# Patient Record
Sex: FEMALE | Race: WHITE | Employment: OTHER | URBAN - METROPOLITAN AREA
[De-identification: names, ages, dates, MRNs, and addresses within clinical notes are randomized per-mention and may not be internally consistent; named-entity substitution may affect disease eponyms.]

---

## 2017-06-04 ENCOUNTER — HOSPITAL ENCOUNTER (INPATIENT)
Age: 82
LOS: 2 days | Discharge: HOME HEALTH | DRG: 871 | End: 2017-06-06
Attending: EMERGENCY MEDICINE | Admitting: INTERNAL MEDICINE
Payer: MEDICARE

## 2017-06-04 ENCOUNTER — APPOINTMENT (OUTPATIENT)
Dept: GENERAL RADIOLOGY | Age: 82
DRG: 871 | End: 2017-06-04
Attending: EMERGENCY MEDICINE
Payer: MEDICARE

## 2017-06-04 DIAGNOSIS — J44.1 ACUTE EXACERBATION OF CHRONIC OBSTRUCTIVE PULMONARY DISEASE (COPD) (HCC): Primary | ICD-10-CM

## 2017-06-04 DIAGNOSIS — N28.9 ACUTE RENAL INSUFFICIENCY: ICD-10-CM

## 2017-06-04 DIAGNOSIS — N30.00 ACUTE CYSTITIS WITHOUT HEMATURIA: ICD-10-CM

## 2017-06-04 DIAGNOSIS — A41.9 SEPSIS, DUE TO UNSPECIFIED ORGANISM: ICD-10-CM

## 2017-06-04 PROBLEM — N17.9 ACUTE RENAL FAILURE (ARF) (HCC): Status: ACTIVE | Noted: 2017-06-04

## 2017-06-04 PROBLEM — J20.9 BRONCHITIS, ACUTE: Status: ACTIVE | Noted: 2017-06-04

## 2017-06-04 PROBLEM — J96.21 ACUTE AND CHRONIC RESPIRATORY FAILURE WITH HYPOXIA (HCC): Status: ACTIVE | Noted: 2017-06-04

## 2017-06-04 LAB
ALBUMIN SERPL BCP-MCNC: 2.9 G/DL (ref 3.5–5)
ALBUMIN/GLOB SERPL: 0.8 {RATIO} (ref 1.1–2.2)
ALP SERPL-CCNC: 62 U/L (ref 45–117)
ALT SERPL-CCNC: 20 U/L (ref 12–78)
ANION GAP BLD CALC-SCNC: 10 MMOL/L (ref 5–15)
APPEARANCE UR: ABNORMAL
AST SERPL W P-5'-P-CCNC: 31 U/L (ref 15–37)
ATRIAL RATE: 81 BPM
BACTERIA URNS QL MICRO: ABNORMAL /HPF
BASOPHILS # BLD AUTO: 0.2 K/UL (ref 0–0.1)
BASOPHILS # BLD: 1 % (ref 0–1)
BILIRUB SERPL-MCNC: 0.8 MG/DL (ref 0.2–1)
BILIRUB UR QL CFM: NEGATIVE
BUN SERPL-MCNC: 33 MG/DL (ref 6–20)
BUN/CREAT SERPL: 15 (ref 12–20)
CALCIUM SERPL-MCNC: 8.3 MG/DL (ref 8.5–10.1)
CALCULATED R AXIS, ECG10: -43 DEGREES
CALCULATED T AXIS, ECG11: 52 DEGREES
CHLORIDE SERPL-SCNC: 106 MMOL/L (ref 97–108)
CO2 SERPL-SCNC: 23 MMOL/L (ref 21–32)
COLOR UR: ABNORMAL
CREAT SERPL-MCNC: 2.22 MG/DL (ref 0.55–1.02)
DIAGNOSIS, 93000: NORMAL
DIFFERENTIAL METHOD BLD: ABNORMAL
EOSINOPHIL # BLD: 0 K/UL (ref 0–0.4)
EOSINOPHIL NFR BLD: 0 % (ref 0–7)
EPITH CASTS URNS QL MICRO: ABNORMAL /LPF
ERYTHROCYTE [DISTWIDTH] IN BLOOD BY AUTOMATED COUNT: 15.7 % (ref 11.5–14.5)
GLOBULIN SER CALC-MCNC: 3.6 G/DL (ref 2–4)
GLUCOSE SERPL-MCNC: 156 MG/DL (ref 65–100)
GLUCOSE UR STRIP.AUTO-MCNC: NEGATIVE MG/DL
HCT VFR BLD AUTO: 40.3 % (ref 35–47)
HGB BLD-MCNC: 13.1 G/DL (ref 11.5–16)
HGB UR QL STRIP: ABNORMAL
KETONES UR QL STRIP.AUTO: 15 MG/DL
LACTATE SERPL-SCNC: 1.6 MMOL/L (ref 0.4–2)
LACTATE SERPL-SCNC: 3.4 MMOL/L (ref 0.4–2)
LEUKOCYTE ESTERASE UR QL STRIP.AUTO: ABNORMAL
LYMPHOCYTES # BLD AUTO: 2 % (ref 12–49)
LYMPHOCYTES # BLD: 0.4 K/UL (ref 0.8–3.5)
MCH RBC QN AUTO: 29.6 PG (ref 26–34)
MCHC RBC AUTO-ENTMCNC: 32.5 G/DL (ref 30–36.5)
MCV RBC AUTO: 91.2 FL (ref 80–99)
MONOCYTES # BLD: 1.1 K/UL (ref 0–1)
MONOCYTES NFR BLD AUTO: 6 % (ref 5–13)
NEUTS BAND NFR BLD MANUAL: 5 % (ref 0–6)
NEUTS SEG # BLD: 16.1 K/UL (ref 1.8–8)
NEUTS SEG NFR BLD AUTO: 86 % (ref 32–75)
NITRITE UR QL STRIP.AUTO: NEGATIVE
PH UR STRIP: 5 [PH] (ref 5–8)
PLATELET # BLD AUTO: 250 K/UL (ref 150–400)
POTASSIUM SERPL-SCNC: 4.8 MMOL/L (ref 3.5–5.1)
PROT SERPL-MCNC: 6.5 G/DL (ref 6.4–8.2)
PROT UR STRIP-MCNC: 100 MG/DL
Q-T INTERVAL, ECG07: 444 MS
QRS DURATION, ECG06: 122 MS
QTC CALCULATION (BEZET), ECG08: 489 MS
RBC # BLD AUTO: 4.42 M/UL (ref 3.8–5.2)
RBC #/AREA URNS HPF: ABNORMAL /HPF (ref 0–5)
RBC MORPH BLD: ABNORMAL
SODIUM SERPL-SCNC: 139 MMOL/L (ref 136–145)
SP GR UR REFRACTOMETRY: 1.03 (ref 1–1.03)
TROPONIN I SERPL-MCNC: <0.04 NG/ML
UA: UC IF INDICATED,UAUC: ABNORMAL
UROBILINOGEN UR QL STRIP.AUTO: 1 EU/DL (ref 0.2–1)
VENTRICULAR RATE, ECG03: 73 BPM
WBC # BLD AUTO: 17.8 K/UL (ref 3.6–11)
WBC URNS QL MICRO: ABNORMAL /HPF (ref 0–4)

## 2017-06-04 PROCEDURE — 74011250637 HC RX REV CODE- 250/637: Performed by: INTERNAL MEDICINE

## 2017-06-04 PROCEDURE — 80053 COMPREHEN METABOLIC PANEL: CPT | Performed by: EMERGENCY MEDICINE

## 2017-06-04 PROCEDURE — 74011000258 HC RX REV CODE- 258: Performed by: EMERGENCY MEDICINE

## 2017-06-04 PROCEDURE — 83605 ASSAY OF LACTIC ACID: CPT | Performed by: INTERNAL MEDICINE

## 2017-06-04 PROCEDURE — 94640 AIRWAY INHALATION TREATMENT: CPT

## 2017-06-04 PROCEDURE — 71020 XR CHEST PA LAT: CPT

## 2017-06-04 PROCEDURE — 85025 COMPLETE CBC W/AUTO DIFF WBC: CPT | Performed by: EMERGENCY MEDICINE

## 2017-06-04 PROCEDURE — 87086 URINE CULTURE/COLONY COUNT: CPT | Performed by: EMERGENCY MEDICINE

## 2017-06-04 PROCEDURE — 74011000250 HC RX REV CODE- 250: Performed by: INTERNAL MEDICINE

## 2017-06-04 PROCEDURE — 87186 SC STD MICRODIL/AGAR DIL: CPT | Performed by: EMERGENCY MEDICINE

## 2017-06-04 PROCEDURE — 74011250636 HC RX REV CODE- 250/636: Performed by: EMERGENCY MEDICINE

## 2017-06-04 PROCEDURE — 74011000250 HC RX REV CODE- 250: Performed by: EMERGENCY MEDICINE

## 2017-06-04 PROCEDURE — 36415 COLL VENOUS BLD VENIPUNCTURE: CPT | Performed by: INTERNAL MEDICINE

## 2017-06-04 PROCEDURE — 87040 BLOOD CULTURE FOR BACTERIA: CPT | Performed by: EMERGENCY MEDICINE

## 2017-06-04 PROCEDURE — 77010033678 HC OXYGEN DAILY

## 2017-06-04 PROCEDURE — 77030029684 HC NEB SM VOL KT MONA -A

## 2017-06-04 PROCEDURE — 99284 EMERGENCY DEPT VISIT MOD MDM: CPT

## 2017-06-04 PROCEDURE — 93005 ELECTROCARDIOGRAM TRACING: CPT

## 2017-06-04 PROCEDURE — 87077 CULTURE AEROBIC IDENTIFY: CPT | Performed by: EMERGENCY MEDICINE

## 2017-06-04 PROCEDURE — 83605 ASSAY OF LACTIC ACID: CPT | Performed by: EMERGENCY MEDICINE

## 2017-06-04 PROCEDURE — 74011250636 HC RX REV CODE- 250/636: Performed by: INTERNAL MEDICINE

## 2017-06-04 PROCEDURE — 65270000015 HC RM PRIVATE ONCOLOGY

## 2017-06-04 PROCEDURE — 81001 URINALYSIS AUTO W/SCOPE: CPT | Performed by: EMERGENCY MEDICINE

## 2017-06-04 PROCEDURE — 84484 ASSAY OF TROPONIN QUANT: CPT | Performed by: EMERGENCY MEDICINE

## 2017-06-04 RX ORDER — ONDANSETRON 2 MG/ML
4 INJECTION INTRAMUSCULAR; INTRAVENOUS
Status: DISCONTINUED | OUTPATIENT
Start: 2017-06-04 | End: 2017-06-06 | Stop reason: HOSPADM

## 2017-06-04 RX ORDER — ACETAMINOPHEN 325 MG/1
650 TABLET ORAL
Status: DISCONTINUED | OUTPATIENT
Start: 2017-06-04 | End: 2017-06-06 | Stop reason: HOSPADM

## 2017-06-04 RX ORDER — UMECLIDINIUM BROMIDE AND VILANTEROL TRIFENATATE 62.5; 25 UG/1; UG/1
1 POWDER RESPIRATORY (INHALATION) DAILY
COMMUNITY
Start: 2017-05-08 | End: 2021-01-01

## 2017-06-04 RX ORDER — HEPARIN SODIUM 5000 [USP'U]/ML
5000 INJECTION, SOLUTION INTRAVENOUS; SUBCUTANEOUS EVERY 8 HOURS
Status: DISCONTINUED | OUTPATIENT
Start: 2017-06-04 | End: 2017-06-06 | Stop reason: HOSPADM

## 2017-06-04 RX ORDER — PANTOPRAZOLE SODIUM 40 MG/1
40 TABLET, DELAYED RELEASE ORAL
Status: DISCONTINUED | OUTPATIENT
Start: 2017-06-04 | End: 2017-06-04

## 2017-06-04 RX ORDER — IPRATROPIUM BROMIDE AND ALBUTEROL SULFATE 2.5; .5 MG/3ML; MG/3ML
3 SOLUTION RESPIRATORY (INHALATION)
Status: DISCONTINUED | OUTPATIENT
Start: 2017-06-04 | End: 2017-06-04

## 2017-06-04 RX ORDER — AZELASTINE 1 MG/ML
1 SPRAY, METERED NASAL DAILY
Status: DISCONTINUED | OUTPATIENT
Start: 2017-06-04 | End: 2017-06-06 | Stop reason: HOSPADM

## 2017-06-04 RX ORDER — FAMOTIDINE 20 MG/1
20 TABLET, FILM COATED ORAL EVERY 12 HOURS
Status: DISCONTINUED | OUTPATIENT
Start: 2017-06-04 | End: 2017-06-05

## 2017-06-04 RX ORDER — SODIUM CHLORIDE 0.9 % (FLUSH) 0.9 %
5-10 SYRINGE (ML) INJECTION EVERY 8 HOURS
Status: DISCONTINUED | OUTPATIENT
Start: 2017-06-04 | End: 2017-06-06 | Stop reason: HOSPADM

## 2017-06-04 RX ORDER — SODIUM CHLORIDE 0.9 % (FLUSH) 0.9 %
5-10 SYRINGE (ML) INJECTION AS NEEDED
Status: DISCONTINUED | OUTPATIENT
Start: 2017-06-04 | End: 2017-06-06 | Stop reason: HOSPADM

## 2017-06-04 RX ORDER — GUAIFENESIN/DEXTROMETHORPHAN 100-10MG/5
10 SYRUP ORAL
Status: DISCONTINUED | OUTPATIENT
Start: 2017-06-04 | End: 2017-06-06 | Stop reason: HOSPADM

## 2017-06-04 RX ORDER — FAMOTIDINE 20 MG/1
20 TABLET, FILM COATED ORAL DAILY
Status: DISCONTINUED | OUTPATIENT
Start: 2017-06-05 | End: 2017-06-04

## 2017-06-04 RX ORDER — ASPIRIN 81 MG/1
81 TABLET ORAL DAILY
Status: DISCONTINUED | OUTPATIENT
Start: 2017-06-04 | End: 2017-06-06 | Stop reason: HOSPADM

## 2017-06-04 RX ORDER — AZELASTINE HCL 205.5 UG/1
2 SPRAY NASAL 2 TIMES DAILY
COMMUNITY
Start: 2017-05-11 | End: 2019-12-15

## 2017-06-04 RX ORDER — SODIUM CHLORIDE 9 MG/ML
100 INJECTION, SOLUTION INTRAVENOUS CONTINUOUS
Status: DISCONTINUED | OUTPATIENT
Start: 2017-06-04 | End: 2017-06-04

## 2017-06-04 RX ORDER — IPRATROPIUM BROMIDE AND ALBUTEROL SULFATE 2.5; .5 MG/3ML; MG/3ML
3 SOLUTION RESPIRATORY (INHALATION)
Status: DISCONTINUED | OUTPATIENT
Start: 2017-06-04 | End: 2017-06-06 | Stop reason: HOSPADM

## 2017-06-04 RX ORDER — IPRATROPIUM BROMIDE AND ALBUTEROL SULFATE 2.5; .5 MG/3ML; MG/3ML
3 SOLUTION RESPIRATORY (INHALATION) ONCE
Status: COMPLETED | OUTPATIENT
Start: 2017-06-04 | End: 2017-06-04

## 2017-06-04 RX ORDER — GUAIFENESIN 600 MG/1
600 TABLET, EXTENDED RELEASE ORAL EVERY 12 HOURS
Status: DISCONTINUED | OUTPATIENT
Start: 2017-06-04 | End: 2017-06-06 | Stop reason: HOSPADM

## 2017-06-04 RX ADMIN — IPRATROPIUM BROMIDE AND ALBUTEROL SULFATE 3 ML: .5; 3 SOLUTION RESPIRATORY (INHALATION) at 07:26

## 2017-06-04 RX ADMIN — HEPARIN SODIUM 5000 UNITS: 5000 INJECTION, SOLUTION INTRAVENOUS; SUBCUTANEOUS at 10:07

## 2017-06-04 RX ADMIN — IPRATROPIUM BROMIDE AND ALBUTEROL SULFATE 3 ML: .5; 3 SOLUTION RESPIRATORY (INHALATION) at 03:13

## 2017-06-04 RX ADMIN — HEPARIN SODIUM 5000 UNITS: 5000 INJECTION, SOLUTION INTRAVENOUS; SUBCUTANEOUS at 18:48

## 2017-06-04 RX ADMIN — SODIUM CHLORIDE 1100 ML: 900 INJECTION, SOLUTION INTRAVENOUS at 03:21

## 2017-06-04 RX ADMIN — SODIUM CHLORIDE 1000 ML: 900 INJECTION, SOLUTION INTRAVENOUS at 01:57

## 2017-06-04 RX ADMIN — ASPIRIN 81 MG: 81 TABLET, COATED ORAL at 09:01

## 2017-06-04 RX ADMIN — SODIUM CHLORIDE 100 ML/HR: 900 INJECTION, SOLUTION INTRAVENOUS at 05:15

## 2017-06-04 RX ADMIN — CEFTRIAXONE SODIUM 2 G: 2 INJECTION, POWDER, FOR SOLUTION INTRAMUSCULAR; INTRAVENOUS at 03:06

## 2017-06-04 RX ADMIN — AZELASTINE HYDROCHLORIDE 1 SPRAY: 137 SPRAY, METERED NASAL at 09:01

## 2017-06-04 RX ADMIN — ACETAMINOPHEN 650 MG: 325 TABLET, FILM COATED ORAL at 10:06

## 2017-06-04 RX ADMIN — METHYLPREDNISOLONE SODIUM SUCCINATE 40 MG: 40 INJECTION, POWDER, FOR SOLUTION INTRAMUSCULAR; INTRAVENOUS at 05:30

## 2017-06-04 RX ADMIN — SALMETEROL XINAFOATE 1 PUFF: 50 POWDER, METERED ORAL; RESPIRATORY (INHALATION) at 10:07

## 2017-06-04 RX ADMIN — METHYLPREDNISOLONE SODIUM SUCCINATE 40 MG: 40 INJECTION, POWDER, FOR SOLUTION INTRAMUSCULAR; INTRAVENOUS at 20:36

## 2017-06-04 RX ADMIN — IPRATROPIUM BROMIDE AND ALBUTEROL SULFATE 3 ML: .5; 3 SOLUTION RESPIRATORY (INHALATION) at 02:53

## 2017-06-04 RX ADMIN — AZITHROMYCIN MONOHYDRATE 500 MG: 500 INJECTION, POWDER, LYOPHILIZED, FOR SOLUTION INTRAVENOUS at 05:30

## 2017-06-04 RX ADMIN — GUAIFENESIN 600 MG: 600 TABLET, EXTENDED RELEASE ORAL at 09:01

## 2017-06-04 RX ADMIN — SALMETEROL XINAFOATE 1 PUFF: 50 POWDER, METERED ORAL; RESPIRATORY (INHALATION) at 20:35

## 2017-06-04 RX ADMIN — GUAIFENESIN 600 MG: 600 TABLET, EXTENDED RELEASE ORAL at 20:36

## 2017-06-04 RX ADMIN — FAMOTIDINE 20 MG: 20 TABLET ORAL at 21:45

## 2017-06-04 RX ADMIN — IPRATROPIUM BROMIDE AND ALBUTEROL SULFATE 3 ML: .5; 3 SOLUTION RESPIRATORY (INHALATION) at 11:24

## 2017-06-04 RX ADMIN — Medication 10 ML: at 21:46

## 2017-06-04 RX ADMIN — IPRATROPIUM BROMIDE AND ALBUTEROL SULFATE 3 ML: .5; 3 SOLUTION RESPIRATORY (INHALATION) at 15:14

## 2017-06-04 RX ADMIN — UMECLIDINIUM 1 PUFF: 62.5 AEROSOL, POWDER ORAL at 10:08

## 2017-06-04 NOTE — PROGRESS NOTES
Pt admitted earlier this morning by my partner. She is seen on rounds today as courtesy. Pt without complaints currently, says she is feeling better this morning. IMP/PLAN:  Sepsis and acute on chronic hypoxic respiratory failure due to acute bronchitis and acute COPD exacerbation:  Baseline 2 LPM O2. Normal stress testing and EF 3/16. Follows with Dr. Babs Andersen. - CXR on admission with no acute abnormality  - blood cultures sent, will follow  - decrease steroids. Will con't rocephin, azithromycin today - possible transition tomorrow  - weaning O2 as able  - con't mucinex, incruse, salmeterol   Acute kidney injury due to dehydration in setting of solitary kidney:  - UA reviewed with 2+ bacteria but many epithelial cells. Sent for culture will follow. Con't emperic rocephin for now. - given IV fluids overnight, will try off fluids today if po intake remains good  Nausea/Vomiting/Diarrhea:  Pt says sx resolving  - prn zofran   - protonix changed to pepcid  PAD s/p L leg stent:  con't ASA      Code Status: DNR  Surrogate Decision Maker: frances France Bachelor [phone]  DVT Prophylaxis: heparin     Baseline: Pt is independent at home, lives alone    No charge.

## 2017-06-04 NOTE — PROGRESS NOTES
Pharmacy Automatic Renal Dosing    Medication: famotidine   Current regimen:  20 mg twice daily  Recent Labs      06/04/17   0057   CREA  2.22*   BUN  33*     Creatinine Clearance (ml/min): 18      Impression/Plan: dose reduced to 20 mg daily for Crcl < [address]/Soy Guzmán Final SAMARA BradleyD

## 2017-06-04 NOTE — H&P
Hospitalist Admission Note    NAME: Oralia Giraldo   :  11/10/1931   MRN:  [id number]     Date/Time:  2017 2:30 AM    Patient PCP: Maykel Gunn MD Pulm= Dr Raffy Rust , Vascular Sx= Dr Chinyere aCrcamo  ________________________________________________________________________    My assessment of this patient's clinical condition and my plan of care is as follows. Assessment / Plan:  Acute on chronic Resp Failure with Hypoxia POA- on 2L/m at home at baseline, needing 5L/m at the moment  Due to Acute COPD exacerbation POA  Due to Acute Bronchitis POA  Sepsis POA due to above  CXR neg  Cr= 2.2  WBC= 17.8    Admit to stepdown bed as pt is at high risk for further respiratory deterioration  Oxygen to keep sats >90%, taper as able in next 24-48 hrs to baseline 2L/m  Empiric Rocephin + Azithromycin  S/p 1L NS bolus x1 in ER, check lactate, if >2.0 give total 30ml/kg NS bolus  Then cont IVF at 100ml/hr   Follow Blood & Sputum Cx  Repeat CXR in 24-48 hrs after hydration  Cont Anora ellipta or equivalent here  Duoneb Q 4 RT  IV solumedrol Q 8 for now, taper quickly  Mucinex, robitussin DM prn    Acute Renal Failure POA  Dehydration  Solitary Kidney (known since 5yrs old)  Nausea/Vomiting/Diarrhea POA - likely Gastritis POA    IVF  BMP in AM  Check renal USG if Cr not improved in 24 hrs  IV PPI for Gastritis  IV zofran prn    PAD s/p L Leg stent  Cont ASA        Code Status: DNR as per pt's wishes in ER  Surrogate Decision Maker: son Jose Carlos Catalan @ [phone]    DVT Prophylaxis: SQ heparin  GI Prophylaxis: IV protonix as above    Baseline: Pt is independent at home, lives alone        Subjective:   CHIEF COMPLAINT: Worsening SOB with cough x 2 days    HISTORY OF PRESENT ILLNESS:     Yohana Aranda is a 80 y.o.  female who presents with above complains from home ambulatory. Pt complains of Worsening SOB x 2 days.   H/o associated cough with yellowish sputum  No h/o fever, chills or rigors  H/o taking PO azithromycin MWF as per the pulm Dr Oscar Gonzalez at baseline  H/o gastric upset causing nausea/vomiting/diarrhea & decreased PO intake as per the pt x few days    Pt was found to have WBC 17K with left shift in ER with neg CXR & renal failure on workup. We were asked to admit for work up and evaluation of the above problems. Past Medical History:   Diagnosis Date    Arthritis     generalized    COPD     former smoker      >60pkyr quit 1/3/11    h/o DVT 1955    left leg    ischemic left lower extremitiy pain     above knee FemPop 1/3/11    PVD (peripheral vascular disease) ([facility] [address]     Seasonal allergic rhinitis     Single kidney         Past Surgical History:   Procedure Laterality Date    BREAST SURGERY PROCEDURE UNLISTED  1955    excision left breast cyst    HX GI  10 yrs ago    colonoscopy    HX GYN      3 miscarriges    HX GYN      1 vaginal birth   [de-identified] ORTHOPAEDIC      veinography left leg, stent left leg       Social History   Substance Use Topics    Smoking status: Former Smoker     Packs/day: 1.00     Years: 60.00     Quit date: 1/3/2011    Smokeless tobacco: Never Used    Alcohol use Yes      Comment: occasional liquor after dinner        Family History   Problem Relation Age of Onset    Cancer Mother      colon    Cancer Sister      lung     Allergies   Allergen Reactions    Food Extracts Diarrhea     Onions and garlic causes abdominal pain,cramping     Sulfa (Sulfonamide Antibiotics) Other (comments)     Altered Mental Status        Prior to Admission medications    Medication Sig Start Date End Date Taking?  Authorizing Provider   Azelastine (ASTEPRO) 0.15 % (205.5 mcg) nasal spray  5/11/17   Historical Provider   ANORO ELLIPTA 62.5-25 mcg/actuation inhaler  5/8/17   Historical Provider   COMBIVENT RESPIMAT  mcg/actuation inhaler INHALE 1 PUFF THREE TIMES A DAY 5/9/17   Kojo Villeda MD   triamcinolone acetonide (KENALOG) 0.1 % topical cream Apply  to affected area two (2) times daily as needed for Skin Irritation. use thin layer 3/12/13   Latha Hidalgo MD   aspirin delayed-release 81 mg tablet Take  by mouth daily. Historical Provider       REVIEW OF SYSTEMS:           Total of 12 systems reviewed as follows:       POSITIVE= underlined text  Negative = text not underlined  General:  fever, chills, sweats, generalized weakness, weight loss/gain,      loss of appetite   Eyes:    blurred vision, eye pain, loss of vision, double vision  ENT:    rhinorrhea, pharyngitis   Respiratory:   cough, sputum production, SOB, GODINEZ, wheezing, pleuritic pain   Cardiology:   chest pain, palpitations, orthopnea, PND, edema, syncope   Gastrointestinal:  abdominal pain , N/V, diarrhea, dysphagia, constipation, bleeding   Genitourinary:  frequency, urgency, dysuria, hematuria, incontinence   Muskuloskeletal :  arthralgia, myalgia, back pain  Hematology:  easy bruising, nose or gum bleeding, lymphadenopathy   Dermatological: rash, ulceration, pruritis, color change / jaundice  Endocrine:   hot flashes or polydipsia   Neurological:  headache, dizziness, confusion, focal weakness, paresthesia,     Speech difficulties, memory loss, gait difficulty  Psychological: Feelings of anxiety, depression, agitation    Objective:   VITALS:    Visit Vitals    /44 (BP 1 Location: Right arm)    Pulse 80    Temp 98.7 °F (37.1 °C)    Resp 24    Ht 5' 8\" (1.727 m)    Wt 68 kg (150 lb)    SpO2 92%    BMI 22.81 kg/m2       PHYSICAL EXAM:    General:    Alert, cooperative, Moderate Resp distress noted+, appears stated age. HEENT: Atraumatic, anicteric sclerae, pink conjunctivae     No oral ulcers, mucosa moist, throat clear, dentition fair  Neck:  Supple, symmetrical,  thyroid: non tender  Lungs:   Bilateral Wheezing noted +. No rales. Chest wall:  No tenderness  No Accessory muscle use. Heart:   Regular  rhythm,  No  murmur   No edema  Abdomen:   Soft, non-tender. Not distended.   Bowel sounds normal  Extremities: No cyanosis. No clubbing,      Skin turgor normal, Capillary refill normal, Radial dial pulse 2+  Skin:     Not pale. Not Jaundiced  No rashes   Psych:  Good insight. Not depressed. Not anxious or agitated. Neurologic: EOMs intact. No facial asymmetry. No aphasia or slurred speech. Symmetrical strength, Sensation grossly intact. Alert and oriented X 4.     _______________________________________________________________________  Care Plan discussed with:    Comments   Patient x    Family  x Son & DIL at bedside in ER   RN x    Care Manager                    Consultant:  ana Heath   _______________________________________________________________________  Expected  Disposition:   Home with Family x   HH/PT/OT/RN ?   SNF/LTC    JORGE ALBERTO    ________________________________________________________________________  TOTAL TIME:  54 Minutes    Critical Care Provided     Minutes non procedure based      Comments    x Reviewed previous records   >50% of visit spent in counseling and coordination of care  Discussion with patient and/or family and questions answered       ________________________________________________________________________  Signed: Willian Gamboa MD    Procedures: see electronic medical records for all procedures/Xrays and details which were not copied into this note but were reviewed prior to creation of Plan.     LAB DATA REVIEWED:    Recent Results (from the past 24 hour(s))   EKG, 12 LEAD, INITIAL    Collection Time: 06/04/17 12:47 AM   Result Value Ref Range    Ventricular Rate 73 BPM    Atrial Rate 81 BPM    QRS Duration 122 ms    Q-T Interval 444 ms    QTC Calculation (Bezet) 489 ms    Calculated R Axis -43 degrees    Calculated T Axis 52 degrees    Diagnosis       Wide QRS rhythm  Left axis deviation  Left bundle branch block  When compared with ECG of 03-FEB-2011 18:13,  Wide QRS rhythm has replaced Sinus rhythm     CBC WITH AUTOMATED DIFF    Collection Time: 06/04/17 12:57 AM   Result Value Ref Range    WBC 17.8 (H) 3.6 - 11.0 K/uL    RBC 4.42 3.80 - 5.20 M/uL    HGB 13.1 11.5 - 16.0 g/dL    HCT 40.3 35.0 - 47.0 %    MCV 91.2 80.0 - 99.0 FL    MCH 29.6 26.0 - 34.0 PG    MCHC 32.5 30.0 - 36.5 g/dL    RDW 15.7 (H) 11.5 - 14.5 %    PLATELET 164 804 - 027 K/uL    NEUTROPHILS 86 (H) 32 - 75 %    BAND NEUTROPHILS 5 0 - 6 %    LYMPHOCYTES 2 (L) 12 - 49 %    MONOCYTES 6 5 - 13 %    EOSINOPHILS 0 0 - 7 %    BASOPHILS 1 0 - 1 %    ABS. NEUTROPHILS 16.1 (H) 1.8 - 8.0 K/UL    ABS. LYMPHOCYTES 0.4 (L) 0.8 - 3.5 K/UL    ABS. MONOCYTES 1.1 (H) 0.0 - 1.0 K/UL    ABS. EOSINOPHILS 0.0 0.0 - 0.4 K/UL    ABS. BASOPHILS 0.2 (H) 0.0 - 0.1 K/UL    RBC COMMENTS NORMOCYTIC, NORMOCHROMIC      DF MANUAL     METABOLIC PANEL, COMPREHENSIVE    Collection Time: 06/04/17 12:57 AM   Result Value Ref Range    Sodium 139 136 - 145 mmol/L    Potassium 4.8 3.5 - 5.1 mmol/L    Chloride 106 97 - 108 mmol/L    CO2 23 21 - 32 mmol/L    Anion gap 10 5 - 15 mmol/L    Glucose 156 (H) 65 - 100 mg/dL    BUN 33 (H) 6 - 20 MG/DL    Creatinine 2.22 (H) 0.55 - 1.02 MG/DL    BUN/Creatinine ratio 15 12 - 20      GFR est AA 25 (L) >60 ml/min/1.73m2    GFR est non-AA 21 (L) >60 ml/min/1.73m2    Calcium 8.3 (L) 8.5 - 10.1 MG/DL    Bilirubin, total 0.8 0.2 - 1.0 MG/DL    ALT (SGPT) 20 12 - 78 U/L    AST (SGOT) 31 15 - 37 U/L    Alk.  phosphatase 62 45 - 117 U/L    Protein, total 6.5 6.4 - 8.2 g/dL    Albumin 2.9 (L) 3.5 - 5.0 g/dL    Globulin 3.6 2.0 - 4.0 g/dL    A-G Ratio 0.8 (L) 1.1 - 2.2     TROPONIN I    Collection Time: 06/04/17 12:57 AM   Result Value Ref Range    Troponin-I, Qt. <0.04 <0.05 ng/mL

## 2017-06-04 NOTE — PROGRESS NOTES
Bedside shift change report given to [facility] (oncoming nurse) by Rosa Stone (offgoing nurse). Report included the following information SBAR, Kardex, Intake/Output, MAR, Recent Results and Cardiac Rhythm NSR. TRANSFER - OUT REPORT:    Verbal report given to Pam Christianson (name) on Edmond Najera  being transferred to Oncology (unit) for routine progression of care       Report consisted of patients Situation, Background, Assessment and   Recommendations(SBAR). Information from the following report(s) SBAR, Kardex, Intake/Output, MAR, Recent Results and Cardiac Rhythm NSR was reviewed with the receiving nurse. Lines:   Peripheral IV 06/04/17 Left Antecubital (Active)   Site Assessment Clean, dry, & intact 6/4/2017  4:21 PM   Phlebitis Assessment 0 6/4/2017  4:21 PM   Infiltration Assessment 0 6/4/2017  4:21 PM   Dressing Status Clean, dry, & intact 6/4/2017  4:21 PM   Dressing Type Transparent;Tape 6/4/2017  4:21 PM   Hub Color/Line Status Pink;Capped 6/4/2017  4:21 PM   Alcohol Cap Used No 6/4/2017  4:21 PM        Opportunity for questions and clarification was provided.       Patient transported with:   Oxygen at 3 liters  RN

## 2017-06-04 NOTE — IP AVS SNAPSHOT
[address] [facility]. 
[phone] Patient: Montserrat Rodriguez MRN: [id number] You are allergic to the following Allergen Reactions Food Extracts Diarrhea Onions and garlic causes abdominal pain,cramping   
    
 Sulfa (Sulfonamide Antibiotics) Other (comments) Altered Mental Status Recent Documentation Height Weight Breastfeeding? BMI OB Status Smoking Status 1.727 m 68 kg No 22.81 kg/m2 Postmenopausal Former Smoker Unresulted Labs Order Current Status CULTURE, BLOOD Preliminary result CULTURE, BLOOD, PAIRED Preliminary result CULTURE, RESPIRATORY/SPUTUM/BRONCH W GRAM STAIN Preliminary result CULTURE, URINE Preliminary result Emergency Contacts  (Rel.) Home Phone Work Phone Mobile Phone Damon Capone (Spouse) [phone] -- -- About your hospitalization You were admitted on:  June 4, 2017 You last received care in the:  [facility] MEDICAL ONCOLOGY You were discharged on:  June 6, 2017 Why you were hospitalized Your primary diagnosis was:  Not on File Your diagnoses also included:  Bronchitis, Acute, Copd Exacerbation (Hcc), Acute Renal Failure (Arf) (Hcc), Acute And Chronic Respiratory Failure With Hypoxia (Hcc), Sepsis (Hcc) Providers Seen During Your Hospitalizations Provider Role Specialty Primary office phone Rufus Hendrickson MD Attending Provider Emergency Medicine [phone] Selene Gonsalez MD Attending Provider Internal Medicine [phone] Your Primary Care Physician (PCP) Primary Care Physician Office Phone Office Fax Abdifinn Potter [phone] Follow-up Information Follow up With Details Comments Contact Info Keith Terrazas MD  As needed [address] 
[phone] Michelle Galarza MD In 2 weeks  [address] [facility] [address] [facility] 
[phone] Current Discharge Medication List  
  
START taking these medications Dose & Instructions Dispensing Information Comments Morning Noon Evening Bedtime  
 guaiFENesin  mg ER tablet Commonly known as:  Adrian & Adrian Your last dose was: Your next dose is:    
   
   
 Dose:  600 mg Take 1 Tab by mouth two (2) times a day for 7 days. Quantity:  14 Tab Refills:  0  
     
   
   
   
  
 guaiFENesin-dextromethorphan 100-10 mg/5 mL syrup Commonly known as:  ROBITUSSIN DM Your last dose was: Your next dose is:    
   
   
 Dose:  5 mL Take 5 mL by mouth every six (6) hours as needed for up to 10 days. Quantity:  118 mL Refills:  0  
     
   
   
   
  
 levoFLOXacin 750 mg tablet Commonly known as:  Rick Curtis Your last dose was: Your next dose is:    
   
   
 Dose:  750 mg Take 1 Tab by mouth every fourty-eight (48) hours for 7 days. Quantity:  3 Tab Refills:  0  
     
   
   
   
  
 predniSONE 20 mg tablet Commonly known as:  Luis A Shelley Your last dose was: Your next dose is:    
   
   
 Dose:  40 mg Take 2 Tabs by mouth daily for 7 days. Quantity:  14 Tab Refills:  0 CONTINUE these medications which have CHANGED Dose & Instructions Dispensing Information Comments Morning Noon Evening Bedtime * COMBIVENT RESPIMAT  mcg/actuation inhaler Generic drug:  ipratropium-albuterol What changed:  Another medication with the same name was added. Make sure you understand how and when to take each. Your last dose was: Your next dose is:    
   
   
 INHALE 1 PUFF THREE TIMES A DAY Quantity:  1 Inhaler Refills:  11  
     
   
   
   
  
 * albuterol-ipratropium 2.5 mg-0.5 mg/3 ml Nebu Commonly known as:  Izola Cons  
 What changed: You were already taking a medication with the same name, and this prescription was added. Make sure you understand how and when to take each. Your last dose was: Your next dose is:    
   
   
 Dose:  3 mL  
3 mL by Nebulization route three (3) times daily. Quantity:  90 Nebule Refills:  0  
     
   
   
   
  
 * Notice: This list has 2 medication(s) that are the same as other medications prescribed for you. Read the directions carefully, and ask your doctor or other care provider to review them with you. CONTINUE these medications which have NOT CHANGED Dose & Instructions Dispensing Information Comments Morning Noon Evening Bedtime ANORO ELLIPTA 62.5-25 mcg/actuation inhaler Generic drug:  umeclidinium-vilanterol Your last dose was: Your next dose is:    
   
   
  Refills:  0  
     
   
   
   
  
 aspirin delayed-release 81 mg tablet Your last dose was: Your next dose is: Take  by mouth daily. Refills:  0 Azelastine 0.15 % (205.5 mcg) nasal spray Commonly known as:  ASTEPRO Your last dose was: Your next dose is:    
   
   
  Refills:  0  
     
   
   
   
  
 triamcinolone acetonide 0.1 % topical cream  
Commonly known as:  KENALOG Your last dose was: Your next dose is:    
   
   
 Apply  to affected area two (2) times daily as needed for Skin Irritation. use thin layer Quantity:  80 g Refills:  5 Where to Get Your Medications Information on where to get these meds will be given to you by the nurse or doctor. ! Ask your nurse or doctor about these medications  
  albuterol-ipratropium 2.5 mg-0.5 mg/3 ml Nebu  
 guaiFENesin  mg ER tablet  
 guaiFENesin-dextromethorphan 100-10 mg/5 mL syrup  
 levoFLOXacin 750 mg tablet  
 predniSONE 20 mg tablet Discharge Instructions HOSPITALIST DISCHARGE INSTRUCTIONS 
 
NAME: Heidi Bauer :  11/10/1931 MRN:  [id number] Date/Time:  2017 11:38 AM 
 
ADMIT DATE: 2017 DISCHARGE DATE: 2017 Attending Physician: Karley Tomlinson MD 
 
DISCHARGE DIAGNOSIS: 
Sepsis and acute on chronic hypoxic respiratory failure due to acute bronchitis and acute COPD exacerbation Acute kidney injury due to dehydration, resolved Nausea/Vomiting/Diarrhea MEDICATIONS: 
See above · It is important that you take the medication exactly as they are prescribed. · Keep your medication in the bottles provided by the pharmacist and keep a list of the medication names, dosages, and times to be taken in your wallet. · Do not take other medications without consulting your doctor. Pain Management: per above medications What to do at [facility] Recommended diet:  Resume previous diet Recommended activity: Activity as tolerated If you have questions regarding the hospital related prescriptions or hospital related issues please call [facility] at . You can always direct your questions to your primary care doctor if you are unable to reach your hospital physician; your PCP works as an extension of your hospital doctor just like your hospital doctor is an extension of your PCP for your time at [facility]. If you experience any of the following symptoms then please call your primary care physician or return to the emergency room if you cannot get hold of your doctor: 
Fever, chills, nausea, vomiting, diarrhea, change in mentation, falling, bleeding, shortness of breath Additional Instructions: 
 
I recommend that you take an over-the-counter probiotic (such as Align, Culturelle, or store generic) while you are on antibiotics. For your constipation, I recommend that you start docusate (Colace) 100mg by mouth twice daily - this is available over the counter. Bring these papers with you to your follow up appointments. The papers will help your doctors be sure to continue the care plan from the hospital. 
 
 
 
 
 
 
Information obtained by : 
I understand that if any problems occur once I am at home I am to contact my physician. I understand and acknowledge receipt of the instructions indicated above. Physician's or R.N.'s Signature                                                                  Date/Time Patient or Representative Signature                                                          Date/Time Discharge Orders None [employer] Announcement We are excited to announce that we are making your provider's discharge notes available to you in [employer]. You will see these notes when they are completed and signed by the physician that discharged you from your recent hospital stay. If you have any questions or concerns about any information you see in [employer], please call the Health Information Department where you were seen or reach out to your Primary Care Provider for more information about your plan of care. Introducing [facility] & [facility] SERVICES! Hilaria Escobar introduces [employer] patient portal. Now you can access parts of your medical record, email your doctor's office, and request medication refills online. 1. In your internet browser, go to [email or web address] 2. Click on the First Time User? Click Here link in the Sign In box. You will see the New Member Sign Up page. 3. Enter your [employer] Access Code exactly as it appears below. You will not need to use this code after youve completed the sign-up process.  If you do not sign up before the expiration date, you must request a new code. · [employer] Access Code: [email or web address] Expires: 9/2/2017 12:51 AM 
 
4. Enter the last four digits of your Social Security Number (xxxx) and Date of Birth (mm/dd/yyyy) as indicated and click Submit. You will be taken to the next sign-up page. 5. Create a [employer] ID. This will be your [employer] login ID and cannot be changed, so think of one that is secure and easy to remember. 6. Create a [employer] password. You can change your password at any time. 7. Enter your Password Reset Question and Answer. This can be used at a later time if you forget your password. 8. Enter your e-mail address. You will receive e-mail notification when new information is available in [address] 9. Click Sign Up. You can now view and download portions of your medical record. 10. Click the Download Summary menu link to download a portable copy of your medical information. If you have questions, please visit the Frequently Asked Questions section of the [employer] website. Remember, [employer] is NOT to be used for urgent needs. For medical emergencies, dial 911. Now available from your iPhone and Android! General Information Please provide this summary of care documentation to your next provider. Patient Signature:  ____________________________________________________________ Date:  ____________________________________________________________  
  
Gene Chicas Provider Signature:  ____________________________________________________________ Date:  ____________________________________________________________

## 2017-06-04 NOTE — PROGRESS NOTES
Primary Nurse Torey Santos RN and Sirisha Anand RN performed a dual skin assessment on this patient No impairment noted, except callous on second toe on right foot. Blanchable redness on bilateral bunions. Also, blanchable redness on elbows and behind right ear from nasal cannula.    Juan score is 20

## 2017-06-04 NOTE — ED PROVIDER NOTES
HPI Comments: Franklin Huynh is a 80 y.o. female with history significant for COPD on continuous 2L O2 NC, PVD, Ischemic left lower extremity pain presenting ambulatory to [facility] ED with c/o SOB and upset stomach for 2 days. Pt reports having a productive cough producing yellow sputum. Pt also reports generalized weakness. Pt states having breathing treatments, but has recently lost her inhaler and will not do nebulizer treatments at home. Pt denies any heart disease. Pt specifically denies pain with urination, fever, and chills. PCP: Jevon Menendez MD    Social Hx: +EtOH; -Smoker (Former Smoker; quit date: 4/3/9447); -Illicit Drugs    There are no other changes, complaints or physical findings at this time. .    The history is provided by the patient. Past Medical History:   Diagnosis Date    Arthritis     generalized    COPD     former smoker      >60pkyr quit 1/3/11    h/o DVT 1955    left leg    ischemic left lower extremitiy pain     above knee FemPop 1/3/11    PVD (peripheral vascular disease) ([facility] [address]     Seasonal allergic rhinitis     Single kidney        Past Surgical History:   Procedure Laterality Date    BREAST SURGERY PROCEDURE UNLISTED  1955    excision left breast cyst    HX GI  10 yrs ago    colonoscopy    HX GYN      3 miscarriges    HX GYN      1 vaginal birth   [de-identified] ORTHOPAEDIC      veinography left leg, stent left leg         Family History:   Problem Relation Age of Onset    Cancer Mother      colon    Cancer Sister      lung       Social History     Social History    Marital status:      Spouse name: N/A    Number of children: N/A    Years of education: N/A     Occupational History    Not on file.      Social History Main Topics    Smoking status: Former Smoker     Packs/day: 1.00     Years: 60.00     Quit date: 1/3/2011    Smokeless tobacco: Never Used    Alcohol use Yes      Comment: occasional liquor after dinner    Drug use: No    Sexual activity: Not on file     Other Topics Concern    Not on file     Social History Narrative         ALLERGIES: Food extracts and Sulfa (sulfonamide antibiotics)    Review of Systems   Constitutional: Negative for activity change, chills and fever. HENT: Negative for congestion and sore throat. Eyes: Negative for pain and redness. Respiratory: Positive for cough (Productive with yellow sputum) and shortness of breath. Negative for chest tightness. Cardiovascular: Negative for chest pain and palpitations. Gastrointestinal: Negative for abdominal pain, diarrhea, nausea and vomiting. Genitourinary: Negative for dysuria, frequency and urgency. Musculoskeletal: Negative for back pain and neck pain. Skin: Negative for rash. Neurological: Positive for weakness (Generalized). Negative for syncope, light-headedness and headaches. Psychiatric/Behavioral: Negative for confusion. All other systems reviewed and are negative. Patient Vitals for the past 12 hrs:   Temp Pulse Resp BP SpO2   06/04/17 0048 - - - - 92 %   06/04/17 0042 - - - 120/47 -   06/04/17 0036 98.7 °F (37.1 °C) 80 (!) 36 (!) 62/50 (!) 89 %     Physical Exam   Constitutional: She is oriented to person, place, and time. She appears well-developed and well-nourished. No distress. HENT:   Head: Normocephalic. Nose: Nose normal.   Mouth/Throat: Oropharynx is clear and moist. No oropharyngeal exudate. Eyes: Conjunctivae are normal. Pupils are equal, round, and reactive to light. No scleral icterus. Neck: Normal range of motion. Neck supple. No JVD present. No tracheal deviation present. No thyromegaly present. Cardiovascular: Normal rate, regular rhythm and intact distal pulses. Exam reveals no gallop and no friction rub. No murmur heard. Pulmonary/Chest: No stridor. She is in respiratory distress. She has wheezes. She has no rales.    BL expiratory wheezes  mild increased work of breathing  + pursed lip breathing  Talks in short 3-5 word sentences  Mild respiratory distress   Abdominal: Soft. Bowel sounds are normal. She exhibits no distension. There is no tenderness. There is no rebound and no guarding. Musculoskeletal: Normal range of motion. She exhibits no edema. Lymphadenopathy:     She has no cervical adenopathy. Neurological: She is alert and oriented to person, place, and time. No cranial nerve deficit. She exhibits normal muscle tone. Coordination normal.   Skin: Skin is warm and dry. No rash noted. She is not diaphoretic. No erythema. Psychiatric: She has a normal mood and affect. Her behavior is normal.   Nursing note and vitals reviewed. MDM  Number of Diagnoses or Management Options  Acute cystitis without hematuria:   Acute exacerbation of chronic obstructive pulmonary disease (COPD) ([facility] [address]):   Acute renal insufficiency:   Sepsis, due to unspecified organism [facility]):   Diagnosis management comments: DDx: COPD, PNA, ACS, and sepsis    Risk of Complications, Morbidity, and/or Mortality  General comments: Will admit to hospitalist for acute copd exacerbation, uti, sepsis; bp's stable; no tachycardia; afebrile here; believe initial bp of 62/50 was inaccurate, as bp was normal 6 minutes later on repeat; clear cxr; +uti; +elevated lactate at 3.4; wbc 17; + acute renal insufficiency with cr of 2.2, previously normal in 2015; started iv ceftriaxone and zithromax;  Gabriela Ivan MD      Critical Care  Total time providing critical care: 30-74 minutes    ED Course       Procedures    CONSULT NOTE:   1:52 AM  Gabriela Ivan MD   spoke with Dr. Tl Joyner,   Specialty: Hospitalist  Discussed pt's hx, disposition, and available diagnostic and imaging results. Reviewed care plans. Consultant will evaluate pt for admission. Would like to treat pt with Rocephin and Zithromax. Written by MELODY Uriostegui, as dictated by Gabriela Ivan MD.    ED EKG interpretation: 00:47  Rhythm: sinus rhythm; and regular .  Rate (approx.): 73; Axis: left axis deviation; MA Interval: normal; QRS interval: LBBB; ST/T wave: non-specific changes; This EKG was interpreted by Yg Higgins MD,ED Provider. CRITICAL CARE NOTE :    3:58 AM      IMPENDING DETERIORATION -Airway, Respiratory, Cardiovascular and Metabolic    ASSOCIATED RISK FACTORS - Hypotension, Dysrhythmia, Metabolic changes and CNS Decompensation    MANAGEMENT- Bedside Assessment and Supervision of Care    INTERPRETATION -  Xrays, ECG and Blood Pressure    INTERVENTIONS - hemodynamic mngmt and Metobolic interventions    CASE REVIEW - Hospitalist, Medical Sub-Specialist and Nursing    TREATMENT RESPONSE -Improved    PERFORMED BY - Self        NOTES   :      I have spent 50 minutes of critical care time involved in lab review, consultations with specialist, family decision- making, bedside attention and documentation. During this entire length of time I was immediately available to the patient . Critical Care: The reason for providing this level of medical care for this critically ill patient was due to a critical illness that impaired one or more vital organ systems, such that there was a high probability of imminent or life threatening deterioration in the patient's condition. This care involved high complexity decision making to assess, manipulate, and support vital system functions, to treat this degree of vital organ system failure, and to prevent further life threatening deterioration of the patients condition.   Yg Higgins MD        LABORATORY TESTS:  Recent Results (from the past 12 hour(s))   EKG, 12 LEAD, INITIAL    Collection Time: 06/04/17 12:47 AM   Result Value Ref Range    Ventricular Rate 73 BPM    Atrial Rate 81 BPM    QRS Duration 122 ms    Q-T Interval 444 ms    QTC Calculation (Bezet) 489 ms    Calculated R Axis -43 degrees    Calculated T Axis 52 degrees    Diagnosis       Wide QRS rhythm  Left axis deviation  Left bundle branch block  When compared with ECG of 03-FEB-2011 18:13,  Wide QRS rhythm has replaced Sinus rhythm     CBC WITH AUTOMATED DIFF    Collection Time: 06/04/17 12:57 AM   Result Value Ref Range    WBC 17.8 (H) 3.6 - 11.0 K/uL    RBC 4.42 3.80 - 5.20 M/uL    HGB 13.1 11.5 - 16.0 g/dL    HCT 40.3 35.0 - 47.0 %    MCV 91.2 80.0 - 99.0 FL    MCH 29.6 26.0 - 34.0 PG    MCHC 32.5 30.0 - 36.5 g/dL    RDW 15.7 (H) 11.5 - 14.5 %    PLATELET 802 549 - 927 K/uL    NEUTROPHILS 86 (H) 32 - 75 %    BAND NEUTROPHILS 5 0 - 6 %    LYMPHOCYTES 2 (L) 12 - 49 %    MONOCYTES 6 5 - 13 %    EOSINOPHILS 0 0 - 7 %    BASOPHILS 1 0 - 1 %    ABS. NEUTROPHILS 16.1 (H) 1.8 - 8.0 K/UL    ABS. LYMPHOCYTES 0.4 (L) 0.8 - 3.5 K/UL    ABS. MONOCYTES 1.1 (H) 0.0 - 1.0 K/UL    ABS. EOSINOPHILS 0.0 0.0 - 0.4 K/UL    ABS. BASOPHILS 0.2 (H) 0.0 - 0.1 K/UL    RBC COMMENTS NORMOCYTIC, NORMOCHROMIC      DF MANUAL     METABOLIC PANEL, COMPREHENSIVE    Collection Time: 06/04/17 12:57 AM   Result Value Ref Range    Sodium 139 136 - 145 mmol/L    Potassium 4.8 3.5 - 5.1 mmol/L    Chloride 106 97 - 108 mmol/L    CO2 23 21 - 32 mmol/L    Anion gap 10 5 - 15 mmol/L    Glucose 156 (H) 65 - 100 mg/dL    BUN 33 (H) 6 - 20 MG/DL    Creatinine 2.22 (H) 0.55 - 1.02 MG/DL    BUN/Creatinine ratio 15 12 - 20      GFR est AA 25 (L) >60 ml/min/1.73m2    GFR est non-AA 21 (L) >60 ml/min/1.73m2    Calcium 8.3 (L) 8.5 - 10.1 MG/DL    Bilirubin, total 0.8 0.2 - 1.0 MG/DL    ALT (SGPT) 20 12 - 78 U/L    AST (SGOT) 31 15 - 37 U/L    Alk. phosphatase 62 45 - 117 U/L    Protein, total 6.5 6.4 - 8.2 g/dL    Albumin 2.9 (L) 3.5 - 5.0 g/dL    Globulin 3.6 2.0 - 4.0 g/dL    A-G Ratio 0.8 (L) 1.1 - 2.2     TROPONIN I    Collection Time: 06/04/17 12:57 AM   Result Value Ref Range    Troponin-I, Qt. <0.04 <0.05 ng/mL       IMAGING RESULTS:  XR CHEST PA LAT   Final Result   EXAM: XR CHEST PA LAT. INDICATION: Chest pain. COMPARISON: 11/2/2015.     FINDINGS:   PA and lateral radiographs of the chest were obtained.  The patient is on a  cardiac monitor and nasal oxygen. Lungs: The lungs are clear of mass, nodule, airspace disease or edema. Pleura: There is no pleural effusion or pneumothorax. Mediastinum: The cardiac and mediastinal contours and pulmonary vascularity are  normal.  Bones and soft tissues: There is some sclerosis in the left humeral head which  is unchanged.     IMPRESSION  IMPRESSION: No acute abnormality. MEDICATIONS GIVEN:  Medications   sodium chloride 0.9 % bolus infusion 1,000 mL (not administered)   albuterol-ipratropium (DUO-NEB) 2.5 MG-0.5 MG/3 ML (not administered)   cefTRIAXone (ROCEPHIN) 2 g in 0.9% sodium chloride (MBP/ADV) 50 mL (not administered)   azithromycin (ZITHROMAX) 500 mg in 0.9% sodium chloride (MBP/ADV) 250 mL (not administered)       IMPRESSION:  1. Acute exacerbation of chronic obstructive pulmonary disease (COPD) ([facility] [address]    2. Acute renal insufficiency        PLAN:  1. Admit to Hospitalist    Admit Note:  1:57 AM  Pt is being admitted by Sol Tyson MD. The results of their tests and reason(s) for their admission have been discussed with pt and/or available family. They convey agreement and understanding for the need to be admitted and for admission diagnosis. This note is prepared by Giovanni Oropeza, acting as a Scribe for Sol Tyson MD.    Sol Tyson MD: The scribe's documentation has been prepared under my direction and personally reviewed by me in its entirety. I confirm that the notes above accurately reflects all work, treatment, procedures, and medical decision making performed by me.

## 2017-06-04 NOTE — PROGRESS NOTES
PCU SHIFT NURSING NOTE      Bedside shift change report given to Luis Solitario (oncoming nurse) by Brian Najera (offgoing nurse). Report included the following information SBAR, Kardex, Intake/Output, MAR and Recent Results. Shift Summary: 0730  Pt with no complaints  In bed resting NSR on monitor        Admission Date 6/4/2017   Admission Diagnosis Acute and chronic respiratory failure with hypoxia (HCC)  COPD exacerbation (HCC)  Bronchitis, acute  Acute renal failure (ARF) (HCC)   Consults None        Consults   []PT   []OT   []Speech   []Case Management      [] Palliative      Cardiac Monitoring Order   []Yes   []No     IV drips   []Yes    Drip:                            Dose:  Drip:                            Dose:  Drip:                            Dose:   []No     GI Prophylaxis   []Yes   []No         DVT Prophylaxis   SCDs:             Torito stockings:         [] Medication   []Contraindicated   []None      Activity Level Activity Level: Up with Assistance     Activity Assistance: Partial (one person)   Purposeful Rounding every 1-2 hour? []Yes   Candelaria Score  Total Score: 3   Bed Alarm (If score 3 or >)   []Yes   [] Refused (See signed refusal form in chart)   Juan Score  Juan Score: 20   Juan Score (if score 14 or less)   []PMT consult   []Wound Care consult      []Specialty bed   [] Nutrition consult          Needs prior to discharge:   Home O2 required:    []Yes   []No    If yes, how much O2 required?     Other:    Last Bowel Movement: Last Bowel Movement Date: 06/03/17      Influenza Vaccine Received Flu Vaccine for Current Season (usually Sept-March): Not Flu Season        Pneumonia Vaccine           Diet Active Orders   Diet    DIET CARDIAC Regular; 2 GM NA (House Low NA)      LDAs               Peripheral IV 06/04/17 Left Antecubital (Active)   Site Assessment Clean, dry, & intact 6/4/2017  8:06 AM   Phlebitis Assessment 0 6/4/2017  8:06 AM   Infiltration Assessment 0 6/4/2017  8:06 AM   Dressing Status Clean, dry, & intact 6/4/2017  8:06 AM   Dressing Type Transparent 6/4/2017  8:06 AM   Hub Color/Line Status Pink; Infusing 6/4/2017  8:06 AM                      Urinary Catheter      Intake & Output   Date 06/03/17 0700 - 06/04/17 0659(Not Admitted) 06/04/17 0700 - 06/05/17 0659   Shift 0700-1859 1900-0659 24 Hour Total 0700-1859 1900-0659 24 Hour Total   I  N  T  A  K  E   I.V.  300  (0.4) 300  (0.2)         Volume (cefTRIAXone (ROCEPHIN) 2 g in 0.9% sodium chloride (MBP/ADV) 50 mL)  50 50         Volume (azithromycin (ZITHROMAX) 500 mg in 0.9% sodium chloride (MBP/ADV) 250 mL)  250 250       Shift Total  (mL/kg)  300  (4.4) 300  (4.4)      O  U  T  P  U  T   Shift Total  (mL/kg)         NET  300 300      Weight (kg)  68 68 68 68 68         Readmission Risk Assessment Tool Score Medium Risk            14       Total Score        4 More than 1 Admission in calendar year    5 Patient Insurance is Medicare, Medicaid or Self Pay    5 Charlson Comorbidity Score        Criteria that do not apply:    Relationship with PCP    Patient Living Status    Patient Length of Stay > 5       Expected Length of Stay - - -   Actual Length of Stay 0

## 2017-06-04 NOTE — PROGRESS NOTES
0230: TRANSFER - IN REPORT:    Verbal report received from CHELSIE Castro RN(name) on Shelby Arellano  being received from ED(unit) for routine progression of care      Report consisted of patients Situation, Background, Assessment and   Recommendations(SBAR). Information from the following report(s) SBAR, Kardex, ED Summary, Procedure Summary, Intake/Output, MAR, Recent Results, Cardiac Rhythm NSR and Alarm Parameters  was reviewed with the receiving nurse. Opportunity for questions and clarification was provided. Assessment will be completed upon patients arrival to unit and care assumed. 0430: Pt arrived from ED w/ IVF infusing for 2nd 1000cc bolus of fluids. Is alert and oriented. Transferred to bed. Son and daughter in law w/ pt and took all of pt's belongings home. O2 per NC @ 4L. Scattered wheezing noted. Primary Nurse Briseyda Laird, TOMI and Amol Serrano RN performed a dual skin assessment on this patient No impairment noted  Juan score is 20. Pt has some SOB and dry, non-productive cough. 3304: 2nd lactic acid drawn.

## 2017-06-05 LAB
ANION GAP BLD CALC-SCNC: 8 MMOL/L (ref 5–15)
BUN SERPL-MCNC: 41 MG/DL (ref 6–20)
BUN/CREAT SERPL: 31 (ref 12–20)
CALCIUM SERPL-MCNC: 8.2 MG/DL (ref 8.5–10.1)
CHLORIDE SERPL-SCNC: 112 MMOL/L (ref 97–108)
CO2 SERPL-SCNC: 20 MMOL/L (ref 21–32)
CREAT SERPL-MCNC: 1.34 MG/DL (ref 0.55–1.02)
ERYTHROCYTE [DISTWIDTH] IN BLOOD BY AUTOMATED COUNT: 15.9 % (ref 11.5–14.5)
GLUCOSE SERPL-MCNC: 151 MG/DL (ref 65–100)
HCT VFR BLD AUTO: 35.1 % (ref 35–47)
HGB BLD-MCNC: 11.5 G/DL (ref 11.5–16)
MCH RBC QN AUTO: 30.3 PG (ref 26–34)
MCHC RBC AUTO-ENTMCNC: 32.8 G/DL (ref 30–36.5)
MCV RBC AUTO: 92.4 FL (ref 80–99)
PLATELET # BLD AUTO: 216 K/UL (ref 150–400)
POTASSIUM SERPL-SCNC: 4.6 MMOL/L (ref 3.5–5.1)
RBC # BLD AUTO: 3.8 M/UL (ref 3.8–5.2)
SODIUM SERPL-SCNC: 140 MMOL/L (ref 136–145)
WBC # BLD AUTO: 11.9 K/UL (ref 3.6–11)

## 2017-06-05 PROCEDURE — 94640 AIRWAY INHALATION TREATMENT: CPT

## 2017-06-05 PROCEDURE — 77010033678 HC OXYGEN DAILY

## 2017-06-05 PROCEDURE — 80048 BASIC METABOLIC PNL TOTAL CA: CPT | Performed by: INTERNAL MEDICINE

## 2017-06-05 PROCEDURE — 87040 BLOOD CULTURE FOR BACTERIA: CPT | Performed by: INTERNAL MEDICINE

## 2017-06-05 PROCEDURE — 74011000250 HC RX REV CODE- 250: Performed by: INTERNAL MEDICINE

## 2017-06-05 PROCEDURE — 97161 PT EVAL LOW COMPLEX 20 MIN: CPT | Performed by: PHYSICAL THERAPIST

## 2017-06-05 PROCEDURE — 85027 COMPLETE CBC AUTOMATED: CPT | Performed by: INTERNAL MEDICINE

## 2017-06-05 PROCEDURE — 87070 CULTURE OTHR SPECIMN AEROBIC: CPT | Performed by: INTERNAL MEDICINE

## 2017-06-05 PROCEDURE — 97535 SELF CARE MNGMENT TRAINING: CPT

## 2017-06-05 PROCEDURE — 36415 COLL VENOUS BLD VENIPUNCTURE: CPT | Performed by: INTERNAL MEDICINE

## 2017-06-05 PROCEDURE — 74011250636 HC RX REV CODE- 250/636: Performed by: INTERNAL MEDICINE

## 2017-06-05 PROCEDURE — G8979 MOBILITY GOAL STATUS: HCPCS | Performed by: PHYSICAL THERAPIST

## 2017-06-05 PROCEDURE — 97165 OT EVAL LOW COMPLEX 30 MIN: CPT

## 2017-06-05 PROCEDURE — 74011250636 HC RX REV CODE- 250/636: Performed by: EMERGENCY MEDICINE

## 2017-06-05 PROCEDURE — 74011000258 HC RX REV CODE- 258: Performed by: EMERGENCY MEDICINE

## 2017-06-05 PROCEDURE — 74011250637 HC RX REV CODE- 250/637: Performed by: INTERNAL MEDICINE

## 2017-06-05 PROCEDURE — G8978 MOBILITY CURRENT STATUS: HCPCS | Performed by: PHYSICAL THERAPIST

## 2017-06-05 PROCEDURE — 65270000015 HC RM PRIVATE ONCOLOGY

## 2017-06-05 PROCEDURE — 97116 GAIT TRAINING THERAPY: CPT | Performed by: PHYSICAL THERAPIST

## 2017-06-05 PROCEDURE — G8987 SELF CARE CURRENT STATUS: HCPCS

## 2017-06-05 PROCEDURE — G8988 SELF CARE GOAL STATUS: HCPCS

## 2017-06-05 RX ORDER — LEVOFLOXACIN 750 MG/1
750 TABLET ORAL
Status: DISCONTINUED | OUTPATIENT
Start: 2017-06-06 | End: 2017-06-06 | Stop reason: HOSPADM

## 2017-06-05 RX ORDER — CALCIUM CARBONATE 200(500)MG
200 TABLET,CHEWABLE ORAL
Status: DISCONTINUED | OUTPATIENT
Start: 2017-06-05 | End: 2017-06-06 | Stop reason: HOSPADM

## 2017-06-05 RX ORDER — FAMOTIDINE 20 MG/1
20 TABLET, FILM COATED ORAL DAILY
Status: DISCONTINUED | OUTPATIENT
Start: 2017-06-06 | End: 2017-06-06 | Stop reason: HOSPADM

## 2017-06-05 RX ORDER — PREDNISONE 20 MG/1
40 TABLET ORAL
Status: DISCONTINUED | OUTPATIENT
Start: 2017-06-06 | End: 2017-06-06 | Stop reason: HOSPADM

## 2017-06-05 RX ADMIN — SALMETEROL XINAFOATE 1 PUFF: 50 POWDER, METERED ORAL; RESPIRATORY (INHALATION) at 20:27

## 2017-06-05 RX ADMIN — CEFTRIAXONE SODIUM 2 G: 2 INJECTION, POWDER, FOR SOLUTION INTRAMUSCULAR; INTRAVENOUS at 01:57

## 2017-06-05 RX ADMIN — Medication 10 ML: at 13:55

## 2017-06-05 RX ADMIN — IPRATROPIUM BROMIDE AND ALBUTEROL SULFATE 3 ML: .5; 3 SOLUTION RESPIRATORY (INHALATION) at 00:07

## 2017-06-05 RX ADMIN — IPRATROPIUM BROMIDE AND ALBUTEROL SULFATE 3 ML: .5; 3 SOLUTION RESPIRATORY (INHALATION) at 16:03

## 2017-06-05 RX ADMIN — METHYLPREDNISOLONE SODIUM SUCCINATE 40 MG: 40 INJECTION, POWDER, FOR SOLUTION INTRAMUSCULAR; INTRAVENOUS at 20:27

## 2017-06-05 RX ADMIN — METHYLPREDNISOLONE SODIUM SUCCINATE 40 MG: 40 INJECTION, POWDER, FOR SOLUTION INTRAMUSCULAR; INTRAVENOUS at 08:53

## 2017-06-05 RX ADMIN — ACETAMINOPHEN 650 MG: 325 TABLET, FILM COATED ORAL at 16:52

## 2017-06-05 RX ADMIN — Medication 10 ML: at 06:20

## 2017-06-05 RX ADMIN — IPRATROPIUM BROMIDE AND ALBUTEROL SULFATE 3 ML: .5; 3 SOLUTION RESPIRATORY (INHALATION) at 07:58

## 2017-06-05 RX ADMIN — CALCIUM CARBONATE (ANTACID) CHEW TAB 500 MG 200 MG: 500 CHEW TAB at 20:39

## 2017-06-05 RX ADMIN — GUAIFENESIN 600 MG: 600 TABLET, EXTENDED RELEASE ORAL at 08:52

## 2017-06-05 RX ADMIN — HEPARIN SODIUM 5000 UNITS: 5000 INJECTION, SOLUTION INTRAVENOUS; SUBCUTANEOUS at 10:39

## 2017-06-05 RX ADMIN — UMECLIDINIUM 1 PUFF: 62.5 AEROSOL, POWDER ORAL at 08:54

## 2017-06-05 RX ADMIN — HEPARIN SODIUM 5000 UNITS: 5000 INJECTION, SOLUTION INTRAVENOUS; SUBCUTANEOUS at 17:44

## 2017-06-05 RX ADMIN — ASPIRIN 81 MG: 81 TABLET, COATED ORAL at 08:52

## 2017-06-05 RX ADMIN — HEPARIN SODIUM 5000 UNITS: 5000 INJECTION, SOLUTION INTRAVENOUS; SUBCUTANEOUS at 02:05

## 2017-06-05 RX ADMIN — AZITHROMYCIN MONOHYDRATE 500 MG: 500 INJECTION, POWDER, LYOPHILIZED, FOR SOLUTION INTRAVENOUS at 02:46

## 2017-06-05 RX ADMIN — GUAIFENESIN 600 MG: 600 TABLET, EXTENDED RELEASE ORAL at 20:28

## 2017-06-05 RX ADMIN — Medication 10 ML: at 20:29

## 2017-06-05 RX ADMIN — FAMOTIDINE 20 MG: 20 TABLET ORAL at 08:52

## 2017-06-05 RX ADMIN — IPRATROPIUM BROMIDE AND ALBUTEROL SULFATE 3 ML: .5; 3 SOLUTION RESPIRATORY (INHALATION) at 23:52

## 2017-06-05 RX ADMIN — AZELASTINE HYDROCHLORIDE 1 SPRAY: 137 SPRAY, METERED NASAL at 08:55

## 2017-06-05 RX ADMIN — SALMETEROL XINAFOATE 1 PUFF: 50 POWDER, METERED ORAL; RESPIRATORY (INHALATION) at 08:54

## 2017-06-05 NOTE — PROGRESS NOTES
Physical Therapy Goals  Initiated 6/5/2017  1. Patient will move from supine to sit and sit to supine , scoot up and down and roll side to side in bed with independence within 7 day(s). 2.  Patient will transfer from bed to chair and chair to bed with independence using the least restrictive device within 7 day(s). 3.  Patient will perform sit to stand with independence within 7 day(s). 4.  Patient will ambulate with modified independence for 250 feet with the least restrictive device within 7 day(s). 5.  Patient will ascend/descend 3 stairs with 1 handrail(s) with modified independence within 7 day(s). physical Therapy EVALUATION  Patient: Milagros Hebert (80 y.o. female)  Date: 6/5/2017  Primary Diagnosis: Acute and chronic respiratory failure with hypoxia (HCC)  COPD exacerbation (HCC)  Bronchitis, acute  Acute renal failure (ARF) (HCC)        Precautions: DNR, Fall (2L O2 PTA with COPD)    ASSESSMENT :  Based on the objective data described below, the patient presents with generally decreased functional mobility and decreased endurance. Patient ambulated 150ft with use of a rolling walker requiring supervision with steady gait and no LOB noted. Patient requiring one standing rest break. Patient independent with sit to stand demonstrating intact sitting balance and good standing balance with support. Recommend discharge home with HHPT safety evaluation. Patient may benefit from transition to OP Pulmonary Rehab. Patient will benefit from skilled intervention to address the above impairments.   Patients rehabilitation potential is considered to be Good  Factors which may influence rehabilitation potential include:   []         None noted  []         Mental ability/status  [x]         Medical condition  []         Home/family situation and support systems  []         Safety awareness  []         Pain tolerance/management  []         Other:      PLAN :  Recommendations and Planned Interventions:  [x] Bed Mobility Training             []    Neuromuscular Re-Education  [x]           Transfer Training                   []    Orthotic/Prosthetic Training  [x]           Gait Training                         []    Modalities  []           Therapeutic Exercises           []    Edema Management/Control  [x]           Therapeutic Activities            [x]    Patient and Family Training/Education  [x]           Other (comment): stairs    Frequency/Duration: Patient will be followed by physical therapy  3 times a week to address goals. Discharge Recommendations: Home Health for safety eval  Further Equipment Recommendations for Discharge: Patient has appropriate DME     SUBJECTIVE:   Patient stated I just feel a little short of breath after walking.     OBJECTIVE DATA SUMMARY:   HISTORY:    Past Medical History:   Diagnosis Date    Arthritis     generalized    COPD     former smoker      >60pkyr quit 1/3/11    h/o DVT 1955    left leg    ischemic left lower extremitiy pain     above knee FemPop 1/3/11    PVD (peripheral vascular disease) ([facility] [address]     Seasonal allergic rhinitis     Single kidney      Past Surgical History:   Procedure Laterality Date    BREAST SURGERY PROCEDURE UNLISTED  1955    excision left breast cyst    HX GI  10 yrs ago    colonoscopy    HX GYN      3 miscarriges    HX GYN      1 vaginal birth   [facility]      veinography left leg, stent left leg     Prior Level of Function/Home Situation: Patient reports living alone in one story home ambulating independently with use of a rolling walker as needed. She reports using 2LPM of O2 at night and as needed during the day. She also reports she is not driving and her son brings her groceries.        Home Situation  Home Environment: Private residence  # Steps to Enter: (P) 3  Rails to Enter: (P) Yes  Hand Rails : (P) Left  One/Two Story Residence: One story  Living Alone: Yes  Support Systems: Family member(s), Friends \ neighbors  Patient Expects to be Discharged to[de-identified] Private residence  Current DME Used/Available at Home: Walker, rolling, Oxygen, portable  Tub or Shower Type: Tub/Shower combination    EXAMINATION/PRESENTATION/DECISION MAKING:   Critical Behavior:  Neurologic State: Alert  Orientation Level: Oriented X4  Cognition: Follows commands, Appropriate decision making, Appropriate for age attention/concentration, Appropriate safety awareness  Safety/Judgement: Fall prevention, Home safety, Insight into deficits  Hearing: Auditory  Auditory Impairment: None    Range Of Motion:  AROM: Generally decreased, functional           PROM: Within functional limits           Strength:    Strength: Generally decreased, functional                    Tone & Sensation:   Tone: Normal              Sensation:  (impaired L LE post revascularization)               Coordination:  Coordination: Generally decreased, functional  Vision:   Corrective Lenses: Reading glasses  Functional Mobility:  Bed Mobility:  Rolling: Independent  Supine to Sit: Independent  Sit to Supine: Independent  Scooting: Independent  Transfers:  Sit to Stand: Independent  Stand to Sit: Independent        Bed to Chair: Supervision              Balance:   Sitting: Intact  Standing: Impaired  Standing - Static: Good; Unsupported  Standing - Dynamic : Good (with RW )  Ambulation/Gait Training:  Distance (ft): 150 Feet (ft)  Assistive Device: Walker, rolling;Gait belt  Ambulation - Level of Assistance: Supervision        Gait Abnormalities: Decreased step clearance        Base of Support: Narrowed     Speed/Paige: Pace decreased (<100 feet/min)  Step Length: Left shortened;Right shortened                  Gait was steady with no LOB noted for 150 ft with use of a rolling walker and supervision. Patient requiring one standing rest break to complete.                    Functional Measure:    Elder Mobility Scale    17/20         EMS and G-code impairment scale:  Percentage of impairment CH  0% CI  1-19% CJ  20-39% CK  40-59% CL  60-79% CM  80-99% CN  100%   EMS Score 0-20 20 17-19 13-16 9-12 5-8 1-4 0      Scores under 10  generally these patients are dependent in mobility maneuvers; require help with  basic ADL, such as transfers, toileting and dressing. Scores between 10  13  generally these patients are borderline in terms of safe mobility and  independence in ADL i.e. they require some help with some mobility maneuvers. Scores over 14  Generally these patients are able to perform mobility maneuvers alone and safely  and are independent in basic ADL. G codes: In compliance with CMSs Claims Based Outcome Reporting, the following G-code set was chosen for this patient based on their primary functional limitation being treated: The outcome measure chosen to determine the severity of the functional limitation was the Elderly Mobility scale with a score of 17/20 which was correlated with the impairment scale. ? Mobility - Walking and Moving Around:     - CURRENT STATUS: CI - 1%-19% impaired, limited or restricted    - GOAL STATUS: CH - 0% impaired, limited or restricted    - D/C STATUS:  ---------------To be determined---------------          Pain:  Pain Scale 1: Numeric (0 - 10)  Pain Intensity 1: 0              Activity Tolerance:   O2 sats at rest were 92% and post-activity were 87%. After rest and pursed lip breathing O2 sats karine to 91%. Please refer to the flowsheet for vital signs taken during this treatment. After treatment:   [x]         Patient left in no apparent distress sitting up in chair  []         Patient left in no apparent distress in bed  [x]         Call bell left within reach  [x]         Nursing notified  []         Caregiver present  []         Bed alarm activated    COMMUNICATION/EDUCATION:   The patients plan of care was discussed with: Registered Nurse.   [x]         Fall prevention education was provided and the patient/caregiver indicated understanding. [x]         Patient/family have participated as able in goal setting and plan of care. [x]         Patient/family agree to work toward stated goals and plan of care. []         Patient understands intent and goals of therapy, but is neutral about his/her participation. []         Patient is unable to participate in goal setting and plan of care.     Thank you for this referral.  Annika Baumann, PT   Time Calculation: 19 mins

## 2017-06-05 NOTE — PROGRESS NOTES
Oncology Nursing Communication Tool      6:31 PM  6/5/2017     Bedside shift change report given to Lea, RN (incoming nurse) by Di Mendoza RN (outgoing nurse) on Deloris Bocanegra a 80 y.o. female who was admitted on 6/4/2017 12:40 AM. Report included the following information SBAR, Kardex, Procedure Summary, Intake/Output, MAR, Accordion, Recent Results and Med Rec Status. Significant changes during shift: Repeat blood cultures drawn. Sputum sample collected for culture.  IV antibiotics changed to oral.       Issues for physician to address: None            Code Status: DNR     Infections: No current active infections     Allergies: Food extracts and Sulfa (sulfonamide antibiotics)     Current diet: DIET REGULAR  DIET NUTRITIONAL SUPPLEMENTS Breakfast, Dinner; Ensure Clear       Pain Meds [x] yes [] no   Bowel Movement [] yes [x] no   Last Bowel Movement (date) 06/03            Vital Signs:   Patient Vitals for the past 12 hrs:   Temp Pulse Resp BP SpO2   06/05/17 0759 - - - - 96 %   06/05/17 0612 98 °F (36.7 °C) 76 20 156/89 95 %   06/05/17 0007 - - - - 92 %      Intake & Output:     Intake/Output Summary (Last 24 hours) at 06/05/17 1006  Last data filed at 06/05/17 0246   Gross per 24 hour   Intake              200 ml   Output                0 ml   Net              200 ml      Laboratory Results:     Recent Results (from the past 12 hour(s))   METABOLIC PANEL, BASIC    Collection Time: 06/05/17  1:55 AM   Result Value Ref Range    Sodium 140 136 - 145 mmol/L    Potassium 4.6 3.5 - 5.1 mmol/L    Chloride 112 (H) 97 - 108 mmol/L    CO2 20 (L) 21 - 32 mmol/L    Anion gap 8 5 - 15 mmol/L    Glucose 151 (H) 65 - 100 mg/dL    BUN 41 (H) 6 - 20 MG/DL    Creatinine 1.34 (H) 0.55 - 1.02 MG/DL    BUN/Creatinine ratio 31 (H) 12 - 20      GFR est AA 46 (L) >60 ml/min/1.73m2    GFR est non-AA 38 (L) >60 ml/min/1.73m2    Calcium 8.2 (L) 8.5 - 10.1 MG/DL   CBC W/O DIFF    Collection Time: 06/05/17 1:55 AM   Result Value Ref Range    WBC 11.9 (H) 3.6 - 11.0 K/uL    RBC 3.80 3.80 - 5.20 M/uL    HGB 11.5 11.5 - 16.0 g/dL    HCT 35.1 35.0 - 47.0 %    MCV 92.4 80.0 - 99.0 FL    MCH 30.3 26.0 - 34.0 PG    MCHC 32.8 30.0 - 36.5 g/dL    RDW 15.9 (H) 11.5 - 14.5 %    PLATELET 994 079 - 058 K/uL              Opportunity for questions and clarifications were given to the incoming nurse. Patient's bed is in low position, side rails x2, door open PRN, call bell within reach and patient not in distress.       Amy Card RN

## 2017-06-05 NOTE — PROGRESS NOTES
Problem: Self Care Deficits Care Plan (Adult)  Goal: *Acute Goals and Plan of Care (Insert Text)  Occupational Therapy Goals  Initiated 6/5/2017  1. Patient will perform grooming in standing VSS with independence within 7 day(s). 2. Patient will perform bathing VSS with modified independence within 7 day(s). 3. Patient will perform upper body dressing and lower body dressing VSS with modified independence within 7 day(s). 4. Patient will perform toilet transfers with modified independence within 7 day(s). 5. Patient will perform all aspects of toileting with modified independence within 7 day(s). 6. Patient will participate in upper extremity therapeutic exercise/activities with independence for 10 minutes within 7 day(s). 7. Patient will utilize energy conservation, PLB and fall prevention techniques during functional activities with verbal, visual and tactile cues within 7 day(s). OCCUPATIONAL THERAPY EVALUATION  Patient: Arun Dawson (80 y.o. female)  Date: 6/5/2017  Primary Diagnosis: Acute and chronic respiratory failure with hypoxia (HCC)  COPD exacerbation (HCC)  Bronchitis, acute  Acute renal failure (ARF) (HCC)        Precautions:   DNR, Fall (2L O2 PTA with COPD)      ASSESSMENT :  Based on the objective data described below, the patient presents with general debility from diarrhea with sepsis and acute on chronic resp. Failure with COPD exaccerbation, admitted to ER 6-4, against her wishes and with much encouragement from her son who brings her weekly groceries. Patient states, \"I thought I could fix it myself! \" She performs basic self care with S with increased time and SOB, VSS on 3L O2, baseline 2L. Expect she will do well once medically stable, with training in acute care to maximize her awareness of home safety, energy conservation, AE/DME and benefits of consistent HEP use on a daily basis. Patient reports feeling motivated for this training.       Patient will benefit from skilled intervention to address the above impairments. Patients rehabilitation potential is considered to be Good  Factors which may influence rehabilitation potential include:   [X]             None noted  [ ]             Mental ability/status  [ ]             Medical condition  [ ]             Home/family situation and support systems  [ ]             Safety awareness  [ ]             Pain tolerance/management  [ ]             Other:        PLAN :  Recommendations and Planned Interventions:  [X]               Self Care Training                  [X]        Therapeutic Activities  [X]               Functional Mobility Training    [X]        Cognitive Retraining PRN  [X]               Therapeutic Exercises           [X]        Endurance Activities  [X]               Balance Training                   [ ]        Neuromuscular Re-Education  [ ]               Visual/Perceptual Training     [X]   Home Safety Training  [X]               Patient Education                 [X]        Family Training/Education  [ ]               Other (comment):     Frequency/Duration: Patient will be followed by occupational therapy 4 times a week to address goals.   Discharge Recommendations: None  Further Equipment Recommendations for Discharge: have trained patient benefits of BSC over commode vs pulling up on sink       SUBJECTIVE:   Patient stated My sciatica is the thing that hurts the most.      OBJECTIVE DATA SUMMARY:   HISTORY:   Past Medical History:   Diagnosis Date    Arthritis       generalized    COPD      former smoker        >60pkyr quit 1/3/11    h/o DVT 1955     left leg    ischemic left lower extremitiy pain       above knee FemPop 1/3/11    PVD (peripheral vascular disease) ([facility] [address]      Seasonal allergic rhinitis      Single kidney       Past Surgical History:   Procedure Laterality Date    BREAST SURGERY PROCEDURE UNLISTED   1955     excision left breast cyst    HX GI   10 yrs ago     colonoscopy    HX GYN         3 miscarriges    HX GYN         1 vaginal birth   [employer] HX ORTHOPAEDIC         veinography left leg, stent left leg        Prior Level of Function/Home Situation: lives alone, performs all ADLs and IADLs within the home, son buys groceries as patient has never driven 2L O2 PTA for COPD  Expanded or extensive additional review of patient history:      Home Situation  Home Environment: Private residence  # Steps to Enter: 3  One/Two Story Residence: One story  Living Alone: Yes  Support Systems: Family member(s), Friends \ neighbors  Patient Expects to be Discharged to[de-identified] Private residence  Current DME Used/Available at Home: Walker, rolling, Oxygen, portable  Tub or Shower Type: Tub/Shower combination  [X]  Right hand dominant             [ ]  Left hand dominant     EXAMINATION OF PERFORMANCE DEFICITS:  Cognitive/Behavioral Status:  Neurologic State: Alert  Orientation Level: Oriented X4  Cognition: Follows commands; Appropriate decision making; Appropriate for age attention/concentration; Appropriate safety awareness  Perception: Appears intact  Perseveration: No perseveration noted  Safety/Judgement: Fall prevention;Home safety; Insight into deficits  Appears within normal limits- Do recommend MoCA when 79 yo lives alone so subtle cognitive changes don't fall through the cracks; eg, she did not seek medical tx on her own. Skin: intact; note redness at bunions     Edema: none     Hearing: Auditory  Auditory Impairment: None     Vision/Perceptual:                                Corrective Lenses: Reading glasses     Range of Motion:  B UE  AROM: Generally decreased, functional  PROM: Within functional limits                       Strength:  B UE  Strength: Generally decreased, functional; using 2L O2, endurance \"a bit lower than normal\" with functional use for self care                 Coordination:  Coordination: Generally decreased, functional  Fine Motor Skills-Upper: Left Intact; Right Intact    Gross Motor Skills-Upper: Left Intact; Right Intact     Tone & Sensation:     Tone: Normal  Sensation:  (impaired L LE post revascularization)                       Balance:  Sitting: Intact  Standing: Intact (declines with fatigue)     Functional Mobility and Transfers for ADLs:  Bed Mobility:  Rolling: Independent  Supine to Sit: Independent  Sit to Supine: Independent  Scooting: Independent     Transfers:  Sit to Stand: Modified independent  Stand to Sit: Modified independent  Bed to Chair: Supervision  Toilet Transfer : Supervision (safety with toilet transfers with diarrhea trained)     ADL Assessment:  Feeding: Independent     Oral Facial Hygiene/Grooming: Modified Independent; Additional time     Bathing: Supervision     Upper Body Dressing: Setup     Lower Body Dressing: Setup     Toileting: Supervision                 ADL Intervention and task modifications:         initiated training for fall prevention; has not had energy conservation training recently. Recommend with nursing patient to complete as able in order to maintain strength, endurance and independence: ADLs with supervision/setup, OOB to chair 3x/day and mobilizing to the bathroom for toileting with S/CGA assist. Thank you for your assistance. Cognitive Retraining  Safety/Judgement: Fall prevention;Home safety; Insight into deficits     Therapeutic Exercise:  Initiated training of [employer] benefits with consistent participation even with her co-morbidities; has no HEP in place: \"With sciatica and COPD can't really do anything. \"   Functional Measure:  Barthel Index:      Bathin  Bladder: 5  Bowels: 5  Groomin  Dressing: 10  Feeding: 10  Mobility: 0  Stairs: 5  Toilet Use: 5  Transfer (Bed to Chair and Back): 10  Total: 55         Barthel and G-code impairment scale:  Percentage of impairment CH  0% CI  1-19% CJ  20-39% CK  40-59% CL  60-79% CM  80-99% CN  100%   Barthel Score 0-100 100 99-80 79-60 59-40 20-39 1-19    0   Barthel Score 0-20 20 17-19 13-16 9-12 5-8 1-4 0      The Barthel ADL Index: Guidelines  1. The index should be used as a record of what a patient does, not as a record of what a patient could do. 2. The main aim is to establish degree of independence from any help, physical or verbal, however minor and for whatever reason. 3. The need for supervision renders the patient not independent. 4. A patient's performance should be established using the best available evidence. Asking the patient, friends/relatives and nurses are the usual sources, but direct observation and common sense are also important. However direct testing is not needed. 5. Usually the patient's performance over the preceding 24-48 hours is important, but occasionally longer periods will be relevant. 6. Middle categories imply that the patient supplies over 50 per cent of the effort. 7. Use of aids to be independent is allowed. Sheryl Appl., Barthel, D.W. (5797). Functional evaluation: the Barthel Index. [address] [facility] (14)2. Garrett Nance casie HERNANDO Ortega, Fransico Galindo., Asya Orellana., [place], [address] [facility] (1999). Measuring the change indisability after inpatient rehabilitation; comparison of the responsiveness of the Barthel Index and Functional [place] Measure. Journal of Neurology, Neurosurgery, and Psychiatry, 66(4), 511-825. Gianna Ruvalcaba, NARCHANA.OZZY, JAGDISH Andersen, & Mary Miller, MAngelinaA. (2004.) Assessment of post-stroke quality of life in cost-effectiveness studies: The usefulness of the Barthel Index and the EuroQoL-5D. Quality of Life Research, 13, 245-15         G codes: In compliance with CMSs Claims Based Outcome Reporting, the following G-code set was chosen for this patient based on their primary functional limitation being treated: The outcome measure chosen to determine the severity of the functional limitation was the Barthel Index with a score of 55/100 which was correlated with the impairment scale. · Self Care:               - CURRENT STATUS:    CK - 40%-59% impaired, limited or restricted               - GOAL STATUS:           CI - 1%-19% impaired, limited or restricted               - D/C STATUS:                       ---------------To be determined---------------      Occupational Therapy Evaluation Charge Determination   History Examination Decision-Making   LOW Complexity : Brief history review  LOW Complexity : 1-3 performance deficits relating to physical, cognitive , or psychosocial skils that result in activity limitations and / or participation restrictions  MEDIUM Complexity : Patient may present with comorbidities that affect occupational performnce. Miniml to moderate modification of tasks or assistance (eg, physical or verbal ) with assesment(s) is necessary to enable patient to complete evaluation       Based on the above components, the patient evaluation is determined to be of the following complexity level: LOW   Pain:  Pain Scale 1: Numeric (0 - 10)  Pain Intensity 1: 0              Activity Tolerance:   fair  Please refer to the flowsheet for vital signs taken during this treatment. After treatment:   [X] Patient left in no apparent distress sitting up in chair  [ ] Patient left in no apparent distress in bed  [X] Call bell left within reach  [X] Nursing notified  [ ] Caregiver present  [ ] Bed alarm activated      COMMUNICATION/EDUCATION:   The patients plan of care was discussed with: Registered Nurse.  [X] Home safety education was provided and the patient/caregiver indicated understanding. [X] Patient/family have participated as able in goal setting and plan of care. [X] Patient/family agree to work toward stated goals and plan of care. [ ] Patient understands intent and goals of therapy, but is neutral about his/her participation. [ ] Patient is unable to participate in goal setting and plan of care.   This patients plan of care is appropriate for delegation to [facility].     Thank you for this referral.  Shereen Landrum OTR/L  Time Calculation: 25 mins

## 2017-06-05 NOTE — PROGRESS NOTES
Pharmacy Dosing - Famotidine    CrCl = Less than 50 mL/min    Since CrCl is less than 50 mL/min, famotidine dose adjusted to 20 mg PO every 24 hours per P&T approved protocol.     Thanks,  Reg Ramos, PHARMD

## 2017-06-05 NOTE — ROUTINE PROCESS
[facility]'s lab called to report 1/2 bottles BC growing gram + cocci in pairs & chains. Dr. Sari Giles informed of results & current antibiotics,  no orders given.

## 2017-06-05 NOTE — PROGRESS NOTES
Initial Nutrition Assessment:    INTERVENTIONS/RECOMMENDATIONS:   · Meals/Snacks: General/healthful diet: Continue Regular Diet  · Supplements: Commercial supplement: Continue Ensure Clear BID     ASSESSMENT:   Pt medically noted with sepsis, acute on chronic hypoxic respiratory failure, acute bronchitis, acute COPD exacerbation, JASPAL due to dehydration, and N/V/D. PMH noted below. Chart reviewed; Pt was alert and oriented at time of visit, no family members present. Pt states a good appetite today and PTA; observed 75% consumed lunch tray. Pt denies N/V/D today. Pt reports a 10# wt loss since December due to the passing of her ; pt noted with 8% wt loss in 8 months. Pt reports she prepares her meals and her son does the shopping. Usual home diet discussed. Pt reports tolerance to diet but has not tried ONS, will continue. Labs reviewed: elevated BUN, Cr and Gluc. BM noted on 6/3. Will monitor pt appetite/PO intake and acceptance to ONS.      Past Medical History:   Diagnosis Date    Arthritis     generalized    COPD     former smoker      >60pkyr quit 1/3/11    h/o DVT 1955    left leg    ischemic left lower extremitiy pain     above knee FemPop 1/3/11    PVD [facility]peripheral vascular disease) ([facility] [address]     Seasonal allergic rhinitis     Single kidney      Diet Order: Regular, Other (comment) (Ensure Clear BID)  % Eaten:  Patient Vitals for the past 72 hrs:   % Diet Eaten   06/05/17 1355 50 %     Pertinent Medications: [x]Reviewed []Other: heparin; nitrobid; serevent; methylprednisolone; zofran; umedidinium  Pertinent Labs: [x]Reviewed []Other: BUN 41; Cr 1.34; Gluc 151  Food Allergies: []None [x]Other: food extracts   Last BM: 6/3   [x]Active     []Hyperactive  []Hypoactive       [] Absent BS  Skin:    [] Intact   [] Incision  [] Breakdown  [x] Other: scars    Anthropometrics:   Height: 5' 8\" (172.7 cm) Weight: 68 kg (150 lb)   IBW (%IBW):   ( ) UBW (%UBW):   (  %)   Last Weight Metrics:  Weight Loss Metrics 6/4/2017 10/21/2016 11/2/2015 7/2/2015 2/28/2014 4/9/2013 4/4/2013   Today's Wt 150 lb 160 lb 163 lb 160 lb 165 lb 169 lb 172 lb   BMI 22.81 kg/m2 26.63 kg/m2 27.12 kg/m2 26.63 kg/m2 27.46 kg/m2 28.12 kg/m2 28.62 kg/m2       BMI: Body mass index is 22.81 kg/(m^2). This BMI is indicative of:   []Underweight    [x]Normal    []Overweight    [] Obesity   [] Extreme Obesity (BMI>40)     Estimated Nutrition Needs (Based on):   1528 Kcals/day (BMR 1175 x 1. 3AF) , 68 g (1.0g/kg bw) Protein  Carbohydrate: At Least 130 g/day  Fluids: 1550 mL/day (1ml/kcal) or per MD    Pt expected to meet estimated nutrient needs: [x]Yes []No    NUTRITION DIAGNOSES:   Problem:  Inadequate oral food/beverage intake      Etiology: related to GI complications     Signs/Symptoms: as evidenced by reported N/V/D PTA      NUTRITION INTERVENTIONS:  Meals/Snacks: General/healthful diet   Supplements: Commercial supplement              GOAL:   Pt will consume >75% meals/supplements next 3-5 days    LEARNING NEEDS (Diet, Food/Nutrient-Drug Interaction):    [x] None Identified   [] Identified and Education Provided/Documented   [] Identified and Pt declined/was not appropriate     Cultureal, Mandaeism, OR Ethnic Dietary Needs:    [x] None Identified   [] Identified and Addressed     [x] Interdisciplinary Care Plan Reviewed/Documented    [x] Discharge Planning:  General Healthful diet     MONITORING /EVALUATION:      Food/Nutrient Intake Outcomes:  Total energy intake  Physical Signs/Symptoms Outcomes: Weight/weight change, GI, GI profile, Glucose profile, Electrolyte and renal profile    NUTRITION RISK:    [] High              [] Moderate           [x]  Low  []  Minimal/Uncompromised    PT SEEN FOR:    []  MD Consult: []Calorie Count      []Diabetic Diet Education        []Diet Education     []Electrolyte Management     []General Nutrition Management and Supplements     []Management of Tube Feeding     []TPN Recommendations    [x]  RN Referral:  [x]MST score >=2     []Enteral/Parenteral Nutrition PTA     []Pregnant: Gestational DM or Multigestation     []Pressure Ulcer/Wound Care needs        []  Low BMI  []  DTR Referral       Franny Mesa  Pager [phone]  Weekend Pager [phone]

## 2017-06-05 NOTE — PROGRESS NOTES
Hospitalist Progress Note    NAME: Franklin Huynh   :  11/10/1931   MRN:  [id number]         Assessment / Plan:  Sepsis and acute on chronic hypoxic respiratory failure due to acute bronchitis and acute COPD exacerbation: Baseline 2 LPM O2 - back on home O2. Normal stress testing and EF 3/16. Follows with Dr. Maria Guadalupe Ward. - CXR on admission with no acute abnormality  - blood cultures alpha strep 1/2 (probably contaminent). New blood cultures sent today. Respiratory culture sent, results pending.  - will complete IV solumedrol today, transition to po prednisone tomorrow  - change rocephin, azithro to oral levaquin tomorrow  - con't mucinex, incruse, salmeterol   Acute kidney injury due to dehydration in setting of solitary kidney:  - UTI ruled out by culture (1000 colonies)  - con't off IV fluids, encourage po intake  Nausea/Vomiting/Diarrhea: resolved  - prn zofran   - con't pepcid  PAD s/p L leg stent: con't ASA      Code Status: DNR  Surrogate Decision Maker: frances Cook Manner [phone]  DVT Prophylaxis: heparin     Subjective:     Chief Complaint / Reason for Physician Visit  \"I'm still having trouble with my breathing\". Discussed with RN events overnight. Review of Systems:  Symptom Y/N Comments  Symptom Y/N Comments   Fever/Chills n   Chest Pain n    Poor Appetite n   Edema y    Cough y   Abdominal Pain n    Sputum n   Joint Pain     SOB/GODINEZ y   Pruritis/Rash     Nausea/vomit    Tolerating PT/OT     Diarrhea    Tolerating Diet     Constipation    Other       Could NOT obtain due to:      Objective:     VITALS:   Last 24hrs VS reviewed since prior progress note.  Most recent are:  Patient Vitals for the past 24 hrs:   Temp Pulse Resp BP SpO2   17 0759 - - - - 96 %   17 0612 98 °F (36.7 °C) 76 20 156/89 95 %   17 0007 - - - - 92 %   17 1837 97.6 °F (36.4 °C) 76 18 107/56 91 %   17 1615 98.1 °F (36.7 °C) 72 18 98/74 92 %   17 1515 - - - - 96 %   17 1124 - - - - 97 % 06/04/17 1120 - - - 94/42 -   06/04/17 1117 98.1 °F (36.7 °C) (!) 59 20 (!) 87/36 93 %       Intake/Output Summary (Last 24 hours) at 06/05/17 0845  Last data filed at 06/05/17 0246   Gross per 24 hour   Intake              200 ml   Output                0 ml   Net              200 ml        PHYSICAL EXAM:  General: WD, WN. Alert, cooperative, no acute distress    EENT:  EOMI. Anicteric sclerae. MMM  Resp:  CTA bilaterally, no wheezing or rales. Fair air movement. No accessory muscle use  CV:  Regular  rhythm,  L pedal edema (chronic)  GI:  Soft, Non distended, Non tender.  +Bowel sounds  Neurologic:  Alert and oriented X 3, normal speech,   Psych:   Fair insight. Not anxious nor agitated  Skin:  No rashes. No jaundice    Reviewed most current lab test results and cultures  YES  Reviewed most current radiology test results   YES  Review and summation of old records today    NO  Reviewed patient's current orders and MAR    YES  PMH/SH reviewed - no change compared to H&P  ________________________________________________________________________  Care Plan discussed with:    Comments   Patient x    Family      RN x    Care Manager     Consultant                        Multidiciplinary team rounds were held today with , nursing, pharmacist and clinical coordinator. Patient's plan of care was discussed; medications were reviewed and discharge planning was addressed. ________________________________________________________________________  Total NON critical care TIME:  25 Minutes    Total CRITICAL CARE TIME Spent:   Minutes non procedure based      Comments   >50% of visit spent in counseling and coordination of care x    ________________________________________________________________________  Malena Petersen MD     Procedures: see electronic medical records for all procedures/Xrays and details which were not copied into this note but were reviewed prior to creation of Plan.       LABS:  I reviewed today's most current labs and imaging studies.   Pertinent labs include:  Recent Labs      06/05/17 0155 06/04/17 0057   WBC  11.9*  17.8*   HGB  11.5  13.1   HCT  35.1  40.3   PLT  216  250     Recent Labs      06/05/17 0155 06/04/17 0057   NA  140  139   K  4.6  4.8   CL  112*  106   CO2  20*  23   GLU  151*  156*   BUN  41*  33*   CREA  1.34*  2.22*   CA  8.2*  8.3*   ALB   --   2.9*   TBILI   --   0.8   SGOT   --   31   ALT   --   20       Signed: Janay Ascencio MD

## 2017-06-05 NOTE — ROUTINE PROCESS
Oncology Nursing Communication Tool      7:07 AM  6/5/2017     Bedside shift change report given to 793 Christiano Montana RN (incoming nurse) by Dk Person RN (outgoing nurse) on Aida Cotto a 80 y.o. female who was admitted on 6/4/2017 12:40 AM. Report included the following information SBAR, Kardex, Intake/Output, MAR and Recent Results. Significant changes during shift: [facility] lab called back blood cx results & on call MD notified. No new orders. Up to BR with assist. SOB on exertion. BP improved.       Issues for physician to address:   [facility] results          Code Status: DNR     Infections: No current active infections     Allergies: Food extracts and Sulfa (sulfonamide antibiotics)     Current diet: DIET REGULAR  DIET NUTRITIONAL SUPPLEMENTS Breakfast, Dinner; Ensure Clear       Pain Controlled [x] yes [] no   Bowel Movement [] yes [x] no   Last Bowel Movement (date)     6/3/17            Vital Signs:   Patient Vitals for the past 12 hrs:   Temp Pulse Resp BP SpO2   06/05/17 0612 98 °F (36.7 °C) 76 20 156/89 95 %   06/05/17 0007 - - - - 92 %      Intake & Output:     Intake/Output Summary (Last 24 hours) at 06/05/17 0707  Last data filed at 06/05/17 0246   Gross per 24 hour   Intake              200 ml   Output                0 ml   Net              200 ml      Laboratory Results:     Recent Results (from the past 12 hour(s))   METABOLIC PANEL, BASIC    Collection Time: 06/05/17  1:55 AM   Result Value Ref Range    Sodium 140 136 - 145 mmol/L    Potassium 4.6 3.5 - 5.1 mmol/L    Chloride 112 (H) 97 - 108 mmol/L    CO2 20 (L) 21 - 32 mmol/L    Anion gap 8 5 - 15 mmol/L    Glucose 151 (H) 65 - 100 mg/dL    BUN 41 (H) 6 - 20 MG/DL    Creatinine 1.34 (H) 0.55 - 1.02 MG/DL    BUN/Creatinine ratio 31 (H) 12 - 20      GFR est AA 46 (L) >60 ml/min/1.73m2    GFR est non-AA 38 (L) >60 ml/min/1.73m2    Calcium 8.2 (L) 8.5 - 10.1 MG/DL   CBC W/O DIFF    Collection Time: 06/05/17  1:55 AM   Result Value Ref Range    WBC 11.9 (H) 3.6 - 11.0 K/uL    RBC 3.80 3.80 - 5.20 M/uL    HGB 11.5 11.5 - 16.0 g/dL    HCT 35.1 35.0 - 47.0 %    MCV 92.4 80.0 - 99.0 FL    MCH 30.3 26.0 - 34.0 PG    MCHC 32.8 30.0 - 36.5 g/dL    RDW 15.9 (H) 11.5 - 14.5 %    PLATELET 608 180 - 750 K/uL              Opportunity for questions and clarifications were given to the incoming nurse. Patient's bed is in low position, side rails x2, door open PRN, call bell within reach and patient not in distress.       Yael Hu RN

## 2017-06-06 ENCOUNTER — HOME HEALTH ADMISSION (OUTPATIENT)
Dept: HOME HEALTH SERVICES | Facility: HOME HEALTH | Age: 82
End: 2017-06-06
Payer: MEDICARE

## 2017-06-06 VITALS
WEIGHT: 150 LBS | HEART RATE: 71 BPM | SYSTOLIC BLOOD PRESSURE: 154 MMHG | OXYGEN SATURATION: 91 % | BODY MASS INDEX: 22.73 KG/M2 | RESPIRATION RATE: 18 BRPM | TEMPERATURE: 98.3 F | DIASTOLIC BLOOD PRESSURE: 63 MMHG | HEIGHT: 68 IN

## 2017-06-06 LAB
ANION GAP BLD CALC-SCNC: 7 MMOL/L (ref 5–15)
BACTERIA SPEC CULT: ABNORMAL
BACTERIA SPEC CULT: ABNORMAL
BASOPHILS # BLD AUTO: 0 K/UL
BASOPHILS # BLD: 0 %
BUN SERPL-MCNC: 30 MG/DL (ref 6–20)
BUN/CREAT SERPL: 31 (ref 12–20)
CALCIUM SERPL-MCNC: 8.1 MG/DL (ref 8.5–10.1)
CC UR VC: ABNORMAL
CHLORIDE SERPL-SCNC: 112 MMOL/L (ref 97–108)
CO2 SERPL-SCNC: 22 MMOL/L (ref 21–32)
CREAT SERPL-MCNC: 0.96 MG/DL (ref 0.55–1.02)
DIFFERENTIAL METHOD BLD: ABNORMAL
EOSINOPHIL # BLD: 0 K/UL
EOSINOPHIL NFR BLD: 0 %
ERYTHROCYTE [DISTWIDTH] IN BLOOD BY AUTOMATED COUNT: 15.7 % (ref 11.5–14.5)
GLUCOSE SERPL-MCNC: 135 MG/DL (ref 65–100)
HCT VFR BLD AUTO: 34.5 % (ref 35–47)
HGB BLD-MCNC: 11.5 G/DL (ref 11.5–16)
LYMPHOCYTES # BLD AUTO: 3 %
LYMPHOCYTES # BLD: 0.3 K/UL
MCH RBC QN AUTO: 30.8 PG (ref 26–34)
MCHC RBC AUTO-ENTMCNC: 33.3 G/DL (ref 30–36.5)
MCV RBC AUTO: 92.5 FL (ref 80–99)
MONOCYTES # BLD: 0.4 K/UL
MONOCYTES NFR BLD AUTO: 4 %
NEUTS SEG # BLD: 9.4 K/UL
NEUTS SEG NFR BLD AUTO: 93 %
PLATELET # BLD AUTO: 238 K/UL (ref 150–400)
POTASSIUM SERPL-SCNC: 4.6 MMOL/L (ref 3.5–5.1)
RBC # BLD AUTO: 3.73 M/UL (ref 3.8–5.2)
RBC MORPH BLD: ABNORMAL
SERVICE CMNT-IMP: ABNORMAL
SODIUM SERPL-SCNC: 141 MMOL/L (ref 136–145)
WBC # BLD AUTO: 10.1 K/UL (ref 3.6–11)

## 2017-06-06 PROCEDURE — 36415 COLL VENOUS BLD VENIPUNCTURE: CPT | Performed by: INTERNAL MEDICINE

## 2017-06-06 PROCEDURE — 80048 BASIC METABOLIC PNL TOTAL CA: CPT | Performed by: INTERNAL MEDICINE

## 2017-06-06 PROCEDURE — 94640 AIRWAY INHALATION TREATMENT: CPT

## 2017-06-06 PROCEDURE — 74011250636 HC RX REV CODE- 250/636: Performed by: INTERNAL MEDICINE

## 2017-06-06 PROCEDURE — 74011000250 HC RX REV CODE- 250: Performed by: INTERNAL MEDICINE

## 2017-06-06 PROCEDURE — 74011636637 HC RX REV CODE- 636/637: Performed by: INTERNAL MEDICINE

## 2017-06-06 PROCEDURE — 85025 COMPLETE CBC W/AUTO DIFF WBC: CPT | Performed by: INTERNAL MEDICINE

## 2017-06-06 PROCEDURE — 74011250637 HC RX REV CODE- 250/637: Performed by: INTERNAL MEDICINE

## 2017-06-06 RX ORDER — GUAIFENESIN 600 MG/1
600 TABLET, EXTENDED RELEASE ORAL 2 TIMES DAILY
Qty: 14 TAB | Refills: 0 | Status: SHIPPED | OUTPATIENT
Start: 2017-06-06 | End: 2017-06-13

## 2017-06-06 RX ORDER — GUAIFENESIN/DEXTROMETHORPHAN 100-10MG/5
5 SYRUP ORAL
Qty: 118 ML | Refills: 0 | Status: SHIPPED | OUTPATIENT
Start: 2017-06-06 | End: 2017-06-16

## 2017-06-06 RX ORDER — LEVOFLOXACIN 750 MG/1
750 TABLET ORAL
Qty: 3 TAB | Refills: 0 | Status: SHIPPED | OUTPATIENT
Start: 2017-06-06 | End: 2017-06-13

## 2017-06-06 RX ORDER — PREDNISONE 20 MG/1
40 TABLET ORAL DAILY
Qty: 14 TAB | Refills: 0 | Status: SHIPPED | OUTPATIENT
Start: 2017-06-06 | End: 2017-06-13

## 2017-06-06 RX ORDER — IPRATROPIUM BROMIDE AND ALBUTEROL SULFATE 2.5; .5 MG/3ML; MG/3ML
3 SOLUTION RESPIRATORY (INHALATION) 3 TIMES DAILY
Qty: 90 NEBULE | Refills: 0 | Status: SHIPPED | OUTPATIENT
Start: 2017-06-06 | End: 2019-12-15 | Stop reason: DRUGHIGH

## 2017-06-06 RX ORDER — POLYETHYLENE GLYCOL 3350 17 G/17G
17 POWDER, FOR SOLUTION ORAL ONCE
Status: COMPLETED | OUTPATIENT
Start: 2017-06-06 | End: 2017-06-06

## 2017-06-06 RX ADMIN — AZELASTINE HYDROCHLORIDE 1 SPRAY: 137 SPRAY, METERED NASAL at 09:07

## 2017-06-06 RX ADMIN — UMECLIDINIUM 1 PUFF: 62.5 AEROSOL, POWDER ORAL at 09:08

## 2017-06-06 RX ADMIN — IPRATROPIUM BROMIDE AND ALBUTEROL SULFATE 3 ML: .5; 3 SOLUTION RESPIRATORY (INHALATION) at 08:28

## 2017-06-06 RX ADMIN — GUAIFENESIN 600 MG: 600 TABLET, EXTENDED RELEASE ORAL at 08:44

## 2017-06-06 RX ADMIN — SALMETEROL XINAFOATE 1 PUFF: 50 POWDER, METERED ORAL; RESPIRATORY (INHALATION) at 09:08

## 2017-06-06 RX ADMIN — PREDNISONE 40 MG: 20 TABLET ORAL at 08:44

## 2017-06-06 RX ADMIN — ASPIRIN 81 MG: 81 TABLET, COATED ORAL at 08:43

## 2017-06-06 RX ADMIN — POLYETHYLENE GLYCOL 3350 17 G: 17 POWDER, FOR SOLUTION ORAL at 12:24

## 2017-06-06 RX ADMIN — Medication 10 ML: at 06:00

## 2017-06-06 RX ADMIN — HEPARIN SODIUM 5000 UNITS: 5000 INJECTION, SOLUTION INTRAVENOUS; SUBCUTANEOUS at 11:14

## 2017-06-06 RX ADMIN — FAMOTIDINE 20 MG: 20 TABLET ORAL at 08:44

## 2017-06-06 RX ADMIN — HEPARIN SODIUM 5000 UNITS: 5000 INJECTION, SOLUTION INTRAVENOUS; SUBCUTANEOUS at 02:37

## 2017-06-06 RX ADMIN — LEVOFLOXACIN 750 MG: 750 TABLET, FILM COATED ORAL at 08:44

## 2017-06-06 NOTE — PROGRESS NOTES
Spiritual Care Partner Volunteer visited patient on 6/5/17. Documented by:    Nabeel Rm M.S.   Spiritual Care Department  If needs arise please call [phone]

## 2017-06-06 NOTE — DISCHARGE SUMMARY
Hospitalist Discharge Summary     Patient ID:  Adrianna Sensor  [id number]  80 y.o.  11/10/1931    PCP on record: Tameka Person MD    Admit date: 6/4/2017  Discharge date and time: 6/6/2017      DISCHARGE DIAGNOSIS:  Sepsis and acute on chronic hypoxic respiratory failure due to acute bronchitis and acute COPD exacerbation   Acute kidney injury due to dehydration in setting of solitary kidney  Nausea/Vomiting/Diarrhea  PAD s/p L leg stent with sciatica      CONSULTATIONS:  None    Excerpted HPI from H&P of Constanza De La Cruz MD:  Amarilys Najera is a 80 y.o.  female who presents with above complains from home ambulatory. Pt complains of Worsening SOB x 2 days. H/o associated cough with yellowish sputum  No h/o fever, chills or rigors  H/o taking PO azithromycin MWF as per the pulm Dr Anirudh Mckeon at baseline  H/o gastric upset causing nausea/vomiting/diarrhea & decreased PO intake as per the pt x few days     Pt was found to have WBC 17K with left shift in ER with neg CXR & renal failure on workup.    ______________________________________________________________________  DISCHARGE SUMMARY/HOSPITAL COURSE:  for full details see H&P, daily progress notes, labs, consult notes. Hospital course:  Sepsis and acute on chronic hypoxic respiratory failure due to acute bronchitis and acute COPD exacerbation: Baseline 2 LPM O2 - back on home O2 at the time of discharge. Follows with Dr. Anirudh Mckeon. Pt had normal stress testing and EF 3/16. CXR on admission with no acute abnormality. Pt had blood cultures alpha strep 1/2 (probably contaminent). Repeat blood cultures sent 6/5 were negative at the time of discharge. Respiratory culture 6/5 grew light normal eron. Pt was initially treated with solumedrol, then transitioned to po prednisone prior to discharge. She was initially on IV rocephin, azithro and then changed oral levaquin. Pt should con't mucinex, incruse, salmeterol.   Acute kidney injury due to dehydration in setting of solitary kidney:  UTI ruled out by culture (1000 colonies). Cr at baseline on discharge (0.9). Nausea/Vomiting/Diarrhea: resolved after admission  PAD s/p L leg stent with sciatica: con't ASA. Pt has been set up with home PT.    _______________________________________________________________________  Patient seen and examined by me on discharge day. Pertinent Findings:  Gen: awake, appropriate, NAD  HEENT: cl reza, no lesions  Chest: CTA bilaterally, no crackles or wheezes, fair air movement  Cv: RRR, no murmur, no edema  Abd: soft, NT, ND, BS+, no mass  Neuro: CN intact  _______________________________________________________________________  DISCHARGE MEDICATIONS:   Current Discharge Medication List      START taking these medications    Details   albuterol-ipratropium (DUO-NEB) 2.5 mg-0.5 mg/3 ml nebu 3 mL by Nebulization route three (3) times daily. Qty: 90 Nebule, Refills: 0      guaiFENesin ER (MUCINEX) 600 mg ER tablet Take 1 Tab by mouth two (2) times a day for 7 days. Qty: 14 Tab, Refills: 0      guaiFENesin-dextromethorphan (ROBITUSSIN DM) 100-10 mg/5 mL syrup Take 5 mL by mouth every six (6) hours as needed for up to 10 days. Qty: 118 mL, Refills: 0      levoFLOXacin (LEVAQUIN) 750 mg tablet Take 1 Tab by mouth every fourty-eight (48) hours for 7 days. Qty: 3 Tab, Refills: 0      predniSONE (DELTASONE) 20 mg tablet Take 2 Tabs by mouth daily for 7 days. Qty: 14 Tab, Refills: 0         CONTINUE these medications which have NOT CHANGED    Details   Azelastine (ASTEPRO) 0.15 % (205.5 mcg) nasal spray       ANORO ELLIPTA 62.5-25 mcg/actuation inhaler       COMBIVENT RESPIMAT  mcg/actuation inhaler INHALE 1 PUFF THREE TIMES A DAY  Qty: 1 Inhaler, Refills: 11      aspirin delayed-release 81 mg tablet Take  by mouth daily.         triamcinolone acetonide (KENALOG) 0.1 % topical cream Apply  to affected area two (2) times daily as needed for Skin Irritation. use thin layer  Qty: 80 g, Refills: 5    Associated Diagnoses: Eczema             My Recommended Diet, Activity, Wound Care, and follow-up labs are listed in the patient's Discharge Insturctions which I have personally completed and reviewed.     _______________________________________________________________________  DISPOSITION:    Home with Family: x   Home with HH/PT/OT/RN: x   SNF/LTC:    JORGE ALBERTO:    OTHER:        Condition at Discharge:  Stable  _______________________________________________________________________  Follow up with:   PCP : Katie Lam MD  Follow-up Information     Follow up With Details Comments Contact Info    Katie Lam MD  As needed [address] [id number]  [phone]      Jay Poole MD In 2 weeks  [address]  [facility]  [place] [address] [place] [address]  [phone]                Total time in minutes spent coordinating this discharge (includes going over instructions, follow-up, prescriptions, and preparing report for sign off to her PCP) :  35 minutes    Signed:  Hernan Lance MD

## 2017-06-06 NOTE — HOME CARE
Home Health Care Discharge Planning: [facility]  Face to Face Encounter      NAME: Robyn Hernandez   :  11/10/1931   MRN:  [id number]     Primary Diagnosis:   Sepsis and acute on chronic hypoxic respiratory failure due to acute bronchitis and acute COPD exacerbation  Acute kidney injury due to dehydration, resolved  Nausea/Vomiting/Diarrhea    Date of Face to Face:  2017 11:40 AM                                  Face to Face Encounter findings are related to primary reason for home care:   YES    1. I certify that the patient needs intermittent skilled nursing care, physical therapy and/or speech therapy. I will not be following this patient in the Community and Dr. Marie Ellis MD will be responsible for signing the [address] 2. Initial Orders for Care: Skilled Nursing and Physical Therapy    3. I certify that this patient is homebound because of illness or injury, need the aid of supportive devices such as crutches, canes, wheelchairs, and walkers; the use of special transportation; or the assistance of another person in order to leave their place of residence. There exists a normal inability to leave home and leaving home requires a considerable and taxing effort. 4. I certify that this patient is under my care and that I had a Face-to-Face Encounter that meets the physician Face-to-Face Encounter requirements. Document the physical findings from the Face-to-Face Encounter that support the need for skilled services: Has new diagnosis that requires skilled nursing teaching and intervention , Has new medications that requires skilled nursing teaching and monitoring for understanding and compliance  and Has new finding of weakness and altered mobility that requires skilled physical/occupational and/or speech therapy services for evaluation and interventions.      Audelia Dominguez MD  Discharging Physician  Office: [phone]  Fax:   [phone]

## 2017-06-06 NOTE — PROGRESS NOTES
Patient was discharged home with family at bedside. . IV was removed per hospital policy. Discharge instructions and medications were reviewed and understood.

## 2017-06-06 NOTE — DISCHARGE INSTRUCTIONS
HOSPITALIST DISCHARGE INSTRUCTIONS    NAME: Chano Guzmán   :  11/10/1931   MRN:  [id number]     Date/Time:  2017 11:38 AM    ADMIT DATE: 2017   DISCHARGE DATE: 2017     Attending Physician: Melvi Kirby MD    DISCHARGE DIAGNOSIS:  Sepsis and acute on chronic hypoxic respiratory failure due to acute bronchitis and acute COPD exacerbation  Acute kidney injury due to dehydration, resolved  Nausea/Vomiting/Diarrhea    MEDICATIONS:  See above    · It is important that you take the medication exactly as they are prescribed. · Keep your medication in the bottles provided by the pharmacist and keep a list of the medication names, dosages, and times to be taken in your wallet. · Do not take other medications without consulting your doctor. Pain Management: per above medications    What to do at [address]:  Resume previous diet    Recommended activity: Activity as tolerated    If you have questions regarding the hospital related prescriptions or hospital related issues please call [facility] at . You can always direct your questions to your primary care doctor if you are unable to reach your hospital physician; your PCP works as an extension of your hospital doctor just like your hospital doctor is an extension of your PCP for your time at [facility]. If you experience any of the following symptoms then please call your primary care physician or return to the emergency room if you cannot get hold of your doctor:  Fever, chills, nausea, vomiting, diarrhea, change in mentation, falling, bleeding, shortness of breath    Additional Instructions:    I recommend that you take an over-the-counter probiotic (such as Align, Culturelle, or store generic) while you are on antibiotics. For your constipation, I recommend that you start docusate (Colace) 100mg by mouth twice daily - this is available over the counter.     Bring these papers with you to your follow up appointments. The papers will help your doctors be sure to continue the care plan from the hospital.              Information obtained by :  I understand that if any problems occur once I am at home I am to contact my physician. I understand and acknowledge receipt of the instructions indicated above.                                                                                                                                            Physician's or R.N.'s Signature                                                                  Date/Time                                                                                                                                              Patient or Representative Signature                                                          Date/Time

## 2017-06-07 LAB
BACTERIA SPEC CULT: ABNORMAL
BACTERIA SPEC CULT: ABNORMAL
GRAM STN SPEC: ABNORMAL
SERVICE CMNT-IMP: ABNORMAL

## 2017-06-08 ENCOUNTER — HOME CARE VISIT (OUTPATIENT)
Dept: SCHEDULING | Facility: HOME HEALTH | Age: 82
End: 2017-06-08
Payer: MEDICARE

## 2017-06-08 VITALS
OXYGEN SATURATION: 96 % | TEMPERATURE: 99.2 F | RESPIRATION RATE: 18 BRPM | HEART RATE: 75 BPM | SYSTOLIC BLOOD PRESSURE: 122 MMHG | DIASTOLIC BLOOD PRESSURE: 74 MMHG

## 2017-06-08 VITALS
HEART RATE: 76 BPM | SYSTOLIC BLOOD PRESSURE: 128 MMHG | TEMPERATURE: 98.5 F | DIASTOLIC BLOOD PRESSURE: 90 MMHG | RESPIRATION RATE: 22 BRPM | OXYGEN SATURATION: 93 %

## 2017-06-08 PROCEDURE — G0299 HHS/HOSPICE OF RN EA 15 MIN: HCPCS

## 2017-06-08 PROCEDURE — 3331090001 HH PPS REVENUE CREDIT

## 2017-06-08 PROCEDURE — 3331090002 HH PPS REVENUE DEBIT

## 2017-06-08 PROCEDURE — G0151 HHCP-SERV OF PT,EA 15 MIN: HCPCS

## 2017-06-08 PROCEDURE — 400013 HH SOC

## 2017-06-09 ENCOUNTER — HOME CARE VISIT (OUTPATIENT)
Dept: HOME HEALTH SERVICES | Facility: HOME HEALTH | Age: 82
End: 2017-06-09
Payer: MEDICARE

## 2017-06-09 PROCEDURE — 3331090002 HH PPS REVENUE DEBIT

## 2017-06-09 PROCEDURE — 3331090001 HH PPS REVENUE CREDIT

## 2017-06-10 PROCEDURE — 3331090001 HH PPS REVENUE CREDIT

## 2017-06-10 PROCEDURE — 3331090002 HH PPS REVENUE DEBIT

## 2017-06-11 LAB
BACTERIA SPEC CULT: NORMAL
SERVICE CMNT-IMP: NORMAL

## 2017-06-11 PROCEDURE — 3331090001 HH PPS REVENUE CREDIT

## 2017-06-11 PROCEDURE — 3331090002 HH PPS REVENUE DEBIT

## 2017-06-12 PROCEDURE — 3331090002 HH PPS REVENUE DEBIT

## 2017-06-12 PROCEDURE — 3331090001 HH PPS REVENUE CREDIT

## 2017-06-13 ENCOUNTER — HOME CARE VISIT (OUTPATIENT)
Dept: HOME HEALTH SERVICES | Facility: HOME HEALTH | Age: 82
End: 2017-06-13
Payer: MEDICARE

## 2017-06-13 PROCEDURE — 3331090002 HH PPS REVENUE DEBIT

## 2017-06-13 PROCEDURE — G0157 HHC PT ASSISTANT EA 15: HCPCS

## 2017-06-13 PROCEDURE — 3331090001 HH PPS REVENUE CREDIT

## 2017-06-14 VITALS
TEMPERATURE: 99 F | OXYGEN SATURATION: 97 % | HEART RATE: 87 BPM | SYSTOLIC BLOOD PRESSURE: 132 MMHG | RESPIRATION RATE: 18 BRPM | DIASTOLIC BLOOD PRESSURE: 64 MMHG

## 2017-06-14 PROCEDURE — 3331090002 HH PPS REVENUE DEBIT

## 2017-06-14 PROCEDURE — 3331090001 HH PPS REVENUE CREDIT

## 2017-06-15 ENCOUNTER — HOME CARE VISIT (OUTPATIENT)
Dept: HOME HEALTH SERVICES | Facility: HOME HEALTH | Age: 82
End: 2017-06-15
Payer: MEDICARE

## 2017-06-15 LAB
BACTERIA SPEC CULT: ABNORMAL
SERVICE CMNT-IMP: ABNORMAL

## 2017-06-15 PROCEDURE — G0157 HHC PT ASSISTANT EA 15: HCPCS

## 2017-06-15 PROCEDURE — 3331090002 HH PPS REVENUE DEBIT

## 2017-06-15 PROCEDURE — 3331090001 HH PPS REVENUE CREDIT

## 2017-06-16 PROCEDURE — 3331090001 HH PPS REVENUE CREDIT

## 2017-06-16 PROCEDURE — 3331090002 HH PPS REVENUE DEBIT

## 2017-06-17 VITALS
OXYGEN SATURATION: 95 % | DIASTOLIC BLOOD PRESSURE: 62 MMHG | SYSTOLIC BLOOD PRESSURE: 122 MMHG | TEMPERATURE: 99 F | HEART RATE: 66 BPM | RESPIRATION RATE: 16 BRPM

## 2017-06-17 PROCEDURE — 3331090002 HH PPS REVENUE DEBIT

## 2017-06-17 PROCEDURE — 3331090001 HH PPS REVENUE CREDIT

## 2017-06-18 PROCEDURE — 3331090002 HH PPS REVENUE DEBIT

## 2017-06-18 PROCEDURE — 3331090001 HH PPS REVENUE CREDIT

## 2017-06-19 PROCEDURE — 3331090001 HH PPS REVENUE CREDIT

## 2017-06-19 PROCEDURE — 3331090002 HH PPS REVENUE DEBIT

## 2017-06-20 ENCOUNTER — HOME CARE VISIT (OUTPATIENT)
Dept: SCHEDULING | Facility: HOME HEALTH | Age: 82
End: 2017-06-20
Payer: MEDICARE

## 2017-06-20 VITALS
OXYGEN SATURATION: 94 % | SYSTOLIC BLOOD PRESSURE: 112 MMHG | HEART RATE: 64 BPM | TEMPERATURE: 98.2 F | DIASTOLIC BLOOD PRESSURE: 64 MMHG

## 2017-06-20 PROCEDURE — 3331090001 HH PPS REVENUE CREDIT

## 2017-06-20 PROCEDURE — G0157 HHC PT ASSISTANT EA 15: HCPCS

## 2017-06-20 PROCEDURE — 3331090002 HH PPS REVENUE DEBIT

## 2017-06-21 PROCEDURE — 3331090001 HH PPS REVENUE CREDIT

## 2017-06-21 PROCEDURE — 3331090002 HH PPS REVENUE DEBIT

## 2017-06-22 PROCEDURE — 3331090002 HH PPS REVENUE DEBIT

## 2017-06-22 PROCEDURE — 3331090001 HH PPS REVENUE CREDIT

## 2017-06-23 ENCOUNTER — HOME CARE VISIT (OUTPATIENT)
Dept: HOME HEALTH SERVICES | Facility: HOME HEALTH | Age: 82
End: 2017-06-23
Payer: MEDICARE

## 2017-06-23 PROCEDURE — 3331090001 HH PPS REVENUE CREDIT

## 2017-06-23 PROCEDURE — 3331090002 HH PPS REVENUE DEBIT

## 2017-06-23 PROCEDURE — G0157 HHC PT ASSISTANT EA 15: HCPCS

## 2017-06-24 VITALS
RESPIRATION RATE: 16 BRPM | HEART RATE: 54 BPM | DIASTOLIC BLOOD PRESSURE: 62 MMHG | OXYGEN SATURATION: 94 % | TEMPERATURE: 99.7 F | SYSTOLIC BLOOD PRESSURE: 122 MMHG

## 2017-06-24 PROCEDURE — 3331090002 HH PPS REVENUE DEBIT

## 2017-06-24 PROCEDURE — 3331090001 HH PPS REVENUE CREDIT

## 2017-06-25 PROCEDURE — 3331090002 HH PPS REVENUE DEBIT

## 2017-06-25 PROCEDURE — 3331090001 HH PPS REVENUE CREDIT

## 2017-06-26 PROCEDURE — 3331090002 HH PPS REVENUE DEBIT

## 2017-06-26 PROCEDURE — 3331090001 HH PPS REVENUE CREDIT

## 2017-06-27 ENCOUNTER — HOME CARE VISIT (OUTPATIENT)
Dept: HOME HEALTH SERVICES | Facility: HOME HEALTH | Age: 82
End: 2017-06-27
Payer: MEDICARE

## 2017-06-27 PROCEDURE — 3331090001 HH PPS REVENUE CREDIT

## 2017-06-27 PROCEDURE — 3331090002 HH PPS REVENUE DEBIT

## 2017-06-28 PROCEDURE — 3331090001 HH PPS REVENUE CREDIT

## 2017-06-28 PROCEDURE — 3331090002 HH PPS REVENUE DEBIT

## 2017-06-29 ENCOUNTER — HOME CARE VISIT (OUTPATIENT)
Dept: HOME HEALTH SERVICES | Facility: HOME HEALTH | Age: 82
End: 2017-06-29
Payer: MEDICARE

## 2017-06-29 ENCOUNTER — HOME CARE VISIT (OUTPATIENT)
Dept: SCHEDULING | Facility: HOME HEALTH | Age: 82
End: 2017-06-29
Payer: MEDICARE

## 2017-06-29 PROCEDURE — G0151 HHCP-SERV OF PT,EA 15 MIN: HCPCS

## 2017-06-29 PROCEDURE — 3331090002 HH PPS REVENUE DEBIT

## 2017-06-29 PROCEDURE — 3331090001 HH PPS REVENUE CREDIT

## 2017-06-30 PROCEDURE — 3331090001 HH PPS REVENUE CREDIT

## 2017-06-30 PROCEDURE — 3331090002 HH PPS REVENUE DEBIT

## 2017-07-01 PROCEDURE — 3331090002 HH PPS REVENUE DEBIT

## 2017-07-01 PROCEDURE — 3331090001 HH PPS REVENUE CREDIT

## 2017-07-02 PROCEDURE — 3331090002 HH PPS REVENUE DEBIT

## 2017-07-02 PROCEDURE — 3331090001 HH PPS REVENUE CREDIT

## 2017-07-03 PROCEDURE — 3331090001 HH PPS REVENUE CREDIT

## 2017-07-03 PROCEDURE — 3331090002 HH PPS REVENUE DEBIT

## 2017-07-04 PROCEDURE — 3331090002 HH PPS REVENUE DEBIT

## 2017-07-04 PROCEDURE — 3331090001 HH PPS REVENUE CREDIT

## 2017-07-05 ENCOUNTER — HOME CARE VISIT (OUTPATIENT)
Dept: SCHEDULING | Facility: HOME HEALTH | Age: 82
End: 2017-07-05
Payer: MEDICARE

## 2017-07-05 PROCEDURE — 3331090002 HH PPS REVENUE DEBIT

## 2017-07-05 PROCEDURE — G0157 HHC PT ASSISTANT EA 15: HCPCS

## 2017-07-05 PROCEDURE — 3331090001 HH PPS REVENUE CREDIT

## 2017-07-06 VITALS
SYSTOLIC BLOOD PRESSURE: 118 MMHG | DIASTOLIC BLOOD PRESSURE: 70 MMHG | RESPIRATION RATE: 16 BRPM | OXYGEN SATURATION: 96 % | TEMPERATURE: 98.1 F | HEART RATE: 51 BPM

## 2017-07-06 PROCEDURE — 3331090002 HH PPS REVENUE DEBIT

## 2017-07-06 PROCEDURE — 3331090001 HH PPS REVENUE CREDIT

## 2017-07-07 ENCOUNTER — HOME CARE VISIT (OUTPATIENT)
Dept: SCHEDULING | Facility: HOME HEALTH | Age: 82
End: 2017-07-07
Payer: MEDICARE

## 2017-07-07 PROCEDURE — G0157 HHC PT ASSISTANT EA 15: HCPCS

## 2017-07-07 PROCEDURE — 3331090001 HH PPS REVENUE CREDIT

## 2017-07-07 PROCEDURE — 3331090002 HH PPS REVENUE DEBIT

## 2017-07-08 PROCEDURE — 3331090002 HH PPS REVENUE DEBIT

## 2017-07-08 PROCEDURE — 3331090001 HH PPS REVENUE CREDIT

## 2017-07-09 VITALS
OXYGEN SATURATION: 93 % | SYSTOLIC BLOOD PRESSURE: 124 MMHG | HEART RATE: 47 BPM | TEMPERATURE: 98.5 F | DIASTOLIC BLOOD PRESSURE: 78 MMHG | RESPIRATION RATE: 17 BRPM

## 2017-07-09 PROCEDURE — 3331090002 HH PPS REVENUE DEBIT

## 2017-07-09 PROCEDURE — 3331090001 HH PPS REVENUE CREDIT

## 2017-07-10 PROCEDURE — 3331090001 HH PPS REVENUE CREDIT

## 2017-07-10 PROCEDURE — 3331090002 HH PPS REVENUE DEBIT

## 2017-07-11 ENCOUNTER — HOME CARE VISIT (OUTPATIENT)
Dept: SCHEDULING | Facility: HOME HEALTH | Age: 82
End: 2017-07-11
Payer: MEDICARE

## 2017-07-11 VITALS
TEMPERATURE: 98.1 F | HEART RATE: 45 BPM | SYSTOLIC BLOOD PRESSURE: 118 MMHG | RESPIRATION RATE: 16 BRPM | DIASTOLIC BLOOD PRESSURE: 60 MMHG | OXYGEN SATURATION: 94 %

## 2017-07-11 PROCEDURE — G0157 HHC PT ASSISTANT EA 15: HCPCS

## 2017-07-11 PROCEDURE — 3331090002 HH PPS REVENUE DEBIT

## 2017-07-11 PROCEDURE — 3331090001 HH PPS REVENUE CREDIT

## 2017-07-12 PROCEDURE — 3331090002 HH PPS REVENUE DEBIT

## 2017-07-12 PROCEDURE — 3331090001 HH PPS REVENUE CREDIT

## 2017-07-13 PROCEDURE — 3331090002 HH PPS REVENUE DEBIT

## 2017-07-13 PROCEDURE — 3331090001 HH PPS REVENUE CREDIT

## 2017-07-14 ENCOUNTER — HOME CARE VISIT (OUTPATIENT)
Dept: SCHEDULING | Facility: HOME HEALTH | Age: 82
End: 2017-07-14
Payer: MEDICARE

## 2017-07-14 VITALS
SYSTOLIC BLOOD PRESSURE: 133 MMHG | DIASTOLIC BLOOD PRESSURE: 58 MMHG | OXYGEN SATURATION: 97 % | HEART RATE: 54 BPM | RESPIRATION RATE: 16 BRPM | TEMPERATURE: 98.4 F

## 2017-07-14 PROCEDURE — 3331090002 HH PPS REVENUE DEBIT

## 2017-07-14 PROCEDURE — G0151 HHCP-SERV OF PT,EA 15 MIN: HCPCS

## 2017-07-14 PROCEDURE — 3331090003 HH PPS REVENUE ADJ

## 2017-07-14 PROCEDURE — 3331090001 HH PPS REVENUE CREDIT

## 2017-07-15 PROCEDURE — 3331090002 HH PPS REVENUE DEBIT

## 2017-07-15 PROCEDURE — 3331090001 HH PPS REVENUE CREDIT

## 2017-07-16 PROCEDURE — 3331090001 HH PPS REVENUE CREDIT

## 2017-07-16 PROCEDURE — 3331090002 HH PPS REVENUE DEBIT

## 2017-07-17 ENCOUNTER — HOSPITAL ENCOUNTER (EMERGENCY)
Age: 82
Discharge: HOME | End: 2017-07-17
Attending: EMERGENCY MEDICINE | Admitting: EMERGENCY MEDICINE
Payer: MEDICARE

## 2017-07-17 ENCOUNTER — APPOINTMENT (OUTPATIENT)
Dept: GENERAL RADIOLOGY | Age: 82
End: 2017-07-17
Attending: EMERGENCY MEDICINE
Payer: MEDICARE

## 2017-07-17 VITALS
DIASTOLIC BLOOD PRESSURE: 49 MMHG | OXYGEN SATURATION: 93 % | TEMPERATURE: 99 F | RESPIRATION RATE: 26 BRPM | HEART RATE: 74 BPM | SYSTOLIC BLOOD PRESSURE: 107 MMHG | BODY MASS INDEX: 24.11 KG/M2 | WEIGHT: 150 LBS | HEIGHT: 66 IN

## 2017-07-17 DIAGNOSIS — J44.1 COPD EXACERBATION (HCC): Primary | ICD-10-CM

## 2017-07-17 DIAGNOSIS — E87.1 HYPONATREMIA: ICD-10-CM

## 2017-07-17 LAB
ALBUMIN SERPL BCP-MCNC: 3.5 G/DL (ref 3.5–5)
ALBUMIN/GLOB SERPL: 1.2 {RATIO} (ref 1.1–2.2)
ALP SERPL-CCNC: 53 U/L (ref 45–117)
ALT SERPL-CCNC: 12 U/L (ref 12–78)
ANION GAP BLD CALC-SCNC: 8 MMOL/L (ref 5–15)
AST SERPL W P-5'-P-CCNC: 13 U/L (ref 15–37)
BASOPHILS # BLD AUTO: 0 K/UL (ref 0–0.1)
BASOPHILS # BLD: 0 % (ref 0–1)
BILIRUB SERPL-MCNC: 1 MG/DL (ref 0.2–1)
BUN SERPL-MCNC: 20 MG/DL (ref 6–20)
BUN/CREAT SERPL: 14 (ref 12–20)
CALCIUM SERPL-MCNC: 8.7 MG/DL (ref 8.5–10.1)
CHLORIDE SERPL-SCNC: 95 MMOL/L (ref 97–108)
CK SERPL-CCNC: 38 U/L (ref 26–192)
CO2 SERPL-SCNC: 24 MMOL/L (ref 21–32)
CREAT SERPL-MCNC: 1.4 MG/DL (ref 0.55–1.02)
DIFFERENTIAL METHOD BLD: ABNORMAL
EOSINOPHIL # BLD: 0.1 K/UL (ref 0–0.4)
EOSINOPHIL NFR BLD: 1 % (ref 0–7)
ERYTHROCYTE [DISTWIDTH] IN BLOOD BY AUTOMATED COUNT: 14.4 % (ref 11.5–14.5)
GLOBULIN SER CALC-MCNC: 2.9 G/DL (ref 2–4)
GLUCOSE SERPL-MCNC: 158 MG/DL (ref 65–100)
HCT VFR BLD AUTO: 38.1 % (ref 35–47)
HGB BLD-MCNC: 12.9 G/DL (ref 11.5–16)
LYMPHOCYTES # BLD AUTO: 2 % (ref 12–49)
LYMPHOCYTES # BLD: 0.3 K/UL (ref 0.8–3.5)
MCH RBC QN AUTO: 30.2 PG (ref 26–34)
MCHC RBC AUTO-ENTMCNC: 33.9 G/DL (ref 30–36.5)
MCV RBC AUTO: 89.2 FL (ref 80–99)
MONOCYTES # BLD: 0.6 K/UL (ref 0–1)
MONOCYTES NFR BLD AUTO: 4 % (ref 5–13)
NEUTS BAND NFR BLD MANUAL: 5 % (ref 0–6)
NEUTS SEG # BLD: 13.7 K/UL (ref 1.8–8)
NEUTS SEG NFR BLD AUTO: 88 % (ref 32–75)
PLATELET # BLD AUTO: 230 K/UL (ref 150–400)
POTASSIUM SERPL-SCNC: 5 MMOL/L (ref 3.5–5.1)
PROT SERPL-MCNC: 6.4 G/DL (ref 6.4–8.2)
RBC # BLD AUTO: 4.27 M/UL (ref 3.8–5.2)
RBC MORPH BLD: ABNORMAL
SODIUM SERPL-SCNC: 127 MMOL/L (ref 136–145)
TROPONIN I SERPL-MCNC: <0.04 NG/ML
WBC # BLD AUTO: 14.7 K/UL (ref 3.6–11)

## 2017-07-17 PROCEDURE — 80053 COMPREHEN METABOLIC PANEL: CPT | Performed by: EMERGENCY MEDICINE

## 2017-07-17 PROCEDURE — 82550 ASSAY OF CK (CPK): CPT | Performed by: EMERGENCY MEDICINE

## 2017-07-17 PROCEDURE — 94640 AIRWAY INHALATION TREATMENT: CPT

## 2017-07-17 PROCEDURE — 74011636637 HC RX REV CODE- 636/637: Performed by: EMERGENCY MEDICINE

## 2017-07-17 PROCEDURE — 85025 COMPLETE CBC W/AUTO DIFF WBC: CPT | Performed by: EMERGENCY MEDICINE

## 2017-07-17 PROCEDURE — 84484 ASSAY OF TROPONIN QUANT: CPT | Performed by: EMERGENCY MEDICINE

## 2017-07-17 PROCEDURE — 74011000250 HC RX REV CODE- 250: Performed by: EMERGENCY MEDICINE

## 2017-07-17 PROCEDURE — 93005 ELECTROCARDIOGRAM TRACING: CPT

## 2017-07-17 PROCEDURE — 77030029684 HC NEB SM VOL KT MONA -A

## 2017-07-17 PROCEDURE — 71020 XR CHEST PA LAT: CPT

## 2017-07-17 PROCEDURE — 36415 COLL VENOUS BLD VENIPUNCTURE: CPT | Performed by: EMERGENCY MEDICINE

## 2017-07-17 PROCEDURE — A9270 NON-COVERED ITEM OR SERVICE: HCPCS | Performed by: EMERGENCY MEDICINE

## 2017-07-17 PROCEDURE — 99285 EMERGENCY DEPT VISIT HI MDM: CPT

## 2017-07-17 RX ORDER — IPRATROPIUM BROMIDE AND ALBUTEROL SULFATE 2.5; .5 MG/3ML; MG/3ML
3 SOLUTION RESPIRATORY (INHALATION) ONCE
Status: COMPLETED | OUTPATIENT
Start: 2017-07-17 | End: 2017-07-17

## 2017-07-17 RX ORDER — PREDNISONE 20 MG/1
60 TABLET ORAL DAILY
Qty: 15 TAB | Refills: 0 | Status: SHIPPED | OUTPATIENT
Start: 2017-07-17 | End: 2017-07-22

## 2017-07-17 RX ORDER — PREDNISONE 20 MG/1
60 TABLET ORAL
Status: COMPLETED | OUTPATIENT
Start: 2017-07-17 | End: 2017-07-17

## 2017-07-17 RX ORDER — DOXYCYCLINE HYCLATE 100 MG
100 TABLET ORAL 2 TIMES DAILY
Qty: 14 TAB | Refills: 0 | Status: SHIPPED | OUTPATIENT
Start: 2017-07-17 | End: 2017-07-24

## 2017-07-17 RX ORDER — IPRATROPIUM BROMIDE AND ALBUTEROL SULFATE 2.5; .5 MG/3ML; MG/3ML
3 SOLUTION RESPIRATORY (INHALATION)
Status: COMPLETED | OUTPATIENT
Start: 2017-07-17 | End: 2017-07-17

## 2017-07-17 RX ADMIN — IPRATROPIUM BROMIDE AND ALBUTEROL SULFATE 3 ML: .5; 3 SOLUTION RESPIRATORY (INHALATION) at 21:32

## 2017-07-17 RX ADMIN — IPRATROPIUM BROMIDE AND ALBUTEROL SULFATE 3 ML: .5; 3 SOLUTION RESPIRATORY (INHALATION) at 19:33

## 2017-07-17 RX ADMIN — PREDNISONE 60 MG: 20 TABLET ORAL at 21:04

## 2017-07-18 LAB
ATRIAL RATE: 78 BPM
CALCULATED R AXIS, ECG10: -29 DEGREES
CALCULATED T AXIS, ECG11: 88 DEGREES
DIAGNOSIS, 93000: NORMAL
Q-T INTERVAL, ECG07: 446 MS
QRS DURATION, ECG06: 118 MS
QTC CALCULATION (BEZET), ECG08: 474 MS
VENTRICULAR RATE, ECG03: 68 BPM

## 2017-07-18 NOTE — ED PROVIDER NOTES
HPI Comments: Anirudh Trinh is a 80 y.o. female with PMHx of COPD, and PSHx of nephrectomy, presenting per EMS to ED c/o worsening SOB since yesterday. Pt notes associated dry cough and subjective fever. She reports history of COPD and adherence with associated medications. Pt endorses she uses home O2 2L NC, denies increase with current symptoms. She denies current tobacco use. Pt denies history of A-fib. She notes history of nephrectomy. Pt specifically denies CP and leg swelling. PCP: Macho Burnett MD  Social Hx: former smoker (quit date: 1/3/2011); + EtOH (occasional); - drug use. There are no other complaints, changes, or physical findings at this time. Written by Dulce Maria Calhoun ED Scribe, as dictated by Marques Hull DO. The history is provided by the patient and the EMS personnel. Past Medical History:   Diagnosis Date    Arthritis     generalized    COPD     former smoker      >60pkyr quit 1/3/11    h/o DVT 1955    left leg    ischemic left lower extremitiy pain     above knee FemPop 1/3/11    PVD (peripheral vascular disease) ([facility] [address]     Seasonal allergic rhinitis     Single kidney        Past Surgical History:   Procedure Laterality Date    BREAST SURGERY PROCEDURE UNLISTED  1955    excision left breast cyst    HX GI  10 yrs ago    colonoscopy    HX GYN      3 miscarriges    HX GYN      1 vaginal birth   [de-identified] ORTHOPAEDIC      veinography left leg, stent left leg         Family History:   Problem Relation Age of Onset    Cancer Mother      colon    Cancer Sister      lung       Social History     Social History    Marital status:      Spouse name: N/A    Number of children: N/A    Years of education: N/A     Occupational History    Not on file.      Social History Main Topics    Smoking status: Former Smoker     Packs/day: 1.00     Years: 60.00     Quit date: 1/3/2011    Smokeless tobacco: Never Used    Alcohol use Yes      Comment: occasional liquor after dinner    Drug use: No    Sexual activity: Not on file     Other Topics Concern    Not on file     Social History Narrative         ALLERGIES: Food extracts and Sulfa (sulfonamide antibiotics)    Review of Systems   Constitutional: Positive for fever (subjective). Negative for fatigue. HENT: Negative. Eyes: Negative. Respiratory: Positive for cough and shortness of breath. Negative for wheezing. Cardiovascular: Negative for chest pain and leg swelling. Gastrointestinal: Negative for blood in stool, constipation, diarrhea, nausea and vomiting. Endocrine: Negative. Genitourinary: Negative for difficulty urinating and dysuria. Musculoskeletal: Negative. Skin: Negative for rash. Allergic/Immunologic: Negative. Neurological: Negative for weakness and numbness. Hematological: Negative. Psychiatric/Behavioral: Negative. All other systems reviewed and are negative. Vitals:    07/17/17 1929 07/17/17 1937 07/17/17 2017 07/17/17 2111   BP:       Pulse: 63 73 64 74   Resp: 19 20 26 26   Temp:       SpO2: 93% 94% 94% 93%   Weight:       Height:                Physical Exam   Constitutional: She is oriented to person, place, and time. She appears well-developed and well-nourished. HENT:   Head: Normocephalic and atraumatic. Mouth/Throat: Mucous membranes are normal.   Eyes: EOM are normal. Pupils are equal, round, and reactive to light. Neck: Normal range of motion. No JVD present. No tracheal deviation present. Cardiovascular: Normal rate, normal heart sounds and intact distal pulses. An irregularly irregular rhythm present. Exam reveals no gallop and no friction rub. No murmur heard. Pulmonary/Chest: Breath sounds normal. No stridor. She has no wheezes. She has no rales. Diminished air movement bilaterally; Abdominal: Soft. Bowel sounds are normal. She exhibits no distension and no mass. There is no tenderness. There is no guarding. Musculoskeletal: Normal range of motion. She exhibits no tenderness. No peripheral edema; Neurological: She is alert and oriented to person, place, and time. Skin: Skin is warm and dry. No rash noted. Psychiatric: She has a normal mood and affect. Her behavior is normal. Judgment and thought content normal.   Nursing note and vitals reviewed. MDM  Number of Diagnoses or Management Options  Diagnosis management comments: DDx: COPD exacerbation, acs, pneumonia, pulmonary edema. Pt is speaking in full sentences, is in NAD. Will get CXR, labs, cardiac enzymes, ekg. WIll treat with duonebs and steroids then reassess. Amount and/or Complexity of Data Reviewed  Clinical lab tests: ordered and reviewed  Tests in the radiology section of CPT®: ordered and reviewed  Tests in the medicine section of CPT®: reviewed and ordered  Obtain history from someone other than the patient: yes (EMS)  Review and summarize past medical records: yes  Independent visualization of images, tracings, or specimens: yes    Patient Progress  Patient progress: stable    Procedures    EKG interpretation: (Preliminary) 1934  Rhythm: atrial fib; and irregular. Rate (approx.): 68; Axis: left axis deviation; QRS interval: normal ; ST/T wave: non-specific changes. Written by MELODY Mallory, as dictated by Rebeca Tyler DO. Progress Note:  Pt states she is feeling better, and lung sounds are improved. Pt O2 saturations are 93-94% on her home O2. Discussed patient's hyponatremia and creatinine with patient. Pt will increase her oral hydration at home, increase her salt intake, and follow up with her PCP later this week to recheck her labwork.      LABORATORY TESTS:  Recent Results (from the past 12 hour(s))   CBC WITH AUTOMATED DIFF    Collection Time: 07/17/17  7:34 PM   Result Value Ref Range    WBC 14.7 (H) 3.6 - 11.0 K/uL    RBC 4.27 3.80 - 5.20 M/uL    HGB 12.9 11.5 - 16.0 g/dL    HCT 38.1 35.0 - 47.0 % MCV 89.2 80.0 - 99.0 FL    MCH 30.2 26.0 - 34.0 PG    MCHC 33.9 30.0 - 36.5 g/dL    RDW 14.4 11.5 - 14.5 %    PLATELET 099 150 - 812 K/uL    NEUTROPHILS 88 (H) 32 - 75 %    BAND NEUTROPHILS 5 0 - 6 %    LYMPHOCYTES 2 (L) 12 - 49 %    MONOCYTES 4 (L) 5 - 13 %    EOSINOPHILS 1 0 - 7 %    BASOPHILS 0 0 - 1 %    ABS. NEUTROPHILS 13.7 (H) 1.8 - 8.0 K/UL    ABS. LYMPHOCYTES 0.3 (L) 0.8 - 3.5 K/UL    ABS. MONOCYTES 0.6 0.0 - 1.0 K/UL    ABS. EOSINOPHILS 0.1 0.0 - 0.4 K/UL    ABS. BASOPHILS 0.0 0.0 - 0.1 K/UL    RBC COMMENTS NORMOCYTIC, NORMOCHROMIC      DF MANUAL     METABOLIC PANEL, COMPREHENSIVE    Collection Time: 07/17/17  7:34 PM   Result Value Ref Range    Sodium 127 (L) 136 - 145 mmol/L    Potassium 5.0 3.5 - 5.1 mmol/L    Chloride 95 (L) 97 - 108 mmol/L    CO2 24 21 - 32 mmol/L    Anion gap 8 5 - 15 mmol/L    Glucose 158 (H) 65 - 100 mg/dL    BUN 20 6 - 20 MG/DL    Creatinine 1.40 (H) 0.55 - 1.02 MG/DL    BUN/Creatinine ratio 14 12 - 20      GFR est AA 43 (L) >60 ml/min/1.73m2    GFR est non-AA 36 (L) >60 ml/min/1.73m2    Calcium 8.7 8.5 - 10.1 MG/DL    Bilirubin, total 1.0 0.2 - 1.0 MG/DL    ALT (SGPT) 12 12 - 78 U/L    AST (SGOT) 13 (L) 15 - 37 U/L    Alk.  phosphatase 53 45 - 117 U/L    Protein, total 6.4 6.4 - 8.2 g/dL    Albumin 3.5 3.5 - 5.0 g/dL    Globulin 2.9 2.0 - 4.0 g/dL    A-G Ratio 1.2 1.1 - 2.2     CK W/ REFLX CKMB    Collection Time: 07/17/17  7:34 PM   Result Value Ref Range    CK 38 26 - 192 U/L   TROPONIN I    Collection Time: 07/17/17  7:34 PM   Result Value Ref Range    Troponin-I, Qt. <0.04 <0.05 ng/mL   EKG, 12 LEAD, INITIAL    Collection Time: 07/17/17  7:34 PM   Result Value Ref Range    Ventricular Rate 68 BPM    Atrial Rate 78 BPM    QRS Duration 118 ms    Q-T Interval 446 ms    QTC Calculation (Bezet) 474 ms    Calculated R Axis -29 degrees    Calculated T Axis 88 degrees    Diagnosis       Atrial fibrillation with a competing junctional pacemaker  Low voltage QRS  Cannot rule out Anterior infarct , age undetermined  ST & T wave abnormality, consider lateral ischemia  When compared with ECG of 04-JUN-2017 00:47,  Atrial fibrillation has replaced Wide QRS rhythm         IMAGING RESULTS:  INDICATION:  Tachypnea, dyspnea.     COMPARISON:  6/4/2017.     FINDINGS:  AP and lateral views were obtained of the chest.    The heart is top normal to minimally enlarged. The lungs are hyperinflated. No consolidation, pulmonary edema, or pleural effusion is evident. Degenerative change of the spine is noted.          IMPRESSION  IMPRESSION:    No pneumonia or CHF.               MEDICATIONS GIVEN:  Medications   albuterol-ipratropium (DUO-NEB) 2.5 MG-0.5 MG/3 ML (3 mL Nebulization Given 7/17/17 1933)   predniSONE (DELTASONE) tablet 60 mg (60 mg Oral Given 7/17/17 2104)   albuterol-ipratropium (DUO-NEB) 2.5 MG-0.5 MG/3 ML (3 mL Nebulization Given 7/17/17 2132)       IMPRESSION:  1. COPD exacerbation ([facility] [address]    2. Hyponatremia        PLAN:  1. Current Discharge Medication List      START taking these medications    Details   doxycycline (VIBRA-TABS) 100 mg tablet Take 1 Tab by mouth two (2) times a day for 7 days. Qty: 14 Tab, Refills: 0      predniSONE (DELTASONE) 20 mg tablet Take 3 Tabs by mouth daily for 5 days. Qty: 15 Tab, Refills: 0           2. Follow-up Information     Follow up With Details Comments Contact Info    ODILON Bennett MD Schedule an appointment as soon as possible for a visit  [address] [address]  [phone]      [facility] EMERGENCY DEPT  As needed, If symptoms worsen [address]  [address] [facility]  [phone]        Return to ED if worse     DISCHARGE NOTE  10:08 PM  The patient has been re-evaluated and is ready for discharge. Reviewed available results with patient. Counseled pt on diagnosis and care plan. Pt has expressed understanding, and all questions have been answered.  Pt agrees with plan and agrees to F/U as recommended, or return to the ED if their sxs worsen. Discharge instructions have been provided and explained to the pt, along with reasons to return to the ED. Written by Alex Valdivia ED Scribe, as dictated by Beatris Geronimo DO. This note is prepared by Alex Valdivia, acting as Scribe for Beatris Geronimo DO. Beatris Geronimo DO: The scribe's documentation has been prepared under my direction and personally reviewed by me in its entirety. I confirm that the note above accurately reflects all work, treatment, procedures, and medical decision making performed by me.

## 2017-07-18 NOTE — DISCHARGE INSTRUCTIONS
Chronic Obstructive Pulmonary Disease (COPD): Care Instructions  Your Care Instructions    Chronic obstructive pulmonary disease (COPD) is a general term for a group of lung diseases, including emphysema and chronic bronchitis. People with COPD have decreased airflow in and out of the lungs, which makes it hard to breathe. The airways also can get clogged with thick mucus. Cigarette smoking is a major cause of COPD. Although there is no cure for COPD, you can slow its progress. Following your treatment plan and taking care of yourself can help you feel better and live longer. Follow-up care is a key part of your treatment and safety. Be sure to make and go to all appointments, and call your doctor if you are having problems. It's also a good idea to know your test results and keep a list of the medicines you take. How can you care for yourself at home? Staying healthy  · Do not smoke. This is the most important step you can take to prevent more damage to your lungs. If you need help quitting, talk to your doctor about stop-smoking programs and medicines. These can increase your chances of quitting for good. · Avoid colds and flu. Get a pneumococcal vaccine shot. If you have had one before, ask your doctor whether you need a second dose. Get the flu vaccine every fall. If you must be around people with colds or the flu, wash your hands often. · Avoid secondhand smoke, air pollution, and high altitudes. Also avoid cold, dry air and hot, humid air. Stay at home with your windows closed when air pollution is bad. Medicines and oxygen therapy  · Take your medicines exactly as prescribed. Call your doctor if you think you are having a problem with your medicine. · You may be taking medicines such as:  ¨ Bronchodilators. These help open your airways and make breathing easier. Bronchodilators are either short-acting (work for 6 to 9 hours) or long-acting (work for 24 hours).  You inhale most bronchodilators, so they start to act quickly. Always carry your quick-relief inhaler with you in case you need it while you are away from home. ¨ Corticosteroids (prednisone, budesonide). These reduce airway inflammation. They come in pill or inhaled form. You must take these medicines every day for them to work well. · A spacer may help you get more inhaled medicine to your lungs. Ask your doctor or pharmacist if a spacer is right for you. If it is, ask how to use it properly. · Do not take any vitamins, over-the-counter medicine, or herbal products without talking to your doctor first.  · If your doctor prescribed antibiotics, take them as directed. Do not stop taking them just because you feel better. You need to take the full course of antibiotics. · Oxygen therapy boosts the amount of oxygen in your blood and helps you breathe easier. Use the flow rate your doctor has recommended, and do not change it without talking to your doctor first.  Activity  · Get regular exercise. Walking is an easy way to get exercise. Start out slowly, and walk a little more each day. · Pay attention to your breathing. You are exercising too hard if you cannot talk while you are exercising. · Take short rest breaks when doing household chores and other activities. · Learn breathing methods--such as breathing through pursed lips--to help you become less short of breath. · If your doctor has not set you up with a pulmonary rehabilitation program, talk to him or her about whether rehab is right for you. Rehab includes exercise programs, education about your disease and how to manage it, help with diet and other changes, and emotional support. Diet  · Eat regular, healthy meals. Use bronchodilators about 1 hour before you eat to make it easier to eat. Eat several small meals instead of three large ones. Drink beverages at the end of the meal. Avoid foods that are hard to chew.   · Eat foods that contain protein so that you do not lose muscle mass.  · Talk with your doctor if you gain too much weight or if you lose weight without trying. Mental health  · Talk to your family, friends, or a therapist about your feelings. It is normal to feel frightened, angry, hopeless, helpless, and even guilty. Talking openly about bad feelings can help you cope. If these feelings last, talk to your doctor. When should you call for help? Call 911 anytime you think you may need emergency care. For example, call if:  · You have severe trouble breathing. Call your doctor now or seek immediate medical care if:  · You have new or worse trouble breathing. · You cough up blood. · You have a fever. Watch closely for changes in your health, and be sure to contact your doctor if:  · You cough more deeply or more often, especially if you notice more mucus or a change in the color of your mucus. · You have new or worse swelling in your legs or belly. · You are not getting better as expected. Where can you learn more? Go to http://von-johnna.info/. López Gone in the search box to learn more about \"Chronic Obstructive Pulmonary Disease (COPD): Care Instructions. \"  Current as of: March 25, 2017  Content Version: 11.3  © 1961-0094 [employer]. Care instructions adapted under license by [employer] (which disclaims liability or warranty for this information). If you have questions about a medical condition or this instruction, always ask your healthcare professional. [place] [address] any warranty or liability for your use of this information.

## 2018-07-27 ENCOUNTER — HOSPITAL ENCOUNTER (INPATIENT)
Age: 83
LOS: 5 days | Discharge: NURSING, REHAB OR LONG-TERM CARE | DRG: 191 | End: 2018-08-01
Attending: EMERGENCY MEDICINE | Admitting: INTERNAL MEDICINE
Payer: MEDICARE

## 2018-07-27 ENCOUNTER — APPOINTMENT (OUTPATIENT)
Dept: GENERAL RADIOLOGY | Age: 83
DRG: 191 | End: 2018-07-27
Attending: EMERGENCY MEDICINE
Payer: MEDICARE

## 2018-07-27 DIAGNOSIS — J44.1 ACUTE EXACERBATION OF CHRONIC OBSTRUCTIVE PULMONARY DISEASE (COPD) (HCC): Primary | ICD-10-CM

## 2018-07-27 DIAGNOSIS — E87.1 HYPONATREMIA: ICD-10-CM

## 2018-07-27 DIAGNOSIS — R09.02 HYPOXIA: ICD-10-CM

## 2018-07-27 LAB
ALBUMIN SERPL-MCNC: 4 G/DL (ref 3.5–5)
ALBUMIN/GLOB SERPL: 1.3 {RATIO} (ref 1.1–2.2)
ALP SERPL-CCNC: 63 U/L (ref 45–117)
ALT SERPL-CCNC: 14 U/L (ref 12–78)
ANION GAP SERPL CALC-SCNC: 8 MMOL/L (ref 5–15)
AST SERPL-CCNC: 15 U/L (ref 15–37)
ATRIAL RATE: 67 BPM
BASOPHILS # BLD: 0.1 K/UL (ref 0–0.1)
BASOPHILS NFR BLD: 1 % (ref 0–1)
BILIRUB SERPL-MCNC: 0.6 MG/DL (ref 0.2–1)
BUN SERPL-MCNC: 16 MG/DL (ref 6–20)
BUN/CREAT SERPL: 17 (ref 12–20)
CALCIUM SERPL-MCNC: 9.1 MG/DL (ref 8.5–10.1)
CALCULATED P AXIS, ECG09: 53 DEGREES
CALCULATED R AXIS, ECG10: -29 DEGREES
CALCULATED T AXIS, ECG11: 92 DEGREES
CHLORIDE SERPL-SCNC: 95 MMOL/L (ref 97–108)
CK SERPL-CCNC: 66 U/L (ref 26–192)
CO2 SERPL-SCNC: 25 MMOL/L (ref 21–32)
CREAT SERPL-MCNC: 0.92 MG/DL (ref 0.55–1.02)
DIAGNOSIS, 93000: NORMAL
DIFFERENTIAL METHOD BLD: ABNORMAL
EOSINOPHIL # BLD: 0.4 K/UL (ref 0–0.4)
EOSINOPHIL NFR BLD: 5 % (ref 0–7)
ERYTHROCYTE [DISTWIDTH] IN BLOOD BY AUTOMATED COUNT: 13.6 % (ref 11.5–14.5)
GLOBULIN SER CALC-MCNC: 3.1 G/DL (ref 2–4)
GLUCOSE SERPL-MCNC: 99 MG/DL (ref 65–100)
HCT VFR BLD AUTO: 41 % (ref 35–47)
HGB BLD-MCNC: 13.8 G/DL (ref 11.5–16)
IMM GRANULOCYTES # BLD: 0 K/UL (ref 0–0.04)
IMM GRANULOCYTES NFR BLD AUTO: 0 % (ref 0–0.5)
LYMPHOCYTES # BLD: 1 K/UL (ref 0.8–3.5)
LYMPHOCYTES NFR BLD: 13 % (ref 12–49)
MCH RBC QN AUTO: 29.7 PG (ref 26–34)
MCHC RBC AUTO-ENTMCNC: 33.7 G/DL (ref 30–36.5)
MCV RBC AUTO: 88.2 FL (ref 80–99)
MONOCYTES # BLD: 0.6 K/UL (ref 0–1)
MONOCYTES NFR BLD: 8 % (ref 5–13)
NEUTS SEG # BLD: 5.8 K/UL (ref 1.8–8)
NEUTS SEG NFR BLD: 74 % (ref 32–75)
NRBC # BLD: 0 K/UL (ref 0–0.01)
NRBC BLD-RTO: 0 PER 100 WBC
P-R INTERVAL, ECG05: 242 MS
PLATELET # BLD AUTO: 227 K/UL (ref 150–400)
PMV BLD AUTO: 8.8 FL (ref 8.9–12.9)
POTASSIUM SERPL-SCNC: 4.7 MMOL/L (ref 3.5–5.1)
PROT SERPL-MCNC: 7.1 G/DL (ref 6.4–8.2)
Q-T INTERVAL, ECG07: 442 MS
QRS DURATION, ECG06: 138 MS
QTC CALCULATION (BEZET), ECG08: 467 MS
RBC # BLD AUTO: 4.65 M/UL (ref 3.8–5.2)
SODIUM SERPL-SCNC: 128 MMOL/L (ref 136–145)
TROPONIN I SERPL-MCNC: <0.05 NG/ML
VENTRICULAR RATE, ECG03: 67 BPM
WBC # BLD AUTO: 7.9 K/UL (ref 3.6–11)

## 2018-07-27 PROCEDURE — 65660000000 HC RM CCU STEPDOWN

## 2018-07-27 PROCEDURE — 94640 AIRWAY INHALATION TREATMENT: CPT

## 2018-07-27 PROCEDURE — A9270 NON-COVERED ITEM OR SERVICE: HCPCS | Performed by: EMERGENCY MEDICINE

## 2018-07-27 PROCEDURE — 74011000250 HC RX REV CODE- 250: Performed by: EMERGENCY MEDICINE

## 2018-07-27 PROCEDURE — 82550 ASSAY OF CK (CPK): CPT | Performed by: EMERGENCY MEDICINE

## 2018-07-27 PROCEDURE — 36415 COLL VENOUS BLD VENIPUNCTURE: CPT | Performed by: EMERGENCY MEDICINE

## 2018-07-27 PROCEDURE — 83935 ASSAY OF URINE OSMOLALITY: CPT | Performed by: INTERNAL MEDICINE

## 2018-07-27 PROCEDURE — 74011000250 HC RX REV CODE- 250: Performed by: INTERNAL MEDICINE

## 2018-07-27 PROCEDURE — 94760 N-INVAS EAR/PLS OXIMETRY 1: CPT

## 2018-07-27 PROCEDURE — 99285 EMERGENCY DEPT VISIT HI MDM: CPT

## 2018-07-27 PROCEDURE — 74011250637 HC RX REV CODE- 250/637: Performed by: INTERNAL MEDICINE

## 2018-07-27 PROCEDURE — 74011250637 HC RX REV CODE- 250/637: Performed by: EMERGENCY MEDICINE

## 2018-07-27 PROCEDURE — 85025 COMPLETE CBC W/AUTO DIFF WBC: CPT | Performed by: EMERGENCY MEDICINE

## 2018-07-27 PROCEDURE — 84484 ASSAY OF TROPONIN QUANT: CPT | Performed by: EMERGENCY MEDICINE

## 2018-07-27 PROCEDURE — 80053 COMPREHEN METABOLIC PANEL: CPT | Performed by: EMERGENCY MEDICINE

## 2018-07-27 PROCEDURE — 84300 ASSAY OF URINE SODIUM: CPT | Performed by: INTERNAL MEDICINE

## 2018-07-27 PROCEDURE — 74011636637 HC RX REV CODE- 636/637: Performed by: EMERGENCY MEDICINE

## 2018-07-27 PROCEDURE — 71046 X-RAY EXAM CHEST 2 VIEWS: CPT

## 2018-07-27 PROCEDURE — 77010033678 HC OXYGEN DAILY

## 2018-07-27 PROCEDURE — 93005 ELECTROCARDIOGRAM TRACING: CPT

## 2018-07-27 RX ORDER — IPRATROPIUM BROMIDE AND ALBUTEROL SULFATE 2.5; .5 MG/3ML; MG/3ML
3 SOLUTION RESPIRATORY (INHALATION)
Status: DISCONTINUED | OUTPATIENT
Start: 2018-07-27 | End: 2018-08-01 | Stop reason: HOSPADM

## 2018-07-27 RX ORDER — AZITHROMYCIN 250 MG/1
250 TABLET, FILM COATED ORAL DAILY
Status: COMPLETED | OUTPATIENT
Start: 2018-07-27 | End: 2018-07-31

## 2018-07-27 RX ORDER — SODIUM CHLORIDE 0.9 % (FLUSH) 0.9 %
5-10 SYRINGE (ML) INJECTION AS NEEDED
Status: DISCONTINUED | OUTPATIENT
Start: 2018-07-27 | End: 2018-08-01 | Stop reason: HOSPADM

## 2018-07-27 RX ORDER — AZITHROMYCIN 250 MG/1
250 TABLET, FILM COATED ORAL DAILY
Status: DISCONTINUED | OUTPATIENT
Start: 2018-07-28 | End: 2018-07-27

## 2018-07-27 RX ORDER — ONDANSETRON 2 MG/ML
4 INJECTION INTRAMUSCULAR; INTRAVENOUS
Status: DISCONTINUED | OUTPATIENT
Start: 2018-07-27 | End: 2018-08-01 | Stop reason: HOSPADM

## 2018-07-27 RX ORDER — ACETAMINOPHEN 325 MG/1
650 TABLET ORAL
Status: DISCONTINUED | OUTPATIENT
Start: 2018-07-27 | End: 2018-08-01 | Stop reason: HOSPADM

## 2018-07-27 RX ORDER — ALBUTEROL SULFATE 0.83 MG/ML
10 SOLUTION RESPIRATORY (INHALATION)
Status: COMPLETED | OUTPATIENT
Start: 2018-07-27 | End: 2018-07-27

## 2018-07-27 RX ORDER — HYDROCHLOROTHIAZIDE 25 MG/1
25 TABLET ORAL
Status: COMPLETED | OUTPATIENT
Start: 2018-07-27 | End: 2018-07-27

## 2018-07-27 RX ORDER — GUAIFENESIN 600 MG/1
600 TABLET, EXTENDED RELEASE ORAL 2 TIMES DAILY
Status: DISCONTINUED | OUTPATIENT
Start: 2018-07-27 | End: 2018-08-01 | Stop reason: HOSPADM

## 2018-07-27 RX ORDER — ENOXAPARIN SODIUM 100 MG/ML
30 INJECTION SUBCUTANEOUS EVERY 24 HOURS
Status: DISCONTINUED | OUTPATIENT
Start: 2018-07-27 | End: 2018-07-27 | Stop reason: DRUGHIGH

## 2018-07-27 RX ORDER — IPRATROPIUM BROMIDE AND ALBUTEROL SULFATE 2.5; .5 MG/3ML; MG/3ML
3 SOLUTION RESPIRATORY (INHALATION)
Status: DISCONTINUED | OUTPATIENT
Start: 2018-07-27 | End: 2018-07-29

## 2018-07-27 RX ORDER — ENOXAPARIN SODIUM 100 MG/ML
40 INJECTION SUBCUTANEOUS EVERY 24 HOURS
Status: DISCONTINUED | OUTPATIENT
Start: 2018-07-27 | End: 2018-08-01 | Stop reason: HOSPADM

## 2018-07-27 RX ORDER — PREDNISONE 10 MG/1
5 TABLET ORAL
Status: COMPLETED | OUTPATIENT
Start: 2018-07-27 | End: 2018-07-27

## 2018-07-27 RX ORDER — SODIUM CHLORIDE 0.9 % (FLUSH) 0.9 %
5-10 SYRINGE (ML) INJECTION EVERY 8 HOURS
Status: DISCONTINUED | OUTPATIENT
Start: 2018-07-27 | End: 2018-08-01 | Stop reason: HOSPADM

## 2018-07-27 RX ORDER — HYDRALAZINE HYDROCHLORIDE 20 MG/ML
10 INJECTION INTRAMUSCULAR; INTRAVENOUS
Status: DISCONTINUED | OUTPATIENT
Start: 2018-07-27 | End: 2018-07-28

## 2018-07-27 RX ADMIN — AZITHROMYCIN 250 MG: 250 TABLET, FILM COATED ORAL at 20:42

## 2018-07-27 RX ADMIN — ALBUTEROL SULFATE 10 MG: 2.5 SOLUTION RESPIRATORY (INHALATION) at 15:51

## 2018-07-27 RX ADMIN — PREDNISONE 5 MG: 10 TABLET ORAL at 15:51

## 2018-07-27 RX ADMIN — GUAIFENESIN 600 MG: 600 TABLET, EXTENDED RELEASE ORAL at 20:42

## 2018-07-27 RX ADMIN — HYDROCHLOROTHIAZIDE 25 MG: 25 TABLET ORAL at 17:58

## 2018-07-27 RX ADMIN — IPRATROPIUM BROMIDE AND ALBUTEROL SULFATE 3 ML: .5; 3 SOLUTION RESPIRATORY (INHALATION) at 20:42

## 2018-07-27 NOTE — IP AVS SNAPSHOT
[address] 
[phone] Patient: Lydia Serrano MRN: [id number] TFI:46/24/4555 About your hospitalization You were admitted on:  July 27, 2018 You last received care in the:  \[facility] [place] [facility]\"" 2 CARDIOPULMONARY CARE You were discharged on:  August 1, 2018 Why you were hospitalized Your primary diagnosis was:  Not on File Your diagnoses also included:  Copd Exacerbation (Hcc), Acute And Chronic Respiratory Failure With Hypoxia (Hcc), Sinus Bradycardia, Lbbb (Left Bundle Branch Block) Follow-up Information Follow up With Details Comments Contact Info Shanel Garay MD Schedule an appointment as soon as possible for a visit after discharge from [address] [place] [address] 
[phone] Thiago Harvey MD  [facility] Janel Meyer [address] 
[phone] Discharge Orders None A check maya indicates which time of day the medication should be taken. My Medications START taking these medications Instructions Each Dose to Equal  
 Morning Noon Evening Bedtime  
 alum-mag hydroxide-simeth 200-200-20 mg/5 mL Susp Commonly known as:  MYLANTA Your last dose was: Your next dose is: Take 30 mL by mouth every four (4) hours as needed. 30 mL  
    
   
   
   
  
 amLODIPine 2.5 mg tablet Commonly known as:  Kinza Jefferson Start taking on:  8/2/2018 Your last dose was: Your next dose is: Take 1 Tab by mouth daily. 2.5 mg  
    
   
   
   
  
 butalbital-acetaminophen-caffeine -40 mg per tablet Commonly known as:  Jacky Montesinos Your last dose was: Your next dose is: Take 1 Tab by mouth every six (6) hours as needed for Headache. 1 Tab guaiFENesin  mg ER tablet Commonly known as:  Adrian [employer] Adrian Your last dose was: Your next dose is: Take 1 Tab by mouth two (2) times a day for 5 days. 600 mg  
    
   
   
   
  
 predniSONE 5 mg tablet Commonly known as:  Maryellen Grover Start taking on:  8/2/2018 Your last dose was: Your next dose is: Take 1 Tab by mouth daily (with breakfast) for 20 days. 5 mg CHANGE how you take these medications Instructions Each Dose to Equal  
 Morning Noon Evening Bedtime * COMBIVENT RESPIMAT  mcg/actuation inhaler Generic drug:  ipratropium-albuterol What changed: - Another medication with the same name was added. Make sure you understand how and when to take each. - Another medication with the same name was changed. Make sure you understand how and when to take each. Your last dose was: Your next dose is:    
   
   
 INHALE 1 PUFF THREE TIMES A DAY  
     
   
   
   
  
 * albuterol-ipratropium 2.5 mg-0.5 mg/3 ml Nebu Commonly known as:  Nahma Churches What changed:  when to take this Your last dose was: Your next dose is:    
   
   
 3 mL by Nebulization route three (3) times daily. 3 mL * albuterol-ipratropium 2.5 mg-0.5 mg/3 ml Nebu Commonly known as:  Nahma Churches What changed: You were already taking a medication with the same name, and this prescription was added. Make sure you understand how and when to take each. Your last dose was: Your next dose is:    
   
   
 3 mL by Nebulization route every four (4) hours as needed. 3 mL * Notice: This list has 3 medication(s) that are the same as other medications prescribed for you. Read the directions carefully, and ask your doctor or other care provider to review them with you. CONTINUE taking these medications  Instructions Each Dose to Equal  
 Morning Noon Evening Bedtime  
 acetaminophen 500 mg tablet Commonly known as:  TYLENOL Your last dose was: Your next dose is: Take 500 mg by mouth every six (6) hours as needed for Pain. 500 mg  
    
   
   
   
  
 ANORO ELLIPTA 62.5-25 mcg/actuation inhaler Generic drug:  umeclidinium-vilanterol Your last dose was: Your next dose is:    
   
   
      
   
   
   
  
 aspirin delayed-release 81 mg tablet Your last dose was: Your next dose is: Take  by mouth daily. Azelastine 0.15 % (205.5 mcg) nasal spray Commonly known as:  ASTEPRO Your last dose was: Your next dose is: OXYGEN-AIR DELIVERY SYSTEMS Your last dose was: Your next dose is:    
   
   
 2 L/min by Nasal route as needed (Shortness of Breath). 2 L/min STOP taking these medications   
 azithromycin 250 mg tablet Commonly known as:  Morris Leigh Where to Get Your Medications These medications were sent to [address], [place] [address]  [address] [facility], [address] [place] [address] Phone:  [phone]  
  albuterol-ipratropium 2.5 mg-0.5 mg/3 ml Nebu Information on where to get these meds will be given to you by the nurse or doctor. ! Ask your nurse or doctor about these medications  
  alum-mag hydroxide-simeth 200-200-20 mg/5 mL Susp  
 amLODIPine 2.5 mg tablet  
 butalbital-acetaminophen-caffeine -40 mg per tablet  
 guaiFENesin  mg ER tablet  
 predniSONE 5 mg tablet Discharge Instructions HOSPITALIST DISCHARGE INSTRUCTIONS 
 
NAME: Arnaud Whitley :  11/10/1931 MRN:  [id number] Date/Time:  2018 2:29 PM 
 
ADMIT DATE: 2018 DISCHARGE DATE: 2018 DISCHARGE DIAGNOSIS: 
Hyponatremia Dizziness COPD exacerbation Chronic respiratory failure on 2LNC oxygen Elevated BP Sinus erin with 1st degree block Solitary kidney PAD, s/p left leg stent DNR MEDICATIONS: 
· It is important that you take the medication exactly as they are prescribed. · Keep your medication in the bottles provided by the pharmacist and keep a list of the medication names, dosages, and times to be taken in your wallet. · Do not take other medications without consulting your doctor. Pain Management: per above medications What to do at [facility] Recommended diet:  Cardiac Diet Recommended activity: PT/OT Eval and Treat If you have questions regarding the hospital related prescriptions or hospital related issues please call Christina Mota at . If you experience any of the following symptoms then please call your primary care physician or return to the emergency room if you cannot get hold of your doctor: 
Fever, chills, nausea, vomiting, diarrhea, change in mentation, falling, bleeding, shortness of breath Information obtained by : 
I understand that if any problems occur once I am at home I am to contact my physician. I understand and acknowledge receipt of the instructions indicated above. Physician's or R.N.'s Signature                                                                  Date/Time Patient or Representative Signature                                                          Date/Time Appnique Announcement We are excited to announce that we are making your provider's discharge notes available to you in Appnique.   You will see these notes when they are completed and signed by the physician that discharged you from your recent hospital stay. If you have any questions or concerns about any information you see in EverCloud, please call the Health Information Department where you were seen or reach out to your Primary Care Provider for more information about your plan of care. Introducing [facility]! [facility] introduces EverCloud patient portal. Now you can access parts of your medical record, email your doctor's office, and request medication refills online. 1. In your internet browser, go to [email or web address] 2. Click on the First Time User? Click Here link in the Sign In box. You will see the New Member Sign Up page. 3. Enter your EverCloud Access Code exactly as it appears below. You will not need to use this code after youve completed the sign-up process. If you do not sign up before the expiration date, you must request a new code. · EverCloud Access Code: [email or web address] Expires: 10/25/2018 12:49 PM 
 
4. Enter the last four digits of your Social Security Number (xxxx) and Date of Birth (mm/dd/yyyy) as indicated and click Submit. You will be taken to the next sign-up page. 5. Create a EverCloud ID. This will be your EverCloud login ID and cannot be changed, so think of one that is secure and easy to remember. 6. Create a EverCloud password. You can change your password at any time. 7. Enter your Password Reset Question and Answer. This can be used at a later time if you forget your password. 8. Enter your e-mail address. You will receive e-mail notification when new information is available in [address] 9. Click Sign Up. You can now view and download portions of your medical record. 10. Click the Download Summary menu link to download a portable copy of your medical information.  
 
If you have questions, please visit the Frequently Asked Questions section of the [employer]. Remember, MyChart is NOT to be used for urgent needs. For medical emergencies, dial 911. Now available from your iPhone and Android! Introducing Placido Alexis As a [employer] [facility] patient, I wanted to make you aware of our electronic visit tool called Placido Alexis. Shopular/Bloxy allows you to connect within minutes with a medical provider 24 hours a day, seven days a week via a mobile device or tablet or logging into a secure website from your computer. You can access Placido Alexis from anywhere in the [place]. A virtual visit might be right for you when you have a simple condition and feel like you just dont want to get out of bed, or cant get away from work for an appointment, when your regular [facility] provider is not available (evenings, weekends or holidays), or when youre out of town and need minor care. Electronic visits cost only $49 and if the SpicerAppthority/Bloxy provider determines a prescription is needed to treat your condition, one can be electronically transmitted to a nearby pharmacy*. Please take a moment to enroll today if you have not already done so. The enrollment process is free and takes just a few minutes. To enroll, please download the Mobilepolice hayden to your tablet or phone, or visit [email or web address] to enroll on your computer. And, as an [address] [place] [address] patient with a Storehouse account, the results of your visits will be scanned into your electronic medical record and your primary care provider will be able to view the scanned results. We urge you to continue to see your regular [facility] provider for your ongoing medical care. And while your primary care provider may not be the one available when you seek a Placido Alexis virtual visit, the peace of mind you get from getting a real diagnosis real time can be priceless. For more information on Placido Alexis, view our Frequently Asked Questions (FAQs) at [email or web address]. Sincerely, 
 
Juanito Phillips MD 
Chief Medical Officer Marcia Vsaquez *:  certain medications cannot be prescribed via Placido Alexis Providers Seen During Your Hospitalization Provider Specialty Primary office phone Praveena Oakes MD Emergency Medicine [phone] Connie Sandra MD Internal Medicine [phone] Your Primary Care Physician (PCP) Primary Care Physician Office Phone Office Fax Day Flores [phone] You are allergic to the following Allergen Reactions Food Extracts Diarrhea Onions and garlic causes abdominal pain,cramping   
    
 Sulfa (Sulfonamide Antibiotics) Other (comments) Altered Mental Status Recent Documentation Height Weight Breastfeeding? BMI OB Status Smoking Status 1.702 m 62.3 kg No 21.51 kg/m2 Postmenopausal Former Smoker Emergency Contacts Name Discharge Info Relation Home Work Mobile Damon Capone DISCHARGE CAREGIVER [3] Son [22] [phone] Patient Belongings The following personal items are in your possession at time of discharge: 
  Dental Appliances: Lowers, With patient  Visual Aid: Glasses      Home Medications: None   Jewelry: Bracelet, Ring, Earrings, With patient (3 rings with white metal bands, 1 ring with yellow band.)  Clothing: Footwear, Pants, Shirt, Undergarments    Other Valuables: Kinsey Jean [employer], KARENA, With patient Please provide this summary of care documentation to your next provider. Signatures-by signing, you are acknowledging that this After Visit Summary has been reviewed with you and you have received a copy. Patient Signature:  ____________________________________________________________ Date:  ____________________________________________________________  
  
Tati Kit Provider Signature:  ____________________________________________________________ Date:  ____________________________________________________________

## 2018-07-27 NOTE — ED NOTES
Bedside and Verbal shift change report given to Pearl James  (oncoming nurse) by Cash Velarde  (offgoing nurse). Report included the following information SBAR, ED Summary and Recent Results.

## 2018-07-27 NOTE — IP AVS SNAPSHOT
[address] [facility] 
[phone] Patient: Yareli Ragsdale MRN: [id number] KARLA:[birth year] A check maya indicates which time of day the medication should be taken. My Medications START taking these medications Instructions Each Dose to Equal  
 Morning Noon Evening Bedtime  
 alum-mag hydroxide-simeth 200-200-20 mg/5 mL Susp Commonly known as:  MYLANTA Your last dose was: Your next dose is: Take 30 mL by mouth every four (4) hours as needed. 30 mL  
    
   
   
   
  
 amLODIPine 2.5 mg tablet Commonly known as:  Brandi Gold Start taking on:  8/2/2018 Your last dose was: Your next dose is: Take 1 Tab by mouth daily. 2.5 mg  
    
   
   
   
  
 butalbital-acetaminophen-caffeine -40 mg per tablet Commonly known as:  Dom Holloway Your last dose was: Your next dose is: Take 1 Tab by mouth every six (6) hours as needed for Headache. 1 Tab  
    
   
   
   
  
 guaiFENesin  mg ER tablet Commonly known as:  [employer] Adrian Your last dose was: Your next dose is: Take 1 Tab by mouth two (2) times a day for 5 days. 600 mg  
    
   
   
   
  
 predniSONE 5 mg tablet Commonly known as:  Jennifer Reid Start taking on:  8/2/2018 Your last dose was: Your next dose is: Take 1 Tab by mouth daily (with breakfast) for 20 days. 5 mg CHANGE how you take these medications Instructions Each Dose to Equal  
 Morning Noon Evening Bedtime * COMBIVENT RESPIMAT  mcg/actuation inhaler Generic drug:  ipratropium-albuterol What changed: - Another medication with the same name was added. Make sure you understand how and when to take each. - Another medication with the same name was changed.  Make sure you understand how and when to take each. Your last dose was: Your next dose is:    
   
   
 INHALE 1 PUFF THREE TIMES A DAY  
     
   
   
   
  
 * albuterol-ipratropium 2.5 mg-0.5 mg/3 ml Nebu Commonly known as:  Zoutonsray What changed:  when to take this Your last dose was: Your next dose is:    
   
   
 3 mL by Nebulization route three (3) times daily. 3 mL * albuterol-ipratropium 2.5 mg-0.5 mg/3 ml Nebu Commonly known as:  Zoutonsray What changed: You were already taking a medication with the same name, and this prescription was added. Make sure you understand how and when to take each. Your last dose was: Your next dose is:    
   
   
 3 mL by Nebulization route every four (4) hours as needed. 3 mL * Notice: This list has 3 medication(s) that are the same as other medications prescribed for you. Read the directions carefully, and ask your doctor or other care provider to review them with you. CONTINUE taking these medications Instructions Each Dose to Equal  
 Morning Noon Evening Bedtime  
 acetaminophen 500 mg tablet Commonly known as:  TYLENOL Your last dose was: Your next dose is: Take 500 mg by mouth every six (6) hours as needed for Pain. 500 mg  
    
   
   
   
  
 ANORO ELLIPTA 62.5-25 mcg/actuation inhaler Generic drug:  umeclidinium-vilanterol Your last dose was: Your next dose is:    
   
   
      
   
   
   
  
 aspirin delayed-release 81 mg tablet Your last dose was: Your next dose is: Take  by mouth daily. Azelastine 0.15 % (205.5 mcg) nasal spray Commonly known as:  ASTEPRO Your last dose was: Your next dose is: OXYGEN-AIR DELIVERY SYSTEMS Your last dose was: Your next dose is: 2 L/min by Nasal route as needed (Shortness of Breath). 2 L/min STOP taking these medications   
 azithromycin 250 mg tablet Commonly known as:  Regine Burnham Where to Get Your Medications These medications were sent to [address], [place] [address]  [address] [facility], [address] [facility] [address] [address] Phone:  [phone]  
  albuterol-ipratropium 2.5 mg-0.5 mg/3 ml Nebu Information on where to get these meds will be given to you by the nurse or doctor. ! Ask your nurse or doctor about these medications  
  alum-mag hydroxide-simeth 200-200-20 mg/5 mL Susp  
 amLODIPine 2.5 mg tablet  
 butalbital-acetaminophen-caffeine -40 mg per tablet  
 guaiFENesin  mg ER tablet  
 predniSONE 5 mg tablet

## 2018-07-28 ENCOUNTER — APPOINTMENT (OUTPATIENT)
Dept: CT IMAGING | Age: 83
DRG: 191 | End: 2018-07-28
Attending: INTERNAL MEDICINE
Payer: MEDICARE

## 2018-07-28 ENCOUNTER — APPOINTMENT (OUTPATIENT)
Dept: MRI IMAGING | Age: 83
DRG: 191 | End: 2018-07-28
Attending: INTERNAL MEDICINE
Payer: MEDICARE

## 2018-07-28 PROBLEM — R00.1 SINUS BRADYCARDIA: Status: ACTIVE | Noted: 2018-07-28

## 2018-07-28 PROBLEM — I44.7 LBBB (LEFT BUNDLE BRANCH BLOCK): Status: ACTIVE | Noted: 2018-07-28

## 2018-07-28 LAB
ANION GAP SERPL CALC-SCNC: 7 MMOL/L (ref 5–15)
BUN SERPL-MCNC: 12 MG/DL (ref 6–20)
BUN/CREAT SERPL: 15 (ref 12–20)
CALCIUM SERPL-MCNC: 9 MG/DL (ref 8.5–10.1)
CHLORIDE SERPL-SCNC: 98 MMOL/L (ref 97–108)
CO2 SERPL-SCNC: 23 MMOL/L (ref 21–32)
CREAT SERPL-MCNC: 0.78 MG/DL (ref 0.55–1.02)
GLUCOSE SERPL-MCNC: 129 MG/DL (ref 65–100)
MAGNESIUM SERPL-MCNC: 2 MG/DL (ref 1.6–2.4)
OSMOLALITY UR: 286 MOSM/KG H2O
POTASSIUM SERPL-SCNC: 4.6 MMOL/L (ref 3.5–5.1)
SODIUM SERPL-SCNC: 128 MMOL/L (ref 136–145)
SODIUM UR-SCNC: 104 MMOL/L
TSH SERPL DL<=0.05 MIU/L-ACNC: 1.72 UIU/ML (ref 0.36–3.74)

## 2018-07-28 PROCEDURE — 83735 ASSAY OF MAGNESIUM: CPT | Performed by: INTERNAL MEDICINE

## 2018-07-28 PROCEDURE — 77030029684 HC NEB SM VOL KT MONA -A

## 2018-07-28 PROCEDURE — 70450 CT HEAD/BRAIN W/O DYE: CPT

## 2018-07-28 PROCEDURE — 74011250636 HC RX REV CODE- 250/636: Performed by: INTERNAL MEDICINE

## 2018-07-28 PROCEDURE — 94640 AIRWAY INHALATION TREATMENT: CPT

## 2018-07-28 PROCEDURE — 80048 BASIC METABOLIC PNL TOTAL CA: CPT | Performed by: INTERNAL MEDICINE

## 2018-07-28 PROCEDURE — 93306 TTE W/DOPPLER COMPLETE: CPT

## 2018-07-28 PROCEDURE — 70551 MRI BRAIN STEM W/O DYE: CPT

## 2018-07-28 PROCEDURE — 84443 ASSAY THYROID STIM HORMONE: CPT | Performed by: INTERNAL MEDICINE

## 2018-07-28 PROCEDURE — 74011000250 HC RX REV CODE- 250: Performed by: INTERNAL MEDICINE

## 2018-07-28 PROCEDURE — 65660000000 HC RM CCU STEPDOWN

## 2018-07-28 PROCEDURE — 77010033678 HC OXYGEN DAILY

## 2018-07-28 PROCEDURE — 74011250637 HC RX REV CODE- 250/637: Performed by: INTERNAL MEDICINE

## 2018-07-28 PROCEDURE — 36415 COLL VENOUS BLD VENIPUNCTURE: CPT | Performed by: INTERNAL MEDICINE

## 2018-07-28 PROCEDURE — 94760 N-INVAS EAR/PLS OXIMETRY 1: CPT

## 2018-07-28 RX ORDER — MAG HYDROX/ALUMINUM HYD/SIMETH 200-200-20
30 SUSPENSION, ORAL (FINAL DOSE FORM) ORAL ONCE
Status: COMPLETED | OUTPATIENT
Start: 2018-07-28 | End: 2018-07-28

## 2018-07-28 RX ORDER — BUTALBITAL, ACETAMINOPHEN AND CAFFEINE 50; 325; 40 MG/1; MG/1; MG/1
1 TABLET ORAL
Status: DISCONTINUED | OUTPATIENT
Start: 2018-07-28 | End: 2018-08-01 | Stop reason: HOSPADM

## 2018-07-28 RX ORDER — HYDRALAZINE HYDROCHLORIDE 20 MG/ML
10 INJECTION INTRAMUSCULAR; INTRAVENOUS
Status: DISCONTINUED | OUTPATIENT
Start: 2018-07-28 | End: 2018-08-01 | Stop reason: HOSPADM

## 2018-07-28 RX ADMIN — Medication 10 ML: at 00:07

## 2018-07-28 RX ADMIN — METHYLPREDNISOLONE SODIUM SUCCINATE 40 MG: 40 INJECTION, POWDER, FOR SOLUTION INTRAMUSCULAR; INTRAVENOUS at 14:33

## 2018-07-28 RX ADMIN — Medication 10 ML: at 21:03

## 2018-07-28 RX ADMIN — GUAIFENESIN 600 MG: 600 TABLET, EXTENDED RELEASE ORAL at 09:33

## 2018-07-28 RX ADMIN — IPRATROPIUM BROMIDE AND ALBUTEROL SULFATE 3 ML: .5; 3 SOLUTION RESPIRATORY (INHALATION) at 19:26

## 2018-07-28 RX ADMIN — Medication 10 ML: at 14:00

## 2018-07-28 RX ADMIN — ENOXAPARIN SODIUM 40 MG: 100 INJECTION SUBCUTANEOUS at 21:02

## 2018-07-28 RX ADMIN — ACETAMINOPHEN 650 MG: 325 TABLET ORAL at 07:31

## 2018-07-28 RX ADMIN — GUAIFENESIN 600 MG: 600 TABLET, EXTENDED RELEASE ORAL at 17:32

## 2018-07-28 RX ADMIN — METHYLPREDNISOLONE SODIUM SUCCINATE 20 MG: 40 INJECTION, POWDER, FOR SOLUTION INTRAMUSCULAR; INTRAVENOUS at 09:33

## 2018-07-28 RX ADMIN — METHYLPREDNISOLONE SODIUM SUCCINATE 20 MG: 40 INJECTION, POWDER, FOR SOLUTION INTRAMUSCULAR; INTRAVENOUS at 00:07

## 2018-07-28 RX ADMIN — IPRATROPIUM BROMIDE AND ALBUTEROL SULFATE 3 ML: .5; 3 SOLUTION RESPIRATORY (INHALATION) at 07:42

## 2018-07-28 RX ADMIN — Medication 10 ML: at 06:00

## 2018-07-28 RX ADMIN — ALUMINUM HYDROXIDE, MAGNESIUM HYDROXIDE, AND SIMETHICONE 30 ML: 200; 200; 20 SUSPENSION ORAL at 22:03

## 2018-07-28 RX ADMIN — AZITHROMYCIN 250 MG: 250 TABLET, FILM COATED ORAL at 12:49

## 2018-07-28 RX ADMIN — METHYLPREDNISOLONE SODIUM SUCCINATE 40 MG: 40 INJECTION, POWDER, FOR SOLUTION INTRAMUSCULAR; INTRAVENOUS at 21:02

## 2018-07-28 RX ADMIN — IPRATROPIUM BROMIDE AND ALBUTEROL SULFATE 3 ML: .5; 3 SOLUTION RESPIRATORY (INHALATION) at 14:05

## 2018-07-28 RX ADMIN — BUTALBITAL, ACETAMINOPHEN AND CAFFEINE 1 TABLET: 50; 325; 40 TABLET ORAL at 17:32

## 2018-07-28 RX ADMIN — ACETAMINOPHEN 650 MG: 325 TABLET ORAL at 14:33

## 2018-07-28 RX ADMIN — IPRATROPIUM BROMIDE AND ALBUTEROL SULFATE 3 ML: .5; 3 SOLUTION RESPIRATORY (INHALATION) at 01:57

## 2018-07-28 NOTE — H&P
Hospitalist Admission Note NAME: Teagan Kang :  11/10/1931 MRN:  [id number] Date/Time:  2018 8:02 PM 
 
Patient PCP: Guanaco Elizabeth MD 
_____________________________________________________________________ Given the patient's current clinical presentation, I have a high level of concern for decompensation if discharged from the emergency department. Complex decision making was performed, which includes reviewing the patient's available past medical records, laboratory results, and x-ray films. My assessment of this patient's clinical condition and my plan of care is as follows. Assessment / Plan: COPD exacerbation Chronic respiratory failure on 2LNC oxygen 
-CXR negative 
-she reports sensitivity to prednisone. Will start with low dose IV solumedrol and transition to medrol at discharge 
-start empiric oral zithromax 
-mucinex and scheduled duonebs 
-continue oxygen Elevated BP 
-no history of HTN. BP has come down so could just be stress related. -will stop the HCTZ started by ER as she is already hyponatremic 
-hydralazine prn for now Hyponatremia 
-she drinks a lot of plain water and could have underlying SIADH related to lung disease. Will hold on hydration and start with Urine Na and Osmolarity 
-repeat Na in AM. Solitary kidney 
-follow Cr 
 
PAD, s/p left leg stent 
-asa. Follows with Dr Ce Perez Code Status:   DNR  She said she would not want to be kept alive on machines or receive CPR in the event of an arrest.  We will honor her wishes and will make her a DNR. Surrogate Decision Maker:  son DVT Prophylaxis:   lovenox GI Prophylaxis: not indicated Baseline: . Lives alone. Subjective: CHIEF COMPLAINT:   SOB HISTORY OF PRESENT ILLNESS:    
Eveline Beverly is a 80 y.o.  female with a history of PAD, solitary kidney and COPD who presents with SOB.   Patient reports feeling short winded with activities x 1 week. She has a chronic cough and white sputum but her sputum has been thicker recently. She denies CP, increase leg swelling, leg pain, orthopnea or PND or fevers chills. ER evaluation today, CXR clear. She was treated with 5mg prednisone and a neb treatment but desaturated down to the 80s with ambulation. We were asked to admit for work up and evaluation of the above problems. Past Medical History:  
Diagnosis Date  Arthritis   
 generalized  COPD   
 former smoker   
  >60pkyr quit 1/3/11  
 h/o DVT 1955 left leg  ischemic left lower extremitiy pain   
 above knee FemPop 1/3/11  PVD (peripheral vascular disease) ([facility]  Seasonal allergic rhinitis  Single kidney Past Surgical History:  
Procedure Laterality Date [address] excision left breast cyst  
 HX GI  10 yrs ago  
 colonoscopy  HX GYN    
 3 miscarriges  HX GYN    
 1 vaginal birth  HX ORTHOPAEDIC    
 veinography left leg, stent left leg Social History Substance Use Topics  Smoking status: Former Smoker Packs/day: 1.00 Years: 60.00 Quit date: 1/3/2011  Smokeless tobacco: Never Used  Alcohol use Yes Comment: occasional liquor after dinner Family History Problem Relation Age of Onset  Cancer Mother   
  colon  Cancer Sister   
  lung Allergies Allergen Reactions  Food Extracts Diarrhea Onions and garlic causes abdominal pain,cramping  Sulfa (Sulfonamide Antibiotics) Other (comments) Altered Mental Status Prior to Admission medications Medication Sig Start Date End Date Taking? Authorizing Provider  
predniSONE (DELTASONE) 5 mg tablet Take 5 mg by mouth. Historical Provider  
azithromycin (ZITHROMAX) 250 mg tablet Take 250 mg by mouth every Monday, Wednesday, Friday. Historical Provider OXYGEN-AIR DELIVERY SYSTEMS 2 L/min by Nasal route as needed (Shortness of Breath). Historical Provider  
docusate sodium (COLACE) 100 mg capsule Take 100 mg by mouth daily as needed for Constipation. Historical Provider  
acetaminophen (TYLENOL) 500 mg tablet Take 500 mg by mouth every six (6) hours as needed for Pain. Historical Provider  
albuterol-ipratropium (DUO-NEB) 2.5 mg-0.5 mg/3 ml nebu 3 mL by Nebulization route three (3) times daily. Patient taking differently: 3 mL by Nebulization route four (4) times daily. 6/6/17   En Ramirez MD  
Azelastine (ASTEPRO) 0.15 % (205.5 mcg) nasal spray  5/11/17   Historical Provider ANORO ELLIPTA 62.5-25 mcg/actuation inhaler  5/8/17   Historical Provider COMBIVENT RESPIMAT  mcg/actuation inhaler INHALE 1 PUFF THREE TIMES A DAY 5/9/17   Abbe Magallon MD  
triamcinolone acetonide (KENALOG) 0.1 % topical cream Apply  to affected area two (2) times daily as needed for Skin Irritation. use thin layer 3/12/13   Abbe Magallon MD  
aspirin delayed-release 81 mg tablet Take  by mouth daily. Historical Provider REVIEW OF SYSTEMS:    
 
Total of 12 systems reviewed as follows:   
   POSITIVE= underlined text  Negative = text not underlined General:  fever, chills, sweats, generalized weakness, weight loss/gain,  
   loss of appetite Eyes:    blurred vision, eye pain, loss of vision, double vision ENT:    rhinorrhea, pharyngitis Respiratory:   cough, sputum production, SOB, GODINEZ, wheezing, pleuritic pain  
Cardiology:   chest pain, palpitations, orthopnea, PND, edema, syncope Gastrointestinal:  abdominal pain , N/V, diarrhea, dysphagia, constipation, bleeding Genitourinary:  frequency, urgency, dysuria, hematuria, incontinence Muskuloskeletal :  arthralgia, myalgia, back pain Hematology:  easy bruising, nose or gum bleeding, lymphadenopathy Dermatological: rash, ulceration, pruritis, color change / jaundice Endocrine:   hot flashes or polydipsia Neurological:  headache, dizziness, confusion, focal weakness, paresthesia, Speech difficulties, memory loss, gait difficulty Psychological: Feelings of anxiety, depression, agitation Objective: VITALS:   
Visit Vitals  BP (!) 205/76  Pulse (!) 59  Temp 97.9 °F (36.6 °C)  Resp 18  Ht 5' 8\" (1.727 m)  Wt 63.5 kg (140 lb)  SpO2 97%  BMI 21.29 kg/m2 PHYSICAL EXAM: 
 
General:    Alert, cooperative, no distress, appears stated age. HEENT: Atraumatic, anicteric sclerae, pink conjunctivae No oral ulcers, mucosa moist, throat clear, dentition fair Neck:  Supple, symmetrical,  thyroid: non tender Lungs:   Mild end expiratory wheezing at bases posteriorly Chest wall:  No tenderness  No Accessory muscle use. Heart:   Regular  rhythm,  No  murmur   No edema Abdomen:   Soft, non-tender. Not distended. Bowel sounds normal 
Extremities: No cyanosis. No clubbing,   
  Skin turgor normal, Capillary refill normal, Radial dial pulse 2+ Skin:     Not pale. Not Jaundiced  No rashes Psych:  Good insight. Not depressed. Not anxious or agitated. Neurologic: EOMs intact. No facial asymmetry. No aphasia or slurred speech. Symmetrical strength, Sensation grossly intact. Alert and oriented X 4.  
 
_______________________________________________________________________ Care Plan discussed with: 
  Comments Patient x Family RN Care Manager Consultant:     
_______________________________________________________________________ Expected  Disposition:  
Home with Family x HH/PT/OT/RN   
SNF/LTC   
JORGE ALBERTO   
________________________________________________________________________ TOTAL TIME:  50  Minutes Critical Care Provided     Minutes non procedure based Comments  
 x Reviewed previous records  
>50% of visit spent in counseling and coordination of care  Discussion with patient and/or family and questions answered Given the patient's current clinical presentation, I have a high level of concern for decompensation if discharged from the ED. Complex decision making was performed which includes reviewing the patient's available past medical records, laboratory results, and Xray films. I have also directly communicated my plan and discussed this case with the involved ED physician.  
 
____________________________________________________________________ Peyton Crowe MD 
 
Procedures: see electronic medical records for all procedures/Xrays and details which were not copied into this note but were reviewed prior to creation of Plan. LAB DATA REVIEWED:   
Recent Results (from the past 24 hour(s)) EKG, 12 LEAD, INITIAL Collection Time: 07/27/18 12:56 PM  
Result Value Ref Range Ventricular Rate 67 BPM  
 Atrial Rate 67 BPM  
 P-R Interval 242 ms QRS Duration 138 ms Q-T Interval 442 ms QTC Calculation (Bezet) 467 ms Calculated P Axis 53 degrees Calculated R Axis -29 degrees Calculated T Axis 92 degrees Diagnosis Sinus rhythm with 1st degree AV block Left bundle branch block When compared with ECG of 17-JUL-2017 19:34, Sinus rhythm has replaced Atrial fibrillation Left bundle branch block is now present Minimal criteria for Anterior infarct are no longer present CBC WITH AUTOMATED DIFF Collection Time: 07/27/18  2:11 PM  
Result Value Ref Range WBC 7.9 3.6 - 11.0 K/uL  
 RBC 4.65 3.80 - 5.20 M/uL  
 HGB 13.8 11.5 - 16.0 g/dL HCT 41.0 35.0 - 47.0 % MCV 88.2 80.0 - 99.0 FL  
 MCH 29.7 26.0 - 34.0 PG  
 MCHC 33.7 30.0 - 36.5 g/dL  
 RDW 13.6 11.5 - 14.5 % PLATELET 438 356 - 653 K/uL MPV 8.8 (L) 8.9 - 12.9 FL  
 NRBC 0.0 0  WBC ABSOLUTE NRBC 0.00 0.00 - 0.01 K/uL NEUTROPHILS 74 32 - 75 % LYMPHOCYTES 13 12 - 49 % MONOCYTES 8 5 - 13 % EOSINOPHILS 5 0 - 7 % BASOPHILS 1 0 - 1 % IMMATURE GRANULOCYTES 0 0.0 - 0.5 % ABS. NEUTROPHILS 5.8 1.8 - 8.0 K/UL  
 ABS. LYMPHOCYTES 1.0 0.8 - 3.5 K/UL  
 ABS. MONOCYTES 0.6 0.0 - 1.0 K/UL  
 ABS. EOSINOPHILS 0.4 0.0 - 0.4 K/UL  
 ABS. BASOPHILS 0.1 0.0 - 0.1 K/UL  
 ABS. IMM. GRANS. 0.0 0.00 - 0.04 K/UL  
 DF AUTOMATED METABOLIC PANEL, COMPREHENSIVE Collection Time: 07/27/18  2:11 PM  
Result Value Ref Range Sodium 128 (L) 136 - 145 mmol/L Potassium 4.7 3.5 - 5.1 mmol/L Chloride 95 (L) 97 - 108 mmol/L  
 CO2 25 21 - 32 mmol/L Anion gap 8 5 - 15 mmol/L Glucose 99 65 - 100 mg/dL BUN 16 6 - 20 MG/DL Creatinine 0.92 0.55 - 1.02 MG/DL  
 BUN/Creatinine ratio 17 12 - 20 GFR est AA >60 >60 ml/min/1.73m2 GFR est non-AA 58 (L) >60 ml/min/1.73m2 Calcium 9.1 8.5 - 10.1 MG/DL Bilirubin, total 0.6 0.2 - 1.0 MG/DL  
 ALT (SGPT) 14 12 - 78 U/L  
 AST (SGOT) 15 15 - 37 U/L Alk. phosphatase 63 45 - 117 U/L Protein, total 7.1 6.4 - 8.2 g/dL Albumin 4.0 3.5 - 5.0 g/dL Globulin 3.1 2.0 - 4.0 g/dL A-G Ratio 1.3 1.1 - 2.2 CK W/ REFLX CKMB Collection Time: 07/27/18  2:11 PM  
Result Value Ref Range CK 66 26 - 192 U/L  
TROPONIN I Collection Time: 07/27/18  2:11 PM  
Result Value Ref Range Troponin-I, Qt. <0.05 <0.05 ng/mL

## 2018-07-28 NOTE — CONSULTS
CARDIOLOGY CONSULT       Date of  Admission: 7/27/2018  2:28 PM     Admission type:Emergency    Consult for: Lightheadedness, transient sinus bradycardia to 46 bpm, question  symptomatic bradycardia  Consulted by:  Dr. Az Pro: Macie Kiser is a 80 y.o. female admitted for COPD exacerbation ([facility] Patient complains of lately, she has been lightheaded shortness of breath which is slowly improving. Matta Junk She was noted to have transient sinus bradycardia as low as 46 bpm while awake on the monitor. At that time, she was hypertensive. The patient denies chest pain/ orthopnea, PND, LE edema, palpitations, syncope, or presyncope.       Patient Active Problem List    Diagnosis Date Noted    Sinus bradycardia 07/28/2018    LBBB (left bundle branch block) 07/28/2018    Bronchitis, acute 06/04/2017    [facility] 06/04/2017    Acute renal failure (ARF) ([facility] 06/04/2017    Acute and chronic respiratory failure with hypoxia ([facility] 06/04/2017    Sepsis ([facility] 06/04/2017    PVD (peripheral vascular disease) ([facility]     Seasonal allergic rhinitis     COPD (chronic obstructive pulmonary disease) ([facility] 02/13/2012      ODILON Gottlieb MD  Past Medical History:   Diagnosis Date    Arthritis     generalized    COPD     former smoker      >60pkyr quit 1/3/11    h/o DVT 1955    left leg    ischemic left lower extremitiy pain     above knee FemPop 1/3/11    PVD (peripheral vascular disease) ([facility]     Seasonal allergic rhinitis     Single kidney        Social History     Social History    Marital status:      Spouse name: N/A    Number of children: N/A    Years of education: N/A     Social History Main Topics    Smoking status: Former Smoker     Packs/day: 1.00     Years: 60.00     Quit date: 1/3/2011    Smokeless tobacco: Never Used    Alcohol use Yes      Comment: occasional liquor after dinner    Drug use: No    Sexual activity: Not Asked     Other Topics Concern  None     Social History Narrative     Allergies   Allergen Reactions    Food Extracts Diarrhea     Onions and garlic causes abdominal pain,cramping     Sulfa (Sulfonamide Antibiotics) Other (comments)     Altered Mental Status      Family History   Problem Relation Age of Onset    Cancer Mother      colon    Cancer Sister      lung      Current Facility-Administered Medications   Medication Dose Route Frequency    hydrALAZINE (APRESOLINE) 20 mg/mL injection 10 mg  10 mg IntraVENous Q6H PRN    methylPREDNISolone (PF) (SOLU-MEDROL) injection 40 mg  40 mg IntraVENous Q6H    sodium chloride (NS) flush 5-10 mL  5-10 mL IntraVENous Q8H    sodium chloride (NS) flush 5-10 mL  5-10 mL IntraVENous PRN    acetaminophen (TYLENOL) tablet 650 mg  650 mg Oral Q4H PRN    ondansetron (ZOFRAN) injection 4 mg  4 mg IntraVENous Q4H PRN    albuterol-ipratropium (DUO-NEB) 2.5 MG-0.5 MG/3 ML  3 mL Nebulization Q6H RT    albuterol-ipratropium (DUO-NEB) 2.5 MG-0.5 MG/3 ML  3 mL Nebulization Q4H PRN    guaiFENesin ER (MUCINEX) tablet 600 mg  600 mg Oral BID    enoxaparin (LOVENOX) injection 40 mg  40 mg SubCUTAneous Q24H    azithromycin (ZITHROMAX) tablet 250 mg  250 mg Oral DAILY         Review of Symptoms:  11 systems reviewed, negative other than as stated in HPI     Subjective:        Visit Vitals    /58 (BP 1 Location: Left arm, BP Patient Position: At rest)    Pulse 75    Temp 98 °F (36.7 °C)    Resp 20    Ht 5' 7\" (1.702 m)    Wt 140 lb (63.5 kg)    SpO2 97%    Breastfeeding No    BMI 21.93 kg/m2       Physical:  Gen:  NAD  Heart: regular rhythm, no murmur, gallop or rub  Lungs: clear to auscultation bilaterally, Somewhat decreased   neck: supple, symmetrical, trachea midline, no adenopathy, thyroid: not enlarged, symmetric, no tenderness/mass/nodules, no carotid bruit and no JVD  Abdomen: soft, non-tender.  Bowel sounds normal. No masses,  no organomegaly  Extremities: extremities normal, atraumatic, no cyanosis or edema, positive femorals without bruits, normal dp pulses  Neurologic: CN, motor and speech grossly normal    Data Review:   Recent Labs      07/27/18   1411   WBC  7.9   HGB  13.8   HCT  41.0   PLT  227     Recent Labs      07/28/18   0620  07/27/18   1411   NA  128*  128*   K  4.6  4.7   CL  98  95*   CO2  23  25   GLU  129*  99   BUN  12  16   CREA  0.78  0.92   CA  9.0  9.1   MG  2.0   --    ALB   --   4.0   TBILI   --   0.6   SGOT   --   15   ALT   --   14       Recent Labs      07/27/18   1411   TROIQ  <0.05       Lab Results   Component Value Date/Time    Cholesterol (POC) 227 (A) 11/02/2015 03:48 PM    HDL Cholesterol (POC) 52 11/02/2015 03:48 PM    LDL Cholesterol (POC) 104 11/02/2015 03:48 PM    Triglycerides (POC) 356 (A) 11/02/2015 03:48 PM           Intake/Output Summary (Last 24 hours) at 07/28/18 1626  Last data filed at 07/27/18 2330   Gross per 24 hour   Intake                0 ml   Output              500 ml   Net             -500 ml        Cardiographics    Telemetry: normal sinus rhythm  Occasional sinus bradycardia 50/min    ECG: normal sinus rhythm, LBBB    Echocardiogram: Ordered     Assessment:       Active Problems:    COPD exacerbation ([address] (6/4/2017)      Acute and chronic respiratory failure with hypoxia (HCC) (6/4/2017)      Sinus bradycardia (7/28/2018)      LBBB (left bundle branch block) (7/28/2018)         Plan:     Transient sinus bradycardia:  Transient HR as low as 46 BPM with hypertension, not hypotension. This is very unlikely to be symptomatic bradycardia. Continue to monitor for more significant bradycardia or erin with hypotension. Old LBBB. Check echo. Discussed with Dr. Christal Lane.

## 2018-07-28 NOTE — PROGRESS NOTES
Hospitalist Progress Note NAME: Rebecca Grant :  11/10/1931 MRN:  [id number] Assessment / Plan: 
Dizziness x 2-3 days Associated HTN and sinus bradycardia concerning for [place] / CVA Check CT head and MRI brain If positive then consult neurology Dizziness can be part of symptomatic bradycardia ? ? BP is high rather low ? COPD related ? Due to hyponatremia Check TSH and cortisol am 
 
COPD exacerbation Chronic respiratory failure on 2LNC oxygen CXR negative IV steroids Continue empiric oral zithromax Mucolytics, Nebs Continue O2 Hyponatremia Suspect SIADH vs polydipsia as reported drinking lots of fluids due to told in the past to do so due to single kidney Will place on fluid restriction and continue to monitor 
  
Elevated BP No history of HTN, pt reported history of hypotension R/o CVA as above Fluid restriction for possible SIADH as above PRN IV hydralazine for now 
  Sinus erin with 1st degree block Considering concern for symptomatic bradycardia, asking cardiology consult Check TSH 
  
Solitary kidney Monitor Cr 
  
PAD, s/p left leg stent Continue ASA  Follows with Dr Shereen Green 
Code Status:   DNR  She said she would not want to be kept alive on machines or receive CPR in the event of an arrest.  We will honor her wishes and will make her a DNR. 
  
Surrogate Decision Maker:  Son 
  
DVT Prophylaxis: Lovenox GI Prophylaxis: not indicated 
  
Baseline: . Lives alone.    
                       
  
Subjective: Pt seen and examined at bedside. Continue to feel dizzy. Overnight events d/w RN  
CHIEF COMPLAINT: f/u \"SOB\" 
  
Review of Systems: 
Symptom Y/N Comments  Symptom Y/N Comments Fever/Chills n   Chest Pain n   
Poor Appetite    Edema Cough y   Abdominal Pain n   
Sputum    Joint Pain SOB/GODINEZ n   Pruritis/Rash Nausea/vomit    Tolerating PT/OT Diarrhea    Tolerating Diet y Constipation    Other y Dizziness / lightheadedness Could NOT obtain due to:   
 
Objective: VITALS:  
Last 24hrs VS reviewed since prior progress note. Most recent are: 
Patient Vitals for the past 24 hrs: 
 Temp Pulse Resp BP SpO2  
07/28/18 1056 97.8 °F (36.6 °C) 60 18 171/59 97 %  
07/28/18 0742 - - - - 91 %  
07/28/18 0722 - - - - 93 % 07/28/18 0716 97.8 °F (36.6 °C) 84 20 154/76 96 %  
07/28/18 0314 97.8 °F (36.6 °C) 68 18 148/68 93 % 07/28/18 0157 - - - - 96 %  
07/27/18 2303 97.4 °F (36.3 °C) 67 20 158/59 95 %  
07/27/18 2109 97.4 °F (36.3 °C) 74 20 (!) 176/103 95 %  
07/27/18 2030 - - - 183/70 97 %  
07/27/18 2000 - - - 175/73 97 %  
07/27/18 1930 - - - 167/72 96 %  
07/27/18 1746 - (!) 59 - (!) 205/76 97 %  
07/27/18 1630 - (!) 58 - 192/90 98 %  
07/27/18 1530 - (!) 56 - 170/82 98 % Intake/Output Summary (Last 24 hours) at 07/28/18 1338 Last data filed at 07/27/18 2330 Gross per 24 hour Intake                0 ml Output              500 ml Net             -500 ml PHYSICAL EXAM: 
General: WD, WN. Alert, cooperative, no acute distress   
EENT:  EOMI. Anicteric sclerae. MMM Resp:  CTA bilaterally, no wheezing or rales. No accessory muscle use CV:  Regular  rhythm,  No edema GI:  Soft, Non distended, Non tender.  +Bowel sounds Neurologic:  Alert and oriented X 3, normal speech, Psych:   Good insight. Not anxious nor agitated Skin:  No rashes. No jaundice Reviewed most current lab test results and cultures  YES Reviewed most current radiology test results   YES Review and summation of old records today    NO Reviewed patient's current orders and MAR    YES 
PMH/SH reviewed - no change compared to H&P 
________________________________________________________________________ Care Plan discussed with: 
  Comments Patient y Family RN y   
Care Manager Consultant Multidiciplinary team rounds were held today with , nursing, pharmacist and clinical coordinator.   Patient's plan of care was discussed; medications were reviewed and discharge planning was addressed. ________________________________________________________________________ Total NON critical care TIME:  35 Minutes Total CRITICAL CARE TIME Spent:   Minutes non procedure based Comments >50% of visit spent in counseling and coordination of care    
________________________________________________________________________ Melania Gutierrez MD  
 
Procedures: see electronic medical records for all procedures/Xrays and details which were not copied into this note but were reviewed prior to creation of Plan. LABS: 
I reviewed today's most current labs and imaging studies. Pertinent labs include: 
Recent Labs  
   07/27/18 
 1411 WBC  7.9 HGB  13.8 HCT  41.0  
PLT  227 Recent Labs  
   07/28/18 
 3305  07/27/18 
 1411 NA  128*  128* K  4.6  4.7 CL  98  95* CO2  23  25 GLU  129*  99 BUN  12  16 CREA  0.78  0.92  
CA  9.0  9.1 MG  2.0   --   
ALB   --   4.0 TBILI   --   0.6 SGOT   --   15 ALT   --   14 Signed: Melania Gutierrez MD

## 2018-07-28 NOTE — ROUTINE PROCESS
TRANSFER - OUT REPORT: 
 
Verbal report given to RN(name) on Vane Henry  being transferred to [address] [place] [address] [address](unit) for routine progression of care Report consisted of patients Situation, Background, Assessment and  
Recommendations(SBAR). Information from the following report(s) SBAR, ED Summary, MAR, Recent Results and Cardiac Rhythm sr was reviewed with the receiving nurse. Lines:  
Peripheral IV 07/27/18 Right Antecubital (Active) Site Assessment Clean, dry, & intact 7/27/2018  2:15 PM  
Phlebitis Assessment 0 7/27/2018  2:15 PM  
Infiltration Assessment 0 7/27/2018  2:15 PM  
Dressing Status Clean, dry, & intact 7/27/2018  2:15 PM  
Dressing Type Transparent 7/27/2018  2:15 PM  
Hub Color/Line Status Pink 7/27/2018  2:15 PM  
  
 
Opportunity for questions and clarification was provided. Patient transported with: 
 O2 @ 2 liters

## 2018-07-28 NOTE — PROGRESS NOTES
ADULT PROTOCOL: JET AEROSOL  REASSESSMENT Patient  Analisa Barrios     80 y.o.   female     7/28/2018  10:33 AM 
 
Breath Sounds Pre Procedure: Right Breath Sounds: Diminished, Expiratory wheezing Left Breath Sounds: Diminished, Expiratory wheezing Breath Sounds Post Procedure: Right Breath Sounds: Diminished, Expiratory wheezing Left Breath Sounds: Diminished, Expiratory wheezing Breathing pattern: Pre procedure Breathing Pattern: Regular Post procedure Breathing Pattern: Regular Heart Rate: Pre procedure Pulse: 80 
         Post procedure Pulse: 84 Resp Rate: Pre procedure Respirations: 20 
         Post procedure Respirations: 20 
 
     
 
Cough: Pre procedure Cough: Productive Post procedure Cough: Non-productive Sputum: Pre procedure Sputum amount: Moderate Sputum color/odor: Clear Sputum consistency: Thick Oxygen: O2 Device: Room air   room air Changed: NO/ Baseline 2 lpm at night and PRN SpO2: Pre procedure SpO2: 91 %   without oxygen Post procedure SpO2: 93 %  without oxygen Nebulizer Therapy: Current medications Aerosolized Medications: DuoNeb Changed: NO Smoking History: former smoker Problem List:  
Patient Active Problem List  
Diagnosis Code  COPD (chronic obstructive pulmonary disease) ([facility]) J44.9  PVD (peripheral vascular disease) ([facility]) I73.9  Seasonal allergic rhinitis J30.2  Bronchitis, acute J20.9  COPD exacerbation ([facility] J44.1  Acute renal failure (ARF) ([facility]) N17.9  Acute and chronic respiratory failure with hypoxia ([facility]) J96.21  
 Sepsis ([facility]) A41.9 Respiratory Therapist: Criselda Driscoll, RT

## 2018-07-28 NOTE — PROGRESS NOTES
SHIFT SUMMARY: 
 
0700: Bedside report received from Antonia Middleton RN. Assumed patient care 
 
1200: Telemetry informed nurse of patient HR of 46. At the time patient sleeping, BP stable. Patient also complaining of intermittent dizziness and severe headache. Dr. Meggan Ye informed. Order received for STAT head CT and MRI. Cardiology consult also placed. Will continue to monitor  
 
1900:  Bedside report given to oncoming nurse. 1360 J[address] Pacheco NURSING NOTE Admission Date 7/27/2018 Admission Diagnosis COPD exacerbation ([address] Consults IP CONSULT TO HOSPITALIST 
IP CONSULT TO CARDIOLOGY Cardiac Monitoring [] Yes [] No  
  
Purposeful Hourly Rounding [] Yes   
Tracy Score Total Score: 2 Tracy score 3 or > [] Bed Alarm [] Avasys [] 1:1 sitter [] Patient refused (Signed refusal form in chart) Juan Score Juan Score: 20 Juan score 14 or < [] PMT consult [] Wound Care consult  
 []  Specialty bed  [] Nutrition consult Influenza Vaccine Received Flu Vaccine for Current Season (usually Sept-March): Not Flu Season Oxygen needs? [] Room air Oxygen @  []1L    []2L    []3L   []4L    []5L   []6L via NC Chronic home O2 use? [] Yes [] No 
Perform O2 challenge test and document in progress note using [facility] [employer]) Last bowel movement Last Bowel Movement Date: 07/27/18 Urinary Catheter LDAs Peripheral IV 07/27/18 Right Antecubital (Active) Site Assessment Clean, dry, & intact 7/28/2018  4:20 PM  
Phlebitis Assessment 0 7/28/2018  4:20 PM  
Infiltration Assessment 0 7/28/2018  4:20 PM  
Dressing Status Clean, dry, & intact 7/28/2018  4:20 PM  
Dressing Type Tape;Transparent 7/28/2018  4:20 PM  
Hub Color/Line Status Pink;Flushed;Patent 7/28/2018  4:20 PM  
                  
  
Readmission Risk Assessment Tool Score Low Risk   
      
 10 Total Score   
  
 5 Pt. Coverage (Medicare=5 , Medicaid, or Self-Pay=4)  5 Charlson Comorbidity Score (Age + Comorbid Conditions) Criteria that do not apply:  
 Has Seen PCP in Last 6 Months (Yes=3, No=0) . Living with Significant Other. Assisted Living. LTAC. SNF. or  
Rehab Patient Length of Stay (>5 days = 3) IP Visits Last 12 Months (1-3=4, 4=9, >4=11) Expected Length of Stay - - - Actual Length of Stay 1

## 2018-07-28 NOTE — PROGRESS NOTES
Pharmacy  Enoxaparin (Lovenox®) Monitoring/Dosing Indication: DVT ppx Current Dose: Enoxaparin 30 mg subcutaneously every 24 hours Creatinine Clearance (mL/min): Estimated Creatinine Clearance: 44 mL/min (based on Cr of 0.92). Estimated Creatinine Clearance (using IBW):44.3 mL/min Labs: 
Recent Labs  
   07/27/18 
 1411 CREA  0.92  
HGB  13.8 PLT  227 Wt Readings from Last 1 Encounters:  
07/27/18 63.5 kg (140 lb) Ht Readings from Last 1 Encounters:  
07/27/18 172.7 cm (68\") Impression/Plan: · Adjusted Lovenox to 40mg SQ q24h for indication and renal function Thanks, 
Natan Rosenberg, [facility]

## 2018-07-29 LAB
ANION GAP SERPL CALC-SCNC: 10 MMOL/L (ref 5–15)
ANION GAP SERPL CALC-SCNC: 9 MMOL/L (ref 5–15)
BUN SERPL-MCNC: 17 MG/DL (ref 6–20)
BUN SERPL-MCNC: 35 MG/DL (ref 6–20)
BUN/CREAT SERPL: 19 (ref 12–20)
BUN/CREAT SERPL: 32 (ref 12–20)
CALCIUM SERPL-MCNC: 9.4 MG/DL (ref 8.5–10.1)
CALCIUM SERPL-MCNC: 9.4 MG/DL (ref 8.5–10.1)
CHLORIDE SERPL-SCNC: 91 MMOL/L (ref 97–108)
CHLORIDE SERPL-SCNC: 94 MMOL/L (ref 97–108)
CO2 SERPL-SCNC: 23 MMOL/L (ref 21–32)
CO2 SERPL-SCNC: 24 MMOL/L (ref 21–32)
CORTIS AM PEAK SERPL-MCNC: 5.6 UG/DL (ref 4.3–22.4)
CREAT SERPL-MCNC: 0.89 MG/DL (ref 0.55–1.02)
CREAT SERPL-MCNC: 1.1 MG/DL (ref 0.55–1.02)
GLUCOSE SERPL-MCNC: 116 MG/DL (ref 65–100)
GLUCOSE SERPL-MCNC: 128 MG/DL (ref 65–100)
OSMOLALITY UR: 535 MOSM/KG H2O
POTASSIUM SERPL-SCNC: 4.4 MMOL/L (ref 3.5–5.1)
POTASSIUM SERPL-SCNC: 4.9 MMOL/L (ref 3.5–5.1)
POTASSIUM UR-SCNC: 46 MMOL/L
SODIUM SERPL-SCNC: 124 MMOL/L (ref 136–145)
SODIUM SERPL-SCNC: 127 MMOL/L (ref 136–145)
SODIUM UR-SCNC: 38 MMOL/L

## 2018-07-29 PROCEDURE — 74011250637 HC RX REV CODE- 250/637: Performed by: INTERNAL MEDICINE

## 2018-07-29 PROCEDURE — 94760 N-INVAS EAR/PLS OXIMETRY 1: CPT

## 2018-07-29 PROCEDURE — 80048 BASIC METABOLIC PNL TOTAL CA: CPT | Performed by: INTERNAL MEDICINE

## 2018-07-29 PROCEDURE — 83930 ASSAY OF BLOOD OSMOLALITY: CPT | Performed by: INTERNAL MEDICINE

## 2018-07-29 PROCEDURE — 94640 AIRWAY INHALATION TREATMENT: CPT

## 2018-07-29 PROCEDURE — 83935 ASSAY OF URINE OSMOLALITY: CPT | Performed by: INTERNAL MEDICINE

## 2018-07-29 PROCEDURE — 74011000250 HC RX REV CODE- 250: Performed by: INTERNAL MEDICINE

## 2018-07-29 PROCEDURE — 36415 COLL VENOUS BLD VENIPUNCTURE: CPT | Performed by: INTERNAL MEDICINE

## 2018-07-29 PROCEDURE — 82533 TOTAL CORTISOL: CPT | Performed by: INTERNAL MEDICINE

## 2018-07-29 PROCEDURE — 65660000000 HC RM CCU STEPDOWN

## 2018-07-29 PROCEDURE — 84133 ASSAY OF URINE POTASSIUM: CPT | Performed by: INTERNAL MEDICINE

## 2018-07-29 PROCEDURE — 74011250636 HC RX REV CODE- 250/636: Performed by: INTERNAL MEDICINE

## 2018-07-29 PROCEDURE — 84300 ASSAY OF URINE SODIUM: CPT | Performed by: INTERNAL MEDICINE

## 2018-07-29 PROCEDURE — 77010033678 HC OXYGEN DAILY

## 2018-07-29 PROCEDURE — 74011250637 HC RX REV CODE- 250/637: Performed by: EMERGENCY MEDICINE

## 2018-07-29 RX ORDER — MAG HYDROX/ALUMINUM HYD/SIMETH 200-200-20
30 SUSPENSION, ORAL (FINAL DOSE FORM) ORAL ONCE
Status: COMPLETED | OUTPATIENT
Start: 2018-07-29 | End: 2018-07-29

## 2018-07-29 RX ORDER — IPRATROPIUM BROMIDE AND ALBUTEROL SULFATE 2.5; .5 MG/3ML; MG/3ML
3 SOLUTION RESPIRATORY (INHALATION)
Status: DISCONTINUED | OUTPATIENT
Start: 2018-07-29 | End: 2018-08-01 | Stop reason: HOSPADM

## 2018-07-29 RX ORDER — AMLODIPINE BESYLATE 5 MG/1
2.5 TABLET ORAL DAILY
Status: DISCONTINUED | OUTPATIENT
Start: 2018-07-29 | End: 2018-08-01 | Stop reason: HOSPADM

## 2018-07-29 RX ORDER — ASPIRIN 81 MG/1
81 TABLET ORAL DAILY
Status: DISCONTINUED | OUTPATIENT
Start: 2018-07-29 | End: 2018-08-01 | Stop reason: HOSPADM

## 2018-07-29 RX ORDER — TOLVAPTAN 15 MG/1
15 TABLET ORAL ONCE
Status: COMPLETED | OUTPATIENT
Start: 2018-07-29 | End: 2018-07-29

## 2018-07-29 RX ADMIN — ENOXAPARIN SODIUM 40 MG: 100 INJECTION SUBCUTANEOUS at 20:49

## 2018-07-29 RX ADMIN — ALUMINUM HYDROXIDE, MAGNESIUM HYDROXIDE, AND SIMETHICONE 30 ML: 200; 200; 20 SUSPENSION ORAL at 22:23

## 2018-07-29 RX ADMIN — Medication 10 ML: at 20:49

## 2018-07-29 RX ADMIN — METHYLPREDNISOLONE SODIUM SUCCINATE 40 MG: 40 INJECTION, POWDER, FOR SOLUTION INTRAMUSCULAR; INTRAVENOUS at 05:03

## 2018-07-29 RX ADMIN — UMECLIDINIUM BROMIDE AND VILANTEROL TRIFENATATE 1 PUFF: 62.5; 25 POWDER RESPIRATORY (INHALATION) at 10:00

## 2018-07-29 RX ADMIN — Medication 10 ML: at 16:29

## 2018-07-29 RX ADMIN — GUAIFENESIN 600 MG: 600 TABLET, EXTENDED RELEASE ORAL at 18:39

## 2018-07-29 RX ADMIN — ASPIRIN 81 MG: 81 TABLET, DELAYED RELEASE ORAL at 09:59

## 2018-07-29 RX ADMIN — AZITHROMYCIN 250 MG: 250 TABLET, FILM COATED ORAL at 13:07

## 2018-07-29 RX ADMIN — IPRATROPIUM BROMIDE AND ALBUTEROL SULFATE 3 ML: .5; 3 SOLUTION RESPIRATORY (INHALATION) at 13:33

## 2018-07-29 RX ADMIN — GUAIFENESIN 600 MG: 600 TABLET, EXTENDED RELEASE ORAL at 09:59

## 2018-07-29 RX ADMIN — IPRATROPIUM BROMIDE AND ALBUTEROL SULFATE 3 ML: .5; 3 SOLUTION RESPIRATORY (INHALATION) at 20:21

## 2018-07-29 RX ADMIN — METHYLPREDNISOLONE SODIUM SUCCINATE 40 MG: 40 INJECTION, POWDER, FOR SOLUTION INTRAMUSCULAR; INTRAVENOUS at 16:28

## 2018-07-29 RX ADMIN — TOLVAPTAN 15 MG: 15 TABLET ORAL at 16:22

## 2018-07-29 RX ADMIN — METHYLPREDNISOLONE SODIUM SUCCINATE 40 MG: 40 INJECTION, POWDER, FOR SOLUTION INTRAMUSCULAR; INTRAVENOUS at 20:49

## 2018-07-29 RX ADMIN — IPRATROPIUM BROMIDE AND ALBUTEROL SULFATE 3 ML: .5; 3 SOLUTION RESPIRATORY (INHALATION) at 07:35

## 2018-07-29 RX ADMIN — METHYLPREDNISOLONE SODIUM SUCCINATE 40 MG: 40 INJECTION, POWDER, FOR SOLUTION INTRAMUSCULAR; INTRAVENOUS at 09:59

## 2018-07-29 RX ADMIN — AMLODIPINE BESYLATE 2.5 MG: 5 TABLET ORAL at 16:25

## 2018-07-29 RX ADMIN — Medication 10 ML: at 05:03

## 2018-07-29 RX ADMIN — IPRATROPIUM BROMIDE AND ALBUTEROL SULFATE 3 ML: .5; 3 SOLUTION RESPIRATORY (INHALATION) at 01:02

## 2018-07-29 RX ADMIN — BUTALBITAL, ACETAMINOPHEN AND CAFFEINE 1 TABLET: 50; 325; 40 TABLET ORAL at 10:17

## 2018-07-29 NOTE — PROGRESS NOTES
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 
  
Elevated BP No history of HTN, pt reported history of borderline hypotension I see she saw last year but could find vital 
Will start low dose norvasc Continue PRN IV hydralazine 
  
Sinus erin with 1st degree block Hr Stable, now staying > 60s (there was few episodes in 40s earlier the admission) TSh ok 
  
Solitary kidney Monitor Cr 
  
PAD, s/p left leg stent Continue ASA  Follows with Dr Marya Palomo 
Code Status:   DNR  She said she would not want to be kept alive on machines or receive CPR in the event of an arrest.  We will honor her wishes and will make her a DNR. 
  
Surrogate Decision Maker:  Son 
  
DVT Prophylaxis: Lovenox GI Prophylaxis: not indicated 
  
Baseline: . Lives alone.    
                       
  
Subjective: Pt seen and examined at bedside. Started to feel better. Overnight events d/w RN  
CHIEF COMPLAINT: f/u \"SOB\" 
  
Review of Systems: 
Symptom Y/N Comments  Symptom Y/N Comments Fever/Chills n   Chest Pain n   
Poor Appetite    Edema Cough y   Abdominal Pain n   
Sputum    Joint Pain SOB/GODINEZ n   Pruritis/Rash Nausea/vomit    Tolerating PT/OT Diarrhea    Tolerating Diet y Constipation    Other y Dizziness / lightheadedness Could NOT obtain due to:   
 
Objective: VITALS:  
Last 24hrs VS reviewed since prior progress note. Most recent are: 
Patient Vitals for the past 24 hrs: 
 Temp Pulse Resp BP SpO2  
07/29/18 1038 98.2 °F (36.8 °C) 69 20 160/66 97 %  
07/29/18 0751 97.9 °F (36.6 °C) 68 18 145/69 96 %  
07/29/18 0735 - - - - 96 %  
07/29/18 0307 98.1 °F (36.7 °C) 72 20 149/74 96 %  
07/29/18 0102 - - - - 97 %  
07/28/18 2339 97.9 °F (36.6 °C) 72 20 150/74 95 %  
07/28/18 1932 97.6 °F (36.4 °C) 72 20 160/70 95 %  
07/28/18 1926 - - - - 94 %  
07/28/18 1508 98 °F (36.7 °C) 75 20 161/58 97 %  
07/28/18 1405 - - - - 96 % Intake/Output Summary (Last 24 hours) at 07/29/18 1333 Last data filed at 07/29/18 8131 Gross per 24 hour Intake              250 ml Output              600 ml Net             -350 ml PHYSICAL EXAM: 
General: WD, WN. Alert, cooperative, no acute distress   
EENT:  EOMI. Anicteric sclerae. MMM Resp:  CTA bilaterally, no wheezing or rales. No accessory muscle use CV:  Regular  rhythm,  No edema GI:  Soft, Non distended, Non tender.  +Bowel sounds Neurologic:  Alert and oriented X 3, normal speech, Psych:   Good insight. Not anxious nor agitated Skin:  No rashes. No jaundice Reviewed most current lab test results and cultures  YES Reviewed most current radiology test results   YES Review and summation of old records today    NO Reviewed patient's current orders and MAR    YES 
PMH/SH reviewed - no change compared to H&P 
________________________________________________________________________ Care Plan discussed with: 
  Comments Patient y Family RN y   
Care Manager Consultant Multidiciplinary team rounds were held today with , nursing, pharmacist and clinical coordinator. Patient's plan of care was discussed; medications were reviewed and discharge planning was addressed. ________________________________________________________________________ Total NON critical care TIME:  30 Minutes Total CRITICAL CARE TIME Spent:   Minutes non procedure based Comments >50% of visit spent in counseling and coordination of care    
________________________________________________________________________ Judie Lewis MD  
 
Procedures: see electronic medical records for all procedures/Xrays and details which were not copied into this note but were reviewed prior to creation of Plan. LABS: 
I reviewed today's most current labs and imaging studies. Pertinent labs include: 
Recent Labs  
   07/27/18 
 1411 WBC  7.9 HGB  13.8 HCT  41.0  
PLT  227 Recent Labs  
   07/29/18 
 0342  07/28/18 
 0620  07/27/18 
 1411 NA  127*  128*  128* K  4.4  4.6  4.7 CL  94*  98  95* CO2  24  23  25 GLU  128*  129*  99 BUN  17  12  16 CREA  0.89  0.78  0.92  
CA  9.4  9.0  9.1 MG   --   2.0   --   
ALB   --    --   4.0 TBILI   --    --   0.6 SGOT   --    --   15 ALT   --    --   14 Signed: Judie Lewis MD

## 2018-07-29 NOTE — CONSULTS
Nephrology Consult Note     Reji Chawla     [facility]              Phone - [phone]   Patient: Kevin Sheehan   YOB: 1931    Date- 7/29/2018  MRN: [id number]   CONSULTING PHYSICIAN: DR. WRIGHT           REASON FOR CONSULTATION: HYPONATREMIA  ADMIT DATE:7/27/2018 PATIENT PCP:ODILON Renner MD     ASSESSMENT:   · HYPONATREMIA LIKELY DUE TO EXCESS ADH due to COPD  · Excessive free water intake may contribute to her hyponatremia. - I don't think this is primary polydipsia. · Copd  · htn  · Active Problems:  ·   COPD exacerbation ([address] (6/4/2017)  ·   ·   Acute and chronic respiratory failure with hypoxia (HCC) (6/4/2017)  ·   ·   Sinus bradycardia (7/28/2018)  ·   ·   LBBB (left bundle branch block) (7/28/2018)  ·   ·   PLAN:   · samsca 15 mg po now  · Repeat lab this pm  · Recheck urine, na,k,osmo  · TSH AND CORTISOL normal  · CXR negative for lung mass  · Agree with norvasc for htn  · Fluid restriction 1500 ml per day    [x] High complexity decision making was performed  [x] Patient is at high-risk of decompensation with multiple organ involvement    Subjective:   HPI: Kevin Sheehan is a 80 y.o.  female. She is admitted with weakness. She is found to have hyponatremia  Na level 127 today  Her na trends      Ref. Range 6/27/2017 11:47 7/17/2017 19:34 7/24/2017 14:58 9/15/2017 12:15 7/27/2018 14:11 7/28/2018 06:20 7/29/2018 03:42   Sodium  133 (L) 127 (L) 134 134 128 (L) 128 (L) 127 (L)     She is not on any diuretics  She denies pain or nausea or vomiting  She has copd  She denies taking NSAIDS  She drinks plenty of liquid. She can't give me quantity for her liquid intake. She says - I drink a lot of water  Her na didn't improve with fluid restriction  Her tsh and cortisol is normal  Urine osmo 286 and na 104  No serum osmo done. Review of Systems:  A 11 point review of system was performed today.  Pertinent positives and negatives are mentioned in the HPI. The reminder of the ROS is negative and noncontributory. Past Medical History:   Diagnosis Date    Arthritis     generalized    COPD     former smoker      >60pkyr quit 1/3/11    h/o DVT 1955    left leg    ischemic left lower extremitiy pain     above knee FemPop 1/3/11    PVD [facility]peripheral vascular disease) [facility]     Seasonal allergic rhinitis     Single kidney       Past Surgical History:   Procedure Laterality Date    BREAST SURGERY PROCEDURE UNLISTED  1955    excision left breast cyst    HX GI  10 yrs ago    colonoscopy    HX GYN      3 miscarriges    HX GYN      1 vaginal birth   [facility]      veinography left leg, stent left leg      Prior to Admission medications    Medication Sig Start Date End Date Taking? Authorizing Provider   azithromycin (ZITHROMAX) 250 mg tablet Take 250 mg by mouth every Monday, Wednesday, Friday. Yes Historical Provider   OXYGEN-AIR DELIVERY SYSTEMS 2 L/min by Nasal route as needed (Shortness of Breath). Yes Historical Provider   acetaminophen (TYLENOL) 500 mg tablet Take 500 mg by mouth every six (6) hours as needed for Pain. Yes Historical Provider   albuterol-ipratropium (DUO-NEB) 2.5 mg-0.5 mg/3 ml nebu 3 mL by Nebulization route three (3) times daily. Patient taking differently: 3 mL by Nebulization route four (4) times daily. 6/6/17  Yes Rose Fay MD   Azelastine (ASTEPRO) 0.15 % (205.5 mcg) nasal spray  5/11/17  Yes Historical Provider   ANORO ELLIPTA 62.5-25 mcg/actuation inhaler  5/8/17  Yes Historical Provider   COMBIVENT RESPIMAT  mcg/actuation inhaler INHALE 1 PUFF THREE TIMES A DAY 5/9/17  Yes ODILON Krueger MD   aspirin delayed-release 81 mg tablet Take  by mouth daily. Yes Historical Provider   predniSONE (DELTASONE) 5 mg tablet Take 5 mg by mouth. Historical Provider   docusate sodium (COLACE) 100 mg capsule Take 100 mg by mouth daily as needed for Constipation.     Historical Provider triamcinolone acetonide (KENALOG) 0.1 % topical cream Apply  to affected area two (2) times daily as needed for Skin Irritation.  use thin layer 3/12/13   Amelia Johansen MD     Allergies   Allergen Reactions    Food Extracts Diarrhea     Onions and garlic causes abdominal pain,cramping     Sulfa (Sulfonamide Antibiotics) Other (comments)     Altered Mental Status      Social History   Substance Use Topics    Smoking status: Former Smoker     Packs/day: 1.00     Years: 60.00     Quit date: 1/3/2011    Smokeless tobacco: Never Used    Alcohol use Yes      Comment: occasional liquor after dinner      Family History   Problem Relation Age of Onset    Cancer Mother      colon    Cancer Sister      lung        Objective:    Patient Vitals for the past 24 hrs:   Temp Pulse Resp BP SpO2   07/29/18 1333 - - - - 95 %   07/29/18 1038 98.2 °F (36.8 °C) 69 20 160/66 97 %   07/29/18 0751 97.9 °F (36.6 °C) 68 18 145/69 96 %   07/29/18 0735 - - - - 96 %   07/29/18 0307 98.1 °F (36.7 °C) 72 20 149/74 96 %   07/29/18 0102 - - - - 97 %   07/28/18 2339 97.9 °F (36.6 °C) 72 20 150/74 95 %   07/28/18 1932 97.6 °F (36.4 °C) 72 20 160/70 95 %   07/28/18 1926 - - - - 94 %   07/28/18 1508 98 °F (36.7 °C) 75 20 161/58 97 %   07/28/18 1405 - - - - 96 %        Physical Exam:  General:Alert, No distress,   Eyes:No scleral icterus, No conjunctival pallor  Neck:Supple,no mass palpable,no thyromegaly  Lungs:Clears to auscultation Bilaterally, normal respiratory effort  CVS:RRR, S1 S2 normal,  No rub,  Abdomen:Soft, Non tender, No hepatosplenomegaly  Extremities: no LE edema  Skin:No rash or lesions, Warm and DRY   Psych: appropriate affect    :  No verde  Musculoskeletal : no redness, no joint tenderness  NEURO: Normal Speech, Non focal    CODE STATUS:  dnr  Care Plan discussed with:  Pt and nurse     Chart reviewed.      TOTAL TIME: 76 Minutes   y Reviewed previous records   y Discussion with patient and/or family and questions answered   y >50% of visit spent in counseling and coordination of care        ECG[de-identified] Rev:no  Xray/CT/US/MRI REV:yes  Lab Data Personally Reviewed: (see below)    BMP: 7/27/2018: BUN 16 MG/DL (Ref range: 6 - 20 MG/DL); Calcium 9.1 MG/DL (Ref range: 8.5 - 10.1 MG/DL); Chloride 95 mmol/L* (Ref range: 97 - 108 mmol/L); CO2 25 mmol/L (Ref range: 21 - 32 mmol/L); Creatinine 0.92 MG/DL (Ref range: 0.55 - 1.02 MG/DL); Glucose 99 mg/dL (Ref range: 65 - 100 mg/dL); Potassium 4.7 mmol/L (Ref range: 3.5 - 5.1 mmol/L); Sodium 128 mmol/L* (Ref range: 136 - 145 mmol/L)  7/28/2018: BUN 12 MG/DL (Ref range: 6 - 20 MG/DL); Calcium 9.0 MG/DL (Ref range: 8.5 - 10.1 MG/DL); Chloride 98 mmol/L (Ref range: 97 - 108 mmol/L); CO2 23 mmol/L (Ref range: 21 - 32 mmol/L); Creatinine 0.78 MG/DL (Ref range: 0.55 - 1.02 MG/DL); Glucose 129 mg/dL* (Ref range: 65 - 100 mg/dL); Potassium 4.6 mmol/L (Ref range: 3.5 - 5.1 mmol/L);  Sodium 128 mmol/L* (Ref range: 136 - 145 mmol/L)  Recent Labs      07/29/18   0342  07/28/18   0620  07/27/18   1411   WBC   --    --   7.9   HGB   --    --   13.8   PLT   --    --   227   ANEU   --    --   5.8   NA  127*  128*  128*   K  4.4  4.6  4.7   GLU  128*  129*  99   BUN  17  12  16   CREA  0.89  0.78  0.92   ALT   --    --   14   SGOT   --    --   15   TBILI   --    --   0.6   AP   --    --   63   CA  9.4  9.0  9.1   MG   --   2.0   --      Lab Results   Component Value Date/Time    Color DARK YELLOW 06/04/2017 02:52 AM    Appearance OPAQUE (A) 06/04/2017 02:52 AM    Specific gravity 1.026 06/04/2017 02:52 AM    Specific gravity >1.030 (H) 03/11/2011 03:47 PM    pH (UA) 5.0 06/04/2017 02:52 AM    Protein 100 (A) 06/04/2017 02:52 AM    Glucose NEGATIVE  06/04/2017 02:52 AM    Ketone 15 (A) 06/04/2017 02:52 AM    Bilirubin NEGATIVE  03/11/2011 03:47 PM    Urobilinogen 1.0 06/04/2017 02:52 AM    Nitrites NEGATIVE  06/04/2017 02:52 AM    Leukocyte Esterase MODERATE (A) 06/04/2017 02:52 AM    Epithelial cells MANY (A) 2017 02:52 AM    Bacteria 2+ (A) 2017 02:52 AM    WBC 20-50 2017 02:52 AM    RBC 5-10 2017 02:52 AM       No results found for: IRON, FE, TIBC, IBCT, PSAT, FERR  Lab Results   Component Value Date/Time    Culture result: NO GROWTH 6 DAYS 2017 09:53 AM    Culture result: MODERATE  NORMAL RESPIRATORY KELLY   2017 07:55 AM    Culture result: LIGHT  YEAST   (A) 2017 07:55 AM     Prior to Admission Medications   Prescriptions Last Dose Informant Patient Reported? Taking? ANORO ELLIPTA 62.5-25 mcg/actuation inhaler 2018 at Unknown time  Yes Yes   Azelastine (ASTEPRO) 0.15 % (205.5 mcg) nasal spray 2018 at Unknown time  Yes Yes   COMBIVENT RESPIMAT  mcg/actuation inhaler 2018 at Unknown time  No Yes   Sig: INHALE 1 PUFF THREE TIMES A DAY   OXYGEN-AIR DELIVERY SYSTEMS 2018 at Unknown time  Yes Yes   Si L/min by Nasal route as needed (Shortness of Breath). acetaminophen (TYLENOL) 500 mg tablet 2018 at Unknown time  Yes Yes   Sig: Take 500 mg by mouth every six (6) hours as needed for Pain. albuterol-ipratropium (DUO-NEB) 2.5 mg-0.5 mg/3 ml nebu 2018 at Unknown time  No Yes   Sig: 3 mL by Nebulization route three (3) times daily. Patient taking differently: 3 mL by Nebulization route four (4) times daily. aspirin delayed-release 81 mg tablet 2018 at Unknown time  Yes Yes   Sig: Take  by mouth daily. azithromycin (ZITHROMAX) 250 mg tablet 2018 at Unknown time  Yes Yes   Sig: Take 250 mg by mouth every Monday, Wednesday, Friday. docusate sodium (COLACE) 100 mg capsule Unknown at Unknown time  Yes No   Sig: Take 100 mg by mouth daily as needed for Constipation. predniSONE (DELTASONE) 5 mg tablet Unknown at Unknown time  Yes No   Sig: Take 5 mg by mouth.   triamcinolone acetonide (KENALOG) 0.1 % topical cream Unknown at Unknown time  No No   Sig: Apply  to affected area two (2) times daily as needed for Skin Irritation. use thin layer      Facility-Administered Medications: None     Imaging:    Medications list Personally Reviewed   [x]      Yes     []               No    Thank you for allowing us to participate in the care this patient. We will follow patient with you. Signed By: Zainab Gordon MD  [facility]  [facility]  [facility], [address] [facility], [address]  Phone - [phone]         Fax - [phone] [facility] Office  [address] [place] [address]  Phone - [phone]        Fax - [phone]     [email or web address]

## 2018-07-29 NOTE — PROGRESS NOTES
Bedside and Verbal shift change report given to Grace Huddleston RN (oncoming nurse). Report included the following information SBAR, Kardex, ED Summary, Intake/Output, MAR, Accordion and Med Rec Status. SHIFT SUMMARY: 
1110: consulted dr Kenisha Clemente 1113: rn spoke to dr Daxa Lopez who states he will see pt today.

## 2018-07-29 NOTE — PROGRESS NOTES
ADULT PROTOCOL: JET AEROSOL  REASSESSMENT Patient  Regino Serrano     80 y.o.   female     7/29/2018  9:18 AM 
 
Breath Sounds Pre Procedure: Right Breath Sounds: Diminished, Expiratory wheezing Left Breath Sounds: Diminished, Expiratory wheezing Breath Sounds Post Procedure: Right Breath Sounds: Diminished, Expiratory wheezing Left Breath Sounds: Diminished, Expiratory wheezing Breathing pattern: Pre procedure Breathing Pattern: Regular Post procedure Breathing Pattern: Regular Heart Rate: Pre procedure Pulse: 70 Post procedure Pulse: 60 Resp Rate: Pre procedure Respirations: 16 Post procedure Respirations: 16 
 
     
 
Cough: Pre procedure Cough: Productive Post procedure Cough: Non-productive Sputum: Pre procedure Sputum amount: Moderate Sputum color/odor: Clear Sputum consistency: Thick Oxygen: O2 Device: Nasal cannula   Flow rate (L/min) 2 lpm 
   Changed: NO/ Baseline 2 lpm 
 
SpO2: Pre procedure SpO2: 96 %   with oxygen Post procedure SpO2: 95 %  with oxygen Nebulizer Therapy: Current medications Aerosolized Medications: DuoNeb Changed: YES/ Changed to TID and PRN Smoking History: former smoker Problem List:  
Patient Active Problem List  
Diagnosis Code  COPD (chronic obstructive pulmonary disease) ([facility]) J44.9  PVD (peripheral vascular disease) ([facility]) I73.9  Seasonal allergic rhinitis J30.2  Bronchitis, acute J20.9  [facility] J44.1  Acute renal failure (ARF) ([facility]) N17.9  Acute and chronic respiratory failure with hypoxia ([facility]) J96.21  
 Sepsis ([facility]) A41.9  Sinus bradycardia R00.1  LBBB (left bundle branch block) I44.7 Respiratory Therapist: RT Toni

## 2018-07-29 NOTE — PROGRESS NOTES
7/29/2018 4:51 PM 
 
Admit Date: 7/27/2018 Admit Diagnosis: [facility] exacerb[facility] Subjective: Yareli Ragsdale denies chest pain or shortness of breath. Current Facility-Administered Medications Medication Dose Route Frequency  umeclidinium-vilanterol (ANORO ELLIPTA) 62.5 mcg- 25 mcg/inhalation  1 Puff Inhalation DAILY  aspirin delayed-release tablet 81 mg  81 mg Oral DAILY  albuterol-ipratropium (DUO-NEB) 2.5 MG-0.5 MG/3 ML  3 mL Nebulization TID RT  
 amLODIPine (NORVASC) tablet 2.5 mg  2.5 mg Oral DAILY  hydrALAZINE (APRESOLINE) 20 mg/mL injection 10 mg  10 mg IntraVENous Q6H PRN  
 methylPREDNISolone (PF) (SOLU-MEDROL) injection 40 mg  40 mg IntraVENous Q6H  
 butalbital-acetaminophen-caffeine (FIORICET, ESGIC) -40 mg per tablet 1 Tab  1 Tab Oral Q6H PRN  
 sodium chloride (NS) flush 5-10 mL  5-10 mL IntraVENous Q8H  
 sodium chloride (NS) flush 5-10 mL  5-10 mL IntraVENous PRN  
 acetaminophen (TYLENOL) tablet 650 mg  650 mg Oral Q4H PRN  
 ondansetron (ZOFRAN) injection 4 mg  4 mg IntraVENous Q4H PRN  
 albuterol-ipratropium (DUO-NEB) 2.5 MG-0.5 MG/3 ML  3 mL Nebulization Q4H PRN  
 guaiFENesin ER (MUCINEX) tablet 600 mg  600 mg Oral BID  enoxaparin (LOVENOX) injection 40 mg  40 mg SubCUTAneous Q24H  
 azithromycin (ZITHROMAX) tablet 250 mg  250 mg Oral DAILY Objective:  
  
Physical Exam:   
Visit Vitals  /77 (BP 1 Location: Right arm, BP Patient Position: Sitting)  Pulse 75  Temp 98.6 °F (37 °C)  Resp 20  
 Ht 5' 7\" (1.702 m)  Wt 138 lb 0.1 oz (62.6 kg)  SpO2 94%  Breastfeeding No  
 BMI 21.62 kg/m2 Gen:  NAD Mental Status - Alert. General Appearance - Not in acute distress. Chest and Lung Exam  
Inspection: Accessory muscles - No use of accessory muscles in breathing. Auscultation:  
Breath sounds: - Normal.  
Cardiovascular Inspection: Jugular vein - Bilateral - Inspection Normal.  
Palpation/Percussion: Apical Impulse: - Normal.  
Auscultation: Rhythm - Regular. Heart Sounds - S1 WNL and S2 WNL. No S3 or S4. Murmurs & Other Heart Sounds: Auscultation of the heart reveals - No Murmurs. Peripheral Vascular Upper Extremity: Inspection - Bilateral - No Cyanotic nailbeds or Digital clubbing. Lower Extremity:  
Palpation: Edema - Bilateral - No edema. Abdomen:   Soft, non-tender, bowel sounds are active. Neuro: A&O times 3, CN and motor grossly WNL Data Review:  
Recent Labs  
   07/27/18 
 1411 WBC  7.9 HGB  13.8 HCT  41.0  
PLT  227 Recent Labs  
   07/29/18 
 0342  07/28/18 
 0620  07/27/18 
 1411 NA  127*  128*  128* K  4.4  4.6  4.7 CL  94*  98  95* CO2  24  23  25 GLU  128*  129*  99 BUN  17  12  16 CREA  0.89  0.78  0.92  
CA  9.4  9.0  9.1 MG   --   2.0   --   
ALB   --    --   4.0 TBILI   --    --   0.6 SGOT   --    --   15 ALT   --    --   14 Recent Labs  
   07/27/18 
 1411 TROIQ  <0.05 Intake/Output Summary (Last 24 hours) at 07/29/18 1651 Last data filed at 07/29/18 7933 Gross per 24 hour Intake              250 ml Output              600 ml Net             -350 ml Telemetry: normal sinus rhythm Echo 7/29/2018: 
SUMMARY: 
Left ventricle: Systolic function was normal. Ejection fraction was 
estimated to be 60 %. No obvious wall motion abnormalities identified in 
the views obtained. There was mild concentric hypertrophy. Mitral valve: There was mild regurgitation. Aortic valve: Leaflets exhibited mildly increased thickness and mild 
calcification. Not well visualized. Tricuspid valve: There was mild to moderate regurgitation. There was 
moderate pulmonary hypertension. Assessment:  
 
Active Problems: COPD exacerbation ([address] (6/4/2017) Acute and chronic respiratory failure with hypoxia ([address] (6/4/2017) Sinus bradycardia (7/28/2018) LBBB (left bundle branch block) (7/28/2018) Plan:  
 
Transient sinus bradycardia: 
Transient HR as low as 46 BPM with hypertension, not hypotension on 7/28/2018 . This is very unlikely to be symptomatic bradycardia. Continue to monitor for more significant bradycardia or erin with hypotension. Old LBBB. Echo with normal LVEF, no significant valvular pathology, moderate pulmonary hypertension presumed due to COPD. Please call [facility] if sustained heart rate less than 40 bpm, pauses greater than 3 seconds, or bradycardia associated with blood pressure less than 90. Otherwise, I will sign off for now. Thanks for the consult. Discussed with Dr. Sonya Rosales.

## 2018-07-29 NOTE — PROGRESS NOTES
Problem: Pressure Injury - Risk of 
Goal: *Prevention of pressure injury Document Juan Scale and appropriate interventions in the flowsheet. Outcome: Progressing Towards Goal 
Pressure Injury Interventions: 
Sensory Interventions: Assess changes in LOC, Chair cushion, Check visual cues for pain, Discuss PT/OT consult with provider, Float heels, Keep linens dry and wrinkle-free, Maintain/enhance activity level, Minimize linen layers, Monitor skin under medical devices, Pad between skin to skin, Turn and reposition approx. every two hours (pillows and wedges if needed) Moisture Interventions: Absorbent underpads, Apply protective barrier, creams and emollients, Limit adult briefs, Minimize layers, Moisture barrier Activity Interventions: Chair cushion, Increase time out of bed, PT/OT evaluation Mobility Interventions: Chair cushion, Float heels, PT/OT evaluation, Turn and reposition approx. every two hours(pillow and wedges) Nutrition Interventions: Document food/fluid/supplement intake, Discuss nutritional consult with provider, Offer support with meals,snacks and hydration Friction and Shear Interventions: Apply protective barrier, creams and emollients, Feet elevated on foot rest, Lift sheet, Lift team/patient mobility team, Minimize layers, Transferring/repositioning devices

## 2018-07-29 NOTE — PROGRESS NOTES
Problem: Falls - Risk of 
Goal: *Absence of Falls Document Bishop Graham Fall Risk and appropriate interventions in the flowsheet. Outcome: Progressing Towards Goal 
Fall Risk Interventions: 
Mobility Interventions: Bed/chair exit alarm Medication Interventions: Assess postural VS orthostatic hypotension, Patient to call before getting OOB, Teach patient to arise slowly

## 2018-07-30 LAB
ALBUMIN SERPL-MCNC: 3.7 G/DL (ref 3.5–5)
ANION GAP SERPL CALC-SCNC: 9 MMOL/L (ref 5–15)
BUN SERPL-MCNC: 33 MG/DL (ref 6–20)
BUN/CREAT SERPL: 33 (ref 12–20)
CALCIUM SERPL-MCNC: 9.2 MG/DL (ref 8.5–10.1)
CHLORIDE SERPL-SCNC: 95 MMOL/L (ref 97–108)
CO2 SERPL-SCNC: 24 MMOL/L (ref 21–32)
CREAT SERPL-MCNC: 1.01 MG/DL (ref 0.55–1.02)
GLUCOSE SERPL-MCNC: 125 MG/DL (ref 65–100)
OSMOLALITY SERPL: 280 MOSM/KG H2O
PHOSPHATE SERPL-MCNC: 3.5 MG/DL (ref 2.6–4.7)
POTASSIUM SERPL-SCNC: 4.5 MMOL/L (ref 3.5–5.1)
SODIUM SERPL-SCNC: 128 MMOL/L (ref 136–145)

## 2018-07-30 PROCEDURE — 36415 COLL VENOUS BLD VENIPUNCTURE: CPT | Performed by: INTERNAL MEDICINE

## 2018-07-30 PROCEDURE — 65660000000 HC RM CCU STEPDOWN

## 2018-07-30 PROCEDURE — 74011250636 HC RX REV CODE- 250/636: Performed by: INTERNAL MEDICINE

## 2018-07-30 PROCEDURE — 74011250637 HC RX REV CODE- 250/637: Performed by: INTERNAL MEDICINE

## 2018-07-30 PROCEDURE — 74011000250 HC RX REV CODE- 250: Performed by: INTERNAL MEDICINE

## 2018-07-30 PROCEDURE — 94760 N-INVAS EAR/PLS OXIMETRY 1: CPT

## 2018-07-30 PROCEDURE — 77010033678 HC OXYGEN DAILY

## 2018-07-30 PROCEDURE — 80069 RENAL FUNCTION PANEL: CPT | Performed by: INTERNAL MEDICINE

## 2018-07-30 PROCEDURE — 94640 AIRWAY INHALATION TREATMENT: CPT

## 2018-07-30 RX ORDER — MAG HYDROX/ALUMINUM HYD/SIMETH 200-200-20
30 SUSPENSION, ORAL (FINAL DOSE FORM) ORAL
Status: DISCONTINUED | OUTPATIENT
Start: 2018-07-30 | End: 2018-08-01 | Stop reason: HOSPADM

## 2018-07-30 RX ORDER — PREDNISONE 5 MG/1
5 TABLET ORAL
Status: DISCONTINUED | OUTPATIENT
Start: 2018-07-31 | End: 2018-08-01 | Stop reason: HOSPADM

## 2018-07-30 RX ADMIN — METHYLPREDNISOLONE SODIUM SUCCINATE 40 MG: 40 INJECTION, POWDER, FOR SOLUTION INTRAMUSCULAR; INTRAVENOUS at 05:13

## 2018-07-30 RX ADMIN — ENOXAPARIN SODIUM 40 MG: 100 INJECTION SUBCUTANEOUS at 20:59

## 2018-07-30 RX ADMIN — AZITHROMYCIN 250 MG: 250 TABLET, FILM COATED ORAL at 12:57

## 2018-07-30 RX ADMIN — IPRATROPIUM BROMIDE AND ALBUTEROL SULFATE 3 ML: .5; 3 SOLUTION RESPIRATORY (INHALATION) at 13:41

## 2018-07-30 RX ADMIN — Medication 10 ML: at 13:03

## 2018-07-30 RX ADMIN — IPRATROPIUM BROMIDE AND ALBUTEROL SULFATE 3 ML: .5; 3 SOLUTION RESPIRATORY (INHALATION) at 07:57

## 2018-07-30 RX ADMIN — METHYLPREDNISOLONE SODIUM SUCCINATE 40 MG: 40 INJECTION, POWDER, FOR SOLUTION INTRAMUSCULAR; INTRAVENOUS at 11:01

## 2018-07-30 RX ADMIN — Medication 10 ML: at 05:13

## 2018-07-30 RX ADMIN — IPRATROPIUM BROMIDE AND ALBUTEROL SULFATE 3 ML: .5; 3 SOLUTION RESPIRATORY (INHALATION) at 19:26

## 2018-07-30 RX ADMIN — GUAIFENESIN 600 MG: 600 TABLET, EXTENDED RELEASE ORAL at 18:27

## 2018-07-30 RX ADMIN — GUAIFENESIN 600 MG: 600 TABLET, EXTENDED RELEASE ORAL at 11:01

## 2018-07-30 RX ADMIN — AMLODIPINE BESYLATE 2.5 MG: 5 TABLET ORAL at 11:01

## 2018-07-30 RX ADMIN — UMECLIDINIUM BROMIDE AND VILANTEROL TRIFENATATE 1 PUFF: 62.5; 25 POWDER RESPIRATORY (INHALATION) at 12:58

## 2018-07-30 RX ADMIN — ASPIRIN 81 MG: 81 TABLET, DELAYED RELEASE ORAL at 11:01

## 2018-07-30 NOTE — PROGRESS NOTES
Nephrology Progress Note Reji Chawla  
 
[facility]. [facility]                  Phone - [phone] Patient: Analisa Barrios Admit Date: 7/27/2018 YOB: 1931 Date- 7/30/2018 CC: Follow up for HYPONATREMIA Subjective: Interval History:  
-   S/P SAMSCA HIGH BUN DUE TO STEROIDS Cr. Stable She feels better 
bp improved Her serum osmo is normal ???? Likely due to high bun due to steroids Urine osmo is high suggest excess ADH state ROS-No c/o sob, fever. No c/o nausea or vomiting No c/o chest pain Assessment & Plan:  
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 
Fluid restriction 6080-6253 ml per day ( 24 hour after samsca administration ) 
 
- copd On steroids 
 
- htn Continue norvasc Care Plan discussed with: pt 
Review of Systems: Pertinent items are noted in HPI. Physical Exam:  
GEN: NAD NECK- Supple, no thyromegaly RESP: Clear b/l, no wheezing, No accessory muscle use CVS: RRR,S1,S2 SKIN: No Rash, No Jaundice ABDO: soft , non tender, No hepatosplenomegaly EXT: No Edema NEURO: non focal, normal speech Objective:  
Patient Vitals for the past 24 hrs: 
 Temp Pulse Resp BP SpO2  
07/30/18 0757 - - - - 93 % 07/30/18 0734 98.3 °F (36.8 °C) 70 19 151/74 96 % 07/30/18 0428 97.1 °F (36.2 °C) 69 19 161/74 96 % 07/30/18 0109 97.9 °F (36.6 °C) 66 17 141/67 95 %  
07/29/18 2026 98.4 °F (36.9 °C) 70 20 142/54 96 %  
07/29/18 2021 - - - - 97 %  
07/29/18 1603 98.6 °F (37 °C) 75 20 139/77 94 %  
07/29/18 1333 - - - - 95 %  
07/29/18 1038 98.2 °F (36.8 °C) 69 20 160/66 97 % Last 3 Recorded Weights in this Encounter  
 07/27/18 1249 07/29/18 8034 Weight: 63.5 kg (140 lb) 62.6 kg (138 lb 0.1 oz)  
 
  
07/28 1901 - 07/30 0700 In: -  
Out: 2100 [Urine:2100] Chart reviewed. Pertinent Notes reviewed. Medications list  reviewed Current Facility-Administered Medications Medication  umeclidinium-vilanterol (ANORO ELLIPTA) 62.5 mcg- 25 mcg/inhalation  aspirin delayed-release tablet 81 mg  
 albuterol-ipratropium (DUO-NEB) 2.5 MG-0.5 MG/3 ML  
 amLODIPine (NORVASC) tablet 2.5 mg  
 hydrALAZINE (APRESOLINE) 20 mg/mL injection 10 mg  
 methylPREDNISolone (PF) (SOLU-MEDROL) injection 40 mg  
 butalbital-acetaminophen-caffeine (FIORICET, ESGIC) -40 mg per tablet 1 Tab  sodium chloride (NS) flush 5-10 mL  sodium chloride (NS) flush 5-10 mL  acetaminophen (TYLENOL) tablet 650 mg  
 ondansetron (ZOFRAN) injection 4 mg  albuterol-ipratropium (DUO-NEB) 2.5 MG-0.5 MG/3 ML  
 guaiFENesin ER (MUCINEX) tablet 600 mg  
 enoxaparin (LOVENOX) injection 40 mg  
 azithromycin (ZITHROMAX) tablet 250 mg Data Review : 
Recent Labs  
   07/30/18 
 0439  07/29/18 
 2240  07/29/18 
 0342  07/28/18 
 0620  07/27/18 
 1411 WBC   --    --    --    --   7.9 HGB   --    --    --    --   13.8 PLT   --    --    --    --   227 ANEU   --    --    --    --   5.8 NA  128*  124*  127*  128*  128* K  4.5  4.9  4.4  4.6  4.7 GLU  125*  116*  128*  129*  99 BUN  33*  35*  17  12  16 CREA  1.01  1.10*  0.89  0.78  0.92  
ALT   --    --    --    --   14 SGOT   --    --    --    --   15  
TBILI   --    --    --    --   0.6 AP   --    --    --    --   63  
CA  9.2  9.4  9.4  9.0  9.1 MG   --    --    --   2.0   --   
PHOS  3.5   --    --    --    -- No results for input(s): FE, TIBC, PSAT, FERR in the last 72 hours. Lab Results Component Value Date/Time Sodium urine, random 38 07/29/2018 07:15 PM  
 
 
Dianna Longest, MD 
 
                         [facility] 
                               [email or web address] [facility] [address] [place], [address] Phone - [phone] Fax - [phone]  [address] [facility] Phone - [phone] Fax - [phone]

## 2018-07-30 NOTE — PROGRESS NOTES
0730 
Report received from [place], [address] [place] [address]. SBAR, Kardex, ED Summary, Procedure Summary, Intake/Output, MAR, Accordion, Recent Results, Med Rec Status and Cardiac Rhythm NSR to SB were discussed. [address] [place] [address] Dr. Kassy Vieira at bedside. Verbal orders received to stop solumedrol and to start 5 mg daily prednisone starting tomorrow.

## 2018-07-30 NOTE — PROGRESS NOTES
Hospitalist Progress Note NAME: Kimberlee Kidney :  11/10/1931 MRN:  [id number] Assessment / Plan: Hyponatremia Seems combination of SIADH and excessive water intake Appreciate nephrology assistance who susupect SIADH due to COPD S/p SAMSCA x1 yesterday, held today Dizziness x 2-3 days Improving CT head and MRI negative Possible COPD related COPD exacerbation Chronic respiratory failure on 2LNC oxygen CXR negative Switch steroids to 5mg Daily (she doesn't want higher dose as making her aggitated Continue empiric oral zithromax Mucolytics, Nebs Continue O2 Elevated BP No history of HTN, pt reported history of borderline hypotension I see she saw last year but could find vital 
Will start low dose norvasc Continue PRN IV hydralazine 
  
Sinus erin with 1st degree block Hr Stable, now staying > 60s (there was few episodes in 40s earlier the admission) TSh ok 
  
Solitary kidney Monitor Cr 
  
PAD, s/p left leg stent Continue ASA  Follows with Dr Amin Fort 
Code Status:   DNR  She said she would not want to be kept alive on machines or receive CPR in the event of an arrest.  We will honor her wishes and will make her a DNR. 
  
Surrogate Decision Maker:  Son 
  
DVT Prophylaxis: Lovenox GI Prophylaxis: not indicated 
  
Baseline: . Lives alone.    
                       
  
Subjective: Pt seen and examined at bedside. Started to feel better, breathing better. Overnight events d/w RN  
CHIEF COMPLAINT: f/u \"SOB\" 
  
Review of Systems: 
Symptom Y/N Comments  Symptom Y/N Comments Fever/Chills n   Chest Pain n   
Poor Appetite    Edema Cough y   Abdominal Pain n   
Sputum    Joint Pain SOB/GODINEZ n   Pruritis/Rash Nausea/vomit    Tolerating PT/OT Diarrhea    Tolerating Diet y Constipation    Other y Dizziness / lightheadedness Could NOT obtain due to:   
 
Objective: VITALS:  
Last 24hrs VS reviewed since prior progress note.  Most recent are: 
Patient Vitals for the past 24 hrs: 
 Temp Pulse Resp BP SpO2  
07/30/18 1342 - - - - 95 % 07/30/18 1121 97.2 °F (36.2 °C) 73 19 161/60 97 % 07/30/18 0815 - - - - 96 % 07/30/18 0757 - - - - 93 % 07/30/18 0734 98.3 °F (36.8 °C) 70 19 151/74 96 % 07/30/18 0428 97.1 °F (36.2 °C) 69 19 161/74 96 % 07/30/18 0109 97.9 °F (36.6 °C) 66 17 141/67 95 %  
07/29/18 2026 98.4 °F (36.9 °C) 70 20 142/54 96 %  
07/29/18 2021 - - - - 97 %  
07/29/18 1603 98.6 °F (37 °C) 75 20 139/77 94 % Intake/Output Summary (Last 24 hours) at 07/30/18 1507 Last data filed at 07/30/18 1309 Gross per 24 hour Intake              360 ml Output             1800 ml Net            -1440 ml PHYSICAL EXAM: 
General: WD, WN. Alert, cooperative, no acute distress   
EENT:  EOMI. Anicteric sclerae. MMM Resp:  CTA bilaterally, no wheezing or rales. No accessory muscle use CV:  Regular  rhythm,  No edema GI:  Soft, Non distended, Non tender.  +Bowel sounds Neurologic:  Alert and oriented X 3, normal speech, Psych:   Good insight. Not anxious nor agitated Skin:  No rashes. No jaundice Reviewed most current lab test results and cultures  YES Reviewed most current radiology test results   YES Review and summation of old records today    NO Reviewed patient's current orders and MAR    YES 
PMH/SH reviewed - no change compared to H&P 
________________________________________________________________________ Care Plan discussed with: 
  Comments Patient y Family RN y   
Care Manager Consultant Multidiciplinary team rounds were held today with , nursing, pharmacist and clinical coordinator. Patient's plan of care was discussed; medications were reviewed and discharge planning was addressed. ________________________________________________________________________ Total NON critical care TIME:  25 Minutes Total CRITICAL CARE TIME Spent:   Minutes non procedure based Comments >50% of visit spent in counseling and coordination of care    
________________________________________________________________________ Anders Whitley MD  
 
Procedures: see electronic medical records for all procedures/Xrays and details which were not copied into this note but were reviewed prior to creation of Plan. LABS: 
I reviewed today's most current labs and imaging studies. Pertinent labs include: No results for input(s): WBC, HGB, HCT, PLT, HGBEXT, HCTEXT, PLTEXT, HGBEXT, HCTEXT, PLTEXT in the last 72 hours. Recent Labs  
   07/30/18 
 6802  07/29/18 
 2240  07/29/18 
 0342  07/28/18 
 0774 NA  128*  124*  127*  128* K  4.5  4.9  4.4  4.6 CL  95*  91*  94*  98  
CO2  24  23  24  23 GLU  125*  116*  128*  129* BUN  33*  35*  17  12 CREA  1.01  1.10*  0.89  0.78 CA  9.2  9.4  9.4  9.0 MG   --    --    --   2.0 PHOS  3.5   --    --    --   
ALB  3.7   --    --    --   
 
 
Signed: Anders Whitley MD

## 2018-07-30 NOTE — PROGRESS NOTES
ADULT PROTOCOL: JET AEROSOL  REASSESSMENT Patient  Teagan Kang     80 y.o.   female     7/30/2018  10:10 AM 
 
Breath Sounds Pre Procedure: Right Breath Sounds: Diminished, Expiratory wheezing Left Breath Sounds: Diminished, Expiratory wheezing Breath Sounds Post Procedure: Right Breath Sounds: Diminished, Expiratory wheezing Left Breath Sounds: Diminished, Expiratory wheezing Breathing pattern: Pre procedure Breathing Pattern: Regular Post procedure Breathing Pattern: Regular Heart Rate: Pre procedure Pulse: 75 Post procedure Pulse: 68 Resp Rate: Pre procedure Respirations: 16 Post procedure Respirations: 16 
 
     
 
Cough: Pre procedure Cough: Productive Post procedure Cough: Non-productive Sputum: Pre procedure Sputum amount: Moderate Sputum color/odor: Clear Sputum consistency: Thick Oxygen: O2 Device: Nasal cannula   Flow rate (L/min) 2 lpm 
   Changed: NO/ baseline 2 lpm 
 
SpO2: Pre procedure SpO2: 93 %   without oxygen Post procedure SpO2: 96 %  with oxygen Nebulizer Therapy: Current medications Aerosolized Medications: DuoNeb Changed: NO Smoking History: former smoker Problem List:  
Patient Active Problem List  
Diagnosis Code  COPD (chronic obstructive pulmonary disease) ([facility]) J44.9  PVD (peripheral vascular disease) ([facility]) I73.9  Seasonal allergic rhinitis J30.2  Bronchitis, acute J20.9  COPD exacerbation ([facility] J44.1  Acute renal failure (ARF) ([facility]) N17.9  Acute and chronic respiratory failure with hypoxia ([facility]) J96.21  
 Sepsis ([facility]) A41.9  Sinus bradycardia R00.1  LBBB (left bundle branch block) I44.7 Respiratory Therapist: Rosana Garnica RT

## 2018-07-31 LAB
ALBUMIN SERPL-MCNC: 3.6 G/DL (ref 3.5–5)
ANION GAP SERPL CALC-SCNC: 7 MMOL/L (ref 5–15)
BUN SERPL-MCNC: 37 MG/DL (ref 6–20)
BUN/CREAT SERPL: 34 (ref 12–20)
CALCIUM SERPL-MCNC: 8.9 MG/DL (ref 8.5–10.1)
CHLORIDE SERPL-SCNC: 99 MMOL/L (ref 97–108)
CO2 SERPL-SCNC: 26 MMOL/L (ref 21–32)
CREAT SERPL-MCNC: 1.08 MG/DL (ref 0.55–1.02)
GLUCOSE SERPL-MCNC: 89 MG/DL (ref 65–100)
OSMOLALITY UR: 248 MOSM/KG H2O
PHOSPHATE SERPL-MCNC: 3.2 MG/DL (ref 2.6–4.7)
POTASSIUM SERPL-SCNC: 4.5 MMOL/L (ref 3.5–5.1)
POTASSIUM UR-SCNC: 17 MMOL/L
SODIUM SERPL-SCNC: 132 MMOL/L (ref 136–145)
SODIUM UR-SCNC: 31 MMOL/L

## 2018-07-31 PROCEDURE — G8987 SELF CARE CURRENT STATUS: HCPCS

## 2018-07-31 PROCEDURE — 74011250637 HC RX REV CODE- 250/637: Performed by: INTERNAL MEDICINE

## 2018-07-31 PROCEDURE — G8979 MOBILITY GOAL STATUS: HCPCS

## 2018-07-31 PROCEDURE — 74011636637 HC RX REV CODE- 636/637: Performed by: INTERNAL MEDICINE

## 2018-07-31 PROCEDURE — G8978 MOBILITY CURRENT STATUS: HCPCS

## 2018-07-31 PROCEDURE — 80069 RENAL FUNCTION PANEL: CPT | Performed by: INTERNAL MEDICINE

## 2018-07-31 PROCEDURE — 77030027138 HC INCENT SPIROMETER -A

## 2018-07-31 PROCEDURE — 77010033678 HC OXYGEN DAILY

## 2018-07-31 PROCEDURE — 94640 AIRWAY INHALATION TREATMENT: CPT

## 2018-07-31 PROCEDURE — 65660000000 HC RM CCU STEPDOWN

## 2018-07-31 PROCEDURE — 74011000250 HC RX REV CODE- 250: Performed by: INTERNAL MEDICINE

## 2018-07-31 PROCEDURE — 84133 ASSAY OF URINE POTASSIUM: CPT | Performed by: INTERNAL MEDICINE

## 2018-07-31 PROCEDURE — 97165 OT EVAL LOW COMPLEX 30 MIN: CPT

## 2018-07-31 PROCEDURE — G8988 SELF CARE GOAL STATUS: HCPCS

## 2018-07-31 PROCEDURE — 94760 N-INVAS EAR/PLS OXIMETRY 1: CPT

## 2018-07-31 PROCEDURE — 36415 COLL VENOUS BLD VENIPUNCTURE: CPT | Performed by: INTERNAL MEDICINE

## 2018-07-31 PROCEDURE — 74011250636 HC RX REV CODE- 250/636: Performed by: INTERNAL MEDICINE

## 2018-07-31 PROCEDURE — 83935 ASSAY OF URINE OSMOLALITY: CPT | Performed by: INTERNAL MEDICINE

## 2018-07-31 PROCEDURE — 97162 PT EVAL MOD COMPLEX 30 MIN: CPT

## 2018-07-31 PROCEDURE — 97116 GAIT TRAINING THERAPY: CPT

## 2018-07-31 PROCEDURE — 97535 SELF CARE MNGMENT TRAINING: CPT

## 2018-07-31 PROCEDURE — 84300 ASSAY OF URINE SODIUM: CPT | Performed by: INTERNAL MEDICINE

## 2018-07-31 RX ADMIN — IPRATROPIUM BROMIDE AND ALBUTEROL SULFATE 3 ML: .5; 3 SOLUTION RESPIRATORY (INHALATION) at 19:51

## 2018-07-31 RX ADMIN — ALUMINUM HYDROXIDE, MAGNESIUM HYDROXIDE, AND SIMETHICONE 30 ML: 200; 200; 20 SUSPENSION ORAL at 20:47

## 2018-07-31 RX ADMIN — Medication 10 ML: at 20:47

## 2018-07-31 RX ADMIN — AMLODIPINE BESYLATE 2.5 MG: 5 TABLET ORAL at 08:48

## 2018-07-31 RX ADMIN — IPRATROPIUM BROMIDE AND ALBUTEROL SULFATE 3 ML: .5; 3 SOLUTION RESPIRATORY (INHALATION) at 07:31

## 2018-07-31 RX ADMIN — ASPIRIN 81 MG: 81 TABLET, DELAYED RELEASE ORAL at 08:49

## 2018-07-31 RX ADMIN — UMECLIDINIUM BROMIDE AND VILANTEROL TRIFENATATE 1 PUFF: 62.5; 25 POWDER RESPIRATORY (INHALATION) at 08:48

## 2018-07-31 RX ADMIN — IPRATROPIUM BROMIDE AND ALBUTEROL SULFATE 3 ML: .5; 3 SOLUTION RESPIRATORY (INHALATION) at 13:55

## 2018-07-31 RX ADMIN — Medication 10 ML: at 14:47

## 2018-07-31 RX ADMIN — AZITHROMYCIN 250 MG: 250 TABLET, FILM COATED ORAL at 11:52

## 2018-07-31 RX ADMIN — GUAIFENESIN 600 MG: 600 TABLET, EXTENDED RELEASE ORAL at 18:01

## 2018-07-31 RX ADMIN — ENOXAPARIN SODIUM 40 MG: 100 INJECTION SUBCUTANEOUS at 20:47

## 2018-07-31 RX ADMIN — GUAIFENESIN 600 MG: 600 TABLET, EXTENDED RELEASE ORAL at 08:48

## 2018-07-31 RX ADMIN — PREDNISONE 5 MG: 5 TABLET ORAL at 08:49

## 2018-07-31 NOTE — PROGRESS NOTES
Spiritual Care Assessment/Progress Note [address] 
 
 
NAME: Rosanna Paulino      MRN: [id number] AGE: 80 y.o. SEX: female [facility] Affiliation: [facility] Language: [place] 7/31/2018     Total Time (in minutes): 12 Spiritual Assessment begun in [facility] CARDIOPULMONARY CARE through conversation with: 
  
    [x]Patient        [] Family    [] Friend(s) Reason for Consult: Initial/Spiritual assessment, patient floor Spiritual beliefs: (Please include comment if needed) [x] Identifies with a yaa tradition:     
   [x] Supported by a yaa community:        
   [] Claims no spiritual orientation:       
   [] Seeking spiritual identity:            
   [] Adheres to an individual form of spirituality:       
   [] Not able to assess:                   
 
    
Identified resources for coping:  
   [x] Prayer                           
   [] Music                  [] Guided Imagery [x] Family/friends                 [] Pet visits [] Devotional reading                         [] Unknown 
   [] Other:                                          
 
 
Interventions offered during this visit: (See comments for more details) Patient Interventions: Affirmation of emotions/emotional suffering, Catharsis/review of pertinent events in supportive environment, Coping skills reviewed/reinforced, Iconic (affirming the presence of God/Higher Power), Initial/Spiritual assessment, patient floor, Life review/legacy, Prayer (assurance of), Normalization of emotional/spiritual concerns Plan of Care: 
 
 [] Support spiritual and/or cultural needs  
 [] Support AMD and/or advance care planning process    
 [] Support grieving process 
 [] Coordinate Rites and/or Rituals  
 [] Coordination with community clergy  [] No spiritual needs identified at this time 
 [] Detailed Plan of Care below (See Comments)  [] Make referral to Music Therapy 
[] Make referral to Pet Therapy [] Make referral to Addiction services 
[] Make referral to [facility] 
[] Make referral to Spiritual Care Partner 
[] No future visits requested       
[x] Follow up visits as needed Comments: Initial visit with patient on [facility]. Introduced self and role of chaplains in the hospital. Provided active listening as patient shared events leading to her hospitalization and concerns with new complications that have arisen and may require rehab following discharge. Patient engaged in life review where patient shared that she feels extremely blessed to have enjoyed good health almost her entire life as well in addition to being supported by friends and family. Patient indicated she attends a [employer] and is checked on by members of the [employer] and her . Patient has a wonderful sense of humor that she attributes to her mother whom she shared had a very difficult life and yet always found laughter and herb in things. Affirmed patient's similar characteristics and concluded visit with spoken blessing for continued healing. Chaplains remain available as needed and desired. ANGELLA [employer].  Paging Service [phone])

## 2018-07-31 NOTE — PROGRESS NOTES
Nephrology Progress Note Reji Chawla  
 
www. [facility]                  Phone - [phone] Patient: Fransico Diop Admit Date: 7/27/2018 YOB: 1931 Date- 7/31/2018 CC: Follow up for hyponatremia Subjective: Interval History:  
-  Na improved to 132 Cr. Stable She feels better 
bp improved Her serum osmo is normal ???? Likely due to high bun due to steroids Urine osmo is high suggest excess ADH state ROS- 
No C/O of chest pain,SOB, abdominal pain,fever,chills Assessment & Plan:  
- HYPONATREMIA likely due to SIADH- copd NO SAMSCA today Follow bmp in am 
Fluid restriction 1808-5482 ml per day ( 24 hour after samsca administration ) 
 
- copd On steroids 
 
- htn Continue norvas Care Plan discussed with: pt 
Review of Systems: Pertinent items are noted in HPI. Physical Exam:  
General: NAD Resp:  Clear lungs, no wheezing or rales. CV:  Regular  rhythm,  S1,S2 GI:  Soft, Non distended, Non tender. Skin:  no rashes or ulcers. No jaundice Neurologic:  Alert and oriented X 3. Non Focal 
 
 
 
Objective:  
Patient Vitals for the past 24 hrs: 
 Temp Pulse Resp BP SpO2  
07/31/18 0733 - - - - 94 % 07/31/18 0709 98.2 °F (36.8 °C) 60 18 123/53 98 % 07/31/18 0355 97.5 °F (36.4 °C) 69 20 157/65 96 % 07/30/18 2307 97.7 °F (36.5 °C) 70 16 154/65 98 % 07/30/18 1924 - - - - 96 % 07/30/18 1850 97.7 °F (36.5 °C) 77 19 154/86 97 % 07/30/18 1533 97.4 °F (36.3 °C) 77 19 145/70 96 % 07/30/18 1342 - - - - 95 % 07/30/18 1121 97.2 °F (36.2 °C) 73 19 161/60 97 % Last 3 Recorded Weights in this Encounter  
 07/29/18 6279 07/30/18 1828 07/31/18 9406 Weight: 62.6 kg (138 lb 0.1 oz) 63 kg (138 lb 14.2 oz) 62.3 kg (137 lb 5.6 oz) 07/31 0701 - 07/31 1900 In: -  
Out: 700 [Urine:700] 07/29 1901 - 07/31 0700 In: 360 [P.O.:360] Out: 2100 [Urine:2100] Chart reviewed. Pertinent Notes reviewed. Medications list  reviewed Current Facility-Administered Medications Medication  alum-mag hydroxide-simeth (MYLANTA) oral suspension 30 mL  predniSONE (DELTASONE) tablet 5 mg  umeclidinium-vilanterol (ANORO ELLIPTA) 62.5 mcg- 25 mcg/inhalation  aspirin delayed-release tablet 81 mg  
 albuterol-ipratropium (DUO-NEB) 2.5 MG-0.5 MG/3 ML  
 amLODIPine (NORVASC) tablet 2.5 mg  
 hydrALAZINE (APRESOLINE) 20 mg/mL injection 10 mg  
 butalbital-acetaminophen-caffeine (FIORICET, ESGIC) -40 mg per tablet 1 Tab  sodium chloride (NS) flush 5-10 mL  sodium chloride (NS) flush 5-10 mL  acetaminophen (TYLENOL) tablet 650 mg  
 ondansetron (ZOFRAN) injection 4 mg  albuterol-ipratropium (DUO-NEB) 2.5 MG-0.5 MG/3 ML  
 guaiFENesin ER (MUCINEX) tablet 600 mg  
 enoxaparin (LOVENOX) injection 40 mg  
 azithromycin (ZITHROMAX) tablet 250 mg Data Review : 
Recent Labs  
   07/31/18 
 0355  07/30/18 
 0439  07/29/18 
 2240  07/29/18 
 0342 NA  132*  128*  124*  127* K  4.5  4.5  4.9  4.4 GLU  89  125*  116*  128* BUN  37*  33*  35*  17  
CREA  1.08*  1.01  1.10*  0.89 CA  8.9  9.2  9.4  9.4 PHOS  3.2  3.5   --    -- No results for input(s): FE, TIBC, PSAT, FERR in the last 72 hours. Lab Results Component Value Date/Time Sodium urine, random 31 07/31/2018 03:55 AM  
 
 
Kathy Adams MD 
 
                         [facility] 
                               [email or web address] [facility] [address] [place], [address] [place] Phone - [phone] Fax - [phone]  [address], [address] [facility] Phone - [phone] Fax - [phone]

## 2018-07-31 NOTE — PROGRESS NOTES
Problem: Mobility Impaired (Adult and Pediatric) Goal: *Acute Goals and Plan of Care (Insert Text) Physical Therapy Goals Initiated 7/31/2018 1. Patient will move from supine to sit and sit to supine , scoot up and down and roll side to side in bed with independence within 7 day(s). 2.  Patient will transfer from bed to chair and chair to bed with independence using the least restrictive device within 7 day(s). 3.  Patient will perform sit to stand with independence within 7 day(s). 4.  Patient will ambulate with independence for 300 feet with the least restrictive device within 7 day(s). 5.  Patient will ascend/descend 4 stairs with 1 handrail(s) with modified independence within 7 day(s). physical Therapy EVALUATION Patient: Nakul Tomas (80 y.o. female) Date: 7/31/2018 Primary Diagnosis: COPD exacerbation ([facility] [address] Precautions:  Fall ASSESSMENT : 
Based on the objective data described below, the patient presents with impaired functional mobility secondary to impaired standing balance, impaired gait mechanics, generalized weakness, decreased respiratory status, limited endurance, and decreased activity tolerance following admission for COPD exacerbation. Pt received sitting in bedside chair with caregiver present and agreeable to PT evaluation. Pt cleared by nursing for mobility. VSS on 2 L/min O2. Supplemental O2 weaned to 1 L/min O2 for activity as pt does not wear O2 at baseline and SaO2 96% during initial mobility. Sit<>stand transfers performed with supervision. Exhibited fair standing balance overall, needing intermittent external support for balance. She scored 18/28 on Tinetti balance test indicating high fall risk. She ambulated 70 ft with min A x 1 and without AD, demonstrating slow gait speed, short and shuffled steps, decreased arm swing RAISSA, and decreased step clearance throughout. SaO2 93% on 1 L/min O2 during initial ambulation, however SaO2 89% once returned to chair.  O2 placed back on 2 L/min O2 and educated/encouraged PLB to improve O2 sats >92%. Had pt attempt dynamic standing balance tasks (SLS and picking up object from the ground), however pt unable to complete and needed min A for LOB. Pt was left sitting in bedside chair with all needs met, RN aware, caregiver present, and chair alarm on following therapy session. Pt is an excellent inpatient rehab candidate and can tolerate 3 hours of therapy/day. PT will continue to follow to address mobility impairments as noted above. Recommend with nursing patient to complete as able in order to maintain strength, endurance and independence: OOB to chair 3x/day and walking daily with min/CGA assist and RW support. Thank you for your assistance. Patient will benefit from skilled intervention to address the above impairments. Patients rehabilitation potential is considered to be Excellent Factors which may influence rehabilitation potential include:  
[x]         None noted 
[]         Mental ability/status []         Medical condition 
[]         Home/family situation and support systems 
[]         Safety awareness 
[]         Pain tolerance/management 
[]         Other: PLAN : 
Recommendations and Planned Interventions: 
[x]           Bed Mobility Training             []    Neuromuscular Re-Education 
[x]           Transfer Training                   []    Orthotic/Prosthetic Training 
[x]           Gait Training                         []    Modalities [x]           Therapeutic Exercises           []    Edema Management/Control 
[x]           Therapeutic Activities            [x]    Patient and Family Training/Education 
[]           Other (comment): Frequency/Duration: Patient will be followed by physical therapy  5 times a week to address goals. Discharge Recommendations: Inpatient Rehab Further Equipment Recommendations for Discharge: TBD by rehab SUBJECTIVE:  
Patient stated I can't go home like this. OBJECTIVE DATA SUMMARY:  
HISTORY:   
Past Medical History:  
Diagnosis Date  Arthritis   
 generalized  COPD   
 former smoker   
  >60pkyr quit 1/3/11  
 h/o DVT 1955 left leg  ischemic left lower extremitiy pain   
 above knee FemPop 1/3/11  PVD (peripheral vascular disease) ([facility] [address]  Seasonal allergic rhinitis  Single kidney Past Surgical History:  
Procedure Laterality Date [address] excision left breast cyst  
 HX GI  10 yrs ago  
 colonoscopy  HX GYN    
 3 miscarriges  HX GYN    
 1 vaginal birth  HX ORTHOPAEDIC    
 veinography left leg, stent left leg Prior Level of Function/Home Situation: Pt is independent for mobility and ADLs at baseline. Lives alone. Does not work or drive. Denies fall history. Does own cooking, cleaning, laundry, but does not do yard work due to breathing issues. Wears 2 L/min O2 only at night. Personal factors and/or comorbidities impacting plan of care: arthritis; COPD Home Situation Home Environment: Private residence # Steps to Enter: 3 Rails to Enter: Yes Hand Rails : Bilateral 
One/Two Story Residence: One story Living Alone: No 
Support Systems: Family member(s), Friends \ neighbors, Child(genna) Patient Expects to be Discharged to[de-identified] Private residence Current DME Used/Available at Home: Cane, straight, Walker, rolling EXAMINATION/PRESENTATION/DECISION MAKING:  
Critical Behavior: 
Neurologic State: Alert Cognition: Appropriate for age attention/concentration, Appropriate decision making, Appropriate safety awareness Hearing: Auditory Auditory Impairment: Hard of hearing, bilateral 
Skin:  Intact Edema: None Range Of Motion: 
AROM: Within functional limits PROM: Within functional limits Strength:   
Strength: Generally decreased, functional 
  
  
  
  
  
  
Tone & Sensation:  
Tone: Normal 
  
  
  
  
Sensation: Impaired (LLE) Coordination: 
Coordination: Generally decreased, functional 
Vision:  
  
Functional Mobility: 
Bed Mobility: 
  
  
  
Scooting: Supervision Transfers: 
Sit to Stand: Supervision Stand to Sit: Supervision Balance:  
Sitting: Intact Standing: Impaired; Without support Standing - Static: Fair Standing - Dynamic : Fair Ambulation/Gait Training: 
Distance (ft): 70 Feet (ft) Assistive Device: Gait belt Ambulation - Level of Assistance: Minimal assistance; Additional time Gait Description (WDL): Exceptions to [facility] Gait Abnormalities: Decreased step clearance;Shuffling gait;Trunk sway increased; Altered arm swing Base of Support: Narrowed Speed/Paige: Pace decreased (<100 feet/min); Shuffled Step Length: Left shortened;Right shortened Functional Measure: 
Tinetti test: 
 
Sitting Balance: 1 Arises: 1 Attempts to Rise: 2 Immediate Standing Balance: 1 Standing Balance: 1 Nudged: 1 Eyes Closed: 1 Turn 360 Degrees - Continuous/Discontinuous: 0 Turn 360 Degrees - Steady/Unsteady: 0 Sitting Down: 1 Balance Score: 9 Indication of Gait: 1 
R Step Length/Height: 0 
L Step Length/Height: 0 
R Foot Clearance: 1 L Foot Clearance: 1 Step Symmetry: 1 Step Continuity: 1 Path: 1 Trunk: 2 Walking Time: 1 Gait Score: 9 Total Score: 18 Tinetti Test and G-code impairment scale: 
Percentage of Impairment CH 
 
0% 
 CI 
 
1-19% CJ 
 
20-39% CK 
 
40-59% CL 
 
60-79% CM 
 
80-99% CN  
 
100% Tinetti Score 0-28 28 23-27 17-22 12-16 6-11 1-5 0 Tinetti Tool Score Risk of Falls 
<19 = High Fall Risk 19-24 = Moderate Fall Risk 25-28 = Low Fall Risk Tinetti ME. Performance-Oriented Assessment of Mobility Problems in Elderly Patients. [address]; [id number]. (Scoring Description: PT Bulletin Feb. 10, 1993) Older adults: Jude Rebolledo et al, 2009; n = [address] elderly evaluated with ABC, MAGDI, ADL, and IADL) · Mean MAGDI score for males aged 69-68 years = 26.21(3.40) · Mean MAGDI score for females age 69-68 years = 25.16(4.30) · Mean MAGDI score for males over 80 years = 23.29(6.02) · Mean MAGDI score for females over 80 years = 17.20(8.32) G codes: In compliance with CMSs Claims Based Outcome Reporting, the following G-code set was chosen for this patient based on their primary functional limitation being treated: The outcome measure chosen to determine the severity of the functional limitation was the Tinetti with a score of 18/28 which was correlated with the impairment scale. ? Mobility - Walking and Moving Around:  
  - CURRENT STATUS: CJ - 20%-39% impaired, limited or restricted  - GOAL STATUS: CH - 0% impaired, limited or restricted  - D/C STATUS:  ---------------To be determined--------------- Physical Therapy Evaluation Charge Determination History Examination Presentation Decision-Making MEDIUM  Complexity : 1-2 comorbidities / personal factors will impact the outcome/ POC  HIGH Complexity : 4+ Standardized tests and measures addressing body structure, function, activity limitation and / or participation in recreation  MEDIUM Complexity : Evolving with changing characteristics  Other outcome measures Tinetti  MEDIUM Based on the above components, the patient evaluation is determined to be of the following complexity level: MEDIUM Pain: 
Pain Scale 1: Numeric (0 - 10) Pain Intensity 1: 0 Activity Tolerance:  
Fair - increased SOB with activity on 1 L/min O2; SaO2 89-93% during activity on 1 L/min O2; increased fatigue with gait Please refer to the flowsheet for vital signs taken during this treatment. After treatment:  
[x]         Patient left in no apparent distress sitting up in chair 
[]         Patient left in no apparent distress in bed 
[x]         Call bell left within reach [x]         Nursing notified 
[x]         Caregiver present [x]         Bed alarm activated COMMUNICATION/EDUCATION:  
The patients plan of care was discussed with: Physical Therapist, Occupational Therapist, Registered Nurse and . [x]         Fall prevention education was provided and the patient/caregiver indicated understanding. [x]         Patient/family have participated as able in goal setting and plan of care. [x]         Patient/family agree to work toward stated goals and plan of care. []         Patient understands intent and goals of therapy, but is neutral about his/her participation. []         Patient is unable to participate in goal setting and plan of care. Thank you for this referral. 
Emiliana Leiva, PT, DPT Time Calculation: 21 mins

## 2018-07-31 NOTE — PROGRESS NOTES
Bedside shift change report given to Moshe Magaña RN (oncoming nurse) by Mirela Casey RN (offgoing nurse). Report included the following information SBAR.

## 2018-07-31 NOTE — PROGRESS NOTES
Problem: Self Care Deficits Care Plan (Adult) Goal: *Acute Goals and Plan of Care (Insert Text) Occupational Therapy Goals Initiated 7/31/2018 1. Patient will perform grooming at the sink with supervision/set-up within 7 day(s). 2.  Patient will perform bathing at sink with minimal assistance/contact guard assist within 7 day(s). 3.  Patient will perform lower body dressing with minimal assistance/contact guard assist within 7 day(s). 4.  Patient will perform toilet transfers with supervision/set-up within 7 day(s). 5.  Patient will perform all aspects of toileting with independence within 7 day(s). 6.  Patient will participate in upper extremity therapeutic exercise/activities with independence for 5 minutes within 7 day(s). 7.  Patient will utilize energy conservation techniques during functional activities with verbal cues within 7 day(s). Occupational Therapy EVALUATION Patient: Rebecca Grant (80 y.o. female) Date: 7/31/2018 Primary Diagnosis: COPD exacerbation ([facility] [address] Precautions:   Fall ASSESSMENT : 
Based on the objective data described below, the patient presents with generalized weakness, decreased endurance, strength, functional mobility, and ADLs. Pt was on 2 liter of NC at night PTA and was living at home alone and independent in all areas and was not using AD. Pt was cleared to be seen for eval and all VSS and pt was on RA and her O2% was 90% at rest and once she transferred to the toilet her O2% dropped to 87%. Pt was put back on her O2 at 2 liters. Pt did well on the 2 liters and her O2% remained in the 90's. Pt was unsteady on her feet with no AD and was holding to the walls and therapy and did much better with RW and she liked using the RW. Pt has a dry cough and states that her chest feels tight at times and OT issued pt incentive inspirometer and educated pt on use.   Pt is very weak and at discharge recommend that she have In pt rehab in order to reach her max functional level. Patient will benefit from skilled intervention to address the above impairments. Patients rehabilitation potential is considered to be Good Factors which may influence rehabilitation potential include:  
[x]             None noted []             Mental ability/status []             Medical condition []             Home/family situation and support systems []             Safety awareness []             Pain tolerance/management 
[]             Other: PLAN : 
Recommendations and Planned Interventions: 
[x]               Self Care Training                  []        Therapeutic Activities [x]               Functional Mobility Training    []        Cognitive Retraining 
[x]               Therapeutic Exercises           [x]        Endurance Activities []               Balance Training                   []        Neuromuscular Re-Education []               Visual/Perceptual Training     [x]   Home Safety Training 
[x]               Patient Education                 [x]        Family Training/Education []               Other (comment): Frequency/Duration: Patient will be followed by occupational therapy 4 times a week to address goals. Discharge Recommendations: Inpatient Rehab Further Equipment Recommendations for Discharge: tbd SUBJECTIVE:  
Patient stated I dont think I can go home alone right now. I am so weak.  OBJECTIVE DATA SUMMARY:  
HISTORY:  
Past Medical History:  
Diagnosis Date  Arthritis   
 generalized  COPD   
 former smoker   
  >60pkyr quit 1/3/11  
 h/o DVT 1955 left leg  ischemic left lower extremitiy pain   
 above knee FemPop 1/3/11  PVD (peripheral vascular disease) ([facility] [address]  Seasonal allergic rhinitis  Single kidney Past Surgical History:  
Procedure Laterality Date [address] excision left breast cyst  
 HX GI  10 yrs ago  
 colonoscopy  HX GYN    
 3 miscarriges  HX GYN 1 vaginal birth  HX ORTHOPAEDIC    
 veinography left leg, stent left leg Prior Level of Function/Environment/Context: pt lives alone and was independent in all areas prior and did not use AD and only used O2 at night. Expanded or extensive additional review of patient history:  
 
Home Situation Home Environment: Private residence # Steps to Enter: 3 Rails to Enter: Yes Hand Rails : Bilateral 
One/Two Story Residence: One story Living Alone: No 
Support Systems: Family member(s), Friends \ neighbors, Child(genna) Patient Expects to be Discharged to[de-identified] Private residence Current DME Used/Available at Home: Cane, straight, Walker, rolling Hand dominance: Right EXAMINATION OF PERFORMANCE DEFICITS: 
Cognitive/Behavioral Status: 
Neurologic State: Alert Cognition: Appropriate for age attention/concentration; Appropriate decision making; Appropriate safety awareness Skin: in good health for her age Edema: none noted Hearing: Auditory Auditory Impairment: Hard of hearing, bilateral 
 
Vision/Perceptual:   
    
    
   intact Range of Motion: 
 
AROM: Within functional limits PROM: Within functional limits Strength: 
 
Strength: Generally decreased, functional 
  
  
  
  
 
Coordination: 
Coordination: Generally decreased, functional 
    
  
 
Tone & Sensation: 
 
Tone: Normal 
Sensation: Impaired (LLE) Balance: 
Sitting: Intact Standing: Impaired; Without support Standing - Static: Fair Standing - Dynamic : Fair Functional Mobility and Transfers for ADLs: 
Bed Mobility: 
Scooting: Supervision Transfers: 
Sit to Stand: Supervision Stand to Sit: Supervision ADL Assessment: 
Feeding: Independent Oral Facial Hygiene/Grooming: Setup Bathing: Maximum assistance;Minimum assistance Upper Body Dressing: Setup Lower Body Dressing: Maximum assistance;Minimum assistance Toileting: Minimum assistance;Contact guard assistance Functional Measure: 
Barthel Index: 
 
Bathin Bladder: 10 Bowels: 10 
Groomin Dressin Feeding: 10 Mobility: 5 Stairs: 0 Toilet Use: 5 Transfer (Bed to Chair and Back): 10 Total: 60 Barthel and G-code impairment scale: 
Percentage of impairment CH 
0% CI 
1-19% CJ 
20-39% CK 
40-59% CL 
60-79% CM 
80-99% CN 
100% Barthel Score 0-100 100 99-80 79-60 59-40 20-39 1-19 
 0 Barthel Score 0-20 20 17-19 13-16 9-12 5-8 1-4 0 The Barthel ADL Index: Guidelines 1. The index should be used as a record of what a patient does, not as a record of what a patient could do. 2. The main aim is to establish degree of independence from any help, physical or verbal, however minor and for whatever reason. 3. The need for supervision renders the patient not independent. 4. A patient's performance should be established using the best available evidence. Asking the patient, friends/relatives and nurses are the usual sources, but direct observation and common sense are also important. However direct testing is not needed. 5. Usually the patient's performance over the preceding 24-48 hours is important, but occasionally longer periods will be relevant. 6. Middle categories imply that the patient supplies over 50 per cent of the effort. 7. Use of aids to be independent is allowed. Delfino Duffy., Barthel, D.W. ([id number]). Functional evaluation: the Barthel Index. [address] [facility] (14)2. Adelfo Wilcox casie Shannon, LAMJ.MChelsey MODI.Lisa., [place], [address] [place] [address] (). Measuring the change indisability after inpatient rehabilitation; comparison of the responsiveness of the Barthel Index and Functional [place] Measure. Journal of Neurology, Neurosurgery, and Psychiatry, 66(4), 850-973. Suleiman Rivera, N.ODILON.A, JAGDISH Andersen, & Vivian Mariano MAngelinaA. (2004.) Assessment of post-stroke quality of life in cost-effectiveness studies:  The usefulness of the Barthel Index and the EuroQoL-5D. [facility], 13, 800-13 G codes: In compliance with CMSs Claims Based Outcome Reporting, the following G-code set was chosen for this patient based on their primary functional limitation being treated: The outcome measure chosen to determine the severity of the functional limitation was the Barthel with a score of 60/100 which was correlated with the impairment scale. ? Self Care:  
  - CURRENT STATUS: CJ - 20%-39% impaired, limited or restricted  - GOAL STATUS: CI - 1%-19% impaired, limited or restricted  - D/C STATUS:  ---------------To be determined--------------- Occupational Therapy Evaluation Charge Determination History Examination Decision-Making MEDIUM Complexity : Expanded review of history including physical, cognitive and psychosocial  history  LOW Complexity : 1-3 performance deficits relating to physical, cognitive , or psychosocial skils that result in activity limitations and / or participation restrictions  LOW Complexity : No comorbidities that affect functional and no verbal or physical assistance needed to complete eval tasks Based on the above components, the patient evaluation is determined to be of the following complexity level: LOW Pain: 
Pain Scale 1: Numeric (0 - 10) Pain Intensity 1: 0 Activity Tolerance:  
vss/fair Please refer to the flowsheet for vital signs taken during this treatment. After treatment:  
[x] Patient left in no apparent distress sitting up in chair 
[] Patient left in no apparent distress in bed 
[x] Call bell left within reach [x] Nursing notified 
[] Caregiver present [x] Bed alarm activated COMMUNICATION/EDUCATION:  
The patients plan of care was discussed with: Physical Therapist and Registered Nurse. [x] Home safety education was provided and the patient/caregiver indicated understanding.  
[x] Patient/family have participated as able in goal setting and plan of care. 
[x] Patient/family agree to work toward stated goals and plan of care. [] Patient understands intent and goals of therapy, but is neutral about his/her participation. [] Patient is unable to participate in goal setting and plan of care. This patients plan of care is appropriate for delegation to ALLI. Thank you for this referral. 
Tejal Livingston, OT Time Calculation: 20 mins

## 2018-07-31 NOTE — PROGRESS NOTES
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 
resolved CT head and MRI negative Possible COPD related COPD exacerbation Chronic respiratory failure on 2LNC oxygen CXR negative 
continue steroids to 5mg Daily (she doesn't want higher dose as making her aggitated, claims she does better 1 month of 5mg of steroids) Continue empiric oral zithromax Mucolytics, Nebs Continue O2 Elevated BP No history of HTN, pt reported history of borderline hypotension I see she saw last year but could find vital 
May developed HTN within past year 
stated low dose norvasc Continue PRN IV hydralazine BP stbale 
  
Sinus erin with 1st degree block Hr Stable, now staying > 60s (there was few episodes in 40s earlier the admission) TSh ok Appreciate cardiology input 
  
Solitary kidney Monitor Cr 
  
PAD, s/p left leg stent Continue ASA  Follows with Dr Jackie Mitchell 
Code Status:   DNR  She said she would not want to be kept alive on machines or receive CPR in the event of an arrest.  We will honor her wishes and will make her a DNR. 
  
Surrogate Decision Maker:  Son 
  
DVT Prophylaxis: Lovenox GI Prophylaxis: not indicated 
  
Baseline: . Lives alone.    
                       
  
Subjective: Pt seen and examined at bedside. Started to feel better, breathing better. Overnight events d/w RN  
CHIEF COMPLAINT: f/u \"SOB\" 
  
Review of Systems: 
Symptom Y/N Comments  Symptom Y/N Comments Fever/Chills n   Chest Pain n   
Poor Appetite    Edema Cough y   Abdominal Pain n   
Sputum    Joint Pain SOB/GODINEZ n   Pruritis/Rash Nausea/vomit    Tolerating PT/OT Diarrhea    Tolerating Diet y Constipation    Other Could NOT obtain due to:   
 
Objective: VITALS:  
Last 24hrs VS reviewed since prior progress note. Most recent are: 
Patient Vitals for the past 24 hrs: 
 Temp Pulse Resp BP SpO2  
07/31/18 1443 98.6 °F (37 °C) 80 18 111/60 93 % 07/31/18 1355 - - - - 91 %  
07/31/18 1037 98 °F (36.7 °C) 65 16 108/69 97 % 07/31/18 0733 - - - - 94 % 07/31/18 0709 98.2 °F (36.8 °C) 60 18 123/53 98 % 07/31/18 0355 97.5 °F (36.4 °C) 69 20 157/65 96 % 07/30/18 2307 97.7 °F (36.5 °C) 70 16 154/65 98 % 07/30/18 1924 - - - - 96 % 07/30/18 1850 97.7 °F (36.5 °C) 77 19 154/86 97 % Intake/Output Summary (Last 24 hours) at 07/31/18 1841 Last data filed at 07/31/18 9290 Gross per 24 hour Intake              360 ml Output             1200 ml Net             -840 ml PHYSICAL EXAM: 
General: WD, WN. Alert, cooperative, no acute distress   
EENT:  EOMI. Anicteric sclerae. MMM Resp:  CTA bilaterally, no wheezing or rales. No accessory muscle use CV:  Regular  rhythm,  No edema GI:  Soft, Non distended, Non tender.  +Bowel sounds Neurologic:  Alert and oriented X 3, normal speech, Psych:   Good insight. Not anxious nor agitated Skin:  No rashes. No jaundice Reviewed most current lab test results and cultures  YES Reviewed most current radiology test results   YES Review and summation of old records today    NO Reviewed patient's current orders and MAR    YES 
PMH/SH reviewed - no change compared to H&P 
________________________________________________________________________ Care Plan discussed with: 
  Comments Patient y Family RN y   
Care Manager Consultant Multidiciplinary team rounds were held today with , nursing, pharmacist and clinical coordinator. Patient's plan of care was discussed; medications were reviewed and discharge planning was addressed. ________________________________________________________________________ Total NON critical care TIME:  25 Minutes Total CRITICAL CARE TIME Spent:   Minutes non procedure based Comments >50% of visit spent in counseling and coordination of care    
________________________________________________________________________ Terri Nam MD  
 
Procedures: see electronic medical records for all procedures/Xrays and details which were not copied into this note but were reviewed prior to creation of Plan. LABS: 
I reviewed today's most current labs and imaging studies. Pertinent labs include: No results for input(s): WBC, HGB, HCT, PLT, HGBEXT, HCTEXT, PLTEXT, HGBEXT, HCTEXT, PLTEXT in the last 72 hours. Recent Labs  
   07/31/18 
 0355  07/30/18 
 0439  07/29/18 
 2240 NA  132*  128*  124* K  4.5  4.5  4.9 CL  99  95*  91* CO2  26  24  23 GLU  89  125*  116* BUN  37*  33*  35* CREA  1.08*  1.01  1.10* CA  8.9  9.2  9.4 PHOS  3.2  3.5   --   
ALB  3.6  3.7   --   
 
 
Signed: Terri Nam MD

## 2018-07-31 NOTE — PROGRESS NOTES
ADULT PROTOCOL: JET AEROSOL  REASSESSMENT Patient  Bakari Yan     80 y.o.   female     7/31/2018  9:01 AM 
 
Breath Sounds Pre Procedure: Right Breath Sounds: Clear Left Breath Sounds: Clear Breath Sounds Post Procedure: Right Breath Sounds: Clear Left Breath Sounds: Clear Breathing pattern: Pre procedure Breathing Pattern: Regular Post procedure Breathing Pattern: Regular Heart Rate: Pre procedure Pulse: 66 Post procedure Pulse: 85 Resp Rate: Pre procedure Respirations: 16 Post procedure Respirations: 16 
 
 
Oxygen: O2 Device: Nasal cannula   Flow rate (L/min) 2 Changed: No 
 
 
 
SpO2: Pre procedure SpO2: 94 %    2LPM NC Post procedure SpO2: 93 %  2LPM NC Nebulizer Therapy: Current medications Aerosolized Medications: DuoNeb  TID Changed: No, Pt takes TID at home Smoking History:   
Smoking status: Former Smoker   
    Packs/day: 1.00  
    Years: 60.00  
    Quit date: 1/3/2011 Problem List:  
Patient Active Problem List  
Diagnosis Code  COPD (chronic obstructive pulmonary disease) ([facility]) J44.9  PVD (peripheral vascular disease) ([facility]) I73.9  Seasonal allergic rhinitis J30.2  Bronchitis, acute J20.9  COPD exacerbation ([facility] J44.1  Acute renal failure (ARF) ([facility]) N17.9  Acute and chronic respiratory failure with hypoxia ([facility]) J96.21  
 Sepsis ([facility]) A41.9  Sinus bradycardia R00.1  LBBB (left bundle branch block) I44.7 Respiratory Therapist: RT Chad

## 2018-07-31 NOTE — PROGRESS NOTES
CM met with Pt to discuss discharge recommendation for acute. Pt stated that she would like to go to [address] [place] [address]. CM sent referral to [address] [place] [address] via Ofelia Feliz. Awaiting response. Reason for Admission:   COPD exacerbation RRAT Score:     10 Plan for utilizing home health:   No recommendation at this time. Likelihood of Readmission:  Low Transition of Care Plan:   Treatment team is recommending that Pt discharges to acute rehab. Referral jhas been sent to [address] [place] [address]. Pt lives in a 1 story home alone. Pt recieves support from her son and neighbors. Pt reports that she does not drive, stating that her son or her neighbors takes her to appointments and to run errands. Pt reports to be independent in managing her ADLs. Pt reports that she uses O2 as needed and at night. (Dennie Edin) Pt denies previous history of SNF. Pt reports she had [facility] services several years ago but could not recall the agency. Care Management Interventions PCP Verified by CM: Yes Mode of Transport at Discharge:  (wheel chair) Transition of Care Consult (CM Consult): Discharge Planning Discharge Durable Medical Equipment:  (Pt has access to 2L O2 at home ([facility]) Pt has access to a walker and cane) Physical Therapy Consult: Yes Occupational Therapy Consult: Yes Current Support Network: Own Home, Lives Alone (son ) Plan discussed with Pt/Family/Caregiver: Yes Discharge Location Discharge Placement: Rehab hospital/unit acute Peyton Obrien, BS, [facility] [phone]

## 2018-08-01 ENCOUNTER — HOSPITAL ENCOUNTER (OUTPATIENT)
Dept: REHABILITATION | Age: 83
End: 2018-08-06
Attending: PHYSICAL MEDICINE & REHABILITATION | Admitting: PHYSICAL MEDICINE & REHABILITATION

## 2018-08-01 VITALS
TEMPERATURE: 98.5 F | DIASTOLIC BLOOD PRESSURE: 71 MMHG | BODY MASS INDEX: 21.56 KG/M2 | SYSTOLIC BLOOD PRESSURE: 119 MMHG | WEIGHT: 137.35 LBS | RESPIRATION RATE: 20 BRPM | HEIGHT: 67 IN | OXYGEN SATURATION: 92 % | HEART RATE: 79 BPM

## 2018-08-01 LAB
ALBUMIN SERPL-MCNC: 3.6 G/DL (ref 3.5–5)
ANION GAP SERPL CALC-SCNC: 6 MMOL/L (ref 5–15)
BUN SERPL-MCNC: 36 MG/DL (ref 6–20)
BUN/CREAT SERPL: 28 (ref 12–20)
CALCIUM SERPL-MCNC: 8.8 MG/DL (ref 8.5–10.1)
CHLORIDE SERPL-SCNC: 100 MMOL/L (ref 97–108)
CO2 SERPL-SCNC: 27 MMOL/L (ref 21–32)
CREAT SERPL-MCNC: 1.29 MG/DL (ref 0.55–1.02)
GLUCOSE SERPL-MCNC: 86 MG/DL (ref 65–100)
PHOSPHATE SERPL-MCNC: 3.3 MG/DL (ref 2.6–4.7)
POTASSIUM SERPL-SCNC: 4.7 MMOL/L (ref 3.5–5.1)
SODIUM SERPL-SCNC: 133 MMOL/L (ref 136–145)

## 2018-08-01 PROCEDURE — 74011636637 HC RX REV CODE- 636/637: Performed by: INTERNAL MEDICINE

## 2018-08-01 PROCEDURE — 74011250637 HC RX REV CODE- 250/637: Performed by: INTERNAL MEDICINE

## 2018-08-01 PROCEDURE — 77010033678 HC OXYGEN DAILY

## 2018-08-01 PROCEDURE — 94760 N-INVAS EAR/PLS OXIMETRY 1: CPT

## 2018-08-01 PROCEDURE — 94640 AIRWAY INHALATION TREATMENT: CPT

## 2018-08-01 PROCEDURE — 80069 RENAL FUNCTION PANEL: CPT | Performed by: INTERNAL MEDICINE

## 2018-08-01 PROCEDURE — 36415 COLL VENOUS BLD VENIPUNCTURE: CPT | Performed by: INTERNAL MEDICINE

## 2018-08-01 PROCEDURE — 97116 GAIT TRAINING THERAPY: CPT

## 2018-08-01 PROCEDURE — 74011000250 HC RX REV CODE- 250: Performed by: INTERNAL MEDICINE

## 2018-08-01 RX ORDER — GUAIFENESIN 600 MG/1
600 TABLET, EXTENDED RELEASE ORAL 2 TIMES DAILY
Qty: 10 TAB | Refills: 0 | Status: SHIPPED
Start: 2018-08-01 | End: 2018-08-06

## 2018-08-01 RX ORDER — MAG HYDROX/ALUMINUM HYD/SIMETH 200-200-20
30 SUSPENSION, ORAL (FINAL DOSE FORM) ORAL
Qty: 1 BOTTLE | Refills: 0 | Status: SHIPPED
Start: 2018-08-01 | End: 2019-12-15 | Stop reason: DRUGHIGH

## 2018-08-01 RX ORDER — BUTALBITAL, ACETAMINOPHEN AND CAFFEINE 50; 325; 40 MG/1; MG/1; MG/1
1 TABLET ORAL
Qty: 30 TAB | Refills: 0 | Status: SHIPPED
Start: 2018-08-01 | End: 2018-12-26

## 2018-08-01 RX ORDER — AMLODIPINE BESYLATE 2.5 MG/1
2.5 TABLET ORAL DAILY
Qty: 30 TAB | Refills: 0 | Status: SHIPPED
Start: 2018-08-02 | End: 2018-10-03 | Stop reason: SDUPTHER

## 2018-08-01 RX ORDER — IPRATROPIUM BROMIDE AND ALBUTEROL SULFATE 2.5; .5 MG/3ML; MG/3ML
3 SOLUTION RESPIRATORY (INHALATION)
Qty: 30 NEBULE | Refills: 0 | Status: SHIPPED | OUTPATIENT
Start: 2018-08-01 | End: 2019-12-15

## 2018-08-01 RX ORDER — PREDNISONE 5 MG/1
5 TABLET ORAL
Qty: 30 TAB | Refills: 0 | Status: SHIPPED
Start: 2018-08-02 | End: 2018-08-22

## 2018-08-01 RX ADMIN — PREDNISONE 5 MG: 5 TABLET ORAL at 09:19

## 2018-08-01 RX ADMIN — IPRATROPIUM BROMIDE AND ALBUTEROL SULFATE 3 ML: .5; 3 SOLUTION RESPIRATORY (INHALATION) at 07:18

## 2018-08-01 RX ADMIN — ASPIRIN 81 MG: 81 TABLET, DELAYED RELEASE ORAL at 09:19

## 2018-08-01 RX ADMIN — AMLODIPINE BESYLATE 2.5 MG: 5 TABLET ORAL at 09:19

## 2018-08-01 RX ADMIN — GUAIFENESIN 600 MG: 600 TABLET, EXTENDED RELEASE ORAL at 09:19

## 2018-08-01 RX ADMIN — Medication 10 ML: at 03:24

## 2018-08-01 RX ADMIN — IPRATROPIUM BROMIDE AND ALBUTEROL SULFATE 3 ML: .5; 3 SOLUTION RESPIRATORY (INHALATION) at 14:17

## 2018-08-01 RX ADMIN — UMECLIDINIUM BROMIDE AND VILANTEROL TRIFENATATE 1 PUFF: 62.5; 25 POWDER RESPIRATORY (INHALATION) at 09:20

## 2018-08-01 NOTE — PROGRESS NOTES
Problem: Mobility Impaired (Adult and Pediatric) Goal: *Acute Goals and Plan of Care (Insert Text) Physical Therapy Goals Initiated 7/31/2018 1. Patient will move from supine to sit and sit to supine , scoot up and down and roll side to side in bed with independence within 7 day(s). 2.  Patient will transfer from bed to chair and chair to bed with independence using the least restrictive device within 7 day(s). 3.  Patient will perform sit to stand with independence within 7 day(s). 4.  Patient will ambulate with independence for 300 feet with the least restrictive device within 7 day(s). 5.  Patient will ascend/descend 4 stairs with 1 handrail(s) with modified independence within 7 day(s). physical Therapy TREATMENT Patient: Nakul Tomas (80 y.o. female) Date: 8/1/2018 Diagnosis: COPD exacerbation ([facility] <principal problem not specified> Precautions: Fall Chart, physical therapy assessment, plan of care and goals were reviewed. ASSESSMENT: 
Pt cleared by nurse to mobilize. Pt received up in recliner on RA. Pt agreeable to therapy. Pt performed sit to stand transfer at [facility]. Pt ambulated 115ft with RW at [facility]. Pt requiring constant cueing to walk closer to walker for safety. Pt with improved stability with RW with no LOB. Pt with SOB over time with ambulation. Pt reported LE felt stronger. Pts o2 stats after ambulation at 86% on RA. Pt able to recover in 1 min with PLB to 91%. Pt educated on importance of focusing on breathing with activity right now to prevent o2 stats decreasing too low. Pt with understanding. Pt still very fatigued after ambulation but reported she felt better than yesterday. Pt would benefit from IP rehab to continue to improve strength and endurance to return home to independent prior level of function.   
Progression toward goals: 
[x]    Improving appropriately and progressing toward goals 
[]    Improving slowly and progressing toward goals 
[]    Not making progress toward goals and plan of care will be adjusted PLAN: 
Patient continues to benefit from skilled intervention to address the above impairments. Continue treatment per established plan of care. Discharge Recommendations:  Inpatient Rehab Further Equipment Recommendations for Discharge:  TBD by rehab SUBJECTIVE:  
Patient stated I feel better so that's a good thing.  OBJECTIVE DATA SUMMARY:  
Critical Behavior: 
Neurologic State: Alert, Appropriate for age Cognition: Appropriate decision making Functional Mobility Training: 
  
Transfers: 
Sit to Stand: Contact guard assistance;Stand-by assistance Stand to Sit: Stand-by assistance Balance: 
Sitting: Intact Standing: Intact; With support Standing - Static: Good;Constant support Standing - Dynamic : Fair Ambulation/Gait Training: 
Distance (ft): 115 Feet (ft) Assistive Device: Gait belt;Walker, rolling Ambulation - Level of Assistance: Contact guard assistance Gait Abnormalities: Decreased step clearance (trunk flexion ) Base of Support: Narrowed Speed/Paige: Pace decreased (<100 feet/min) Step Length: Right shortened;Left shortened Pain: 
Pain Scale 1: Numeric (0 - 10) Pain Intensity 1: 0 Activity Tolerance: Pt with improved activity tolerance and safety with RW. After treatment:  
[x]    Patient left in no apparent distress sitting up in chair 
[]    Patient left in no apparent distress in bed 
[x]    Call bell left within reach [x]    Nursing notified 
[]    Caregiver present [x]    Bed alarm activated COMMUNICATION/COLLABORATION:  
The patients plan of care was discussed with: Registered Nurse Jaydon Patel Time Calculation: 14 mins

## 2018-08-01 NOTE — PROGRESS NOTES
ADULT PROTOCOL: JET AEROSOL  REASSESSMENT Patient  Lydia Pretty     80 y.o.   female     8/1/2018  8:37 AM 
 
Breath Sounds Pre Procedure: Right Breath Sounds: Clear Left Breath Sounds: Clear Breath Sounds Post Procedure: Right Breath Sounds: Clear Left Breath Sounds: Clear Breathing pattern: Pre procedure Breathing Pattern: Regular Post procedure Breathing Pattern: Regular Heart Rate: Pre procedure Pulse: 76 Post procedure Pulse: 89 Resp Rate: Pre procedure Respirations: 18 Post procedure Respirations: 18 
 
 
Cough: Pre procedure Cough: Non-productive Post procedure Cough: Congested, Non-productive Sputum: Pre procedure Sputum amount: Moderate Sputum color/odor: Clear Sputum consistency: Thick Post procedure Oxygen: 2L/NC Changed: NO SpO2: Pre procedure SpO2: 94 % Post procedure SpO2: 100 % Nebulizer Therapy: Current medications Aerosolized Medications: DuoNeb Changed: NO 
 
 
Problem List:  
Patient Active Problem List  
Diagnosis Code  COPD (chronic obstructive pulmonary disease) ([facility]) J44.9  PVD (peripheral vascular disease) ([facility]) I73.9  Seasonal allergic rhinitis J30.2  Bronchitis, acute J20.9  [facility] exacerbation [facility] J44.1  Acute renal failure (ARF) ([facility]) N17.9  Acute and chronic respiratory failure with hypoxia ([facility]) J96.21  
 Sepsis ([facility]) A41.9  Sinus bradycardia R00.1  LBBB (left bundle branch block) I44.7 Respiratory Therapist: Clement Pierre RT

## 2018-08-01 NOTE — PROGRESS NOTES
Nephrology Progress Note Reji Chawla  
 
[facility]. [facility]                  Phone - [phone] Patient: Lata Ricci Admit Date: 7/27/2018 YOB: 1931 Date- 8/1/2018 CC: Follow up for hyponatremia Subjective: Interval History:  
-  Na improving - 133 today Cr. Slightly increased 
bp stable No c/o sob, fever. No c/o nausea or vomiting No c/o chest pain C/o leg weakness. C/o difficulty walking Assessment & Plan:  
- HYPONATREMIA likely due to SIADH- copd Tsh, cortisol normal. URINE Osmo 535 and na 38 on 7-29-18 NO SAMSCA today Follow bmp in am 
Fluid restriction 2810-4058 ml per day ( 24 hour after samsca administration ) 
 
- copd  
 
 
- [facility] Care Plan discussed with: pt 
Review of Systems: Pertinent items are noted in HPI. Physical Exam:  
GEN:  NAD NECK:  Supple, no thyromegaly RESP: CTA  b/l, no  wheezing, decreased at base CVS: RRR,S1,S2 ABDO:  soft , non tender, No hepatosplenomegaly EXT:no  Edema NEURO: non focal, normal speech Objective:  
Patient Vitals for the past 24 hrs: 
 Temp Pulse Resp BP SpO2  
08/01/18 1058 98.4 °F (36.9 °C) 82 19 136/65 94 % 08/01/18 0736 98.5 °F (36.9 °C) 89 18 151/46 94 % 08/01/18 0718 - - - - 94 % 08/01/18 0312 98.7 °F (37.1 °C) 80 18 128/48 94 % 08/01/18 0010 98.4 °F (36.9 °C) 78 20 135/60 92 % 07/31/18 1954 98.6 °F (37 °C) 80 18 153/71 96 % 07/31/18 1951 - - - - 94 % 07/31/18 1443 98.6 °F (37 °C) 80 18 111/60 93 % 07/31/18 1355 - - - - 91 % Last 3 Recorded Weights in this Encounter  
 07/29/18 3232 07/30/18 1828 07/31/18 2707 Weight: 62.6 kg (138 lb 0.1 oz) 63 kg (138 lb 14.2 oz) 62.3 kg (137 lb 5.6 oz) 07/30 1901 - 08/01 0700 In: 36 [P.O.:760] Out: 2000 [BQIXK:5345] Chart reviewed. Pertinent Notes reviewed. Medications list  reviewed Current Facility-Administered Medications Medication  alum-mag hydroxide-simeth (MYLANTA) oral suspension 30 mL  predniSONE (DELTASONE) tablet 5 mg  umeclidinium-vilanterol (ANORO ELLIPTA) 62.5 mcg- 25 mcg/inhalation  aspirin delayed-release tablet 81 mg  
 albuterol-ipratropium (DUO-NEB) 2.5 MG-0.5 MG/3 ML  
 amLODIPine (NORVASC) tablet 2.5 mg  
 hydrALAZINE (APRESOLINE) 20 mg/mL injection 10 mg  
 butalbital-acetaminophen-caffeine (FIORICET, ESGIC) -40 mg per tablet 1 Tab  sodium chloride (NS) flush 5-10 mL  sodium chloride (NS) flush 5-10 mL  acetaminophen (TYLENOL) tablet 650 mg  
 ondansetron (ZOFRAN) injection 4 mg  albuterol-ipratropium (DUO-NEB) 2.5 MG-0.5 MG/3 ML  
 guaiFENesin ER (MUCINEX) tablet 600 mg  
 enoxaparin (LOVENOX) injection 40 mg Data Review : 
Recent Labs 08/01/18 
 1895  07/31/18 
 8777  07/30/18 
 4498  07/29/18 
 2240 NA  133*  132*  128*  124* K  4.7  4.5  4.5  4.9 GLU  86  89  125*  116* BUN  36*  37*  33*  35* CREA  1.29*  1.08*  1.01  1.10* CA  8.8  8.9  9.2  9.4 PHOS  3.3  3.2  3.5   -- No results for input(s): FE, TIBC, PSAT, FERR in the last 72 hours. Lab Results Component Value Date/Time Sodium urine, random 31 07/31/2018 03:55 AM  
 
 
Lonnie Ha MD 
 
                         [address] [place] [address] [facility] 
                               www.[facility].[employer] [address] [place], [address] Phone - [phone] Fax - [phone]  [address], [address] [place] [address] Phone - [phone] Fax - [phone]

## 2018-08-01 NOTE — DISCHARGE SUMMARY
Hospitalist Discharge Summary     Patient ID:  Nakul Tomas  [id number]  80 y.o.  11/10/1931    PCP on record: Mukund Bergman MD    Admit date: 7/27/2018  Discharge date and time: 8/1/2018      DISCHARGE DIAGNOSIS:  Hyponatremia  Dizziness  COPD exacerbation  Chronic respiratory failure on 2LNC oxygen  Elevated BP  Sinus erin with 1st degree block  Solitary kidney  PAD, s/p left leg stent  DNR    CONSULTATIONS:  IP CONSULT TO HOSPITALIST  IP CONSULT TO CARDIOLOGY  IP CONSULT TO NEPHROLOGY    Excerpted HPI from H&P of Ranjan Johnson MD:  Adalgisa Humphrey is a 80 y.o.  female with a history of PAD, solitary kidney and COPD who presents with SOB. Patient reports feeling short winded with activities x 1 week. She has a chronic cough and white sputum but her sputum has been thicker recently. She denies CP, increase leg swelling, leg pain, orthopnea or PND or fevers chills. ER evaluation today, CXR clear. She was treated with 5mg prednisone and a neb treatment but desaturated down to the 80s with ambulation. We were asked to admit for work up and evaluation of the above problems. ______________________________________________________________________  DISCHARGE SUMMARY/HOSPITAL COURSE:  for full details see H&P, daily progress notes, labs, consult notes.      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  S/p SAMSCA x1 yesterday, again held today  Na improving  PT/OT recommended inpatient rehab     Dizziness x 2-3 days  resolved  CT head and MRI negative  Possible COPD related     COPD exacerbation  Chronic respiratory failure on 2LNC oxygen  CXR negative  continue steroids to 5mg Daily (she doesn't want higher dose as making her aggitated, claims she does better 1 month of 5mg of steroids)  Continue empiric oral zithromax  Mucolytics, Nebs  Continue O2     Elevated BP  No history of HTN, pt reported history of borderline hypotension  I see she saw last year but could find vital  May developed HTN within past year  stated low dose norvasc  Continue PRN IV hydralazine  BP stbale      Sinus erin with 1st degree block  Hr Stable, now staying > 60s (there was few episodes in 40s earlier the admission)  TSh ok  Appreciate cardiology input      Solitary kidney  Monitor Cr      PAD, s/p left leg stent  Continue ASA  Follows with Dr Nestor Eagle  Code Status: [facility]  _______________________________________________________________________  Patient seen and examined by me on discharge day. Pertinent Findings:  Gen:    Not in distress  Chest: Clear lungs  CVS:   Regular rhythm. No edema  Abd:  Soft, not distended, not tender  Neuro:  Alert, non focal  ______  Current Discharge Medication List      START taking these medications    Details   !! albuterol-ipratropium (DUO-NEB) 2.5 mg-0.5 mg/3 ml nebu 3 mL by Nebulization route every four (4) hours as needed. Qty: 30 Nebule, Refills: 0      alum-mag hydroxide-simeth (MYLANTA) 200-200-20 mg/5 mL susp Take 30 mL by mouth every four (4) hours as needed. Qty: 1 Bottle, Refills: 0      amLODIPine (NORVASC) 2.5 mg tablet Take 1 Tab by mouth daily. Qty: 30 Tab, Refills: 0      butalbital-acetaminophen-caffeine (FIORICET, ESGIC) -40 mg per tablet Take 1 Tab by mouth every six (6) hours as needed for Headache. Qty: 30 Tab, Refills: 0      guaiFENesin ER (MUCINEX) 600 mg ER tablet Take 1 Tab by mouth two (2) times a day for 5 days. Qty: 10 Tab, Refills: 0      predniSONE (DELTASONE) 5 mg tablet Take 1 Tab by mouth daily (with breakfast) for 20 days. Qty: 30 Tab, Refills: 0       !! - Potential duplicate medications found. Please discuss with provider. CONTINUE these medications which have NOT CHANGED    Details   OXYGEN-AIR DELIVERY SYSTEMS 2 L/min by Nasal route as needed (Shortness of Breath).       acetaminophen (TYLENOL) 500 mg tablet Take 500 mg by mouth every six (6) hours as needed for Pain. !! albuterol-ipratropium (DUO-NEB) 2.5 mg-0.5 mg/3 ml nebu 3 mL by Nebulization route three (3) times daily. Qty: 90 Nebule, Refills: 0      Azelastine (ASTEPRO) 0.15 % (205.5 mcg) nasal spray       ANORO ELLIPTA 62.5-25 mcg/actuation inhaler       COMBIVENT RESPIMAT  mcg/actuation inhaler INHALE 1 PUFF THREE TIMES A DAY  Qty: 1 Inhaler, Refills: 11      aspirin delayed-release 81 mg tablet Take  by mouth daily. !! - Potential duplicate medications found. Please discuss with provider. STOP taking these medications       azithromycin (ZITHROMAX) 250 mg tablet Comments:   Reason for Stopping:             My Recommended Diet, Activity, Wound Care, and follow-up labs are listed in the patient's Discharge Insturctions which I have personally completed and reviewed.     ______________________________________________________________________    Risk of deterioration: Low    Condition at Discharge:  Stable  ______________________________________________________________________    Disposition  IP Rehab  ______________________________________________________________________    Care Plan discussed with:   Patient, RN, Care Manager    ______________________________________________________________________    Code Status: DNR/DNI  ______________________________________________________________________      Follow up with:   PCP : Pily Ernst MD  Follow-up Information     Follow up With Details Comments Contact Info    Pily Ernst MD Schedule an appointment as soon as possible for a visit after discharge from [place] [address] [address] [facility], [place]  [facility] [address] [facility]  [phone]                Total time in minutes spent coordinating this discharge (includes going over instructions, follow-up, prescriptions, and preparing report for sign off to her PCP) :  35 minutes    Signed:  Juliocesar Fagan MD

## 2018-08-01 NOTE — PROGRESS NOTES
Pt to discharge to [address] [place] [address] today. Pt will be in room 121 Call report : [phone] There are no further discharge needs at this time. Care Management Interventions PCP Verified by CM: Yes Mode of Transport at Discharge:  (wheel chair) Transition of Care Consult (CM Consult): Discharge Planning Discharge Durable Medical Equipment:  (Pt has access to 2L O2 at home ([facility]) Pt has access to a walker and cane) Physical Therapy Consult: Yes Occupational Therapy Consult: Yes Current Support Network: Own Home, Lives Alone (son ) Plan discussed with Pt/Family/Caregiver: Yes Discharge Location Discharge Placement: Rehab hospital/unit acute ([facility]) Peyton [address], BS, [facility] [address] [facility] [address] [facility] [address] [phone]

## 2018-08-01 NOTE — PROGRESS NOTES
Bedside shift change report given to TOMI Brantley (oncoming nurse). Report included the following information SBAR, Kardex, Intake/Output, MAR and Recent Results. SHIFT SUMMARY: 
 
 
 
 
 
[address] NURSING NOTE Admission Date 7/27/2018 Admission Diagnosis COPD exacerbation ([address] Consults IP CONSULT TO HOSPITALIST 
IP CONSULT TO CARDIOLOGY 
IP CONSULT TO NEPHROLOGY Cardiac Monitoring [x] Yes [] No  
  
Purposeful Hourly Rounding [x] Yes   
Tracy Score Total Score: 3 Tracy score 3 or > [x] Bed Alarm [] Avasys [] 1:1 sitter [] Patient refused (Signed refusal form in chart) Juan Score Juan Score: 21 Juan score 14 or < [] PMT consult [] Wound Care consult  
 []  Specialty bed  [] Nutrition consult Influenza Vaccine Received Flu Vaccine for Current Season (usually Sept-March): Not Flu Season Oxygen needs? [] Room air Oxygen @  [x]1L    []2L    []3L   []4L    []5L   []6L via NC Chronic home O2 use? [x] Yes [] No 
Perform O2 challenge test and document in progress note using smartphrase (.Homeoxygen) Last bowel movement Last Bowel Movement Date: 07/27/18 Urinary Catheter LDAs Peripheral IV 07/27/18 Right Antecubital (Active) Site Assessment Clean, dry, & intact 7/31/2018  8:04 PM  
Phlebitis Assessment 0 7/31/2018  8:04 PM  
Infiltration Assessment 0 7/31/2018  8:04 PM  
Dressing Status Clean, dry, & intact 7/31/2018  8:04 PM  
Dressing Type Transparent 7/31/2018  8:04 PM  
Hub Color/Line Status Pink;Capped 7/31/2018  8:04 PM  
                  
  
Readmission Risk Assessment Tool Score Low Risk   
      
 10 Total Score   
  
 5 Pt. Coverage (Medicare=5 , Medicaid, or Self-Pay=4) 5 Charlson Comorbidity Score (Age + Comorbid Conditions) Criteria that do not apply:  
 Has Seen PCP in Last 6 Months (Yes=3, No=0) . Living with Significant Other. Assisted Living. LTAC. SNF. or  
Rehab  Patient Length of Stay (>5 days = 3) IP Visits Last 12 Months (1-3=4, 4=9, >4=11) Expected Length of Stay 3d 2h Actual Length of Stay 4

## 2018-08-01 NOTE — DISCHARGE INSTRUCTIONS
HOSPITALIST DISCHARGE INSTRUCTIONS    NAME: Nakul Tomas   :  11/10/1931   MRN:  [id number]     Date/Time:  2018 2:29 PM    ADMIT DATE: 2018     DISCHARGE DATE: 2018     DISCHARGE DIAGNOSIS:  Hyponatremia  Dizziness  COPD exacerbation  Chronic respiratory failure on 2LNC oxygen  Elevated BP  Sinus erin with 1st degree block  Solitary kidney  PAD, s/p left leg stent  DNR    MEDICATIONS:  · It is important that you take the medication exactly as they are prescribed. · Keep your medication in the bottles provided by the pharmacist and keep a list of the medication names, dosages, and times to be taken in your wallet. · Do not take other medications without consulting your doctor. Pain Management: per above medications    What to do at Home    Recommended diet:  Cardiac Diet    Recommended activity: PT/OT Eval and Treat    If you have questions regarding the hospital related prescriptions or hospital related issues please call [facility] [place] at [phone]. If you experience any of the following symptoms then please call your primary care physician or return to the emergency room if you cannot get hold of your doctor:  Fever, chills, nausea, vomiting, diarrhea, change in mentation, falling, bleeding, shortness of breath      Information obtained by :  I understand that if any problems occur once I am at home I am to contact my physician. I understand and acknowledge receipt of the instructions indicated above.                                                                                                                                            Physician's or R.N.'s Signature                                                                  Date/Time                                                                                                                                              Patient or Representative Signature Date/Time

## 2018-08-01 NOTE — PROGRESS NOTES
Problem: Falls - Risk of 
Goal: *Absence of Falls Document Bienvenido Ridley Fall Risk and appropriate interventions in the flowsheet. Outcome: Progressing Towards Goal 
Fall Risk Interventions: 
Mobility Interventions: Bed/chair exit alarm, Communicate number of staff needed for ambulation/transfer, Mechanical lift, OT consult for ADLs, Patient to call before getting OOB, PT Consult for mobility concerns Mentation Interventions: Bed/chair exit alarm, Door open when patient unattended, Evaluate medications/consider consulting pharmacy, Eyeglasses and hearing aids, Familiar objects from home, Family/sitter at bedside Medication Interventions: Bed/chair exit alarm, Evaluate medications/consider consulting pharmacy, Patient to call before getting OOB, Teach patient to arise slowly Elimination Interventions: Bed/chair exit alarm, Call light in reach, Elevated toilet seat, Patient to call for help with toileting needs, Toilet paper/wipes in reach, Toileting schedule/hourly rounds

## 2018-08-01 NOTE — PROGRESS NOTES
[address] 
                                                                      80 y.o.   female [address]   Room: 2213/01    [facility] 2 CARDIOPULMONARY CARE  Unit Phone# :       
[address] 
[facility] [address] [address] Dept: [phone] Loc: [id number] SITUATION Admitted:  7/27/2018         Attending Provider:  Rocael Estrada MD    
 
Consultations:  IP CONSULT TO HOSPITALIST 
IP CONSULT TO CARDIOLOGY 
IP CONSULT TO NEPHROLOGY 
 
PCP:  Martine Wilson MD   [phone] Treatment Team: Attending Provider: Rocael Estrada MD; Consulting Provider: Robin Stone MD; Utilization Review: Nickolas Basurto RN; Consulting Provider: Leopoldo Alexis MD; Care Manager: Karolina Hernandez Admitting Dx:  COPD exacerbation ([facility] [address] Principal Problem: <principal problem not specified> * No surgery found * of  
  
BY: * Surgery not found *             ON: * No surgery found * Code Status: DNR Advance Directives:  
Advance Care Planning 7/28/2018 Patient's Healthcare Decision Maker is: Verbal statement (Legal Next of Kin remains as decision maker) Primary Decision Maker Name [address] [place] [address] Primary Decision Maker Phone Number -  
Primary Decision Maker Relationship to Patient -  
Confirm Advance Directive Yes, not on file Does the patient have other document types - (Send w/patient) Yes Not W Pt  
 
 
Isolation:  There are currently no Active Isolations       MDRO: No current active infections Pain Medications given:  None    Last dose:  
 
Special Equipment needed: no  Type of equipment: 
 
 
(Not currently on dialysis) (Not currently on dialysis) (Not currently on dialysis) BACKGROUND Allergies: Allergies Allergen Reactions  Food Extracts Diarrhea Onions and garlic causes abdominal pain,cramping  Sulfa (Sulfonamide Antibiotics) Other (comments) Altered Mental Status Past Medical History:  
Diagnosis Date  Arthritis   
 generalized  COPD   
 former smoker   
  >60pkyr quit 1/3/11  
 h/o DVT 1955 left leg  ischemic left lower extremitiy pain   
 above knee FemPop 1/3/11  PVD (peripheral vascular disease) ([facility] [address]  Seasonal allergic rhinitis  Single kidney Past Surgical History:  
Procedure Laterality Date [address] excision left breast cyst  
 HX GI  10 yrs ago  
 colonoscopy  HX GYN    
 3 miscarriges  HX GYN    
 1 vaginal birth  HX ORTHOPAEDIC    
 veinography left leg, stent left leg Prescriptions Prior to Admission Medication Sig  
 azithromycin (ZITHROMAX) 250 mg tablet Take 250 mg by mouth every Monday, Wednesday, Friday.  OXYGEN-AIR DELIVERY SYSTEMS 2 L/min by Nasal route as needed (Shortness of Breath).  acetaminophen (TYLENOL) 500 mg tablet Take 500 mg by mouth every six (6) hours as needed for Pain.  albuterol-ipratropium (DUO-NEB) 2.5 mg-0.5 mg/3 ml nebu 3 mL by Nebulization route three (3) times daily. (Patient taking differently: 3 mL by Nebulization route four (4) times daily.)  Azelastine (ASTEPRO) 0.15 % (205.5 mcg) nasal spray  ANORO ELLIPTA 62.5-25 mcg/actuation inhaler  COMBIVENT RESPIMAT  mcg/actuation inhaler INHALE 1 PUFF THREE TIMES A DAY  aspirin delayed-release 81 mg tablet Take  by mouth daily. Hard scripts included in transfer packet no Vaccinations:   
Immunization History Administered Date(s) Administered  Influenza Vaccine 11/13/2012  Influenza Vaccine Whole 10/01/2010  ZZZ-RETIRED (DO NOT USE) Pneumococcal Vaccine (Unspecified Type) 01/03/2011 Readmission Risks:    Known Risks: The Charlson CoMorbitiy Index tool is an evidenced based tool that has more automatic generated information.  The tool looks at many different items such as the age of the patient, how many times they were admitted in the last calendar year, current length of stay in the hospital and their diagnosis. All of these items are pulled automatically from information documented in the chart from various places and will generate a score that predicts whether a patient is at low (less than 13), medium (13-20) or high (21 or greater) risk of being readmitted. ASSESSMENT Temp: 98.5 °F (36.9 °C) (08/01/18 1514) Pulse (Heart Rate): 79 (08/01/18 1514) Resp Rate: 20 (08/01/18 1514)           BP: 119/71 (08/01/18 1514) O2 Sat (%): 92 % (08/01/18 1514) Weight: 62.3 kg (137 lb 5.6 oz)    Height: 5' 7\" (170.2 cm) (07/27/18 2129) If above not within 1 hour of discharge: 
 
BP:_____  P:____  R:____ T:_____ O2 Sat: ___%  O2: ______ Active Orders Diet DIET CARDIAC Regular Orientation: oriented to time, place, person and situation Active Behaviors: None Active Lines/Drains:  (Peg Tube / Peterson / CL or S/L?): no 
 
Urinary Status: Voiding     Last BM: Last Bowel Movement Date: 08/01/18 Skin Integrity: Intact Mobility: No limitations Weight Bearing Status: WBAT (Weight Bearing as Tolerated) Gait Training Assistive Device: Gait belt, Walker, rolling Ambulation - Level of Assistance: Contact guard assistance Distance (ft): 115 Feet (ft) Lab Results Component Value Date/Time Glucose 86 08/01/2018 02:28 AM  
 INR 1.1 03/15/2011 10:40 AM  
 INR 1.1 03/09/2011 06:20 PM  
 HGB 13.8 07/27/2018 02:11 PM  
 HGB 12.9 07/17/2017 07:34 PM  
  
  RECOMMENDATION See After Visit Summary (AVS) for: · Discharge instructions · [facility] · Special equipment needed (entered pre-discharge by Care Management) · Medication Reconciliation · Follow up Appointment(s) Report given/sent by:  Francine Sutton Verbal report given to: Eyal Arora RN  FAXED to:     
    
Estimated discharge time:  8/1/2018 at 1700 Pt discharged to [facility]. Left with all personal belongings.

## 2018-08-02 LAB
ALBUMIN SERPL-MCNC: 3.5 G/DL (ref 3.5–5)
ALBUMIN/GLOB SERPL: 1.2 {RATIO} (ref 1.1–2.2)
ALP SERPL-CCNC: 57 U/L (ref 45–117)
ALT SERPL-CCNC: 18 U/L (ref 12–78)
ANION GAP SERPL CALC-SCNC: 7 MMOL/L (ref 5–15)
AST SERPL-CCNC: 16 U/L (ref 15–37)
BILIRUB SERPL-MCNC: 0.7 MG/DL (ref 0.2–1)
BUN SERPL-MCNC: 30 MG/DL (ref 6–20)
BUN/CREAT SERPL: 35 (ref 12–20)
CALCIUM SERPL-MCNC: 8.9 MG/DL (ref 8.5–10.1)
CHLORIDE SERPL-SCNC: 101 MMOL/L (ref 97–108)
CO2 SERPL-SCNC: 26 MMOL/L (ref 21–32)
CREAT SERPL-MCNC: 0.86 MG/DL (ref 0.55–1.02)
ERYTHROCYTE [DISTWIDTH] IN BLOOD BY AUTOMATED COUNT: 13.9 % (ref 11.5–14.5)
GLOBULIN SER CALC-MCNC: 3 G/DL (ref 2–4)
GLUCOSE SERPL-MCNC: 79 MG/DL (ref 65–100)
HCT VFR BLD AUTO: 42.4 % (ref 35–47)
HGB BLD-MCNC: 14.2 G/DL (ref 11.5–16)
MAGNESIUM SERPL-MCNC: 2.4 MG/DL (ref 1.6–2.4)
MCH RBC QN AUTO: 30.1 PG (ref 26–34)
MCHC RBC AUTO-ENTMCNC: 33.5 G/DL (ref 30–36.5)
MCV RBC AUTO: 89.8 FL (ref 80–99)
NRBC # BLD: 0 K/UL (ref 0–0.01)
NRBC BLD-RTO: 0 PER 100 WBC
PLATELET # BLD AUTO: 226 K/UL (ref 150–400)
PMV BLD AUTO: 9 FL (ref 8.9–12.9)
POTASSIUM SERPL-SCNC: 4.5 MMOL/L (ref 3.5–5.1)
PROT SERPL-MCNC: 6.5 G/DL (ref 6.4–8.2)
RBC # BLD AUTO: 4.72 M/UL (ref 3.8–5.2)
SODIUM SERPL-SCNC: 134 MMOL/L (ref 136–145)
WBC # BLD AUTO: 8.4 K/UL (ref 3.6–11)

## 2018-08-02 PROCEDURE — 80053 COMPREHEN METABOLIC PANEL: CPT | Performed by: PHYSICAL MEDICINE & REHABILITATION

## 2018-08-02 PROCEDURE — 36415 COLL VENOUS BLD VENIPUNCTURE: CPT | Performed by: PHYSICAL MEDICINE & REHABILITATION

## 2018-08-02 PROCEDURE — 83735 ASSAY OF MAGNESIUM: CPT | Performed by: PHYSICAL MEDICINE & REHABILITATION

## 2018-08-02 PROCEDURE — 85027 COMPLETE CBC AUTOMATED: CPT | Performed by: PHYSICAL MEDICINE & REHABILITATION

## 2018-08-06 ENCOUNTER — PATIENT OUTREACH (OUTPATIENT)
Dept: CARDIOLOGY CLINIC | Age: 83
End: 2018-08-06

## 2018-09-04 PROBLEM — I10 ESSENTIAL HYPERTENSION: Status: ACTIVE | Noted: 2018-09-04

## 2018-12-26 ENCOUNTER — APPOINTMENT (OUTPATIENT)
Dept: ULTRASOUND IMAGING | Age: 83
End: 2018-12-26
Attending: EMERGENCY MEDICINE
Payer: MEDICARE

## 2018-12-26 ENCOUNTER — HOSPITAL ENCOUNTER (EMERGENCY)
Age: 83
Discharge: HOME | End: 2018-12-26
Attending: EMERGENCY MEDICINE | Admitting: EMERGENCY MEDICINE
Payer: MEDICARE

## 2018-12-26 VITALS
BODY MASS INDEX: 21.66 KG/M2 | OXYGEN SATURATION: 94 % | RESPIRATION RATE: 18 BRPM | HEART RATE: 66 BPM | WEIGHT: 138 LBS | HEIGHT: 67 IN | TEMPERATURE: 97.9 F | SYSTOLIC BLOOD PRESSURE: 181 MMHG | DIASTOLIC BLOOD PRESSURE: 86 MMHG

## 2018-12-26 DIAGNOSIS — R60.0 LEG EDEMA, LEFT: Primary | ICD-10-CM

## 2018-12-26 PROCEDURE — 93971 EXTREMITY STUDY: CPT

## 2018-12-26 PROCEDURE — 99282 EMERGENCY DEPT VISIT SF MDM: CPT

## 2018-12-27 NOTE — ED NOTES
Patient discharged by Kenia Downs. Patient provided with discharge instructions Rx and instructions on follow up care. Patient out of ED via wheelchair accompanied by family.

## 2018-12-27 NOTE — DISCHARGE INSTRUCTIONS
Leg and Ankle Edema: Care Instructions  Your Care Instructions  Swelling in the legs, ankles, and feet is called edema. It is common after you sit or stand for a while. Long plane flights or car rides often cause swelling in the legs and feet. You may also have swelling if you have to stand for long periods of time at your job. Problems with the veins in the legs (varicose veins) and changes in hormones can also cause swelling. Sometimes the swelling in the ankles and feet is caused by a more serious problem, such as heart failure, infection, blood clots, or liver or kidney disease. Follow-up care is a key part of your treatment and safety. Be sure to make and go to all appointments, and call your doctor if you are having problems. It's also a good idea to know your test results and keep a list of the medicines you take. How can you care for yourself at home? · If your doctor gave you medicine, take it as prescribed. Call your doctor if you think you are having a problem with your medicine. · Whenever you are resting, raise your legs up. Try to keep the swollen area higher than the level of your heart. · Take breaks from standing or sitting in one position. ? Walk around to increase the blood flow in your lower legs. ? Move your feet and ankles often while you stand, or tighten and relax your leg muscles. · Wear support stockings. Put them on in the morning, before swelling gets worse. · Eat a balanced diet. Lose weight if you need to. · Limit the amount of salt (sodium) in your diet. Salt holds fluid in the body and may increase swelling. When should you call for help? Call 911 anytime you think you may need emergency care. For example, call if:    · You have symptoms of a blood clot in your lung (called a pulmonary embolism). These may include:  ? Sudden chest pain. ? Trouble breathing. ?  Coughing up blood.    Call your doctor now or seek immediate medical care if:    · You have signs of a blood clot, such as:  ? Pain in your calf, back of the knee, thigh, or groin. ? Redness and swelling in your leg or groin.     · You have symptoms of infection, such as:  ? Increased pain, swelling, warmth, or redness. ? Red streaks or pus. ? A fever.    Watch closely for changes in your health, and be sure to contact your doctor if:    · Your swelling is getting worse.     · You have new or worsening pain in your legs.     · You do not get better as expected. Where can you learn more? Go to http://von-johnna.info/. Enter A265 in the search box to learn more about \"Leg and Ankle Edema: Care Instructions. \"  Current as of: November 20, 2017  Content Version: 11.8  © 1776-8429 [employer]. Care instructions adapted under license by [employer] (which disclaims liability or warranty for this information). If you have questions about a medical condition or this instruction, always ask your healthcare professional. [place] [address] any warranty or liability for your use of this information.

## 2018-12-27 NOTE — ED PROVIDER NOTES
EMERGENCY DEPARTMENT HISTORY AND PHYSICAL EXAM          Date: 12/26/2018  Patient Name: Contreras Mejia    History of Presenting Illness     Chief Complaint   Patient presents with    Leg Pain     Into triage via wheelchair with c/o non-traumatic pain and swelling to Lt lower leg x 1 week. History Provided By: Patient and Patient's Son    HPI: Contreras Mejia is a 80 y.o. female, pmhx PVD, who presents ambulatory to the ED c/o left lower leg swelling and pain x 1 week. Pt denies injury/recent surgery/hormone use. Pt has been sedentary for the last couple of weeks. There is no redness. She denie SOB. She specifically denies any recent fevers, chills, nausea, vomiting, diarrhea, abd pain, CP, urinary sxs, changes in BM, or headache. She denies history of diabetes, kidney disease, liver disease, thyroid disease    PCP: Jayna Gonzalez MD   Vascular Surgeon: Kenn Gomez MD    There are no other complaints, changes, or physical findings at this time. Current Outpatient Medications   Medication Sig Dispense Refill    amLODIPine (NORVASC) 2.5 mg tablet Take 1 Tab by mouth daily. 30 Tab 6    albuterol-ipratropium (DUO-NEB) 2.5 mg-0.5 mg/3 ml nebu 3 mL by Nebulization route every four (4) hours as needed. 30 Nebule 0    alum-mag hydroxide-simeth (MYLANTA) 200-200-20 mg/5 mL susp Take 30 mL by mouth every four (4) hours as needed. 1 Bottle 0    OXYGEN-AIR DELIVERY SYSTEMS 2 L/min by Nasal route as needed (Shortness of Breath).  acetaminophen (TYLENOL) 500 mg tablet Take 500 mg by mouth every six (6) hours as needed for Pain.  albuterol-ipratropium (DUO-NEB) 2.5 mg-0.5 mg/3 ml nebu 3 mL by Nebulization route three (3) times daily.  (Patient taking differently: 3 mL by Nebulization route four (4) times daily.) 90 Nebule 0    ANORO ELLIPTA 62.5-25 mcg/actuation inhaler       COMBIVENT RESPIMAT  mcg/actuation inhaler INHALE 1 PUFF THREE TIMES A DAY 1 Inhaler 11    aspirin delayed-release 81 mg tablet Take  by mouth daily.  Azelastine (ASTEPRO) 0.15 % (205.5 mcg) nasal spray          Past History     Past Medical History:  Past Medical History:   Diagnosis Date    Arthritis     generalized    COPD     Essential hypertension 2018    former smoker      >60pkyr quit 1/3/11    h/o DVT     left leg    ischemic left lower extremitiy pain     above knee FemPop 1/3/11    PVD [facility]peripheral vascular disease) ([facility] [address]     Seasonal allergic rhinitis     Single kidney        Past Surgical History:  Past Surgical History:   Procedure Laterality Date    BREAST SURGERY PROCEDURE UNLISTED      excision left breast cyst    HX GI  10 yrs ago    colonoscopy    HX GYN      3 miscarriges    HX GYN      1 vaginal birth   [facility]      veinography left leg, stent left leg       Family History:  Family History   Problem Relation Age of Onset    Cancer Mother         colon    Cancer Sister         lung       Social History:  Social History     Tobacco Use    Smoking status: Former Smoker     Packs/day: 1.00     Years: 60.00     Pack years: 60.00     Last attempt to quit: 1/3/2011     Years since quittin.9    Smokeless tobacco: Never Used   Substance Use Topics    Alcohol use: Yes     Comment: occasional liquor after dinner    Drug use: No       Allergies: Allergies   Allergen Reactions    Food Extracts Diarrhea     Onions and garlic causes abdominal pain,cramping     Sulfa (Sulfonamide Antibiotics) Other (comments)     Altered Mental Status         Review of Systems   Review of Systems   Constitutional: Negative for activity change, appetite change, fatigue and fever. HENT: Negative for congestion, dental problem, ear pain, rhinorrhea and sinus pressure. Eyes: Negative for photophobia, pain, discharge, redness, itching and visual disturbance. Respiratory: Negative for cough, chest tightness, shortness of breath, wheezing and stridor.     Cardiovascular: Negative for chest pain and leg swelling. Gastrointestinal: Negative for abdominal distention, abdominal pain and blood in stool. Genitourinary: Negative for difficulty urinating, dysuria, flank pain, frequency, vaginal bleeding, vaginal discharge and vaginal pain. Musculoskeletal: Positive for gait problem and myalgias. Negative for arthralgias, back pain, joint swelling and neck pain. Skin: Negative for rash and wound. Neurological: Negative for dizziness, syncope, weakness, numbness and headaches. Psychiatric/Behavioral: Negative for behavioral problems. The patient is not nervous/anxious. Physical Exam   Physical Exam   Constitutional: She is oriented to person, place, and time. Vital signs are normal. She appears well-developed and well-nourished. No distress. Nasal cannula in place. HENT:   Head: Normocephalic and atraumatic. Right Ear: External ear normal.   Left Ear: External ear normal.   Nose: Nose normal.   Eyes: Conjunctivae and EOM are normal. Pupils are equal, round, and reactive to light. Right eye exhibits no discharge. Left eye exhibits no discharge. No scleral icterus. Neck: Normal range of motion. Neck supple. No tracheal deviation present. Cardiovascular: Normal rate, regular rhythm, normal heart sounds and intact distal pulses. Exam reveals no gallop and no friction rub. No murmur heard. Pulmonary/Chest: Effort normal and breath sounds normal. No respiratory distress. She has no wheezes. She has no rales. She exhibits no tenderness. Musculoskeletal:        Left lower leg: She exhibits tenderness and edema. She exhibits no bony tenderness and no deformity. Left foot: Normal. There is no swelling and normal capillary refill. Edema left lower leg. No erythema. No calor. Strong pedal pulse left foot. Lymphadenopathy:     She has no cervical adenopathy. Neurological: She is alert and oriented to person, place, and time. No cranial nerve deficit.    Skin: Skin is warm and dry. No lesion and no rash noted. No erythema. Psychiatric: She has a normal mood and affect. Her behavior is normal.   Nursing note and vitals reviewed. Diagnostic Study Results     Labs -   No results found for this or any previous visit (from the past 12 hour(s)). Radiologic Studies -       DUPLEX LOWER EXT VENOUS LEFT (Final result)   Result time 12/26/18 19:00:48   Final result by Chino Moran MD (12/26/18 19:00:48)                Impression:    IMPRESSION: Negative examination. Narrative:    Clinical indication: r/o dvt    Unilateral venous Doppler performed on theleft lower extremity shows no evidence  for deep venous thrombosis masses or fluid collection. Color-flow, compression and real-time examination were performed. Spectral  analysis was performed                    DUPLEX LOWER EXT VENOUS LEFT   Final Result   IMPRESSION: Negative examination. CT Results  (Last 48 hours)    None        CXR Results  (Last 48 hours)    None            Medical Decision Making   I am the first provider for this patient. I reviewed the vital signs, available nursing notes, past medical history, past surgical history, family history and social history. Vital Signs-Reviewed the patient's vital signs. Patient Vitals for the past 12 hrs:   Temp Pulse Resp BP SpO2   12/26/18 2007  66 18 181/86    12/26/18 1741 97.9 °F (36.6 °C) 77 18 (!) 176/101 94 %       Records Reviewed: Nursing Notes and Old Medical Records    Provider Notes (Medical Decision Making):      DDx: DVT, edema, phelbitis    ED Course:   Initial assessment performed. The patients presenting problems have been discussed, and they are in agreement with the care plan formulated and outlined with them. I have encouraged them to ask questions as they arise throughout their visit. PROGRESS NOTE:     Pt defers pain medicine. Pt defers ACE wraps.     8:48 PM  Pt noted to be feeling better , ready for discharge. Updated pt and/or family on all lab and imaging findings available. Will follow up as instructed. All questions have been answered, pt voiced understanding and agreement with plan. Narcotics were prescribed, pt was advised not to drive or operate heavy machinery. Specific return precautions provided as well as instructions to return to the ED should sx worsen at any time. Vital signs stable for discharge. VAUGHN Shannon    Disposition:    DISCHARGE NOTE:  8:48PM  The patient's results have been reviewed with family and/or caregiver. They verbally convey their understanding and agreement of the patient's signs, symptoms, diagnosis, treatment, and prognosis. They additionally agree to follow up as recommended in the discharge instructions or to return to the Emergency Room should the patient's condition change prior to their follow-up appointment. The family and/or caregiver verbally agrees with the care-plan and all of their questions have been answered. The discharge instructions have also been provided to the them along with educational information regarding the patient's diagnosis and a list of reasons why the patient would want to return to the ER prior to their follow-up appointment should their condition change. VAUGHN Shannon    PLAN:  1. Current Discharge Medication List      CONTINUE these medications which have NOT CHANGED    Details   amLODIPine (NORVASC) 2.5 mg tablet Take 1 Tab by mouth daily. Qty: 30 Tab, Refills: 6      !! albuterol-ipratropium (DUO-NEB) 2.5 mg-0.5 mg/3 ml nebu 3 mL by Nebulization route every four (4) hours as needed. Qty: 30 Nebule, Refills: 0      alum-mag hydroxide-simeth (MYLANTA) 200-200-20 mg/5 mL susp Take 30 mL by mouth every four (4) hours as needed. Qty: 1 Bottle, Refills: 0      OXYGEN-AIR DELIVERY SYSTEMS 2 L/min by Nasal route as needed (Shortness of Breath).       acetaminophen (TYLENOL) 500 mg tablet Take 500 mg by mouth every six (6) hours as needed for Pain. !! albuterol-ipratropium (DUO-NEB) 2.5 mg-0.5 mg/3 ml nebu 3 mL by Nebulization route three (3) times daily. Qty: 90 Nebule, Refills: 0      ANORO ELLIPTA 62.5-25 mcg/actuation inhaler       COMBIVENT RESPIMAT  mcg/actuation inhaler INHALE 1 PUFF THREE TIMES A DAY  Qty: 1 Inhaler, Refills: 11      aspirin delayed-release 81 mg tablet Take  by mouth daily. Azelastine (ASTEPRO) 0.15 % (205.5 mcg) nasal spray        !! - Potential duplicate medications found. Please discuss with provider. 2.   Follow-up Information     Follow up With Specialties Details Why Contact Info    Lamine Rosado MD Internal Medicine   [address]  [phone]      Torri Saldaña MD General and Vascular Surgery Call inform them of ER visit and schedule follow up  [address] [place] [address] Rd  [address] [phone]      [facility] EMERGENCY DEPT Emergency Medicine  If symptoms worsen [address] [place] [address]  [phone]        Return to ED if worse     Diagnosis     Clinical Impression:   1. Leg edema, left              This note will not be viewable in [employer].

## 2018-12-27 NOTE — ED TRIAGE NOTES
Assumed care of this patient in JET. She arrived via wheelchair on oxygen at 2L NC. Patient reports that she had left lower extremity swelling and tenderness in her calf area x1 week.

## 2019-02-18 ENCOUNTER — APPOINTMENT (OUTPATIENT)
Dept: GENERAL RADIOLOGY | Age: 84
DRG: 190 | End: 2019-02-18
Attending: EMERGENCY MEDICINE
Payer: MEDICARE

## 2019-02-18 ENCOUNTER — APPOINTMENT (OUTPATIENT)
Dept: CT IMAGING | Age: 84
DRG: 190 | End: 2019-02-18
Attending: EMERGENCY MEDICINE
Payer: MEDICARE

## 2019-02-18 ENCOUNTER — HOSPITAL ENCOUNTER (INPATIENT)
Age: 84
LOS: 2 days | Discharge: HOME | DRG: 190 | End: 2019-02-20
Attending: EMERGENCY MEDICINE | Admitting: INTERNAL MEDICINE
Payer: MEDICARE

## 2019-02-18 DIAGNOSIS — J44.1 COPD EXACERBATION (HCC): ICD-10-CM

## 2019-02-18 DIAGNOSIS — J96.01 ACUTE RESPIRATORY FAILURE WITH HYPOXIA (HCC): Primary | ICD-10-CM

## 2019-02-18 PROBLEM — J96.00 ACUTE RESPIRATORY FAILURE (HCC): Status: ACTIVE | Noted: 2019-02-18

## 2019-02-18 LAB
ALBUMIN SERPL-MCNC: 3.7 G/DL (ref 3.5–5)
ALBUMIN/GLOB SERPL: 1.2 {RATIO} (ref 1.1–2.2)
ALP SERPL-CCNC: 67 U/L (ref 45–117)
ALT SERPL-CCNC: 12 U/L (ref 12–78)
ANION GAP SERPL CALC-SCNC: 8 MMOL/L (ref 5–15)
AST SERPL-CCNC: 15 U/L (ref 15–37)
ATRIAL RATE: 74 BPM
BASOPHILS # BLD: 0.1 K/UL (ref 0–0.1)
BASOPHILS NFR BLD: 1 % (ref 0–1)
BILIRUB SERPL-MCNC: 0.5 MG/DL (ref 0.2–1)
BNP SERPL-MCNC: 2113 PG/ML
BUN SERPL-MCNC: 18 MG/DL (ref 6–20)
BUN/CREAT SERPL: 24 (ref 12–20)
CALCIUM SERPL-MCNC: 8.7 MG/DL (ref 8.5–10.1)
CALCULATED P AXIS, ECG09: 56 DEGREES
CALCULATED R AXIS, ECG10: -18 DEGREES
CALCULATED T AXIS, ECG11: 91 DEGREES
CHLORIDE SERPL-SCNC: 97 MMOL/L (ref 97–108)
CK SERPL-CCNC: 66 U/L (ref 26–192)
CO2 SERPL-SCNC: 25 MMOL/L (ref 21–32)
COMMENT, HOLDF: NORMAL
CREAT SERPL-MCNC: 0.76 MG/DL (ref 0.55–1.02)
D DIMER PPP FEU-MCNC: 1.47 MG/L FEU (ref 0–0.65)
DIAGNOSIS, 93000: NORMAL
DIFFERENTIAL METHOD BLD: NORMAL
EOSINOPHIL # BLD: 0.4 K/UL (ref 0–0.4)
EOSINOPHIL NFR BLD: 6 % (ref 0–7)
ERYTHROCYTE [DISTWIDTH] IN BLOOD BY AUTOMATED COUNT: 14.3 % (ref 11.5–14.5)
GLOBULIN SER CALC-MCNC: 3.1 G/DL (ref 2–4)
GLUCOSE SERPL-MCNC: 88 MG/DL (ref 65–100)
HCT VFR BLD AUTO: 39.9 % (ref 35–47)
HGB BLD-MCNC: 13.3 G/DL (ref 11.5–16)
IMM GRANULOCYTES # BLD AUTO: 0 K/UL (ref 0–0.04)
IMM GRANULOCYTES NFR BLD AUTO: 0 % (ref 0–0.5)
LACTATE BLD-SCNC: 1.06 MMOL/L (ref 0.4–2)
LYMPHOCYTES # BLD: 1.1 K/UL (ref 0.8–3.5)
LYMPHOCYTES NFR BLD: 15 % (ref 12–49)
MCH RBC QN AUTO: 29.8 PG (ref 26–34)
MCHC RBC AUTO-ENTMCNC: 33.3 G/DL (ref 30–36.5)
MCV RBC AUTO: 89.5 FL (ref 80–99)
MONOCYTES # BLD: 0.6 K/UL (ref 0–1)
MONOCYTES NFR BLD: 9 % (ref 5–13)
NEUTS SEG # BLD: 5.1 K/UL (ref 1.8–8)
NEUTS SEG NFR BLD: 70 % (ref 32–75)
NRBC # BLD: 0 K/UL (ref 0–0.01)
NRBC BLD-RTO: 0 PER 100 WBC
P-R INTERVAL, ECG05: 216 MS
PLATELET # BLD AUTO: 244 K/UL (ref 150–400)
PMV BLD AUTO: 9.1 FL (ref 8.9–12.9)
POTASSIUM SERPL-SCNC: 4.5 MMOL/L (ref 3.5–5.1)
PROT SERPL-MCNC: 6.8 G/DL (ref 6.4–8.2)
Q-T INTERVAL, ECG07: 490 MS
QRS DURATION, ECG06: 150 MS
QTC CALCULATION (BEZET), ECG08: 543 MS
RBC # BLD AUTO: 4.46 M/UL (ref 3.8–5.2)
SODIUM SERPL-SCNC: 130 MMOL/L (ref 136–145)
TROPONIN I SERPL-MCNC: <0.05 NG/ML
VENTRICULAR RATE, ECG03: 74 BPM
WBC # BLD AUTO: 7.2 K/UL (ref 3.6–11)

## 2019-02-18 PROCEDURE — 74011000250 HC RX REV CODE- 250: Performed by: EMERGENCY MEDICINE

## 2019-02-18 PROCEDURE — 71045 X-RAY EXAM CHEST 1 VIEW: CPT

## 2019-02-18 PROCEDURE — 71275 CT ANGIOGRAPHY CHEST: CPT

## 2019-02-18 PROCEDURE — 94640 AIRWAY INHALATION TREATMENT: CPT

## 2019-02-18 PROCEDURE — 74011000250 HC RX REV CODE- 250: Performed by: INTERNAL MEDICINE

## 2019-02-18 PROCEDURE — 82550 ASSAY OF CK (CPK): CPT

## 2019-02-18 PROCEDURE — 65270000015 HC RM PRIVATE ONCOLOGY

## 2019-02-18 PROCEDURE — 85379 FIBRIN DEGRADATION QUANT: CPT

## 2019-02-18 PROCEDURE — 74011250636 HC RX REV CODE- 250/636: Performed by: INTERNAL MEDICINE

## 2019-02-18 PROCEDURE — 80053 COMPREHEN METABOLIC PANEL: CPT

## 2019-02-18 PROCEDURE — 83880 ASSAY OF NATRIURETIC PEPTIDE: CPT

## 2019-02-18 PROCEDURE — 94760 N-INVAS EAR/PLS OXIMETRY 1: CPT

## 2019-02-18 PROCEDURE — 74011250637 HC RX REV CODE- 250/637: Performed by: INTERNAL MEDICINE

## 2019-02-18 PROCEDURE — 83605 ASSAY OF LACTIC ACID: CPT

## 2019-02-18 PROCEDURE — 96374 THER/PROPH/DIAG INJ IV PUSH: CPT

## 2019-02-18 PROCEDURE — 96375 TX/PRO/DX INJ NEW DRUG ADDON: CPT

## 2019-02-18 PROCEDURE — 99285 EMERGENCY DEPT VISIT HI MDM: CPT

## 2019-02-18 PROCEDURE — 84484 ASSAY OF TROPONIN QUANT: CPT

## 2019-02-18 PROCEDURE — 74011636320 HC RX REV CODE- 636/320: Performed by: EMERGENCY MEDICINE

## 2019-02-18 PROCEDURE — 74011250636 HC RX REV CODE- 250/636: Performed by: EMERGENCY MEDICINE

## 2019-02-18 PROCEDURE — 36415 COLL VENOUS BLD VENIPUNCTURE: CPT

## 2019-02-18 PROCEDURE — 85025 COMPLETE CBC W/AUTO DIFF WBC: CPT

## 2019-02-18 PROCEDURE — 93005 ELECTROCARDIOGRAM TRACING: CPT

## 2019-02-18 PROCEDURE — 94761 N-INVAS EAR/PLS OXIMETRY MLT: CPT

## 2019-02-18 RX ORDER — SODIUM CHLORIDE 0.9 % (FLUSH) 0.9 %
10 SYRINGE (ML) INJECTION
Status: COMPLETED | OUTPATIENT
Start: 2019-02-18 | End: 2019-02-18

## 2019-02-18 RX ORDER — HYDRALAZINE HYDROCHLORIDE 25 MG/1
25 TABLET, FILM COATED ORAL
Status: DISCONTINUED | OUTPATIENT
Start: 2019-02-18 | End: 2019-02-20 | Stop reason: HOSPADM

## 2019-02-18 RX ORDER — AZELASTINE 1 MG/ML
2 SPRAY, METERED NASAL 2 TIMES DAILY
Status: DISCONTINUED | OUTPATIENT
Start: 2019-02-18 | End: 2019-02-20 | Stop reason: HOSPADM

## 2019-02-18 RX ORDER — SODIUM CHLORIDE 0.9 % (FLUSH) 0.9 %
5-40 SYRINGE (ML) INJECTION AS NEEDED
Status: DISCONTINUED | OUTPATIENT
Start: 2019-02-18 | End: 2019-02-20 | Stop reason: HOSPADM

## 2019-02-18 RX ORDER — MAG HYDROX/ALUMINUM HYD/SIMETH 200-200-20
30 SUSPENSION, ORAL (FINAL DOSE FORM) ORAL DAILY PRN
Status: DISCONTINUED | OUTPATIENT
Start: 2019-02-18 | End: 2019-02-20 | Stop reason: HOSPADM

## 2019-02-18 RX ORDER — ALBUTEROL SULFATE 0.83 MG/ML
5 SOLUTION RESPIRATORY (INHALATION)
Status: COMPLETED | OUTPATIENT
Start: 2019-02-18 | End: 2019-02-18

## 2019-02-18 RX ORDER — FUROSEMIDE 10 MG/ML
10 INJECTION INTRAMUSCULAR; INTRAVENOUS
Status: COMPLETED | OUTPATIENT
Start: 2019-02-18 | End: 2019-02-18

## 2019-02-18 RX ORDER — LISINOPRIL 5 MG/1
5 TABLET ORAL DAILY
Status: DISCONTINUED | OUTPATIENT
Start: 2019-02-19 | End: 2019-02-20 | Stop reason: HOSPADM

## 2019-02-18 RX ORDER — ONDANSETRON 2 MG/ML
4 INJECTION INTRAMUSCULAR; INTRAVENOUS
Status: DISCONTINUED | OUTPATIENT
Start: 2019-02-18 | End: 2019-02-20 | Stop reason: HOSPADM

## 2019-02-18 RX ORDER — ASPIRIN 81 MG/1
81 TABLET ORAL DAILY
Status: DISCONTINUED | OUTPATIENT
Start: 2019-02-19 | End: 2019-02-20 | Stop reason: HOSPADM

## 2019-02-18 RX ORDER — ENOXAPARIN SODIUM 100 MG/ML
30 INJECTION SUBCUTANEOUS EVERY 24 HOURS
Status: DISCONTINUED | OUTPATIENT
Start: 2019-02-19 | End: 2019-02-20 | Stop reason: HOSPADM

## 2019-02-18 RX ORDER — CLONIDINE HYDROCHLORIDE 0.1 MG/1
0.1 TABLET ORAL 3 TIMES DAILY
Status: DISCONTINUED | OUTPATIENT
Start: 2019-02-18 | End: 2019-02-18

## 2019-02-18 RX ORDER — NALOXONE HYDROCHLORIDE 0.4 MG/ML
0.4 INJECTION, SOLUTION INTRAMUSCULAR; INTRAVENOUS; SUBCUTANEOUS AS NEEDED
Status: DISCONTINUED | OUTPATIENT
Start: 2019-02-18 | End: 2019-02-20 | Stop reason: HOSPADM

## 2019-02-18 RX ORDER — ACETAMINOPHEN 325 MG/1
650 TABLET ORAL
Status: DISCONTINUED | OUTPATIENT
Start: 2019-02-18 | End: 2019-02-20 | Stop reason: HOSPADM

## 2019-02-18 RX ORDER — IPRATROPIUM BROMIDE AND ALBUTEROL SULFATE 2.5; .5 MG/3ML; MG/3ML
3 SOLUTION RESPIRATORY (INHALATION)
Status: DISCONTINUED | OUTPATIENT
Start: 2019-02-18 | End: 2019-02-20 | Stop reason: HOSPADM

## 2019-02-18 RX ORDER — OXYCODONE AND ACETAMINOPHEN 5; 325 MG/1; MG/1
1 TABLET ORAL
Status: DISCONTINUED | OUTPATIENT
Start: 2019-02-18 | End: 2019-02-20 | Stop reason: HOSPADM

## 2019-02-18 RX ORDER — SODIUM CHLORIDE 0.9 % (FLUSH) 0.9 %
5-40 SYRINGE (ML) INJECTION EVERY 8 HOURS
Status: DISCONTINUED | OUTPATIENT
Start: 2019-02-18 | End: 2019-02-20 | Stop reason: HOSPADM

## 2019-02-18 RX ADMIN — Medication 10 ML: at 15:00

## 2019-02-18 RX ADMIN — FUROSEMIDE 10 MG: 10 INJECTION, SOLUTION INTRAMUSCULAR; INTRAVENOUS at 14:09

## 2019-02-18 RX ADMIN — METHYLPREDNISOLONE SODIUM SUCCINATE 40 MG: 125 INJECTION, POWDER, FOR SOLUTION INTRAMUSCULAR; INTRAVENOUS at 21:50

## 2019-02-18 RX ADMIN — Medication 10 ML: at 21:50

## 2019-02-18 RX ADMIN — Medication 10 ML: at 17:53

## 2019-02-18 RX ADMIN — METHYLPREDNISOLONE SODIUM SUCCINATE 125 MG: 125 INJECTION, POWDER, FOR SOLUTION INTRAMUSCULAR; INTRAVENOUS at 12:44

## 2019-02-18 RX ADMIN — METHYLPREDNISOLONE SODIUM SUCCINATE 40 MG: 125 INJECTION, POWDER, FOR SOLUTION INTRAMUSCULAR; INTRAVENOUS at 17:54

## 2019-02-18 RX ADMIN — NITROGLYCERIN 0.5 INCH: 20 OINTMENT TOPICAL at 17:53

## 2019-02-18 RX ADMIN — IOPAMIDOL 100 ML: 755 INJECTION, SOLUTION INTRAVENOUS at 15:00

## 2019-02-18 RX ADMIN — ALBUTEROL SULFATE 5 MG: 2.5 SOLUTION RESPIRATORY (INHALATION) at 12:44

## 2019-02-18 RX ADMIN — IPRATROPIUM BROMIDE AND ALBUTEROL SULFATE 3 ML: .5; 3 SOLUTION RESPIRATORY (INHALATION) at 19:20

## 2019-02-18 RX ADMIN — ALUMINUM HYDROXIDE, MAGNESIUM HYDROXIDE, AND SIMETHICONE 30 ML: 200; 200; 20 SUSPENSION ORAL at 21:50

## 2019-02-18 RX ADMIN — ACETAMINOPHEN 650 MG: 325 TABLET ORAL at 18:26

## 2019-02-18 NOTE — PROGRESS NOTES
TRANSFER - IN REPORT: 
 
Verbal report received from trung Duran(name) on David Lemons  being received from ED(unit) for routine progression of care Report consisted of patients Situation, Background, Assessment and  
Recommendations(SBAR). Information from the following report(s) SBAR, Procedure Summary, Intake/Output, MAR and Recent Results was reviewed with the receiving nurse. Opportunity for questions and clarification was provided. Assessment completed upon patients arrival to unit and care assumed.

## 2019-02-18 NOTE — ED NOTES
1200- Assumed care of patient. Patient is alert and oriented. Patient arrives via EMS with c/o acute on chronic shortness of breath. Hx of COPD and baseline 2L NC. Monitor x 3 applied. Patient positioned for comfort with call bell within reach. Side rails up for safety. Provider to evaluate patient. 1300- Patient resting comfortably.

## 2019-02-18 NOTE — H&P
Hospitalist Admission NoteNAME: Mikaela Poole :  11/10/1931 MRN:  [id number] Date/Time:  2019 5:09 PM 
 
Patient PCP: Lorna Hardy MD 
______________________________________________________________________ Assessment & Plan: COPD with acute exacerbation 
--Suspect that she has had a viral process resulting in COPD exacerbation and she is not septic, therefore will withhold antibiotics at present 
--Start empiric steroids and nebulized bronchodilators 
--Supplemental oxygen support as needed --Respiratory pathogen panel ordered Accelerated hypertension 
--Suspect modest troponin leak and elevated BNP may be a consequence of her present blood pressures --Add nitropaste; hydralazine PRN 
--Patient endorses good medication compliance 
--Home medications ordered Hyponatremia 
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 
--Modest and asymptomatic, monitor Congenital solitary kidney 
--Creatinine within patient's baseline range History of DVT Body mass index is 23.49 kg/m². Code: DNR 
DVT prophylaxis: Lovenox Surrogate decision maker: Prasanna Ortiz,  [phone] Subjective: CHIEF COMPLAINT:   
 
HISTORY OF PRESENT ILLNESS:    
Mikaela Poole is a 80 y.o.  female whose history includes COPD, hypertension, hyponatremia, who presents with complaint noted above. She reports that she has been having problems with dyspnea and wheezing that have been getting progressively worse over the last two weeks. She has since developed a cough that is productive of thick whitish phlegm as well as some nasal congestion and postnasal drip. She denies fevers and chills, denies sick contacts, did not try anything at home apart from her usual COPD maintenance regimen. Today, she felt much worse with increased dyspnea and therefore presented to the ER today.  On presentation in the ER, she was found to have some reduced air exchange and wheezing, as well as oxygen desaturation with ambulation. She has a history of DVT in the left leg as well as PAD in the left leg requiring bypass surgery and says that the left leg is always a bit more swollen compared to the right; she indicates that today her left leg girth is within her usual baseline. We were asked to admit for work up and evaluation of the above problems. Past Medical History:  
Diagnosis Date  Arthritis   
 generalized  COPD  Essential hypertension 2018  former smoker   
  >60pkyr quit 1/3/11  
 h/o DVT  left leg  ischemic left lower extremitiy pain   
 above knee FemPop 1/3/11  PVD (peripheral vascular disease) ([facility] [address]  Seasonal allergic rhinitis  Single kidney Past Surgical History:  
Procedure Laterality Date [address] excision left breast cyst  
 HX GI  10 yrs ago  
 colonoscopy  HX GYN    
 3 miscarriges  HX GYN    
 1 vaginal birth  HX ORTHOPAEDIC    
 veinography left leg, stent left leg Social History Tobacco Use  Smoking status: Former Smoker Packs/day: 1.00 Years: 60.00 Pack years: 60.00 Last attempt to quit: 1/3/2011 Years since quittin.1  Smokeless tobacco: Never Used Substance Use Topics  Alcohol use: Yes Comment: occasional liquor after dinner Drug use:  denies Family History Problem Relation Age of Onset  Cancer Mother   
     colon  Cancer Sister   
     lung Allergies Allergen Reactions  Food Extracts Diarrhea Onions and garlic causes abdominal pain,cramping  Sulfa (Sulfonamide Antibiotics) Other (comments) Altered Mental Status Prior to Admission medications Medication Sig Start Date End Date Taking? Authorizing Provider  
lisinopril (PRINIVIL, ZESTRIL) 5 mg tablet Take 1 Tab by mouth daily. 1/15/19  Yes Sai Arnold MD  
COMBIVENT RESPIMAT  mcg/actuation inhaler INHALE 1 PUFF THREE TIMES A DAY 5/9/17  Yes Sai Arnold MD  
aspirin delayed-release 81 mg tablet Take  by mouth daily. Yes Provider, Historical  
albuterol-ipratropium (DUO-NEB) 2.5 mg-0.5 mg/3 ml nebu 3 mL by Nebulization route every four (4) hours as needed. 8/1/18   Luma Amaya MD  
alum-mag hydroxide-simeth (MYLANTA) 200-200-20 mg/5 mL susp Take 30 mL by mouth every four (4) hours as needed. 8/1/18   Luma Amaya MD  
OXYGEN-AIR DELIVERY SYSTEMS 2 L/min by Nasal route as needed (Shortness of Breath). Provider, Historical  
acetaminophen (TYLENOL) 500 mg tablet Take 500 mg by mouth every six (6) hours as needed for Pain. Provider, Historical  
albuterol-ipratropium (DUO-NEB) 2.5 mg-0.5 mg/3 ml nebu 3 mL by Nebulization route three (3) times daily. Patient taking differently: 3 mL by Nebulization route four (4) times daily. 6/6/17   Stephanie Miller MD  
Azelastine (ASTEPRO) 0.15 % (205.5 mcg) nasal spray  5/11/17   Provider, Historical  
[place]ro [place] 62.5-25 mcg/actuation inhaler  5/8/17   Provider, Historical  
 
REVIEW OF SYSTEMS:  POSITIVE= Bold. Negative = normal text General:  fever, chills, sweats, generalized weakness, weight loss/gain, loss of appetite Eyes:  blurred vision, eye pain, loss of vision, diplopia Ear Nose and Throat:  rhinorrhea, pharyngitis Respiratory:   cough, sputum production, SOB, wheezing, GODINEZ, pleuritic pain 
Cardiology:  chest pain, palpitations, orthopnea, PND, edema, syncope Gastrointestinal:  abdominal pain, N/V, dysphagia, diarrhea, constipation, bleeding Genitourinary:  frequency, urgency, dysuria, hematuria, incontinence Muskuloskeletal :  arthralgia, myalgia Hematology:  easy bruising, bleeding, lymphadenopathy Dermatological:  rash, ulceration, pruritis Endocrine:  hot flashes or polydipsia Neurological:  headache, dizziness, confusion, focal weakness, paresthesia, memory loss, gait disturbance Psychological: anxiety, depression, agitation Objective: VITALS:   
Visit Vitals BP (!) 181/131 Pulse 83 Temp 97.7 °F (36.5 °C) Resp 22 Wt 68 kg (150 lb) SpO2 92% BMI 23.49 kg/m² Temp (24hrs), Av.7 °F (36.5 °C), Min:97.7 °F (36.5 °C), Max:97.7 °F (36.5 °C) Body mass index is 23.49 kg/m². PHYSICAL EXAM: 
 
General:    Alert, cooperative, no distress, appears stated age. HEENT: Atraumatic, anicteric sclerae, pink conjunctivae No oral ulcers, mucosa moist, throat clear. Hearing intact. Neck:  Supple, symmetrical,  thyroid: non tender Lungs:   Diffuse end-expiratory wheezes; tachypneic with intermittent use of SCMs to assist with breathing. Chest wall:  No tenderness Heart:   Regular  rhythm,  No  murmur   No gallop. Abdomen:   Soft, non-tender. Not distended. Bowel sounds normal. No masses Extremities: No cyanosis. No clubbing. Slight erythema or left lower extremity with slightly enlarged girth compared to right lower extremity, baseline per patient. Skin:     Not pale Not Jaundiced  No rashes Psych:  Good insight. Not depressed. Not anxious or agitated. Neurologic: EOMs intact. No facial asymmetry. No aphasia or slurred speech. Symmetrical strength, Alert and oriented X 3. Peripheral pulse: Bilateral, DP, Radial, 2+ Capillary refill:  normal 
 
IMAGING RESULTS: 
 [x]       I have personally reviewed the actual   [x]     CXR  [x]     CT scan CXR: 
CT : 
EKG: 
 ________________________________________________________________________ Care Plan discussed with: 
  Comments Patient x Family RN Care Manager Consultant:     
________________________________________________________________________ Prophylaxis: 
GI N/A  
DVT Lovenox  
________________________________________________________________________ Recommended Disposition:  
Home with Family x HH/PT/OT/RN   
SNF/LTC   
JORGE ALBERTO   
________________________________________________________________________ Code Status: 
Full Code DNR/DNI x  
________________________________________________________________________ TOTAL TIME:  45 minutes Comments  
 x Reviewed previous records  
>50% of visit spent in counseling and coordination of care x Discussion with patient and/or family and questions answered 
  
 
 
______________________________________________________________________ Mandi Vasquez MD 
 
 
Procedures: see electronic medical records for all procedures/Xrays and details which were not copied into this note but were reviewed prior to creation of Plan. LAB DATA REVIEWED:   
Recent Results (from the past 24 hour(s)) EKG, 12 LEAD, INITIAL Collection Time: 02/18/19 11:51 AM  
Result Value Ref Range Ventricular Rate 74 BPM  
 Atrial Rate 74 BPM  
 P-R Interval 216 ms  
 QRS Duration 150 ms  
 Q-T Interval 490 ms QTC Calculation (Bezet) 543 ms Calculated P Axis 56 degrees Calculated R Axis -18 degrees Calculated T Axis 91 degrees Diagnosis Sinus rhythm with 1st degree AV block with fusion complexes Left bundle branch block When compared with ECG of 27-JUL-2018 12:56, 
fusion complexes are now present T wave amplitude has increased in Inferior leads QT has lengthened Confirmed by Андрей Miner ([id number]) on 2/18/2019 3:42:09 PM 
  
POC LACTIC ACID Collection Time: 02/18/19 12:22 PM  
Result Value Ref Range Lactic Acid (POC) 1.06 0.40 - 2.00 mmol/L  
D DIMER Collection Time: 02/18/19 12:43 PM  
Result Value Ref Range D-dimer 1.47 (H) 0.00 - 0.65 mg/L FEU NT-PRO BNP Collection Time: 02/18/19 12:59 PM  
Result Value Ref Range NT pro-BNP 2,113 (H) <450 PG/ML  
TROPONIN I Collection Time: 02/18/19 12:59 PM  
Result Value Ref Range Troponin-I, Qt. <0.05 <0.05 ng/mL CK W/ REFLX CKMB Collection Time: 02/18/19 12:59 PM  
Result Value Ref Range CK 66 26 - 276 U/L  
METABOLIC PANEL, COMPREHENSIVE Collection Time: 02/18/19 12:59 PM  
Result Value Ref Range Sodium 130 (L) 136 - 145 mmol/L Potassium 4.5 3.5 - 5.1 mmol/L Chloride 97 97 - 108 mmol/L  
 CO2 25 21 - 32 mmol/L Anion gap 8 5 - 15 mmol/L Glucose 88 65 - 100 mg/dL BUN 18 6 - 20 MG/DL Creatinine 0.76 0.55 - 1.02 MG/DL  
 BUN/Creatinine ratio 24 (H) 12 - 20 GFR est AA >60 >60 ml/min/1.73m2 GFR est non-AA >60 >60 ml/min/1.73m2 Calcium 8.7 8.5 - 10.1 MG/DL Bilirubin, total 0.5 0.2 - 1.0 MG/DL  
 ALT (SGPT) 12 12 - 78 U/L  
 AST (SGOT) 15 15 - 37 U/L Alk. phosphatase 67 45 - 117 U/L Protein, total 6.8 6.4 - 8.2 g/dL Albumin 3.7 3.5 - 5.0 g/dL Globulin 3.1 2.0 - 4.0 g/dL A-G Ratio 1.2 1.1 - 2.2    
CBC WITH AUTOMATED DIFF Collection Time: 02/18/19 12:59 PM  
Result Value Ref Range WBC 7.2 3.6 - 11.0 K/uL  
 RBC 4.46 3.80 - 5.20 M/uL  
 HGB 13.3 11.5 - 16.0 g/dL HCT 39.9 35.0 - 47.0 % MCV 89.5 80.0 - 99.0 FL  
 MCH 29.8 26.0 - 34.0 PG  
 MCHC 33.3 30.0 - 36.5 g/dL  
 RDW 14.3 11.5 - 14.5 % PLATELET 928 477 - 487 K/uL MPV 9.1 8.9 - 12.9 FL  
 NRBC 0.0 0  WBC ABSOLUTE NRBC 0.00 0.00 - 0.01 K/uL NEUTROPHILS 70 32 - 75 % LYMPHOCYTES 15 12 - 49 % MONOCYTES 9 5 - 13 % EOSINOPHILS 6 0 - 7 % BASOPHILS 1 0 - 1 % IMMATURE GRANULOCYTES 0 0.0 - 0.5 % ABS. NEUTROPHILS 5.1 1.8 - 8.0 K/UL  
 ABS. LYMPHOCYTES 1.1 0.8 - 3.5 K/UL  
 ABS. MONOCYTES 0.6 0.0 - 1.0 K/UL  
 ABS. EOSINOPHILS 0.4 0.0 - 0.4 K/UL  
 ABS. BASOPHILS 0.1 0.0 - 0.1 K/UL  
 ABS. IMM. GRANS. 0.0 0.00 - 0.04 K/UL  
 DF AUTOMATED    
SAMPLES BEING HELD Collection Time: 02/18/19 12:59 PM  
Result Value Ref Range COMMENT Add-on orders for these samples will be processed based on acceptable specimen integrity and analyte stability, which may vary by analyte.

## 2019-02-18 NOTE — ED PROVIDER NOTES
EMERGENCY DEPARTMENT HISTORY AND PHYSICAL EXAM 
 
 
Date: 2/18/2019 Patient Name: Filiberto Segundo History of Presenting Illness Chief Complaint Patient presents with  Shortness of Breath  
  acute on chronic shortness of breath with cough x today; denies pain History Provided By: Patient HPI: Filiberto Segundo, 80 y.o. female with PMHx significant for COPD, PVD, essential HTN and DVT, presents via EMS to the ED with cc of gradual onset, constant, worsening SOB x 1 week with associated generalized weakness, LLE edema, LLE pain, nausea and a productive cough. SOB is worsened with exertion. Cough production is clear and thick. Pt states her SOB has been going on for 1 week but at 8am this morning she was awoken to a sudden worsening of her symptoms, prompting the call for EMS. EMS states she is on 2L O2 at baseline and her vital signs were stable during transport. She is not on any blood thinners. She ambulates with a walker. Pt is a former smoker but denies drinking or illicit drug use. Pt specifically denies any fever, chills, CP or vomiting. There are no other complaints, changes, or physical findings at this time. PCP: Sai Arnold MD 
 
No current facility-administered medications on file prior to encounter. Current Outpatient Medications on File Prior to Encounter Medication Sig Dispense Refill  lisinopril (PRINIVIL, ZESTRIL) 5 mg tablet Take 1 Tab by mouth daily. 30 Tab 3  
 COMBIVENT RESPIMAT  mcg/actuation inhaler INHALE 1 PUFF THREE TIMES A DAY 1 Inhaler 11  
 aspirin delayed-release 81 mg tablet Take  by mouth daily.  albuterol-ipratropium (DUO-NEB) 2.5 mg-0.5 mg/3 ml nebu 3 mL by Nebulization route every four (4) hours as needed. 30 Nebule 0  
 alum-mag hydroxide-simeth (MYLANTA) 200-200-20 mg/5 mL susp Take 30 mL by mouth every four (4) hours as needed.  1 Bottle 0  
 OXYGEN-AIR DELIVERY SYSTEMS 2 L/min by Nasal route as needed (Shortness of Breath).  acetaminophen (TYLENOL) 500 mg tablet Take 500 mg by mouth every six (6) hours as needed for Pain.  albuterol-ipratropium (DUO-NEB) 2.5 mg-0.5 mg/3 ml nebu 3 mL by Nebulization route three (3) times daily. (Patient taking differently: 3 mL by Nebulization route four (4) times daily.) 90 Nebule 0  
 Azelastine (ASTEPRO) 0.15 % (205.5 mcg) nasal spray  ANORO ELLIPTA 62.5-25 mcg/actuation inhaler Past History Past Medical History: 
Past Medical History:  
Diagnosis Date  Arthritis   
 generalized  COPD  Essential hypertension 2018  former smoker   
  >60pkyr quit 1/3/11  
 h/o DVT  left leg  ischemic left lower extremitiy pain   
 above knee FemPop 1/3/11  PVD (peripheral vascular disease) ([facility] [address]  Seasonal allergic rhinitis  Single kidney Past Surgical History: 
Past Surgical History:  
Procedure Laterality Date [address] excision left breast cyst  
 HX GI  10 yrs ago  
 colonoscopy  HX GYN    
 3 miscarriges  HX GYN    
 1 vaginal birth  HX ORTHOPAEDIC    
 veinography left leg, stent left leg Family History: 
Family History Problem Relation Age of Onset  Cancer Mother   
     colon  Cancer Sister   
     lung Social History: 
Social History Tobacco Use  Smoking status: Former Smoker Packs/day: 1.00 Years: 60.00 Pack years: 60.00 Last attempt to quit: 1/3/2011 Years since quittin.1  Smokeless tobacco: Never Used Substance Use Topics  Alcohol use: Yes Comment: occasional liquor after dinner  Drug use: No  
 
 
Allergies: Allergies Allergen Reactions  Food Extracts Diarrhea Onions and garlic causes abdominal pain,cramping  Sulfa (Sulfonamide Antibiotics) Other (comments) Altered Mental Status Review of Systems Review of Systems Constitutional: Negative for fever. HENT: Negative for congestion. Eyes: Negative. Respiratory: Positive for cough and shortness of breath. Cardiovascular: Positive for leg swelling (LLE). Gastrointestinal: Positive for nausea. Endocrine: Negative for heat intolerance. Genitourinary: Negative for dysuria. Musculoskeletal: Positive for myalgias. Skin: Negative for rash. Allergic/Immunologic: Negative for immunocompromised state. Neurological: Positive for weakness. Hematological: Does not bruise/bleed easily. Psychiatric/Behavioral: Negative. All other systems reviewed and are negative. Physical Exam  
Physical Exam  
Constitutional: She is oriented to person, place, and time. She appears well-developed and well-nourished. No distress. HENT:  
Head: Normocephalic and atraumatic. Eyes: EOM are normal. Pupils are equal, round, and reactive to light. Neck: Normal range of motion. Cardiovascular: Normal rate, regular rhythm and normal heart sounds. Pulmonary/Chest:  
Slightly decreased air exchange Abdominal: Soft. Bowel sounds are normal. She exhibits no mass. There is no tenderness. Musculoskeletal: Normal range of motion. She exhibits edema (1+ edema in the LLE). No LLE tenderness to palpation Neurological: She is alert and oriented to person, place, and time. Coordination normal.  
Skin: Skin is warm and dry. Psychiatric: She has a normal mood and affect. Her behavior is normal.  
Nursing note and vitals reviewed. Diagnostic Study Results Labs - Recent Results (from the past 12 hour(s)) EKG, 12 LEAD, INITIAL Collection Time: 02/18/19 11:51 AM  
Result Value Ref Range Ventricular Rate 74 BPM  
 Atrial Rate 74 BPM  
 P-R Interval 216 ms  
 QRS Duration 150 ms  
 Q-T Interval 490 ms QTC Calculation (Bezet) 543 ms Calculated P Axis 56 degrees Calculated R Axis -18 degrees Calculated T Axis 91 degrees Diagnosis Sinus rhythm with 1st degree AV block with fusion complexes Left bundle branch block When compared with ECG of 27-JUL-2018 12:56, 
fusion complexes are now present T wave amplitude has increased in Inferior leads QT has lengthened Confirmed by Thiago Barnard ([id number]) on 2/18/2019 3:42:09 PM 
  
POC LACTIC ACID Collection Time: 02/18/19 12:22 PM  
Result Value Ref Range Lactic Acid (POC) 1.06 0.40 - 2.00 mmol/L  
D DIMER Collection Time: 02/18/19 12:43 PM  
Result Value Ref Range D-dimer 1.47 (H) 0.00 - 0.65 mg/L FEU NT-PRO BNP Collection Time: 02/18/19 12:59 PM  
Result Value Ref Range NT pro-BNP 2,113 (H) <450 PG/ML  
TROPONIN I Collection Time: 02/18/19 12:59 PM  
Result Value Ref Range Troponin-I, Qt. <0.05 <0.05 ng/mL CK W/ REFLX CKMB Collection Time: 02/18/19 12:59 PM  
Result Value Ref Range CK 66 26 - 275 U/L  
METABOLIC PANEL, COMPREHENSIVE Collection Time: 02/18/19 12:59 PM  
Result Value Ref Range Sodium 130 (L) 136 - 145 mmol/L Potassium 4.5 3.5 - 5.1 mmol/L Chloride 97 97 - 108 mmol/L  
 CO2 25 21 - 32 mmol/L Anion gap 8 5 - 15 mmol/L Glucose 88 65 - 100 mg/dL BUN 18 6 - 20 MG/DL Creatinine 0.76 0.55 - 1.02 MG/DL  
 BUN/Creatinine ratio 24 (H) 12 - 20 GFR est AA >60 >60 ml/min/1.73m2 GFR est non-AA >60 >60 ml/min/1.73m2 Calcium 8.7 8.5 - 10.1 MG/DL Bilirubin, total 0.5 0.2 - 1.0 MG/DL  
 ALT (SGPT) 12 12 - 78 U/L  
 AST (SGOT) 15 15 - 37 U/L Alk. phosphatase 67 45 - 117 U/L Protein, total 6.8 6.4 - 8.2 g/dL Albumin 3.7 3.5 - 5.0 g/dL Globulin 3.1 2.0 - 4.0 g/dL A-G Ratio 1.2 1.1 - 2.2    
CBC WITH AUTOMATED DIFF Collection Time: 02/18/19 12:59 PM  
Result Value Ref Range WBC 7.2 3.6 - 11.0 K/uL  
 RBC 4.46 3.80 - 5.20 M/uL  
 HGB 13.3 11.5 - 16.0 g/dL HCT 39.9 35.0 - 47.0 % MCV 89.5 80.0 - 99.0 FL  
 MCH 29.8 26.0 - 34.0 PG  
 MCHC 33.3 30.0 - 36.5 g/dL  
 RDW 14.3 11.5 - 14.5 % PLATELET 859 480 - 131 K/uL MPV 9.1 8.9 - 12.9 FL  
 NRBC 0.0 0  WBC ABSOLUTE NRBC 0.00 0.00 - 0.01 K/uL NEUTROPHILS 70 32 - 75 % LYMPHOCYTES 15 12 - 49 % MONOCYTES 9 5 - 13 % EOSINOPHILS 6 0 - 7 % BASOPHILS 1 0 - 1 % IMMATURE GRANULOCYTES 0 0.0 - 0.5 % ABS. NEUTROPHILS 5.1 1.8 - 8.0 K/UL  
 ABS. LYMPHOCYTES 1.1 0.8 - 3.5 K/UL  
 ABS. MONOCYTES 0.6 0.0 - 1.0 K/UL  
 ABS. EOSINOPHILS 0.4 0.0 - 0.4 K/UL  
 ABS. BASOPHILS 0.1 0.0 - 0.1 K/UL  
 ABS. IMM. GRANS. 0.0 0.00 - 0.04 K/UL  
 DF AUTOMATED    
SAMPLES BEING HELD Collection Time: 02/18/19 12:59 PM  
Result Value Ref Range COMMENT Add-on orders for these samples will be processed based on acceptable specimen integrity and analyte stability, which may vary by analyte. Radiologic Studies -  
CTA CHEST W OR W WO CONT Final Result IMPRESSION:  
  
No evidence for pulmonary embolism. Emphysema. XR CHEST PORT Final Result IMPRESSION:  
Cardiomegaly without acute finding CT Results  (Last 48 hours) 02/18/19 1500  CTA CHEST W OR W WO CONT Final result Impression:  IMPRESSION:  
   
No evidence for pulmonary embolism. Emphysema. Narrative:  EXAM: CTA CHEST W OR W WO CONT INDICATION: Chest pain, acute, pulmonary embolism (PE) suspected COMPARISON: None. CONTRAST: 80 mL of Isovue-370. TECHNIQUE:   
Precontrast  images were obtained to localize the volume for acquisition. Multislice helical CT arteriography was performed from the diaphragm to the  
thoracic inlet during uneventful rapid bolus intravenous contrast  
administration. Lung and soft tissue windows were generated. Coronal and  
sagittal images were generated and 3D post processing consisting of coronal  
maximum intensity images was performed. CT dose reduction was achieved through  
use of a standardized protocol tailored for this examination and automatic exposure control for dose modulation. FINDINGS:  
The lungs are clear of mass, nodule, airspace disease or edema. Diffuse  
emphysematous changes are present. The pulmonary arteries are well enhanced and no pulmonary emboli are identified. There is no mediastinal or hilar adenopathy or mass. The aorta enhances normally  
without evidence of aneurysm or dissection. The visualized portions of the upper abdominal organs are normal.  
   
There is a dextroconvex thoracic curvature. CXR Results  (Last 48 hours) 02/18/19 1239  XR CHEST PORT Final result Impression:  IMPRESSION:  
Cardiomegaly without acute finding Narrative:  INDICATION: Shortness of breath COMPARISON: 7/27/2018 A single frontal view was obtained. The time of this study is 1226 hours. The  
lungs are clear. Cardiomegaly is unchanged. Atherosclerotic change of the aorta  
is noted. . Degenerative changes of the right shoulder noted. Annamary Ripa Medical Decision Making I am the first provider for this patient. I reviewed the vital signs, available nursing notes, past medical history, past surgical history, family history and social history. Vital Signs-Reviewed the patient's vital signs. Patient Vitals for the past 12 hrs: 
 Temp Pulse Resp BP SpO2  
02/18/19 1625  83   92 % 02/18/19 1622  88  (!) 181/131 (!) 75 % 02/18/19 1537  80   92 % 02/18/19 1213 97.7 °F (36.5 °C) 65 22    
02/18/19 1205     95 % 02/18/19 1204    (!) 173/101  Pulse Oximetry Analysis - 95% on 2L NC Cardiac Monitor:  
Rate: 65 bpm 
Rhythm: Normal Sinus Rhythm EKG interpretation: 1151 Rhythm: Sinus Rhythm with 1st degree AV block with fusion complexes and a LBBB; and regular . Rate (approx.): 74; Axis: normal; [place] interval: normal; QRS interval: normal ; ST/T wave: normal; Other findings.  
This note was written by MELODY Adam, as dictated by Jennifer Lainez MD. Records Reviewed: Nursing Notes and Old Medical Records Provider Notes (Medical Decision Making): DDx: COPD exacerbation, respiratory failure, CHF, PNA, bronchitis, dehydration, electrolyte abnormality, UTI, anemia, arrhythmia ED Course:  
Initial assessment performed. The patients presenting problems have been discussed, and they are in agreement with the care plan formulated and outlined with them. I have encouraged them to ask questions as they arise throughout their visit. ED Course as of Feb 18 1626 Mon Feb 18, 2019  
1623 O2 stats dropped to 77% on ambulation. Will admit to hospitalist  [AS] ED Course User Index [AS] Dejon Narayan  
 
 
CONSULT NOTE:  
4:26 PM 
Jennifer Lainez MD spoke with Dr. Mahendra Camara, Specialty: Hospitalist 
Discussed pt's hx, disposition, and available diagnostic and imaging results. Reviewed care plans. Consultant will evaluate pt for admission. Written by anticoagulation , ED Scribe, as dictated by Jennifer Lainez MD. 
 
CRITICAL CARE NOTE : 
4:26 PM 
 
IMPENDING DETERIORATION -Respiratory, Cardiovascular and Metabolic ASSOCIATED RISK FACTORS - Hypoxia, Dysrhythmia, Metabolic changes and Dehydration MANAGEMENT- Bedside Assessment and Supervision of Care INTERPRETATION -  Xrays, CT Scan, ECG and Blood Pressure INTERVENTIONS - hemodynamic mngmt and Metobolic interventions CASE REVIEW - Hospitalist and Nursing TREATMENT RESPONSE -Unchanged PERFORMED BY - Self NOTES   : 
I have spent 55 minutes of critical care time involved in lab review, consultations with specialist, family decision- making, bedside attention and documentation. During this entire length of time I was immediately available to the patient . Jennifer Lainez MD 
 
Disposition: 
Admit Note: 
4:27 PM 
Pt is being admitted by Dr. Mahendra Camara.  The results of their tests and reason(s) for their admission have been discussed with pt and/or available family. They convey agreement and understanding for the need to be admitted and for admission diagnosis. PLAN: 
1. Admit to hospitalist 
 
Diagnosis Clinical Impression: 1. Acute respiratory failure with hypoxia ([facility] [address] 2. COPD exacerbation ([facility] [address] Attestations: This note is prepared by Ysabel Truong, acting as Scribe for Nolberto Fonseca MD. 
 
Nolberto Fonseca MD: The scribe's documentation has been prepared under my direction and personally reviewed by me in its entirety. I confirm that the note above accurately reflects all work, treatment, procedures, and medical decision making performed by me.

## 2019-02-19 LAB
ANION GAP SERPL CALC-SCNC: 7 MMOL/L (ref 5–15)
BASOPHILS # BLD: 0 K/UL (ref 0–0.1)
BASOPHILS NFR BLD: 0 % (ref 0–1)
BUN SERPL-MCNC: 19 MG/DL (ref 6–20)
BUN/CREAT SERPL: 20 (ref 12–20)
CALCIUM SERPL-MCNC: 8.8 MG/DL (ref 8.5–10.1)
CHLORIDE SERPL-SCNC: 95 MMOL/L (ref 97–108)
CO2 SERPL-SCNC: 25 MMOL/L (ref 21–32)
CREAT SERPL-MCNC: 0.94 MG/DL (ref 0.55–1.02)
DIFFERENTIAL METHOD BLD: ABNORMAL
EOSINOPHIL # BLD: 0 K/UL (ref 0–0.4)
EOSINOPHIL NFR BLD: 0 % (ref 0–7)
ERYTHROCYTE [DISTWIDTH] IN BLOOD BY AUTOMATED COUNT: 14 % (ref 11.5–14.5)
GLUCOSE SERPL-MCNC: 136 MG/DL (ref 65–100)
HCT VFR BLD AUTO: 38.3 % (ref 35–47)
HGB BLD-MCNC: 12.9 G/DL (ref 11.5–16)
IMM GRANULOCYTES # BLD AUTO: 0 K/UL (ref 0–0.04)
IMM GRANULOCYTES NFR BLD AUTO: 0 % (ref 0–0.5)
LYMPHOCYTES # BLD: 0.5 K/UL (ref 0.8–3.5)
LYMPHOCYTES NFR BLD: 9 % (ref 12–49)
MCH RBC QN AUTO: 29.9 PG (ref 26–34)
MCHC RBC AUTO-ENTMCNC: 33.7 G/DL (ref 30–36.5)
MCV RBC AUTO: 88.7 FL (ref 80–99)
MONOCYTES # BLD: 0.1 K/UL (ref 0–1)
MONOCYTES NFR BLD: 2 % (ref 5–13)
NEUTS SEG # BLD: 4.4 K/UL (ref 1.8–8)
NEUTS SEG NFR BLD: 89 % (ref 32–75)
NRBC # BLD: 0 K/UL (ref 0–0.01)
NRBC BLD-RTO: 0 PER 100 WBC
OSMOLALITY UR: 658 MOSM/KG H2O
PLATELET # BLD AUTO: 285 K/UL (ref 150–400)
PMV BLD AUTO: 9.3 FL (ref 8.9–12.9)
POTASSIUM SERPL-SCNC: 4.5 MMOL/L (ref 3.5–5.1)
PROCALCITONIN SERPL-MCNC: <0.1 NG/ML
RBC # BLD AUTO: 4.32 M/UL (ref 3.8–5.2)
RBC MORPH BLD: ABNORMAL
SODIUM SERPL-SCNC: 127 MMOL/L (ref 136–145)
SODIUM UR-SCNC: 49 MMOL/L
WBC # BLD AUTO: 5 K/UL (ref 3.6–11)

## 2019-02-19 PROCEDURE — 77010033678 HC OXYGEN DAILY

## 2019-02-19 PROCEDURE — 94760 N-INVAS EAR/PLS OXIMETRY 1: CPT

## 2019-02-19 PROCEDURE — 80048 BASIC METABOLIC PNL TOTAL CA: CPT

## 2019-02-19 PROCEDURE — 94640 AIRWAY INHALATION TREATMENT: CPT

## 2019-02-19 PROCEDURE — 74011250637 HC RX REV CODE- 250/637: Performed by: INTERNAL MEDICINE

## 2019-02-19 PROCEDURE — 74011250636 HC RX REV CODE- 250/636: Performed by: INTERNAL MEDICINE

## 2019-02-19 PROCEDURE — 85025 COMPLETE CBC W/AUTO DIFF WBC: CPT

## 2019-02-19 PROCEDURE — 74011000250 HC RX REV CODE- 250: Performed by: INTERNAL MEDICINE

## 2019-02-19 PROCEDURE — 65270000015 HC RM PRIVATE ONCOLOGY

## 2019-02-19 PROCEDURE — 36415 COLL VENOUS BLD VENIPUNCTURE: CPT

## 2019-02-19 PROCEDURE — 84145 PROCALCITONIN (PCT): CPT

## 2019-02-19 PROCEDURE — 84300 ASSAY OF URINE SODIUM: CPT

## 2019-02-19 PROCEDURE — 83935 ASSAY OF URINE OSMOLALITY: CPT

## 2019-02-19 RX ORDER — OXYCODONE AND ACETAMINOPHEN 5; 325 MG/1; MG/1
2 TABLET ORAL
Status: DISCONTINUED | OUTPATIENT
Start: 2019-02-19 | End: 2019-02-20

## 2019-02-19 RX ORDER — CYCLOBENZAPRINE HCL 10 MG
10 TABLET ORAL
Status: DISCONTINUED | OUTPATIENT
Start: 2019-02-19 | End: 2019-02-20 | Stop reason: HOSPADM

## 2019-02-19 RX ORDER — AZITHROMYCIN 250 MG/1
250 TABLET, FILM COATED ORAL
COMMUNITY

## 2019-02-19 RX ORDER — SODIUM CHLORIDE 9 MG/ML
75 INJECTION, SOLUTION INTRAVENOUS CONTINUOUS
Status: DISCONTINUED | OUTPATIENT
Start: 2019-02-19 | End: 2019-02-20 | Stop reason: HOSPADM

## 2019-02-19 RX ORDER — DEXAMETHASONE SODIUM PHOSPHATE 4 MG/ML
6 INJECTION, SOLUTION INTRA-ARTICULAR; INTRALESIONAL; INTRAMUSCULAR; INTRAVENOUS; SOFT TISSUE DAILY
Status: DISCONTINUED | OUTPATIENT
Start: 2019-02-20 | End: 2019-02-20 | Stop reason: HOSPADM

## 2019-02-19 RX ADMIN — LISINOPRIL 5 MG: 5 TABLET ORAL at 09:02

## 2019-02-19 RX ADMIN — AZELASTINE 2 SPRAY: 1 SPRAY, METERED NASAL at 09:02

## 2019-02-19 RX ADMIN — UMECLIDINIUM BROMIDE AND VILANTEROL TRIFENATATE 1 PUFF: 62.5; 25 POWDER RESPIRATORY (INHALATION) at 09:03

## 2019-02-19 RX ADMIN — IPRATROPIUM BROMIDE AND ALBUTEROL SULFATE 3 ML: .5; 3 SOLUTION RESPIRATORY (INHALATION) at 21:53

## 2019-02-19 RX ADMIN — Medication 10 ML: at 22:28

## 2019-02-19 RX ADMIN — IPRATROPIUM BROMIDE AND ALBUTEROL SULFATE 3 ML: .5; 3 SOLUTION RESPIRATORY (INHALATION) at 08:01

## 2019-02-19 RX ADMIN — AZELASTINE 2 SPRAY: 1 SPRAY, METERED NASAL at 22:29

## 2019-02-19 RX ADMIN — IPRATROPIUM BROMIDE AND ALBUTEROL SULFATE 3 ML: .5; 3 SOLUTION RESPIRATORY (INHALATION) at 11:43

## 2019-02-19 RX ADMIN — Medication 10 ML: at 14:14

## 2019-02-19 RX ADMIN — Medication 10 ML: at 06:54

## 2019-02-19 RX ADMIN — IPRATROPIUM BROMIDE AND ALBUTEROL SULFATE 3 ML: .5; 3 SOLUTION RESPIRATORY (INHALATION) at 15:07

## 2019-02-19 RX ADMIN — SODIUM CHLORIDE 75 ML/HR: 900 INJECTION, SOLUTION INTRAVENOUS at 16:25

## 2019-02-19 RX ADMIN — ALUMINUM HYDROXIDE, MAGNESIUM HYDROXIDE, AND SIMETHICONE 30 ML: 200; 200; 20 SUSPENSION ORAL at 22:28

## 2019-02-19 RX ADMIN — ENOXAPARIN SODIUM 30 MG: 30 INJECTION, SOLUTION INTRAVENOUS; SUBCUTANEOUS at 06:52

## 2019-02-19 RX ADMIN — ASPIRIN 81 MG: 81 TABLET, COATED ORAL at 09:02

## 2019-02-19 NOTE — PROGRESS NOTES
Hospitalist Service Progress Note Daily Progress Note: 2019 Assessment/Plan: COPD with acute  exacerbation improving 
--Suspect that she has had a viral process resulting in COPD exacerbation and she is not septic, therefore will withhold antibiotics at present  
--StartED empiric steroids and nebulized bronchodilators  Stated that she cant tolerates Prednisone so we switched   To Decaderone 
--Supplemental oxygen support as needederone --Respiratory pathogen panel ordered 
  
Accelerated hypertension  Now fi 
--Suspect modest troponin leak and elevated BNP may be a consequence of her present blood pressures --Add nitropaste; hydralazine PRN 
--Patient endorses good medication compliance 
--Home medications ordered 
  
Hyponatremia Worsening Started on NS , check urine Sodium and Osmolality Congenital solitary kidney 
--Creatinine within patient's baseline range 
  
History of DVT 
  
Body mass index is 23.49 kg/m². 
  
Code: DNR 
DVT prophylaxis: Lovenox Surrogate decision maker: Caty Aparicio,  [phone] Subjective:  
Feels better Review of Systems:  
 
 
Objective:  
Physical examination Visit Vitals /69 (BP 1 Location: Left arm, BP Patient Position: Sitting) Pulse 69 Temp 98.2 °F (36.8 °C) Resp 20 Wt 68 kg (150 lb) SpO2 97% Breastfeeding? No  
BMI 23.49 kg/m² Temp (24hrs), Av.8 °F (36.6 °C), Min:97.6 °F (36.4 °C), Max:98.2 °F (36.8 °C) O2 Flow Rate (L/min): 2 l/min O2 Device: Nasal cannula Patient Vitals for the past 24 hrs: 
 Temp Pulse Resp BP SpO2  
19 1509     97 % 19 1438 98.2 °F (36.8 °C) 69 20 149/69 98 % 19 1147     98 % 19 0802     96 % 19 0755    148/66   
19 0735 97.8 °F (36.6 °C) 74 20 (!) 167/94 98 % 19 0038 97.7 °F (36.5 °C) 78 20 133/59 95 % 19 1938 97.6 °F (36.4 °C) 72 20 154/82 95 % 19 1920     96 % Intake/Output Summary (Last 24 hours) at 2/19/2019 1744 Last data filed at 2/19/2019 1041 Gross per 24 hour Intake  Output 400 ml Net -400 ml Last shift: 
  02/19 0701 - 02/19 1900 In: -  
Out: 400 [Urine:400] Last 3 shifts: 
  02/17 1901 - 02/19 0700 In: -  
Out: 400 [Urine:400] General:   Alert, cooperative, no acute distress Head:   Atraumatic Eyes:   Conjunctivae clear ENT:  Oral mucosa normal  
Neck:  Supple, trachea midline, no adenopathy No JVD Back:    No CVA tenderness Chest wall:    No tenderness or deformities Lungs:   Clear to auscultation bilaterally Heart:   Regular rhythm, no murmur Abdomen:    Soft, non-tender No masses or organomegaly Extremities:  No edema or DVT signs Pulses:  Symmetric all extremities Skin:  Warm and dry No rashes or lesions Neurologic:  Oriented No focal deficits Psychiatric:   
   
 
Data Review:  
 
 
Recent Labs  
  02/19/19 
0504 02/18/19 
1259 WBC 5.0 7.2 HGB 12.9 13.3 HCT 38.3 39.9  244 Recent Labs  
  02/19/19 
0504 02/18/19 
1259 * 130*  
K 4.5 4.5  
CL 95* 97  
CO2 25 25 * 88 BUN 19 18 CREA 0.94 0.76 CA 8.8 8.7 ALB  --  3.7 SGOT  --  15 ALT  --  12 No results for input(s): PH, PCO2, PO2, HCO3, FIO2 in the last 72 hours. Lab Results Component Value Date/Time Glucose 136 (H) 02/19/2019 05:04 AM  
  
 
All Micro Results Procedure Component Value Units Date/Time RESPIRATORY PANEL,PCR,NASOPHARYNGEAL [[id number]] Order Status:  Sent Specimen:  NASOPHARYNGEAL SWAB Lab Results Component Value Date/Time Specimen Description: NARES 03/16/2011 08:15 PM  
 Specimen Description: LEG 03/10/2011 06:57 PM  
 Specimen Description: LEG 03/10/2011 06:57 PM  
 
Lab Results Component Value Date/Time  Culture result: NO GROWTH 6 DAYS 06/05/2017 09:53 AM  
 Culture result: MODERATE 
NORMAL RESPIRATORY KELLY 
 06/05/2017 07:55 AM  
 Culture result: LIGHT 
YEAST 
 (A) 06/05/2017 07:55 AM  
 
Medications reviewed Current Facility-Administered Medications Medication Dose Route Frequency  oxyCODONE-acetaminophen (PERCOCET) 5-325 mg per tablet 2 Tab  2 Tab Oral Q6H PRN  
 cyclobenzaprine (FLEXERIL) tablet 10 mg  10 mg Oral TID PRN  
 [START ON 2/20/2019] dexamethasone (DECADRON) 4 mg/mL injection 6 mg  6 mg IntraVENous DAILY  0.9% sodium chloride infusion  75 mL/hr IntraVENous CONTINUOUS  
 sodium chloride (NS) flush 5-40 mL  5-40 mL IntraVENous Q8H  
 sodium chloride (NS) flush 5-40 mL  5-40 mL IntraVENous PRN  
 albuterol-ipratropium (DUO-NEB) 2.5 MG-0.5 MG/3 ML  3 mL Nebulization QID RT  
 acetaminophen (TYLENOL) tablet 650 mg  650 mg Oral Q4H PRN  
 oxyCODONE-acetaminophen (PERCOCET) 5-325 mg per tablet 1 Tab  1 Tab Oral Q4H PRN  
 naloxone (NARCAN) injection 0.4 mg  0.4 mg IntraVENous PRN  
 ondansetron (ZOFRAN) injection 4 mg  4 mg IntraVENous Q4H PRN  
 enoxaparin (LOVENOX) injection 30 mg  30 mg SubCUTAneous Q24H  
 umeclidinium-vilanterol (ANORO ELLIPTA) 62.5 mcg- 25 mcg/inhalation  1 Puff Inhalation DAILY  aspirin delayed-release tablet 81 mg  81 mg Oral DAILY  azelastine (ASTELIN) 137mcg/spray nasal spray  2 Spray Both Nostrils BID  lisinopril (PRINIVIL, ZESTRIL) tablet 5 mg  5 mg Oral DAILY  alum-mag hydroxide-simeth (MYLANTA) oral suspension 30 mL  30 mL Oral DAILY PRN  
 hydrALAZINE (APRESOLINE) tablet 25 mg  25 mg Oral TID PRN Signed: 
 Fany Fritz MD 
 Internist / Hospitalist

## 2019-02-19 NOTE — WOUND CARE
Wound care nurse consult from staff nurse stating \" patient has small open area left side of ankle, POA. small banaid and petroleum dsg in place, also POA\" Patient is a 79 y/o CF admitted for acute respiratory failure. Past Medical History:  
Diagnosis Date  Arthritis   
 generalized  COPD  Essential hypertension 9/4/2018  former smoker   
  >60pkyr quit 1/3/11  
 h/o DVT 1955 left leg  ischemic left lower extremitiy pain   
 above knee FemPop 1/3/11  PVD (peripheral vascular disease) ([facility] [address]  Seasonal allergic rhinitis  Single kidney Patient has a small venous stasis ulcer just above her left medial ankle. WOUND POA CONDITIONS Wound Ankle Left;Medial (Active) Dressing Status  Clean, dry, and intact 2/19/2019  3:11 PM  
Dressing Type  Adhesive wound dressing (Band-Aid) 2/19/2019  3:11 PM  
Incision site well approximated? Yes 2/19/2019  3:11 PM  
Non-Pressure Injury Partial thickness (epider/derm) 2/19/2019  5:14 PM  
Wound Length (cm) 0.5 cm 2/19/2019  5:14 PM  
Wound Width (cm) 0.5 cm 2/19/2019  5:14 PM  
Wound Depth (cm) 0.1 2/19/2019  5:14 PM  
Wound Surface area (cm^2) 0.25 cm^2 2/19/2019  5:14 PM  
Condition of [place] 2/19/2019  5:14 PM  
Tissue Type Pink 2/19/2019  3:11 PM  
Drainage Amount  Scant 2/19/2019  5:14 PM  
Drainage Color Serous 2/19/2019  5:14 PM  
Wound Odor None 2/19/2019  5:14 PM  
Periwound Skin Condition Intact 2/19/2019  5:14 PM  
Number of days: 0 Recommend: 
 
Left medial ankle venous stasis wound: cleanse with NS, wipe clean, cover with a piece of Xeroform gauze and then a large band aid.  
 
Samson Mason RN, [employer]

## 2019-02-19 NOTE — PROGRESS NOTES
Oncology End of Shift Note Bedside shift change report given to Radha Ambrocio RN (incoming nurse) by Juana Florence (outgoing nurse) [facility] Report included the following information SBAR, Kardex, Intake/Output and MAR. Shift Summary: Patient is alert and oriented x4. IV site in right [facility] is patent. Patient was noted to have a small wound on her inner left ankle. Chest rise is symmetrical but the patient was noted to have a slight left chest deformity. An oxygen test was performed on the patient, the patient tolerated the procedure well. O2 stats dropped to the upper 80s while walking on room air. Patient has been able to eat all meals. Issues for Physician to Address:  
 
Patient on Cardiac Monitoring? [] Yes 
[x] No 
 
Rhythm:   
 
 
 
Shift Events Juana Florence

## 2019-02-19 NOTE — PROGRESS NOTES
Problem: Falls - Risk of 
Goal: *Absence of Falls Document Marek Saldaña Fall Risk and appropriate interventions in the flowsheet. Outcome: Progressing Towards Goal 
Fall Risk Interventions: 
  
 
  
 
Medication Interventions: Evaluate medications/consider consulting pharmacy, Patient to call before getting OOB, Teach patient to arise slowly Elimination Interventions: Call light in reach, Patient to call for help with toileting needs, Toileting schedule/hourly rounds

## 2019-02-19 NOTE — PROGRESS NOTES
Oncology End of Shift Note Bedside shift change report given to ***, RN (incoming nurse) by [address],Jay Jay B (outgoing nurse) on Arun Caul. Report included the following information SBAR, Kardex and MAR. Shift Summary: *** Issues for Physician to Address:  *** Patient on Cardiac Monitoring? *** 
[] Yes 
[] No 
 
Rhythm: {Telemetry:[id number]:::1} Shift Events *** [address] [facility],Jay Jay B

## 2019-02-19 NOTE — PROGRESS NOTES
Spiritual Care Partner Volunteer visited patient in Gen Surg on February 19, 2019. Documented by: 
KARTHIK Ortiz, [address] [facility],  [facility]  Paging Service  [phone])

## 2019-02-19 NOTE — PROGRESS NOTES
Pulse oximetry assessment 94% at rest on room air (if 88% or less, skip next steps) 87% while ambulating on room air 
99% at rest on 2 LPM 
MD notified.

## 2019-02-19 NOTE — PROGRESS NOTES
Problem: Falls - Risk of 
Goal: *Absence of Falls Document Marek Saldaña Fall Risk and appropriate interventions in the flowsheet. Outcome: Progressing Towards Goal 
Fall Risk Interventions: 
  
 
  
 
Medication Interventions: Teach patient to arise slowly Elimination Interventions: Call light in reach

## 2019-02-20 ENCOUNTER — HOME HEALTH ADMISSION (OUTPATIENT)
Dept: HOME HEALTH SERVICES | Facility: HOME HEALTH | Age: 84
End: 2019-02-20

## 2019-02-20 VITALS
RESPIRATION RATE: 18 BRPM | OXYGEN SATURATION: 95 % | HEART RATE: 71 BPM | TEMPERATURE: 97.6 F | WEIGHT: 150 LBS | SYSTOLIC BLOOD PRESSURE: 163 MMHG | DIASTOLIC BLOOD PRESSURE: 80 MMHG | BODY MASS INDEX: 23.49 KG/M2

## 2019-02-20 LAB
ANION GAP SERPL CALC-SCNC: 10 MMOL/L (ref 5–15)
BASOPHILS # BLD: 0 K/UL (ref 0–0.1)
BASOPHILS NFR BLD: 0 % (ref 0–1)
BUN SERPL-MCNC: 24 MG/DL (ref 6–20)
BUN/CREAT SERPL: 26 (ref 12–20)
CALCIUM SERPL-MCNC: 8.6 MG/DL (ref 8.5–10.1)
CHLORIDE SERPL-SCNC: 101 MMOL/L (ref 97–108)
CO2 SERPL-SCNC: 24 MMOL/L (ref 21–32)
CREAT SERPL-MCNC: 0.91 MG/DL (ref 0.55–1.02)
DIFFERENTIAL METHOD BLD: NORMAL
EOSINOPHIL # BLD: 0.3 K/UL (ref 0–0.4)
EOSINOPHIL NFR BLD: 4 % (ref 0–7)
ERYTHROCYTE [DISTWIDTH] IN BLOOD BY AUTOMATED COUNT: 14.3 % (ref 11.5–14.5)
GLUCOSE SERPL-MCNC: 85 MG/DL (ref 65–100)
HCT VFR BLD AUTO: 38.7 % (ref 35–47)
HGB BLD-MCNC: 13 G/DL (ref 11.5–16)
IMM GRANULOCYTES # BLD AUTO: 0 K/UL (ref 0–0.04)
IMM GRANULOCYTES NFR BLD AUTO: 0 % (ref 0–0.5)
LYMPHOCYTES # BLD: 1.4 K/UL (ref 0.8–3.5)
LYMPHOCYTES NFR BLD: 16 % (ref 12–49)
MCH RBC QN AUTO: 30.4 PG (ref 26–34)
MCHC RBC AUTO-ENTMCNC: 33.6 G/DL (ref 30–36.5)
MCV RBC AUTO: 90.4 FL (ref 80–99)
MONOCYTES # BLD: 0.7 K/UL (ref 0–1)
MONOCYTES NFR BLD: 8 % (ref 5–13)
NEUTS SEG # BLD: 6.6 K/UL (ref 1.8–8)
NEUTS SEG NFR BLD: 73 % (ref 32–75)
NRBC # BLD: 0 K/UL (ref 0–0.01)
NRBC BLD-RTO: 0 PER 100 WBC
PLATELET # BLD AUTO: 279 K/UL (ref 150–400)
PMV BLD AUTO: 9.5 FL (ref 8.9–12.9)
POTASSIUM SERPL-SCNC: 4.5 MMOL/L (ref 3.5–5.1)
RBC # BLD AUTO: 4.28 M/UL (ref 3.8–5.2)
SODIUM SERPL-SCNC: 135 MMOL/L (ref 136–145)
WBC # BLD AUTO: 9.1 K/UL (ref 3.6–11)

## 2019-02-20 PROCEDURE — 74011250636 HC RX REV CODE- 250/636: Performed by: INTERNAL MEDICINE

## 2019-02-20 PROCEDURE — 94760 N-INVAS EAR/PLS OXIMETRY 1: CPT

## 2019-02-20 PROCEDURE — 85025 COMPLETE CBC W/AUTO DIFF WBC: CPT

## 2019-02-20 PROCEDURE — 80048 BASIC METABOLIC PNL TOTAL CA: CPT

## 2019-02-20 PROCEDURE — 74011250637 HC RX REV CODE- 250/637: Performed by: INTERNAL MEDICINE

## 2019-02-20 PROCEDURE — 36415 COLL VENOUS BLD VENIPUNCTURE: CPT

## 2019-02-20 PROCEDURE — 77010033678 HC OXYGEN DAILY

## 2019-02-20 PROCEDURE — 94761 N-INVAS EAR/PLS OXIMETRY MLT: CPT

## 2019-02-20 PROCEDURE — 74011000250 HC RX REV CODE- 250: Performed by: INTERNAL MEDICINE

## 2019-02-20 PROCEDURE — 94640 AIRWAY INHALATION TREATMENT: CPT

## 2019-02-20 RX ORDER — DEXAMETHASONE 6 MG/1
6 TABLET ORAL
Qty: 3 TAB | Refills: 0 | Status: SHIPPED | OUTPATIENT
Start: 2019-02-20 | End: 2019-12-15

## 2019-02-20 RX ORDER — AZITHROMYCIN 500 MG/1
500 TABLET, FILM COATED ORAL DAILY
Qty: 3 TAB | Refills: 0 | Status: SHIPPED | OUTPATIENT
Start: 2019-02-20 | End: 2019-02-23

## 2019-02-20 RX ORDER — OXYCODONE AND ACETAMINOPHEN 5; 325 MG/1; MG/1
2 TABLET ORAL
Status: DISCONTINUED | OUTPATIENT
Start: 2019-02-20 | End: 2019-02-20 | Stop reason: HOSPADM

## 2019-02-20 RX ADMIN — IPRATROPIUM BROMIDE AND ALBUTEROL SULFATE 3 ML: .5; 3 SOLUTION RESPIRATORY (INHALATION) at 07:54

## 2019-02-20 RX ADMIN — IPRATROPIUM BROMIDE AND ALBUTEROL SULFATE 3 ML: .5; 3 SOLUTION RESPIRATORY (INHALATION) at 11:33

## 2019-02-20 RX ADMIN — Medication 10 ML: at 06:23

## 2019-02-20 RX ADMIN — ACETAMINOPHEN 650 MG: 325 TABLET ORAL at 12:39

## 2019-02-20 RX ADMIN — UMECLIDINIUM BROMIDE AND VILANTEROL TRIFENATATE 1 PUFF: 62.5; 25 POWDER RESPIRATORY (INHALATION) at 11:07

## 2019-02-20 RX ADMIN — ASPIRIN 81 MG: 81 TABLET, COATED ORAL at 12:39

## 2019-02-20 RX ADMIN — SODIUM CHLORIDE 75 ML/HR: 900 INJECTION, SOLUTION INTRAVENOUS at 08:34

## 2019-02-20 RX ADMIN — AZELASTINE 2 SPRAY: 1 SPRAY, METERED NASAL at 11:08

## 2019-02-20 RX ADMIN — ENOXAPARIN SODIUM 30 MG: 30 INJECTION, SOLUTION INTRAVENOUS; SUBCUTANEOUS at 06:23

## 2019-02-20 RX ADMIN — DEXAMETHASONE SODIUM PHOSPHATE 6 MG: 4 INJECTION, SOLUTION INTRAMUSCULAR; INTRAVENOUS at 11:06

## 2019-02-20 RX ADMIN — LISINOPRIL 5 MG: 5 TABLET ORAL at 11:06

## 2019-02-20 NOTE — PROGRESS NOTES
Reason for Admission:  Acute respiratory failure RRAT Score:     14 Do you (patient/family) have any concerns for transition/discharge? No concerns noted by patient - CM concerned about increasing needs for supportive care and medical management Plan for utilizing home health:     Patient qualifies for Hospital to Home visit and has [facility] visit arranged. She is open to both visits. Likelihood of readmission? Moderate Transition of Care Plan:   CM in to see patient for initial CM assessment and discharge planning. Patient states she lives alone in a one story home and has been independent prior to admission - using a rolling walker at baseline. She has confirmed all demographic information and PCP information. Patient has been seen by [facility] nurse practitioner and ACP addressed - patient has DDNR for home and a copy for her son. Patient has PCP follow-up appt, [facility] appt and a [facility] home visit scheduled and she is in agreement to plan of care. Patient will have all follow-up transportation provided by her son - and she denies any problems with obtaining medicaitons - She has Medicare Part D and can afford any co-pays. Patient received 2nd IM letter from [facility]. 
 
Son plans to  patient by 3:00pm today for transport home. Care Management Interventions PCP Verified by Malina Navarro - [phone]) Mode of Transport at Discharge: Other (see comment)(Patient's son to pick her up from the hospital) Transition of Care Consult (CM Consult): Home Health(Hospital to [address] [place] [address]) [address]: Yes Francinehart Signup: No 
Discharge Durable Medical Equipment: No 
Physical Therapy Consult: No 
Occupational Therapy Consult: No 
Speech Therapy Consult: No 
Current Support Network: Lives Alone, Own Home, Family Lives Nearby(Patient is mostly independent at home - relies on friends and son to assist if needed) Confirm Follow Up Transport: Family Plan discussed with Pt/Family/Caregiver: Yes Freedom of Choice Offered: Yes(Patient aware of all discharge plans and willing to have [facility] visits) Discharge Location Discharge Placement: Home with outpatient services(Home w/ family) Soraya Gill RN, BSN, ACM [facility]   [phone] \

## 2019-02-20 NOTE — ROUTINE PROCESS
Follow up apt scheduled with [facility] on 2/22/19 at [phone] will contact the patient for further information.   Apt added to AVS

## 2019-02-20 NOTE — DISCHARGE SUMMARY
Hospitalist Discharge Summary     Patient ID:  Yessi Salinas  [id number]  80 y.o.  11/10/1931    PCP on record: Nikki Neil MD    Admit date: 2/18/2019  Discharge date and time: 2/20/2019      DISCHARGE DIAGNOSIS:  See below    CONSULTATIONS:  IP CONSULT TO HOSPITALIST    Excerpted HPI from H&P of Elva Miller MD:  Yessi Salinas is a 80 y.o.  female whose history includes COPD, hypertension, hyponatremia, who presents with complaint noted above. She reports that she has been having problems with dyspnea and wheezing that have been getting progressively worse over the last two weeks. She has since developed a cough that is productive of thick whitish phlegm as well as some nasal congestion and postnasal drip. She denies fevers and chills, denies sick contacts, did not try anything at home apart from her usual COPD maintenance regimen. Today, she felt much worse with increased dyspnea and therefore presented to the ER today. On presentation in the ER, she was found to have some reduced air exchange and wheezing, as well as oxygen desaturation with ambulation. ______________________________________________________________________  DISCHARGE SUMMARY/HOSPITAL COURSE:  for full details see H&P, daily progress notes, labs, consult notes. Acute on chronic hypoxic respiratory failure in the setting of COPD with acute exacerbation -  improving  --Suspect that she has had a viral process resulting in COPD exacerbation and she is not septic, therefore will withhold antibiotics at present   --StartED empiric steroids and nebulized bronchodilators  Stated that she cant tolerates Prednisone so we switched   To Decaderone  --Supplemental oxygen support as needederone  --Respiratory pathogen panel ordered     2/20:  Pt back on 2L NC O2 - her baseline. Pt is clearly anxious about her situation and with many questions.  Discussed realistic expectations for a 80year old female with COPD on home O2. Pt ultimately verbalized her understanding and agrees with the plan. She is stable for discharge today. 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 Pertinent Findings:  Gen:    Not in distress, some anxiety  Chest: Clear lungs, no wheezing  CVS:   Regular rhythm. No edema  Abd:  Soft, not distended, not tender  Neuro:  Alert, oriented x3  _______________________________________________________________________  DISCHARGE MEDICATIONS:   Current Discharge Medication List      START taking these medications    Details   dexamethasone (DECADRON) 6 mg tablet Take 1 Tab by mouth Daily (before breakfast). Qty: 3 Tab, Refills: 0      !! azithromycin (ZITHROMAX) 500 mg tab Take 1 Tab by mouth daily for 3 days. Qty: 3 Tab, Refills: 0       !! - Potential duplicate medications found. Please discuss with provider. CONTINUE these medications which have NOT CHANGED    Details   !! azithromycin (ZITHROMAX) 250 mg tablet Take 250 mg by mouth every Monday, Wednesday, Friday. lisinopril (PRINIVIL, ZESTRIL) 5 mg tablet Take 1 Tab by mouth daily. Qty: 30 Tab, Refills: 3      alum-mag hydroxide-simeth (MYLANTA) 200-200-20 mg/5 mL susp Take 30 mL by mouth every four (4) hours as needed. Qty: 1 Bottle, Refills: 0      OXYGEN-AIR DELIVERY SYSTEMS 2 L/min by Nasal route as needed (Shortness of Breath). acetaminophen (TYLENOL) 500 mg tablet Take 500 mg by mouth every six (6) hours as needed for Pain. !! albuterol-ipratropium (DUO-NEB) 2.5 mg-0.5 mg/3 ml nebu 3 mL by Nebulization route three (3) times daily.   Qty: 90 Nebule, Refills: 0      COMBIVENT RESPIMAT  mcg/actuation inhaler INHALE 1 PUFF THREE TIMES A DAY  Qty: 1 Inhaler, Refills: 11      aspirin delayed-release 81 mg tablet Take  by mouth daily. !! albuterol-ipratropium (DUO-NEB) 2.5 mg-0.5 mg/3 ml nebu 3 mL by Nebulization route every four (4) hours as needed. Qty: 30 Nebule, Refills: 0      Azelastine (ASTEPRO) 0.15 % (205.5 mcg) nasal spray       ANORO ELLIPTA 62.5-25 mcg/actuation inhaler        !! - Potential duplicate medications found. Please discuss with provider. My Recommended Diet, Activity, Wound Care, and follow-up labs are listed in the patient's Discharge Insturctions which I have personally completed and reviewed.     ______________________________________________________________________    Risk of deterioration: Low    Condition at Discharge:  Stable  ______________________________________________________________________    Disposition  Home with family, no needs  ______________________________________________________________________    Care Plan discussed with:   Patient, Family, RN, Care Manager    ______________________________________________________________________    Code Status: DNR/DNI  ______________________________________________________________________      Follow up with:   PCP : Valentina Kiser MD  Follow-up Information    None             Total time in minutes spent coordinating this discharge (includes going over instructions, follow-up, prescriptions, and preparing report for sign off to her PCP) :  35 minutes    Signed:  Alden Mathis MD

## 2019-02-20 NOTE — PROGRESS NOTES
Oncology End of Shift Note Bedside shift change report given to Stephanie Ribeiro RN (incoming nurse) by Kiarra Zhang (outgoing nurse) on [facility]. Report included the following information SBAR, Kardex, Intake/Output and MAR. Shift Summary: Pt remained stable during shift. SOB with exertion. Pt wears 2L NC at home. Labs obtained. Na back up to 135. Possible D/C today? Issues for Physician to Address:  D/C? Patient on Cardiac Monitoring? [] Yes 
[x] No 
 
Rhythm:   
 
 
 
Kiarra Zhang

## 2019-02-20 NOTE — PROGRESS NOTES
Transitional Care HUG Note ED [facility] Patient: Yessi Salinas MRN: [id number]  SSN: [id number] YOB: 1931  Age: 80 y.o. Sex: female Admit Date: 2/18/2019 LOS: 2 days Assessment Kalia Romano Assessment: 
 
1. COPD exacerbation - improving 2. HTN - stable 3. Hyponatremia - improved 4. Hx DVT - chronic AC tx 5. L medial ankle Venous stasis ulcer - new Readmission Risks: MODERATE 1. Chronic O2 user, COPD generally well controlled 2. Wound care - new ulcer, chronic PVD LLE Nurse Navigator: none listed 18.5 - 24.9 Normal weight / Body mass index is 23.49 kg/m². Code status: Durable DNR sent with patient Recommended Disposition: home alone Subjective:  
 
79 yo very pleasnat feamle being discharged to home marcin with  EDMOND and H2h visit. This is patients 2nd admission within a year for COPD exacerbation. Pt reports compliancy with inhalers and O2 use at QHS and prn daytime (though not usually not needed). She is followed per Dr Analy Mares. Hx of DVT at early age (1955). Long term [facility] therapy. She recently developed venous stasis ulcer on her L medial ankle. 2 weeks ago. Wound care consult was completed and recs received. Chronic intermittent ulcers have been problematic in the past. 
 
Pulm has her on chronic MWF regimen of Azithromycin  250 mg for her COPD. Socially she lives alone and no longer drives, she is independent in ADLs. She has one son that lives 10 minutes away who provides support as needed. Number of Admissions within a year: 2 - COPD related Heart Failure Bundle NO Advance Care Plan: DDNR completed this admission. AMD at home gave contact information to mail copy and will review when received and advise as needed. Only son is mPOA per report. ROS:  
 
A comprehensive ROS was performed and was negative except for as per HPI. Objective: Allergies Allergen Reactions  Food Extracts Diarrhea Onions and garlic causes abdominal pain,cramping  Sulfa (Sulfonamide Antibiotics) Other (comments) Altered Mental Status Past Medical History:  
Diagnosis Date  Arthritis   
 generalized  COPD  Essential hypertension 2018  former smoker   
  >60pkyr quit 1/3/11  
 h/o DVT  left leg  ischemic left lower extremitiy pain   
 above knee FemPop 1/3/11  PVD (peripheral vascular disease) ([facility] [address]  Seasonal allergic rhinitis  Single kidney Past Surgical History:  
Procedure Laterality Date [address] excision left breast cyst  
 HX GI  10 yrs ago  
 colonoscopy  HX GYN    
 3 miscarriges  HX GYN    
 1 vaginal birth  HX ORTHOPAEDIC    
 veinography left leg, stent left leg Social History Tobacco Use Smoking Status Former Smoker  Packs/day: 1.00  Years: 60.00  Pack years: 60.00  Last attempt to quit: 1/3/2011  Years since quittin.1 Smokeless Tobacco Never Used Current Facility-Administered Medications Medication Dose Route Frequency  oxyCODONE-acetaminophen (PERCOCET) 5-325 mg per tablet 2 Tab  2 Tab Oral Q6H PRN  
 cyclobenzaprine (FLEXERIL) tablet 10 mg  10 mg Oral TID PRN  
 dexamethasone (DECADRON) 4 mg/mL injection 6 mg  6 mg IntraVENous DAILY  0.9% sodium chloride infusion  75 mL/hr IntraVENous CONTINUOUS  
 sodium chloride (NS) flush 5-40 mL  5-40 mL IntraVENous Q8H  
 sodium chloride (NS) flush 5-40 mL  5-40 mL IntraVENous PRN  
 albuterol-ipratropium (DUO-NEB) 2.5 MG-0.5 MG/3 ML  3 mL Nebulization QID RT  
 acetaminophen (TYLENOL) tablet 650 mg  650 mg Oral Q4H PRN  
 oxyCODONE-acetaminophen (PERCOCET) 5-325 mg per tablet 1 Tab  1 Tab Oral Q4H PRN  
 naloxone (NARCAN) injection 0.4 mg  0.4 mg IntraVENous PRN  
 ondansetron (ZOFRAN) injection 4 mg  4 mg IntraVENous Q4H PRN  
 enoxaparin (LOVENOX) injection 30 mg  30 mg SubCUTAneous Q24H  umeclidinium-vilanterol (ANORO ELLIPTA) 62.5 mcg- 25 mcg/inhalation  1 Puff Inhalation DAILY  aspirin delayed-release tablet 81 mg  81 mg Oral DAILY  azelastine (ASTELIN) 137mcg/spray nasal spray  2 Spray Both Nostrils BID  lisinopril (PRINIVIL, ZESTRIL) tablet 5 mg  5 mg Oral DAILY  alum-mag hydroxide-simeth (MYLANTA) oral suspension 30 mL  30 mL Oral DAILY PRN  
 hydrALAZINE (APRESOLINE) tablet 25 mg  25 mg Oral TID PRN Prior to Admission Medications Prescriptions Last Dose Informant Patient Reported? Taking? ANORO ELLIPTA 62.5-25 mcg/actuation inhaler Unknown at Unknown time  Yes No  
Azelastine (ASTEPRO) 0.15 % (205.5 mcg) nasal spray Not Taking at Unknown time  Yes No  
COMBIVENT RESPIMAT  mcg/actuation inhaler 2019 at 0800  No Yes Sig: INHALE 1 PUFF THREE TIMES A DAY OXYGEN-AIR DELIVERY SYSTEMS 2019 at Unknown time  Yes Yes Si L/min by Nasal route as needed (Shortness of Breath). acetaminophen (TYLENOL) 500 mg tablet 2019 at 0800  Yes Yes Sig: Take 500 mg by mouth every six (6) hours as needed for Pain. albuterol-ipratropium (DUO-NEB) 2.5 mg-0.5 mg/3 ml nebu 2019 at 0800  No Yes Sig: 3 mL by Nebulization route three (3) times daily. Patient taking differently: 3 mL by Nebulization route four (4) times daily. albuterol-ipratropium (DUO-NEB) 2.5 mg-0.5 mg/3 ml nebu Unknown at Unknown time  No No  
Sig: 3 mL by Nebulization route every four (4) hours as needed. alum-mag hydroxide-simeth (MYLANTA) 200-200-20 mg/5 mL susp 2019 at 1800  No Yes Sig: Take 30 mL by mouth every four (4) hours as needed. aspirin delayed-release 81 mg tablet 2019 at 0800  Yes Yes Sig: Take  by mouth daily. azithromycin (ZITHROMAX) 250 mg tablet 2019 at Unknown time  Yes Yes Sig: Take 250 mg by mouth every Monday, Wednesday, Friday. lisinopril (PRINIVIL, ZESTRIL) 5 mg tablet 2019 at 0800  No Yes Sig: Take 1 Tab by mouth daily. Facility-Administered Medications: None Patient Vitals for the past 24 hrs: 
 Temp Pulse Resp BP SpO2  
02/20/19 1135     95 % 02/20/19 0755     96 % 02/20/19 0747 97.6 °F (36.4 °C) 71 18 163/80 97 % 02/19/19 2249 97.7 °F (36.5 °C) 72 18 133/61 97 % 02/19/19 2152     95 % 02/19/19 1938 97.8 °F (36.6 °C) 73 20 118/55 95 % 02/19/19 1509     97 % 02/19/19 1438 98.2 °F (36.8 °C) 69 20 149/69 98 % Intake and Output: 
 
Intake/Output Summary (Last 24 hours) at 2/20/2019 1155 Last data filed at 2/20/2019 0600 Gross per 24 hour Intake 856.25 ml Output 700 ml Net 156.25 ml PHYSICAL EXAM: 
 
Visit Vitals /80 (BP 1 Location: Left arm, BP Patient Position: At rest) Pulse 71 Temp 97.6 °F (36.4 °C) Resp 18 Wt 68 kg (150 lb) SpO2 95% Breastfeeding? No  
BMI 23.49 kg/m² General appearance: alert, cooperative, no distress, appears stated age Head: Normocephalic, without obvious abnormality, atraumatic Eyes: conjunctivae/corneas clear. PERRL, EOM's intact. Fundi benign Ears: hearing intact Throat: Lips, mucosa, and tongue normal. Teeth and gums normal 
Lungs: KERI wheezing resolved following neb treatment, RA Heart: regular rate and rhythm, S1, S2 normal, no murmur, click, rub or gallop Abdomen: soft, non-tender. Bowel sounds normal. No masses,  no organomegaly Extremities: Left leg appears mildly erythematous, with non pitting edema very mild. Warm to touch Pulses: 2+ and symmetric Skin:dressing L medial ankle c/d/i Neurologic: Grossly normal 
 
 
 
Lab/Data Review:  
Recent Results (from the past 24 hour(s)) SODIUM, UR, RANDOM Collection Time: 02/19/19  3:29 PM  
Result Value Ref Range Sodium,urine random 49 MMOL/L  
OSMOLALITY, UR Collection Time: 02/19/19  3:29 PM  
Result Value Ref Range Osmolality,urine 658 MOSM/kg H2O  
CBC WITH AUTOMATED DIFF Collection Time: 02/20/19  4:12 AM  
Result Value Ref Range WBC 9.1 3.6 - 11.0 K/uL  
 RBC 4.28 3.80 - 5.20 M/uL  
 HGB 13.0 11.5 - 16.0 g/dL HCT 38.7 35.0 - 47.0 % MCV 90.4 80.0 - 99.0 FL  
 MCH 30.4 26.0 - 34.0 PG  
 MCHC 33.6 30.0 - 36.5 g/dL  
 RDW 14.3 11.5 - 14.5 % PLATELET 582 811 - 751 K/uL MPV 9.5 8.9 - 12.9 FL  
 NRBC 0.0 0  WBC ABSOLUTE NRBC 0.00 0.00 - 0.01 K/uL NEUTROPHILS 73 32 - 75 % LYMPHOCYTES 16 12 - 49 % MONOCYTES 8 5 - 13 % EOSINOPHILS 4 0 - 7 % BASOPHILS 0 0 - 1 % IMMATURE GRANULOCYTES 0 0.0 - 0.5 % ABS. NEUTROPHILS 6.6 1.8 - 8.0 K/UL  
 ABS. LYMPHOCYTES 1.4 0.8 - 3.5 K/UL  
 ABS. MONOCYTES 0.7 0.0 - 1.0 K/UL  
 ABS. EOSINOPHILS 0.3 0.0 - 0.4 K/UL  
 ABS. BASOPHILS 0.0 0.0 - 0.1 K/UL  
 ABS. IMM. GRANS. 0.0 0.00 - 0.04 K/UL  
 DF AUTOMATED METABOLIC PANEL, BASIC Collection Time: 02/20/19  4:12 AM  
Result Value Ref Range Sodium 135 (L) 136 - 145 mmol/L Potassium 4.5 3.5 - 5.1 mmol/L Chloride 101 97 - 108 mmol/L  
 CO2 24 21 - 32 mmol/L Anion gap 10 5 - 15 mmol/L Glucose 85 65 - 100 mg/dL BUN 24 (H) 6 - 20 MG/DL Creatinine 0.91 0.55 - 1.02 MG/DL  
 BUN/Creatinine ratio 26 (H) 12 - 20 GFR est AA >60 >60 ml/min/1.73m2 GFR est non-AA 58 (L) >60 ml/min/1.73m2 Calcium 8.6 8.5 - 10.1 MG/DL Imaging Reviewed:  
 
Cta Chest W Or W Wo Cont Result Date: 2/18/2019 IMPRESSION: No evidence for pulmonary embolism. Emphysema. Xr Chest Fadia Hair Result Date: 2/18/2019 IMPRESSION: Cardiomegaly without acute finding Lázaro Shen Assessment:  
 
Active Problems: 
  Acute respiratory failure ([address] (2/18/2019) Plan:  
 
The objective of todays visit was to review the transitional care plan with the patient. We discussed the importance of transitional care as it relates to reducing the likelihood of readmission. We reviewed the goals for the first 30 days following hospital discharge.  The patient and I discussed wrap around services including nurse navigation, care coordination, home health, transitional care appointments with their primary care provider and specialist team and the role of dispatch health. Patient education focused on readmission zones as described as The Red Zone: High risk for readmission, days 1-21 The Yellow Zone: Moderate risk for readmission, days 22-29 The Green Zone: Lower risk for readmission, days 30 and after 1. Extensive conversation on ACP, DDNR,completed today with all copies distributed to to patient  and EMR. Pt informed to keep all copies in a safe, easily accessible location. Updated on portability and the necessity of possible limitations of documents if  she travels out of state. Requested she give copies to all mPOAs. Patient  given opportunity to ask  questions with information given during this assessment. My contact information given to patient if any future concerns or questions. 2. Discussed DH role and urgent healthcare option. Patient relieved that mobile option is available due to challneges with getting transportation 3. All labs, I/O's and imaging have been reviewed. 4. Medication Reconciliation performed at discharge. Discussed Azithromycin regimen with Dr Antwon Lowery - 500 mg daily x 3 days Resume PAR regimen on next   Monday of 250 mg daily only on   M-W- F She verbalizes understanding directions 5. Collaborated with Staff RN and atttending on this plan of care. The patient  expressed understanding of the disease process and management plan, and all questions were answered. Greater than 45 minutes were spent in patient management, greater than half of which was spent in counseling and coordination of care. 
   
Signed By: Trenton Koyanagi, NP February 20, 2019

## 2019-02-21 ENCOUNTER — PATIENT OUTREACH (OUTPATIENT)
Dept: INTERNAL MEDICINE CLINIC | Age: 84
End: 2019-02-21

## 2019-02-21 ENCOUNTER — HOME CARE VISIT (OUTPATIENT)
Dept: HOME HEALTH SERVICES | Facility: HOME HEALTH | Age: 84
End: 2019-02-21

## 2019-02-21 NOTE — PROGRESS NOTES
Hospital Discharge Follow-Up Date/Time:  2019 12:31 PM 
 
Patient was admitted to [facility] on 19 and discharged on 19 for Acute Respiratory Failure. The physician discharge summary was available at the time of outreach. Patient was contacted within 1 business days of discharge. Top Challenges reviewed with the provider COPD exacerbation CTA chest: No evidence for pulmonary embolism. Emphysema. CXR: Cardiomegaly without acute finding Accelerated HTN Highest BP reading 181/131 -- 163/80 at d/c Pt lives alone. No ACP. 618 [facility] visit  D-Dimer 1.47, Pro BNP 2113, Na130  at admission, troponin negative. CBC WNL at d/c, Na 135 Patient d/c on azithromycin and decadron Method of communication with provider :phone Inpatient RRAT score: 14 Was this a readmission? no  
Patient stated reason for the readmission: n/a Nurse Navigator (NN) contacted the patient by telephone to perform post hospital discharge assessment. Verified name and  with patient as identifiers. Provided introduction to self, and explanation of the Nurse Navigator role. Reviewed discharge instructions and red flags with patient who verbalized understanding. Patient given an opportunity to ask questions and does not have any further questions or concerns at this time. The patient agrees to contact the PCP office for questions related to their healthcare. NN provided contact information for future reference. Disease Specific:   COPD Summary of patient's top problems: 
1. COPD: presented to ED with dyspnea, wheezing and cough that was getting worse. acute exacerbation suspect related to viral process. Empiric steriods and nebulized bronchodilators given. Patient can not tolerate Prednisone so switched to decaderone. Patient is on 2LPM 02 at baseline. D-Dimer 1.47 on admission.  Pro BNP 2113, Na130 at admission, troponin negative. CTA chest: No evidence for pulmonary embolism. Emphysema. 2. Accelerated HTN: Suspect modest troponin leak and elevated BNP may be a consequence of her present blood pressures. Highest BP reading 181/13, 163/80 at d/c. Patient reports good BP compliance and management at home. Home Health orders at discharge: [facility] company: [address] [place] [employer] Date of initial visit: 2/26/19 Durable Medical Equipment ordered/company: none Durable Medical Equipment received: n/a Barriers to care? lack of knowledge about disease, utilization of services Advance Care Planning:  
Does patient have an Advance Directive:  not on file; education provided NN spoke to patient, education provided on ACP. She states that she may have one and needs to speak to her son about it. NN contact information provided so if patient does not have ACP she can contact to get information packet. Medication(s):  
New Medications at Discharge:  
 Details  
dexamethasone (DECADRON) 6 mg tablet Take 1 Tab by mouth Daily (before breakfast). Qty: 3 Tab, Refills: 0  
   
!! azithromycin (ZITHROMAX) 500 mg tab Take 1 Tab by mouth daily for 3 days. Qty: 3 Tab, Refills: 0  
   
 
 
Changed Medications at Discharge: n/a Discontinued Medications at Discharge: n/a Medication reconciliation was performed with patient, who verbalizes understanding of administration of home medications. There were no barriers to obtaining medications identified at this time. Referral to Pharm D needed: no  
 
Current Outpatient Medications Medication Sig  
 dexamethasone (DECADRON) 6 mg tablet Take 1 Tab by mouth Daily (before breakfast).  azithromycin (ZITHROMAX) 500 mg tab Take 1 Tab by mouth daily for 3 days.  azithromycin (ZITHROMAX) 250 mg tablet Take 250 mg by mouth every Monday, Wednesday, Friday.  lisinopril (PRINIVIL, ZESTRIL) 5 mg tablet Take 1 Tab by mouth daily.  albuterol-ipratropium (DUO-NEB) 2.5 mg-0.5 mg/3 ml nebu 3 mL by Nebulization route every four (4) hours as needed.  alum-mag hydroxide-simeth (MYLANTA) 200-200-20 mg/5 mL susp Take 30 mL by mouth every four (4) hours as needed.  OXYGEN-AIR DELIVERY SYSTEMS 2 L/min by Nasal route as needed (Shortness of Breath).  acetaminophen (TYLENOL) 500 mg tablet Take 500 mg by mouth every six (6) hours as needed for Pain.  albuterol-ipratropium (DUO-NEB) 2.5 mg-0.5 mg/3 ml nebu 3 mL by Nebulization route three (3) times daily. (Patient taking differently: 3 mL by Nebulization route four (4) times daily.)  Azelastine (ASTEPRO) 0.15 % (205.5 mcg) nasal spray  ANORO ELLIPTA 62.5-25 mcg/actuation inhaler  COMBIVENT RESPIMAT  mcg/actuation inhaler INHALE 1 PUFF THREE TIMES A DAY  aspirin delayed-release 81 mg tablet Take  by mouth daily. No current facility-administered medications for this visit. There are no discontinued medications. [facility] follow up appointment(s):  
Future Appointments Date Time Provider Padmaja Olivaresisti 2/26/2019 To Be Determined Jen Sultana RN [facility] [place] [address] Non-[facility] follow up appointment(s): Dr. Renaldo Pandya TBD - patient states that at this time she does not have appt scheduled with Dr. Renaldo Pandya. NN offered to schedule appt, patient refused. Patient stated that she wants to wait until her [facility] visit to schedule. NN encouraged patient to schedule asap. NN to f/u with patient on 2/27 after [facility] visit to determine if patient made PCP appt. [facility]:  scheduled [facility] appt confirmed on 2/22/19.   
 
 
2/22/19 NN spoke to patient today. Reviewed red flag s/s with patient, nausea, vomiting, inability to pass urine/stool, SOB, mental status change, chest pain, fever. Reviewed signs of DVT such as calf pain, reddness, tenderness and to contact doctor asap if those s/s occur.  Patient confirmed that she had her 02 supplies at home. Reviewed 02 precautions such as no smoking, having extra 02 tanks on hand, and fall precautions related to 02 tubing. Confirmed that patient received new meds from pharmacy and is taking as prescribed. Patient states at this time that she is good, no red flag s/s. [facility] to see patient 2/22. [facility] visit on 2/26, patient refused to schedule appt until after [facility] visit. NN provided patient with contact information if needed. NN to f/u next week to assess f/u appt status.  AR

## 2019-02-22 ENCOUNTER — APPOINTMENT (OUTPATIENT)
Dept: CT IMAGING | Age: 84
End: 2019-02-22
Attending: EMERGENCY MEDICINE
Payer: MEDICARE

## 2019-02-22 ENCOUNTER — HOSPITAL ENCOUNTER (EMERGENCY)
Age: 84
Discharge: HOME | End: 2019-02-22
Attending: EMERGENCY MEDICINE
Payer: MEDICARE

## 2019-02-22 VITALS
TEMPERATURE: 97 F | SYSTOLIC BLOOD PRESSURE: 163 MMHG | BODY MASS INDEX: 23.28 KG/M2 | DIASTOLIC BLOOD PRESSURE: 76 MMHG | WEIGHT: 144.84 LBS | HEART RATE: 80 BPM | HEIGHT: 66 IN | OXYGEN SATURATION: 96 % | RESPIRATION RATE: 20 BRPM

## 2019-02-22 DIAGNOSIS — S05.11XA PERIORBITAL CONTUSION OF RIGHT EYE, INITIAL ENCOUNTER: Primary | ICD-10-CM

## 2019-02-22 DIAGNOSIS — I10 ACCELERATED HYPERTENSION: ICD-10-CM

## 2019-02-22 DIAGNOSIS — S05.11XA: ICD-10-CM

## 2019-02-22 PROCEDURE — 70450 CT HEAD/BRAIN W/O DYE: CPT

## 2019-02-22 PROCEDURE — 99284 EMERGENCY DEPT VISIT MOD MDM: CPT

## 2019-02-22 PROCEDURE — 70480 CT ORBIT/EAR/FOSSA W/O DYE: CPT

## 2019-02-22 NOTE — ED TRIAGE NOTES
Patient woke up around 0230 this AM with right eye swollen with purple discoloration. Patient denies injury, pain or insect bite at this time

## 2019-02-22 NOTE — DISCHARGE INSTRUCTIONS
Thank you for allowing us to take care of you today! We hope we addressed all of your concerns and needs. We strive to provide excellent quality care in the Emergency Department. You will receive a survey after your visit to evaluate the care you were provided. Should you receive a survey from us, we invite you to share your experience and tell us what made it excellent. It was a pleasure serving you, we invite you to share your experience with us, in our pursuit for excellence, should you be selected to receive a survey. The exam and treatment you received in the Emergency Department were for an urgent problem and are not intended as complete care. It is important that you follow up with a doctor, nurse practitioner, or physician assistant for ongoing care. If your symptoms become worse or you do not improve as expected and you are unable to reach your usual health care provider, you should return to the Emergency Department. We are available 24 hours a day. Please take your discharge instructions with you when you go to your follow-up appointment. If you have any problem arranging a follow-up appointment, contact the Emergency Department immediately. If a prescription has been provided, please have it filled as soon as possible to prevent a delay in treatment. Read the entire medication instruction sheet provided to you by the pharmacy. If you have any questions or reservations about taking the medication due to side effects or interactions with other medications, please call your primary care physician or contact the ER to speak with the charge nurse. Make an appointment with your family doctor or the physician you were referred to for follow-up of this visit as instructed on your discharge paperwork, as this is mandatory follow-up. Return to the ER if you are unable to be seen or if you are unable to be seen in a timely manner.     If you have any problem arranging the follow-up visit, contact the Emergency Department immediately. I hope you feel better and thank you again for allow us to provide you with excellent care today at [place] [address].!       Warmest regards,    Benjie Rhoades MD  Emergency Medicine Physician  [place] [id number].

## 2019-02-22 NOTE — ED PROVIDER NOTES
EMERGENCY DEPARTMENT HISTORY AND PHYSICAL EXAM 
 
 
Date: 2/22/2019 Patient Name: Sayda Walter History of Presenting Illness Chief Complaint Patient presents with  Eye Problem  
  woke this am with hematoma around right eye & no pain & no vision problems History Provided By: Patient HPI: Sayda Walter, 80 y.o. female with PMHx significant for COPD, PVD, DVT, arthritis, and HTN, presents ambulatory to the ED with cc of acute onset R eye bruising x 2 hours. Pt states that she woke up to use the restroom when she noticed R eye redness and bruising. She notes that she is not certain of any trauma. Pt denies having fell, hit her head, or rolled out of the bed. She notes that she wears 2 L nc O2 baseline for COPD. Pt denies any pain to her eye. She denies taking any blood thinners. Additionally, pt specifically denies any recent blurry vision, fever, chills, headache, nausea, vomiting, diarrhea, abdominal pain, CP, SOB, lightheadedness, dizziness, numbness, weakness, tingling, BLE swelling, heart palpitations, urinary sxs, changes in BM, changes in PO intake, melena, hematochezia, cough, or congestion. PCP: Ace Fritz MD 
 
PMHx: Significant for COPD, PVD, DVT, arthritis, and HTN 
PSHx: Significant for tobacco use Social Hx: -tobacco, -EtOH, -Illicit Drugs There are no other complaints, changes or physical findings at this time. Past History Past Medical History: 
Past Medical History:  
Diagnosis Date  Arthritis   
 generalized  COPD  Essential hypertension 9/4/2018  former smoker   
  >[id number] quit 1/3/11  
 h/o DVT 1955 left leg  ischemic left lower extremitiy pain   
 above knee [employer]Pop 1/3/11  PVD (peripheral vascular disease) ([facility] [address]  Seasonal allergic rhinitis  Single kidney Past Surgical History: 
Past Surgical History:  
Procedure Laterality Date [address]  excision left breast cyst  
  HX GI  10 yrs ago  
 colonoscopy  HX GYN    
 3 miscarriges  HX GYN    
 1 vaginal birth  HX ORTHOPAEDIC    
 veinography left leg, stent left leg Family History: 
Family History Problem Relation Age of Onset  Cancer Mother   
     colon  Cancer Sister   
     lung Social History: 
Social History Tobacco Use  Smoking status: Former Smoker Packs/day: 1.00 Years: 60.00 Pack years: 60.00 Last attempt to quit: 1/3/2011 Years since quittin.1  Smokeless tobacco: Never Used Substance Use Topics  Alcohol use: Yes Comment: occasional liquor after dinner  Drug use: No  
 
 
Allergies: Allergies Allergen Reactions  Food Extracts Diarrhea Onions and garlic causes abdominal pain,cramping  Sulfa (Sulfonamide Antibiotics) Other (comments) Altered Mental Status Medications: No current facility-administered medications on file prior to encounter. Current Outpatient Medications on File Prior to Encounter Medication Sig Dispense Refill  dexamethasone (DECADRON) 6 mg tablet Take 1 Tab by mouth Daily (before breakfast). 3 Tab 0  
 azithromycin (ZITHROMAX) 500 mg tab Take 1 Tab by mouth daily for 3 days. 3 Tab 0  
 azithromycin (ZITHROMAX) 250 mg tablet Take 250 mg by mouth every Monday, Wednesday, Friday.  lisinopril (PRINIVIL, ZESTRIL) 5 mg tablet Take 1 Tab by mouth daily. 30 Tab 3  
 albuterol-ipratropium (DUO-NEB) 2.5 mg-0.5 mg/3 ml nebu 3 mL by Nebulization route every four (4) hours as needed. 30 Nebule 0  
 alum-mag hydroxide-simeth (MYLANTA) 200-200-20 mg/5 mL susp Take 30 mL by mouth every four (4) hours as needed. 1 Bottle 0  
 OXYGEN-AIR DELIVERY SYSTEMS 2 L/min by Nasal route as needed (Shortness of Breath).  acetaminophen (TYLENOL) 500 mg tablet Take 500 mg by mouth every six (6) hours as needed for Pain.     
 albuterol-ipratropium (DUO-NEB) 2.5 mg-0.5 mg/3 ml nebu 3 mL by Nebulization route three (3) times daily. (Patient taking differently: 3 mL by Nebulization route four (4) times daily.) 90 Nebule 0  
 Azelastine (ASTEPRO) 0.15 % (205.5 mcg) nasal spray  ANORO ELLIPTA 62.5-25 mcg/actuation inhaler  COMBIVENT RESPIMAT  mcg/actuation inhaler INHALE 1 PUFF THREE TIMES A DAY 1 Inhaler 11  
 aspirin delayed-release 81 mg tablet Take  by mouth daily. Review of Systems Review of Systems Constitutional: Negative. Negative for chills and fever. HENT: Negative. Negative for congestion, facial swelling, rhinorrhea, sore throat, trouble swallowing and voice change. Eyes: Positive for redness (R). Negative for visual disturbance. Positive for R eye bruising. Respiratory: Negative. Negative for apnea, cough, chest tightness, shortness of breath and wheezing. Cardiovascular: Negative. Negative for chest pain, palpitations and leg swelling. Gastrointestinal: Negative. Negative for abdominal distention, abdominal pain, blood in stool, constipation, diarrhea, nausea and vomiting. Endocrine: Negative. Negative for cold intolerance, heat intolerance and polyuria. Genitourinary: Negative. Negative for difficulty urinating, dysuria, flank pain, frequency, hematuria and urgency. Musculoskeletal: Negative. Negative for arthralgias, back pain, myalgias, neck pain and neck stiffness. Skin: Negative. Negative for color change and rash. Neurological: Negative. Negative for dizziness, syncope, facial asymmetry, speech difficulty, weakness, light-headedness, numbness and headaches. Hematological: Negative. Does not bruise/bleed easily. Psychiatric/Behavioral: Negative. Negative for confusion and self-injury. The patient is not nervous/anxious. Physical Exam  
Physical Exam  
Constitutional: She is oriented to person, place, and time. She appears well-developed and well-nourished. No distress.   
HENT:  
 Head: Normocephalic and atraumatic. Mouth/Throat: Oropharynx is clear and moist. No oropharyngeal exudate. Eyes: Conjunctivae and EOM are normal. Pupils are equal, round, and reactive to light. Significant ecchymosis around R eye, no bony deformity, no crepitus. Visual acuity intact. Neck: Normal range of motion. Cardiovascular: Normal rate, regular rhythm and normal heart sounds. Exam reveals no gallop and no friction rub. No murmur heard. Pulmonary/Chest: Effort normal and breath sounds normal. No respiratory distress. She has no wheezes. She has no rales. She exhibits no tenderness. Abdominal: Soft. Bowel sounds are normal. She exhibits no distension and no mass. There is no tenderness. There is no rebound and no guarding. Musculoskeletal: Normal range of motion. She exhibits no edema, tenderness or deformity. Neurological: She is alert and oriented to person, place, and time. She displays normal reflexes. No cranial nerve deficit. She exhibits normal muscle tone. Coordination normal.  
Skin: Skin is warm. No rash noted. She is not diaphoretic. Psychiatric: She has a normal mood and affect. Nursing note and vitals reviewed. Diagnostic Study Results Radiologic Studies -  
CT ORBIT EAR FOSSA WO CONT Final Result IMPRESSION:   
  
No fracture. Right periorbital preseptal soft tissue contusion. CT HEAD WO CONT Final Result IMPRESSION:   
  
No acute intracranial abnormality on this noncontrast head CT. New nonspecific right periorbital preseptal soft tissue contusion. New left mastoid air cell inflammation. CT Results  (Last 48 hours) 02/22/19 0441  CT HEAD WO CONT Final result Impression:  IMPRESSION:   
   
No acute intracranial abnormality on this noncontrast head CT. New nonspecific right periorbital preseptal soft tissue contusion. New left mastoid air cell inflammation. Narrative:  EXAM: CT HEAD WO CONT INDICATION: Awakened this morning with right periorbital bruising. No  
recollection of injury. COMPARISON: CT head and MRI brain on 7/28/2018. TECHNIQUE: Noncontrast head CT. Coronal and sagittal reformats. CT dose  
reduction was achieved through the use of a standardized protocol tailored for  
this examination and automatic exposure control for dose modulation. FINDINGS: The ventricles and sulci are age-appropriate without hydrocephalus. There is no mass effect or midline shift. There is no intracranial hemorrhage or  
extra-axial fluid collection. Chronic microvascular ischemic disease is  
unchanged and greatest in the bilateral parietal periventricular white matter. The calvarium is intact. Right periorbital preseptal soft tissue contusion is  
partially imaged. Left mastoid air cell inflammation is new. 02/22/19 0441  CT ORBIT EAR FOSSA WO CONT Final result Impression:  IMPRESSION:   
   
No fracture. Right periorbital preseptal soft tissue contusion. Narrative:  EXAM:  CT ORBIT EAR FOSSA WO CONT INDICATION:  Right periorbital bruising upon awakening this morning. No  
recollection of injury. COMPARISON: None TECHNIQUE: Helical CT of the orbits with coronal and sagittal reformats. Images  
reviewed in soft tissue and bone windows. CT dose reduction was achieved  
through the use of a standardized protocol tailored for this examination and  
automatic exposure control for dose modulation. CONTRAST: None. FINDINGS: Bones: within normal limits. Normal bone mineralization. No fracture,  
dislocation, or periosteal reaction. Pterygoids are intact. Paranasal sinuses: Clear. Soft tissues: No evidence of mass. Orbits are symmetric. Right orbital preseptal  
soft tissue increased attenuation most likely represents contusion. No drainable  
fluid collection. Medical Decision Making I am the first provider for this patient. I reviewed the vital signs, available nursing notes, past medical history, past surgical history, family history and social history. Vital Signs-Reviewed the patient's vital signs. Patient Vitals for the past 24 hrs: 
 Temp Pulse Resp BP SpO2  
02/22/19 0415    163/76 96 % 02/22/19 0412     95 % 02/22/19 0405    184/67   
02/22/19 0359 97 °F (36.1 °C) 80 20 (!) 150/94 92 % Pulse Oximetry Analysis - 92% on RA Cardiac Monitor:  
Rate: 80 bpm 
Rhythm: Normal Sinus Rhythm Records Reviewed: Nursing Notes, Old Medical Records, Previous electrocardiograms, Previous Radiology Studies and Previous Laboratory Studies Provider Notes (Medical Decision Making):  
Significant ecchymosis around R eye, no bony deformity, no crepitus. Visual acuity intact, will check CT imaging and dispo pending above. ED Course:  
Initial assessment performed. The patients presenting problems have been discussed, and they are in agreement with the care plan formulated and outlined with them. I have encouraged them to ask questions as they arise throughout their visit. HYPERTENSION COUNSELING Education was provided to the patient today regarding their hypertension. Patient is made aware of their elevated blood pressure and is instructed to follow up this week with their Primary Care for a recheck. Patient is counseled regarding consequences of chronic, uncontrolled hypertension including kidney disease, heart disease, stroke or even death. Patient states their understanding and agrees to follow up this week. Additionally, during their visit, I discussed sodium restriction, maintaining ideal body weight and regular exercise program as physiologic means to achieve blood pressure control. The patient will strive towards this.   
 
I reviewed our electronic medical record system for any past medical records that were available that may contribute to the patient's current condition, the nursing notes and vital signs from today's visit. Julio Mckee MD 
Medications Administered During ED Course: 
Medications - No data to display ED Course as of Feb 22 2146 Fri Feb 22, 2019  
0506 Visual acuity checked by PCT 
20/40 right eye 20/80 left eye  [HW] ED Course User Index 
[HW] Joe Morrison MD  
 
Progress Note: 
5:15 AM 
Patient has been reassessed and reports feeling better and symptoms have improved after ED treatment. Elizabeth Saldana is able to tolerate PO and ambulate per baseline. Scarlett Capone's final labs and imaging have been reviewed with her. She has been counseled regarding her diagnosis. She verbally conveys understanding and agreement of the signs, symptoms, diagnosis, treatment and prognosis and additionally agrees to follow up as recommended with Dr. Sahra Hernandez MD in 24 - 48 hours. She also agrees with the care-plan and conveys that all of her questions have been answered. I have also put together some discharge instructions for her that include: 1) educational information regarding their diagnosis, 2) how to care for their diagnosis at home, as well a 3) list of reasons why they would want to return to the ED prior to their follow-up appointment, should their condition change. I have answered all questions to patient's satisfaction. She both understood and agreed with plan as discussed above. Vital signs stable for discharge. Critical Care Time:  
0 minutes. Disposition: 
DISCHARGE NOTE: 
5:15 AM 
The patient is ready for discharge. The patients signs, symptoms, diagnosis, and instructions for discharge have been discussed and the pt has conveyed their understanding. The patient is to follow up as recommended with PCP or return to the ER should their symptoms worsen. Plan has been discussed and patient has conveyed their agreement. PLAN: 
1. Discharge home. Discharge Medication List as of 2/22/2019  5:23 AM  
  
No current facility-administered medications for this encounter. Current Outpatient Medications:  
  dexamethasone (DECADRON) 6 mg tablet, Take 1 Tab by mouth Daily (before breakfast). , Disp: 3 Tab, Rfl: 0 
  azithromycin (ZITHROMAX) 500 mg tab, Take 1 Tab by mouth daily for 3 days. , Disp: 3 Tab, Rfl: 0 
  azithromycin (ZITHROMAX) 250 mg tablet, Take 250 mg by mouth every Monday, Wednesday, Friday., Disp: , Rfl:  
  lisinopril (PRINIVIL, ZESTRIL) 5 mg tablet, Take 1 Tab by mouth daily. , Disp: 30 Tab, Rfl: 3 
  albuterol-ipratropium (DUO-NEB) 2.5 mg-0.5 mg/3 ml nebu, 3 mL by Nebulization route every four (4) hours as needed. , Disp: 30 Nebule, Rfl: 0 
  alum-mag hydroxide-simeth (MYLANTA) 200-200-20 mg/5 mL susp, Take 30 mL by mouth every four (4) hours as needed. , Disp: 1 Bottle, Rfl: 0 
  OXYGEN-AIR DELIVERY SYSTEMS, 2 L/min by Nasal route as needed (Shortness of Breath). , Disp: , Rfl:  
  acetaminophen (TYLENOL) 500 mg tablet, Take 500 mg by mouth every six (6) hours as needed for Pain., Disp: , Rfl:  
  albuterol-ipratropium (DUO-NEB) 2.5 mg-0.5 mg/3 ml nebu, 3 mL by Nebulization route three (3) times daily. (Patient taking differently: 3 mL by Nebulization route four (4) times daily.), Disp: 90 Nebule, Rfl: 0 
  Azelastine (ASTEPRO) 0.15 % (205.5 mcg) nasal spray, , Disp: , Rfl:  
  ANORO ELLIPTA 62.5-25 mcg/actuation inhaler, , Disp: , Rfl:  
  COMBIVENT RESPIMAT  mcg/actuation inhaler, INHALE 1 PUFF THREE TIMES A DAY, Disp: 1 Inhaler, Rfl: 11 
  aspirin delayed-release 81 mg tablet, Take  by mouth daily. , Disp: , Rfl:  
 
2. Follow-up Information Follow up With Specialties Details Why Contact Info Sai Arnold MD Internal Medicine   [place] [phone] [facility] EMERGENCY DEPT Emergency Medicine  If symptoms worsen, As needed [address] [facility] [address]   [phone] Return to ED if worse Diagnosis Clinical Impression: 1. Periorbital contusion of right eye, initial encounter 2. Accelerated hypertension 3. Traumatic periorbital ecchymosis of right eye, initial encounter Attestations This note is prepared by Daphne Charles, acting as Scribe for MD Marilu Us MD : The scribe's documentation has been prepared under my direction and personally reviewed by me in its entirety. I confirm that the note above accurately reflects all work, treatment, procedures, and medical decision making performed by me. 
 
: 
This note will not be viewable in [address] [place]

## 2019-02-26 ENCOUNTER — HOME CARE VISIT (OUTPATIENT)
Dept: SCHEDULING | Facility: HOME HEALTH | Age: 84
End: 2019-02-26

## 2019-02-26 PROCEDURE — G0495 RN CARE TRAIN/EDU IN HH: HCPCS

## 2019-02-27 ENCOUNTER — PATIENT OUTREACH (OUTPATIENT)
Dept: INTERNAL MEDICINE CLINIC | Age: 84
End: 2019-02-27

## 2019-02-27 NOTE — PROGRESS NOTES
02/27/19 NN f/u with patient regarding PCP appt. H2H visit completed per chart review. Patient confirmed that she changing PCP to Dr. Peace Cortes (office is closer), son to make appt. Her son has not scheduled appt as of this time. Encouraged patient to schedule appt asap, offered to contact [facility] to schedule for patient and she refused. Reviewed red flag s/s and patient denies. Patient has contact number to NN if needed. NN to f/u 1 week to assess PCP appt.  AR

## 2019-03-08 ENCOUNTER — PATIENT OUTREACH (OUTPATIENT)
Dept: INTERNAL MEDICINE CLINIC | Age: 84
End: 2019-03-08

## 2019-03-08 NOTE — PROGRESS NOTES
03/08/19  Spoke to appt desk at Dr. Lydia Meyer office. Patient currently does not have any appts scheduled. Patient has not been seen at PCP office since Sept 2018. Per chart review patient stated to [address] [place] [address] nurse that she was going to switch PCP's to Dr. Keisha Rapp. NN contacted Dr. Amalia Hinojosa office who stated patient has not been seen there and does not have any appts scheduled. NN spoke to patient. She states that she is SOB but does not feel like she needs to go to ER. She has contacted [facility] and they are on the way to see patient. Patient stated that she just has not had time to schedule appt at Dr. Amalia Hinojosa office. Encouraged patient to do this asap so that she can get established with the practice to avoid further ER/[facility] visits if possible. NN to f/u with patient Monday to assess [facility] visit. Patient provided with contact information of NN if she needs to contact NN.  AR

## 2019-03-11 ENCOUNTER — PATIENT OUTREACH (OUTPATIENT)
Dept: INTERNAL MEDICINE CLINIC | Age: 84
End: 2019-03-11

## 2019-03-11 NOTE — PROGRESS NOTES
03/11/19  Spoke to patient to discuss [facility] visit - she states that she is breathing and feeling better. SOB has decreased. She states that they dx her with URI and prescribed antibiotics. Reminded patient to contact PCP for f/u appt and she states that her son is going to do it for her. She states she has no questions at this time and was able to obtain all her medications from the pharmacy, she did not have the name of the antibiotic in front of her. NN to f/u to confirm PCP appt with PCP office in 1 week.  AR

## 2019-03-20 ENCOUNTER — PATIENT OUTREACH (OUTPATIENT)
Dept: INTERNAL MEDICINE CLINIC | Age: 84
End: 2019-03-20

## 2019-03-20 NOTE — PROGRESS NOTES
Patient has graduated from the Transitions of Care Coordination  program on 03/20/19. Patient's symptoms are stable at this time. Patient/family has the ability to self-manage. No further nurse navigator follow up scheduled. Pt has nurse navigator's contact information for any further questions, concerns, or needs. Patients upcoming visits:  No future appointments. Spoke to patient today- patient reports that she is doing well. She has not scheduled her PCP f/u yet, she states her or her son just haven't got around to it. Encouraged patient to make asap. Offered to schedule for patient and she declined. NO further questions from patient at this time.  AR

## 2019-10-08 ENCOUNTER — HOME HEALTH ADMISSION (OUTPATIENT)
Dept: HOME HEALTH SERVICES | Facility: HOME HEALTH | Age: 84
End: 2019-10-08
Payer: MEDICARE

## 2019-10-09 ENCOUNTER — HOME CARE VISIT (OUTPATIENT)
Dept: SCHEDULING | Facility: HOME HEALTH | Age: 84
End: 2019-10-09
Payer: MEDICARE

## 2019-10-09 VITALS
TEMPERATURE: 98.3 F | RESPIRATION RATE: 16 BRPM | OXYGEN SATURATION: 97 % | DIASTOLIC BLOOD PRESSURE: 90 MMHG | SYSTOLIC BLOOD PRESSURE: 140 MMHG | HEART RATE: 89 BPM

## 2019-10-09 PROCEDURE — 3331090002 HH PPS REVENUE DEBIT

## 2019-10-09 PROCEDURE — A6216 NON-STERILE GAUZE<=16 SQ IN: HCPCS

## 2019-10-09 PROCEDURE — A6446 CONFORM BAND S W>=3" <5"/YD: HCPCS

## 2019-10-09 PROCEDURE — 400013 HH SOC

## 2019-10-09 PROCEDURE — A4450 NON-WATERPROOF TAPE: HCPCS

## 2019-10-09 PROCEDURE — A6456 ZINC PASTE BAND W >=3"<5"/YD: HCPCS

## 2019-10-09 PROCEDURE — G0299 HHS/HOSPICE OF RN EA 15 MIN: HCPCS

## 2019-10-09 PROCEDURE — 3331090001 HH PPS REVENUE CREDIT

## 2019-10-09 PROCEDURE — A6454 SELF-ADHER BAND W>=3" <5"/YD: HCPCS

## 2019-10-09 PROCEDURE — A6260 WOUND CLEANSER ANY TYPE/SIZE: HCPCS

## 2019-10-10 PROCEDURE — 3331090001 HH PPS REVENUE CREDIT

## 2019-10-10 PROCEDURE — 3331090002 HH PPS REVENUE DEBIT

## 2019-10-11 ENCOUNTER — HOME CARE VISIT (OUTPATIENT)
Dept: SCHEDULING | Facility: HOME HEALTH | Age: 84
End: 2019-10-11
Payer: MEDICARE

## 2019-10-11 VITALS
TEMPERATURE: 98.2 F | SYSTOLIC BLOOD PRESSURE: 140 MMHG | HEART RATE: 68 BPM | OXYGEN SATURATION: 94 % | RESPIRATION RATE: 18 BRPM | DIASTOLIC BLOOD PRESSURE: 78 MMHG

## 2019-10-11 PROCEDURE — 3331090001 HH PPS REVENUE CREDIT

## 2019-10-11 PROCEDURE — G0300 HHS/HOSPICE OF LPN EA 15 MIN: HCPCS

## 2019-10-11 PROCEDURE — 3331090002 HH PPS REVENUE DEBIT

## 2019-10-12 PROCEDURE — 3331090001 HH PPS REVENUE CREDIT

## 2019-10-12 PROCEDURE — 3331090002 HH PPS REVENUE DEBIT

## 2019-10-13 ENCOUNTER — HOME CARE VISIT (OUTPATIENT)
Dept: SCHEDULING | Facility: HOME HEALTH | Age: 84
End: 2019-10-13
Payer: MEDICARE

## 2019-10-13 PROCEDURE — 3331090002 HH PPS REVENUE DEBIT

## 2019-10-13 PROCEDURE — G0300 HHS/HOSPICE OF LPN EA 15 MIN: HCPCS

## 2019-10-13 PROCEDURE — 3331090001 HH PPS REVENUE CREDIT

## 2019-10-14 PROCEDURE — 3331090001 HH PPS REVENUE CREDIT

## 2019-10-14 PROCEDURE — 3331090002 HH PPS REVENUE DEBIT

## 2019-10-15 PROCEDURE — 3331090002 HH PPS REVENUE DEBIT

## 2019-10-15 PROCEDURE — 3331090001 HH PPS REVENUE CREDIT

## 2019-10-16 ENCOUNTER — HOME CARE VISIT (OUTPATIENT)
Dept: SCHEDULING | Facility: HOME HEALTH | Age: 84
End: 2019-10-16
Payer: MEDICARE

## 2019-10-16 VITALS
TEMPERATURE: 98.6 F | OXYGEN SATURATION: 94 % | HEART RATE: 74 BPM | DIASTOLIC BLOOD PRESSURE: 64 MMHG | SYSTOLIC BLOOD PRESSURE: 142 MMHG

## 2019-10-16 VITALS
OXYGEN SATURATION: 94 % | RESPIRATION RATE: 18 BRPM | TEMPERATURE: 97.3 F | DIASTOLIC BLOOD PRESSURE: 70 MMHG | SYSTOLIC BLOOD PRESSURE: 130 MMHG | HEART RATE: 72 BPM

## 2019-10-16 PROCEDURE — 3331090002 HH PPS REVENUE DEBIT

## 2019-10-16 PROCEDURE — G0299 HHS/HOSPICE OF RN EA 15 MIN: HCPCS

## 2019-10-16 PROCEDURE — 3331090001 HH PPS REVENUE CREDIT

## 2019-10-17 ENCOUNTER — HOME CARE VISIT (OUTPATIENT)
Dept: HOME HEALTH SERVICES | Facility: HOME HEALTH | Age: 84
End: 2019-10-17
Payer: MEDICARE

## 2019-10-17 PROCEDURE — 3331090001 HH PPS REVENUE CREDIT

## 2019-10-17 PROCEDURE — 3331090002 HH PPS REVENUE DEBIT

## 2019-10-18 ENCOUNTER — HOME CARE VISIT (OUTPATIENT)
Dept: SCHEDULING | Facility: HOME HEALTH | Age: 84
End: 2019-10-18
Payer: MEDICARE

## 2019-10-18 VITALS
RESPIRATION RATE: 120 BRPM | SYSTOLIC BLOOD PRESSURE: 116 MMHG | OXYGEN SATURATION: 93 % | TEMPERATURE: 99.1 F | DIASTOLIC BLOOD PRESSURE: 60 MMHG | HEART RATE: 73 BPM

## 2019-10-18 PROCEDURE — 3331090002 HH PPS REVENUE DEBIT

## 2019-10-18 PROCEDURE — G0299 HHS/HOSPICE OF RN EA 15 MIN: HCPCS

## 2019-10-18 PROCEDURE — 3331090001 HH PPS REVENUE CREDIT

## 2019-10-19 PROCEDURE — 3331090001 HH PPS REVENUE CREDIT

## 2019-10-19 PROCEDURE — 3331090002 HH PPS REVENUE DEBIT

## 2019-10-20 PROCEDURE — 3331090001 HH PPS REVENUE CREDIT

## 2019-10-20 PROCEDURE — 3331090002 HH PPS REVENUE DEBIT

## 2019-10-21 PROCEDURE — 3331090002 HH PPS REVENUE DEBIT

## 2019-10-21 PROCEDURE — 3331090001 HH PPS REVENUE CREDIT

## 2019-10-22 ENCOUNTER — HOME CARE VISIT (OUTPATIENT)
Dept: HOME HEALTH SERVICES | Facility: HOME HEALTH | Age: 84
End: 2019-10-22
Payer: MEDICARE

## 2019-10-22 PROCEDURE — 3331090001 HH PPS REVENUE CREDIT

## 2019-10-22 PROCEDURE — 3331090002 HH PPS REVENUE DEBIT

## 2019-10-23 PROCEDURE — 3331090002 HH PPS REVENUE DEBIT

## 2019-10-23 PROCEDURE — 3331090001 HH PPS REVENUE CREDIT

## 2019-10-24 ENCOUNTER — HOME CARE VISIT (OUTPATIENT)
Dept: SCHEDULING | Facility: HOME HEALTH | Age: 84
End: 2019-10-24
Payer: MEDICARE

## 2019-10-24 VITALS
OXYGEN SATURATION: 95 % | SYSTOLIC BLOOD PRESSURE: 140 MMHG | RESPIRATION RATE: 18 BRPM | TEMPERATURE: 98.7 F | HEART RATE: 70 BPM | DIASTOLIC BLOOD PRESSURE: 90 MMHG

## 2019-10-24 PROCEDURE — A6248 HYDROGEL DRSG GEL FILLER: HCPCS

## 2019-10-24 PROCEDURE — G0299 HHS/HOSPICE OF RN EA 15 MIN: HCPCS

## 2019-10-24 PROCEDURE — 3331090001 HH PPS REVENUE CREDIT

## 2019-10-24 PROCEDURE — 3331090002 HH PPS REVENUE DEBIT

## 2019-10-24 PROCEDURE — A6446 CONFORM BAND S W>=3" <5"/YD: HCPCS

## 2019-10-25 ENCOUNTER — HOME CARE VISIT (OUTPATIENT)
Dept: SCHEDULING | Facility: HOME HEALTH | Age: 84
End: 2019-10-25
Payer: MEDICARE

## 2019-10-25 VITALS
TEMPERATURE: 98.5 F | OXYGEN SATURATION: 95 % | HEART RATE: 65 BPM | DIASTOLIC BLOOD PRESSURE: 80 MMHG | SYSTOLIC BLOOD PRESSURE: 136 MMHG | RESPIRATION RATE: 18 BRPM

## 2019-10-25 PROCEDURE — 3331090001 HH PPS REVENUE CREDIT

## 2019-10-25 PROCEDURE — 3331090002 HH PPS REVENUE DEBIT

## 2019-10-25 PROCEDURE — G0299 HHS/HOSPICE OF RN EA 15 MIN: HCPCS

## 2019-10-26 PROCEDURE — 3331090002 HH PPS REVENUE DEBIT

## 2019-10-26 PROCEDURE — 3331090001 HH PPS REVENUE CREDIT

## 2019-10-27 PROCEDURE — 3331090001 HH PPS REVENUE CREDIT

## 2019-10-27 PROCEDURE — 3331090002 HH PPS REVENUE DEBIT

## 2019-10-28 PROCEDURE — 3331090002 HH PPS REVENUE DEBIT

## 2019-10-28 PROCEDURE — 3331090001 HH PPS REVENUE CREDIT

## 2019-10-29 ENCOUNTER — HOME CARE VISIT (OUTPATIENT)
Dept: SCHEDULING | Facility: HOME HEALTH | Age: 84
End: 2019-10-29
Payer: MEDICARE

## 2019-10-29 VITALS
OXYGEN SATURATION: 95 % | TEMPERATURE: 98.2 F | SYSTOLIC BLOOD PRESSURE: 140 MMHG | DIASTOLIC BLOOD PRESSURE: 80 MMHG | RESPIRATION RATE: 20 BRPM | HEART RATE: 62 BPM

## 2019-10-29 PROCEDURE — 3331090002 HH PPS REVENUE DEBIT

## 2019-10-29 PROCEDURE — 3331090001 HH PPS REVENUE CREDIT

## 2019-10-29 PROCEDURE — G0299 HHS/HOSPICE OF RN EA 15 MIN: HCPCS

## 2019-10-30 PROCEDURE — 3331090002 HH PPS REVENUE DEBIT

## 2019-10-30 PROCEDURE — 3331090001 HH PPS REVENUE CREDIT

## 2019-10-31 PROCEDURE — 3331090001 HH PPS REVENUE CREDIT

## 2019-10-31 PROCEDURE — 3331090002 HH PPS REVENUE DEBIT

## 2019-11-01 ENCOUNTER — HOME CARE VISIT (OUTPATIENT)
Dept: SCHEDULING | Facility: HOME HEALTH | Age: 84
End: 2019-11-01
Payer: MEDICARE

## 2019-11-01 VITALS
HEART RATE: 70 BPM | SYSTOLIC BLOOD PRESSURE: 140 MMHG | OXYGEN SATURATION: 94 % | DIASTOLIC BLOOD PRESSURE: 80 MMHG | RESPIRATION RATE: 20 BRPM | TEMPERATURE: 98.2 F

## 2019-11-01 PROCEDURE — 3331090001 HH PPS REVENUE CREDIT

## 2019-11-01 PROCEDURE — 3331090002 HH PPS REVENUE DEBIT

## 2019-11-01 PROCEDURE — G0299 HHS/HOSPICE OF RN EA 15 MIN: HCPCS

## 2019-11-02 PROCEDURE — 3331090002 HH PPS REVENUE DEBIT

## 2019-11-02 PROCEDURE — 3331090001 HH PPS REVENUE CREDIT

## 2019-11-03 PROCEDURE — 3331090001 HH PPS REVENUE CREDIT

## 2019-11-03 PROCEDURE — 3331090002 HH PPS REVENUE DEBIT

## 2019-11-04 PROCEDURE — 3331090002 HH PPS REVENUE DEBIT

## 2019-11-04 PROCEDURE — 3331090001 HH PPS REVENUE CREDIT

## 2019-11-05 ENCOUNTER — HOME CARE VISIT (OUTPATIENT)
Dept: SCHEDULING | Facility: HOME HEALTH | Age: 84
End: 2019-11-05
Payer: MEDICARE

## 2019-11-05 VITALS
HEART RATE: 63 BPM | OXYGEN SATURATION: 95 % | DIASTOLIC BLOOD PRESSURE: 82 MMHG | RESPIRATION RATE: 20 BRPM | TEMPERATURE: 99.3 F | SYSTOLIC BLOOD PRESSURE: 140 MMHG

## 2019-11-05 PROCEDURE — 3331090001 HH PPS REVENUE CREDIT

## 2019-11-05 PROCEDURE — G0299 HHS/HOSPICE OF RN EA 15 MIN: HCPCS

## 2019-11-05 PROCEDURE — 3331090002 HH PPS REVENUE DEBIT

## 2019-11-06 PROCEDURE — 3331090001 HH PPS REVENUE CREDIT

## 2019-11-06 PROCEDURE — 3331090002 HH PPS REVENUE DEBIT

## 2019-11-07 PROCEDURE — 3331090002 HH PPS REVENUE DEBIT

## 2019-11-07 PROCEDURE — 3331090001 HH PPS REVENUE CREDIT

## 2019-11-08 ENCOUNTER — HOME CARE VISIT (OUTPATIENT)
Dept: SCHEDULING | Facility: HOME HEALTH | Age: 84
End: 2019-11-08
Payer: MEDICARE

## 2019-11-08 VITALS
SYSTOLIC BLOOD PRESSURE: 120 MMHG | TEMPERATURE: 98.6 F | OXYGEN SATURATION: 94 % | RESPIRATION RATE: 20 BRPM | HEART RATE: 70 BPM | DIASTOLIC BLOOD PRESSURE: 80 MMHG

## 2019-11-08 PROCEDURE — 3331090002 HH PPS REVENUE DEBIT

## 2019-11-08 PROCEDURE — 3331090001 HH PPS REVENUE CREDIT

## 2019-11-08 PROCEDURE — G0299 HHS/HOSPICE OF RN EA 15 MIN: HCPCS

## 2019-11-09 PROCEDURE — 3331090002 HH PPS REVENUE DEBIT

## 2019-11-09 PROCEDURE — 3331090001 HH PPS REVENUE CREDIT

## 2019-11-10 PROCEDURE — 3331090001 HH PPS REVENUE CREDIT

## 2019-11-10 PROCEDURE — 3331090002 HH PPS REVENUE DEBIT

## 2019-11-11 PROCEDURE — 3331090001 HH PPS REVENUE CREDIT

## 2019-11-11 PROCEDURE — 3331090002 HH PPS REVENUE DEBIT

## 2019-11-12 ENCOUNTER — HOME CARE VISIT (OUTPATIENT)
Dept: SCHEDULING | Facility: HOME HEALTH | Age: 84
End: 2019-11-12
Payer: MEDICARE

## 2019-11-12 VITALS
SYSTOLIC BLOOD PRESSURE: 118 MMHG | DIASTOLIC BLOOD PRESSURE: 70 MMHG | OXYGEN SATURATION: 94 % | TEMPERATURE: 99.3 F | RESPIRATION RATE: 20 BRPM | HEART RATE: 64 BPM

## 2019-11-12 PROCEDURE — G0299 HHS/HOSPICE OF RN EA 15 MIN: HCPCS

## 2019-11-12 PROCEDURE — 3331090001 HH PPS REVENUE CREDIT

## 2019-11-12 PROCEDURE — 3331090002 HH PPS REVENUE DEBIT

## 2019-11-13 PROCEDURE — 3331090002 HH PPS REVENUE DEBIT

## 2019-11-13 PROCEDURE — 3331090001 HH PPS REVENUE CREDIT

## 2019-11-14 PROCEDURE — 3331090002 HH PPS REVENUE DEBIT

## 2019-11-14 PROCEDURE — 3331090001 HH PPS REVENUE CREDIT

## 2019-11-15 ENCOUNTER — HOME CARE VISIT (OUTPATIENT)
Dept: SCHEDULING | Facility: HOME HEALTH | Age: 84
End: 2019-11-15
Payer: MEDICARE

## 2019-11-15 PROCEDURE — 3331090001 HH PPS REVENUE CREDIT

## 2019-11-15 PROCEDURE — G0299 HHS/HOSPICE OF RN EA 15 MIN: HCPCS

## 2019-11-15 PROCEDURE — 3331090002 HH PPS REVENUE DEBIT

## 2019-11-16 VITALS
HEART RATE: 61 BPM | RESPIRATION RATE: 20 BRPM | DIASTOLIC BLOOD PRESSURE: 70 MMHG | SYSTOLIC BLOOD PRESSURE: 132 MMHG | OXYGEN SATURATION: 94 % | TEMPERATURE: 98.8 F

## 2019-11-16 PROCEDURE — 3331090001 HH PPS REVENUE CREDIT

## 2019-11-16 PROCEDURE — 3331090002 HH PPS REVENUE DEBIT

## 2019-11-17 PROCEDURE — 3331090001 HH PPS REVENUE CREDIT

## 2019-11-17 PROCEDURE — 3331090002 HH PPS REVENUE DEBIT

## 2019-11-18 PROCEDURE — 3331090002 HH PPS REVENUE DEBIT

## 2019-11-18 PROCEDURE — 3331090001 HH PPS REVENUE CREDIT

## 2019-11-19 ENCOUNTER — HOME CARE VISIT (OUTPATIENT)
Dept: SCHEDULING | Facility: HOME HEALTH | Age: 84
End: 2019-11-19
Payer: MEDICARE

## 2019-11-19 VITALS
HEART RATE: 71 BPM | DIASTOLIC BLOOD PRESSURE: 78 MMHG | TEMPERATURE: 98.5 F | RESPIRATION RATE: 22 BRPM | SYSTOLIC BLOOD PRESSURE: 132 MMHG | OXYGEN SATURATION: 95 %

## 2019-11-19 PROCEDURE — 3331090001 HH PPS REVENUE CREDIT

## 2019-11-19 PROCEDURE — 3331090002 HH PPS REVENUE DEBIT

## 2019-11-19 PROCEDURE — G0299 HHS/HOSPICE OF RN EA 15 MIN: HCPCS

## 2019-11-20 PROCEDURE — 3331090002 HH PPS REVENUE DEBIT

## 2019-11-20 PROCEDURE — 3331090001 HH PPS REVENUE CREDIT

## 2019-11-21 PROCEDURE — 3331090002 HH PPS REVENUE DEBIT

## 2019-11-21 PROCEDURE — 3331090001 HH PPS REVENUE CREDIT

## 2019-11-22 ENCOUNTER — HOME CARE VISIT (OUTPATIENT)
Dept: SCHEDULING | Facility: HOME HEALTH | Age: 84
End: 2019-11-22
Payer: MEDICARE

## 2019-11-22 PROCEDURE — G0299 HHS/HOSPICE OF RN EA 15 MIN: HCPCS

## 2019-11-22 PROCEDURE — 3331090001 HH PPS REVENUE CREDIT

## 2019-11-22 PROCEDURE — 3331090002 HH PPS REVENUE DEBIT

## 2019-11-23 PROCEDURE — 3331090002 HH PPS REVENUE DEBIT

## 2019-11-23 PROCEDURE — 3331090001 HH PPS REVENUE CREDIT

## 2019-11-24 VITALS
DIASTOLIC BLOOD PRESSURE: 70 MMHG | OXYGEN SATURATION: 95 % | TEMPERATURE: 97.8 F | HEART RATE: 70 BPM | RESPIRATION RATE: 18 BRPM | SYSTOLIC BLOOD PRESSURE: 120 MMHG

## 2019-11-24 PROCEDURE — 3331090002 HH PPS REVENUE DEBIT

## 2019-11-24 PROCEDURE — 3331090001 HH PPS REVENUE CREDIT

## 2019-11-25 ENCOUNTER — HOME CARE VISIT (OUTPATIENT)
Dept: SCHEDULING | Facility: HOME HEALTH | Age: 84
End: 2019-11-25
Payer: MEDICARE

## 2019-11-25 VITALS
DIASTOLIC BLOOD PRESSURE: 86 MMHG | SYSTOLIC BLOOD PRESSURE: 140 MMHG | RESPIRATION RATE: 20 BRPM | TEMPERATURE: 98.7 F | HEART RATE: 52 BPM | OXYGEN SATURATION: 97 %

## 2019-11-25 PROCEDURE — G0299 HHS/HOSPICE OF RN EA 15 MIN: HCPCS

## 2019-11-25 PROCEDURE — 3331090001 HH PPS REVENUE CREDIT

## 2019-11-25 PROCEDURE — 3331090002 HH PPS REVENUE DEBIT

## 2019-11-26 PROCEDURE — 3331090001 HH PPS REVENUE CREDIT

## 2019-11-26 PROCEDURE — 3331090002 HH PPS REVENUE DEBIT

## 2019-11-27 PROCEDURE — 3331090002 HH PPS REVENUE DEBIT

## 2019-11-27 PROCEDURE — 3331090001 HH PPS REVENUE CREDIT

## 2019-11-28 PROCEDURE — 3331090001 HH PPS REVENUE CREDIT

## 2019-11-28 PROCEDURE — 3331090002 HH PPS REVENUE DEBIT

## 2019-11-29 ENCOUNTER — HOME CARE VISIT (OUTPATIENT)
Dept: SCHEDULING | Facility: HOME HEALTH | Age: 84
End: 2019-11-29
Payer: MEDICARE

## 2019-11-29 PROCEDURE — A6216 NON-STERILE GAUZE<=16 SQ IN: HCPCS

## 2019-11-29 PROCEDURE — G0299 HHS/HOSPICE OF RN EA 15 MIN: HCPCS

## 2019-11-29 PROCEDURE — A4450 NON-WATERPROOF TAPE: HCPCS

## 2019-11-29 PROCEDURE — 3331090001 HH PPS REVENUE CREDIT

## 2019-11-29 PROCEDURE — 3331090002 HH PPS REVENUE DEBIT

## 2019-11-29 PROCEDURE — A6248 HYDROGEL DRSG GEL FILLER: HCPCS

## 2019-11-30 VITALS
OXYGEN SATURATION: 95 % | TEMPERATURE: 98.5 F | RESPIRATION RATE: 20 BRPM | SYSTOLIC BLOOD PRESSURE: 130 MMHG | DIASTOLIC BLOOD PRESSURE: 80 MMHG

## 2019-11-30 PROCEDURE — 3331090001 HH PPS REVENUE CREDIT

## 2019-11-30 PROCEDURE — 3331090002 HH PPS REVENUE DEBIT

## 2019-12-01 PROCEDURE — 3331090001 HH PPS REVENUE CREDIT

## 2019-12-01 PROCEDURE — 3331090002 HH PPS REVENUE DEBIT

## 2019-12-02 ENCOUNTER — HOME CARE VISIT (OUTPATIENT)
Dept: SCHEDULING | Facility: HOME HEALTH | Age: 84
End: 2019-12-02
Payer: MEDICARE

## 2019-12-02 VITALS
HEART RATE: 71 BPM | RESPIRATION RATE: 20 BRPM | OXYGEN SATURATION: 97 % | DIASTOLIC BLOOD PRESSURE: 80 MMHG | SYSTOLIC BLOOD PRESSURE: 130 MMHG | TEMPERATURE: 99 F

## 2019-12-02 PROCEDURE — 3331090002 HH PPS REVENUE DEBIT

## 2019-12-02 PROCEDURE — G0299 HHS/HOSPICE OF RN EA 15 MIN: HCPCS

## 2019-12-02 PROCEDURE — 3331090001 HH PPS REVENUE CREDIT

## 2019-12-03 PROCEDURE — 3331090001 HH PPS REVENUE CREDIT

## 2019-12-03 PROCEDURE — 3331090002 HH PPS REVENUE DEBIT

## 2019-12-04 PROCEDURE — 3331090002 HH PPS REVENUE DEBIT

## 2019-12-04 PROCEDURE — 3331090001 HH PPS REVENUE CREDIT

## 2019-12-05 ENCOUNTER — HOME CARE VISIT (OUTPATIENT)
Dept: SCHEDULING | Facility: HOME HEALTH | Age: 84
End: 2019-12-05
Payer: MEDICARE

## 2019-12-05 VITALS
HEART RATE: 71 BPM | TEMPERATURE: 98.6 F | RESPIRATION RATE: 20 BRPM | OXYGEN SATURATION: 95 % | DIASTOLIC BLOOD PRESSURE: 80 MMHG | SYSTOLIC BLOOD PRESSURE: 130 MMHG

## 2019-12-05 PROCEDURE — 3331090001 HH PPS REVENUE CREDIT

## 2019-12-05 PROCEDURE — 3331090002 HH PPS REVENUE DEBIT

## 2019-12-05 PROCEDURE — G0299 HHS/HOSPICE OF RN EA 15 MIN: HCPCS

## 2019-12-06 PROCEDURE — 3331090001 HH PPS REVENUE CREDIT

## 2019-12-06 PROCEDURE — 3331090002 HH PPS REVENUE DEBIT

## 2019-12-07 PROCEDURE — 3331090002 HH PPS REVENUE DEBIT

## 2019-12-07 PROCEDURE — 3331090001 HH PPS REVENUE CREDIT

## 2019-12-08 PROCEDURE — 3331090002 HH PPS REVENUE DEBIT

## 2019-12-08 PROCEDURE — 3331090001 HH PPS REVENUE CREDIT

## 2019-12-09 ENCOUNTER — HOME CARE VISIT (OUTPATIENT)
Dept: SCHEDULING | Facility: HOME HEALTH | Age: 84
End: 2019-12-09
Payer: MEDICARE

## 2019-12-09 VITALS
TEMPERATURE: 99 F | OXYGEN SATURATION: 95 % | RESPIRATION RATE: 18 BRPM | DIASTOLIC BLOOD PRESSURE: 80 MMHG | SYSTOLIC BLOOD PRESSURE: 130 MMHG | HEART RATE: 67 BPM

## 2019-12-09 PROCEDURE — 400014 HH F/U

## 2019-12-09 PROCEDURE — 3331090002 HH PPS REVENUE DEBIT

## 2019-12-09 PROCEDURE — 3331090001 HH PPS REVENUE CREDIT

## 2019-12-09 PROCEDURE — G0299 HHS/HOSPICE OF RN EA 15 MIN: HCPCS

## 2019-12-10 PROCEDURE — 3331090002 HH PPS REVENUE DEBIT

## 2019-12-10 PROCEDURE — 3331090001 HH PPS REVENUE CREDIT

## 2019-12-11 PROCEDURE — 3331090002 HH PPS REVENUE DEBIT

## 2019-12-11 PROCEDURE — 3331090001 HH PPS REVENUE CREDIT

## 2019-12-12 ENCOUNTER — HOME CARE VISIT (OUTPATIENT)
Dept: SCHEDULING | Facility: HOME HEALTH | Age: 84
End: 2019-12-12
Payer: MEDICARE

## 2019-12-12 VITALS
DIASTOLIC BLOOD PRESSURE: 78 MMHG | RESPIRATION RATE: 20 BRPM | TEMPERATURE: 98.1 F | HEART RATE: 68 BPM | SYSTOLIC BLOOD PRESSURE: 130 MMHG | OXYGEN SATURATION: 97 %

## 2019-12-12 PROCEDURE — 3331090002 HH PPS REVENUE DEBIT

## 2019-12-12 PROCEDURE — 3331090001 HH PPS REVENUE CREDIT

## 2019-12-12 PROCEDURE — G0299 HHS/HOSPICE OF RN EA 15 MIN: HCPCS

## 2019-12-13 PROCEDURE — 3331090001 HH PPS REVENUE CREDIT

## 2019-12-13 PROCEDURE — 3331090002 HH PPS REVENUE DEBIT

## 2019-12-14 PROCEDURE — 3331090002 HH PPS REVENUE DEBIT

## 2019-12-14 PROCEDURE — 3331090001 HH PPS REVENUE CREDIT

## 2019-12-15 ENCOUNTER — HOSPITAL ENCOUNTER (INPATIENT)
Age: 84
LOS: 6 days | Discharge: NURSING, REHAB OR LONG-TERM CARE | DRG: 190 | End: 2019-12-21
Attending: EMERGENCY MEDICINE | Admitting: NEUROMUSCULOSKELETAL MEDICINE & OMM
Payer: MEDICARE

## 2019-12-15 ENCOUNTER — APPOINTMENT (OUTPATIENT)
Dept: NON INVASIVE DIAGNOSTICS | Age: 84
DRG: 190 | End: 2019-12-15
Attending: NEUROMUSCULOSKELETAL MEDICINE & OMM
Payer: MEDICARE

## 2019-12-15 ENCOUNTER — APPOINTMENT (OUTPATIENT)
Dept: CT IMAGING | Age: 84
DRG: 190 | End: 2019-12-15
Attending: EMERGENCY MEDICINE
Payer: MEDICARE

## 2019-12-15 ENCOUNTER — APPOINTMENT (OUTPATIENT)
Dept: GENERAL RADIOLOGY | Age: 84
DRG: 190 | End: 2019-12-15
Attending: EMERGENCY MEDICINE
Payer: MEDICARE

## 2019-12-15 DIAGNOSIS — J44.1 CHRONIC OBSTRUCTIVE PULMONARY DISEASE WITH ACUTE EXACERBATION (HCC): Primary | ICD-10-CM

## 2019-12-15 LAB
ALBUMIN SERPL-MCNC: 3.8 G/DL (ref 3.5–5)
ALBUMIN/GLOB SERPL: 1.2 {RATIO} (ref 1.1–2.2)
ALP SERPL-CCNC: 68 U/L (ref 45–117)
ALT SERPL-CCNC: 16 U/L (ref 12–78)
ANION GAP SERPL CALC-SCNC: 6 MMOL/L (ref 5–15)
AST SERPL-CCNC: 19 U/L (ref 15–37)
ATRIAL RATE: 70 BPM
BASOPHILS # BLD: 0.1 K/UL (ref 0–0.1)
BASOPHILS NFR BLD: 2 % (ref 0–1)
BILIRUB SERPL-MCNC: 0.5 MG/DL (ref 0.2–1)
BNP SERPL-MCNC: 1719 PG/ML
BUN SERPL-MCNC: 21 MG/DL (ref 6–20)
BUN/CREAT SERPL: 21 (ref 12–20)
CALCIUM SERPL-MCNC: 9.2 MG/DL (ref 8.5–10.1)
CALCULATED P AXIS, ECG09: 27 DEGREES
CALCULATED R AXIS, ECG10: -20 DEGREES
CALCULATED T AXIS, ECG11: 121 DEGREES
CHLORIDE SERPL-SCNC: 98 MMOL/L (ref 97–108)
CO2 SERPL-SCNC: 28 MMOL/L (ref 21–32)
CREAT SERPL-MCNC: 0.99 MG/DL (ref 0.55–1.02)
DIAGNOSIS, 93000: NORMAL
DIFFERENTIAL METHOD BLD: ABNORMAL
EOSINOPHIL # BLD: 0.4 K/UL (ref 0–0.4)
EOSINOPHIL NFR BLD: 6 % (ref 0–7)
ERYTHROCYTE [DISTWIDTH] IN BLOOD BY AUTOMATED COUNT: 14 % (ref 11.5–14.5)
FLUAV AG NPH QL IA: NEGATIVE
FLUBV AG NOSE QL IA: NEGATIVE
GLOBULIN SER CALC-MCNC: 3.2 G/DL (ref 2–4)
GLUCOSE SERPL-MCNC: 112 MG/DL (ref 65–100)
HCT VFR BLD AUTO: 43.7 % (ref 35–47)
HGB BLD-MCNC: 13.8 G/DL (ref 11.5–16)
IMM GRANULOCYTES # BLD AUTO: 0 K/UL (ref 0–0.04)
IMM GRANULOCYTES NFR BLD AUTO: 0 % (ref 0–0.5)
LIPASE SERPL-CCNC: 155 U/L (ref 73–393)
LYMPHOCYTES # BLD: 0.7 K/UL (ref 0.8–3.5)
LYMPHOCYTES NFR BLD: 12 % (ref 12–49)
MCH RBC QN AUTO: 29.1 PG (ref 26–34)
MCHC RBC AUTO-ENTMCNC: 31.6 G/DL (ref 30–36.5)
MCV RBC AUTO: 92 FL (ref 80–99)
MONOCYTES # BLD: 0.4 K/UL (ref 0–1)
MONOCYTES NFR BLD: 7 % (ref 5–13)
NEUTS SEG # BLD: 4.5 K/UL (ref 1.8–8)
NEUTS SEG NFR BLD: 73 % (ref 32–75)
NRBC # BLD: 0 K/UL (ref 0–0.01)
NRBC BLD-RTO: 0 PER 100 WBC
P-R INTERVAL, ECG05: 190 MS
PLATELET # BLD AUTO: 247 K/UL (ref 150–400)
PMV BLD AUTO: 9.5 FL (ref 8.9–12.9)
POTASSIUM SERPL-SCNC: 4.4 MMOL/L (ref 3.5–5.1)
PROT SERPL-MCNC: 7 G/DL (ref 6.4–8.2)
Q-T INTERVAL, ECG07: 422 MS
QRS DURATION, ECG06: 120 MS
QTC CALCULATION (BEZET), ECG08: 455 MS
RBC # BLD AUTO: 4.75 M/UL (ref 3.8–5.2)
RBC MORPH BLD: ABNORMAL
SODIUM SERPL-SCNC: 132 MMOL/L (ref 136–145)
TROPONIN I SERPL-MCNC: <0.05 NG/ML
VENTRICULAR RATE, ECG03: 70 BPM
WBC # BLD AUTO: 6.1 K/UL (ref 3.6–11)

## 2019-12-15 PROCEDURE — 84484 ASSAY OF TROPONIN QUANT: CPT

## 2019-12-15 PROCEDURE — 87804 INFLUENZA ASSAY W/OPTIC: CPT

## 2019-12-15 PROCEDURE — 71045 X-RAY EXAM CHEST 1 VIEW: CPT

## 2019-12-15 PROCEDURE — 74011636320 HC RX REV CODE- 636/320: Performed by: EMERGENCY MEDICINE

## 2019-12-15 PROCEDURE — 94640 AIRWAY INHALATION TREATMENT: CPT

## 2019-12-15 PROCEDURE — 83690 ASSAY OF LIPASE: CPT

## 2019-12-15 PROCEDURE — 3331090003 HH PPS REVENUE ADJ

## 2019-12-15 PROCEDURE — 74011250637 HC RX REV CODE- 250/637: Performed by: NEUROMUSCULOSKELETAL MEDICINE & OMM

## 2019-12-15 PROCEDURE — 3331090002 HH PPS REVENUE DEBIT

## 2019-12-15 PROCEDURE — 65270000029 HC RM PRIVATE

## 2019-12-15 PROCEDURE — 36415 COLL VENOUS BLD VENIPUNCTURE: CPT

## 2019-12-15 PROCEDURE — 83880 ASSAY OF NATRIURETIC PEPTIDE: CPT

## 2019-12-15 PROCEDURE — 3331090001 HH PPS REVENUE CREDIT

## 2019-12-15 PROCEDURE — 85025 COMPLETE CBC W/AUTO DIFF WBC: CPT

## 2019-12-15 PROCEDURE — 74011000250 HC RX REV CODE- 250: Performed by: NEUROMUSCULOSKELETAL MEDICINE & OMM

## 2019-12-15 PROCEDURE — 74011636637 HC RX REV CODE- 636/637: Performed by: NEUROMUSCULOSKELETAL MEDICINE & OMM

## 2019-12-15 PROCEDURE — 71275 CT ANGIOGRAPHY CHEST: CPT

## 2019-12-15 PROCEDURE — 99285 EMERGENCY DEPT VISIT HI MDM: CPT

## 2019-12-15 PROCEDURE — 74011000250 HC RX REV CODE- 250: Performed by: EMERGENCY MEDICINE

## 2019-12-15 PROCEDURE — 80053 COMPREHEN METABOLIC PANEL: CPT

## 2019-12-15 PROCEDURE — 93005 ELECTROCARDIOGRAM TRACING: CPT

## 2019-12-15 RX ORDER — LISINOPRIL 5 MG/1
5 TABLET ORAL DAILY
Status: DISCONTINUED | OUTPATIENT
Start: 2019-12-16 | End: 2019-12-21 | Stop reason: HOSPADM

## 2019-12-15 RX ORDER — ACETAMINOPHEN 325 MG/1
650 TABLET ORAL
Status: DISCONTINUED | OUTPATIENT
Start: 2019-12-15 | End: 2019-12-21 | Stop reason: HOSPADM

## 2019-12-15 RX ORDER — IPRATROPIUM BROMIDE AND ALBUTEROL SULFATE 2.5; .5 MG/3ML; MG/3ML
3 SOLUTION RESPIRATORY (INHALATION)
COMMUNITY
End: 2021-04-20 | Stop reason: ALTCHOICE

## 2019-12-15 RX ORDER — SODIUM CHLORIDE 0.9 % (FLUSH) 0.9 %
10 SYRINGE (ML) INJECTION
Status: COMPLETED | OUTPATIENT
Start: 2019-12-15 | End: 2019-12-15

## 2019-12-15 RX ORDER — LORATADINE 10 MG/1
10 TABLET ORAL DAILY
Status: DISCONTINUED | OUTPATIENT
Start: 2019-12-16 | End: 2019-12-21 | Stop reason: HOSPADM

## 2019-12-15 RX ORDER — SODIUM CHLORIDE 0.9 % (FLUSH) 0.9 %
5-40 SYRINGE (ML) INJECTION AS NEEDED
Status: DISCONTINUED | OUTPATIENT
Start: 2019-12-15 | End: 2019-12-21 | Stop reason: HOSPADM

## 2019-12-15 RX ORDER — DEXAMETHASONE 4 MG/1
6 TABLET ORAL
Status: DISCONTINUED | OUTPATIENT
Start: 2019-12-16 | End: 2019-12-15

## 2019-12-15 RX ORDER — MAG HYDROX/ALUMINUM HYD/SIMETH 200-200-20
30 SUSPENSION, ORAL (FINAL DOSE FORM) ORAL
Status: DISCONTINUED | OUTPATIENT
Start: 2019-12-15 | End: 2019-12-21 | Stop reason: HOSPADM

## 2019-12-15 RX ORDER — MORPHINE SULFATE 2 MG/ML
4 INJECTION, SOLUTION INTRAMUSCULAR; INTRAVENOUS
Status: DISCONTINUED | OUTPATIENT
Start: 2019-12-15 | End: 2019-12-21 | Stop reason: HOSPADM

## 2019-12-15 RX ORDER — PREDNISONE 20 MG/1
40 TABLET ORAL
Status: DISCONTINUED | OUTPATIENT
Start: 2019-12-16 | End: 2019-12-15

## 2019-12-15 RX ORDER — IPRATROPIUM BROMIDE AND ALBUTEROL SULFATE 2.5; .5 MG/3ML; MG/3ML
3 SOLUTION RESPIRATORY (INHALATION) ONCE
Status: COMPLETED | OUTPATIENT
Start: 2019-12-15 | End: 2019-12-15

## 2019-12-15 RX ORDER — IPRATROPIUM BROMIDE AND ALBUTEROL SULFATE 2.5; .5 MG/3ML; MG/3ML
3 SOLUTION RESPIRATORY (INHALATION)
Status: DISCONTINUED | OUTPATIENT
Start: 2019-12-15 | End: 2019-12-19

## 2019-12-15 RX ORDER — ALBUTEROL SULFATE 0.83 MG/ML
2.5 SOLUTION RESPIRATORY (INHALATION)
Status: DISCONTINUED | OUTPATIENT
Start: 2019-12-15 | End: 2019-12-21 | Stop reason: HOSPADM

## 2019-12-15 RX ORDER — AZITHROMYCIN 250 MG/1
250 TABLET, FILM COATED ORAL
Status: DISCONTINUED | OUTPATIENT
Start: 2019-12-16 | End: 2019-12-17 | Stop reason: DRUGHIGH

## 2019-12-15 RX ORDER — ONDANSETRON 2 MG/ML
4 INJECTION INTRAMUSCULAR; INTRAVENOUS
Status: DISCONTINUED | OUTPATIENT
Start: 2019-12-15 | End: 2019-12-21 | Stop reason: HOSPADM

## 2019-12-15 RX ORDER — MAG HYDROX/ALUMINUM HYD/SIMETH 200-200-20
30 SUSPENSION, ORAL (FINAL DOSE FORM) ORAL
COMMUNITY
End: 2020-01-23 | Stop reason: SDUPTHER

## 2019-12-15 RX ORDER — ASPIRIN 81 MG/1
81 TABLET ORAL DAILY
Status: DISCONTINUED | OUTPATIENT
Start: 2019-12-16 | End: 2019-12-21 | Stop reason: HOSPADM

## 2019-12-15 RX ORDER — LORATADINE 10 MG/1
10 TABLET ORAL DAILY
COMMUNITY

## 2019-12-15 RX ORDER — SODIUM CHLORIDE 0.9 % (FLUSH) 0.9 %
5-40 SYRINGE (ML) INJECTION EVERY 8 HOURS
Status: DISCONTINUED | OUTPATIENT
Start: 2019-12-15 | End: 2019-12-21 | Stop reason: HOSPADM

## 2019-12-15 RX ORDER — CALCIUM CARBONATE 200(500)MG
0.5 TABLET,CHEWABLE ORAL
COMMUNITY
End: 2020-02-25 | Stop reason: ALTCHOICE

## 2019-12-15 RX ORDER — PREDNISONE 20 MG/1
40 TABLET ORAL
Status: DISCONTINUED | OUTPATIENT
Start: 2019-12-15 | End: 2019-12-16

## 2019-12-15 RX ORDER — CALCIUM CARBONATE 200(500)MG
100 TABLET,CHEWABLE ORAL
Status: DISCONTINUED | OUTPATIENT
Start: 2019-12-15 | End: 2019-12-21 | Stop reason: HOSPADM

## 2019-12-15 RX ADMIN — IOPAMIDOL 100 ML: 755 INJECTION, SOLUTION INTRAVENOUS at 14:49

## 2019-12-15 RX ADMIN — ACETAMINOPHEN 650 MG: 325 TABLET ORAL at 18:05

## 2019-12-15 RX ADMIN — IPRATROPIUM BROMIDE AND ALBUTEROL SULFATE 3 ML: .5; 3 SOLUTION RESPIRATORY (INHALATION) at 16:37

## 2019-12-15 RX ADMIN — Medication 10 ML: at 18:07

## 2019-12-15 RX ADMIN — Medication 10 ML: at 14:49

## 2019-12-15 RX ADMIN — IPRATROPIUM BROMIDE AND ALBUTEROL SULFATE 3 ML: .5; 3 SOLUTION RESPIRATORY (INHALATION) at 13:13

## 2019-12-15 RX ADMIN — IPRATROPIUM BROMIDE AND ALBUTEROL SULFATE 3 ML: .5; 3 SOLUTION RESPIRATORY (INHALATION) at 21:18

## 2019-12-15 RX ADMIN — Medication 10 ML: at 21:06

## 2019-12-15 NOTE — PROGRESS NOTES
Pharmacy Clarification of the Prior to Admission Medication Regimen Retrospective to the Admission Medication Reconciliation    The patient was interviewed regarding clarification of the prior to admission medication regimen. Patient's daughter-in-law was present in room and obtained permission from patient to discuss drug regimen with visitor(s) present. Patient was questioned regarding use of any other inhalers, topical products, over the counter medications, herbal medications, vitamin products or ophthalmic/nasal/otic medication use. Information Obtained From: Patient, Medication list, Rx query    Recommendations/Findings: The following amendments were made to the patient's active medication list on file at [facility]:     1) Additions:   calcium carbonate (TUMS) 200 mg calcium (500 mg) chew  loratadine (CLARITIN) 10 mg tablet    2) Removals: Albuterol-ipratropium (Duo-neb) 2.5 mg-0.5 mg/3 mL nebu  Azelastine (Astepro) 0.15% (205.5 mcg) nasal spray  Dexamethasone (Decadrone) 6 mg tab    3) Changes:  acetaminophen (TYLENOL) 500 mg tablet (Old regimen: take 500 mg by mouth every 6 hours PRN pain /New regimen: take 1000 mg by mouth daily)  albuterol-ipratropium (DUO-NEB) 2.5 mg-0.5 mg/3 ml nebu (Old regimen: 3 mL by nebulization route TID daily /New regimen: 3 mL by nebulization route QID)  alum-mag hydroxide-simeth (MYLANTA) 200-200-20 mg/5 mL susp (Old regimen: take 30 mL by mouth every 4 hours PRN /New regimen: take 30 mL by mouth QHS)  aspirin delayed-release 81 mg tablet (Old regimen: no sig /New regimen: take 162 mg by mouth daily)  ipratropium-albuterol (COMBIVENT RESPIMAT)  mcg/actuation inhaler (Old regimen: inhale 1 puff TID /New regimen: inhale 1 puff QID)    4) Pertinent Pharmacy Findings: none     PTA medication list was corrected to the following:     Prior to Admission Medications   Prescriptions Last Dose Informant Taking?    ANORO ELLIPTA 62.5-25 mcg/actuation inhaler 12/15/2019 at Unknown time Self Yes   Sig: Take 1 Puff by inhalation daily. acetaminophen (TYLENOL) 500 mg tablet 12/15/2019 at Unknown time Self Yes   Sig: Take 1,000 mg by mouth daily. albuterol-ipratropium (DUO-NEB) 2.5 mg-0.5 mg/3 ml nebu 12/15/2019 at Unknown time Self Yes   Sig: 3 mL by Nebulization route four (4) times daily. alum-mag hydroxide-simeth (MYLANTA) 200-200-20 mg/5 mL susp 12/14/2019 at Unknown time Self Yes   Sig: Take 30 mL by mouth nightly. aspirin delayed-release 81 mg tablet 12/15/2019 at Unknown time Self Yes   Sig: Take 162 mg by mouth daily. azithromycin (ZITHROMAX) 250 mg tablet 12/14/2019 at Unknown time Self Yes   Sig: Take 250 mg by mouth every Monday, Wednesday, Friday. calcium carbonate (TUMS) 200 mg calcium (500 mg) chew 12/14/2019 at Unknown time Self Yes   Sig: Take 0.5 Tabs by mouth daily as needed (stomach upset). ipratropium-albuterol (COMBIVENT RESPIMAT)  mcg/actuation inhaler 12/15/2019 at Unknown time Self Yes   Sig: Take 1 Puff by inhalation four (4) times daily. lisinopril (PRINIVIL, ZESTRIL) 5 mg tablet 12/15/2019 at Unknown time Self Yes   Sig: TAKE ONE TABLET BY MOUTH EVERY DAY   loratadine (CLARITIN) 10 mg tablet 12/12/2019 Self Yes   Sig: Take 10 mg by mouth daily.       Facility-Administered Medications: None          Thank you,  Cherise Cooks  Medication History Pharmacy Technician

## 2019-12-15 NOTE — H&P
Hospitalist Admission Note    NAME: Kostas Mccauley   :  11/10/1931   MRN:  [id number]     Date/Time:  12/15/2019 4:20 PM    Patient PCP: Nidia Euceda MD  ______________________________________________________________________  Given the patient's current clinical presentation, I have a high level of concern for decompensation if discharged from the emergency department. Complex decision making was performed, which includes reviewing the patient's available past medical records, laboratory results, and x-ray films. My assessment of this patient's clinical condition and my plan of care is as follows. Assessment / Plan:    1. Copd exacarbation with chronic hypoxic respiratory failure - place on duonebs  And medrol kenny. Consult cm for hhn possible home tomorrow. On 2 liter / min at home o2. Influenza pendng    2. Elevated bnp - ekg with no new abnormalities. Check echo    3. htn - cont prinivil. 4. pvd - cont aspirin. Code Status: dnr  Surrogate Decision Maker: gene joshua 240 [id number]  DVT Prophylaxis: heparin  GI Prophylaxis: not indicated    Baseline: walker      Subjective:   CHIEF COMPLAINT: dyspnea    HISTORY OF PRESENT ILLNESS:     Bladimir Youssef is a 80 y.o.  female who presents with recurrent dysnea this am.  Pt developed sob around 3:00am which resolved in the shrot term with an inhaler. The symptoms returned a few hrs later and she was tena to the er via ems. She lives alone and has neighbors that check on her. She denies any chest pain or dizziness. She remains mildly sob at rest.  She sees [facility] as an op and is in the process of switching pcp to someone local.      We were asked to admit for work up and evaluation of the above problems.      Past Medical History:   Diagnosis Date    Arthritis     generalized    COPD     Essential hypertension 2018    former smoker      >60pkyr quit 1/3/11    h/o DVT     left leg    ischemic left lower extremitiy pain     above knee FemPop 1/3/11    PVD (peripheral vascular disease) ([facility] [address]     Seasonal allergic rhinitis     Single kidney         Past Surgical History:   Procedure Laterality Date    BREAST SURGERY PROCEDURE UNLISTED      excision left breast cyst    HX GI  10 yrs ago    colonoscopy    HX GYN      3 miscarriges    HX GYN      1 vaginal birth   [de-identified] ORTHOPAEDIC      veinography left leg, stent left leg       Social History     Tobacco Use    Smoking status: Former Smoker     Packs/day: 1.00     Years: 60.00     Pack years: 60.00     Last attempt to quit: 1/3/2011     Years since quittin.9    Smokeless tobacco: Never Used   Substance Use Topics    Alcohol use: Yes     Comment: occasional liquor after dinner        Family History   Problem Relation Age of Onset    Cancer Mother         colon    Cancer Sister         lung     Allergies   Allergen Reactions    Food Extracts Diarrhea     Onions and garlic causes abdominal pain,cramping     Sulfa (Sulfonamide Antibiotics) Other (comments)     Altered Mental Status        Prior to Admission medications    Medication Sig Start Date End Date Taking? Authorizing Provider   lisinopril (PRINIVIL, ZESTRIL) 5 mg tablet TAKE ONE TABLET BY MOUTH EVERY DAY 19   Monica Mahajan MD   dexamethasone (DECADRON) 6 mg tablet Take 1 Tab by mouth Daily (before breakfast). 19   Jaye Diop MD   azithromycin [facility]) 250 mg tablet Take 250 mg by mouth every Monday, Wednesday, Friday. Provider, Historical   albuterol-ipratropium (DUO-NEB) 2.5 mg-0.5 mg/3 ml nebu 3 mL by Nebulization route every four (4) hours as needed. 18   Karthik Hammer MD   alum-mag hydroxide-simeth (MYLANTA) 200-200-20 mg/5 mL susp Take 30 mL by mouth every four (4) hours as needed. Patient taking differently: Take 30 mL by mouth every four (4) hours as needed for Diarrhea.  18   Karthik Hammer MD   OXYGEN-AIR DELIVERY SYSTEMS 2 L/min by Nasal route as needed (Shortness of Breath). Provider, Historical   acetaminophen (TYLENOL) 500 mg tablet Take 500 mg by mouth every six (6) hours as needed for Pain. Provider, Historical   albuterol-ipratropium (DUO-NEB) 2.5 mg-0.5 mg/3 ml nebu 3 mL by Nebulization route three (3) times daily. Patient taking differently: 3 mL by Nebulization route four (4) times daily. 6/6/17   Yumiko Giraldo MD   Azelastine (ASTEPRO) 0.15 % (205.5 mcg) nasal spray 2 Sprays by Both Nostrils route two (2) times a day. 5/11/17   Provider, Historical   ANORO ELLIPTA 62.5-25 mcg/actuation inhaler Take 1 Puff by inhalation daily. 5/8/17   Provider, Historical   COMBIVENT RESPIMAT  mcg/actuation inhaler INHALE 1 PUFF THREE TIMES A DAY 5/9/17   Tex Kong MD   aspirin delayed-release 81 mg tablet Take  by mouth daily. Provider, Historical       REVIEW OF SYSTEMS:     I am not able to complete the review of systems because:    The patient is intubated and sedated    The patient has altered mental status due to his acute medical problems    The patient has baseline aphasia from prior stroke(s)    The patient has baseline dementia and is not reliable historian    The patient is in acute medical distress and unable to provide information           Total of 12 systems reviewed as follows:       POSITIVE= underlined text  Negative = text not underlined  General:  fever, chills, sweats, generalized weakness, weight loss/gain,      loss of appetite   Eyes:    blurred vision, eye pain, loss of vision, double vision  ENT:    rhinorrhea, pharyngitis   Respiratory:   cough, sputum production, SOB, GODINEZ, wheezing, pleuritic pain   Cardiology:   chest pain, palpitations, orthopnea, PND, edema, syncope   Gastrointestinal:  abdominal pain , N/V, diarrhea, dysphagia, constipation, bleeding   Genitourinary:  frequency, urgency, dysuria, hematuria, incontinence   Muskuloskeletal :  arthralgia, myalgia, back pain  Hematology:  easy bruising, nose or gum bleeding, lymphadenopathy   Dermatological: rash, ulceration, pruritis, color change / jaundice  Endocrine:   hot flashes or polydipsia   Neurological:  headache, dizziness, confusion, focal weakness, paresthesia,     Speech difficulties, memory loss, gait difficulty  Psychological: Feelings of anxiety, depression, agitation    Objective:   VITALS:    Visit Vitals  /69   Pulse 73   Temp 98.5 °F (36.9 °C)   Resp 26   Wt 65.3 kg (144 lb)   SpO2 96%   BMI 23.24 kg/m²       PHYSICAL EXAM:    General:    Alert, cooperative, no distress, appears stated age. HEENT: Atraumatic, anicteric sclerae, pink conjunctivae     No oral ulcers, mucosa moist, throat clear, dentition fair  Neck:  Supple, symmetrical,  thyroid: non tender  Lungs:   Clear to auscultation bilaterally. No Wheezing or Rhonchi. No rales. Chest wall:  No tenderness  No Accessory muscle use. Heart:   Regular  rhythm,  No  murmur   No edema  Abdomen:   Soft, non-tender. Not distended. Bowel sounds normal  Extremities: No cyanosis. No clubbing,      Skin turgor normal, Capillary refill normal, Radial dial pulse 2+  Skin:     Not pale. Not Jaundiced  No rashes   Psych:  Good insight. Not depressed. Not anxious or agitated. Neurologic: EOMs intact. No facial asymmetry. No aphasia or slurred speech. Symmetrical strength, Sensation grossly intact.  Alert and oriented X 4.     _______________________________________________________________________  Care Plan discussed with:    Comments   Patient     Family      RN     Care Manager                    Consultant:      _______________________________________________________________________  Expected  Disposition:   Home with Family    HH/PT/OT/RN    SNF/LTC    JORGE ALBERTO    ________________________________________________________________________  TOTAL TIME:  20 Minutes    Critical Care Provided     Minutes non procedure based      Comments     Reviewed previous records   >50% of visit spent in counseling and coordination of care  Discussion with patient and/or family and questions answered       ________________________________________________________________________  Signed: Arloa Seat, DO    Procedures: see electronic medical records for all procedures/Xrays and details which were not copied into this note but were reviewed prior to creation of Plan. LAB DATA REVIEWED:    Recent Results (from the past 24 hour(s))   EKG, 12 LEAD, INITIAL    Collection Time: 12/15/19 11:00 AM   Result Value Ref Range    Ventricular Rate 70 BPM    Atrial Rate 70 BPM    P-R Interval 190 ms    QRS Duration 120 ms    Q-T Interval 422 ms    QTC Calculation (Bezet) 455 ms    Calculated P Axis 27 degrees    Calculated R Axis -20 degrees    Calculated T Axis 121 degrees    Diagnosis       Normal sinus rhythm  Septal infarct , age undetermined  ST & T wave abnormality, consider lateral ischemia  When compared with ECG of 18-FEB-2019 11:51,  Left bundle branch block is no longer present  Septal infarct is now present  Confirmed by Domenic Ashraf, P.VAngelina ([id number]) on 12/15/2019 1:35:32 PM     CBC WITH AUTOMATED DIFF    Collection Time: 12/15/19 11:03 AM   Result Value Ref Range    WBC 6.1 3.6 - 11.0 K/uL    RBC 4.75 3.80 - 5.20 M/uL    HGB 13.8 11.5 - 16.0 g/dL    HCT 43.7 35.0 - 47.0 %    MCV 92.0 80.0 - 99.0 FL    MCH 29.1 26.0 - 34.0 PG    MCHC 31.6 30.0 - 36.5 g/dL    RDW 14.0 11.5 - 14.5 %    PLATELET 718 459 - 934 K/uL    MPV 9.5 8.9 - 12.9 FL    NRBC 0.0 0  WBC    ABSOLUTE NRBC 0.00 0.00 - 0.01 K/uL    NEUTROPHILS 73 32 - 75 %    LYMPHOCYTES 12 12 - 49 %    MONOCYTES 7 5 - 13 %    EOSINOPHILS 6 0 - 7 %    BASOPHILS 2 (H) 0 - 1 %    IMMATURE GRANULOCYTES 0 0.0 - 0.5 %    ABS. NEUTROPHILS 4.5 1.8 - 8.0 K/UL    ABS. LYMPHOCYTES 0.7 (L) 0.8 - 3.5 K/UL    ABS. MONOCYTES 0.4 0.0 - 1.0 K/UL    ABS. EOSINOPHILS 0.4 0.0 - 0.4 K/UL    ABS. BASOPHILS 0.1 0.0 - 0.1 K/UL    ABS. IMM.  GRANS. 0.0 0.00 - 0.04 K/UL    DF AUTOMATED      RBC COMMENTS NORMOCYTIC, NORMOCHROMIC     METABOLIC PANEL, COMPREHENSIVE    Collection Time: 12/15/19 11:03 AM   Result Value Ref Range    Sodium 132 (L) 136 - 145 mmol/L    Potassium 4.4 3.5 - 5.1 mmol/L    Chloride 98 97 - 108 mmol/L    CO2 28 21 - 32 mmol/L    Anion gap 6 5 - 15 mmol/L    Glucose 112 (H) 65 - 100 mg/dL    BUN 21 (H) 6 - 20 MG/DL    Creatinine 0.99 0.55 - 1.02 MG/DL    BUN/Creatinine ratio 21 (H) 12 - 20      GFR est AA >60 >60 ml/min/1.73m2    GFR est non-AA 53 (L) >60 ml/min/1.73m2    Calcium 9.2 8.5 - 10.1 MG/DL    Bilirubin, total 0.5 0.2 - 1.0 MG/DL    ALT (SGPT) 16 12 - 78 U/L    AST (SGOT) 19 15 - 37 U/L    Alk.  phosphatase 68 45 - 117 U/L    Protein, total 7.0 6.4 - 8.2 g/dL    Albumin 3.8 3.5 - 5.0 g/dL    Globulin 3.2 2.0 - 4.0 g/dL    A-G Ratio 1.2 1.1 - 2.2     LIPASE    Collection Time: 12/15/19 11:03 AM   Result Value Ref Range    Lipase 155 73 - 393 U/L   TROPONIN I    Collection Time: 12/15/19 11:03 AM   Result Value Ref Range    Troponin-I, Qt. <0.05 <0.05 ng/mL   NT-PRO BNP    Collection Time: 12/15/19 11:03 AM   Result Value Ref Range    NT pro-BNP 1,719 (H) <450 PG/ML

## 2019-12-15 NOTE — ED TRIAGE NOTES
Assumed care from EMS. Patient comes to the ED complaining of increased shortness of breath this morning. Patient has a hx of COPD but states that this feels different. Patient states that she took her rescue inhaler three different times this morning. Patient normally always wears 2 liters of oxygen. Patient has a small ulcer on the left side of her lower leg that is being treated by wound care nurse at home.

## 2019-12-15 NOTE — ED PROVIDER NOTES
EMERGENCY DEPARTMENT HISTORY AND PHYSICAL EXAM      Date: 12/15/2019  Patient Name: Earl Rosas    History of Presenting Illness     Chief Complaint   Patient presents with    Shortness of Breath       History Provided By: Patient    HPI: Earl Rosas, 80 y.o. female with PMHx as noted below presents the emergency department by EMS with complaints of shortness of breath. Patient notes that she woke up at around 3 AM this morning feeling dyspneic. She did her inhaler at that time with only mild temporary relief however has remained short of breath since prompting her to call EMS this morning. Patient has had some associated generalized weakness and fatigue denies any purulent sputum, fevers, chest pain, syncope. PCP: Briana Griffin MD    Current Outpatient Medications   Medication Sig Dispense Refill    acetaminophen (TYLENOL) 325 mg tablet Take 2 Tabs by mouth every four (4) hours as needed for Pain or Fever. 40 Tab 0    chlorpheniramine-HYDROcodone (TUSSIONEX) 10-8 mg/5 mL suspension Take 5 mL by mouth every twelve (12) hours as needed for Cough for up to 3 days. Max Daily Amount: 10 mL. 100 mL 0    furosemide (LASIX) 20 mg tablet Take 1 Tab by mouth daily. 30 Tab 1    guaiFENesin (ROBITUSSIN) 100 mg/5 mL liquid Take 20 mL by mouth three (3) times daily. 473 mL 0    predniSONE (DELTASONE) 20 mg tablet Take 40 mg by mouth daily (with breakfast) for 5 days. 10 Tab 0    metoprolol tartrate (LOPRESSOR) 25 mg tablet Take 1 Tab by mouth every twelve (12) hours. 60 Tab 1    [START ON 12/22/2019] polyethylene glycol (MIRALAX) 17 gram packet Take 1 Packet by mouth daily. 30 Each 1    albuterol-ipratropium (DUO-NEB) 2.5 mg-0.5 mg/3 ml nebu 3 mL by Nebulization route four (4) times daily.  alum-mag hydroxide-simeth (MYLANTA) 200-200-20 mg/5 mL susp Take 30 mL by mouth nightly.       ipratropium-albuterol (COMBIVENT RESPIMAT)  mcg/actuation inhaler Take 1 Puff by inhalation four (4) times daily.  loratadine (CLARITIN) 10 mg tablet Take 10 mg by mouth daily.  calcium carbonate (TUMS) 200 mg calcium (500 mg) chew Take 0.5 Tabs by mouth daily as needed (stomach upset).  lisinopril (PRINIVIL, ZESTRIL) 5 mg tablet TAKE ONE TABLET BY MOUTH EVERY DAY 30 Tab 3    azithromycin (ZITHROMAX) 250 mg tablet Take 250 mg by mouth every Monday, Wednesday, Friday.  ANORO ELLIPTA 62.5-25 mcg/actuation inhaler Take 1 Puff by inhalation daily.  aspirin delayed-release 81 mg tablet Take 162 mg by mouth daily.  calcium carb/magnesium hydrox (MYLANTA PO) Take 30 mL by mouth every four (4) hours as needed for Nausea. Past History     Past Medical History:  Past Medical History:   Diagnosis Date    Arthritis     generalized    COPD     Essential hypertension 2018    former smoker      >60pkyr quit 1/3/11    h/o DVT     left leg    ischemic left lower extremitiy pain     above knee FemPop 1/3/11    PVD [facility]peripheral vascular disease) ([facility] [address]     Seasonal allergic rhinitis     Single kidney        Past Surgical History:  Past Surgical History:   Procedure Laterality Date    BREAST SURGERY PROCEDURE UNLISTED      excision left breast cyst    HX GI  10 yrs ago    colonoscopy    HX GYN      3 miscarriges    HX GYN      1 vaginal birth   [de-identified] ORTHOPAEDIC      veinography left leg, stent left leg       Family History:  Family History   Problem Relation Age of Onset    Cancer Mother         colon    Cancer Sister         lung       Social History:  Social History     Tobacco Use    Smoking status: Former Smoker     Packs/day: 1.00     Years: 60.00     Pack years: 60.00     Last attempt to quit: 1/3/2011     Years since quittin.9    Smokeless tobacco: Never Used   Substance Use Topics    Alcohol use: Yes     Comment: occasional liquor after dinner    Drug use: No     Types: Prescription, OTC       Allergies:   Allergies   Allergen Reactions    Food Extracts Diarrhea     Onions and garlic causes abdominal pain,cramping     Sulfa (Sulfonamide Antibiotics) Other (comments)     Altered Mental Status         Review of Systems   Review of Systems  Constitutional: Negative for fever, chills, and fatigue. HENT: Negative for congestion, sore throat, rhinorrhea, sneezing and neck stiffness   Eyes: Negative for discharge and redness. Respiratory: Positive for  shortness of breath, wheezing   Cardiovascular: Negative for chest pain, palpitations   Gastrointestinal: Negative for nausea, vomiting, abdominal pain, constipation, diarrhea and blood in stool. Genitourinary: Negative for dysuria, urgency, frequency, hematuria, flank pain, decreased urine volume, discharge,   Musculoskeletal: Negative for myalgias or joint pain . Skin: Negative for rash or lesions . Neurological: Negative weakness, light-headedness, numbness and headaches. Physical Exam   Physical Exam    GENERAL: alert and oriented, no acute distress  EYES: PEERL, No injection, discharge or icterus. ENT: Mucous membranes pink and moist.  NECK: Supple  LUNGS: Airway patent. Mildly labored respirations, diminished breath sounds bilaterally with wheezes noted  HEART: Regular rate and rhythm. No peripheral edema  ABDOMEN: Non-distended and non-tender, without guarding or rebound. SKIN:  warm, dry  EXTREMITIES: Without swelling, tenderness or deformity, symmetric with normal ROM  NEUROLOGICAL: Alert, oriented      Diagnostic Study Results     Labs -   No results found for this or any previous visit (from the past 12 hour(s)). Radiologic Studies -   CTA CHEST W OR W WO CONT   Final Result   IMPRESSION:    1. No pulmonary embolus. 2. Emphysema. 3. No acute finding. XR CHEST PORT   Final Result   IMPRESSION: No acute disease. Emphysema.            CT Results  (Last 48 hours)    None        CXR Results  (Last 48 hours)    None            Medical Decision Making   I am the first provider for this patient. I reviewed the vital signs, available nursing notes, past medical history, past surgical history, family history and social history. Vital Signs-Reviewed the patient's vital signs. Records Reviewed: Nursing Notes and Old Medical Records    Provider Notes (Medical Decision Making): On presentation, the patient is well appearing, in no acute distress, vital signs are overall reassuring  Based on my history and exam the differential diagnosis for this patient includes COPD exacerbation, pneumonia, CHF, ACS, pneumothorax, pulmonary embolism, metabolic abnormality. After extensive work-up in the emergency department, symptoms felt likely to be secondary to COPD exacerbation  Versus heart failure may be multifactorial.  Given Lasix, albuterol in the emergency department with some improvement. We will plan to do admit for further management. ED Course:   Initial assessment performed. The patients presenting problems have been discussed, and they are in agreement with the care plan formulated and outlined with them. I have encouraged them to ask questions as they arise throughout their visit. Medications   albuterol-ipratropium (DUO-NEB) 2.5 MG-0.5 MG/3 ML (3 mL Nebulization Given 12/15/19 1313)   iopamidol (ISOVUE-370) 76 % injection 100 mL (100 mL IntraVENous Given 12/15/19 1449)   sodium chloride (NS) flush 10 mL (10 mL IntraVENous Given 12/15/19 1449)   furosemide (LASIX) injection 40 mg (40 mg IntraVENous Given 12/16/19 1034)   bisacodyl (DULCOLAX) tablet 10 mg (10 mg Oral Given 12/19/19 1638)         PROGRESS:  The patient has been re-evaluated and sx have improved. Reviewed available results with patient and have counseled them on diagnosis and care plan. They have expressed understanding, and all their questions have been answered. They agree with plan for admission.       CONSULT:  Ranjan Philippe MD spoke with the hospitalist.  Discussed HPI and PE, available diagnostic tests and clinical findings. He is in agreement with care plans as outlined and will evaluate for admission     Admit Note  Patient is being admitted to the hospitalist.  The results of their tests and reasons for their admission have been discussed with them and/or available family. They convey agreement and understanding for the need to be admitted and for their admission diagnosis. Consultation has been made with the inpatient physician specialist for hospitalization. Disposition:  admission    PLAN:  1. Admit     Diagnosis     Clinical Impression:   1. Chronic obstructive pulmonary disease with acute exacerbation ([facility] [address]        Please note that this dictation was completed with Dragon, computer voice recognition software. Quite often unanticipated grammatical, syntax, homophones, and other interpretive errors are inadvertently transcribed by the computer software. Please disregard these errors. Additionally, please excuse any errors that have escaped final proofreading.

## 2019-12-15 NOTE — ROUTINE PROCESS
TRANSFER - OUT REPORT: 
 
Verbal report given to Yeni Granados on Jasper Mulligan  being transferred to Ortho(unit) for routine progression of care Report consisted of patients Situation, Background, Assessment and  
Recommendations(SBAR). Information from the following report(s) SBAR and ED Summary was reviewed with the receiving nurse. Lines:    
 
Opportunity for questions and clarification was provided. Patient transported with: 
 Monitor O2 @ 2 liters Tech

## 2019-12-15 NOTE — PROGRESS NOTES
TRANSFER - IN REPORT:    Verbal report received from Gage Do RN(name) on Elissa Ma  being received from Emergency (unit) for routine progression of care      Report consisted of patients Situation, Background, Assessment and   Recommendations(SBAR). Information from the following report(s) SBAR, Kardex, Procedure Summary, Intake/Output, MAR, Accordion and Recent Results was reviewed with the receiving nurse. Opportunity for questions and clarification was provided. Assessment completed upon patients arrival to unit and care assumed.

## 2019-12-16 ENCOUNTER — HOME CARE VISIT (OUTPATIENT)
Dept: SCHEDULING | Facility: HOME HEALTH | Age: 84
End: 2019-12-16
Payer: MEDICARE

## 2019-12-16 ENCOUNTER — APPOINTMENT (OUTPATIENT)
Dept: NON INVASIVE DIAGNOSTICS | Age: 84
DRG: 190 | End: 2019-12-16
Attending: NEUROMUSCULOSKELETAL MEDICINE & OMM
Payer: MEDICARE

## 2019-12-16 LAB
ANION GAP SERPL CALC-SCNC: 5 MMOL/L (ref 5–15)
BUN SERPL-MCNC: 16 MG/DL (ref 6–20)
BUN/CREAT SERPL: 21 (ref 12–20)
CALCIUM SERPL-MCNC: 8.9 MG/DL (ref 8.5–10.1)
CHLORIDE SERPL-SCNC: 101 MMOL/L (ref 97–108)
CO2 SERPL-SCNC: 26 MMOL/L (ref 21–32)
CREAT SERPL-MCNC: 0.77 MG/DL (ref 0.55–1.02)
ERYTHROCYTE [DISTWIDTH] IN BLOOD BY AUTOMATED COUNT: 14 % (ref 11.5–14.5)
GLUCOSE SERPL-MCNC: 94 MG/DL (ref 65–100)
HCT VFR BLD AUTO: 38.5 % (ref 35–47)
HGB BLD-MCNC: 12.6 G/DL (ref 11.5–16)
MCH RBC QN AUTO: 29.9 PG (ref 26–34)
MCHC RBC AUTO-ENTMCNC: 32.7 G/DL (ref 30–36.5)
MCV RBC AUTO: 91.2 FL (ref 80–99)
NRBC # BLD: 0 K/UL (ref 0–0.01)
NRBC BLD-RTO: 0 PER 100 WBC
PLATELET # BLD AUTO: 213 K/UL (ref 150–400)
PMV BLD AUTO: 9.3 FL (ref 8.9–12.9)
POTASSIUM SERPL-SCNC: 4.2 MMOL/L (ref 3.5–5.1)
RBC # BLD AUTO: 4.22 M/UL (ref 3.8–5.2)
SODIUM SERPL-SCNC: 132 MMOL/L (ref 136–145)
WBC # BLD AUTO: 6.7 K/UL (ref 3.6–11)

## 2019-12-16 PROCEDURE — 97530 THERAPEUTIC ACTIVITIES: CPT

## 2019-12-16 PROCEDURE — 97165 OT EVAL LOW COMPLEX 30 MIN: CPT | Performed by: OCCUPATIONAL THERAPIST

## 2019-12-16 PROCEDURE — 3331090001 HH PPS REVENUE CREDIT

## 2019-12-16 PROCEDURE — 74011250637 HC RX REV CODE- 250/637: Performed by: NEUROMUSCULOSKELETAL MEDICINE & OMM

## 2019-12-16 PROCEDURE — 94640 AIRWAY INHALATION TREATMENT: CPT

## 2019-12-16 PROCEDURE — 97162 PT EVAL MOD COMPLEX 30 MIN: CPT

## 2019-12-16 PROCEDURE — 36415 COLL VENOUS BLD VENIPUNCTURE: CPT

## 2019-12-16 PROCEDURE — 97116 GAIT TRAINING THERAPY: CPT

## 2019-12-16 PROCEDURE — 85027 COMPLETE CBC AUTOMATED: CPT

## 2019-12-16 PROCEDURE — 77010033678 HC OXYGEN DAILY

## 2019-12-16 PROCEDURE — 94760 N-INVAS EAR/PLS OXIMETRY 1: CPT

## 2019-12-16 PROCEDURE — 93306 TTE W/DOPPLER COMPLETE: CPT

## 2019-12-16 PROCEDURE — 3331090002 HH PPS REVENUE DEBIT

## 2019-12-16 PROCEDURE — 97535 SELF CARE MNGMENT TRAINING: CPT | Performed by: OCCUPATIONAL THERAPIST

## 2019-12-16 PROCEDURE — 74011250636 HC RX REV CODE- 250/636: Performed by: NEUROMUSCULOSKELETAL MEDICINE & OMM

## 2019-12-16 PROCEDURE — 65270000029 HC RM PRIVATE

## 2019-12-16 PROCEDURE — 74011000250 HC RX REV CODE- 250: Performed by: NEUROMUSCULOSKELETAL MEDICINE & OMM

## 2019-12-16 PROCEDURE — 80048 BASIC METABOLIC PNL TOTAL CA: CPT

## 2019-12-16 RX ORDER — AMLODIPINE BESYLATE 5 MG/1
10 TABLET ORAL DAILY
Status: DISCONTINUED | OUTPATIENT
Start: 2019-12-16 | End: 2019-12-17

## 2019-12-16 RX ORDER — FUROSEMIDE 10 MG/ML
40 INJECTION INTRAMUSCULAR; INTRAVENOUS ONCE
Status: COMPLETED | OUTPATIENT
Start: 2019-12-16 | End: 2019-12-16

## 2019-12-16 RX ADMIN — LORATADINE 10 MG: 10 TABLET ORAL at 08:08

## 2019-12-16 RX ADMIN — AZITHROMYCIN MONOHYDRATE 250 MG: 250 TABLET ORAL at 21:03

## 2019-12-16 RX ADMIN — Medication 10 ML: at 21:04

## 2019-12-16 RX ADMIN — Medication 10 ML: at 05:27

## 2019-12-16 RX ADMIN — IPRATROPIUM BROMIDE AND ALBUTEROL SULFATE 3 ML: .5; 3 SOLUTION RESPIRATORY (INHALATION) at 07:19

## 2019-12-16 RX ADMIN — ACETAMINOPHEN 650 MG: 325 TABLET ORAL at 08:52

## 2019-12-16 RX ADMIN — AMLODIPINE BESYLATE 10 MG: 5 TABLET ORAL at 08:43

## 2019-12-16 RX ADMIN — CALCIUM CARBONATE (ANTACID) CHEW TAB 500 MG 100 MG: 500 CHEW TAB at 08:53

## 2019-12-16 RX ADMIN — Medication 10 ML: at 13:18

## 2019-12-16 RX ADMIN — IPRATROPIUM BROMIDE AND ALBUTEROL SULFATE 3 ML: .5; 3 SOLUTION RESPIRATORY (INHALATION) at 15:02

## 2019-12-16 RX ADMIN — FUROSEMIDE 40 MG: 10 INJECTION, SOLUTION INTRAMUSCULAR; INTRAVENOUS at 10:34

## 2019-12-16 RX ADMIN — IPRATROPIUM BROMIDE AND ALBUTEROL SULFATE 3 ML: .5; 3 SOLUTION RESPIRATORY (INHALATION) at 03:03

## 2019-12-16 RX ADMIN — ASPIRIN 81 MG: 81 TABLET, COATED ORAL at 08:08

## 2019-12-16 RX ADMIN — LISINOPRIL 5 MG: 5 TABLET ORAL at 08:08

## 2019-12-16 RX ADMIN — ACETAMINOPHEN 650 MG: 325 TABLET ORAL at 17:13

## 2019-12-16 RX ADMIN — UMECLIDINIUM BROMIDE AND VILANTEROL TRIFENATATE 1 PUFF: 62.5; 25 POWDER RESPIRATORY (INHALATION) at 08:09

## 2019-12-16 RX ADMIN — ALUMINUM HYDROXIDE, MAGNESIUM HYDROXIDE, AND SIMETHICONE 30 ML: 200; 200; 20 SUSPENSION ORAL at 13:21

## 2019-12-16 RX ADMIN — IPRATROPIUM BROMIDE AND ALBUTEROL SULFATE 3 ML: .5; 3 SOLUTION RESPIRATORY (INHALATION) at 21:00

## 2019-12-16 NOTE — PROGRESS NOTES
ADULT PROTOCOL: JET AEROSOL ASSESSMENT    Patient  Jose Youssef     80 y.o.   female     12/15/2019  9:39 PM    Breath Sounds Pre Procedure: Right Breath Sounds: Diminished, Clear                               Left Breath Sounds: Diminished, Clear    Breath Sounds Post Procedure: Right Breath Sounds: Diminished, Clear                                 Left Breath Sounds: Diminished, Clear    Heart Rate: Pre procedure Pulse: 70           Post procedure Pulse: 62    Resp Rate: Pre procedure Respirations: 20           Post procedure Respirations: 20    Oxygen: O2 Device: Nasal cannula   Flow rate (L/min) 2     Changed: NO    SpO2: Pre procedure SpO2: 96 %   with oxygen              Post procedure SpO2: 96 %  with oxygen    Nebulizer Therapy: Current medications Aerosolized Medications: DuoNeb      Changed: NO    Smoking History:    Smoking status: Former Smoker       Packs/day: 1.00       Years: 60.00       Pack years: 60.00     Problem List:   Patient Active Problem List   Diagnosis Code    COPD (chronic obstructive pulmonary disease) ([facility]) J44.9    PVD (peripheral vascular disease) ([facility]) I73.9    Seasonal allergic rhinitis J30.2    Bronchitis, acute J20.9    COPD exacerbation ([facility]) J44.1    Acute renal failure (ARF) ([facility]) N17.9    Acute and chronic respiratory failure with hypoxia ([facility]) J96.21    Sepsis ([facility]) A41.9    Sinus bradycardia R00.1    LBBB (left bundle branch block) I44.7    Essential hypertension I10    Acute respiratory failure ([facility]) J96.00       Respiratory Therapist: Joanne Ruiz, RT

## 2019-12-16 NOTE — PHYSICIAN ADVISORY
This patient is at high risk of adverse events and deterioration based on documented clinical data, comorbid conditions and current acute care course. Ms. Lala Ruiz is expected to meet Inpatient Admission status criteria in accordance with CMS regulation Section 43 .3. Specifically, due to medical necessity the patient's stay is expected to exceed Two Midnights. It is our recommendation that this patient's hospitalization status should be upgraded from  INPATIENT to INPATIENT status. The final decision of the patient's hospitalization status depends on the attending physician's judgment.

## 2019-12-16 NOTE — PROGRESS NOTES
OCCUPATIONAL THERAPY EVALUATION/DISCHARGE  Patient: Ollie Cueto (80 y.o. female)  Date: 12/16/2019  Primary Diagnosis: COPD (chronic obstructive pulmonary disease) ([facility] [J44.9]       Precautions:   Fall    ASSESSMENT  Patient presents with minimal deficits in self-care, primarily due to decreased activity tolerance and SOB with activity. She is currently on 3 liters O2 via nasal canula (2 liters at baseline), and her SpO2 was 90-92% with activity, 97-98% at rest.  Discussed energy conservation during ADL. Educated her on use of the incentive spirometer. Patient will be followed by physical therapy for mobility and activity tolerance and will regain independence with ADL as these things improve. Functional Outcome Measure: The patient scored 65/100 on the Barthel Index. PLAN :  Recommend with staff: Out of bed to the chair for all meals and as much as possible throughout the day. Recommendation for discharge: (in order for the patient to meet his/her long term goals)  No skilled occupational therapy/ follow up rehabilitation needs identified at this time. This discharge recommendation:  Has not yet been discussed the attending provider and/or case management    IF patient discharges home will need the following DME: none       SUBJECTIVE:   Patient stated I don't know what's wrong with me.     OBJECTIVE DATA SUMMARY:   HISTORY:   Past Medical History:   Diagnosis Date    Arthritis     generalized    COPD     Essential hypertension 9/4/2018    former smoker      >60pkyr quit 1/3/11    h/o DVT 1955    left leg    ischemic left lower extremitiy pain     above knee FemPop 1/3/11    PVD (peripheral vascular disease) ([facility] [address]     Seasonal allergic rhinitis     Single kidney      Past Surgical History:   Procedure Laterality Date    BREAST SURGERY PROCEDURE UNLISTED  1955    excision left breast cyst    HX GI  10 yrs ago    colonoscopy    HX GYN      3 miscarriges    HX GYN      1 vaginal birth   [facility]THOPAEDIC      veinography left leg, stent left leg       Prior Level of Function/Environment/Context: Lives alone. She has a history of COPD and has had more difficulty over the past 2 weeks with SOB during activity. Her son lives nearby and checks on her daily and provides transportation to appointments and assists with IADL. Expanded or extensive additional review of patient history:   Home Situation  Home Environment: Private residence  # Steps to Enter: 3  Rails to Enter: Yes  Hand Rails : Bilateral  One/Two Story Residence: One story  Living Alone: Yes  Support Systems: Family member(s)  Patient Expects to be Discharged to[de-identified] Private residence  Current DME Used/Available at Home: Cane, straight, Walker, rolling, Oxygen, portable(2 liters home O2)  Tub or Shower Type: Tub/Shower combination    Hand dominance: Right    EXAMINATION OF PERFORMANCE DEFICITS:  Cognitive/Behavioral Status:  Neurologic State: Alert  Orientation Level: Oriented X4  Cognition: Appropriate for age attention/concentration; Follows commands  Perception: Appears intact  Perseveration: No perseveration noted  Safety/Judgement: Awareness of environment; Insight into deficits;Home safety; Fall prevention    Hearing: Auditory  Auditory Impairment: None    Vision/Perceptual:                           Acuity: Within Defined Limits    Corrective Lenses: Reading glasses    Range of Motion:  AROM: Within functional limits                         Strength:  Strength: Generally decreased, functional                Coordination:     Fine Motor Skills-Upper: Left Intact; Right Intact(grossly)    Gross Motor Skills-Upper: Left Intact; Right Intact    Tone & Sensation:  Tone: Normal  Sensation: Impaired(numbness L LE)                      Balance:  Sitting: Intact  Standing: Intact; With support    Functional Mobility and Transfers for ADLs:  Bed Mobility:       Transfers:  Sit to Stand: Stand-by assistance  Stand to Sit: Stand-by assistance  Bathroom Mobility: Stand-by assistance  Toilet Transfer : Stand-by assistance  Assistive Device : Walker, rolling    ADL Assessment:       Oral Facial Hygiene/Grooming: Modified Independent(seated)    Bathing: Stand-by assistance    Upper Body Dressing: Independent    Lower Body Dressing: Modified independent    Toileting: Stand by assistance                ADL Intervention and task modifications:  Educated patient on general home safety and fall prevention. Educated patient on energy conservation. 1. Deep breathing  2. Educated on pacing and making sure he/she takes short frequent breaks (e.g. In the shower wash the upper body, rest for 1 minute, then wash the lower body, etc)  5. Educated on re-arranging his/her routine to allow for rest breaks in the morning routine  8. Educated on looking at the consequences of his/her actions before deciding he/she needs to take on a task (e.g not getting down on one's hands and knees to wash floors because it will take all of one's energy for the day and result in exhaustion). 8. Educated on DME used to help conserve energy, such as a shower seat, a stool or chair in the kitchen, and pushing or pulling items instead of carrying them. Discussed use of the incentive spirometer. Educated on proper use and goals. Cognitive Retraining  Safety/Judgement: Awareness of environment; Insight into deficits;Home safety; Fall prevention    Functional Measure:  Barthel Index:    Bathin  Bladder: 5  Bowels: 10  Groomin  Dressing: 10  Feeding: 10  Mobility: 10  Stairs: 0  Toilet Use: 5  Transfer (Bed to Chair and Back): 10  Total: 65/100        The Barthel ADL Index: Guidelines  1. The index should be used as a record of what a patient does, not as a record of what a patient could do. 2. The main aim is to establish degree of independence from any help, physical or verbal, however minor and for whatever reason.   3. The need for supervision renders the patient not independent. 4. A patient's performance should be established using the best available evidence. Asking the patient, friends/relatives and nurses are the usual sources, but direct observation and common sense are also important. However direct testing is not needed. 5. Usually the patient's performance over the preceding 24-48 hours is important, but occasionally longer periods will be relevant. 6. Middle categories imply that the patient supplies over 50 per cent of the effort. 7. Use of aids to be independent is allowed. Kael Llanes., Barthel, VANNESAW. ([id number]). Functional evaluation: the Barthel Index. [facility] (14)2. Sherry Chaudhary casie HERNANDO Ortega, Teagan Winters., Leonard Magana., [place], [address] (1999). Measuring the change indisability after inpatient rehabilitation; comparison of the responsiveness of the Barthel Index and Functional [place] Measure. Journal of Neurology, Neurosurgery, and Psychiatry, 66(4), 310-670. Lalo Hinton, N.J.A, JAGDISH Andersen, & Marybel Garcia M.A. (2004.) Assessment of post-stroke quality of life in cost-effectiveness studies: The usefulness of the Barthel Index and the EuroQoL-5D.  Quality of Life Research, 15, 368-90       Occupational Therapy Evaluation Charge Determination   History Examination Decision-Making   LOW Complexity : Brief history review  LOW Complexity : 1-3 performance deficits relating to physical, cognitive , or psychosocial skils that result in activity limitations and / or participation restrictions  LOW Complexity : No comorbidities that affect functional and no verbal or physical assistance needed to complete eval tasks       Based on the above components, the patient evaluation is determined to be of the following complexity level: LOW   Pain Rating:  No complaints    Activity Tolerance:   Fair, desaturates with exertion and requires oxygen and requires rest breaks  Please refer to the flowsheet for vital signs taken during this treatment. After treatment patient left in no apparent distress:    Sitting in chair and Call bell within reach    COMMUNICATION/EDUCATION:   The patients plan of care was discussed with: Physical Therapist and Registered Nurse.     Thank you for this referral.  Hermilo Ward OTR/L  Time Calculation: 28 mins

## 2019-12-16 NOTE — PROGRESS NOTES
Spiritual Care Partner Volunteer visited patient in Ortho on 12/16/19. Documented by:  Rev. Mo EdD MDiv     For  Assistance Page [phone])

## 2019-12-16 NOTE — PROGRESS NOTES
Reason for Admission:  COPD                  RRAT Score:   14 moderate               Do you (patient/family) have any concerns for transition/discharge? None at this time                 Plan for utilizing home health:   Pt is currently open to [address] [place] [address] for wound care     Current Advanced Directive/Advance Care Plan: On File             Transition of Care Plan:     Pt will likely discharge home with with resumption of HH. Pt is a 80year old female admitted on 12/15. Pt was alert and oriented x4 sitting at bedside. Pt confirmed demographics. Pt reports to live alone  In a 1 story home (3 steps). Pt reports to have a supportive son who provides her with transportation to appointments. Pt has access to a cane, walker, and is on 2L of O2 at baseline ([facility]). Pt's preferred [facility]. Pt reported that she has been to [address] [place] [address] in the past.     Pt anticipates discharging home with [address] once medically stable. Pt mentioned that he would like to change PCP to Dr. Rani Pulliam for convenience stating that her current PCP is further away. An attempt was made to arrange new patient appointment with Rani Pulliam, Per CM specialist, Rani Pulliam is not currently accepting new patients and the next opening with any physician at his practice isnt until 1/23/20. Care Management Interventions  PCP Verified by CM: Yes  Mode of Transport at Discharge:  Other (see comment)(Son )  Transition of Care Consult (CM Consult): Discharge Planning  Discharge Durable Medical Equipment: No  Physical Therapy Consult: Yes  Occupational Therapy Consult: Yes  Current Support Network: Lives Alone  Confirm Follow Up Transport: Family  Plan discussed with Pt/Family/Caregiver: Yes  Discharge Location  Discharge Placement: Home with home health    [facility], [address] [place] [address]

## 2019-12-16 NOTE — PROGRESS NOTES
Problem: Mobility Impaired (Adult and Pediatric)  Goal: *Acute Goals and Plan of Care (Insert Text)  Description  FUNCTIONAL STATUS PRIOR TO ADMISSION: Patient was modified independent using a rolling walker for functional mobility. HOME SUPPORT PRIOR TO ADMISSION: The patient lived alone in 1 story home. Family may be able to help if needed. But patient has been very independent at home and in community. Physical Therapy Goals  Initiated 12/16/2019  1. Patient will move from supine to sit and sit to supine  in bed with independence within 7 day(s). 2.  Patient will transfer from bed to chair and chair to bed with modified independence using the least restrictive device within 7 day(s). 3.  Patient will perform sit to stand with independence within 7 day(s). 4.  Patient will ambulate with modified independence for 150 feet with the least restrictive device within 7 day(s). 5.  Patient will ascend/descend 4 stairs with 1 handrail(s) with standby within 7 day(s). Outcome: Progressing Towards Goal   PHYSICAL THERAPY EVALUATION  Patient: Chris Dillard (80 y.o. female)  Date: 12/16/2019  Primary Diagnosis: COPD (chronic obstructive pulmonary disease) ([facility] [J44.9]        Precautions:  Fall      ASSESSMENT  Based on the objective data described below, the patient presents with decreased activity tolerance, reported sob with minimal exertion, decreased strength, standing balance and mobility skills. She was able to walk to the toilet in the bathroom and back to chair only due to rapid fatigue using a RW and sba. O2 sats were stable with patient on 3L/min O2 95% to 98%. HR peaked at 102 from 86 at rest.     Current Level of Function Impacting Discharge (mobility/balance): sba    Functional Outcome Measure: The patient scored 65/100 on the Barthel outcome measure which is indicative of 35% functional impairment.       Other factors to consider for discharge: lives alone, but independent PLOF Patient will benefit from skilled therapy intervention to address the above noted impairments. PLAN :  Recommendations and Planned Interventions: bed mobility training, transfer training, gait training, therapeutic exercises, neuromuscular re-education, edema management/control, patient and family training/education, and therapeutic activities      Frequency/Duration: Patient will be followed by physical therapy:  4 times a week to address goals.     Recommendation for discharge: (in order for the patient to meet his/her long term goals)  Physical therapy at least 2 days/week in the home     This discharge recommendation:  Has been made in collaboration with the attending provider and/or case management    IF patient discharges home will need the following DME: patient owns DME required for discharge         SUBJECTIVE:   Patient stated  I don't feel any better since coming to the hospital.    OBJECTIVE DATA SUMMARY:   HISTORY:    Past Medical History:   Diagnosis Date    Arthritis     generalized    COPD     Essential hypertension 9/4/2018    former smoker      >60pkyr quit 1/3/11    h/o DVT 1955    left leg    ischemic left lower extremitiy pain     above knee FemPop 1/3/11    PVD (peripheral vascular disease) ([facility] [address]     Seasonal allergic rhinitis     Single kidney      Past Surgical History:   Procedure Laterality Date    BREAST SURGERY PROCEDURE UNLISTED  1955    excision left breast cyst    HX GI  10 yrs ago    colonoscopy    HX GYN      3 miscarriges    HX GYN      1 vaginal birth    HX ORTHOPAEDIC      veinography left leg, stent left leg       Personal factors and/or comorbidities impacting plan of care: copd, pvd, O2 dependent    Home Situation  Home Environment: Private residence  # Steps to Enter: 3  Rails to Enter: Yes  Hand Rails : Bilateral  One/Two Story Residence: One story  Living Alone: Yes  Support Systems: Family member(s)  Patient Expects to be Discharged to[de-identified] Private residence  Current DME Used/Available at Home: Cane, straight, Walker, rolling, Oxygen, portable(2 liters home O2)  Tub or Shower Type: Tub/Shower combination    EXAMINATION/PRESENTATION/DECISION MAKING:   Critical Behavior:  Neurologic State: Alert  Orientation Level: Oriented X4  Cognition: Appropriate decision making, Appropriate for age attention/concentration, Appropriate safety awareness, Follows commands  Safety/Judgement: Awareness of environment, Insight into deficits, Home safety, Fall prevention  Hearing: Auditory  Auditory Impairment: None  Skin:    Edema:   Range Of Motion:  AROM: Within functional limits                       Strength:    Strength: Generally decreased, functional                    Tone & Sensation:   Tone: Normal              Sensation: Impaired(numbness L LE)               Coordination:     Vision:   Acuity: Within Defined Limits  Corrective Lenses: Reading glasses  Functional Mobility:  Bed Mobility:  Rolling: (up in chair when PT arrived)           Transfers:  Sit to Stand: Stand-by assistance  Stand to Sit: Stand-by assistance        Bed to Chair: Stand-by assistance; Adaptive equipment(RW)              Balance:   Sitting: Intact  Standing: Intact; With support  Ambulation/Gait Training:  Distance (ft): 30 Feet (ft)  Assistive Device: Walker, rolling;Gait belt  Ambulation - Level of Assistance: Stand-by assistance     Gait Description (WDL): Exceptions to WDL  Gait Abnormalities: Step to gait(slight forward trunk lean)  Right Side Weight Bearing: Full  Left Side Weight Bearing: Full  Base of Support: Widened     Speed/Paige: Slow  Step Length: Right shortened;Left shortened                    Functional Measure:  Barthel Index:    Bathin  Bladder: 5  Bowels: 10  Groomin  Dressing: 10  Feeding: 10  Mobility: 10  Stairs: 0  Toilet Use: 5  Transfer (Bed to Chair and Back): 10  Total: 65/100       The Barthel ADL Index: Guidelines  1.  The index should be used as a record of what a patient does, not as a record of what a patient could do. 2. The main aim is to establish degree of independence from any help, physical or verbal, however minor and for whatever reason. 3. The need for supervision renders the patient not independent. 4. A patient's performance should be established using the best available evidence. Asking the patient, friends/relatives and nurses are the usual sources, but direct observation and common sense are also important. However direct testing is not needed. 5. Usually the patient's performance over the preceding 24-48 hours is important, but occasionally longer periods will be relevant. 6. Middle categories imply that the patient supplies over 50 per cent of the effort. 7. Use of aids to be independent is allowed. Danette Valerio., Barthel, D.W. ([id number]). Functional evaluation: the Barthel Index. [address] [facility] (14)2. HERNANDO Ford, Migue Becker., Rob Gonzalez., [place], [address] [place] [address] (1999). Measuring the change indisability after inpatient rehabilitation; comparison of the responsiveness of the Barthel Index and Functional [place] Measure. Journal of Neurology, Neurosurgery, and Psychiatry, 66(4), 580-965. He Harper, N.J.A, CURTIS Andersen.J.YANIRA, & Stacey Crabtree, M.A. (2004.) Assessment of post-stroke quality of life in cost-effectiveness studies: The usefulness of the Barthel Index and the EuroQoL-5D.  Quality of Life Research, 15, 434-46        Physical Therapy Evaluation Charge Determination   History Examination Presentation Decision-Making   HIGH Complexity :3+ comorbidities / personal factors will impact the outcome/ POC  HIGH Complexity : 4+ Standardized tests and measures addressing body structure, function, activity limitation and / or participation in recreation  MEDIUM Complexity : Evolving with changing characteristics  Other outcome measures Barthel  MEDIUM      Based on the above components, the patient evaluation is determined to be of the following complexity level: MEDIUM    Pain Rating:  None reported    Activity Tolerance:   Fair, SpO2 stable on RA, and requires rest breaks  Please refer to the flowsheet for vital signs taken during this treatment. After treatment patient left in no apparent distress:   Sitting in chair and Call bell within reach    COMMUNICATION/EDUCATION:   The patients plan of care was discussed with: Occupational Therapist, Registered Nurse, and . Fall prevention education was provided and the patient/caregiver indicated understanding., Patient/family have participated as able in goal setting and plan of care. , and Patient/family agree to work toward stated goals and plan of care.     Thank you for this referral.  Rylie Cobb, PT   Time Calculation: 30 mins

## 2019-12-16 NOTE — PROGRESS NOTES
Orders received, chart reviewed and patient evaluated by physical therapy. Pending progression with skilled acute physical therapy, recommend:  Physical therapy at least 2 days/week in the home     Recommend with nursing patient to complete as able in order to maintain strength, endurance and independence: OOB to chair 3x/day with assistance x 1 and ambulating with RW. Thank you for your assistance. Full evaluation to follow.      Noemi Sanders, PT

## 2019-12-16 NOTE — CONSULTS
[facility]  Pulmonary, Critical Care, and Sleep Medicine    Initial Patient Consult    Name: Janette Hughes MRN: [id number]   : 11/10/1931 Hospital: [facility]   Date: 2019        IMPRESSION:   · COPD with acute exacerbation. Followed by Dr. Rivas Son in office. · Chronic Respiratory failure on 2L NC at home. · Increased BNP  · Hypertension  · PVD, had bypass of the left leg. RECOMMENDATIONS:   · ON IV diuretics  · ON Abx, Azithromycin. · ON Anti hypertensives  · Improving so will hold off on steroids at this point. · Will obtain and review the office notes. · Adjust oxygen to keep pox over 90%. · Will follow along with you. Subjective: This patient has been seen and evaluated at the request of Dr. Ian Gamble for above. Patient is a 80 y.o. female has increased dyspnea for the last week. Has been loosing weight for the last year. About 10lbs. Has been eating and drinking well. Has some intermittent GERD. Has been having mild sinus drainage. Noted to have increased BNP. Has no aspiration. Not sputum production. Has been sleeping ok. Had CTA of chest that was unremarkable. No evidence of acute PE. Denies any other acute complaint. No sick contacts.        Past Medical History:   Diagnosis Date    Arthritis     generalized    COPD     Essential hypertension 2018    former smoker      >60pkyr quit 1/3/11    h/o DVT     left leg    ischemic left lower extremitiy pain     above knee FemPop 1/3/11    PVD (peripheral vascular disease) ([facility] [address]     Seasonal allergic rhinitis     Single kidney       Past Surgical History:   Procedure Laterality Date    BREAST SURGERY PROCEDURE UNLISTED      excision left breast cyst    HX GI  10 yrs ago    colonoscopy    HX GYN      3 miscarriges    HX GYN      1 vaginal birth    HX ORTHOPAEDIC      veinography left leg, stent left leg      Prior to Admission medications    Medication Sig Start Date End Date Taking? Authorizing Provider   albuterol-ipratropium (DUO-NEB) 2.5 mg-0.5 mg/3 ml nebu 3 mL by Nebulization route four (4) times daily. Yes Provider, Historical   alum-mag hydroxide-simeth (MYLANTA) 200-200-20 mg/5 mL susp Take 30 mL by mouth nightly. Yes Provider, Historical   ipratropium-albuterol (COMBIVENT RESPIMAT)  mcg/actuation inhaler Take 1 Puff by inhalation four (4) times daily. Yes Provider, Historical   loratadine (CLARITIN) 10 mg tablet Take 10 mg by mouth daily. Yes Provider, Historical   calcium carbonate (TUMS) 200 mg calcium (500 mg) chew Take 0.5 Tabs by mouth daily as needed (stomach upset). Yes Provider, Historical   lisinopril (PRINIVIL, ZESTRIL) 5 mg tablet TAKE ONE TABLET BY MOUTH EVERY DAY 19  Yes Carmen Henson MD   azithromycin [facility]) 250 mg tablet Take 250 mg by mouth every Monday, Wednesday, Friday. Yes Provider, Historical   acetaminophen (TYLENOL) 500 mg tablet Take 1,000 mg by mouth daily. Yes Provider, Historical   ANORO ELLIPTA 62.5-25 mcg/actuation inhaler Take 1 Puff by inhalation daily. 17  Yes Provider, Historical   aspirin delayed-release 81 mg tablet Take 162 mg by mouth daily.    Yes Provider, Historical     Allergies   Allergen Reactions    Food Extracts Diarrhea     Onions and garlic causes abdominal pain,cramping     Sulfa (Sulfonamide Antibiotics) Other (comments)     Altered Mental Status      Social History     Tobacco Use    Smoking status: Former Smoker     Packs/day: 1.00     Years: 60.00     Pack years: 60.00     Last attempt to quit: 1/3/2011     Years since quittin.9    Smokeless tobacco: Never Used   Substance Use Topics    Alcohol use: Yes     Comment: occasional liquor after dinner      Family History   Problem Relation Age of Onset    Cancer Mother         colon    Cancer Sister         lung        Current Facility-Administered Medications   Medication Dose Route Frequency    amLODIPine (NORVASC) tablet 10 mg  10 mg Oral DAILY    sodium chloride (NS) flush 5-40 mL  5-40 mL IntraVENous Q8H    albuterol-ipratropium (DUO-NEB) 2.5 MG-0.5 MG/3 ML  3 mL Nebulization Q6H RT    umeclidinium-vilanterol (ANORO ELLIPTA) 62.5 mcg- 25 mcg/inhalation  1 Puff Inhalation DAILY    aspirin delayed-release tablet 81 mg  81 mg Oral DAILY    azithromycin (ZITHROMAX) tablet 250 mg  250 mg Oral Q MON, WED & FRI    lisinopril (PRINIVIL, ZESTRIL) tablet 5 mg  5 mg Oral DAILY    loratadine (CLARITIN) tablet 10 mg  10 mg Oral DAILY       Review of Systems:  Constitutional: positive for fatigue and malaise  Eyes: negative  Ears, nose, mouth, throat, and face: negative  Respiratory: positive for cough, wheezing, dyspnea on exertion, emphysema or chronic bronchitis  Cardiovascular: negative  Gastrointestinal: positive for reflux symptoms  Genitourinary:negative  Integument/breast: negative  Hematologic/lymphatic: negative  Musculoskeletal:negative  Neurological: negative  Behavioral/Psych: negative  Endocrine: negative  Allergic/Immunologic: negative    Objective:   Vital Signs:    Visit Vitals  /64 (BP 1 Location: Left arm, BP Patient Position: Sitting)   Pulse 79   Temp 98.7 °F (37.1 °C)   Resp 18   Wt 65.3 kg (144 lb)   SpO2 97%   BMI 23.24 kg/m²       O2 Device: Nasal cannula   O2 Flow Rate (L/min): 3 l/min   Temp (24hrs), Av.2 °F (36.8 °C), Min:97.7 °F (36.5 °C), Max:98.7 °F (37.1 °C)       Intake/Output:   Last shift:      No intake/output data recorded. Last 3 shifts:  1901 -  0700  In: -   Out: 750 [Urine:750]    Intake/Output Summary (Last 24 hours) at 2019 1249  Last data filed at 2019 0421  Gross per 24 hour   Intake    Output 750 ml   Net -750 ml      Physical Exam:   General:  Alert, cooperative, no distress, appears stated age. Sitting up in chair. No distress. ON supplemental Oxygen. Head:  Normocephalic, without obvious abnormality, atraumatic.    Eyes: Conjunctivae/corneas clear. PERRL, EOMs intact. Nose: Nares normal. Septum midline. Mucosa normal. No drainage or sinus tenderness. Throat: Lips, mucosa, and tongue normal. Teeth and gums normal.   Neck: Supple, symmetrical, trachea midline, no adenopathy, thyroid: no enlargment/tenderness/nodules, no carotid bruit and no JVD. Back:   Symmetric, no curvature. ROM normal.   Lungs:   Clear to auscultation bilaterally. Chest wall:  No tenderness or deformity. Heart:  Regular rate and rhythm, S1, S2 normal, no murmur, click, rub or gallop. Abdomen:   Soft, non-tender. Bowel sounds normal. No masses,  No organomegaly. Extremities: Extremities normal, atraumatic, no cyanosis or edema. Pulses: 2+ and symmetric all extremities. Skin: Skin color, texture, turgor normal. No rashes or lesions   Lymph nodes: Cervical, supraclavicular, and axillary nodes normal.   Neurologic: Grossly nonfocal, motor is intact. Psych: no overt anxiety or depression.       Data review:     Recent Results (from the past 24 hour(s))   INFLUENZA A & B AG (RAPID TEST)    Collection Time: 12/15/19  4:01 PM   Result Value Ref Range    Influenza A Antigen NEGATIVE  NEG      Influenza B Antigen NEGATIVE  NEG     METABOLIC PANEL, BASIC    Collection Time: 12/16/19  3:59 AM   Result Value Ref Range    Sodium 132 (L) 136 - 145 mmol/L    Potassium 4.2 3.5 - 5.1 mmol/L    Chloride 101 97 - 108 mmol/L    CO2 26 21 - 32 mmol/L    Anion gap 5 5 - 15 mmol/L    Glucose 94 65 - 100 mg/dL    BUN 16 6 - 20 MG/DL    Creatinine 0.77 0.55 - 1.02 MG/DL    BUN/Creatinine ratio 21 (H) 12 - 20      GFR est AA >60 >60 ml/min/1.73m2    GFR est non-AA >60 >60 ml/min/1.73m2    Calcium 8.9 8.5 - 10.1 MG/DL   CBC W/O DIFF    Collection Time: 12/16/19  3:59 AM   Result Value Ref Range    WBC 6.7 3.6 - 11.0 K/uL    RBC 4.22 3.80 - 5.20 M/uL    HGB 12.6 11.5 - 16.0 g/dL    HCT 38.5 35.0 - 47.0 %    MCV 91.2 80.0 - 99.0 FL    MCH 29.9 26.0 - 34.0 PG    MCHC 32.7 30.0 - 36.5 g/dL    RDW 14.0 11.5 - 14.5 %    PLATELET 982 747 - 833 K/uL    MPV 9.3 8.9 - 12.9 FL    NRBC 0.0 0  WBC    ABSOLUTE NRBC 0.00 0.00 - 0.01 K/uL       Imaging:  I have personally reviewed the patients radiographs and have reviewed the reports:  12-15-19: CT of chest: No acute process. No effusion, no airspace disease. NO pulmonary infiltrate.          Mari Byrd MD

## 2019-12-16 NOTE — PROGRESS NOTES
Bedside and Verbal shift change report given to Jere Victoria RN (oncoming nurse) by Gema Hargrove RN (offgoing nurse). Report included the following information SBAR, Kardex, Intake/Output, MAR, Accordion and Recent Results.

## 2019-12-16 NOTE — PROGRESS NOTES
Hospitalist Progress Note    NAME: Ollie Cueto   :  11/10/1931   MRN:  [id number]       Assessment / Plan:  1. Copd exacarbation with chronic hypoxic respiratory failure - cont home o2. Cont nebs. Not on steroids 2/2 hx of delerium. Give one dose of lasix and await echo for elevated bnp. Pulmonary associates consulted for persistent sob. 2. Elevated bnp - Check echo     3. htn - cont prinivil. Add norvasc     4. pvd - cont aspirin.     Code Status: dnr  Surrogate Decision Maker: lenin joshua 615 [id number]  DVT Prophylaxis: heparin  GI Prophylaxis: not indicated     Baseline: walker      18.5 - 24.9 Normal weight / Body mass index is 23.24 kg/m². Subjective:     Chief Complaint / Reason for Physician Visit  \"remains sob no cp. \". Discussed with RN events overnight. Review of Systems:  Symptom Y/N Comments  Symptom Y/N Comments   Fever/Chills    Chest Pain     Poor Appetite    Edema     Cough    Abdominal Pain     Sputum    Joint Pain     SOB/GODINEZ    Pruritis/Rash     Nausea/vomit    Tolerating PT/OT     Diarrhea    Tolerating Diet     Constipation    Other       Could NOT obtain due to:      Objective:     VITALS:   Last 24hrs VS reviewed since prior progress note.  Most recent are:  Patient Vitals for the past 24 hrs:   Temp Pulse Resp BP SpO2   19 0800 98.6 °F (37 °C) 71 18 (!) 170/92 96 %   19 0302     97 %   12/15/19 2117     96 %   12/15/19 1950 97.7 °F (36.5 °C) 77 18 170/76 97 %   12/15/19 1807    135/71    12/15/19 1728 97.7 °F (36.5 °C) 77 17 184/79 97 %   12/15/19 1638     95 %   12/15/19 1630  75 22 174/69 94 %   12/15/19 1615  70 17 153/74 95 %   12/15/19 1600  69 24 168/82 95 %   12/15/19 1545  67 23 181/66 95 %   12/15/19 1530  65 19 163/68 96 %   12/15/19 1515  66 26 175/67 97 %   12/15/19 1500  69 17 (!) 179/95 97 %   12/15/19 1430  73 26 169/69 96 %   12/15/19 1415  66 27 171/75 97 %   12/15/19 1400  70 27 161/68 96 %   12/15/19 1330  64 22 158/77 98 %   12/15/19 1315  60 23 149/71 97 %   12/15/19 1313     97 %   12/15/19 1300  62 24 157/67 97 %   12/15/19 1230  61 20 159/73 97 %   12/15/19 1215  (!) 59 19 158/72 97 %   12/15/19 1200  (!) 59 (!) 31 132/77 96 %   12/15/19 1145  63 24 131/64 95 %   12/15/19 1101 98.5 °F (36.9 °C) 75 18 157/80 97 %       Intake/Output Summary (Last 24 hours) at 12/16/2019 3057  Last data filed at 12/16/2019 0421  Gross per 24 hour   Intake    Output 750 ml   Net -750 ml        PHYSICAL EXAM:  General: WD, WN. Alert, cooperative, no acute distress    EENT:  EOMI. Anicteric sclerae. MMM  Resp:  CTA bilaterally, no wheezing or rales. No accessory muscle use  CV:  Regular  rhythm,  No edema  GI:  Soft, Non distended, Non tender.  +Bowel sounds  Neurologic:  Alert and oriented X 3, normal speech,   Psych:   Good insight. Not anxious nor agitated  Skin:  No rashes. No jaundice    Reviewed most current lab test results and cultures  YES  Reviewed most current radiology test results   YES  Review and summation of old records today    NO  Reviewed patient's current orders and MAR    YES  PMH/SH reviewed - no change compared to H&P  ________________________________________________________________________  Care Plan discussed with:    Comments   Patient     Family      RN     Care Manager     Consultant                        Multidiciplinary team rounds were held today with , nursing, pharmacist and clinical coordinator. Patient's plan of care was discussed; medications were reviewed and discharge planning was addressed.      ________________________________________________________________________  Total NON critical care TIME:  20   Minutes    Total CRITICAL CARE TIME Spent:   Minutes non procedure based      Comments   >50% of visit spent in counseling and coordination of care     ________________________________________________________________________  Kirsten Reno DO     Procedures: see electronic medical records for all procedures/Xrays and details which were not copied into this note but were reviewed prior to creation of Plan. LABS:  I reviewed today's most current labs and imaging studies.   Pertinent labs include:  Recent Labs     12/16/19  0359 12/15/19  1103   WBC 6.7 6.1   HGB 12.6 13.8   HCT 38.5 43.7    247     Recent Labs     12/16/19  0359 12/15/19  1103   * 132*   K 4.2 4.4    98   CO2 26 28   GLU 94 112*   BUN 16 21*   CREA 0.77 0.99   CA 8.9 9.2   ALB  --  3.8   TBILI  --  0.5   SGOT  --  19   ALT  --  16       Signed: Mable Romano DO

## 2019-12-17 LAB
AV VELOCITY RATIO: 0.49
ECHO AV AREA PEAK VELOCITY: 1.2 CM2
ECHO AV AREA/BSA PEAK VELOCITY: 0.7 CM2/M2
ECHO AV PEAK GRADIENT: 14.8 MMHG
ECHO AV PEAK VELOCITY: 192.43 CM/S
ECHO EST RA PRESSURE: 10 MMHG
ECHO LA AREA 4C: 10.9 CM2
ECHO LA MAJOR AXIS: 3.2 CM
ECHO LA VOL 2C: 46.95 ML (ref 22–52)
ECHO LA VOL 4C: 22.85 ML (ref 22–52)
ECHO LA VOL BP: 36.75 ML (ref 22–52)
ECHO LA VOL/BSA BIPLANE: 21.13 ML/M2 (ref 16–28)
ECHO LA VOLUME INDEX A2C: 26.99 ML/M2 (ref 16–28)
ECHO LA VOLUME INDEX A4C: 13.14 ML/M2 (ref 16–28)
ECHO LV EDV TEICHHOLZ: 0.31 ML
ECHO LV ESV TEICHHOLZ: 0.22 ML
ECHO LV INTERNAL DIMENSION DIASTOLIC: 3.52 CM (ref 3.9–5.3)
ECHO LV INTERNAL DIMENSION SYSTOLIC: 3.08 CM
ECHO LV IVSD: 1.63 CM (ref 0.6–0.9)
ECHO LV MASS 2D: 240.5 G (ref 67–162)
ECHO LV MASS INDEX 2D: 138.3 G/M2 (ref 43–95)
ECHO LV POSTERIOR WALL DIASTOLIC: 1.45 CM (ref 0.6–0.9)
ECHO LVOT DIAM: 1.75 CM
ECHO LVOT PEAK GRADIENT: 3.5 MMHG
ECHO LVOT PEAK VELOCITY: 93.54 CM/S
ECHO MV A VELOCITY: 89.68 CM/S
ECHO MV E DECELERATION TIME (DT): 245.9 MS
ECHO MV E VELOCITY: 45.84 CM/S
ECHO MV E/A RATIO: 0.51
ECHO MV REGURGITANT PEAK GRADIENT: 21.9 MMHG
ECHO MV REGURGITANT PEAK VELOCITY: 233.98 CM/S
ECHO PULMONARY ARTERY SYSTOLIC PRESSURE (PASP): 65 MMHG
ECHO RA AREA 4C: 18.14 CM2
ECHO RIGHT VENTRICULAR SYSTOLIC PRESSURE (RVSP): 51.4 MMHG
ECHO TV REGURGITANT MAX VELOCITY: 321.53 CM/S
ECHO TV REGURGITANT PEAK GRADIENT: 41.4 MMHG
LVFS 2D: 12.47 %
LVSV (TEICH): 8.15 ML
MV DEC SLOPE: 1.86

## 2019-12-17 PROCEDURE — 94760 N-INVAS EAR/PLS OXIMETRY 1: CPT

## 2019-12-17 PROCEDURE — 94640 AIRWAY INHALATION TREATMENT: CPT

## 2019-12-17 PROCEDURE — 74011000250 HC RX REV CODE- 250: Performed by: NEUROMUSCULOSKELETAL MEDICINE & OMM

## 2019-12-17 PROCEDURE — 3331090001 HH PPS REVENUE CREDIT

## 2019-12-17 PROCEDURE — 3331090002 HH PPS REVENUE DEBIT

## 2019-12-17 PROCEDURE — 65270000029 HC RM PRIVATE

## 2019-12-17 PROCEDURE — 74011250637 HC RX REV CODE- 250/637: Performed by: INTERNAL MEDICINE

## 2019-12-17 PROCEDURE — 97116 GAIT TRAINING THERAPY: CPT

## 2019-12-17 PROCEDURE — 77010033678 HC OXYGEN DAILY

## 2019-12-17 PROCEDURE — 74011250637 HC RX REV CODE- 250/637: Performed by: NEUROMUSCULOSKELETAL MEDICINE & OMM

## 2019-12-17 RX ORDER — GUAIFENESIN 100 MG/5ML
400 SOLUTION ORAL 3 TIMES DAILY
Status: DISCONTINUED | OUTPATIENT
Start: 2019-12-17 | End: 2019-12-21 | Stop reason: HOSPADM

## 2019-12-17 RX ORDER — METOPROLOL TARTRATE 25 MG/1
12.5 TABLET, FILM COATED ORAL EVERY 12 HOURS
Status: DISCONTINUED | OUTPATIENT
Start: 2019-12-17 | End: 2019-12-19

## 2019-12-17 RX ORDER — LABETALOL HYDROCHLORIDE 5 MG/ML
10 INJECTION, SOLUTION INTRAVENOUS
Status: DISCONTINUED | OUTPATIENT
Start: 2019-12-17 | End: 2019-12-21 | Stop reason: HOSPADM

## 2019-12-17 RX ORDER — FUROSEMIDE 20 MG/1
20 TABLET ORAL DAILY
Status: DISCONTINUED | OUTPATIENT
Start: 2019-12-17 | End: 2019-12-20

## 2019-12-17 RX ORDER — AZITHROMYCIN 250 MG/1
250 TABLET, FILM COATED ORAL
Status: DISCONTINUED | OUTPATIENT
Start: 2019-12-19 | End: 2019-12-19

## 2019-12-17 RX ADMIN — IPRATROPIUM BROMIDE AND ALBUTEROL SULFATE 3 ML: .5; 3 SOLUTION RESPIRATORY (INHALATION) at 20:31

## 2019-12-17 RX ADMIN — IPRATROPIUM BROMIDE AND ALBUTEROL SULFATE 3 ML: .5; 3 SOLUTION RESPIRATORY (INHALATION) at 02:35

## 2019-12-17 RX ADMIN — GUAIFENESIN 400 MG: 200 SOLUTION ORAL at 15:10

## 2019-12-17 RX ADMIN — ACETAMINOPHEN 650 MG: 325 TABLET ORAL at 09:34

## 2019-12-17 RX ADMIN — FUROSEMIDE 20 MG: 20 TABLET ORAL at 15:10

## 2019-12-17 RX ADMIN — Medication 10 ML: at 15:09

## 2019-12-17 RX ADMIN — METOPROLOL TARTRATE 12.5 MG: 25 TABLET ORAL at 20:52

## 2019-12-17 RX ADMIN — ALUMINUM HYDROXIDE, MAGNESIUM HYDROXIDE, AND SIMETHICONE 30 ML: 200; 200; 20 SUSPENSION ORAL at 21:49

## 2019-12-17 RX ADMIN — LORATADINE 10 MG: 10 TABLET ORAL at 08:48

## 2019-12-17 RX ADMIN — GUAIFENESIN 400 MG: 200 SOLUTION ORAL at 21:49

## 2019-12-17 RX ADMIN — Medication 10 ML: at 21:49

## 2019-12-17 RX ADMIN — METOPROLOL TARTRATE 12.5 MG: 25 TABLET ORAL at 15:10

## 2019-12-17 RX ADMIN — UMECLIDINIUM BROMIDE AND VILANTEROL TRIFENATATE 1 PUFF: 62.5; 25 POWDER RESPIRATORY (INHALATION) at 08:48

## 2019-12-17 RX ADMIN — LISINOPRIL 5 MG: 5 TABLET ORAL at 08:48

## 2019-12-17 RX ADMIN — IPRATROPIUM BROMIDE AND ALBUTEROL SULFATE 3 ML: .5; 3 SOLUTION RESPIRATORY (INHALATION) at 07:39

## 2019-12-17 RX ADMIN — AMLODIPINE BESYLATE 10 MG: 5 TABLET ORAL at 08:48

## 2019-12-17 RX ADMIN — ACETAMINOPHEN 650 MG: 325 TABLET ORAL at 17:15

## 2019-12-17 RX ADMIN — ASPIRIN 81 MG: 81 TABLET, COATED ORAL at 08:48

## 2019-12-17 RX ADMIN — IPRATROPIUM BROMIDE AND ALBUTEROL SULFATE 3 ML: .5; 3 SOLUTION RESPIRATORY (INHALATION) at 13:41

## 2019-12-17 RX ADMIN — Medication 10 ML: at 06:00

## 2019-12-17 NOTE — PROGRESS NOTES
ADULT PROTOCOL: JET AEROSOL  REASSESSMENT    Patient  Chris Dillard     80 y.o.   female     12/17/2019  2:59 AM    Breath Sounds Pre Procedure: Right Breath Sounds: Coarse                               Left Breath Sounds: Coarse    Breath Sounds Post Procedure: Right Breath Sounds: Coarse                                 Left Breath Sounds: Coarse    Breathing pattern: Pre procedure Breathing Pattern: Regular          Post procedure Breathing Pattern: Tachypneic    Heart Rate: Pre procedure Pulse: 69           Post procedure Pulse: 79    Resp Rate: Pre procedure Respirations: 20           Post procedure Respirations: 22       Cough: Pre procedure Cough: Non-productive               Post procedure Cough: Non-productive    Suctioned: NO    Sputum: Pre procedure                   Post procedure      Oxygen: O2 Device: Nasal cannula   2L     Changed: NO    SpO2: Pre procedure SpO2: 96 %   with oxygen              Post procedure SpO2: 96 %  with oxygen    Nebulizer Therapy: Current medications Aerosolized Medications: DuoNeb      Changed: NO    Smoking History: former smoker    Problem List:   Patient Active Problem List   Diagnosis Code    COPD (chronic obstructive pulmonary disease) ([facility]) J44.9    PVD (peripheral vascular disease) ([facility]) I73.9    Seasonal allergic rhinitis J30.2    Bronchitis, acute J20.9    COPD exacerbation ([facility]) J44.1    Acute renal failure (ARF) ([facility]) N17.9    Acute and chronic respiratory failure with hypoxia ([facility]) J96.21    Sepsis ([facility]) A41.9    Sinus bradycardia R00.1    LBBB (left bundle branch block) I44.7    Essential hypertension I10    Acute respiratory failure ([facility]) J96.00       Respiratory Therapist: Liseth Duran, RT

## 2019-12-17 NOTE — PROGRESS NOTES
Hospitalist Progress Note    NAME: Elissa Ma   :  11/10/1931   MRN:  [id number]       Assessment / Plan:  Acute on Chronic Respiratory Failure POA: uses home O2 2LNC, c/w supplemental O2. Copd exacerbation: c/w Z-max. Bronchodilators, mucolytics. COR Pulmonale: EF 60% with severe pulmonary HTN, c/w ACE, add low dose lasix, Pulmonology in the case, monitor I/o and weight. HTN: c/w ACE, add BB, D/c Norvasc, use labetalol prn  pvd - cont aspirin. Code Status: dnr  Surrogate Decision Maker: gene joshua 240 [id number]  DVT Prophylaxis: heparin  GI Prophylaxis: not indicated  Baseline: walker  18.5 - 24.9 Normal weight / Body mass index is 23.24 kg/m². Dc/ plan to SNF in 1-2 days     Subjective:     Chief Complaint / Reason for Physician Visit  \"I feel weak and SOB. \".  Discussed with RN events overnight. Review of Systems:  Symptom Y/N Comments  Symptom Y/N Comments   Fever/Chills    Chest Pain     Poor Appetite    Edema     Cough    Abdominal Pain     Sputum    Joint Pain     SOB/GODINEZ y   Pruritis/Rash     Nausea/vomit    Tolerating PT/OT     Diarrhea    Tolerating Diet y    Constipation    Other       Could NOT obtain due to:      Objective:     VITALS:   Last 24hrs VS reviewed since prior progress note.  Most recent are:  Patient Vitals for the past 24 hrs:   Temp Pulse Resp BP SpO2   19 1342     97 %   19 1214 97.3 °F (36.3 °C) 82 18 119/74 95 %   19 1116     97 %   19 1100  85   (!) 86 %   19 1056  77   93 %   19 0759 97.7 °F (36.5 °C) 71 18 151/82 96 %   19 0741     97 %   19 0555 97.5 °F (36.4 °C) 75 18 148/70 95 %   19 0234     96 %   19 2316 97.7 °F (36.5 °C) 64 18 123/62 100 %   198     95 %   19 97.4 °F (36.3 °C) 76 18 127/66 95 %   19 1618    110/69    19 1531 97.3 °F (36.3 °C) 77 18 110/69 94 %   19 1503     97 %       Intake/Output Summary (Last 24 hours) at 12/17/2019 1402  Last data filed at 12/17/2019 0241  Gross per 24 hour   Intake 260 ml   Output    Net 260 ml        PHYSICAL EXAM:  General: WD, WN. Alert, cooperative, no acute distress    EENT:  EOMI. Anicteric sclerae. MMM  Resp:  Coarse BS, rhonchi  CV:  Regular  rhythm,  No edema  GI:  Soft, Non distended, Non tender.  +Bowel sounds  Neurologic:  Alert and oriented X 3, normal speech,   Psych:   Good insight. Not anxious nor agitated  Skin:  No rashes. No jaundice    Reviewed most current lab test results and cultures  YES  Reviewed most current radiology test results   YES  Review and summation of old records today    NO  Reviewed patient's current orders and MAR    YES  PMH/SH reviewed - no change compared to H&P  ________________________________________________________________________  Care Plan discussed with:    Comments   Patient y    Family      RN y    Care Manager     Consultant                        Multidiciplinary team rounds were held today with , nursing, pharmacist and clinical coordinator. Patient's plan of care was discussed; medications were reviewed and discharge planning was addressed. ________________________________________________________________________  Total NON critical care TIME:  20   Minutes    Total CRITICAL CARE TIME Spent:   Minutes non procedure based      Comments   >50% of visit spent in counseling and coordination of care y    ________________________________________________________________________  Kanu Mendez MD     Procedures: see electronic medical records for all procedures/Xrays and details which were not copied into this note but were reviewed prior to creation of Plan. LABS:  I reviewed today's most current labs and imaging studies.   Pertinent labs include:  Recent Labs     12/16/19  0359 12/15/19  1103   WBC 6.7 6.1   HGB 12.6 13.8   HCT 38.5 43.7    247     Recent Labs     12/16/19  0359 12/15/19  1103   * 132*   K 4.2 4.4    98   CO2 26 28   GLU 94 112*   BUN 16 21*   CREA 0.77 0.99   CA 8.9 9.2   ALB  --  3.8   TBILI  --  0.5   SGOT  --  19   ALT  --  16       Signed: Joce Griffith MD Uncontrolled type 1 diabetes mellitus with other skin complication

## 2019-12-17 NOTE — PROGRESS NOTES
ADULT PROTOCOL: JET AEROSOL  REASSESSMENT Patient  Nino Hu     80 y.o.   female     12/17/2019  3:05 AM 
 
Breath Sounds Pre Procedure: Right Breath Sounds: Coarse Left Breath Sounds: Coarse Breath Sounds Post Procedure: Right Breath Sounds: Coarse Left Breath Sounds: Coarse Breathing pattern: Pre procedure Breathing Pattern: Regular Post procedure Breathing Pattern: Tachypneic Heart Rate: Pre procedure Pulse: 69 
         Post procedure Pulse: 79 Resp Rate: Pre procedure Respirations: 20 
         Post procedure Respirations: 22 
 
 
Cough: Pre procedure Cough: Non-productive Post procedure Cough: Non-productive Suctioned: NO Sputum: Pre procedure Post procedure Oxygen: O2 Device: Nasal cannula   2L Changed: NO SpO2: Pre procedure SpO2: 96 %   with oxygen Post procedure SpO2: 96 %  with oxygen Nebulizer Therapy: Current medications Aerosolized Medications: DuoNeb Changed: NO Smoking History: former smoker Problem List:  
Patient Active Problem List  
Diagnosis Code  COPD (chronic obstructive pulmonary disease) ([facility]) J44.9  PVD (peripheral vascular disease) ([facility]) I73.9  Seasonal allergic rhinitis J30.2  Bronchitis, acute J20.9  COPD exacerbation ([facility] [address] J44.1  Acute renal failure (ARF) ([facility]) N17.9  Acute and chronic respiratory failure with hypoxia ([facility]) J96.21  
 Sepsis ([facility]) A41.9  Sinus bradycardia R00.1  LBBB (left bundle branch block) I44.7  Essential hypertension I10  
 Acute respiratory failure ([facility]) J96.00 Respiratory Therapist: Danyelle Keita RT

## 2019-12-17 NOTE — PROGRESS NOTES
Problem: Falls - Risk of  Goal: *Absence of Falls  Description  Document Ankit Gomez Fall Risk and appropriate interventions in the flowsheet. Outcome: Progressing Towards Goal  Note: Fall Risk Interventions:  Mobility Interventions: Utilize walker, cane, or other assistive device, Patient to call before getting OOB         Medication Interventions: Assess postural VS orthostatic hypotension, Teach patient to arise slowly, Patient to call before getting OOB    Elimination Interventions: Elevated toilet seat, Call light in reach, Toileting schedule/hourly rounds              Problem: Pressure Injury - Risk of  Goal: *Prevention of pressure injury  Description  Document Juan Scale and appropriate interventions in the flowsheet.   Outcome: Progressing Towards Goal  Note: Pressure Injury Interventions:  Sensory Interventions: Assess changes in LOC, Assess need for specialty bed, Avoid rigorous massage over bony prominences, Chair cushion, Check visual cues for pain, Discuss PT/OT consult with provider, Float heels, Keep linens dry and wrinkle-free    Moisture Interventions: Absorbent underpads    Activity Interventions: Increase time out of bed, Pressure redistribution bed/mattress(bed type), PT/OT evaluation    Mobility Interventions: HOB 30 degrees or less, Pressure redistribution bed/mattress (bed type), PT/OT evaluation    Nutrition Interventions: Document food/fluid/supplement intake    Friction and Shear Interventions: Apply protective barrier, creams and emollients, Feet elevated on foot rest, Foam dressings/transparent film/skin sealants, HOB 30 degrees or less, Lift sheet, Lift team/patient mobility team, Minimize layers, Sit at 90-degree angle

## 2019-12-17 NOTE — PROGRESS NOTES
Bedside shift change report given to Megan Ingram RN (oncoming nurse) by Radha Skinner RN (offgoing nurse). Report included the following information SBAR, Kardex, Procedure Summary, Intake/Output, MAR, Accordion, Recent Results and Med Rec Status.

## 2019-12-17 NOTE — PROGRESS NOTES
Report received  From Lena KRISHNA. DON, Nick, MAR or Recent Results were discussed.  RN assumed care of the pt.     Peyton Gonzales RN

## 2019-12-17 NOTE — PROGRESS NOTES
CM met with Pt to discuss recommendation for SNF. SNF listing was provided. Pt's choice was [address]. CM sent referral via cclink. The Plan for Transition of Care is related to the following treatment goals: SNF    The Patient and/or patient representative was provided with a choice of provider and agrees   with the discharge plan. [x] Yes [] No    Freedom of choice list was provided with basic dialogue that supports the patient's individualized plan of care/goals, treatment preferences and shares the quality data associated with the providers.  [x] Yes [] No      TESSA Ambrose, [id number]

## 2019-12-17 NOTE — PROGRESS NOTES
Problem: Mobility Impaired (Adult and Pediatric)  Goal: *Acute Goals and Plan of Care (Insert Text)  Description  FUNCTIONAL STATUS PRIOR TO ADMISSION: Patient was modified independent using a rolling walker for functional mobility. HOME SUPPORT PRIOR TO ADMISSION: The patient lived alone in 1 story home. Family may be able to help if needed. But patient has been very independent at home and in community. Physical Therapy Goals  Initiated 12/16/2019  1. Patient will move from supine to sit and sit to supine  in bed with independence within 7 day(s). 2.  Patient will transfer from bed to chair and chair to bed with modified independence using the least restrictive device within 7 day(s). 3.  Patient will perform sit to stand with independence within 7 day(s). 4.  Patient will ambulate with modified independence for 150 feet with the least restrictive device within 7 day(s). 5.  Patient will ascend/descend 4 stairs with 1 handrail(s) with standby within 7 day(s). Outcome: Progressing Towards Goal     PHYSICAL THERAPY TREATMENT  Patient: La Belcher (80 y.o. female)  Date: 12/17/2019  Diagnosis: COPD (chronic obstructive pulmonary disease) ([facility] [J44.9]   <principal problem not specified>       Precautions: Fall  Chart, physical therapy assessment, plan of care and goals were reviewed. ASSESSMENT  Patient continues with skilled PT services and is progressing towards goals. Pt remains with wheezing/SOB during activity with decreased O2 sats. Today on 2L NC (94% at rest-86% with mobility-97% s/p rest) during mobility with recovery after seated rest break and PLB techniques utilized. Pt also with brief standing rest breaks during gait. Pt remains motivated for mobility efforts, and will likely need assistance in the home upon discharge vs. Short rehab. Stay prior to home depending on progress with breathing and activity tolerance.   Pt is in agreement and verbalizes \"I would not be able to care for myself like this at home. \"  Pt also reports recent GLF in the home when trying to carry her humidifier. .. anticipate weakness present prior to illness and now pt further debilitated. Continue to follow. Patient Vitals for the past 12 hrs:   Temp Pulse Resp BP SpO2   12/17/19 1214 97.3 °F (36.3 °C) 82 18 119/74 95 %   12/17/19 1116 -- -- -- -- 97 %   12/17/19 1100 -- 85 -- -- (!) 86 %   12/17/19 1056 -- 77 -- -- 93 %   12/17/19 0759 97.7 °F (36.5 °C) 71 18 151/82 96 %   12/17/19 0741 -- -- -- -- 97 %   12/17/19 0555 97.5 °F (36.4 °C) 75 18 148/70 95 %   12/17/19 0234 -- -- -- -- 96 %         Current Level of Function Impacting Discharge (mobility/balance): SBA with RW use    Other factors to consider for discharge: pt lives alone ; pt has son that assists with transportation needs         PLAN :  Patient continues to benefit from skilled intervention to address the above impairments. Continue treatment per established plan of care. to address goals. Recommendation for discharge: (in order for the patient to meet his/her long term goals)  Therapy up to 5 days/week in SNF setting or intensive home health therapy program (with assistance for safety)    This discharge recommendation:  Has not yet been discussed the attending provider and/or case management    IF patient discharges home will need the following DME: patient owns DME required for discharge       SUBJECTIVE:   Patient stated I can't go home like this.     OBJECTIVE DATA SUMMARY:   Critical Behavior:  Neurologic State: Alert, Appropriate for age  Orientation Level: Oriented X4  Cognition: Appropriate decision making, Appropriate for age attention/concentration, Appropriate safety awareness, Follows commands  Safety/Judgement: Awareness of environment, Insight into deficits, Home safety, Fall prevention  Functional Mobility Training:  Bed Mobility:      Pt sitting up in chair on arrival               Transfers:  Sit to Stand: Supervision  Stand to Sit: Stand-by assistance                       Interventions: Verbal cues(energy conservation ; PLB)     Balance:  Sitting: Intact  Standing: Impaired  Standing - Static: Good;Constant support(RW)  Standing - Dynamic : Fair;Constant support(RW)  Ambulation/Gait Training:  Distance (ft): 50 Feet (ft)  Assistive Device: Walker, rolling;Gait belt  Ambulation - Level of Assistance: Stand-by assistance        Gait Abnormalities: Decreased step clearance        Base of Support: Widened     Speed/Paige: Pace decreased (<100 feet/min); Slow  Step Length: Left shortened;Right shortened        Interventions: Verbal cues    Therapeutic Exercises:   Reviewed BLE exercises (hip flex., LAQ's, ankle DF/PF) to perform in the chair    Pain Ratin/10    Activity Tolerance:   Fair, desaturates with exertion and requires oxygen, requires frequent rest breaks, and observed SOB with activity  Please refer to the flowsheet for vital signs taken during this treatment.     After treatment patient left in no apparent distress:   Sitting in chair, Call bell within reach, and nurse notified      COMMUNICATION/COLLABORATION:   The patients plan of care was discussed with: Registered Nurse    Debby Russell, PT, DPT   Time Calculation: 36 mins

## 2019-12-17 NOTE — PROGRESS NOTES
[facility]  Pulmonary, Critical Care, and Sleep Medicine    Patient Consult    Name: David Hashimoto MRN: [id number]   : 11/10/1931 Hospital: [facility]   Date: 2019        IMPRESSION:   · COPD with acute exacerbation. Followed by Dr. Arthur Mccallum in office. · Chronic Respiratory failure on 2L NC at home. · Leg cramps. · Increased BNP  · Hypertension  · PVD, had bypass of the left leg. RECOMMENDATIONS:   · ON IV diuretics  · ON Abx, Azithromycin. · ON Anti hypertensives  · Improving so will hold off on steroids at this point. · Will obtain and review the office notes. · Adjust oxygen to keep pox over 90%. · Will follow along with you. Subjective:   Pt is feeling better this am. No chest pain, no back pain. Had mild cramps. NO headaches. Has a dry cough. This patient has been seen and evaluated at the request of Dr. Swapnil Mendez for above. Patient is a 80 y.o. female has increased dyspnea for the last week. Has been loosing weight for the last year. About 10lbs. Has been eating and drinking well. Has some intermittent GERD. Has been having mild sinus drainage. Noted to have increased BNP. Has no aspiration. Not sputum production. Has been sleeping ok. Had CTA of chest that was unremarkable. No evidence of acute PE. Denies any other acute complaint. No sick contacts.        Past Medical History:   Diagnosis Date    Arthritis     generalized    COPD     Essential hypertension 2018    former smoker      >60pkyr quit 1/3/11    h/o DVT     left leg    ischemic left lower extremitiy pain     above knee FemPop 1/3/11    PVD (peripheral vascular disease) ([address]     Seasonal allergic rhinitis     Single kidney       Past Surgical History:   Procedure Laterality Date    BREAST SURGERY PROCEDURE UNLISTED      excision left breast cyst    HX GI  10 yrs ago    colonoscopy    HX GYN      3 miscarriges    HX GYN      1 vaginal birth   [facility] HX ORTHOPAEDIC      veinography left leg, stent left leg      Prior to Admission medications    Medication Sig Start Date End Date Taking? Authorizing Provider   albuterol-ipratropium (DUO-NEB) 2.5 mg-0.5 mg/3 ml nebu 3 mL by Nebulization route four (4) times daily. Yes Provider, Historical   alum-mag hydroxide-simeth (MYLANTA) 200-200-20 mg/5 mL susp Take 30 mL by mouth nightly. Yes Provider, Historical   ipratropium-albuterol (COMBIVENT RESPIMAT)  mcg/actuation inhaler Take 1 Puff by inhalation four (4) times daily. Yes Provider, Historical   loratadine (CLARITIN) 10 mg tablet Take 10 mg by mouth daily. Yes Provider, Historical   calcium carbonate (TUMS) 200 mg calcium (500 mg) chew Take 0.5 Tabs by mouth daily as needed (stomach upset). Yes Provider, Historical   lisinopril (PRINIVIL, ZESTRIL) 5 mg tablet TAKE ONE TABLET BY MOUTH EVERY DAY 19  Yes Roseanna Garza MD   azithromycin [facility]) 250 mg tablet Take 250 mg by mouth every Monday, Wednesday, Friday. Yes Provider, Historical   acetaminophen (TYLENOL) 500 mg tablet Take 1,000 mg by mouth daily. Yes Provider, Historical   ANORO ELLIPTA 62.5-25 mcg/actuation inhaler Take 1 Puff by inhalation daily. 17  Yes Provider, Historical   aspirin delayed-release 81 mg tablet Take 162 mg by mouth daily.    Yes Provider, Historical     Allergies   Allergen Reactions    Food Extracts Diarrhea     Onions and garlic causes abdominal pain,cramping     Sulfa (Sulfonamide Antibiotics) Other (comments)     Altered Mental Status      Social History     Tobacco Use    Smoking status: Former Smoker     Packs/day: 1.00     Years: 60.00     Pack years: 60.00     Last attempt to quit: 1/3/2011     Years since quittin.9    Smokeless tobacco: Never Used   Substance Use Topics    Alcohol use: Yes     Comment: occasional liquor after dinner      Family History   Problem Relation Age of Onset    Cancer Mother         colon   [facility] Cancer Sister         lung        Current Facility-Administered Medications   Medication Dose Route Frequency    amLODIPine (NORVASC) tablet 10 mg  10 mg Oral DAILY    sodium chloride (NS) flush 5-40 mL  5-40 mL IntraVENous Q8H    albuterol-ipratropium (DUO-NEB) 2.5 MG-0.5 MG/3 ML  3 mL Nebulization Q6H RT    umeclidinium-vilanterol (ANORO ELLIPTA) 62.5 mcg- 25 mcg/inhalation  1 Puff Inhalation DAILY    aspirin delayed-release tablet 81 mg  81 mg Oral DAILY    azithromycin (ZITHROMAX) tablet 250 mg  250 mg Oral Q MON, WED & FRI    lisinopril (PRINIVIL, ZESTRIL) tablet 5 mg  5 mg Oral DAILY    loratadine (CLARITIN) tablet 10 mg  10 mg Oral DAILY       Review of Systems:  Constitutional: positive for fatigue and malaise  Eyes: negative  Ears, nose, mouth, throat, and face: negative  Respiratory: positive for cough, wheezing, dyspnea on exertion, emphysema or chronic bronchitis  Cardiovascular: negative  Gastrointestinal: positive for reflux symptoms  Genitourinary:negative  Integument/breast: negative  Hematologic/lymphatic: negative  Musculoskeletal:negative  Neurological: negative  Behavioral/Psych: negative  Endocrine: negative  Allergic/Immunologic: negative    Objective:   Vital Signs:    Visit Vitals  /82   Pulse 71   Temp 97.7 °F (36.5 °C)   Resp 18   Ht 5' 6\" (1.676 m)   Wt 65.3 kg (144 lb)   SpO2 96%   BMI 23.24 kg/m²       O2 Device: Nasal cannula   O2 Flow Rate (L/min): 2 l/min   Temp (24hrs), Av.7 °F (36.5 °C), Min:97.3 °F (36.3 °C), Max:98.7 °F (37.1 °C)       Intake/Output:   Last shift:      No intake/output data recorded. Last 3 shifts: 12/15 1901 -  0700  In: 260 [P.O.:260]  Out: 750 [Urine:750]    Intake/Output Summary (Last 24 hours) at 2019 1041  Last data filed at 2019 0241  Gross per 24 hour   Intake 260 ml   Output    Net 260 ml      Physical Exam:   General:  Alert, cooperative, no distress, appears stated age. Sitting up in chair. No distress.  ON supplemental Oxygen. Head:  Normocephalic, without obvious abnormality, atraumatic. Eyes:  Conjunctivae/corneas clear. PERRL, EOMs intact. Nose: Nares normal. Septum midline. Mucosa normal. No drainage or sinus tenderness. Throat: Lips, mucosa, and tongue normal. Teeth and gums normal.   Neck: Supple, symmetrical, trachea midline, no adenopathy, thyroid: no enlargment/tenderness/nodules, no carotid bruit and no JVD. Back:   Symmetric, no curvature. ROM normal.   Lungs:   Clear to auscultation bilaterally. Chest wall:  No tenderness or deformity. Heart:  Regular rate and rhythm, S1, S2 normal, no murmur, click, rub or gallop. Abdomen:   Soft, non-tender. Bowel sounds normal. No masses,  No organomegaly. Extremities: Extremities normal, atraumatic, no cyanosis or edema. Pulses: 2+ and symmetric all extremities. Skin: Skin color, texture, turgor normal. No rashes or lesions   Lymph nodes: Cervical, supraclavicular, and axillary nodes normal.   Neurologic: Grossly nonfocal, motor is intact. Psych: no overt anxiety or depression.       Data review:     Recent Results (from the past 24 hour(s))   ECHO ADULT COMPLETE    Collection Time: 12/16/19  4:50 PM   Result Value Ref Range    LA Volume 36.75 22 - 52 mL    Right Atrial Area 4C 18.14 cm2    Aortic Valve Systolic Peak Velocity 501.66 cm/s    Aortic Valve Area by Continuity of Peak Velocity 1.2 cm2    AoV PG 14.8 mmHg    LVIDd 3.52 (A) 3.9 - 5.3 cm    LVPWd 1.45 (A) 0.6 - 0.9 cm    LVIDs 3.08 cm    IVSd 1.63 (A) 0.6 - 0.9 cm    LVOT d 1.75 cm    LVOT Peak Velocity 93.54 cm/s    LVOT Peak Gradient 3.5 mmHg    MV A Tj 89.68 cm/s    MV E Tj 45.84 cm/s    MV E/A 0.51     LA Vol 4C 22.85 22 - 52 mL    LA Vol 2C 46.95 22 - 52 mL    LA Area 4C 10.9 cm2    LV Mass .5 (A) 67 - 162 g    LV Mass AL Index 138.3 (A) 43 - 95 g/m2    RVSP 51.4 mmHg    Est. RA Pressure 10.0 mmHg    Mitral Regurgitant Peak Velocity 233.98 cm/s    Mitral Valve E Wave Deceleration Time 245.9 ms    Left Atrium Major Axis 3.20 cm    Triscuspid Valve Regurgitation Peak Gradient 41.4 mmHg    TR Max Velocity 321.53 cm/s    LA Vol Index 21.13 16 - 28 ml/m2    PASP 51.4 mmHg    LA Vol Index 26.99 16 - 28 ml/m2    LA Vol Index 13.14 16 - 28 ml/m2    MR Peak Gradient 21.9 mmHg    SOLIS/BSA Pk Tj 0.7 cm2/m2    Left Ventricular Fractional Shortening by 2D 83.435921713 %    Mitral Valve Deceleration Whitley 5.2540837760178     AV Velocity Ratio 0.49     Left Ventricular End Diastolic Volume by Teichholz Method 2.64883557429 mL    Left Ventricular End Systolic Volume by Teichholz Method 3.64619842211 mL    Left Ventricular Stroke Volume by Teichholz Method 5.8839727270 mL       Imaging:  I have personally reviewed the patients radiographs and have reviewed the reports:  12-15-19: CT of chest: No acute process. No effusion, no airspace disease. NO pulmonary infiltrate.          Alissa Shore MD

## 2019-12-18 LAB
ALBUMIN SERPL-MCNC: 3.4 G/DL (ref 3.5–5)
ALBUMIN/GLOB SERPL: 1.3 {RATIO} (ref 1.1–2.2)
ALP SERPL-CCNC: 63 U/L (ref 45–117)
ALT SERPL-CCNC: 16 U/L (ref 12–78)
ANION GAP SERPL CALC-SCNC: 6 MMOL/L (ref 5–15)
AST SERPL-CCNC: 17 U/L (ref 15–37)
BASOPHILS # BLD: 0.1 K/UL (ref 0–0.1)
BASOPHILS NFR BLD: 1 % (ref 0–1)
BILIRUB SERPL-MCNC: 0.6 MG/DL (ref 0.2–1)
BUN SERPL-MCNC: 25 MG/DL (ref 6–20)
BUN/CREAT SERPL: 29 (ref 12–20)
CALCIUM SERPL-MCNC: 8.9 MG/DL (ref 8.5–10.1)
CHLORIDE SERPL-SCNC: 96 MMOL/L (ref 97–108)
CO2 SERPL-SCNC: 27 MMOL/L (ref 21–32)
CREAT SERPL-MCNC: 0.86 MG/DL (ref 0.55–1.02)
DIFFERENTIAL METHOD BLD: ABNORMAL
EOSINOPHIL # BLD: 0.7 K/UL (ref 0–0.4)
EOSINOPHIL NFR BLD: 8 % (ref 0–7)
ERYTHROCYTE [DISTWIDTH] IN BLOOD BY AUTOMATED COUNT: 13.8 % (ref 11.5–14.5)
GLOBULIN SER CALC-MCNC: 2.7 G/DL (ref 2–4)
GLUCOSE SERPL-MCNC: 83 MG/DL (ref 65–100)
HCT VFR BLD AUTO: 38.7 % (ref 35–47)
HGB BLD-MCNC: 12.7 G/DL (ref 11.5–16)
IMM GRANULOCYTES # BLD AUTO: 0 K/UL (ref 0–0.04)
IMM GRANULOCYTES NFR BLD AUTO: 1 % (ref 0–0.5)
LYMPHOCYTES # BLD: 1.1 K/UL (ref 0.8–3.5)
LYMPHOCYTES NFR BLD: 13 % (ref 12–49)
MAGNESIUM SERPL-MCNC: 2.1 MG/DL (ref 1.6–2.4)
MCH RBC QN AUTO: 29.6 PG (ref 26–34)
MCHC RBC AUTO-ENTMCNC: 32.8 G/DL (ref 30–36.5)
MCV RBC AUTO: 90.2 FL (ref 80–99)
MONOCYTES # BLD: 0.6 K/UL (ref 0–1)
MONOCYTES NFR BLD: 8 % (ref 5–13)
NEUTS SEG # BLD: 5.5 K/UL (ref 1.8–8)
NEUTS SEG NFR BLD: 69 % (ref 32–75)
NRBC # BLD: 0 K/UL (ref 0–0.01)
NRBC BLD-RTO: 0 PER 100 WBC
PLATELET # BLD AUTO: 226 K/UL (ref 150–400)
PMV BLD AUTO: 9.7 FL (ref 8.9–12.9)
POTASSIUM SERPL-SCNC: 4.4 MMOL/L (ref 3.5–5.1)
PROT SERPL-MCNC: 6.1 G/DL (ref 6.4–8.2)
RBC # BLD AUTO: 4.29 M/UL (ref 3.8–5.2)
SODIUM SERPL-SCNC: 129 MMOL/L (ref 136–145)
WBC # BLD AUTO: 8 K/UL (ref 3.6–11)

## 2019-12-18 PROCEDURE — 94760 N-INVAS EAR/PLS OXIMETRY 1: CPT

## 2019-12-18 PROCEDURE — 3331090001 HH PPS REVENUE CREDIT

## 2019-12-18 PROCEDURE — 94640 AIRWAY INHALATION TREATMENT: CPT

## 2019-12-18 PROCEDURE — 65270000029 HC RM PRIVATE

## 2019-12-18 PROCEDURE — 36415 COLL VENOUS BLD VENIPUNCTURE: CPT

## 2019-12-18 PROCEDURE — 85025 COMPLETE CBC W/AUTO DIFF WBC: CPT

## 2019-12-18 PROCEDURE — 77010033678 HC OXYGEN DAILY

## 2019-12-18 PROCEDURE — 74011000250 HC RX REV CODE- 250: Performed by: NEUROMUSCULOSKELETAL MEDICINE & OMM

## 2019-12-18 PROCEDURE — 3331090002 HH PPS REVENUE DEBIT

## 2019-12-18 PROCEDURE — 83735 ASSAY OF MAGNESIUM: CPT

## 2019-12-18 PROCEDURE — 74011250637 HC RX REV CODE- 250/637: Performed by: NEUROMUSCULOSKELETAL MEDICINE & OMM

## 2019-12-18 PROCEDURE — 74011250637 HC RX REV CODE- 250/637: Performed by: INTERNAL MEDICINE

## 2019-12-18 PROCEDURE — 80053 COMPREHEN METABOLIC PANEL: CPT

## 2019-12-18 RX ADMIN — METOPROLOL TARTRATE 12.5 MG: 25 TABLET ORAL at 21:51

## 2019-12-18 RX ADMIN — IPRATROPIUM BROMIDE AND ALBUTEROL SULFATE 3 ML: .5; 3 SOLUTION RESPIRATORY (INHALATION) at 07:16

## 2019-12-18 RX ADMIN — GUAIFENESIN 400 MG: 200 SOLUTION ORAL at 21:51

## 2019-12-18 RX ADMIN — IPRATROPIUM BROMIDE AND ALBUTEROL SULFATE 3 ML: .5; 3 SOLUTION RESPIRATORY (INHALATION) at 01:10

## 2019-12-18 RX ADMIN — UMECLIDINIUM BROMIDE AND VILANTEROL TRIFENATATE 1 PUFF: 62.5; 25 POWDER RESPIRATORY (INHALATION) at 09:39

## 2019-12-18 RX ADMIN — GUAIFENESIN 400 MG: 200 SOLUTION ORAL at 09:39

## 2019-12-18 RX ADMIN — ASPIRIN 81 MG: 81 TABLET, COATED ORAL at 09:39

## 2019-12-18 RX ADMIN — Medication 10 ML: at 05:33

## 2019-12-18 RX ADMIN — ALUMINUM HYDROXIDE, MAGNESIUM HYDROXIDE, AND SIMETHICONE 30 ML: 200; 200; 20 SUSPENSION ORAL at 16:02

## 2019-12-18 RX ADMIN — IPRATROPIUM BROMIDE AND ALBUTEROL SULFATE 3 ML: .5; 3 SOLUTION RESPIRATORY (INHALATION) at 13:25

## 2019-12-18 RX ADMIN — ACETAMINOPHEN 650 MG: 325 TABLET ORAL at 09:44

## 2019-12-18 RX ADMIN — LISINOPRIL 5 MG: 5 TABLET ORAL at 09:39

## 2019-12-18 RX ADMIN — LORATADINE 10 MG: 10 TABLET ORAL at 09:40

## 2019-12-18 RX ADMIN — IPRATROPIUM BROMIDE AND ALBUTEROL SULFATE 3 ML: .5; 3 SOLUTION RESPIRATORY (INHALATION) at 22:18

## 2019-12-18 RX ADMIN — Medication 10 ML: at 21:55

## 2019-12-18 RX ADMIN — METOPROLOL TARTRATE 12.5 MG: 25 TABLET ORAL at 09:39

## 2019-12-18 RX ADMIN — FUROSEMIDE 20 MG: 20 TABLET ORAL at 09:39

## 2019-12-18 NOTE — PROGRESS NOTES
Hospitalist Progress Note    NAME: Keshia Adam   :  11/10/1931   MRN:  [id number]       Assessment / Plan:  Acute on Chronic Respiratory Failure: O2 sat 86% on 2L, uses home O2 2LNC, c/w supplemental O2. Copd exacerbation: c/w Z-max. Bronchodilators, mucolytics. COR Pulmonale: EF 60% with severe pulmonary HTN, c/w ACE, and low dose lasix, Pulmonology in the case, monitor I/o and weight. Hyponatremia: due to water ingestion, will restrict fluid and monitor  HTN: c/w ACE, and BB, D/c Norvasc, use labetalol prn, so far controlled  pvd - cont aspirin. Code Status: dnr  Surrogate Decision Maker: lenin joshua 240 [id number]  DVT Prophylaxis: heparin  GI Prophylaxis: not indicated  Baseline: walker  18.5 - 24.9 Normal weight / Body mass index is 21.21 kg/m². Dc/ plan to [facility] tomorrow     Subjective:     Chief Complaint / Reason for Physician Visit  \"I feel very weak\". Discussed with RN events overnight. Review of Systems:  Symptom Y/N Comments  Symptom Y/N Comments   Fever/Chills    Chest Pain     Poor Appetite    Edema     Cough    Abdominal Pain     Sputum    Joint Pain     SOB/GODINEZ y   Pruritis/Rash     Nausea/vomit    Tolerating PT/OT     Diarrhea    Tolerating Diet y    Constipation    Other       Could NOT obtain due to:      Objective:     VITALS:   Last 24hrs VS reviewed since prior progress note.  Most recent are:  Patient Vitals for the past 24 hrs:   Temp Pulse Resp BP SpO2   19 0937 97.8 °F (36.6 °C) 65 18 118/63 92 %   19 0717     95 %   19 0252 98.2 °F (36.8 °C) 75 16 151/71 98 %   19 0110     94 %   19 2302 98 °F (36.7 °C) 74 18 136/76 96 %   19 2033     92 %   19 1936 97.9 °F (36.6 °C) 66 18 119/67 92 %   19 1342     97 %   19 1214 97.3 °F (36.3 °C) 82 18 119/74 95 %   19 1116     97 %   19 1100  85   (!) 86 %   19 1056  77   93 %       Intake/Output Summary (Last 24 hours) at 2019 6980 [facility] filed at 12/18/2019 0931  Gross per 24 hour   Intake 988 ml   Output 700 ml   Net 288 ml        PHYSICAL EXAM:  General: WD, WN. Alert, cooperative, no acute distress    EENT:  EOMI. Anicteric sclerae. MMM  Resp:  Coarse BS, rhonchi  CV:  Regular  rhythm,  No edema  GI:  Soft, Non distended, Non tender.  +Bowel sounds  Neurologic:  Alert and oriented X 3, normal speech,   Psych:   Good insight. Not anxious nor agitated  Skin:  No rashes. No jaundice    Reviewed most current lab test results and cultures  YES  Reviewed most current radiology test results   YES  Review and summation of old records today    NO  Reviewed patient's current orders and MAR    YES  PMH/SH reviewed - no change compared to H&P  ________________________________________________________________________  Care Plan discussed with:    Comments   Patient y    Family      RN y    Care Manager     Consultant                        Multidiciplinary team rounds were held today with , nursing, pharmacist and clinical coordinator. Patient's plan of care was discussed; medications were reviewed and discharge planning was addressed. ________________________________________________________________________  Total NON critical care TIME:  20   Minutes    Total CRITICAL CARE TIME Spent:   Minutes non procedure based      Comments   >50% of visit spent in counseling and coordination of care y    ________________________________________________________________________  Comal Swannanoa, MD     Procedures: see electronic medical records for all procedures/Xrays and details which were not copied into this note but were reviewed prior to creation of Plan. LABS:  I reviewed today's most current labs and imaging studies.   Pertinent labs include:  Recent Labs     12/18/19  0339 12/16/19  0359 12/15/19  1103   WBC 8.0 6.7 6.1   HGB 12.7 12.6 13.8   HCT 38.7 38.5 43.7    213 247     Recent Labs     12/18/19  0339 12/16/19  0359 12/15/19  1103   * 132* 132*   K 4.4 4.2 4.4   CL 96* 101 98   CO2 27 26 28   GLU 83 94 112*   BUN 25* 16 21*   CREA 0.86 0.77 0.99   CA 8.9 8.9 9.2   MG 2.1  --   --    ALB 3.4*  --  3.8   TBILI 0.6  --  0.5   SGOT 17  --  19   ALT 16  --  16       Signed: Bisi Aldana MD

## 2019-12-18 NOTE — PROGRESS NOTES
Bedside and Verbal shift change report given to Rodney Almeida (oncoming nurse) by Simi Ellis (offgoing nurse). Report included the following information SBAR, Kardex, MAR and Recent Results.

## 2019-12-18 NOTE — PROGRESS NOTES
Physical Therapy Note    Patient reports she is tired and would like to do PT later this afternoon. Will re-attempt as able.     Thank you,  Tayler MANZANOT

## 2019-12-18 NOTE — ROUTINE PROCESS
Bedside shift change report given to Ian Quiroz (oncoming nurse) by Michael PERALES RN (offgoing nurse). Report included the following information SBAR, Kardex and MAR.

## 2019-12-18 NOTE — PROGRESS NOTES
ADULT PROTOCOL: JET AEROSOL  REASSESSMENT    Patient  Nino Coleman     80 y.o.   female     12/17/2019  9:21 PM    Breath Sounds Pre Procedure: Right Breath Sounds: Diminished                               Left Breath Sounds: Diminished    Breath Sounds Post Procedure: Right Breath Sounds: Diminished                                 Left Breath Sounds: Diminished    Breathing pattern: Pre procedure Breathing Pattern: Regular          Post procedure Breathing Pattern: Regular    Heart Rate: Pre procedure Pulse: 68           Post procedure Pulse: 71    Resp Rate: Pre procedure Respirations: 20           Post procedure Respirations: 17    Oxygen: O2 Device: Nasal cannula        Changed: NO    SpO2: Pre procedure SpO2: 92 %   with oxygen              Post procedure SpO2: 96 %  with oxygen    Nebulizer Therapy: Current medications Aerosolized Medications: DuoNeb      Changed: NO Q6 RESP    Smoking History: FORMER SMOKER    Problem List:   Patient Active Problem List   Diagnosis Code    COPD (chronic obstructive pulmonary disease) ([facility]) J44.9    PVD (peripheral vascular disease) ([facility]) I73.9    Seasonal allergic rhinitis J30.2    Bronchitis, acute J20.9    COPD exacerbation ([facility]) J44.1    Acute renal failure (ARF) ([facility] N17.9    Acute and chronic respiratory failure with hypoxia ([facility]) J96.21    Sepsis ([facility] A41.9    Sinus bradycardia R00.1    LBBB (left bundle branch block) I44.7    Essential hypertension I10    Acute respiratory failure ([facility]) J96.00       Respiratory Therapist: Shila Mayorga RT

## 2019-12-18 NOTE — PROGRESS NOTES
[facility]  Pulmonary, Critical Care, and Sleep Medicine    Patient Consult    Name: Jasper Mulligan MRN: [id number]   : 11/10/1931 Hospital: [facility]   Date: 2019        IMPRESSION:   · COPD with acute exacerbation. Followed by Dr. Suzanne Haddad in office. · Chronic Respiratory failure on 2L NC at home. · Leg cramps and weakness. Still feels that she is weak. · Increased BNP  · Hypertension  · Has a solitary kidney. · PVD, had bypass of the left leg. · Discussed with Dr. Antoni Ayala. RECOMMENDATIONS:   · ON Abx, Azithromycin. · ON Anti hypertensives  · PT and OT following, She may need rehab? · Will obtain and review the office notes. · Adjust oxygen to keep pox over 90%. · Will follow along with you. Subjective:   Pt is feeling better this am. No chest pain, no back pain. No more leg cramps. NO headaches. Has a dry cough. Feels that her legs are weak. Not ready to go home. This patient has been seen and evaluated at the request of Dr. Lady Rodriguez for above. Patient is a 80 y.o. female has increased dyspnea for the last week. Has been loosing weight for the last year. About 10lbs. Has been eating and drinking well. Has some intermittent GERD. Has been having mild sinus drainage. Noted to have increased BNP. Has no aspiration. Not sputum production. Has been sleeping ok. Had CTA of chest that was unremarkable. No evidence of acute PE. Denies any other acute complaint. No sick contacts.        Past Medical History:   Diagnosis Date    Arthritis     generalized    COPD     Essential hypertension 2018    former smoker      >60pkyr quit 1/3/11    h/o DVT     left leg    ischemic left lower extremitiy pain     above knee FemPop 1/3/11    PVD (peripheral vascular disease) ([address]     Seasonal allergic rhinitis     Single kidney       Past Surgical History:   Procedure Laterality Date   [address] excision left breast cyst    HX GI  10 yrs ago    colonoscopy    HX GYN      3 miscarriges    HX GYN      1 vaginal birth   [de-identified] ORTHOPAEDIC      veinography left leg, stent left leg      Prior to Admission medications    Medication Sig Start Date End Date Taking? Authorizing Provider   albuterol-ipratropium (DUO-NEB) 2.5 mg-0.5 mg/3 ml nebu 3 mL by Nebulization route four (4) times daily. Yes Provider, Historical   alum-mag hydroxide-simeth (MYLANTA) 200-200-20 mg/5 mL susp Take 30 mL by mouth nightly. Yes Provider, Historical   ipratropium-albuterol (COMBIVENT RESPIMAT)  mcg/actuation inhaler Take 1 Puff by inhalation four (4) times daily. Yes Provider, Historical   loratadine (CLARITIN) 10 mg tablet Take 10 mg by mouth daily. Yes Provider, Historical   calcium carbonate (TUMS) 200 mg calcium (500 mg) chew Take 0.5 Tabs by mouth daily as needed (stomach upset). Yes Provider, Historical   lisinopril (PRINIVIL, ZESTRIL) 5 mg tablet TAKE ONE TABLET BY MOUTH EVERY DAY 11/20/19  Yes Jaime Eastman MD   azithromycin [facility]) 250 mg tablet Take 250 mg by mouth every Monday, Wednesday, Friday. Yes Provider, Historical   acetaminophen (TYLENOL) 500 mg tablet Take 1,000 mg by mouth daily. Yes Provider, Historical   ANORO ELLIPTA 62.5-25 mcg/actuation inhaler Take 1 Puff by inhalation daily. 5/8/17  Yes Provider, Historical   aspirin delayed-release 81 mg tablet Take 162 mg by mouth daily. Yes Provider, Historical   calcium carb/magnesium hydrox (MYLANTA PO) Take 30 mL by mouth every four (4) hours as needed for Nausea.     Provider, Historical     Allergies   Allergen Reactions    Food Extracts Diarrhea     Onions and garlic causes abdominal pain,cramping     Sulfa (Sulfonamide Antibiotics) Other (comments)     Altered Mental Status      Social History     Tobacco Use    Smoking status: Former Smoker     Packs/day: 1.00     Years: 60.00     Pack years: 60.00     Last attempt to quit: 1/3/2011     Years since quittin.9    Smokeless tobacco: Never Used   Substance Use Topics    Alcohol use: Yes     Comment: occasional liquor after dinner      Family History   Problem Relation Age of Onset    Cancer Mother         colon    Cancer Sister         lung        Current Facility-Administered Medications   Medication Dose Route Frequency    furosemide (LASIX) tablet 20 mg  20 mg Oral DAILY    metoprolol tartrate (LOPRESSOR) tablet 12.5 mg  12.5 mg Oral Q12H    guaiFENesin (ROBITUSSIN) 100 mg/5 mL oral liquid 400 mg  400 mg Oral TID    [START ON 2019] azithromycin (ZITHROMAX) tablet 250 mg  250 mg Oral Q MON, WED & FRI    sodium chloride (NS) flush 5-40 mL  5-40 mL IntraVENous Q8H    albuterol-ipratropium (DUO-NEB) 2.5 MG-0.5 MG/3 ML  3 mL Nebulization Q6H RT    umeclidinium-vilanterol (ANORO ELLIPTA) 62.5 mcg- 25 mcg/inhalation  1 Puff Inhalation DAILY    aspirin delayed-release tablet 81 mg  81 mg Oral DAILY    lisinopril (PRINIVIL, ZESTRIL) tablet 5 mg  5 mg Oral DAILY    loratadine (CLARITIN) tablet 10 mg  10 mg Oral DAILY       Review of Systems:  Constitutional: positive for fatigue and malaise  Eyes: negative  Ears, nose, mouth, throat, and face: negative  Respiratory: positive for cough, wheezing, dyspnea on exertion, emphysema or chronic bronchitis  Cardiovascular: negative  Gastrointestinal: positive for reflux symptoms  Genitourinary:negative  Integument/breast: negative  Hematologic/lymphatic: negative  Musculoskeletal:negative  Neurological: negative  Behavioral/Psych: negative  Endocrine: negative  Allergic/Immunologic: negative    Objective:   Vital Signs:    Visit Vitals  /63 (BP 1 Location: Right arm, BP Patient Position: At rest)   Pulse 65   Temp 97.8 °F (36.6 °C)   Resp 18   Ht 5' 6\" (1.676 m)   Wt 59.6 kg (131 lb 6.3 oz)   SpO2 92%   BMI 21.21 kg/m²       O2 Device: Nasal cannula   O2 Flow Rate (L/min): 2 l/min   Temp (24hrs), Av.8 °F (36.6 °C), Min:97.3 °F (36.3 °C), Max:98.2 °F (36.8 °C)       Intake/Output:   Last shift:      12/18 0701 - 12/18 1900  In: 358 [P.O.:358]  Out: -   Last 3 shifts: 12/16 1901 - 12/18 0700  In: 890 [P.O.:890]  Out: 700 [Urine:700]    Intake/Output Summary (Last 24 hours) at 12/18/2019 1008  Last data filed at 12/18/2019 0931  Gross per 24 hour   Intake 988 ml   Output 700 ml   Net 288 ml      Physical Exam:   General:  Alert, cooperative, no distress, appears stated age. Sitting up in chair. No distress. ON supplemental Oxygen. Head:  Normocephalic, without obvious abnormality, atraumatic. Eyes:  Conjunctivae/corneas clear. PERRL, EOMs intact. Nose: Nares normal. Septum midline. Mucosa normal. No drainage or sinus tenderness. Throat: Lips, mucosa, and tongue normal. Teeth and gums normal.   Neck: Supple, symmetrical, trachea midline, no adenopathy, thyroid: no enlargment/tenderness/nodules, no carotid bruit and no JVD. Back:   Symmetric, no curvature. ROM normal.   Lungs:   Clear to auscultation bilaterally. Chest wall:  No tenderness or deformity. Heart:  Regular rate and rhythm, S1, S2 normal, no murmur, click, rub or gallop. Abdomen:   Soft, non-tender. Bowel sounds normal. No masses,  No organomegaly. Extremities: Extremities normal, atraumatic, no cyanosis or edema. Pulses: 2+ and symmetric all extremities. Skin: Skin color, texture, turgor normal. No rashes or lesions   Lymph nodes: Cervical, supraclavicular, and axillary nodes normal.   Neurologic: Grossly nonfocal, motor is intact. Psych: no overt anxiety or depression.       Data review:     Recent Results (from the past 24 hour(s))   CBC WITH AUTOMATED DIFF    Collection Time: 12/18/19  3:39 AM   Result Value Ref Range    WBC 8.0 3.6 - 11.0 K/uL    RBC 4.29 3.80 - 5.20 M/uL    HGB 12.7 11.5 - 16.0 g/dL    HCT 38.7 35.0 - 47.0 %    MCV 90.2 80.0 - 99.0 FL    MCH 29.6 26.0 - 34.0 PG    MCHC 32.8 30.0 - 36.5 g/dL    RDW 13.8 11.5 - 14.5 % PLATELET 036 027 - 849 K/uL    MPV 9.7 8.9 - 12.9 FL    NRBC 0.0 0  WBC    ABSOLUTE NRBC 0.00 0.00 - 0.01 K/uL    NEUTROPHILS 69 32 - 75 %    LYMPHOCYTES 13 12 - 49 %    MONOCYTES 8 5 - 13 %    EOSINOPHILS 8 (H) 0 - 7 %    BASOPHILS 1 0 - 1 %    IMMATURE GRANULOCYTES 1 (H) 0.0 - 0.5 %    ABS. NEUTROPHILS 5.5 1.8 - 8.0 K/UL    ABS. LYMPHOCYTES 1.1 0.8 - 3.5 K/UL    ABS. MONOCYTES 0.6 0.0 - 1.0 K/UL    ABS. EOSINOPHILS 0.7 (H) 0.0 - 0.4 K/UL    ABS. BASOPHILS 0.1 0.0 - 0.1 K/UL    ABS. IMM. GRANS. 0.0 0.00 - 0.04 K/UL    DF AUTOMATED     MAGNESIUM    Collection Time: 12/18/19  3:39 AM   Result Value Ref Range    Magnesium 2.1 1.6 - 2.4 mg/dL   METABOLIC PANEL, COMPREHENSIVE    Collection Time: 12/18/19  3:39 AM   Result Value Ref Range    Sodium 129 (L) 136 - 145 mmol/L    Potassium 4.4 3.5 - 5.1 mmol/L    Chloride 96 (L) 97 - 108 mmol/L    CO2 27 21 - 32 mmol/L    Anion gap 6 5 - 15 mmol/L    Glucose 83 65 - 100 mg/dL    BUN 25 (H) 6 - 20 MG/DL    Creatinine 0.86 0.55 - 1.02 MG/DL    BUN/Creatinine ratio 29 (H) 12 - 20      GFR est AA >60 >60 ml/min/1.73m2    GFR est non-AA >60 >60 ml/min/1.73m2    Calcium 8.9 8.5 - 10.1 MG/DL    Bilirubin, total 0.6 0.2 - 1.0 MG/DL    ALT (SGPT) 16 12 - 78 U/L    AST (SGOT) 17 15 - 37 U/L    Alk. phosphatase 63 45 - 117 U/L    Protein, total 6.1 (L) 6.4 - 8.2 g/dL    Albumin 3.4 (L) 3.5 - 5.0 g/dL    Globulin 2.7 2.0 - 4.0 g/dL    A-G Ratio 1.3 1.1 - 2.2         Imaging:  I have personally reviewed the patients radiographs and have reviewed the reports:  12-15-19: CT of chest: No acute process. No effusion, no airspace disease. NO pulmonary infiltrate.          Fabiola Llanes MD

## 2019-12-18 NOTE — CDMP QUERY
Patient admitted with copd exac. Noted documentation of \"acute respiratory failure\" in pn 12/17-12/18. Judie Christina Please document in progress notes clinical indicators (such as the ones below) to support this diagnosis. - Respirations <12 or >25 - Air hunger/gasping - Use of accessory muscles of respiration/increased work of breathing - Sternal or intercostal retractions - Stridor - Inability to speak in full sentences - Cyanosis - Pulse ox <90% RA or <95% on O2  
- pH <7.35 or >7.45  
- pO2 < 60 mm Hg (or 10mm below COPD patient's baseline) - pCO2 >50mm Hg (or 10mm above COPD patient's baseline) The medical record reflects the following: 
   Risk Factors: copd exac, former smoker Clinical Indicators: 18 pulm: Chronic Respiratory failure on 2L NC at home. 86%2L--97%2L, rr 18. 12/18- acute on chr. resp failure poa Treatment: Z-max. Bronchodilators, mucolytics, pulm cx Thanks, Sherron Alfaro RN/CDMP

## 2019-12-19 ENCOUNTER — PATIENT OUTREACH (OUTPATIENT)
Dept: CASE MANAGEMENT | Age: 84
End: 2019-12-19

## 2019-12-19 LAB
ALBUMIN SERPL-MCNC: 3.5 G/DL (ref 3.5–5)
ALBUMIN/GLOB SERPL: 1.2 {RATIO} (ref 1.1–2.2)
ALP SERPL-CCNC: 64 U/L (ref 45–117)
ALT SERPL-CCNC: 16 U/L (ref 12–78)
ANION GAP SERPL CALC-SCNC: 4 MMOL/L (ref 5–15)
AST SERPL-CCNC: 17 U/L (ref 15–37)
BASOPHILS # BLD: 0.1 K/UL (ref 0–0.1)
BASOPHILS NFR BLD: 1 % (ref 0–1)
BILIRUB SERPL-MCNC: 0.7 MG/DL (ref 0.2–1)
BUN SERPL-MCNC: 30 MG/DL (ref 6–20)
BUN/CREAT SERPL: 33 (ref 12–20)
CALCIUM SERPL-MCNC: 9 MG/DL (ref 8.5–10.1)
CHLORIDE SERPL-SCNC: 98 MMOL/L (ref 97–108)
CO2 SERPL-SCNC: 27 MMOL/L (ref 21–32)
CREAT SERPL-MCNC: 0.9 MG/DL (ref 0.55–1.02)
DIFFERENTIAL METHOD BLD: ABNORMAL
EOSINOPHIL # BLD: 0.7 K/UL (ref 0–0.4)
EOSINOPHIL NFR BLD: 9 % (ref 0–7)
ERYTHROCYTE [DISTWIDTH] IN BLOOD BY AUTOMATED COUNT: 13.8 % (ref 11.5–14.5)
GLOBULIN SER CALC-MCNC: 2.9 G/DL (ref 2–4)
GLUCOSE SERPL-MCNC: 91 MG/DL (ref 65–100)
HCT VFR BLD AUTO: 40.3 % (ref 35–47)
HGB BLD-MCNC: 13.2 G/DL (ref 11.5–16)
IMM GRANULOCYTES # BLD AUTO: 0 K/UL (ref 0–0.04)
IMM GRANULOCYTES NFR BLD AUTO: 0 % (ref 0–0.5)
LYMPHOCYTES # BLD: 0.9 K/UL (ref 0.8–3.5)
LYMPHOCYTES NFR BLD: 11 % (ref 12–49)
MAGNESIUM SERPL-MCNC: 2.2 MG/DL (ref 1.6–2.4)
MCH RBC QN AUTO: 29.5 PG (ref 26–34)
MCHC RBC AUTO-ENTMCNC: 32.8 G/DL (ref 30–36.5)
MCV RBC AUTO: 90 FL (ref 80–99)
MONOCYTES # BLD: 0.7 K/UL (ref 0–1)
MONOCYTES NFR BLD: 9 % (ref 5–13)
NEUTS SEG # BLD: 5.6 K/UL (ref 1.8–8)
NEUTS SEG NFR BLD: 70 % (ref 32–75)
NRBC # BLD: 0 K/UL (ref 0–0.01)
NRBC BLD-RTO: 0 PER 100 WBC
PLATELET # BLD AUTO: 226 K/UL (ref 150–400)
PMV BLD AUTO: 9.6 FL (ref 8.9–12.9)
POTASSIUM SERPL-SCNC: 4.6 MMOL/L (ref 3.5–5.1)
PROT SERPL-MCNC: 6.4 G/DL (ref 6.4–8.2)
RBC # BLD AUTO: 4.48 M/UL (ref 3.8–5.2)
SODIUM SERPL-SCNC: 129 MMOL/L (ref 136–145)
WBC # BLD AUTO: 8 K/UL (ref 3.6–11)

## 2019-12-19 PROCEDURE — 36415 COLL VENOUS BLD VENIPUNCTURE: CPT

## 2019-12-19 PROCEDURE — 3331090002 HH PPS REVENUE DEBIT

## 2019-12-19 PROCEDURE — 83735 ASSAY OF MAGNESIUM: CPT

## 2019-12-19 PROCEDURE — 77030018836 HC SOL IRR NACL ICUM -A

## 2019-12-19 PROCEDURE — 74011250636 HC RX REV CODE- 250/636: Performed by: INTERNAL MEDICINE

## 2019-12-19 PROCEDURE — 77010033678 HC OXYGEN DAILY

## 2019-12-19 PROCEDURE — 3331090001 HH PPS REVENUE CREDIT

## 2019-12-19 PROCEDURE — 65270000029 HC RM PRIVATE

## 2019-12-19 PROCEDURE — 97116 GAIT TRAINING THERAPY: CPT

## 2019-12-19 PROCEDURE — 74011250637 HC RX REV CODE- 250/637: Performed by: NEUROMUSCULOSKELETAL MEDICINE & OMM

## 2019-12-19 PROCEDURE — 77030040393 HC DRSG OPTIFOAM GENT MDII -B

## 2019-12-19 PROCEDURE — 74011000250 HC RX REV CODE- 250: Performed by: INTERNAL MEDICINE

## 2019-12-19 PROCEDURE — 85025 COMPLETE CBC W/AUTO DIFF WBC: CPT

## 2019-12-19 PROCEDURE — 94640 AIRWAY INHALATION TREATMENT: CPT

## 2019-12-19 PROCEDURE — 94760 N-INVAS EAR/PLS OXIMETRY 1: CPT

## 2019-12-19 PROCEDURE — 77030038269 HC DRN EXT URIN PURWCK BARD -A

## 2019-12-19 PROCEDURE — 74011250637 HC RX REV CODE- 250/637: Performed by: INTERNAL MEDICINE

## 2019-12-19 PROCEDURE — 97530 THERAPEUTIC ACTIVITIES: CPT

## 2019-12-19 PROCEDURE — 80053 COMPREHEN METABOLIC PANEL: CPT

## 2019-12-19 RX ORDER — AZITHROMYCIN 250 MG/1
250 TABLET, FILM COATED ORAL
Status: DISCONTINUED | OUTPATIENT
Start: 2019-12-20 | End: 2019-12-21 | Stop reason: HOSPADM

## 2019-12-19 RX ORDER — IPRATROPIUM BROMIDE AND ALBUTEROL SULFATE 2.5; .5 MG/3ML; MG/3ML
3 SOLUTION RESPIRATORY (INHALATION)
Status: DISCONTINUED | OUTPATIENT
Start: 2019-12-19 | End: 2019-12-21 | Stop reason: HOSPADM

## 2019-12-19 RX ORDER — BISACODYL 5 MG
10 TABLET, DELAYED RELEASE (ENTERIC COATED) ORAL
Status: COMPLETED | OUTPATIENT
Start: 2019-12-19 | End: 2019-12-19

## 2019-12-19 RX ORDER — IPRATROPIUM BROMIDE AND ALBUTEROL SULFATE 2.5; .5 MG/3ML; MG/3ML
3 SOLUTION RESPIRATORY (INHALATION)
Status: DISCONTINUED | OUTPATIENT
Start: 2019-12-19 | End: 2019-12-19

## 2019-12-19 RX ORDER — POLYETHYLENE GLYCOL 3350 17 G/17G
17 POWDER, FOR SOLUTION ORAL DAILY
Status: DISCONTINUED | OUTPATIENT
Start: 2019-12-19 | End: 2019-12-21 | Stop reason: HOSPADM

## 2019-12-19 RX ORDER — METOPROLOL TARTRATE 25 MG/1
25 TABLET, FILM COATED ORAL EVERY 12 HOURS
Status: DISCONTINUED | OUTPATIENT
Start: 2019-12-19 | End: 2019-12-21 | Stop reason: HOSPADM

## 2019-12-19 RX ADMIN — UMECLIDINIUM BROMIDE AND VILANTEROL TRIFENATATE 1 PUFF: 62.5; 25 POWDER RESPIRATORY (INHALATION) at 10:27

## 2019-12-19 RX ADMIN — Medication 10 ML: at 16:38

## 2019-12-19 RX ADMIN — LORATADINE 10 MG: 10 TABLET ORAL at 10:26

## 2019-12-19 RX ADMIN — ASPIRIN 81 MG: 81 TABLET, COATED ORAL at 10:25

## 2019-12-19 RX ADMIN — GUAIFENESIN 400 MG: 200 SOLUTION ORAL at 10:26

## 2019-12-19 RX ADMIN — METHYLPREDNISOLONE SODIUM SUCCINATE 60 MG: 125 INJECTION, POWDER, FOR SOLUTION INTRAMUSCULAR; INTRAVENOUS at 22:34

## 2019-12-19 RX ADMIN — IPRATROPIUM BROMIDE AND ALBUTEROL SULFATE 3 ML: .5; 3 SOLUTION RESPIRATORY (INHALATION) at 19:56

## 2019-12-19 RX ADMIN — IPRATROPIUM BROMIDE AND ALBUTEROL SULFATE 3 ML: .5; 3 SOLUTION RESPIRATORY (INHALATION) at 13:47

## 2019-12-19 RX ADMIN — METOPROLOL TARTRATE 25 MG: 25 TABLET ORAL at 22:32

## 2019-12-19 RX ADMIN — BISACODYL 10 MG: 5 TABLET, COATED ORAL at 16:38

## 2019-12-19 RX ADMIN — METHYLPREDNISOLONE SODIUM SUCCINATE 60 MG: 125 INJECTION, POWDER, FOR SOLUTION INTRAMUSCULAR; INTRAVENOUS at 16:41

## 2019-12-19 RX ADMIN — IPRATROPIUM BROMIDE AND ALBUTEROL SULFATE 3 ML: .5; 3 SOLUTION RESPIRATORY (INHALATION) at 08:14

## 2019-12-19 RX ADMIN — Medication 10 ML: at 05:14

## 2019-12-19 RX ADMIN — METOPROLOL TARTRATE 25 MG: 25 TABLET ORAL at 10:25

## 2019-12-19 RX ADMIN — Medication 10 ML: at 22:35

## 2019-12-19 RX ADMIN — POLYETHYLENE GLYCOL 3350 17 G: 17 POWDER, FOR SOLUTION ORAL at 16:38

## 2019-12-19 RX ADMIN — METHYLPREDNISOLONE SODIUM SUCCINATE 60 MG: 125 INJECTION, POWDER, FOR SOLUTION INTRAMUSCULAR; INTRAVENOUS at 10:27

## 2019-12-19 RX ADMIN — LISINOPRIL 5 MG: 5 TABLET ORAL at 10:26

## 2019-12-19 RX ADMIN — GUAIFENESIN 400 MG: 200 SOLUTION ORAL at 16:41

## 2019-12-19 RX ADMIN — GUAIFENESIN 400 MG: 200 SOLUTION ORAL at 22:33

## 2019-12-19 RX ADMIN — FUROSEMIDE 20 MG: 20 TABLET ORAL at 10:26

## 2019-12-19 NOTE — PROGRESS NOTES
Transitional Care Team: Initial HUG Note    Date of Assessment: 12/19/19  Time of Assessment:  9:37 AM    Benji Conklin is a 80 y.o. female inpatient at  [facility]. Assessment & Plan   Pt A+O sitting in bedside chair. Nasal cannula O2 in place. Continues with harsh cough at times. Pt disappointed that the physician has ordered steroids to help treat this COPD exacerbation episode. She states the steroids leave her a little less clear in her thinking, but acknowledges that they do help with the COPD symptoms. She does states that she is in the donut hole for coverage on some of her inhaler medications. Her pulmonologist has attempted her on alternative meds that cost less but they have not been effective, Anticipates return home, possible benfit of [facility] services         Primary Diagnosis: COPD    Advance Directive:  She has no AMD other than the DDNR. Current Code Status:  DDNR    Referral to Hospice/ Palliative Care Appropriate: not yet. Awareness of Medical Conditions: (Trajectory of illness and pts expectations). Aware her lungs will continue to get worse with age, but for now still with good quality of life    Discharge Needs: (to include safety issues) home health    Barriers Identified: cost of meds  Patient is willing to go to SNF/Inpatient Rehab if recommended: yes  Medication Review:  Await AVS.    Can patient afford medications:  no.    Patient is Compliant with Medication regimen:yes    Who manages medications at home: self    Best Patient Contact Number: [phone]    HUG (Healthy Understanding of Goals) program introduced to patient/family. The Transitional Care Team bridges the gaps in care and education surrounding discharge from the acute care facility. The objective is to empower the patient and family in taking a proactive role in preventing readmission within the first thirty days after discharge.  The team is also involved in the efforts to reduce readmission to the acute care setting after stabilization and discharge from the acute care environment either to skilled nursing facilities or community. [facility] RN/NP will return with [facility] Calendar/ follow up appointments/ Ambulatory Nurse Navigator name and contact information when the patient is ready for discharge. No future appointments. Non-MG follow up appointment(s): none yet  [facility]: call information given to on discharge calendar      Patient education focused on readmission zones as described as: The Red Zone: High risk for readmission, days 1-21  The Yellow Zone: Moderate  risk for readmission, days 22-29   The Green Zone: Lower risk for readmission, days                30 and after    The [facility] Team will attempt to follow the patient from a distance while inpatient as well as be available for further transition/disposition needs. The [facility] team will continue to offer support during the 30- 90 day discharge from acute care setting. Will notify Ambulatory {Blank Single Select Template:[id number][de-identified] \"EDMOND   RN.     Past Medical History:   Diagnosis Date    Arthritis     generalized    COPD     Essential hypertension 9/4/2018    former smoker      >60pkyr quit 1/3/11    h/o DVT 1955    left leg    ischemic left lower extremitiy pain     above knee FemPop 1/3/11    PVD (peripheral vascular disease) [address] [address]     Seasonal allergic rhinitis     Single kidney

## 2019-12-19 NOTE — PROGRESS NOTES
ADULT PROTOCOL: JET AEROSOL  REASSESSMENT    Patient  Bill Evans     80 y.o.   female     12/19/2019  11:02 AM    Breath Sounds Pre Procedure: Right Breath Sounds: Diminished, Expiratory wheezing                               Left Breath Sounds: Diminished    Breath Sounds Post Procedure: Right Breath Sounds: Diminished, Expiratory wheezing                                 Left Breath Sounds: Diminished    Breathing pattern: Pre procedure Breathing Pattern: Regular          Post procedure Breathing Pattern: Regular    Heart Rate: Pre procedure Pulse: 72           Post procedure Pulse: 68    Resp Rate: Pre procedure Respirations: 16           Post procedure Respirations: 16            Cough: Pre procedure Cough: Non-productive               Post procedure Cough: Non-productive      Oxygen: O2 Device: Nasal cannula   Flow rate (L/min) 2 lpm     Changed: NO    SpO2: Pre procedure SpO2: 94 %   with oxygen              Post procedure SpO2: 94 %  with oxygen    Nebulizer Therapy: Current medications Aerosolized Medications: DuoNeb      Changed: YES/ MD changed to q6        Problem List:   Patient Active Problem List   Diagnosis Code    COPD (chronic obstructive pulmonary disease) ([facility]) J44.9    PVD (peripheral vascular disease) ([facility]) I73.9    Seasonal allergic rhinitis J30.2    Bronchitis, acute J20.9    COPD exacerbation ([facility]) J44.1    Acute renal failure (ARF) ([facility]) N17.9    Acute and chronic respiratory failure with hypoxia ([facility]) J96.21    Sepsis ([facility]) A41.9    Sinus bradycardia R00.1    LBBB (left bundle branch block) I44.7    Essential hypertension I10    Acute respiratory failure ([facility]) J96.00       Respiratory Therapist: RT Daya

## 2019-12-19 NOTE — PROGRESS NOTES
ADULT PROTOCOL: JET AEROSOL  REASSESSMENT    Patient  Alda Melgar     80 y.o.   female     12/19/2019  12:43 AM    Breath Sounds Pre Procedure: Right Breath Sounds: Diminished                               Left Breath Sounds: Diminished    Breath Sounds Post Procedure: Right Breath Sounds: Diminished                                 Left Breath Sounds: Diminished    Breathing pattern: Pre procedure Breathing Pattern: Regular          Post procedure Breathing Pattern: Regular    Heart Rate: Pre procedure Pulse: 78           Post procedure Pulse: 80    Resp Rate: Pre procedure Respirations: 16           Post procedure Respirations: 16    Peak Flow: Pre bronchodilator   n/a          Post bronchodilator   n/a    Incentive Spirometry:   n/a      n/a    Cough: Pre procedure Cough: Non-productive               Post procedure Cough: Non-productive    Sputum: Pre procedure  n/a                 Post procedure  n/a    Oxygen: O2 Device: Nasal cannula   Flow rate (L/min) 2     Changed: NO    SpO2: Pre procedure SpO2: 94 %   with oxygen              Post procedure SpO2: 94 %  with oxygen    Nebulizer Therapy: Current medications Aerosolized Medications: DuoNeb      Changed: YES      Problem List:   Patient Active Problem List   Diagnosis Code    COPD (chronic obstructive pulmonary disease) ([facility]) J44.9    PVD (peripheral vascular disease) ([facility]) I73.9    Seasonal allergic rhinitis J30.2    Bronchitis, acute J20.9    COPD exacerbation ([facility]) J44.1    Acute renal failure (ARF) ([facility]) N17.9    Acute and chronic respiratory failure with hypoxia ([facility]) J96.21    Sepsis ([facility]) A41.9    Sinus bradycardia R00.1    LBBB (left bundle branch block) I44.7    Essential hypertension I10    Acute respiratory failure ([facility]) J96.00       Respiratory Therapist: Bryce Zamora, RT

## 2019-12-19 NOTE — PROGRESS NOTES
[facility]  Pulmonary, Critical Care, and Sleep Medicine    Patient Consult    Name: Lala Ruiz MRN: [id number]   : 11/10/1931 Hospital: [facility]   Date: 2019        IMPRESSION:   · COPD with acute exacerbation. Followed by Dr. Tianna Murillo in office. Today she has more coughing and wheezing. Had some increased cough and congestion last pm. We discussed steroids and will go a head and place on steroids given that she is worse today. · Chronic Respiratory failure on 2L NC at home. · Leg cramps and weakness. Still feels that she is weak. · Increased BNP  · Hypertension  · Has a solitary kidney. · PVD, had bypass of the left leg. · Discussed with Dr. Layo Peter. RECOMMENDATIONS:   · Placed on steroids due to worsening condition  · Has nebs. · ON Abx, Azithromycin. · ON Anti hypertensives  · PT and OT following, She may need rehab? · Will obtain and review the office notes. · Adjust oxygen to keep pox over 90%. · Will follow along with you. Subjective:   Pt is feeling better this am. No chest pain, no back pain. No more leg cramps. NO headaches. Increased coughing and wheezing. She did not sleep well last pm.   Tolerating po intake pretty well. This patient has been seen and evaluated at the request of Dr. Jason Cadnelario for above. Patient is a 80 y.o. female has increased dyspnea for the last week. Has been loosing weight for the last year. About 10lbs. Has been eating and drinking well. Has some intermittent GERD. Has been having mild sinus drainage. Noted to have increased BNP. Has no aspiration. Not sputum production. Has been sleeping ok. Had CTA of chest that was unremarkable. No evidence of acute PE. Denies any other acute complaint. No sick contacts.        Past Medical History:   Diagnosis Date    Arthritis     generalized    COPD     Essential hypertension 2018    former smoker      >60pkyr quit 1/3/11    h/o DVT 1955    left leg    ischemic left lower extremitiy pain     above knee [employer] 1/3/11    PVD (peripheral vascular disease) ([facility] [address]     Seasonal allergic rhinitis     Single kidney       Past Surgical History:   Procedure Laterality Date    BREAST SURGERY PROCEDURE UNLISTED  1955    excision left breast cyst    HX GI  10 yrs ago    colonoscopy    HX GYN      3 miscarriges    HX GYN      1 vaginal birth   [de-identified] ORTHOPAEDIC      veinography left leg, stent left leg      Prior to Admission medications    Medication Sig Start Date End Date Taking? Authorizing Provider   albuterol-ipratropium (DUO-NEB) 2.5 mg-0.5 mg/3 ml nebu 3 mL by Nebulization route four (4) times daily. Yes Provider, Historical   alum-mag hydroxide-simeth (MYLANTA) 200-200-20 mg/5 mL susp Take 30 mL by mouth nightly. Yes Provider, Historical   ipratropium-albuterol (COMBIVENT RESPIMAT)  mcg/actuation inhaler Take 1 Puff by inhalation four (4) times daily. Yes Provider, Historical   loratadine (CLARITIN) 10 mg tablet Take 10 mg by mouth daily. Yes Provider, Historical   calcium carbonate (TUMS) 200 mg calcium (500 mg) chew Take 0.5 Tabs by mouth daily as needed (stomach upset). Yes Provider, Historical   lisinopril (PRINIVIL, ZESTRIL) 5 mg tablet TAKE ONE TABLET BY MOUTH EVERY DAY 11/20/19  Yes Marcelyn Kussmaul, MD   azithromycin [facility]) 250 mg tablet Take 250 mg by mouth every Monday, Wednesday, Friday. Yes Provider, Historical   acetaminophen (TYLENOL) 500 mg tablet Take 1,000 mg by mouth daily. Yes Provider, Historical   ANORO ELLIPTA 62.5-25 mcg/actuation inhaler Take 1 Puff by inhalation daily. 5/8/17  Yes Provider, Historical   aspirin delayed-release 81 mg tablet Take 162 mg by mouth daily. Yes Provider, Historical   calcium carb/magnesium hydrox (MYLANTA PO) Take 30 mL by mouth every four (4) hours as needed for Nausea.     Provider, Historical     Allergies   Allergen Reactions    Food Extracts Diarrhea Onions and garlic causes abdominal pain,cramping     Sulfa (Sulfonamide Antibiotics) Other (comments)     Altered Mental Status      Social History     Tobacco Use    Smoking status: Former Smoker     Packs/day: 1.00     Years: 60.00     Pack years: 60.00     Last attempt to quit: 1/3/2011     Years since quittin.9    Smokeless tobacco: Never Used   Substance Use Topics    Alcohol use: Yes     Comment: occasional liquor after dinner      Family History   Problem Relation Age of Onset    Cancer Mother         colon    Cancer Sister         lung        Current Facility-Administered Medications   Medication Dose Route Frequency    metoprolol tartrate (LOPRESSOR) tablet 25 mg  25 mg Oral Q12H    methylPREDNISolone (PF) (Solu-MEDROL) injection 60 mg  60 mg IntraVENous Q6H    albuterol-ipratropium (DUO-NEB) 2.5 MG-0.5 MG/3 ML  3 mL Nebulization Q6H RT    [START ON 2019] azithromycin (ZITHROMAX) tablet 250 mg  250 mg Oral Q MON, WED & FRI    furosemide (LASIX) tablet 20 mg  20 mg Oral DAILY    guaiFENesin (ROBITUSSIN) 100 mg/5 mL oral liquid 400 mg  400 mg Oral TID    sodium chloride (NS) flush 5-40 mL  5-40 mL IntraVENous Q8H    umeclidinium-vilanterol (ANORO ELLIPTA) 62.5 mcg- 25 mcg/inhalation  1 Puff Inhalation DAILY    aspirin delayed-release tablet 81 mg  81 mg Oral DAILY    lisinopril (PRINIVIL, ZESTRIL) tablet 5 mg  5 mg Oral DAILY    loratadine (CLARITIN) tablet 10 mg  10 mg Oral DAILY       Review of Systems:  Constitutional: positive for fatigue and malaise  Eyes: negative  Ears, nose, mouth, throat, and face: negative  Respiratory: positive for cough, wheezing, dyspnea on exertion, emphysema or chronic bronchitis  Cardiovascular: negative  Gastrointestinal: positive for reflux symptoms  Genitourinary:negative  Integument/breast: negative  Hematologic/lymphatic: negative  Musculoskeletal:negative  Neurological: negative  Behavioral/Psych: negative  Endocrine: negative  Allergic/Immunologic: negative    Objective:   Vital Signs:    Visit Vitals  /81 (BP 1 Location: Left arm, BP Patient Position: Sitting;Post activity)   Pulse 80   Temp 97.7 °F (36.5 °C)   Resp 20   Ht 5' 6\" (1.676 m)   Wt 59 kg (130 lb)   SpO2 94%   BMI 20.98 kg/m²       O2 Device: Nasal cannula   O2 Flow Rate (L/min): 2 l/min   Temp (24hrs), Av.9 °F (36.6 °C), Min:97.6 °F (36.4 °C), Max:98.1 °F (36.7 °C)       Intake/Output:   Last shift:      No intake/output data recorded. Last 3 shifts:  1901 -  0700  In: 658 [P.O.:658]  Out: 1200 [Urine:1200]    Intake/Output Summary (Last 24 hours) at 2019 1213  Last data filed at 2019 0501  Gross per 24 hour   Intake 150 ml   Output 500 ml   Net -350 ml      Physical Exam:   General:  Alert, cooperative, no distress, appears stated age. Sitting up in chair. No distress. ON supplemental Oxygen. Head:  Normocephalic, without obvious abnormality, atraumatic. Eyes:  Conjunctivae/corneas clear. PERRL, EOMs intact. Nose: Nares normal. Septum midline. Mucosa normal. No drainage or sinus tenderness. Throat: Lips, mucosa, and tongue normal. Teeth and gums normal.   Neck: Supple, symmetrical, trachea midline, no adenopathy, thyroid: no enlargment/tenderness/nodules, no carotid bruit and no JVD. Back:   Symmetric, no curvature. ROM normal.   Lungs:   Increased coughing and wheezing this am when seen. Chest wall:  No tenderness or deformity. Heart:  Regular rate and rhythm, S1, S2 normal, no murmur, click, rub or gallop. Abdomen:   Soft, non-tender. Bowel sounds normal. No masses,  No organomegaly. Extremities: Extremities normal, atraumatic, no cyanosis or edema. Pulses: 2+ and symmetric all extremities. Skin: Skin color, texture, turgor normal. No rashes or lesions   Lymph nodes: Cervical, supraclavicular, and axillary nodes normal.   Neurologic: Grossly nonfocal, motor is intact.    Psych: no overt anxiety or depression. Data review:     Recent Results (from the past 24 hour(s))   CBC WITH AUTOMATED DIFF    Collection Time: 12/19/19  5:12 AM   Result Value Ref Range    WBC 8.0 3.6 - 11.0 K/uL    RBC 4.48 3.80 - 5.20 M/uL    HGB 13.2 11.5 - 16.0 g/dL    HCT 40.3 35.0 - 47.0 %    MCV 90.0 80.0 - 99.0 FL    MCH 29.5 26.0 - 34.0 PG    MCHC 32.8 30.0 - 36.5 g/dL    RDW 13.8 11.5 - 14.5 %    PLATELET 555 259 - 912 K/uL    MPV 9.6 8.9 - 12.9 FL    NRBC 0.0 0  WBC    ABSOLUTE NRBC 0.00 0.00 - 0.01 K/uL    NEUTROPHILS 70 32 - 75 %    LYMPHOCYTES 11 (L) 12 - 49 %    MONOCYTES 9 5 - 13 %    EOSINOPHILS 9 (H) 0 - 7 %    BASOPHILS 1 0 - 1 %    IMMATURE GRANULOCYTES 0 0.0 - 0.5 %    ABS. NEUTROPHILS 5.6 1.8 - 8.0 K/UL    ABS. LYMPHOCYTES 0.9 0.8 - 3.5 K/UL    ABS. MONOCYTES 0.7 0.0 - 1.0 K/UL    ABS. EOSINOPHILS 0.7 (H) 0.0 - 0.4 K/UL    ABS. BASOPHILS 0.1 0.0 - 0.1 K/UL    ABS. IMM. GRANS. 0.0 0.00 - 0.04 K/UL    DF AUTOMATED     MAGNESIUM    Collection Time: 12/19/19  5:12 AM   Result Value Ref Range    Magnesium 2.2 1.6 - 2.4 mg/dL   METABOLIC PANEL, COMPREHENSIVE    Collection Time: 12/19/19  5:12 AM   Result Value Ref Range    Sodium 129 (L) 136 - 145 mmol/L    Potassium 4.6 3.5 - 5.1 mmol/L    Chloride 98 97 - 108 mmol/L    CO2 27 21 - 32 mmol/L    Anion gap 4 (L) 5 - 15 mmol/L    Glucose 91 65 - 100 mg/dL    BUN 30 (H) 6 - 20 MG/DL    Creatinine 0.90 0.55 - 1.02 MG/DL    BUN/Creatinine ratio 33 (H) 12 - 20      GFR est AA >60 >60 ml/min/1.73m2    GFR est non-AA 59 (L) >60 ml/min/1.73m2    Calcium 9.0 8.5 - 10.1 MG/DL    Bilirubin, total 0.7 0.2 - 1.0 MG/DL    ALT (SGPT) 16 12 - 78 U/L    AST (SGOT) 17 15 - 37 U/L    Alk. phosphatase 64 45 - 117 U/L    Protein, total 6.4 6.4 - 8.2 g/dL    Albumin 3.5 3.5 - 5.0 g/dL    Globulin 2.9 2.0 - 4.0 g/dL    A-G Ratio 1.2 1.1 - 2.2         Imaging:  I have personally reviewed the patients radiographs and have reviewed the reports:  12-15-19: CT of chest: No acute process.  No effusion, no airspace disease. NO pulmonary infiltrate.          Joey Perkins MD

## 2019-12-19 NOTE — PROGRESS NOTES
Hospitalist Progress Note    NAME: Janette Hughes   :  11/10/1931   MRN:  [id number]       Assessment / Plan:  Acute on Chronic Respiratory Failure: O2 sat 86% on 2L, uses home O2 2LNC, c/w supplemental O2. Copd exacerbation: c/w Z-max. Bronchodilators, mucolytics. 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 Hyponatremia: due to water ingestion, will restrict fluid and monitor  HTN: c/w ACE, and increase BB, D/bon Norvasc, use labetalol prn, so far controlled  pvd - cont aspirin. Code Status: dnr  Surrogate Decision Maker: lenin joshua 240 [id number]  DVT Prophylaxis: heparin  GI Prophylaxis: not indicated  Baseline: walker  18.5 - 24.9 Normal weight / Body mass index is 20.98 kg/m². Not ready for D/c today will see if stable tomorrow     Subjective:     Chief Complaint / Reason for Physician Visit  \"I feel very SOB\". Discussed with RN events overnight. Review of Systems:  Symptom Y/N Comments  Symptom Y/N Comments   Fever/Chills    Chest Pain     Poor Appetite    Edema     Cough    Abdominal Pain     Sputum    Joint Pain     SOB/GODINEZ y   Pruritis/Rash     Nausea/vomit    Tolerating PT/OT     Diarrhea    Tolerating Diet y    Constipation    Other       Could NOT obtain due to:      Objective:     VITALS:   Last 24hrs VS reviewed since prior progress note.  Most recent are:  Patient Vitals for the past 24 hrs:   Temp Pulse Resp BP SpO2   19 0814     94 %   19 0501 97.6 °F (36.4 °C) 64 16 (!) 141/93 92 %   19 2322 98 °F (36.7 °C) 78 18 138/76 94 %   19 2218     94 %   19 2032 98.1 °F (36.7 °C) 78 18 141/75 94 %   19 1325     91 %   19 1149 97.1 °F (36.2 °C) 74 18 147/81 95 %   19 0937 97.8 °F (36.6 °C) 65 18 118/63 92 %       Intake/Output Summary (Last 24 hours) at 2019 2125  Last data filed at 2019 0501  Gross per 24 hour   Intake 508 ml   Output 500 ml   Net 8 ml        PHYSICAL EXAM:  General: WD, WN. Alert, cooperative, no acute distress    EENT:  EOMI. Anicteric sclerae. MMM  Resp:  Coarse BS, rhonchi, wheezing  CV:  Regular  rhythm,  No edema  GI:  Soft, Non distended, Non tender.  +Bowel sounds  Neurologic:  Alert and oriented X 3, normal speech,   Psych:   Good insight. Not anxious nor agitated  Skin:  No rashes. No jaundice    Reviewed most current lab test results and cultures  YES  Reviewed most current radiology test results   YES  Review and summation of old records today    NO  Reviewed patient's current orders and MAR    YES  PMH/ reviewed - no change compared to H&P  ________________________________________________________________________  Care Plan discussed with:    Comments   Patient y    Family      RN y    Care Manager     Consultant                        Multidiciplinary team rounds were held today with , nursing, pharmacist and clinical coordinator. Patient's plan of care was discussed; medications were reviewed and discharge planning was addressed. ________________________________________________________________________  Total NON critical care TIME:  30  Minutes    Total CRITICAL CARE TIME Spent:   Minutes non procedure based      Comments   >50% of visit spent in counseling and coordination of care y    ________________________________________________________________________  Sarah Plascencia MD     Procedures: see electronic medical records for all procedures/Xrays and details which were not copied into this note but were reviewed prior to creation of Plan. LABS:  I reviewed today's most current labs and imaging studies.   Pertinent labs include:  Recent Labs     12/19/19  0512 12/18/19 0339   WBC 8.0 8.0   HGB 13.2 12.7   HCT 40.3 38.7    226     Recent Labs     12/19/19  0512 12/18/19 0339   * 129*   K 4.6 4.4   CL 98 96*   CO2 27 27   GLU 91 83   BUN 30* 25*   CREA 0.90 0.86   CA 9.0 8.9   MG 2.2 2.1   ALB 3.5 3.4*   TBILI 0.7 0.6   SGOT 17 17   ALT 16 16       Signed: Ricardo Villanueva MD

## 2019-12-19 NOTE — PROGRESS NOTES
[facility]    Per attending, Pt continues to experience increased wheezing and coughing steroids given. Will reassess tomorrow for appropriateness for discharge.      AMR placed on Will Call       TESSA Kimball, [address] [place] [address]

## 2019-12-19 NOTE — PROGRESS NOTES
Bedside and Verbal shift change report given to Shelton (oncoming nurse) by Moraima Villatoro (offgoing nurse). Report included the following information SBAR, Kardex, MAR and Recent Results.

## 2019-12-19 NOTE — WOUND CARE
Wound Care Consult for a venous stasis wound on the left medial malleolus: Chart reviewed and patient assessed while she is in the recliner asleep. Assessment: Pt. Woke up and was pleasant. Stated that a nurse comes to her home to do wound care every few days and she has it checked by Dr. Riccardo Harrington every few weeks. The wound was cleansed with NS and wiped well with gauze to remove the old drainage and wound debris. The wound has adherent slough in the wound bed that cannot be debrided yet. WOUND POA CONDITIONS Wound Ankle Inner;Left;Superior (Active) Dressing Status Removed 12/19/2019  1:48 PM  
Dressing Type Foam;Xeroform 12/19/2019  1:48 PM  
Non-staged Wound Description Full thickness 12/19/2019  1:48 PM  
Shape round 12/19/2019  1:48 PM  
Wound Length (cm) 0.5 cm 12/19/2019  1:48 PM  
Wound Width (cm) 0.5 cm 12/19/2019  1:48 PM  
Wound Depth (cm) 0.3 cm 12/19/2019  1:48 PM  
Wound Surface Area (cm^2) 0.25 cm^2 12/19/2019  1:48 PM  
Wound Volume (cm^3) 0.08 cm^3 12/19/2019  1:48 PM  
Condition of Base [facility] 12/19/2019  1:48 PM  
Condition of Edges Rolled/curled 12/19/2019  1:48 PM  
Epithelialization (%) 0 12/19/2019  1:48 PM  
Tissue Type Percent Pink 10 12/19/2019  1:48 PM  
Tissue Type Percent Yellow 90 12/19/2019  1:48 PM  
Drainage Amount Scant 12/19/2019  1:48 PM  
Drainage Color Yellow 12/19/2019  1:48 PM  
Wound Odor None 12/19/2019  1:48 PM  
Marley-wound Assessment Edema 12/19/2019  1:48 PM  
Margins Defined edges 12/19/2019  1:48 PM  
Cleansing and Cleansing Agents  Normal saline 12/19/2019  1:48 PM  
Dressing Changed Changed/New 12/19/2019  1:48 PM  
Dressing Type Applied Wet to dry; Foam 12/19/2019  1:48 PM  
Procedure Tolerated Well 12/19/2019  1:48 PM  
 
Treatment: The wound was dressed with a NS wet kerlix gauze dressing for mechanical debridement and then covered with a large foam dressing. Dressing dated for today. Plan: Wound care orders written for daily. Siri Luevano RN, BSN, [employer]

## 2019-12-20 LAB
ALBUMIN SERPL-MCNC: 3.4 G/DL (ref 3.5–5)
ALBUMIN/GLOB SERPL: 1.1 {RATIO} (ref 1.1–2.2)
ALP SERPL-CCNC: 66 U/L (ref 45–117)
ALT SERPL-CCNC: 18 U/L (ref 12–78)
ANION GAP SERPL CALC-SCNC: 8 MMOL/L (ref 5–15)
AST SERPL-CCNC: 16 U/L (ref 15–37)
BASOPHILS # BLD: 0 K/UL (ref 0–0.1)
BASOPHILS NFR BLD: 0 % (ref 0–1)
BILIRUB SERPL-MCNC: 0.4 MG/DL (ref 0.2–1)
BUN SERPL-MCNC: 40 MG/DL (ref 6–20)
BUN/CREAT SERPL: 38 (ref 12–20)
CALCIUM SERPL-MCNC: 8.8 MG/DL (ref 8.5–10.1)
CHLORIDE SERPL-SCNC: 98 MMOL/L (ref 97–108)
CO2 SERPL-SCNC: 24 MMOL/L (ref 21–32)
CREAT SERPL-MCNC: 1.05 MG/DL (ref 0.55–1.02)
DIFFERENTIAL METHOD BLD: ABNORMAL
EOSINOPHIL # BLD: 0 K/UL (ref 0–0.4)
EOSINOPHIL NFR BLD: 0 % (ref 0–7)
ERYTHROCYTE [DISTWIDTH] IN BLOOD BY AUTOMATED COUNT: 14 % (ref 11.5–14.5)
GLOBULIN SER CALC-MCNC: 3.1 G/DL (ref 2–4)
GLUCOSE SERPL-MCNC: 148 MG/DL (ref 65–100)
HCT VFR BLD AUTO: 38.9 % (ref 35–47)
HGB BLD-MCNC: 12.7 G/DL (ref 11.5–16)
IMM GRANULOCYTES # BLD AUTO: 0 K/UL (ref 0–0.04)
IMM GRANULOCYTES NFR BLD AUTO: 0 % (ref 0–0.5)
LYMPHOCYTES # BLD: 0.2 K/UL (ref 0.8–3.5)
LYMPHOCYTES NFR BLD: 4 % (ref 12–49)
MAGNESIUM SERPL-MCNC: 2.4 MG/DL (ref 1.6–2.4)
MCH RBC QN AUTO: 29.5 PG (ref 26–34)
MCHC RBC AUTO-ENTMCNC: 32.6 G/DL (ref 30–36.5)
MCV RBC AUTO: 90.3 FL (ref 80–99)
MONOCYTES # BLD: 0.1 K/UL (ref 0–1)
MONOCYTES NFR BLD: 1 % (ref 5–13)
NEUTS SEG # BLD: 6.3 K/UL (ref 1.8–8)
NEUTS SEG NFR BLD: 95 % (ref 32–75)
NRBC # BLD: 0 K/UL (ref 0–0.01)
NRBC BLD-RTO: 0 PER 100 WBC
PLATELET # BLD AUTO: 228 K/UL (ref 150–400)
PMV BLD AUTO: 9.8 FL (ref 8.9–12.9)
POTASSIUM SERPL-SCNC: 4.7 MMOL/L (ref 3.5–5.1)
PROT SERPL-MCNC: 6.5 G/DL (ref 6.4–8.2)
RBC # BLD AUTO: 4.31 M/UL (ref 3.8–5.2)
SODIUM SERPL-SCNC: 130 MMOL/L (ref 136–145)
WBC # BLD AUTO: 6.6 K/UL (ref 3.6–11)

## 2019-12-20 PROCEDURE — 80053 COMPREHEN METABOLIC PANEL: CPT

## 2019-12-20 PROCEDURE — 77010033678 HC OXYGEN DAILY

## 2019-12-20 PROCEDURE — 85025 COMPLETE CBC W/AUTO DIFF WBC: CPT

## 2019-12-20 PROCEDURE — 65270000029 HC RM PRIVATE

## 2019-12-20 PROCEDURE — 74011250637 HC RX REV CODE- 250/637: Performed by: INTERNAL MEDICINE

## 2019-12-20 PROCEDURE — 74011250636 HC RX REV CODE- 250/636: Performed by: INTERNAL MEDICINE

## 2019-12-20 PROCEDURE — 74011250637 HC RX REV CODE- 250/637: Performed by: NEUROMUSCULOSKELETAL MEDICINE & OMM

## 2019-12-20 PROCEDURE — 94640 AIRWAY INHALATION TREATMENT: CPT

## 2019-12-20 PROCEDURE — 83735 ASSAY OF MAGNESIUM: CPT

## 2019-12-20 PROCEDURE — 74011000250 HC RX REV CODE- 250: Performed by: INTERNAL MEDICINE

## 2019-12-20 PROCEDURE — 3331090002 HH PPS REVENUE DEBIT

## 2019-12-20 PROCEDURE — 36415 COLL VENOUS BLD VENIPUNCTURE: CPT

## 2019-12-20 PROCEDURE — 3331090001 HH PPS REVENUE CREDIT

## 2019-12-20 PROCEDURE — 94760 N-INVAS EAR/PLS OXIMETRY 1: CPT

## 2019-12-20 RX ORDER — HYDROCODONE POLISTIREX AND CHLORPHENIRAMINE POLISTIREX 10; 8 MG/5ML; MG/5ML
5 SUSPENSION, EXTENDED RELEASE ORAL
Status: DISCONTINUED | OUTPATIENT
Start: 2019-12-20 | End: 2019-12-21 | Stop reason: HOSPADM

## 2019-12-20 RX ORDER — FUROSEMIDE 20 MG/1
20 TABLET ORAL
Status: DISCONTINUED | OUTPATIENT
Start: 2019-12-21 | End: 2019-12-21 | Stop reason: HOSPADM

## 2019-12-20 RX ADMIN — LORATADINE 10 MG: 10 TABLET ORAL at 10:34

## 2019-12-20 RX ADMIN — IPRATROPIUM BROMIDE AND ALBUTEROL SULFATE 3 ML: .5; 3 SOLUTION RESPIRATORY (INHALATION) at 01:51

## 2019-12-20 RX ADMIN — METOPROLOL TARTRATE 25 MG: 25 TABLET ORAL at 20:19

## 2019-12-20 RX ADMIN — Medication 10 ML: at 14:46

## 2019-12-20 RX ADMIN — IPRATROPIUM BROMIDE AND ALBUTEROL SULFATE 3 ML: .5; 3 SOLUTION RESPIRATORY (INHALATION) at 13:51

## 2019-12-20 RX ADMIN — METHYLPREDNISOLONE SODIUM SUCCINATE 60 MG: 125 INJECTION, POWDER, FOR SOLUTION INTRAMUSCULAR; INTRAVENOUS at 10:33

## 2019-12-20 RX ADMIN — Medication 10 ML: at 05:36

## 2019-12-20 RX ADMIN — METHYLPREDNISOLONE SODIUM SUCCINATE 60 MG: 125 INJECTION, POWDER, FOR SOLUTION INTRAMUSCULAR; INTRAVENOUS at 05:36

## 2019-12-20 RX ADMIN — METHYLPREDNISOLONE SODIUM SUCCINATE 60 MG: 125 INJECTION, POWDER, FOR SOLUTION INTRAMUSCULAR; INTRAVENOUS at 20:19

## 2019-12-20 RX ADMIN — AZITHROMYCIN MONOHYDRATE 250 MG: 250 TABLET ORAL at 10:33

## 2019-12-20 RX ADMIN — Medication 10 ML: at 20:20

## 2019-12-20 RX ADMIN — IPRATROPIUM BROMIDE AND ALBUTEROL SULFATE 3 ML: .5; 3 SOLUTION RESPIRATORY (INHALATION) at 07:50

## 2019-12-20 RX ADMIN — UMECLIDINIUM BROMIDE AND VILANTEROL TRIFENATATE 1 PUFF: 62.5; 25 POWDER RESPIRATORY (INHALATION) at 10:35

## 2019-12-20 RX ADMIN — METOPROLOL TARTRATE 25 MG: 25 TABLET ORAL at 10:34

## 2019-12-20 RX ADMIN — POLYETHYLENE GLYCOL 3350 17 G: 17 POWDER, FOR SOLUTION ORAL at 10:32

## 2019-12-20 RX ADMIN — ASPIRIN 81 MG: 81 TABLET, COATED ORAL at 10:33

## 2019-12-20 RX ADMIN — GUAIFENESIN 400 MG: 200 SOLUTION ORAL at 10:33

## 2019-12-20 RX ADMIN — Medication 10 ML: at 21:37

## 2019-12-20 RX ADMIN — GUAIFENESIN 400 MG: 200 SOLUTION ORAL at 17:28

## 2019-12-20 RX ADMIN — LISINOPRIL 5 MG: 5 TABLET ORAL at 10:34

## 2019-12-20 RX ADMIN — IPRATROPIUM BROMIDE AND ALBUTEROL SULFATE 3 ML: .5; 3 SOLUTION RESPIRATORY (INHALATION) at 20:38

## 2019-12-20 RX ADMIN — GUAIFENESIN 400 MG: 200 SOLUTION ORAL at 21:37

## 2019-12-20 RX ADMIN — METHYLPREDNISOLONE SODIUM SUCCINATE 60 MG: 125 INJECTION, POWDER, FOR SOLUTION INTRAMUSCULAR; INTRAVENOUS at 14:46

## 2019-12-20 NOTE — PROGRESS NOTES
Communication to Patient/Family:  Met with patient and family and they are agreeable to the transition plan. The Plan for Transition of Care is related to the following treatment goals: Giovani Patient and/or patient representative was provided with a choice of provider and agrees  with the discharge plan. Yes [x] No []    A Freedom of choice list was provided with basic dialogue that supports the patient's individualized plan of care/goals and shares the quality data associated with the providers. Yes [x] No []    SNF/Rehab Transition:  Patient has been accepted to [facility]  SNF/Rehab and meets criteria for admission. Patient will transported by [facility]  and expected to leave at [facility]     Communication to SNF/Rehab:  Bedside RN, has been notified to update the transition plan to the facility and call report (phone number). Discharge information has been updated on the AVS. And communicated to facility via [employer], or CC link. Discharge instructions to be fax'd to facility [phone]    Nursing Please include all hard scripts for controlled substances, med rec and dc summary, and AVS in packet. Reviewed and confirmed with facility, [address] [place] [address], can manage the patient care needs for the following:     Lisandra Fontaine with (X) only those applicable:  Medication:  [x]Medications are available at the facility  [x]IV Antibiotics    []Controlled Substance  hard copies available sent.   []Weekly Labs    Equipment:  []CPAP/BiPAP  []Wound Vacuum  []Petesron or Urinary Device  []PICC/Central Line  []Nebulizer  []Ventilator    Treatment:  []Isolation (for MRSA, VRE, etc.)  []Surgical Drain Management  []Tracheostomy Care  []Dressing Changes  []Dialysis with transportation  []PEG Care  [x]Oxygen  []Daily Weights for Heart Failure    Dietary:  []Any diet limitations  []Tube Feedings   []Total Parenteral Management (TPN) Financial Resources:  []Medicaid Application Completed    []UAI Completed and copy given to pt/family  and copy given to pt/family  []A screening has previously been completed. []Level II Completed    [x] Private pay individual who will not become   financially eligible for Medicaid within 6 months from admission to a [address] [place] [address] facility. [] Individual refused to have screening conducted. []Medicaid Application Completed    []The screening denied because it was determined individual did not need/did not qualify for nursing facility level of care. [] Out of state residents seeking direct admission to a [address] facility. [] Individuals who are inpatients of an out of state hospital, or in state or out of state veterans/ hospital and seek direct admission to a [address] facility  [] Individuals who are pateints or residents of a state owned/operated facility that is licensed by Department of [employer] ([employer]) and seek direct admission to [address] [place] [address]  [] A screening not required for enrollment in [facility] services as set out in [address] [place] [address] 24-  [] [facility]) staff shall perform screenings of the [facility] clients. Advanced Care Plan:  []Surrogate Decision Maker of Care  []POA  []Communicated Code Status and copy sent.     Other:         TESSA Kimball, [address] [place] [address]

## 2019-12-20 NOTE — PROGRESS NOTES
Bedside shift change report given to TOMI Castellano (oncoming nurse) by Angelita Sandoval (offgoing nurse). Report included the following information SBAR, Kardex, ED Summary, Procedure Summary, Intake/Output, MAR, Accordion, Recent Results and Med Rec Status.

## 2019-12-20 NOTE — PROGRESS NOTES
Initial Nutrition Assessment:    INTERVENTIONS/RECOMMENDATIONS:   · Continue current diet     ASSESSMENT:   Chart reviewed, medically noted for Acute on Chronic Respiratory Failure, Copd exacerbation, hyponatremia and PMH shown below. Assessing pt due to LOS. Pt reports good appetite and eating well. She did not have any nutrition questions during visit. Will continue to monitor PO intake.      Past Medical History:   Diagnosis Date    Arthritis     generalized    COPD     Essential hypertension 9/4/2018    former smoker      >60pkyr quit 1/3/11    h/o DVT 1955    left leg    ischemic left lower extremitiy pain     above knee FemPop 1/3/11    PVD (peripheral vascular disease) ([facility] [address]     Seasonal allergic rhinitis     Single kidney        Diet Order: Regular  % Eaten:    Patient Vitals for the past 72 hrs:   % Diet Eaten   12/18/19 2218 50 %   12/17/19 2033 50 %   12/17/19 1846 100 %   12/17/19 1342 75 %     Pertinent Medications: [x]Reviewed: solumedrol   Pertinent Labs: [x]Reviewed: Na 130,   Food Allergies: []NKFA  [x]Other (onion and garlic extract)   Last BM: 12/16  Edema:    n/a    []RUE   []LUE   []RLE   []LLE      Pressure Injury:  n/a    [] Stage I   [] Stage II   [] Stage III   [] Stage IV      Wt Readings from Last 30 Encounters:   12/20/19 58 kg (127 lb 13.9 oz)   02/22/19 65.7 kg (144 lb 13.5 oz)   02/18/19 68 kg (150 lb)   12/26/18 62.6 kg (138 lb)   09/04/18 62.6 kg (138 lb)   07/31/18 62.3 kg (137 lb 5.6 oz)   07/28/18 63.5 kg (140 lb)   11/28/17 67.6 kg (149 lb)   09/15/17 66.7 kg (147 lb)   07/17/17 68 kg (150 lb)   06/27/17 68 kg (150 lb)   06/04/17 68 kg (150 lb)   10/21/16 72.6 kg (160 lb)   11/02/15 73.9 kg (163 lb)   07/02/15 72.6 kg (160 lb)   02/28/14 74.8 kg (165 lb)   04/09/13 76.7 kg (169 lb)   04/04/13 78 kg (172 lb)   03/27/13 77.6 kg (171 lb)   03/26/13 77.6 kg (171 lb)   03/12/13 77.1 kg (170 lb)   08/01/12 76.2 kg (168 lb)   06/07/12 76.2 kg (168 lb)   04/25/12 77.1 kg (170 lb) 02/13/12 75.8 kg (167 lb)   03/18/11 68.5 kg (151 lb)   02/03/11 70.3 kg (155 lb)   01/03/11 69.4 kg (153 lb)   11/24/10 72.2 kg (159 lb 2 oz)   11/10/10 72 kg (158 lb 11.7 oz)       Anthropometrics:   Height: 5' 6\" (167.6 cm) Weight: 58 kg (127 lb 13.9 oz)   IBW (%IBW):   ( ) UBW (%UBW):   (  %)   Last Weight Metrics:  Weight Loss Metrics 12/20/2019 2/22/2019 2/18/2019 12/26/2018 9/4/2018 7/31/2018 11/28/2017   Today's Wt 127 lb 13.9 oz 144 lb 13.5 oz 150 lb 138 lb 138 lb 137 lb 5.6 oz 149 lb   BMI 20.64 kg/m2 23.38 kg/m2 23.49 kg/m2 21.61 kg/m2 21.61 kg/m2 21.51 kg/m2 24.05 kg/m2       BMI: Body mass index is 20.64 kg/m². This BMI is indicative of:   []Underweight    [x]Normal    []Overweight    [] Obesity   [] Extreme Obesity (BMI>40)     Estimated Nutrition Needs (Based on):   1425 Kcals/day(BMR: 1025 x 1.4) , 70 g(1.2 g/kg) Protein  Carbohydrate: At Least 130 g/day  Fluids: 1425 mL/day (1ml/kcal) or per primary team    NUTRITION DIAGNOSES:   Problem:  No nutritional diagnosis at this time      Etiology: related to       Signs/Symptoms: as evidenced by        NUTRITION INTERVENTIONS:  Meals/Snacks: General/healthful diet                  GOAL:   consume >75% of meals in 5-7 days    LEARNING NEEDS (Diet, Food/Nutrient-Drug Interaction):    [x] None Identified   [] Identified and Education Provided/Documented   [] Identified and Pt declined/was not appropriate     Cultureal, Jehovah's witness, OR Ethnic Dietary Needs:    [x] None Identified   [] Identified and Addressed     [x] Interdisciplinary Care Plan Reviewed/Documented    [x] Discharge Planning: General healthy diet. May need kcal and protein dense foods due to COPD to maintain weight       MONITORING /EVALUATION:      Food/Nutrient Intake Outcomes:  Total energy intake  Physical Signs/Symptoms Outcomes: Weight/weight change, Electrolyte and renal profile, GI    NUTRITION RISK:    [] High              [] Moderate           [x]  Low  [] Minimal/Uncompromised    PT SEEN FOR:    []  MD Consult: []Calorie Count      []Diabetic Diet Education        []Diet Education     []Electrolyte Management     []General Nutrition Management and Supplements     []Management of Tube Feeding     []TPN Recommendations    []  RN Referral:  []MST score >=2     []Enteral/Parenteral Nutrition PTA     []Pregnant: Gestational DM or Multigestation     []Pressure Ulcer/Wound Care needs        []  Low BMI  [x]  LOS Referral       Shaheen Willis RDN  Pager [phone]  Weekend Pager [phone]

## 2019-12-20 NOTE — PROGRESS NOTES
Problem: Falls - Risk of  Goal: *Absence of Falls  Description  Document Subhash Holloway Fall Risk and appropriate interventions in the flowsheet. Outcome: Progressing Towards Goal  Note: Fall Risk Interventions:  Mobility Interventions: Assess mobility with egress test, Communicate number of staff needed for ambulation/transfer, OT consult for ADLs, Patient to call before getting OOB, PT Consult for mobility concerns, Strengthening exercises (ROM-active/passive), Utilize walker, cane, or other assistive device, PT Consult for assist device competence, Utilize gait belt for transfers/ambulation         Medication Interventions: Assess postural VS orthostatic hypotension, Evaluate medications/consider consulting pharmacy, Patient to call before getting OOB, Teach patient to arise slowly, Utilize gait belt for transfers/ambulation    Elimination Interventions: Call light in reach, Elevated toilet seat, Patient to call for help with toileting needs, Stay With Me (per policy), Toilet paper/wipes in reach, Toileting schedule/hourly rounds              Problem: Falls - Risk of  Goal: *Absence of Falls  Description  Document Subhash Holloway Fall Risk and appropriate interventions in the flowsheet.   Outcome: Progressing Towards Goal  Note: Fall Risk Interventions:  Mobility Interventions: Assess mobility with egress test, Communicate number of staff needed for ambulation/transfer, OT consult for ADLs, Patient to call before getting OOB, PT Consult for mobility concerns, Strengthening exercises (ROM-active/passive), Utilize walker, cane, or other assistive device, PT Consult for assist device competence, Utilize gait belt for transfers/ambulation         Medication Interventions: Assess postural VS orthostatic hypotension, Evaluate medications/consider consulting pharmacy, Patient to call before getting OOB, Teach patient to arise slowly, Utilize gait belt for transfers/ambulation    Elimination Interventions: Call light in reach, Elevated toilet seat, Patient to call for help with toileting needs, Stay With Me (per policy), Toilet paper/wipes in reach, Toileting schedule/hourly rounds              Problem: Patient Education: Go to Patient Education Activity  Goal: Patient/Family Education  Outcome: Progressing Towards Goal     Problem: Breathing Pattern - Ineffective  Goal: *Absence of hypoxia  Outcome: Progressing Towards Goal     Problem: Pressure Injury - Risk of  Goal: *Prevention of pressure injury  Description  Document Juan Scale and appropriate interventions in the flowsheet.   Outcome: Progressing Towards Goal  Note: Pressure Injury Interventions:  Sensory Interventions: Assess changes in LOC, Check visual cues for pain    Moisture Interventions: Absorbent underpads, Limit adult briefs, Internal/External urinary devices    Activity Interventions: Assess need for specialty bed, Chair cushion, Increase time out of bed, PT/OT evaluation    Mobility Interventions: Assess need for specialty bed, Chair cushion, Float heels, HOB 30 degrees or less, Pressure redistribution bed/mattress (bed type)    Nutrition Interventions: Document food/fluid/supplement intake, Discuss nutritional consult with provider, Offer support with meals,snacks and hydration    Friction and Shear Interventions: Apply protective barrier, creams and emollients, Feet elevated on foot rest, HOB 30 degrees or less, Lift sheet, Lift team/patient mobility team, Foam dressings/transparent film/skin sealants                Problem: Patient Education: Go to Patient Education Activity  Goal: Patient/Family Education  Outcome: Progressing Towards Goal     Problem: Patient Education: Go to Patient Education Activity  Goal: Patient/Family Education  Outcome: Progressing Towards Goal     Problem: Chronic Obstructive Pulmonary Disease (COPD)  Goal: *Oxygen saturation during activity within specified parameters  Outcome: Progressing Towards Goal

## 2019-12-20 NOTE — PROGRESS NOTES
Bedside and Verbal shift change report given to Lilo KRISHNA (oncoming nurse) by Mindy Hagan (offgoing nurse). Report included the following information SBAR, Kardex, Intake/Output, MAR and Recent Results.

## 2019-12-20 NOTE — PROGRESS NOTES
Hospitalist Progress Note    NAME: Earl Rosas   :  11/10/1931   MRN:  [id number]       Assessment / Plan:  Acute on Chronic Respiratory Failure: O2 sat 86% on 2L, uses home O2 2LNC, c/w supplemental O2. Today on 3LNC  But wheezing has improved  Copd exacerbation: c/w Z-max. Bronchodilators, mucolytics, steroids, lung exam has improved  [facility]: EF 60% with severe pulmonary HTN, c/w ACE, and decrease dose lasix, Pulmonology in the case, monitor I/o and weight. Keeping negative balances  Hyponatremia: due to water ingestion, will restrict fluid and monitor, so far resolved  HTN: c/w ACE, and increase BB, D/bon Norvasc, use labetalol prn, so far controlled  pvd - cont aspirin. Code Status: dnr  Surrogate Decision Maker: lenin Butcher [id number]  DVT Prophylaxis: heparin  GI Prophylaxis: not indicated  Baseline: walker  18.5 - 24.9 Normal weight / Body mass index is 20.64 kg/m². May be ready for D/c tomorrow to [place]     Subjective:     Chief Complaint / Reason for Physician Visit  \"I feel miserable\". Discussed with RN events overnight. Review of Systems:  Symptom Y/N Comments  Symptom Y/N Comments   Fever/Chills    Chest Pain     Poor Appetite    Edema     Cough    Abdominal Pain     Sputum    Joint Pain     SOB/GODINEZ y   Pruritis/Rash     Nausea/vomit    Tolerating PT/OT     Diarrhea    Tolerating Diet y    Constipation    Other       Could NOT obtain due to:      Objective:     VITALS:   Last 24hrs VS reviewed since prior progress note.  Most recent are:  Patient Vitals for the past 24 hrs:   Temp Pulse Resp BP SpO2   19 0750     90 %   19 0337 98 °F (36.7 °C) 76 18 139/61 90 %   19 0151     95 %   19 1956     94 %   19 1951 98.6 °F (37 °C) 77 18 116/49 93 %   19 1405  (!) 58      19 1347     93 %   19 1232 97.6 °F (36.4 °C) (!) 43 18 142/63 95 %       Intake/Output Summary (Last 24 hours) at 2019 8860  Last data filed at 12/20/2019 0614  Gross per 24 hour   Intake 240 ml   Output 200 ml   Net 40 ml        PHYSICAL EXAM:  General: WD, WN. Alert, cooperative, no acute distress    EENT:  EOMI. Anicteric sclerae. MMM  Resp:  Coarse BS, rhonchi, not wheezing  CV:  Regular  rhythm,  No edema  GI:  Soft, Non distended, Non tender.  +Bowel sounds  Neurologic:  Alert and oriented X 3, normal speech,   Psych:   Good insight. Not anxious nor agitated  Skin:  No rashes. No jaundice    Reviewed most current lab test results and cultures  YES  Reviewed most current radiology test results   YES  Review and summation of old records today    NO  Reviewed patient's current orders and MAR    YES  PMH/SH reviewed - no change compared to H&P  ________________________________________________________________________  Care Plan discussed with:    Comments   Patient y    Family      RN y    Care Manager     Consultant                        Multidiciplinary team rounds were held today with , nursing, pharmacist and clinical coordinator. Patient's plan of care was discussed; medications were reviewed and discharge planning was addressed. ________________________________________________________________________  Total NON critical care TIME:  30  Minutes    Total CRITICAL CARE TIME Spent:   Minutes non procedure based      Comments   >50% of visit spent in counseling and coordination of care y    ________________________________________________________________________  Gogo Lafleur MD     Procedures: see electronic medical records for all procedures/Xrays and details which were not copied into this note but were reviewed prior to creation of Plan. LABS:  I reviewed today's most current labs and imaging studies.   Pertinent labs include:  Recent Labs     12/20/19 0339 12/19/19 0512 12/18/19 0339   WBC 6.6 8.0 8.0   HGB 12.7 13.2 12.7   HCT 38.9 40.3 38.7    226 226     Recent Labs     12/20/19 0339 12/19/19 0512 12/18/19  0339   * 129* 129*   K 4.7 4.6 4.4   CL 98 98 96*   CO2 24 27 27   * 91 83   BUN 40* 30* 25*   CREA 1.05* 0.90 0.86   CA 8.8 9.0 8.9   MG 2.4 2.2 2.1   ALB 3.4* 3.5 3.4*   TBILI 0.4 0.7 0.6   SGOT 16 17 17   ALT 18 16 16       Signed: Néstor Amaral MD

## 2019-12-20 NOTE — PROGRESS NOTES
[facility]  Pulmonary, Critical Care, and Sleep Medicine    Patient Consult    Name: Yg Jolly MRN: [id number]   : 11/10/1931 Hospital: [facility]   Date: 2019        IMPRESSION:   · COPD with acute exacerbation. Followed by Dr. Maryam Felton in office. Today she has more coughing and wheezing. Had some increased cough and congestion last pm. We discussed steroids and will go a head and place on steroids given that she is worse this am but now feeling better. · Chronic Respiratory failure on 2L NC at home. · Increased BNP  · Hypertension  · Has a solitary kidney. · PVD, had bypass of the left leg. · Discussed with Dr. Hawa Bruno. RECOMMENDATIONS:   · Steroids due to worsening condition  · Has nebs. · ON Abx, Azithromycin. · ON Anti hypertensives  · PT and OT following, She may need rehab? · Will obtain and review the office notes. · Adjust oxygen to keep pox over 90%. Subjective:   Pt is feeling better this am. No chest pain, no back pain. No more leg cramps. NO headaches. Increased coughing and wheezing. She did not sleep well last pm.   Tolerating po intake pretty well. This patient has been seen and evaluated at the request of Dr. Dyana Merchant for above. Patient is a 80 y.o. female has increased dyspnea for the last week. Has been loosing weight for the last year. About 10lbs. Has been eating and drinking well. Has some intermittent GERD. Has been having mild sinus drainage. Noted to have increased BNP. Has no aspiration. Not sputum production. Has been sleeping ok. Had CTA of chest that was unremarkable. No evidence of acute PE. Denies any other acute complaint. No sick contacts.        Past Medical History:   Diagnosis Date    Arthritis     generalized    COPD     Essential hypertension 2018    former smoker      >60pkyr quit 1/3/11    h/o DVT     left leg    ischemic left lower extremitiy pain     above knee FemPop 1/3/11    PVD (peripheral vascular disease) ([facility])     Seasonal allergic rhinitis     Single kidney       Past Surgical History:   Procedure Laterality Date    BREAST SURGERY PROCEDURE UNLISTED  1955    excision left breast cyst    HX GI  10 yrs ago    colonoscopy    HX GYN      3 miscarriges    HX GYN      1 vaginal birth   [de-identified] ORTHOPAEDIC      veinography left leg, stent left leg      Prior to Admission medications    Medication Sig Start Date End Date Taking? Authorizing Provider   albuterol-ipratropium (DUO-NEB) 2.5 mg-0.5 mg/3 ml nebu 3 mL by Nebulization route four (4) times daily. Yes Provider, Historical   alum-mag hydroxide-simeth (MYLANTA) 200-200-20 mg/5 mL susp Take 30 mL by mouth nightly. Yes Provider, Historical   ipratropium-albuterol (COMBIVENT RESPIMAT)  mcg/actuation inhaler Take 1 Puff by inhalation four (4) times daily. Yes Provider, Historical   loratadine (CLARITIN) 10 mg tablet Take 10 mg by mouth daily. Yes Provider, Historical   calcium carbonate (TUMS) 200 mg calcium (500 mg) chew Take 0.5 Tabs by mouth daily as needed (stomach upset). Yes Provider, Historical   lisinopril (PRINIVIL, ZESTRIL) 5 mg tablet TAKE ONE TABLET BY MOUTH EVERY DAY 11/20/19  Yes Carolyn Monae MD   azithromycin [facility]) 250 mg tablet Take 250 mg by mouth every Monday, Wednesday, Friday. Yes Provider, Historical   acetaminophen (TYLENOL) 500 mg tablet Take 1,000 mg by mouth daily. Yes Provider, Historical   ANORO ELLIPTA 62.5-25 mcg/actuation inhaler Take 1 Puff by inhalation daily. 5/8/17  Yes Provider, Historical   aspirin delayed-release 81 mg tablet Take 162 mg by mouth daily. Yes Provider, Historical   calcium carb/magnesium hydrox (MYLANTA PO) Take 30 mL by mouth every four (4) hours as needed for Nausea.     Provider, Historical     Allergies   Allergen Reactions    Food Extracts Diarrhea     Onions and garlic causes abdominal pain,cramping     Sulfa (Sulfonamide Antibiotics) Other (comments)     Altered Mental Status      Social History     Tobacco Use    Smoking status: Former Smoker     Packs/day: 1.00     Years: 60.00     Pack years: 60.00     Last attempt to quit: 1/3/2011     Years since quittin.9    Smokeless tobacco: Never Used   Substance Use Topics    Alcohol use: Yes     Comment: occasional liquor after dinner      Family History   Problem Relation Age of Onset    Cancer Mother         colon    Cancer Sister         lung        Current Facility-Administered Medications   Medication Dose Route Frequency    [START ON 2019] furosemide (LASIX) tablet 20 mg  20 mg Oral Q48H    metoprolol tartrate (LOPRESSOR) tablet 25 mg  25 mg Oral Q12H    methylPREDNISolone (PF) (Solu-MEDROL) injection 60 mg  60 mg IntraVENous Q6H    albuterol-ipratropium (DUO-NEB) 2.5 MG-0.5 MG/3 ML  3 mL Nebulization Q6H RT    azithromycin (ZITHROMAX) tablet 250 mg  250 mg Oral Q MON, WED & FRI    polyethylene glycol (MIRALAX) packet 17 g  17 g Oral DAILY    guaiFENesin (ROBITUSSIN) 100 mg/5 mL oral liquid 400 mg  400 mg Oral TID    sodium chloride (NS) flush 5-40 mL  5-40 mL IntraVENous Q8H    umeclidinium-vilanterol (ANORO ELLIPTA) 62.5 mcg- 25 mcg/inhalation  1 Puff Inhalation DAILY    aspirin delayed-release tablet 81 mg  81 mg Oral DAILY    lisinopril (PRINIVIL, ZESTRIL) tablet 5 mg  5 mg Oral DAILY    loratadine (CLARITIN) tablet 10 mg  10 mg Oral DAILY       Review of Systems:  Constitutional: positive for fatigue and malaise  Eyes: negative  Ears, nose, mouth, throat, and face: negative  Respiratory: positive for cough, wheezing, dyspnea on exertion, emphysema or chronic bronchitis  Cardiovascular: negative  Gastrointestinal: positive for reflux symptoms  Genitourinary:negative  Integument/breast: negative  Hematologic/lymphatic: negative  Musculoskeletal:negative  Neurological: negative  Behavioral/Psych: negative  Endocrine: negative  Allergic/Immunologic: negative    Objective:   Vital Signs:    Visit Vitals  /54   Pulse 75   Temp 98.5 °F (36.9 °C)   Resp 18   Ht 5' 6\" (1.676 m)   Wt 58 kg (127 lb 13.9 oz)   SpO2 95%   BMI 20.64 kg/m²       O2 Device: Nasal cannula   O2 Flow Rate (L/min): 3 l/min   Temp (24hrs), Av.4 °F (36.9 °C), Min:98 °F (36.7 °C), Max:98.6 °F (37 °C)       Intake/Output:   Last shift:      No intake/output data recorded. Last 3 shifts:  1901 -  0700  In: 390 [P.O.:390]  Out: 700 [Urine:700]    Intake/Output Summary (Last 24 hours) at 2019 1431  Last data filed at 2019 9636  Gross per 24 hour   Intake 240 ml   Output 200 ml   Net 40 ml      Physical Exam:   General:  Alert, cooperative, no distress, appears stated age. Sitting up in chair. No distress. ON supplemental Oxygen. Head:  Normocephalic, without obvious abnormality, atraumatic. Eyes:  Conjunctivae/corneas clear. PERRL, EOMs intact. Nose: Nares normal. Septum midline. Mucosa normal. No drainage or sinus tenderness. Throat: Lips, mucosa, and tongue normal. Teeth and gums normal.   Neck: Supple, symmetrical, trachea midline, no adenopathy, thyroid: no enlargment/tenderness/nodules, no carotid bruit and no JVD. Back:   Symmetric, no curvature. ROM normal.   Lungs:   Increased coughing and wheezing this am when seen. Chest wall:  No tenderness or deformity. Heart:  Regular rate and rhythm, S1, S2 normal, no murmur, click, rub or gallop. Abdomen:   Soft, non-tender. Bowel sounds normal. No masses,  No organomegaly. Extremities: Extremities normal, atraumatic, no cyanosis or edema. Pulses: 2+ and symmetric all extremities. Skin: Skin color, texture, turgor normal. No rashes or lesions   Lymph nodes: Cervical, supraclavicular, and axillary nodes normal.   Neurologic: Grossly nonfocal, motor is intact. Psych: no overt anxiety or depression.       Data review:     Recent Results (from the past 24 hour(s))   CBC WITH AUTOMATED DIFF Collection Time: 12/20/19  3:39 AM   Result Value Ref Range    WBC 6.6 3.6 - 11.0 K/uL    RBC 4.31 3.80 - 5.20 M/uL    HGB 12.7 11.5 - 16.0 g/dL    HCT 38.9 35.0 - 47.0 %    MCV 90.3 80.0 - 99.0 FL    MCH 29.5 26.0 - 34.0 PG    MCHC 32.6 30.0 - 36.5 g/dL    RDW 14.0 11.5 - 14.5 %    PLATELET 623 255 - 712 K/uL    MPV 9.8 8.9 - 12.9 FL    NRBC 0.0 0  WBC    ABSOLUTE NRBC 0.00 0.00 - 0.01 K/uL    NEUTROPHILS 95 (H) 32 - 75 %    LYMPHOCYTES 4 (L) 12 - 49 %    MONOCYTES 1 (L) 5 - 13 %    EOSINOPHILS 0 0 - 7 %    BASOPHILS 0 0 - 1 %    IMMATURE GRANULOCYTES 0 0.0 - 0.5 %    ABS. NEUTROPHILS 6.3 1.8 - 8.0 K/UL    ABS. LYMPHOCYTES 0.2 (L) 0.8 - 3.5 K/UL    ABS. MONOCYTES 0.1 0.0 - 1.0 K/UL    ABS. EOSINOPHILS 0.0 0.0 - 0.4 K/UL    ABS. BASOPHILS 0.0 0.0 - 0.1 K/UL    ABS. IMM. GRANS. 0.0 0.00 - 0.04 K/UL    DF AUTOMATED     MAGNESIUM    Collection Time: 12/20/19  3:39 AM   Result Value Ref Range    Magnesium 2.4 1.6 - 2.4 mg/dL   METABOLIC PANEL, COMPREHENSIVE    Collection Time: 12/20/19  3:39 AM   Result Value Ref Range    Sodium 130 (L) 136 - 145 mmol/L    Potassium 4.7 3.5 - 5.1 mmol/L    Chloride 98 97 - 108 mmol/L    CO2 24 21 - 32 mmol/L    Anion gap 8 5 - 15 mmol/L    Glucose 148 (H) 65 - 100 mg/dL    BUN 40 (H) 6 - 20 MG/DL    Creatinine 1.05 (H) 0.55 - 1.02 MG/DL    BUN/Creatinine ratio 38 (H) 12 - 20      GFR est AA 60 (L) >60 ml/min/1.73m2    GFR est non-AA 49 (L) >60 ml/min/1.73m2    Calcium 8.8 8.5 - 10.1 MG/DL    Bilirubin, total 0.4 0.2 - 1.0 MG/DL    ALT (SGPT) 18 12 - 78 U/L    AST (SGOT) 16 15 - 37 U/L    Alk. phosphatase 66 45 - 117 U/L    Protein, total 6.5 6.4 - 8.2 g/dL    Albumin 3.4 (L) 3.5 - 5.0 g/dL    Globulin 3.1 2.0 - 4.0 g/dL    A-G Ratio 1.1 1.1 - 2.2         Imaging:  I have personally reviewed the patients radiographs and have reviewed the reports:  12-15-19: CT of chest: No acute process. No effusion, no airspace disease. NO pulmonary infiltrate.          Fausto Navarro MD

## 2019-12-20 NOTE — PROGRESS NOTES
Attempted to see pt this pm and pt getting an enema at this time. Will defer tx today and tx when pt is able to participate.

## 2019-12-21 VITALS
DIASTOLIC BLOOD PRESSURE: 73 MMHG | OXYGEN SATURATION: 96 % | WEIGHT: 127.87 LBS | HEIGHT: 66 IN | BODY MASS INDEX: 20.55 KG/M2 | TEMPERATURE: 97 F | SYSTOLIC BLOOD PRESSURE: 159 MMHG | RESPIRATION RATE: 18 BRPM | HEART RATE: 62 BPM

## 2019-12-21 LAB
ALBUMIN SERPL-MCNC: 3.8 G/DL (ref 3.5–5)
ALBUMIN/GLOB SERPL: 1.3 {RATIO} (ref 1.1–2.2)
ALP SERPL-CCNC: 71 U/L (ref 45–117)
ALT SERPL-CCNC: 18 U/L (ref 12–78)
ANION GAP SERPL CALC-SCNC: 8 MMOL/L (ref 5–15)
AST SERPL-CCNC: 19 U/L (ref 15–37)
BASOPHILS # BLD: 0 K/UL (ref 0–0.1)
BASOPHILS NFR BLD: 0 % (ref 0–1)
BILIRUB SERPL-MCNC: 0.5 MG/DL (ref 0.2–1)
BUN SERPL-MCNC: 48 MG/DL (ref 6–20)
BUN/CREAT SERPL: 46 (ref 12–20)
CALCIUM SERPL-MCNC: 9.4 MG/DL (ref 8.5–10.1)
CHLORIDE SERPL-SCNC: 101 MMOL/L (ref 97–108)
CO2 SERPL-SCNC: 23 MMOL/L (ref 21–32)
CREAT SERPL-MCNC: 1.05 MG/DL (ref 0.55–1.02)
DIFFERENTIAL METHOD BLD: ABNORMAL
EOSINOPHIL # BLD: 0 K/UL (ref 0–0.4)
EOSINOPHIL NFR BLD: 0 % (ref 0–7)
ERYTHROCYTE [DISTWIDTH] IN BLOOD BY AUTOMATED COUNT: 14.1 % (ref 11.5–14.5)
GLOBULIN SER CALC-MCNC: 3 G/DL (ref 2–4)
GLUCOSE SERPL-MCNC: 143 MG/DL (ref 65–100)
HCT VFR BLD AUTO: 42 % (ref 35–47)
HGB BLD-MCNC: 13.8 G/DL (ref 11.5–16)
IMM GRANULOCYTES # BLD AUTO: 0 K/UL (ref 0–0.04)
IMM GRANULOCYTES NFR BLD AUTO: 0 % (ref 0–0.5)
LYMPHOCYTES # BLD: 0.3 K/UL (ref 0.8–3.5)
LYMPHOCYTES NFR BLD: 3 % (ref 12–49)
MAGNESIUM SERPL-MCNC: 2.6 MG/DL (ref 1.6–2.4)
MCH RBC QN AUTO: 29.6 PG (ref 26–34)
MCHC RBC AUTO-ENTMCNC: 32.9 G/DL (ref 30–36.5)
MCV RBC AUTO: 90.1 FL (ref 80–99)
MONOCYTES # BLD: 0.5 K/UL (ref 0–1)
MONOCYTES NFR BLD: 4 % (ref 5–13)
NEUTS SEG # BLD: 11.7 K/UL (ref 1.8–8)
NEUTS SEG NFR BLD: 93 % (ref 32–75)
NRBC # BLD: 0 K/UL (ref 0–0.01)
NRBC BLD-RTO: 0 PER 100 WBC
PLATELET # BLD AUTO: 260 K/UL (ref 150–400)
PMV BLD AUTO: 10.4 FL (ref 8.9–12.9)
POTASSIUM SERPL-SCNC: 4.7 MMOL/L (ref 3.5–5.1)
PROT SERPL-MCNC: 6.8 G/DL (ref 6.4–8.2)
RBC # BLD AUTO: 4.66 M/UL (ref 3.8–5.2)
SODIUM SERPL-SCNC: 132 MMOL/L (ref 136–145)
WBC # BLD AUTO: 12.5 K/UL (ref 3.6–11)

## 2019-12-21 PROCEDURE — 94640 AIRWAY INHALATION TREATMENT: CPT

## 2019-12-21 PROCEDURE — 94760 N-INVAS EAR/PLS OXIMETRY 1: CPT

## 2019-12-21 PROCEDURE — 77010033678 HC OXYGEN DAILY

## 2019-12-21 PROCEDURE — 80053 COMPREHEN METABOLIC PANEL: CPT

## 2019-12-21 PROCEDURE — 74011250636 HC RX REV CODE- 250/636: Performed by: INTERNAL MEDICINE

## 2019-12-21 PROCEDURE — 36415 COLL VENOUS BLD VENIPUNCTURE: CPT

## 2019-12-21 PROCEDURE — 74011000250 HC RX REV CODE- 250: Performed by: INTERNAL MEDICINE

## 2019-12-21 PROCEDURE — 85025 COMPLETE CBC W/AUTO DIFF WBC: CPT

## 2019-12-21 PROCEDURE — 74011250637 HC RX REV CODE- 250/637: Performed by: INTERNAL MEDICINE

## 2019-12-21 PROCEDURE — 3331090002 HH PPS REVENUE DEBIT

## 2019-12-21 PROCEDURE — 74011250637 HC RX REV CODE- 250/637: Performed by: NEUROMUSCULOSKELETAL MEDICINE & OMM

## 2019-12-21 PROCEDURE — 3331090001 HH PPS REVENUE CREDIT

## 2019-12-21 PROCEDURE — 83735 ASSAY OF MAGNESIUM: CPT

## 2019-12-21 RX ORDER — METOPROLOL TARTRATE 25 MG/1
25 TABLET, FILM COATED ORAL EVERY 12 HOURS
Qty: 60 TAB | Refills: 1 | Status: SHIPPED | OUTPATIENT
Start: 2019-12-21 | End: 2020-02-25 | Stop reason: ALTCHOICE

## 2019-12-21 RX ORDER — PREDNISONE 20 MG/1
40 TABLET ORAL
Qty: 10 TAB | Refills: 0 | Status: SHIPPED | OUTPATIENT
Start: 2019-12-21 | End: 2019-12-26

## 2019-12-21 RX ORDER — FUROSEMIDE 20 MG/1
20 TABLET ORAL DAILY
Qty: 30 TAB | Refills: 1 | Status: SHIPPED | OUTPATIENT
Start: 2019-12-21 | End: 2020-02-25 | Stop reason: ALTCHOICE

## 2019-12-21 RX ORDER — POLYETHYLENE GLYCOL 3350 17 G/17G
17 POWDER, FOR SOLUTION ORAL DAILY
Qty: 30 EACH | Refills: 1 | Status: SHIPPED | OUTPATIENT
Start: 2019-12-22 | End: 2020-01-23 | Stop reason: DRUGHIGH

## 2019-12-21 RX ORDER — HYDROCODONE POLISTIREX AND CHLORPHENIRAMINE POLISTIREX 10; 8 MG/5ML; MG/5ML
5 SUSPENSION, EXTENDED RELEASE ORAL
Qty: 100 ML | Refills: 0 | Status: SHIPPED | OUTPATIENT
Start: 2019-12-21 | End: 2019-12-24

## 2019-12-21 RX ORDER — ACETAMINOPHEN 325 MG/1
650 TABLET ORAL
Qty: 40 TAB | Refills: 0 | Status: SHIPPED | OUTPATIENT
Start: 2019-12-21

## 2019-12-21 RX ORDER — GUAIFENESIN 100 MG/5ML
400 SOLUTION ORAL 3 TIMES DAILY
Qty: 473 ML | Refills: 0 | Status: SHIPPED | OUTPATIENT
Start: 2019-12-21 | End: 2020-02-25 | Stop reason: ALTCHOICE

## 2019-12-21 RX ADMIN — IPRATROPIUM BROMIDE AND ALBUTEROL SULFATE 3 ML: .5; 3 SOLUTION RESPIRATORY (INHALATION) at 07:41

## 2019-12-21 RX ADMIN — LISINOPRIL 5 MG: 5 TABLET ORAL at 08:51

## 2019-12-21 RX ADMIN — UMECLIDINIUM BROMIDE AND VILANTEROL TRIFENATATE 1 PUFF: 62.5; 25 POWDER RESPIRATORY (INHALATION) at 08:52

## 2019-12-21 RX ADMIN — METOPROLOL TARTRATE 25 MG: 25 TABLET ORAL at 08:51

## 2019-12-21 RX ADMIN — ACETAMINOPHEN 650 MG: 325 TABLET ORAL at 09:00

## 2019-12-21 RX ADMIN — Medication 10 ML: at 06:00

## 2019-12-21 RX ADMIN — GUAIFENESIN 400 MG: 200 SOLUTION ORAL at 08:50

## 2019-12-21 RX ADMIN — ASPIRIN 81 MG: 81 TABLET, COATED ORAL at 08:51

## 2019-12-21 RX ADMIN — ALUMINUM HYDROXIDE, MAGNESIUM HYDROXIDE, AND SIMETHICONE 30 ML: 200; 200; 20 SUSPENSION ORAL at 00:49

## 2019-12-21 RX ADMIN — FUROSEMIDE 20 MG: 20 TABLET ORAL at 08:51

## 2019-12-21 RX ADMIN — LORATADINE 10 MG: 10 TABLET ORAL at 08:51

## 2019-12-21 RX ADMIN — METHYLPREDNISOLONE SODIUM SUCCINATE 60 MG: 125 INJECTION, POWDER, FOR SOLUTION INTRAMUSCULAR; INTRAVENOUS at 08:50

## 2019-12-21 RX ADMIN — POLYETHYLENE GLYCOL 3350 17 G: 17 POWDER, FOR SOLUTION ORAL at 08:50

## 2019-12-21 RX ADMIN — METHYLPREDNISOLONE SODIUM SUCCINATE 60 MG: 125 INJECTION, POWDER, FOR SOLUTION INTRAMUSCULAR; INTRAVENOUS at 05:59

## 2019-12-21 NOTE — DISCHARGE SUMMARY
Hospitalist Discharge Summary     Patient ID:  Mely Tineo  [id number]  80 y.o.  11/10/1931  12/15/2019    PCP on record: Odalis Francis MD    Admit date: 12/15/2019  Discharge date and time: 12/21/2019    DISCHARGE DIAGNOSIS:    Respiratory Failure, COPD Exacerbation, COR Pulmonale, Hyponatremia, HTN, PVD    CONSULTATIONS:  IP CONSULT TO PULMONOLOGY    Excerpted HPI from H&P of Llewellyn Pallas, DO:  Daryle Gerhard is a 80 y.o.  female who presents with recurrent dysnea this am.  Pt developed sob around 3:00am which resolved in the shrot term with an inhaler. The symptoms returned a few hrs later and she was tena to the er via ems. She lives alone and has neighbors that check on her. She denies any chest pain or dizziness. She remains mildly sob at rest.  She sees [facility] as an op and is in the process of switching pcp to someone local.    We were asked to admit for work up and evaluation of the above problems. ______________________________________________________________________  DISCHARGE SUMMARY/HOSPITAL COURSE:  for full details see H&P, daily progress notes, labs, consult notes. Acute on Chronic Respiratory Failure: O2 sat 86% on 2L, uses home O2 2LNC, c/w supplemental O2. Today on 3LNC  But wheezing has improved  Copd exacerbation: c/w Z-max. Bronchodilators, mucolytics, steroids, lung exam has improved  COR Pulmonale: EF 60% with severe pulmonary HTN, c/w ACE, and decrease dose lasix, Pulmonology in the case, monitor I/o and weight. Keeping negative balances  Hyponatremia: due to water ingestion, will restrict fluid and monitor, so far resolved  HTN: c/w ACE, and increase BB, D/bon Norvasc, use labetalol prn, so far controlled  pvd - cont aspirin.   Code Status: dnr  Surrogate Decision Maker: lenin joshua 240 [id number]  DVT Prophylaxis: heparin  GI Prophylaxis: not indicated  Baseline: walker  18.5 - 24.9 Normal weight / Body mass index is 20.64 kg/m².     D/c to [facility]  F/U PCP and Pulmonology  _______________________________________________________________________  Patient seen and examined by me on discharge day. Pertinent Findings:  Gen:    Not in distress  Chest: Coarse BS, rhonchi  CVS:   Regular rhythm. No edema  Abd:  Soft, not distended, not tender  Neuro:  Alert, GCS 15  _______________________________________________________________________  DISCHARGE MEDICATIONS:   Current Discharge Medication List      START taking these medications    Details   chlorpheniramine-HYDROcodone (TUSSIONEX) 10-8 mg/5 mL suspension Take 5 mL by mouth every twelve (12) hours as needed for Cough for up to 3 days. Max Daily Amount: 10 mL. Qty: 100 mL, Refills: 0    Associated Diagnoses: Chronic obstructive pulmonary disease with acute exacerbation (HCC)      furosemide (LASIX) 20 mg tablet Take 1 Tab by mouth daily. Qty: 30 Tab, Refills: 1      guaiFENesin (ROBITUSSIN) 100 mg/5 mL liquid Take 20 mL by mouth three (3) times daily. Qty: 473 mL, Refills: 0      predniSONE (DELTASONE) 20 mg tablet Take 40 mg by mouth daily (with breakfast) for 5 days. Qty: 10 Tab, Refills: 0      metoprolol tartrate (LOPRESSOR) 25 mg tablet Take 1 Tab by mouth every twelve (12) hours. Qty: 60 Tab, Refills: 1      polyethylene glycol (MIRALAX) 17 gram packet Take 1 Packet by mouth daily. Qty: 30 Each, Refills: 1         CONTINUE these medications which have CHANGED    Details   acetaminophen (TYLENOL) 325 mg tablet Take 2 Tabs by mouth every four (4) hours as needed for Pain or Fever. Qty: 40 Tab, Refills: 0         CONTINUE these medications which have NOT CHANGED    Details   albuterol-ipratropium (DUO-NEB) 2.5 mg-0.5 mg/3 ml nebu 3 mL by Nebulization route four (4) times daily. !! alum-mag hydroxide-simeth (MYLANTA) 200-200-20 mg/5 mL susp Take 30 mL by mouth nightly.       ipratropium-albuterol (COMBIVENT RESPIMAT)  mcg/actuation inhaler Take 1 Puff by inhalation four (4) times daily. loratadine (CLARITIN) 10 mg tablet Take 10 mg by mouth daily. calcium carbonate (TUMS) 200 mg calcium (500 mg) chew Take 0.5 Tabs by mouth daily as needed (stomach upset). lisinopril (PRINIVIL, ZESTRIL) 5 mg tablet TAKE ONE TABLET BY MOUTH EVERY DAY  Qty: 30 Tab, Refills: 3    Comments: $      azithromycin (ZITHROMAX) 250 mg tablet Take 250 mg by mouth every Monday, Wednesday, Friday. ANORO ELLIPTA 62.5-25 mcg/actuation inhaler Take 1 Puff by inhalation daily. aspirin delayed-release 81 mg tablet Take 162 mg by mouth daily. !! calcium carb/magnesium hydrox (MYLANTA PO) Take 30 mL by mouth every four (4) hours as needed for Nausea. !! - Potential duplicate medications found. Please discuss with provider. Patient Follow Up Instructions: Activity: Activity as tolerated  Diet: Cardiac Diet, Fluid Restriction  Wound Care: None needed    Follow-up with PCP, Pulmonology in 2 weeks.   Follow-up tests/labs none  Follow-up Information     Follow up With Specialties Details Why Branddeecherri OTONIEL [address]  [address] R Yanni Johnson 53    Mark Shin MD Internal Medicine In 2 weeks  [address] Jairo Rouse MD Pulmonary Disease In 2 weeks  One [address] [address]  [phone]          ________________________________________________________________    Risk of deterioration: Moderate    Condition at Discharge:  Stable  __________________________________________________________________    Disposition  SNF/LTC    ____________________________________________________________________    Code Status: DNR/DNI  ___________________________________________________________________      Total time in minutes spent coordinating this discharge (includes going over instructions, follow-up, prescriptions, and preparing report for sign off to her PCP) :  35 minutes    Signed:  Melany Whitney MD

## 2019-12-21 NOTE — ROUTINE PROCESS
Bedside and Verbal shift change report given to [facility] Nan (oncoming nurse) by Anshu Hyde (offgoing nurse). Report included the following information SBAR, Kardex, Intake/Output, MAR and Recent Results.

## 2019-12-21 NOTE — PROGRESS NOTES
Problem: Falls - Risk of  Goal: *Absence of Falls  Description  Document Bessie Patino Fall Risk and appropriate interventions in the flowsheet. Outcome: Progressing Towards Goal  Note: Fall Risk Interventions:  Mobility Interventions: Assess mobility with egress test, Communicate number of staff needed for ambulation/transfer, Patient to call before getting OOB, OT consult for ADLs, PT Consult for mobility concerns, PT Consult for assist device competence, Strengthening exercises (ROM-active/passive), Utilize walker, cane, or other assistive device, Utilize gait belt for transfers/ambulation         Medication Interventions: Assess postural VS orthostatic hypotension, Evaluate medications/consider consulting pharmacy, Patient to call before getting OOB, Teach patient to arise slowly, Utilize gait belt for transfers/ambulation    Elimination Interventions: Call light in reach, Elevated toilet seat, Patient to call for help with toileting needs, Stay With Me (per policy), Toilet paper/wipes in reach, Toileting schedule/hourly rounds              Problem: Falls - Risk of  Goal: *Absence of Falls  Description  Document Bessie Patino Fall Risk and appropriate interventions in the flowsheet.   Outcome: Progressing Towards Goal  Note: Fall Risk Interventions:  Mobility Interventions: Assess mobility with egress test, Communicate number of staff needed for ambulation/transfer, Patient to call before getting OOB, OT consult for ADLs, PT Consult for mobility concerns, PT Consult for assist device competence, Strengthening exercises (ROM-active/passive), Utilize walker, cane, or other assistive device, Utilize gait belt for transfers/ambulation         Medication Interventions: Assess postural VS orthostatic hypotension, Evaluate medications/consider consulting pharmacy, Patient to call before getting OOB, Teach patient to arise slowly, Utilize gait belt for transfers/ambulation    Elimination Interventions: Call light in reach, Elevated toilet seat, Patient to call for help with toileting needs, Stay With Me (per policy), Toilet paper/wipes in reach, Toileting schedule/hourly rounds              Problem: Patient Education: Go to Patient Education Activity  Goal: Patient/Family Education  Outcome: Progressing Towards Goal     Problem: Breathing Pattern - Ineffective  Goal: *Absence of hypoxia  Outcome: Progressing Towards Goal     Problem: Pressure Injury - Risk of  Goal: *Prevention of pressure injury  Description  Document Juan Scale and appropriate interventions in the flowsheet.   Outcome: Progressing Towards Goal  Note: Pressure Injury Interventions:  Sensory Interventions: Assess changes in LOC, Assess need for specialty bed, Chair cushion, Avoid rigorous massage over bony prominences    Moisture Interventions: Absorbent underpads    Activity Interventions: Chair cushion, Pressure redistribution bed/mattress(bed type), PT/OT evaluation, Increase time out of bed    Mobility Interventions: Assess need for specialty bed, Chair cushion, Float heels, HOB 30 degrees or less, Pressure redistribution bed/mattress (bed type), PT/OT evaluation    Nutrition Interventions: Offer support with meals,snacks and hydration, Discuss nutritional consult with provider, Document food/fluid/supplement intake    Friction and Shear Interventions: Apply protective barrier, creams and emollients, Feet elevated on foot rest, Foam dressings/transparent film/skin sealants, HOB 30 degrees or less, Lift sheet                Problem: Patient Education: Go to Patient Education Activity  Goal: Patient/Family Education  Outcome: Progressing Towards Goal     Problem: Patient Education: Go to Patient Education Activity  Goal: Patient/Family Education  Outcome: Progressing Towards Goal     Problem: Chronic Obstructive Pulmonary Disease (COPD)  Goal: *Oxygen saturation during activity within specified parameters  Outcome: Progressing Towards Goal     Problem: Patient Education: Go to Patient Education Activity  Goal: Patient/Family Education  Outcome: Progressing Towards Goal     Problem: Sepsis: Day of Diagnosis  Goal: Off Pathway (Use only if patient is Off Pathway)  Outcome: Progressing Towards Goal  Goal: *Fluid resuscitation  Outcome: Progressing Towards Goal  Goal: *Paired blood cultures prior to first dose of antibiotic  Outcome: Progressing Towards Goal  Goal: *First dose of  appropriate antibiotic within 3 hours of arrival to ED, within 1 hour of arrival to ICU  Outcome: Progressing Towards Goal  Goal: *Lactic acid with first set of blood cultures  Outcome: Progressing Towards Goal  Goal: *Pneumococcal immunization (if eligible)  Outcome: Progressing Towards Goal  Goal: *Influenza immunization (if eligible)  Outcome: Progressing Towards Goal  Goal: Activity/Safety  Outcome: Progressing Towards Goal  Goal: Consults, if ordered  Outcome: Progressing Towards Goal  Goal: Diagnostic Test/Procedures  Outcome: Progressing Towards Goal  Goal: Nutrition/Diet  Outcome: Progressing Towards Goal  Goal: Discharge Planning  Outcome: Progressing Towards Goal  Goal: Medications  Outcome: Progressing Towards Goal  Goal: Respiratory  Outcome: Progressing Towards Goal  Goal: Treatments/Interventions/Procedures  Outcome: Progressing Towards Goal  Goal: Psychosocial  Outcome: Progressing Towards Goal

## 2019-12-21 NOTE — PROGRESS NOTES
Patient observed putting on his pants as I entered his room. Very agitated and anxious, Patient stated\" I am going to smoke\". Informed patient that this was a non-smoking facility, nicotine patch offered and  explained the importance of  Patient safety. Charge Nurse and Nursing Supervisor notified.

## 2019-12-21 NOTE — PROGRESS NOTES
Pt's personal belongings sent home with son. PIV taken out with cath tip intact. AVS, H&P, discharge summary, facesheet, [facility] sheet, hos note, MAR report, copy of DNR, and most recent vital signs placed in discharge folder for [facility] to take to [facility] upon their arrival for pt transport for discharge.

## 2019-12-21 NOTE — DISCHARGE INSTRUCTIONS
HOSPITALIST DISCHARGE INSTRUCTIONS    NAME: Earl Rosas   :  11/10/1931   MRN:  [id number]     Date/Time:  2019 9:56 AM    ADMIT DATE: 12/15/2019   DISCHARGE DATE: 2019     Attending Physician: Saran Arriaga MD    DISCHARGE DIAGNOSIS:  Respiratory Failure, COPD Exacerbation, COR Pulmonale, Hyponatremia, HTN, PVD      Medications: Per above medication reconciliation. Pain Management: per above medications    Recommended diet: Cardiac Diet, Fluid restriction 1.5L per day    Recommended activity: Activity as tolerated    Wound care: none    Indwelling devices:  none    Supplemental Oxygen: Chronic Oxygen,  2-3  LNC at baseline    Required Lab work: Per SNF routine    Glucose management:  None    Code status: DNR        Outside physician follow up: Follow-up Information     Follow up With Specialties Details [facility]   [address] [place] [address] Route [facility]   P [facility] 53    Briana Griffin MD Internal Medicine In 2 weeks  [address] Jatinnixon Ospina MD Pulmonary Disease In 2 weeks  One [facility]  Pulmonary [address]  [phone]                 Skilled nursing facility/ SNF MD responsible for above on discharge. Information obtained by :  I understand that if any problems occur once I am at home I am to contact my physician. I understand and acknowledge receipt of the instructions indicated above.                                                                                                                                            Physician's or R.N.'s Signature                                                                  Date/Time                                                                                                                                              Patient or Repres

## 2019-12-21 NOTE — PROGRESS NOTES
Verbal shift change report given to Brii  (oncoming nurse) by Deysi Fernandes (offgoing nurse). Report included the following information SBAR, Kardex and MAR.

## 2019-12-21 NOTE — PROGRESS NOTES
ADULT PROTOCOL: JET AEROSOL ASSESSMENT    Patient  Abe Lang     80 y.o.   female     12/20/2019  8:56 PM    Breath Sounds Pre Procedure: Right Breath Sounds: Diminished, Clear                               Left Breath Sounds: Diminished, Clear    Breath Sounds Post Procedure: Right Breath Sounds: Diminished, Clear                                 Left Breath Sounds: Diminished, Clear    Heart Rate: Pre procedure Pulse: 70           Post procedure Pulse: 68    Resp Rate: Pre procedure Respirations: 20           Post procedure Respirations: 20        Cough: Pre procedure Cough: Non-productive               Post procedure Cough: Non-productive    Oxygen: O2 Device: Nasal cannula   Flow rate (L/min) 2     Changed: NO    SpO2: Pre procedure SpO2: 93 %   with oxygen              Post procedure SpO2: 95 %  with oxygen    Nebulizer Therapy: Current medications Aerosolized Medications: DuoNeb      Changed: NO    Smoking History:  Former Smoker    Problem List:   Patient Active Problem List   Diagnosis Code    COPD (chronic obstructive pulmonary disease) ([facility] J44.9    PVD (peripheral vascular disease) ([facility] I73.9    Seasonal allergic rhinitis J30.2    Bronchitis, acute J20.9    COPD exacerbation ([facility] J44.1    Acute renal failure (ARF) ([facility]) N17.9    Acute and chronic respiratory failure with hypoxia ([facility]) J96.21    Sepsis ([facility] A41.9    Sinus bradycardia R00.1    LBBB (left bundle branch block) I44.7    Essential hypertension I10    Acute respiratory failure ([facility] J96.00       Respiratory Therapist: Margy Oppenheim, RT

## 2019-12-21 NOTE — PROGRESS NOTES
CM alerted that the patient is being discharged today. CM contacted [facility] and arranged for transportation for 10:30 AM. The phone number for report is [phone]. CM will fax the patient's d/c summary once it is available. CM will continue to assist with dispo.      Janel Nunez 169, R Gianluca Celaya 75

## 2019-12-21 NOTE — PROGRESS NOTES
Hospital to [facility] [address]                                                                        80 y.o.   female    [address] [facility]   Room: [facility] 3 ORTHOPEDICS  Unit Phone# :  [phone]      [address]   [place] [address]  Dept: [phone]  Loc: [phone]                    SITUATION     Admitted:  12/15/2019         Attending Provider:  Tobias Rand MD       Consultations:  IP CONSULT TO PULMONOLOGY    PCP:  Trinidad Robison MD   [phone]    Treatment Team: Attending Provider: Tobias Rand MD; Utilization Review: Lauren Young; Consulting Provider: Petra Yee MD; Care Manager: Florentin Richey    Admitting Dx:  COPD (chronic obstructive pulmonary disease) ([facility] [J44.9]       Principal Problem: <principal problem not specified>    * No surgery found * of      BY: * Surgery not found *             ON: * No surgery found *                  Code Status: DNR                Advance Directives:   Advance Care Planning 12/15/2019   Patient's Healthcare Decision Maker is: -   Primary Decision Maker Name -   Primary Decision Maker Phone Number -   Primary Decision Maker Relationship to Patient -   Confirm Advance Directive Yes, on file   Does the patient have other document types -    (Send w/patient)   Not Received       Isolation:  There are currently no Active Isolations       MDRO: No current active infections    Pain Medications given:  tylenol    Last dose: 12/21/2019 at  0900    Special Equipment needed: no  Type of equipment:  (Not currently on dialysis)  (Not currently on dialysis)  (Not currently on dialysis)     BACKGROUND     Allergies:   Allergies   Allergen Reactions    Food Extracts Diarrhea     Onions and garlic causes abdominal pain,cramping     Sulfa (Sulfonamide Antibiotics) Other (comments)     Altered Mental Status       Past Medical History:   Diagnosis Date  Arthritis     generalized    COPD     Essential hypertension 9/4/2018    former smoker      >60pkyr quit 1/3/11    h/o DVT 1955    left leg    ischemic left lower extremitiy pain     above knee FemPop 1/3/11    PVD (peripheral vascular disease) ([facility]     Seasonal allergic rhinitis     Single kidney        Past Surgical History:   Procedure Laterality Date    BREAST SURGERY PROCEDURE UNLISTED  1955    excision left breast cyst    HX GI  10 yrs ago    colonoscopy    HX GYN      3 miscarriges    HX GYN      1 vaginal birth   [facility] HX ORTHOPAEDIC      veinography left leg, stent left leg       Medications Prior to Admission   Medication Sig    albuterol-ipratropium (DUO-NEB) 2.5 mg-0.5 mg/3 ml nebu 3 mL by Nebulization route four (4) times daily.  alum-mag hydroxide-simeth (MYLANTA) 200-200-20 mg/5 mL susp Take 30 mL by mouth nightly.  ipratropium-albuterol (COMBIVENT RESPIMAT)  mcg/actuation inhaler Take 1 Puff by inhalation four (4) times daily.  loratadine (CLARITIN) 10 mg tablet Take 10 mg by mouth daily.  calcium carbonate (TUMS) 200 mg calcium (500 mg) chew Take 0.5 Tabs by mouth daily as needed (stomach upset).  lisinopril (PRINIVIL, ZESTRIL) 5 mg tablet TAKE ONE TABLET BY MOUTH EVERY DAY    azithromycin (ZITHROMAX) 250 mg tablet Take 250 mg by mouth every Monday, Wednesday, Friday.  acetaminophen (TYLENOL) 500 mg tablet Take 1,000 mg by mouth daily.  ANORO ELLIPTA 62.5-25 mcg/actuation inhaler Take 1 Puff by inhalation daily.  aspirin delayed-release 81 mg tablet Take 162 mg by mouth daily.        Hard scripts included in transfer packet yes    Vaccinations:    Immunization History   Administered Date(s) Administered    (RETIRED) Pneumococcal Vaccine (Unspecified Type) 01/03/2011    Influenza Vaccine 11/13/2012    Influenza Vaccine Whole 10/01/2010       Readmission Risks:    Known Risks: none        The Charlson CoMorbitiy Index tool is an evidenced based tool that has more automatic generated information. The tool looks at many different items such as the age of the patient, how many times they were admitted in the last calendar year, current length of stay in the hospital and their diagnosis. All of these items are pulled automatically from information documented in the chart from various places and will generate a score that predicts whether a patient is at low (less than 13), medium (13-20) or high (21 or greater) risk of being readmitted.         ASSESSMENT                Temp: 97.3 °F (36.3 °C) (12/21/19 0744) Pulse (Heart Rate): 80 (12/21/19 0744)     Resp Rate: 18 (12/21/19 0744)           BP: (!) 157/95 (12/21/19 0744)     O2 Sat (%): 99 % (12/21/19 0744)     Weight: 58 kg (127 lb 13.9 oz)    Height: 5' 6\" (167.6 cm) (12/16/19 1618)       If above not within 1 hour of discharge:    BP:_____  P:____  R:____ T:_____ O2 Sat: ___%  O2: ______    Active Orders   Diet    DIET REGULAR FR 1500ML         Orientation: oriented to time, place, person and situation     Active Behaviors: None                                   Active Lines/Drains:  (Peg Tube / Peterson / CL or S/L?): no    Urinary Status: Voiding     Last BM: Last Bowel Movement Date: 12/15/19     Skin Integrity: Wound (add Wound LDA)   Wound Ankle Left;Medial-Dressing Status : Clean, dry, and intact         Mobility: Slightly limited   Weight Bearing Status: WBAT (Weight Bearing as Tolerated)      Gait Training  Assistive Device: Gait belt, Walker, rolling  Ambulation - Level of Assistance: Contact guard assistance  Distance (ft): 40 Feet (ft)  Interventions: Verbal cues         Lab Results   Component Value Date/Time    Glucose 143 (H) 12/21/2019 04:40 AM    INR 1.1 03/15/2011 10:40 AM    INR 1.1 03/09/2011 06:20 PM    HGB 13.8 12/21/2019 04:40 AM    HGB 12.7 12/20/2019 03:39 AM        RECOMMENDATION     See After Visit Summary (AVS) for:  · Discharge instructions  · After [address]   · Special equipment needed (entered pre-discharge by Care Management)  · Medication Reconciliation    · Follow up Appointment(s)         Report given/sent by:  Campbell Kelley RN                    Verbal report given to:  Emiliano Alexis RN           Estimated discharge time:  12/21/2019 at 1030

## 2019-12-22 PROCEDURE — 3331090002 HH PPS REVENUE DEBIT

## 2019-12-22 PROCEDURE — 3331090001 HH PPS REVENUE CREDIT

## 2019-12-23 ENCOUNTER — PATIENT OUTREACH (OUTPATIENT)
Dept: CARDIOLOGY CLINIC | Age: 84
End: 2019-12-23

## 2019-12-23 PROCEDURE — 3331090001 HH PPS REVENUE CREDIT

## 2019-12-23 PROCEDURE — 3331090002 HH PPS REVENUE DEBIT

## 2019-12-23 NOTE — PROGRESS NOTES
Patient discharged to a SNF [facility], [facility] and [address] [place] [address].  (Medicare [facility]  D/c to [facility] 12/21/19 f/u 21d

## 2019-12-24 PROCEDURE — 3331090002 HH PPS REVENUE DEBIT

## 2019-12-24 PROCEDURE — 3331090001 HH PPS REVENUE CREDIT

## 2019-12-25 PROCEDURE — 3331090001 HH PPS REVENUE CREDIT

## 2019-12-25 PROCEDURE — 3331090002 HH PPS REVENUE DEBIT

## 2019-12-26 PROCEDURE — 3331090001 HH PPS REVENUE CREDIT

## 2019-12-26 PROCEDURE — 3331090002 HH PPS REVENUE DEBIT

## 2019-12-27 PROCEDURE — 3331090002 HH PPS REVENUE DEBIT

## 2019-12-27 PROCEDURE — 3331090001 HH PPS REVENUE CREDIT

## 2019-12-28 PROCEDURE — 3331090001 HH PPS REVENUE CREDIT

## 2019-12-28 PROCEDURE — 3331090002 HH PPS REVENUE DEBIT

## 2019-12-29 PROCEDURE — 3331090002 HH PPS REVENUE DEBIT

## 2019-12-29 PROCEDURE — 3331090001 HH PPS REVENUE CREDIT

## 2019-12-30 PROCEDURE — 3331090001 HH PPS REVENUE CREDIT

## 2019-12-30 PROCEDURE — 3331090002 HH PPS REVENUE DEBIT

## 2019-12-31 PROCEDURE — 3331090002 HH PPS REVENUE DEBIT

## 2019-12-31 PROCEDURE — 3331090001 HH PPS REVENUE CREDIT

## 2020-01-01 PROCEDURE — 3331090002 HH PPS REVENUE DEBIT

## 2020-01-01 PROCEDURE — 3331090001 HH PPS REVENUE CREDIT

## 2020-01-02 PROCEDURE — 3331090001 HH PPS REVENUE CREDIT

## 2020-01-02 PROCEDURE — 3331090002 HH PPS REVENUE DEBIT

## 2020-01-03 PROCEDURE — 3331090001 HH PPS REVENUE CREDIT

## 2020-01-03 PROCEDURE — 3331090002 HH PPS REVENUE DEBIT

## 2020-01-04 PROCEDURE — 3331090001 HH PPS REVENUE CREDIT

## 2020-01-04 PROCEDURE — 3331090002 HH PPS REVENUE DEBIT

## 2020-01-05 PROCEDURE — 3331090001 HH PPS REVENUE CREDIT

## 2020-01-05 PROCEDURE — 3331090002 HH PPS REVENUE DEBIT

## 2020-01-06 PROCEDURE — 3331090001 HH PPS REVENUE CREDIT

## 2020-01-06 PROCEDURE — 3331090002 HH PPS REVENUE DEBIT

## 2020-01-07 PROCEDURE — 3331090002 HH PPS REVENUE DEBIT

## 2020-01-07 PROCEDURE — 3331090001 HH PPS REVENUE CREDIT

## 2020-01-08 PROCEDURE — 3331090002 HH PPS REVENUE DEBIT

## 2020-01-08 PROCEDURE — 3331090001 HH PPS REVENUE CREDIT

## 2020-01-09 PROCEDURE — 3331090001 HH PPS REVENUE CREDIT

## 2020-01-09 PROCEDURE — 3331090002 HH PPS REVENUE DEBIT

## 2020-01-10 PROCEDURE — 3331090001 HH PPS REVENUE CREDIT

## 2020-01-10 PROCEDURE — 3331090002 HH PPS REVENUE DEBIT

## 2020-01-11 PROCEDURE — 3331090001 HH PPS REVENUE CREDIT

## 2020-01-11 PROCEDURE — 3331090002 HH PPS REVENUE DEBIT

## 2020-01-12 PROCEDURE — 3331090002 HH PPS REVENUE DEBIT

## 2020-01-12 PROCEDURE — 3331090001 HH PPS REVENUE CREDIT

## 2020-01-13 PROCEDURE — 3331090002 HH PPS REVENUE DEBIT

## 2020-01-13 PROCEDURE — 3331090001 HH PPS REVENUE CREDIT

## 2020-01-14 PROCEDURE — 3331090002 HH PPS REVENUE DEBIT

## 2020-01-14 PROCEDURE — 3331090001 HH PPS REVENUE CREDIT

## 2020-01-15 PROCEDURE — 3331090001 HH PPS REVENUE CREDIT

## 2020-01-15 PROCEDURE — 3331090002 HH PPS REVENUE DEBIT

## 2020-01-16 ENCOUNTER — PATIENT OUTREACH (OUTPATIENT)
Dept: CARDIOLOGY CLINIC | Age: 85
End: 2020-01-16

## 2020-01-16 PROCEDURE — 3331090002 HH PPS REVENUE DEBIT

## 2020-01-16 PROCEDURE — 3331090001 HH PPS REVENUE CREDIT

## 2020-01-16 NOTE — PROGRESS NOTES
Transition of care outreach postponed for 14 days due to patient's discharge to SNF.   D/c to [facility] 21/19 still admitted, f/u 5d if still admitted resolve episode

## 2020-01-17 PROCEDURE — 3331090002 HH PPS REVENUE DEBIT

## 2020-01-17 PROCEDURE — 3331090001 HH PPS REVENUE CREDIT

## 2020-01-18 PROCEDURE — 3331090002 HH PPS REVENUE DEBIT

## 2020-01-18 PROCEDURE — 3331090001 HH PPS REVENUE CREDIT

## 2020-01-19 PROCEDURE — 3331090001 HH PPS REVENUE CREDIT

## 2020-01-19 PROCEDURE — 3331090002 HH PPS REVENUE DEBIT

## 2020-01-20 PROCEDURE — 3331090001 HH PPS REVENUE CREDIT

## 2020-01-20 PROCEDURE — 3331090002 HH PPS REVENUE DEBIT

## 2020-01-21 ENCOUNTER — PATIENT OUTREACH (OUTPATIENT)
Dept: CARDIOLOGY CLINIC | Age: 85
End: 2020-01-21

## 2020-01-21 PROCEDURE — 3331090002 HH PPS REVENUE DEBIT

## 2020-01-21 PROCEDURE — 3331090001 HH PPS REVENUE CREDIT

## 2020-01-21 NOTE — PROGRESS NOTES
D/c to [facility]. 12/21/19, still admitted  Episode resolved

## 2020-01-22 PROCEDURE — 3331090001 HH PPS REVENUE CREDIT

## 2020-01-22 PROCEDURE — 3331090002 HH PPS REVENUE DEBIT

## 2020-01-23 ENCOUNTER — APPOINTMENT (OUTPATIENT)
Dept: GENERAL RADIOLOGY | Age: 85
DRG: 189 | End: 2020-01-23
Attending: EMERGENCY MEDICINE
Payer: MEDICARE

## 2020-01-23 ENCOUNTER — HOSPITAL ENCOUNTER (INPATIENT)
Age: 85
LOS: 1 days | Discharge: NURSING, REHAB OR LONG-TERM CARE | DRG: 189 | End: 2020-01-23
Attending: EMERGENCY MEDICINE | Admitting: GENERAL ACUTE CARE HOSPITAL
Payer: MEDICARE

## 2020-01-23 ENCOUNTER — APPOINTMENT (OUTPATIENT)
Dept: CT IMAGING | Age: 85
DRG: 189 | End: 2020-01-23
Attending: EMERGENCY MEDICINE
Payer: MEDICARE

## 2020-01-23 ENCOUNTER — HOME CARE VISIT (OUTPATIENT)
Dept: HOME HEALTH SERVICES | Facility: HOME HEALTH | Age: 85
End: 2020-01-23
Payer: MEDICARE

## 2020-01-23 DIAGNOSIS — L03.90 CELLULITIS, UNSPECIFIED CELLULITIS SITE: ICD-10-CM

## 2020-01-23 DIAGNOSIS — J96.21 ACUTE ON CHRONIC RESPIRATORY FAILURE WITH HYPOXIA (HCC): Primary | ICD-10-CM

## 2020-01-23 DIAGNOSIS — J44.9 CHRONIC OBSTRUCTIVE PULMONARY DISEASE, UNSPECIFIED COPD TYPE (HCC): ICD-10-CM

## 2020-01-23 LAB
ALBUMIN SERPL-MCNC: 3.3 G/DL (ref 3.5–5)
ALBUMIN/GLOB SERPL: 1 {RATIO} (ref 1.1–2.2)
ALP SERPL-CCNC: 69 U/L (ref 45–117)
ALT SERPL-CCNC: 11 U/L (ref 12–78)
ANION GAP SERPL CALC-SCNC: 6 MMOL/L (ref 5–15)
AST SERPL-CCNC: 20 U/L (ref 15–37)
ATRIAL RATE: 81 BPM
BASOPHILS # BLD: 0.1 K/UL (ref 0–0.1)
BASOPHILS NFR BLD: 1 % (ref 0–1)
BILIRUB SERPL-MCNC: 0.6 MG/DL (ref 0.2–1)
BNP SERPL-MCNC: 2081 PG/ML
BUN SERPL-MCNC: 16 MG/DL (ref 6–20)
BUN/CREAT SERPL: 15 (ref 12–20)
CALCIUM SERPL-MCNC: 9 MG/DL (ref 8.5–10.1)
CALCULATED P AXIS, ECG09: 74 DEGREES
CALCULATED R AXIS, ECG10: -47 DEGREES
CALCULATED T AXIS, ECG11: 101 DEGREES
CHLORIDE SERPL-SCNC: 95 MMOL/L (ref 97–108)
CO2 SERPL-SCNC: 31 MMOL/L (ref 21–32)
COMMENT, HOLDF: NORMAL
CREAT SERPL-MCNC: 1.06 MG/DL (ref 0.55–1.02)
DIAGNOSIS, 93000: NORMAL
DIFFERENTIAL METHOD BLD: ABNORMAL
EOSINOPHIL # BLD: 0.5 K/UL (ref 0–0.4)
EOSINOPHIL NFR BLD: 8 % (ref 0–7)
ERYTHROCYTE [DISTWIDTH] IN BLOOD BY AUTOMATED COUNT: 14.1 % (ref 11.5–14.5)
FLUAV AG NPH QL IA: NEGATIVE
FLUBV AG NOSE QL IA: NEGATIVE
GLOBULIN SER CALC-MCNC: 3.2 G/DL (ref 2–4)
GLUCOSE SERPL-MCNC: 96 MG/DL (ref 65–100)
HCT VFR BLD AUTO: 37.5 % (ref 35–47)
HGB BLD-MCNC: 12.3 G/DL (ref 11.5–16)
IMM GRANULOCYTES # BLD AUTO: 0 K/UL (ref 0–0.04)
IMM GRANULOCYTES NFR BLD AUTO: 0 % (ref 0–0.5)
LACTATE SERPL-SCNC: 1.2 MMOL/L (ref 0.4–2)
LYMPHOCYTES # BLD: 1.1 K/UL (ref 0.8–3.5)
LYMPHOCYTES NFR BLD: 18 % (ref 12–49)
MAGNESIUM SERPL-MCNC: 2.1 MG/DL (ref 1.6–2.4)
MCH RBC QN AUTO: 29.4 PG (ref 26–34)
MCHC RBC AUTO-ENTMCNC: 32.8 G/DL (ref 30–36.5)
MCV RBC AUTO: 89.7 FL (ref 80–99)
MONOCYTES # BLD: 0.6 K/UL (ref 0–1)
MONOCYTES NFR BLD: 11 % (ref 5–13)
NEUTS SEG # BLD: 3.7 K/UL (ref 1.8–8)
NEUTS SEG NFR BLD: 62 % (ref 32–75)
NRBC # BLD: 0 K/UL (ref 0–0.01)
NRBC BLD-RTO: 0 PER 100 WBC
P-R INTERVAL, ECG05: 206 MS
PLATELET # BLD AUTO: 295 K/UL (ref 150–400)
PMV BLD AUTO: 9 FL (ref 8.9–12.9)
POTASSIUM SERPL-SCNC: 4.2 MMOL/L (ref 3.5–5.1)
PROT SERPL-MCNC: 6.5 G/DL (ref 6.4–8.2)
Q-T INTERVAL, ECG07: 400 MS
QRS DURATION, ECG06: 142 MS
QTC CALCULATION (BEZET), ECG08: 476 MS
RBC # BLD AUTO: 4.18 M/UL (ref 3.8–5.2)
SAMPLES BEING HELD,HOLD: NORMAL
SODIUM SERPL-SCNC: 132 MMOL/L (ref 136–145)
TROPONIN I SERPL-MCNC: <0.05 NG/ML
VENTRICULAR RATE, ECG03: 85 BPM
WBC # BLD AUTO: 5.9 K/UL (ref 3.6–11)

## 2020-01-23 PROCEDURE — 97116 GAIT TRAINING THERAPY: CPT

## 2020-01-23 PROCEDURE — 74011636320 HC RX REV CODE- 636/320: Performed by: EMERGENCY MEDICINE

## 2020-01-23 PROCEDURE — 94760 N-INVAS EAR/PLS OXIMETRY 1: CPT

## 2020-01-23 PROCEDURE — 3331090001 HH PPS REVENUE CREDIT

## 2020-01-23 PROCEDURE — 94640 AIRWAY INHALATION TREATMENT: CPT

## 2020-01-23 PROCEDURE — 80053 COMPREHEN METABOLIC PANEL: CPT

## 2020-01-23 PROCEDURE — 65270000029 HC RM PRIVATE

## 2020-01-23 PROCEDURE — 83735 ASSAY OF MAGNESIUM: CPT

## 2020-01-23 PROCEDURE — 77030038269 HC DRN EXT URIN PURWCK BARD -A

## 2020-01-23 PROCEDURE — 94762 N-INVAS EAR/PLS OXIMTRY CONT: CPT

## 2020-01-23 PROCEDURE — 74011000258 HC RX REV CODE- 258: Performed by: EMERGENCY MEDICINE

## 2020-01-23 PROCEDURE — 87040 BLOOD CULTURE FOR BACTERIA: CPT

## 2020-01-23 PROCEDURE — 97161 PT EVAL LOW COMPLEX 20 MIN: CPT

## 2020-01-23 PROCEDURE — 83605 ASSAY OF LACTIC ACID: CPT

## 2020-01-23 PROCEDURE — 74011250636 HC RX REV CODE- 250/636: Performed by: EMERGENCY MEDICINE

## 2020-01-23 PROCEDURE — 3331090002 HH PPS REVENUE DEBIT

## 2020-01-23 PROCEDURE — 36415 COLL VENOUS BLD VENIPUNCTURE: CPT

## 2020-01-23 PROCEDURE — 74011250637 HC RX REV CODE- 250/637: Performed by: GENERAL ACUTE CARE HOSPITAL

## 2020-01-23 PROCEDURE — 99285 EMERGENCY DEPT VISIT HI MDM: CPT

## 2020-01-23 PROCEDURE — 74011250636 HC RX REV CODE- 250/636: Performed by: GENERAL ACUTE CARE HOSPITAL

## 2020-01-23 PROCEDURE — 83880 ASSAY OF NATRIURETIC PEPTIDE: CPT

## 2020-01-23 PROCEDURE — 84484 ASSAY OF TROPONIN QUANT: CPT

## 2020-01-23 PROCEDURE — 93005 ELECTROCARDIOGRAM TRACING: CPT

## 2020-01-23 PROCEDURE — 74011636637 HC RX REV CODE- 636/637: Performed by: EMERGENCY MEDICINE

## 2020-01-23 PROCEDURE — 71275 CT ANGIOGRAPHY CHEST: CPT

## 2020-01-23 PROCEDURE — 87804 INFLUENZA ASSAY W/OPTIC: CPT

## 2020-01-23 PROCEDURE — 85025 COMPLETE CBC W/AUTO DIFF WBC: CPT

## 2020-01-23 PROCEDURE — 77010033678 HC OXYGEN DAILY

## 2020-01-23 PROCEDURE — 77030029684 HC NEB SM VOL KT MONA -A

## 2020-01-23 PROCEDURE — 74011000250 HC RX REV CODE- 250: Performed by: EMERGENCY MEDICINE

## 2020-01-23 PROCEDURE — 74011250637 HC RX REV CODE- 250/637: Performed by: EMERGENCY MEDICINE

## 2020-01-23 PROCEDURE — 74011000250 HC RX REV CODE- 250: Performed by: GENERAL ACUTE CARE HOSPITAL

## 2020-01-23 RX ORDER — IPRATROPIUM BROMIDE 0.5 MG/2.5ML
0.5 SOLUTION RESPIRATORY (INHALATION)
Status: DISCONTINUED | OUTPATIENT
Start: 2020-01-23 | End: 2020-01-25

## 2020-01-23 RX ORDER — POLYETHYLENE GLYCOL 3350 17 G/17G
17 POWDER, FOR SOLUTION ORAL
COMMUNITY
End: 2021-04-20 | Stop reason: ALTCHOICE

## 2020-01-23 RX ORDER — LISINOPRIL 5 MG/1
5 TABLET ORAL DAILY
Status: DISCONTINUED | OUTPATIENT
Start: 2020-01-23 | End: 2020-01-27 | Stop reason: HOSPADM

## 2020-01-23 RX ORDER — MAG HYDROX/ALUMINUM HYD/SIMETH 200-200-20
30 SUSPENSION, ORAL (FINAL DOSE FORM) ORAL EVERY EVENING
COMMUNITY
End: 2020-02-25 | Stop reason: ALTCHOICE

## 2020-01-23 RX ORDER — SODIUM CHLORIDE 0.9 % (FLUSH) 0.9 %
5-40 SYRINGE (ML) INJECTION AS NEEDED
Status: DISCONTINUED | OUTPATIENT
Start: 2020-01-23 | End: 2020-01-27 | Stop reason: HOSPADM

## 2020-01-23 RX ORDER — CEPHALEXIN 250 MG/1
500 CAPSULE ORAL EVERY 6 HOURS
Status: DISCONTINUED | OUTPATIENT
Start: 2020-01-23 | End: 2020-01-24

## 2020-01-23 RX ORDER — LEVALBUTEROL INHALATION SOLUTION 1.25 MG/3ML
1.25 SOLUTION RESPIRATORY (INHALATION)
Status: COMPLETED | OUTPATIENT
Start: 2020-01-23 | End: 2020-01-23

## 2020-01-23 RX ORDER — ARFORMOTEROL TARTRATE 15 UG/2ML
15 SOLUTION RESPIRATORY (INHALATION)
Status: DISCONTINUED | OUTPATIENT
Start: 2020-01-23 | End: 2020-01-25

## 2020-01-23 RX ORDER — GUAIFENESIN 600 MG/1
600 TABLET, EXTENDED RELEASE ORAL EVERY 12 HOURS
Status: DISCONTINUED | OUTPATIENT
Start: 2020-01-23 | End: 2020-01-24

## 2020-01-23 RX ORDER — METOPROLOL TARTRATE 25 MG/1
25 TABLET, FILM COATED ORAL EVERY 12 HOURS
Status: DISCONTINUED | OUTPATIENT
Start: 2020-01-23 | End: 2020-01-27 | Stop reason: HOSPADM

## 2020-01-23 RX ORDER — LORATADINE 10 MG/1
10 TABLET ORAL DAILY
Status: DISCONTINUED | OUTPATIENT
Start: 2020-01-23 | End: 2020-01-24

## 2020-01-23 RX ORDER — FUROSEMIDE 20 MG/1
20 TABLET ORAL DAILY
Status: DISCONTINUED | OUTPATIENT
Start: 2020-01-23 | End: 2020-01-24

## 2020-01-23 RX ORDER — IPRATROPIUM BROMIDE AND ALBUTEROL SULFATE 2.5; .5 MG/3ML; MG/3ML
3 SOLUTION RESPIRATORY (INHALATION)
Status: DISCONTINUED | OUTPATIENT
Start: 2020-01-23 | End: 2020-01-25

## 2020-01-23 RX ORDER — SODIUM CHLORIDE 0.9 % (FLUSH) 0.9 %
10 SYRINGE (ML) INJECTION
Status: COMPLETED | OUTPATIENT
Start: 2020-01-23 | End: 2020-01-23

## 2020-01-23 RX ORDER — ONDANSETRON 2 MG/ML
4 INJECTION INTRAMUSCULAR; INTRAVENOUS
Status: DISCONTINUED | OUTPATIENT
Start: 2020-01-23 | End: 2020-01-27 | Stop reason: HOSPADM

## 2020-01-23 RX ORDER — LEVALBUTEROL INHALATION SOLUTION 1.25 MG/3ML
1.25 SOLUTION RESPIRATORY (INHALATION) ONCE
Status: COMPLETED | OUTPATIENT
Start: 2020-01-23 | End: 2020-01-23

## 2020-01-23 RX ORDER — ASPIRIN 81 MG/1
162 TABLET ORAL DAILY
Status: DISCONTINUED | OUTPATIENT
Start: 2020-01-23 | End: 2020-01-27 | Stop reason: HOSPADM

## 2020-01-23 RX ORDER — ENOXAPARIN SODIUM 100 MG/ML
40 INJECTION SUBCUTANEOUS EVERY 24 HOURS
Status: DISCONTINUED | OUTPATIENT
Start: 2020-01-23 | End: 2020-01-27 | Stop reason: HOSPADM

## 2020-01-23 RX ORDER — PREDNISONE 20 MG/1
40 TABLET ORAL
Status: DISCONTINUED | OUTPATIENT
Start: 2020-01-24 | End: 2020-01-27 | Stop reason: HOSPADM

## 2020-01-23 RX ORDER — ARFORMOTEROL TARTRATE 15 UG/2ML
15 SOLUTION RESPIRATORY (INHALATION)
Status: DISCONTINUED | OUTPATIENT
Start: 2020-01-23 | End: 2020-01-23 | Stop reason: SDUPTHER

## 2020-01-23 RX ORDER — PREDNISONE 20 MG/1
60 TABLET ORAL
Status: COMPLETED | OUTPATIENT
Start: 2020-01-23 | End: 2020-01-23

## 2020-01-23 RX ORDER — SODIUM CHLORIDE 0.9 % (FLUSH) 0.9 %
5-40 SYRINGE (ML) INJECTION EVERY 8 HOURS
Status: DISCONTINUED | OUTPATIENT
Start: 2020-01-23 | End: 2020-01-27 | Stop reason: HOSPADM

## 2020-01-23 RX ADMIN — LEVALBUTEROL HYDROCHLORIDE 1.25 MG: 1.25 SOLUTION RESPIRATORY (INHALATION) at 11:19

## 2020-01-23 RX ADMIN — GUAIFENESIN 600 MG: 600 TABLET, EXTENDED RELEASE ORAL at 10:33

## 2020-01-23 RX ADMIN — METOPROLOL TARTRATE 25 MG: 25 TABLET ORAL at 21:22

## 2020-01-23 RX ADMIN — ASPIRIN 162 MG: 81 TABLET ORAL at 11:43

## 2020-01-23 RX ADMIN — CEFAZOLIN 1 G: 1 INJECTION, POWDER, FOR SOLUTION INTRAMUSCULAR; INTRAVENOUS; PARENTERAL at 10:33

## 2020-01-23 RX ADMIN — FUROSEMIDE 20 MG: 40 TABLET ORAL at 11:44

## 2020-01-23 RX ADMIN — METOPROLOL TARTRATE 25 MG: 25 TABLET ORAL at 11:45

## 2020-01-23 RX ADMIN — LEVALBUTEROL HYDROCHLORIDE 1.25 MG: 1.25 SOLUTION RESPIRATORY (INHALATION) at 06:29

## 2020-01-23 RX ADMIN — IPRATROPIUM BROMIDE AND ALBUTEROL SULFATE 3 ML: .5; 3 SOLUTION RESPIRATORY (INHALATION) at 23:51

## 2020-01-23 RX ADMIN — LORATADINE 10 MG: 10 TABLET ORAL at 11:45

## 2020-01-23 RX ADMIN — ENOXAPARIN SODIUM 40 MG: 40 INJECTION SUBCUTANEOUS at 11:43

## 2020-01-23 RX ADMIN — LISINOPRIL 5 MG: 5 TABLET ORAL at 11:44

## 2020-01-23 RX ADMIN — IPRATROPIUM BROMIDE AND ALBUTEROL SULFATE 3 ML: .5; 3 SOLUTION RESPIRATORY (INHALATION) at 15:13

## 2020-01-23 RX ADMIN — Medication 10 ML: at 06:49

## 2020-01-23 RX ADMIN — IOPAMIDOL 50 ML: 755 INJECTION, SOLUTION INTRAVENOUS at 06:49

## 2020-01-23 RX ADMIN — IPRATROPIUM BROMIDE AND ALBUTEROL SULFATE 3 ML: .5; 3 SOLUTION RESPIRATORY (INHALATION) at 21:34

## 2020-01-23 RX ADMIN — CEPHALEXIN 500 MG: 250 CAPSULE ORAL at 17:28

## 2020-01-23 RX ADMIN — Medication 10 ML: at 21:22

## 2020-01-23 RX ADMIN — PREDNISONE 60 MG: 20 TABLET ORAL at 10:33

## 2020-01-23 RX ADMIN — GUAIFENESIN 600 MG: 600 TABLET, EXTENDED RELEASE ORAL at 21:22

## 2020-01-23 RX ADMIN — Medication 10 ML: at 14:12

## 2020-01-23 NOTE — PROGRESS NOTES
Problem: Mobility Impaired (Adult and Pediatric)  Goal: *Acute Goals and Plan of Care (Insert Text)  Description  FUNCTIONAL STATUS PRIOR TO ADMISSION: Pt lives alone in one story home with 3 step entry. Prior to her hospitalization in Dec of 19 she was independent w/o device using some furniture walking and RW at night only, and doing well; also only wore O2 at night. She was able to manage all of her own ADls and IADLs including laundry. After that hositalization, she went to [facility] for rehab and was there from Dec 21 to Jan 22. They state she had home visit about a week ago with the therapists that went well but since has declined and after returning home yesterday particularly got worse and could barely tolerate making it to the bathroom this am. She uses a RW for amb and since hospitalization has been on 2L O2 full time. They also note that about 3 weeks ago, she developed R foot drop and was fitted with an AFO but they are unaware of the cause. HOME SUPPORT PRIOR TO ADMISSION: The patient lived alone with family in the area. Physical Therapy Goals  Initiated 1/23/2020  1. Patient will move from supine to sit and sit to supine , scoot up and down and roll side to side in bed with independence within 7 day(s). 2.  Patient will transfer from bed to chair and chair to bed with modified independence using the least restrictive device within 7 day(s). 3.  Patient will perform sit to stand with modified independence within 7 day(s). 4.  Patient will ambulate with modified independence for 150 feet with the least restrictive device within 7 day(s). 5.  Patient will ascend/descend 3 stairs with handrail(s) with supervision/set-up within 7 day(s). Outcome: Progressing Towards Goal   PHYSICAL THERAPY EMERGENCY DEPARTMENT EVALUATION WITH RECOMMENDED ADMISSION  Patient:  Isidro Morrissey (80 y.o. female)  Date: 1/23/2020  Primary Diagnosis: Acute on chronic respiratory failure with [facility] ([facility] [J96.21]        Precautions: O2 at 2L      ASSESSMENT  Based on the objective data described below, the patient presents with decline in activity tolerance, functional mobility and gait requiring CGA to min A overall with significant SOB and congestion with activity and difficulty speaking with activity, desatting to 88% with amb. Current Level of Function Impacting Discharge (mobility/balance): Pt requiring min A with bed mobility, CGA with transfers. She amb with RW with CGA with SOB and noted congestion by end of 75' distance. Pt required one standing rest at ~35' after which time she c/o more SOB even with breathing cues and pacing. Sats initially remaining in 90s on the 2L but at end of distance dropping and remaining 88-90%. HR 80. MD in to evaluate breathing at end of session due to amount of congestion and pt's SOB. Pt able to speak but 2-4 word sentences. Pt returned to bed. Note in gait, pt exhibits steppage gait on R and limited heel strike L as well likely with the neuropathy on the L but per pt and family R foot drop is new. Gross sensation intact on the R foot but only trace motor activity in dorsiflexion. Pt w/o hx back issues or pain. Pt does state in sitting at EOB that her R leg feels like it jumps and does exhibit this x1. Functional Outcome Measure: The patient scored 50/100 on the barthel outcome measure which is indicative of 50% impairment. Other factors to consider for discharge: lives alone, previously independent and able to tolerate ADLs and IDLS on her own but now requiring CGA to min A and having difficulty tolerating just amb. Admission for this patient is recommended with continued acute therapy.      PLAN :  Recommendations and Planned Interventions: bed mobility training, transfer training, gait training, therapeutic exercises, patient and family training/education, and therapeutic activities      Frequency/Duration: Patient will be followed by physical therapy: 5 times a week to address goals. Recommendation for discharge: (in order for the patient to meet his/her long term goals)  To be determined: SNF vs HHPT depending upon progress; Pt will likely need assist at home and family is aware. Depending upon pt's new baseline, she may be better served at an [facility] level if possible for her. This discharge recommendation:  Has been made in collaboration with the attending provider and/or case management    Equipment recommendations for successful discharge (if) home: TBD, may need shower chair     SUBJECTIVE:   Patient stated I feel bad.     OBJECTIVE DATA SUMMARY:   HISTORY:    Past Medical History:   Diagnosis Date    Arthritis     generalized    COPD     Essential hypertension 9/4/2018    former smoker      >60pkyr quit 1/3/11    h/o DVT 1955    left leg    ischemic left lower extremitiy pain     above knee [employer] 1/3/11    PVD (peripheral vascular disease) ([facility]     Seasonal allergic rhinitis     Single kidney      Past Surgical History:   Procedure Laterality Date    BREAST SURGERY PROCEDURE UNLISTED  1955    excision left breast cyst    HX GI  10 yrs ago    colonoscopy    HX GYN      3 miscarriges    HX GYN      1 vaginal birth   [facility]      veinography left leg, stent left leg       Home Situation  Home Environment: Private residence  # Steps to Enter: 3  Rails to Enter: Yes  Hand Rails : Left  One/Two Story Residence: One story  Living Alone: Yes  Support Systems: Family member(s), Child(genna)  Current DME Used/Available at Home: Commode, bedside, Walker, rolling  Tub or Shower Type: Tub/Shower combination    EXAMINATION/PRESENTATION/DECISION MAKING:   Critical Behavior:  Neurologic State: Alert  Orientation Level: Oriented X4  Cognition: Follows commands     Hearing:   Auditory  Auditory Impairment: Hard of hearing, bilateral    Range Of Motion:  AROM: Within functional limits(except R ankle dorsiflex trace)           PROM: Within functional limits           Strength:    Strength: Generally decreased, functional(and trace at R ankle dorsiflexion)                    Tone & Sensation:   Tone: Normal              Sensation: (states hx neuropathy LLE, tingling)               Coordination:  Coordination: Within functional limits           Functional Mobility:  Bed Mobility:  Rolling: Stand-by assistance  Supine to Sit: Minimum assistance  Sit to Supine: Minimum assistance  Scooting: Contact guard assistance  Transfers:  Sit to Stand: Contact guard assistance  Stand to Sit: Contact guard assistance                       Balance:   Sitting: Intact  Standing: Impaired  Standing - Static: Good;Constant support(RW)  Standing - Dynamic : Fair;Constant support(RW)  Ambulation/Gait Training:  Distance (ft): 75 Feet (ft)(standing rest half way)  Assistive Device: Gait belt;Walker, rolling(2L O2)  Ambulation - Level of Assistance: Contact guard assistance        Gait Abnormalities: Steppage gait(decr heel strike L as well but steppage on R)              Speed/Paige: Slow;Pace decreased (<100 feet/min)  Step Length: Right shortened;Left shortened  Swing Pattern: Right asymmetrical             Special Tests:  Barthel Index:    Bathin  Bladder: 5  Bowels: 10  Groomin  Dressin  Feeding: 10  Mobility: 0(limited by distance)  Stairs: 0  Toilet Use: 5  Transfer (Bed to Chair and Back): 10  Total: 50/100       The Barthel ADL Index: Guidelines  1. The index should be used as a record of what a patient does, not as a record of what a patient could do. 2. The main aim is to establish degree of independence from any help, physical or verbal, however minor and for whatever reason. 3. The need for supervision renders the patient not independent. 4. A patient's performance should be established using the best available evidence. Asking the patient, friends/relatives and nurses are the usual sources, but direct observation and common sense are also important. However direct testing is not needed. 5. Usually the patient's performance over the preceding 24-48 hours is important, but occasionally longer periods will be relevant. 6. Middle categories imply that the patient supplies over 50 per cent of the effort. 7. Use of aids to be independent is allowed. Ubaldo Duel., Barthel, D.W. (9607). Functional evaluation: the Barthel Index. [address] [facility] (14)2. HERNANDO Waldron, Lianne Mendoza., [facility]., [place], [address] [place] [address] (1999). Measuring the change indisability after inpatient rehabilitation; comparison of the responsiveness of the Barthel Index and Functional [place] Measure. Journal of Neurology, Neurosurgery, and Psychiatry, 66(4), 037-575. Breanna Michaels, AARONJ.A, JAGDISH Andersen, & John Mcfarlane M.A. (2004.) Assessment of post-stroke quality of life in cost-effectiveness studies: The usefulness of the Barthel Index and the EuroQoL-5D. Quality of Life Research, 15, 376-34           Physical Therapy Evaluation Charge Determination   History Examination Presentation Decision-Making   HIGH Complexity :3+ comorbidities / personal factors will impact the outcome/ POC  HIGH Complexity : 4+ Standardized tests and measures addressing body structure, function, activity limitation and / or participation in recreation  MEDIUM Complexity : Evolving with changing characteristics  LOW Complexity : FOTO score of       Based on the above components, the patient evaluation is determined to be of the following complexity level: LOW      Pain:  Pain Scale 1: Numeric (0 - 10)  Pain Intensity 1: 0     Activity Tolerance:   Poor  Please refer to the flowsheet for vital signs taken during this treatment.     After treatment patient left in no apparent distress:   Supine in bed, Call bell within reach, Caregiver / family present, and ED stretcher rails up     COMMUNICATION/EDUCATION:   Communication/Collaboration:  Fall prevention education was provided and the patient/caregiver indicated understanding., Patient/family have participated as able in goal setting and plan of care. , and Patient/family agree to work toward stated goals and plan of care.     Findings and recommendations were discussed with: MD physician and care manager     Thank you for this referral.  Ruth Gaytan, PT   Time Calculation: 39 mins

## 2020-01-23 NOTE — ED NOTES
9707: Assumed care. 9066: Patient assisted to bedside commode. Patient passed bowel. Urine sample contaminated. Pure wick place. [id number] Mackay MD at bedside. 4273: Patient's son & daughter and law join bedside. 0630: Patient transported to [address]: X-ray attempted. Advised patient is in [address]: Patient returns from CT at this time. 5026: Call completed to X-ray. Advised patient has returned to bedside.

## 2020-01-23 NOTE — ED PROVIDER NOTES
EMERGENCY DEPARTMENT HISTORY AND PHYSICAL EXAM      Date: 1/23/2020  Patient Name: Kevin Sheehan    History of Presenting Illness     Chief Complaint   Patient presents with    Shortness of Breath       History Provided By: Patient    HPI: Kevin Sheehan, 80 y.o. female with PMHx as noted below presents the emergency department with generalized weakness, shortness of breath and left leg redness and warmth. Patient states that she was discharged from rehab yesterday after a prolonged stay from a recent COPD exacerbation. Patient notes that when she got home she has become increasingly short of breath and feels as though she is unable to care for herself. She reports that she is unable to get up and perform her ADL's. She also notes a new redness leg that she states was not there a few days ago. Patient also notes that she had right foot drop while in rehab that is since resolved but does have some numbness in the foot that is residual for the last 3 to 4 weeks. Otherwise denies any acute numbness/weakness, headache, fevers, chills, increased cough, sputum production.     PCP: Gregorio Rios MD    Current Facility-Administered Medications   Medication Dose Route Frequency Provider Last Rate Last Dose    guaiFENesin ER (MUCINEX) tablet 600 mg  600 mg Oral Q12H Asiya Valle MD   600 mg at 01/23/20 1033    albuterol-ipratropium (DUO-NEB) 2.5 MG-0.5 MG/3 ML  3 mL Nebulization Q4H RT Asiya Valle MD   3 mL at 01/23/20 1513    aspirin delayed-release tablet 162 mg  162 mg Oral DAILY Asiya Valle MD   162 mg at 01/23/20 1143    furosemide (LASIX) tablet 20 mg  20 mg Oral DAILY Asiya Valle MD   20 mg at 01/23/20 1144    metoprolol tartrate (LOPRESSOR) tablet 25 mg  25 mg Oral Q12H Asiya Valle MD   25 mg at 01/23/20 1145    loratadine (CLARITIN) tablet 10 mg  10 mg Oral DAILY Asiya Valle MD   10 mg at 01/23/20 1145    lisinopril (PRINIVIL, ZESTRIL) tablet 5 mg  5 mg Oral DAILY Deya Shin Jenan PIERCE MD   5 mg at 20 1144    sodium chloride (NS) flush 5-40 mL  5-40 mL IntraVENous Q8H Kim Julien MD   10 mL at 20 1412    sodium chloride (NS) flush 5-40 mL  5-40 mL IntraVENous PRN Kim Julien MD        ondansetron [facility]) injection 4 mg  4 mg IntraVENous Q4H PRN Kim Julien MD        enoxaparin (LOVENOX) injection 40 mg  40 mg SubCUTAneous Q24H Kim Julien MD   40 mg at 20 1143    [START ON 2020] predniSONE (DELTASONE) tablet 40 mg  40 mg Oral DAILY WITH BREAKFAST Kim Julien MD        cephALEXin (KEFLEX) capsule 500 mg  500 mg Oral Q6H Kim Julien MD        arformoteroL (BROVANA) neb solution 15 mcg  15 mcg Nebulization BID RT Darron Garcia MD           Past History     Past Medical History:  Past Medical History:   Diagnosis Date    Arthritis     generalized    COPD     Essential hypertension 2018    former smoker      >60pkyr quit 1/3/11    h/o DVT     left leg    ischemic left lower extremitiy pain     above knee [employer]Pop 1/3/11    PVD (peripheral vascular disease) ([facility] [address]     Seasonal allergic rhinitis     Single kidney        Past Surgical History:  Past Surgical History:   Procedure Laterality Date    BREAST SURGERY PROCEDURE UNLISTED      excision left breast cyst    HX GI  10 yrs ago    colonoscopy    HX GYN      3 miscarriges    HX GYN      1 vaginal birth   [facility]      veinography left leg, stent left leg       Family History:  Family History   Problem Relation Age of Onset    Cancer Mother         colon    Cancer Sister         lung       Social History:  Social History     Tobacco Use    Smoking status: Former Smoker     Packs/day: 1.00     Years: 60.00     Pack years: 60.00     Last attempt to quit: 1/3/2011     Years since quittin.0    Smokeless tobacco: Never Used   Substance Use Topics    Alcohol use: Yes     Comment: occasional liquor after dinner    Drug use: No     Types: Prescription, OTC Allergies: Allergies   Allergen Reactions    Food Extracts Diarrhea     Onions and garlic causes abdominal pain,cramping     Sulfa (Sulfonamide Antibiotics) Other (comments)     Altered Mental Status         Review of Systems   Review of Systems  Constitutional: Negative for fever, chills. positive fatigue. HENT: Negative for congestion, sore throat, rhinorrhea, sneezing and neck stiffness   Eyes: Negative for discharge and redness. Respiratory: Positive for  shortness of breath, wheezing   Cardiovascular: Negative for chest pain, palpitations   Gastrointestinal: Negative for nausea, vomiting, abdominal pain, constipation, diarrhea and blood in stool. Genitourinary: Negative for dysuria, urgency, frequency, hematuria, flank pain, decreased urine volume, discharge,   Musculoskeletal: Negative for myalgias or joint pain . Skin: Negative for rash or lesions . Neurological: Negative weakness, light-headedness,  and headaches. Positive numbness        Physical Exam   Physical Exam    GENERAL: alert and oriented, no acute distress  EYES: PEERL, No injection, discharge or icterus. ENT: Mucous membranes pink and moist.  NECK: Supple  LUNGS: Airway patent mildly labored respirations, diminished breath sounds bilaterally. HEART: Regular rate and rhythm. No peripheral edema  ABDOMEN: Non-distended and non-tender, without guarding or rebound. SKIN:  warm, dry  EXTREMITIES: There is warmth, erythema to the left lower extremity with some associated chronic ulcers. NEUROLOGICAL:  alert and oriented x 3, CN II-XII grossly intact, strength 5/5 bilateral upper and lower extremities, sensation intact throughout to light touch, no dysmetria.          Diagnostic Study Results     Labs -     Recent Results (from the past 12 hour(s))   EKG, 12 LEAD, INITIAL    Collection Time: 01/23/20  5:49 AM   Result Value Ref Range    Ventricular Rate 85 BPM    Atrial Rate 81 BPM    P-R Interval 206 ms    QRS Duration 142 ms Q-T Interval 400 ms    QTC Calculation (Bezet) 476 ms    Calculated P Axis 74 degrees    Calculated R Axis -47 degrees    Calculated T Axis 101 degrees    Diagnosis       Sinus rhythm  Left axis deviation  Left bundle branch block  Confirmed by Jess Samuels ([id number]) on 1/23/2020 12:31:38 PM     CBC WITH AUTOMATED DIFF    Collection Time: 01/23/20  6:02 AM   Result Value Ref Range    WBC 5.9 3.6 - 11.0 K/uL    RBC 4.18 3.80 - 5.20 M/uL    HGB 12.3 11.5 - 16.0 g/dL    HCT 37.5 35.0 - 47.0 %    MCV 89.7 80.0 - 99.0 FL    MCH 29.4 26.0 - 34.0 PG    MCHC 32.8 30.0 - 36.5 g/dL    RDW 14.1 11.5 - 14.5 %    PLATELET 705 802 - 969 K/uL    MPV 9.0 8.9 - 12.9 FL    NRBC 0.0 0  WBC    ABSOLUTE NRBC 0.00 0.00 - 0.01 K/uL    NEUTROPHILS 62 32 - 75 %    LYMPHOCYTES 18 12 - 49 %    MONOCYTES 11 5 - 13 %    EOSINOPHILS 8 (H) 0 - 7 %    BASOPHILS 1 0 - 1 %    IMMATURE GRANULOCYTES 0 0.0 - 0.5 %    ABS. NEUTROPHILS 3.7 1.8 - 8.0 K/UL    ABS. LYMPHOCYTES 1.1 0.8 - 3.5 K/UL    ABS. MONOCYTES 0.6 0.0 - 1.0 K/UL    ABS. EOSINOPHILS 0.5 (H) 0.0 - 0.4 K/UL    ABS. BASOPHILS 0.1 0.0 - 0.1 K/UL    ABS. IMM. GRANS. 0.0 0.00 - 0.04 K/UL    DF AUTOMATED     METABOLIC PANEL, COMPREHENSIVE    Collection Time: 01/23/20  6:02 AM   Result Value Ref Range    Sodium 132 (L) 136 - 145 mmol/L    Potassium 4.2 3.5 - 5.1 mmol/L    Chloride 95 (L) 97 - 108 mmol/L    CO2 31 21 - 32 mmol/L    Anion gap 6 5 - 15 mmol/L    Glucose 96 65 - 100 mg/dL    BUN 16 6 - 20 MG/DL    Creatinine 1.06 (H) 0.55 - 1.02 MG/DL    BUN/Creatinine ratio 15 12 - 20      GFR est AA 59 (L) >60 ml/min/1.73m2    GFR est non-AA 49 (L) >60 ml/min/1.73m2    Calcium 9.0 8.5 - 10.1 MG/DL    Bilirubin, total 0.6 0.2 - 1.0 MG/DL    ALT (SGPT) 11 (L) 12 - 78 U/L    AST (SGOT) 20 15 - 37 U/L    Alk.  phosphatase 69 45 - 117 U/L    Protein, total 6.5 6.4 - 8.2 g/dL    Albumin 3.3 (L) 3.5 - 5.0 g/dL    Globulin 3.2 2.0 - 4.0 g/dL    A-G Ratio 1.0 (L) 1.1 - 2.2     NT-PRO BNP Collection Time: 01/23/20  6:02 AM   Result Value Ref Range    NT pro-BNP 2,081 (H) <450 PG/ML   SAMPLES BEING HELD    Collection Time: 01/23/20  6:02 AM   Result Value Ref Range    SAMPLES BEING HELD RD SST BL     COMMENT        Add-on orders for these samples will be processed based on acceptable specimen integrity and analyte stability, which may vary by analyte. MAGNESIUM    Collection Time: 01/23/20  6:02 AM   Result Value Ref Range    Magnesium 2.1 1.6 - 2.4 mg/dL   TROPONIN I    Collection Time: 01/23/20  6:02 AM   Result Value Ref Range    Troponin-I, Qt. <0.05 <0.05 ng/mL   LACTIC ACID    Collection Time: 01/23/20  6:28 AM   Result Value Ref Range    Lactic acid 1.2 0.4 - 2.0 MMOL/L   INFLUENZA A & B AG (RAPID TEST)    Collection Time: 01/23/20  6:28 AM   Result Value Ref Range    Influenza A Antigen NEGATIVE  NEG      Influenza B Antigen NEGATIVE  NEG         Radiologic Studies -   CTA CHEST W OR W WO CONT   Final Result   IMPRESSION:    There is no pulmonary embolism. There is no aortic aneurysm or dissection. Emphysematous changes. Coronary artery disease and cardiomegaly. CT Results  (Last 48 hours)               01/23/20 0649  CTA CHEST W OR W WO CONT Final result    Impression:  IMPRESSION:    There is no pulmonary embolism. There is no aortic aneurysm or dissection. Emphysematous changes. Coronary artery disease and cardiomegaly. Narrative:  CLINICAL HISTORY: Chest pain, COPD, history of DVT       INDICATION: Chest pain       COMPARISON: 12/15/2019       TECHNIQUE: CT of the chest with  IV contrast , Isovue-370 is performed. Axial   images from the thoracic inlet to the level of the upper abdomen are obtained. Manual post-processing of the images and coronal reformatting is also performed. CT dose reduction was achieved through use of a standardized protocol tailored   for this examination and automatic exposure control for dose modulation.    Multiplanar reformatted imaging was performed. Sagittal and coronal reformatting. 3-D Postprocessing of imaging was performed. 3-D MIP reconstructed images were obtained in the coronal plane. FINDINGS:    There is no pulmonary embolism. There is no aortic aneurysm or dissection. There is mild ectasia of the proximal abdominal aorta. There is a small hiatal   hernia. There is cardiomegaly. Emphysematous change with bibasilar atelectasis. Mild bronchiectatic change as well. Coronary vascular calcifications. Chronic   thoracic kyphosis. There is no pleural or pericardial effusion. There is no   mediastinal, axillary or hilar lymphadenopathy. The aorta is normal in course   and caliber. The proximal pulmonary arteries are without evidence of filling   defects. No lytic or blastic lesions are identified. The remainder of the upper   abdomen visualized is unremarkable. CXR Results  (Last 48 hours)    None            Medical Decision Making   I am the first provider for this patient. I reviewed the vital signs, available nursing notes, past medical history, past surgical history, family history and social history. Vital Signs-Reviewed the patient's vital signs.   Patient Vitals for the past 12 hrs:   Temp Pulse Resp BP SpO2   01/23/20 1529 98.6 °F (37 °C) 60 18 157/65 95 %   01/23/20 1515     97 %   01/23/20 1335 98 °F (36.7 °C) 64 20 162/73 97 %   01/23/20 1145  85  164/79    01/23/20 1144  85  164/79    01/23/20 1130   21 164/79 100 %   01/23/20 1119     95 %   01/23/20 1000  81 27 155/57 95 %   01/23/20 0930  77 23 141/79 96 %   01/23/20 0900  93 (!) 32 150/82 93 %   01/23/20 0830  72 18 146/55 96 %   01/23/20 0800  76 22 121/78 96 %   01/23/20 0730  78 18 133/73 97 %   01/23/20 0715  73 20 131/70 100 %   01/23/20 0630  (!) 113 24 158/72 98 %   01/23/20 0600  79 22 165/80 97 %   01/23/20 0555     96 %   01/23/20 0545 98.4 °F (36.9 °C) 80  165/80 90 %       Records Reviewed: Nursing Notes and Old Medical Records    Provider Notes (Medical Decision Making): This is an 70-year-old female presenting with increasing shortness of breath and generalized weakness difficulty performing her ADLs. Differential was broad and included cellulitis, DVT, pulmonary embolism, pneumonia, COPD exacerbation, failure to thrive among others. Exam is concerning for possible cellulitis of the left lower extremity so was started on IV Ancef. Patient attempted to stand and ambulate however dropped her oxygen saturations into the mid 80s on her home 3 L. This time do not feel patient is safe for discharge so will plan to admit for further management. ED Course:   Initial assessment performed. The patients presenting problems have been discussed, and they are in agreement with the care plan formulated and outlined with them. I have encouraged them to ask questions as they arise throughout their visit. PROGRESS:  The patient has been re-evaluated and sx have improved. Reviewed available results with patient and have counseled them on diagnosis and care plan. They have expressed understanding, and all their questions have been answered. They agree with plan for admission. CONSULT:  Lane Blake MD spoke with the hospitalist.  Discussed HPI and PE, available diagnostic tests and clinical findings. He is in agreement with care plans as outlined and will evaluate for admission     Admit Note  Patient is being admitted to the hospitalist.  The results of their tests and reasons for their admission have been discussed with them and/or available family. They convey agreement and understanding for the need to be admitted and for their admission diagnosis. Consultation has been made with the inpatient physician specialist for hospitalization. Disposition:  admission    PLAN:  1. Admit     Diagnosis     Clinical Impression:   1.  Acute on chronic respiratory failure with hypoxia ([facility] [address]    2. Cellulitis, unspecified cellulitis site    3. Chronic obstructive pulmonary disease, unspecified COPD type ([facility] [address]        Please note that this dictation was completed with Dragon, computer voice recognition software. Quite often unanticipated grammatical, syntax, homophones, and other interpretive errors are inadvertently transcribed by the computer software. Please disregard these errors. Additionally, please excuse any errors that have escaped final proofreading.

## 2020-01-23 NOTE — PROGRESS NOTES
TRANSFER - IN REPORT:    Verbal report received from Nanette Little(name) on Radha Dickinson  being received from ER(unit) for routine progression of care      Report consisted of patients Situation, Background, Assessment and   Recommendations(SBAR). Information from the following report(s) SBAR, Kardex, Procedure Summary, Intake/Output, MAR and Recent Results was reviewed with the receiving nurse. Opportunity for questions and clarification was provided. Assessment completed upon patients arrival to unit and care assumed.

## 2020-01-23 NOTE — PROGRESS NOTES
Date of previous inpatient admission/ ED visit? 12/15/19 - 12/21/19    What brought the patient back to ED? DC from [facility] 1/22/20 - went home W [facility] -they were supposed to start today, 1/23/20, however she came ER d/t increased SOB and weakness    Did patient decline recommended services during last admission/ ED visit (if yes, what)? No proceeded to SNF at [facility] after released from hospital    Has patient seen a provider since their last inpatient admission/ED visit (if yes, when)? Pt DC from SNF yesterday, [facility] was going to open Pt today however, she came back to ER. CM talked to [place] at [facility] and they did a home visit with Pt last week and felt that she was medically stable for DC home yesterday. Pt/family feels that she is weaker after going to rehab. CM Interventions:  From previous inpatient admission/ED visit: [place] [facility]    From current inpatient admission/ED visit: eval SNF needs or [facility] caregiver. CM gave family CG info. We are eval now. Transition of Care Plan:               -FAMILY IS LEANING TOWARDS RETURNING TO SNF REHAB   PT WILL NOT REQUIRE A 3 NIGHT STAY AS PT HAD QUALIFYING THREE NIGHT STAY WITH PREVIOUS HOSPITALIZATION. -CHOICE LIST WAS GIVEN TO PT/FAMILY FOR REVIEW AS THEY ARE NOT SURE THEY WANT TO RETURN TO [place].   -PT/FAMILY IS AWARE THAT PT MAY NEED TO TRANSITION SNF TO LTC OR ASSISTED LIVING IF QUALIFIES OR HIRE PRIVATE DUTY AT [place] [id number]. -CM PROVIDED RESOURCE FOR CARE [facility] ASSISTED LIVING  AND PRIVATE DUTY RESOURCES. -SECOND IM LETTER WILL BE NEEDED PRIOR TO DC.           Reason for Admission:  Pt was admitted 1/23/2020 d/t a Dx of Respiratory failure with Hypoxia. COPD. Weakness. RRAT Score:     Not indicated at this time.                  Plan for utilizing home health:      Referral was sent to [facility] and they accepted however, Pt was not seen by [place] yet d/t Pt coming to hospital today. Current Advanced Directive/Advance Care Plan: DNR based on ACP. No AMD on file. CM offered to have pastoral care come and do AMD with Pt and they declined at this time. 11 AM  CM was consulted by ER MD to eval/investigate Pt last 24 hours and formulate a safe DC plan for the Pt. CM met Pt and family and reviewed demographic information. Pt is a 80year old female admitted on 12/15. Pt was alert and oriented x4 sitting at bedside.      Pt confirmed demographics. Pt reports to live alone  In a 1 story home (3 steps). Pt reports to have a supportive son who provides her with transportation to appointments. Pt has access to a cane, walker, and is on 2L of O2 at baseline ([employer]). [facility]'s preferred [facility]. Pt reported that she has been to [facility] in the past. DC from [facility] 1/22/20 W [facility]    Pt was released from [facility] 1/22/20 and they set up [address] services. Pt/family stated that she struggled to get into the 3 steps to enter and that she was so weak she could not get to bathroom last night by herself. . so son brought her back to ER. Pt and family are realistic that Pt will possibly need more SNF rehab with Assisted Living or Home with [facility] and private duty caregivers once medically stable. I gave the Pt family resources: SNF list to review as Pt/family are not sure that they want to return to [facility] but may be interested in trying another SNF. CM provided [facility]  as well as Private Duty Caregiver List in case they decide to sent Pt home with caregivers instead. CM will continue to monitor discharge plan. Care Management Interventions  PCP Verified by CM: Yes  Palliative Care Criteria Met (RRAT>21 & CHF Dx)?: No  Mode of Transport at Discharge:  Other (see comment)  Transition of Care Consult (CM Consult): Discharge Planning, SNF  MyChart Signup: No  Discharge Durable Medical Equipment: No  Physical Therapy Consult: Yes  Occupational Therapy Consult: No  Speech Therapy Consult: No  Current Support Network: Lives Alone, Family Lives [place], Own Home  Confirm Follow Up Transport: Family  Discharge Location  Discharge Placement: Skilled nursing facility      Janel Mackenzie

## 2020-01-23 NOTE — H&P
Hospitalist Admission Note    NAME: Radha Dickinson   :  11/10/1931   MRN:  [id number]     Date/Time:  2020 10:24 AM    Patient PCP: Henriette Mcardle, MD  ______________________________________________________________________  Given the patient's current clinical presentation, I have a high level of concern for decompensation if discharged from the emergency department. Complex decision making was performed, which includes reviewing the patient's available past medical records, laboratory results, and x-ray films. My assessment of this patient's clinical condition and my plan of care is as follows. Assessment / Plan:  Acute on chronic hypoxic respiratory failure secondary to COPD exacerbation  -Admit to Medical floor  -Pt typically on 2L NC - increase as needed to keep O2 sat >92.  -Pt's RR > 30  -Continue with duonebs, and steroids  -CM Consult  -CTA negative for PE or PNA - it did show significant emphysematous disease. My concern here is that the pt is 80years old, and I am not sure if she is going to be able to live independently moving forward. She was just discharge from rehab yesterday and was not able to be by herself for even 24 hours. Spoke candidly with pt's family in regards to this and they seem to be understanding of it. LLE cellulitis  PVD  Chronic wound left ankle  -Will give Keflex  -Wound Care consult    HTN  -Resume home meds    Code Status: DDNR  Surrogate Decision Maker: Son  DVT Prophylaxis: Lovenox  GI Prophylaxis: not indicated  Baseline: Independent ADLs      Subjective:   CHIEF COMPLAINT: SOB    HISTORY OF PRESENT ILLNESS:     Debra Duncan is a 80 y.o.  female who presents with CC listed above. Of note, pt was previously admitted to [facility] with similar complaints and discharged to rehab in December. Pt has been at rehab this entire time, and was only discharged yesterday back to her home.  She woke up this morning complaining of worsening SOB and difficulty breathing and therefore EMS was called. Pt's family at bedside state that the pt was \"probably not ready\" to be discharged from rehab. Since her arrival to the ER, pt endorses improvement in her symptoms but she is still significantly below her baseline. We were asked to admit for work up and evaluation of the above problems. Past Medical History:   Diagnosis Date    Arthritis     generalized    COPD     Essential hypertension 2018    former smoker      >60pkyr quit 1/3/11    h/o DVT     left leg    ischemic left lower extremitiy pain     above knee FemPop 1/3/11    PVD [facility]peripheral vascular disease) ([facility] [address]     Seasonal allergic rhinitis     Single kidney         Past Surgical History:   Procedure Laterality Date    BREAST SURGERY PROCEDURE UNLISTED      excision left breast cyst    HX GI  10 yrs ago    colonoscopy    HX GYN      3 miscarriges    HX GYN      1 vaginal birth   [facility]      veinography left leg, stent left leg       Social History     Tobacco Use    Smoking status: Former Smoker     Packs/day: 1.00     Years: 60.00     Pack years: 60.00     Last attempt to quit: 1/3/2011     Years since quittin.0    Smokeless tobacco: Never Used   Substance Use Topics    Alcohol use: Yes     Comment: occasional liquor after dinner        Family History   Problem Relation Age of Onset    Cancer Mother         colon    Cancer Sister         lung     Allergies   Allergen Reactions    Food Extracts Diarrhea     Onions and garlic causes abdominal pain,cramping     Sulfa (Sulfonamide Antibiotics) Other (comments)     Altered Mental Status        Prior to Admission medications    Medication Sig Start Date End Date Taking? Authorizing Provider   acetaminophen (TYLENOL) 325 mg tablet Take 2 Tabs by mouth every four (4) hours as needed for Pain or Fever. 19   Corey Luna MD   furosemide (LASIX) 20 mg tablet Take 1 Tab by mouth daily. 12/21/19   Pat Canada MD   guaiFENesin (ROBITUSSIN) 100 mg/5 mL liquid Take 20 mL by mouth three (3) times daily. 12/21/19   Suraj Keys MD   metoprolol tartrate (LOPRESSOR) 25 mg tablet Take 1 Tab by mouth every twelve (12) hours. 12/21/19   Suraj Keys MD   polyethylene glycol (MIRALAX) 17 gram packet Take 1 Packet by mouth daily. 12/22/19   Suraj Keys MD   calcium carb/magnesium hydrox (MYLANTA PO) Take 30 mL by mouth every four (4) hours as needed for Nausea. Provider, Historical   albuterol-ipratropium (DUO-NEB) 2.5 mg-0.5 mg/3 ml nebu 3 mL by Nebulization route four (4) times daily. Provider, Historical   alum-mag hydroxide-simeth (MYLANTA) 200-200-20 mg/5 mL susp Take 30 mL by mouth nightly. Provider, Historical   ipratropium-albuterol (COMBIVENT RESPIMAT)  mcg/actuation inhaler Take 1 Puff by inhalation four (4) times daily. Provider, Historical   loratadine (CLARITIN) 10 mg tablet Take 10 mg by mouth daily. Provider, Historical   calcium carbonate (TUMS) 200 mg calcium (500 mg) chew Take 0.5 Tabs by mouth daily as needed (stomach upset). Provider, Historical   lisinopril (PRINIVIL, ZESTRIL) 5 mg tablet TAKE ONE TABLET BY MOUTH EVERY DAY 11/20/19   Andra Mcdermott MD   azithromycin [facility]) 250 mg tablet Take 250 mg by mouth every Monday, Wednesday, Friday. Provider, Historical   ANORO ELLIPTA 62.5-25 mcg/actuation inhaler Take 1 Puff by inhalation daily. 5/8/17   Provider, Historical   aspirin delayed-release 81 mg tablet Take 162 mg by mouth daily. Provider, Historical       REVIEW OF SYSTEMS:     I am not able to complete the review of systems because:    The patient is intubated and sedated    The patient has altered mental status due to his acute medical problems    The patient has baseline aphasia from prior stroke(s)    The patient has baseline dementia and is not reliable historian    The patient is in acute medical distress and unable to provide information           Total of 12 systems reviewed as follows:       POSITIVE= underlined text  Negative = text not underlined  General:  fever, chills, sweats, generalized weakness, weight loss/gain,      loss of appetite   Eyes:    blurred vision, eye pain, loss of vision, double vision  ENT:    rhinorrhea, pharyngitis   Respiratory:   cough, sputum production, SOB, GODINEZ, wheezing, pleuritic pain   Cardiology:   chest pain, palpitations, orthopnea, PND, edema, syncope   Gastrointestinal:  abdominal pain , N/V, diarrhea, dysphagia, constipation, bleeding   Genitourinary:  frequency, urgency, dysuria, hematuria, incontinence   Muskuloskeletal :  arthralgia, myalgia, back pain  Hematology:  easy bruising, nose or gum bleeding, lymphadenopathy   Dermatological: rash, ulceration, pruritis, color change / jaundice  Endocrine:   hot flashes or polydipsia   Neurological:  headache, dizziness, confusion, focal weakness, paresthesia,     Speech difficulties, memory loss, gait difficulty  Psychological: Feelings of anxiety, depression, agitation    Objective:   VITALS:    Visit Vitals  /57   Pulse 81   Temp 98.4 °F (36.9 °C)   Resp 27   Ht 5' 8\" (1.727 m)   Wt 65.2 kg (143 lb 12.8 oz)   SpO2 95%   BMI 21.86 kg/m²       PHYSICAL EXAM:    General:    Alert, cooperative, no distress, appears stated age. HEENT: Atraumatic, anicteric sclerae, pink conjunctivae     No oral ulcers, mucosa moist, throat clear, dentition fair  Neck:  Supple, symmetrical,  thyroid: non tender  Lungs:   Fair air entry, no significant wheezing, on 2L NC, tachypneic  Chest wall:  No tenderness  No Accessory muscle use. Heart:   Tachycardia,  No  murmur   No edema  Abdomen:   Soft, non-tender. Not distended. Bowel sounds normal  Extremities: No cyanosis. No clubbing,      Skin turgor normal, Capillary refill normal, Radial dial pulse 2+    LLE redness and warmth appreciated, fresh dressing on ankle wound  Skin:     Not pale. Not Jaundiced  No rashes   Psych:  Good insight. Not depressed. Not anxious or agitated. Neurologic: EOMs intact. No facial asymmetry. No aphasia or slurred speech. Symmetrical strength, Sensation grossly intact. Alert and oriented X 4.     _______________________________________________________________________  Care Plan discussed with:    Comments   Patient x    Family  x    RN x    Care Manager                    Consultant:      _______________________________________________________________________  Expected  Disposition:   Home with Family    HH/PT/OT/RN    SNF/LTC x   JORGE ALBERTO    ________________________________________________________________________  TOTAL TIME:  54 Minutes    Critical Care Provided     Minutes non procedure based      Comments    x Reviewed previous records   >50% of visit spent in counseling and coordination of care  Discussion with patient and/or family and questions answered       ________________________________________________________________________  Signed: Kayy Vazquez MD    Procedures: see electronic medical records for all procedures/Xrays and details which were not copied into this note but were reviewed prior to creation of Plan.     LAB DATA REVIEWED:    Recent Results (from the past 24 hour(s))   EKG, 12 LEAD, INITIAL    Collection Time: 01/23/20  5:49 AM   Result Value Ref Range    Ventricular Rate 85 BPM    Atrial Rate 81 BPM    P-R Interval 206 ms    QRS Duration 142 ms    Q-T Interval 400 ms    QTC Calculation (Bezet) 476 ms    Calculated P Axis 74 degrees    Calculated R Axis -47 degrees    Calculated T Axis 101 degrees    Diagnosis       Undetermined rhythm  Left axis deviation  Left bundle branch block  When compared with ECG of 15-DEC-2019 11:00,  Current undetermined rhythm precludes rhythm comparison, needs review  Left bundle branch block is now present  Criteria for Septal infarct are no longer present     CBC WITH AUTOMATED DIFF    Collection Time: 01/23/20  6:02 AM   Result Value Ref Range    WBC 5.9 3.6 - 11.0 K/uL    RBC 4.18 3.80 - 5.20 M/uL    HGB 12.3 11.5 - 16.0 g/dL    HCT 37.5 35.0 - 47.0 %    MCV 89.7 80.0 - 99.0 FL    MCH 29.4 26.0 - 34.0 PG    MCHC 32.8 30.0 - 36.5 g/dL    RDW 14.1 11.5 - 14.5 %    PLATELET 631 623 - 692 K/uL    MPV 9.0 8.9 - 12.9 FL    NRBC 0.0 0  WBC    ABSOLUTE NRBC 0.00 0.00 - 0.01 K/uL    NEUTROPHILS 62 32 - 75 %    LYMPHOCYTES 18 12 - 49 %    MONOCYTES 11 5 - 13 %    EOSINOPHILS 8 (H) 0 - 7 %    BASOPHILS 1 0 - 1 %    IMMATURE GRANULOCYTES 0 0.0 - 0.5 %    ABS. NEUTROPHILS 3.7 1.8 - 8.0 K/UL    ABS. LYMPHOCYTES 1.1 0.8 - 3.5 K/UL    ABS. MONOCYTES 0.6 0.0 - 1.0 K/UL    ABS. EOSINOPHILS 0.5 (H) 0.0 - 0.4 K/UL    ABS. BASOPHILS 0.1 0.0 - 0.1 K/UL    ABS. IMM. GRANS. 0.0 0.00 - 0.04 K/UL    DF AUTOMATED     METABOLIC PANEL, COMPREHENSIVE    Collection Time: 01/23/20  6:02 AM   Result Value Ref Range    Sodium 132 (L) 136 - 145 mmol/L    Potassium 4.2 3.5 - 5.1 mmol/L    Chloride 95 (L) 97 - 108 mmol/L    CO2 31 21 - 32 mmol/L    Anion gap 6 5 - 15 mmol/L    Glucose 96 65 - 100 mg/dL    BUN 16 6 - 20 MG/DL    Creatinine 1.06 (H) 0.55 - 1.02 MG/DL    BUN/Creatinine ratio 15 12 - 20      GFR est AA 59 (L) >60 ml/min/1.73m2    GFR est non-AA 49 (L) >60 ml/min/1.73m2    Calcium 9.0 8.5 - 10.1 MG/DL    Bilirubin, total 0.6 0.2 - 1.0 MG/DL    ALT (SGPT) 11 (L) 12 - 78 U/L    AST (SGOT) 20 15 - 37 U/L    Alk.  phosphatase 69 45 - 117 U/L    Protein, total 6.5 6.4 - 8.2 g/dL    Albumin 3.3 (L) 3.5 - 5.0 g/dL    Globulin 3.2 2.0 - 4.0 g/dL    A-G Ratio 1.0 (L) 1.1 - 2.2     NT-PRO BNP    Collection Time: 01/23/20  6:02 AM   Result Value Ref Range    NT pro-BNP 2,081 (H) <450 PG/ML   SAMPLES BEING HELD    Collection Time: 01/23/20  6:02 AM   Result Value Ref Range    SAMPLES BEING HELD RD SST BL     COMMENT        Add-on orders for these samples will be processed based on acceptable specimen integrity and analyte stability, which may vary by analyte.    MAGNESIUM    Collection Time: 01/23/20  6:02 AM   Result Value Ref Range    Magnesium 2.1 1.6 - 2.4 mg/dL   TROPONIN I    Collection Time: 01/23/20  6:02 AM   Result Value Ref Range    Troponin-I, Qt. <0.05 <0.05 ng/mL   LACTIC ACID    Collection Time: 01/23/20  6:28 AM   Result Value Ref Range    Lactic acid 1.2 0.4 - 2.0 MMOL/L   INFLUENZA A & B AG (RAPID TEST)    Collection Time: 01/23/20  6:28 AM   Result Value Ref Range    Influenza A Antigen NEGATIVE  NEG      Influenza B Antigen NEGATIVE  NEG

## 2020-01-23 NOTE — PROGRESS NOTES
Pharmacy Clarification of the Prior to Admission Medication Regimen Retrospective to the Admission Medication Reconciliation    The patient was interviewed regarding clarification of the prior to admission medication regimen and stated she was discharged from [place] [facility] [address] [facility] yesterday (1/22/20) around 1400 and last received medications at [place] [address] on 1/22/20. MHT called [facility] [address], [phone], and spoke with Derrell AGEE RN , who was able to verify the last doses administered for the patient. Patient stated she did not take any medications once home on 1/22/20 to her [id number] [place] [address] admission on 1/23/20. Information Obtained From: Patient, transfer papers    Recommendations/Findings: The following amendments were made to the patient's active medication list on file at [address] [place] [address]:     1) Additions: NONE    2) Removals: NONE    3) Changes:  polyethylene glycol (MIRALAX) 17 gram/dose powder (Old regimen: 17 g daily /New regimen: 17 g daily PRN)    4) Pertinent Pharmacy Findings:  albuterol-ipratropium (DUO-NEB) 2.5 mg-0.5 mg/3 ml nebu, calcium carbonate (TUMS) 200 mg calcium (500 mg) chew, polyethylene glycol (MIRALAX) 17 gram/dose powder: Per SNF and patient, she has not taken these PRN agents as of 1/23/20     PTA medication list was corrected to the following:     Prior to Admission Medications   Prescriptions Last Dose Informant Taking? ANORO ELLIPTA 62.5-25 mcg/actuation inhaler 1/22/2020 at 0900 Transfer Papers Yes   Sig: Take 1 Puff by inhalation daily. acetaminophen (TYLENOL) 325 mg tablet 1/22/2020 at 0900 Transfer Papers Yes   Sig: Take 2 Tabs by mouth every four (4) hours as needed for Pain or Fever. albuterol-ipratropium (DUO-NEB) 2.5 mg-0.5 mg/3 ml nebu Not Taking at Unknown time Transfer Papers No   Sig: 3 mL by Nebulization route every four (4) hours as needed for Wheezing or Shortness of Breath.    alum-mag hydroxide-simeth (MYLANTA) 200-200-20 mg/5 mL susp 1/21/2020 at Unknown time Transfer Papers Yes   Sig: Take 30 mL by mouth every evening. aspirin delayed-release 81 mg tablet 1/22/2020 at 0900 Transfer Papers Yes   Sig: Take 162 mg by mouth daily. azithromycin (ZITHROMAX) 250 mg tablet 1/22/2020 at 0900 Transfer Papers Yes   Sig: Take 250 mg by mouth every Monday, Wednesday, Friday. calcium carbonate (TUMS) 200 mg calcium (500 mg) chew Not Taking at Unknown time Transfer Papers No   Sig: Take 0.5 Tabs by mouth daily as needed (stomach upset). furosemide (LASIX) 20 mg tablet 1/22/2020 at 0900 Transfer Papers Yes   Sig: Take 1 Tab by mouth daily. guaiFENesin (ROBITUSSIN) 100 mg/5 mL liquid 1/22/2020 at 0900 Transfer Papers Yes   Sig: Take 20 mL by mouth three (3) times daily. ipratropium-albuterol (COMBIVENT RESPIMAT)  mcg/actuation inhaler 1/22/2020 at 0900 Transfer Papers Yes   Sig: Take 1 Puff by inhalation four (4) times daily. lisinopril (PRINIVIL, ZESTRIL) 5 mg tablet 1/22/2020 at 0900 Transfer Papers Yes   Sig: TAKE ONE TABLET BY MOUTH EVERY DAY   loratadine (CLARITIN) 10 mg tablet 1/22/2020 at 0900 Transfer Papers Yes   Sig: Take 10 mg by mouth daily. metoprolol tartrate (LOPRESSOR) 25 mg tablet 1/22/2020 at 0900 Transfer Papers Yes   Sig: Take 1 Tab by mouth every twelve (12) hours. polyethylene glycol (MIRALAX) 17 gram/dose powder Not Taking at Unknown time Transfer Papers No   Sig: Take 17 g by mouth daily as needed (constipation).       Facility-Administered Medications: None          Thank you,  Wang Pérez [facility]  Medication History Pharmacy Technician

## 2020-01-23 NOTE — ED NOTES
Bedside shift change report [facility] (oncoming nurse) by Laisha Barfield (offgoing nurse). Report included the following information SBAR, ED Summary, MAR and Recent Results. Assumed care of patient who is lying quietly on the stretcher in no apparent distress. Pt is alert and oriented x 4. Respirations are even and unlabored. No needs are expressed at this time. Call bell within reach. Side rails x 2. Cardiac monitor x 3. Stretcher locked in the lowest position. Will continue to monitor.

## 2020-01-23 NOTE — ED NOTES
TRANSFER - OUT REPORT:    Verbal report given to Nieves KRISHNA (name) on Lata Ricci  being transferred to Room 3265 (unit) for routine progression of care       Report consisted of patients Situation, Background, Assessment and   Recommendations(SBAR). Information from the following report(s) SBAR, ED Summary, [facility] and Recent Results was reviewed with the receiving nurse. Lines:   Peripheral IV 01/23/20 Right Antecubital (Active)   Site Assessment Clean, dry, & intact 1/23/2020  6:00 AM   Phlebitis Assessment 0 1/23/2020  6:00 AM   Infiltration Assessment 0 1/23/2020  6:00 AM   Dressing Status Clean, dry, & intact 1/23/2020  6:00 AM   Dressing Type Transparent 1/23/2020  6:00 AM   Hub Color/Line Status Pink;Flushed 1/23/2020  6:00 AM   Action Taken Blood drawn 1/23/2020  6:00 AM        Opportunity for questions and clarification was provided.       Patient transported with:   O2 @ 2 liters

## 2020-01-24 PROCEDURE — 77030040393 HC DRSG OPTIFOAM GENT MDII -B

## 2020-01-24 PROCEDURE — 74011250637 HC RX REV CODE- 250/637: Performed by: GENERAL ACUTE CARE HOSPITAL

## 2020-01-24 PROCEDURE — 94760 N-INVAS EAR/PLS OXIMETRY 1: CPT

## 2020-01-24 PROCEDURE — 74011000250 HC RX REV CODE- 250: Performed by: GENERAL ACUTE CARE HOSPITAL

## 2020-01-24 PROCEDURE — 65270000029 HC RM PRIVATE

## 2020-01-24 PROCEDURE — 97530 THERAPEUTIC ACTIVITIES: CPT | Performed by: PHYSICAL THERAPIST

## 2020-01-24 PROCEDURE — 74011250637 HC RX REV CODE- 250/637: Performed by: NURSE PRACTITIONER

## 2020-01-24 PROCEDURE — 97116 GAIT TRAINING THERAPY: CPT | Performed by: PHYSICAL THERAPIST

## 2020-01-24 PROCEDURE — 3331090001 HH PPS REVENUE CREDIT

## 2020-01-24 PROCEDURE — 74011250637 HC RX REV CODE- 250/637: Performed by: HOSPITALIST

## 2020-01-24 PROCEDURE — 3331090002 HH PPS REVENUE DEBIT

## 2020-01-24 PROCEDURE — 74011250636 HC RX REV CODE- 250/636: Performed by: GENERAL ACUTE CARE HOSPITAL

## 2020-01-24 PROCEDURE — 94640 AIRWAY INHALATION TREATMENT: CPT

## 2020-01-24 PROCEDURE — 77010033678 HC OXYGEN DAILY

## 2020-01-24 PROCEDURE — 77030018836 HC SOL IRR NACL ICUM -A

## 2020-01-24 PROCEDURE — 74011636637 HC RX REV CODE- 636/637: Performed by: GENERAL ACUTE CARE HOSPITAL

## 2020-01-24 RX ORDER — ADHESIVE BANDAGE
30 BANDAGE TOPICAL DAILY PRN
Status: DISCONTINUED | OUTPATIENT
Start: 2020-01-24 | End: 2020-01-27 | Stop reason: HOSPADM

## 2020-01-24 RX ORDER — AZITHROMYCIN 250 MG/1
250 TABLET, FILM COATED ORAL
Status: DISCONTINUED | OUTPATIENT
Start: 2020-01-24 | End: 2020-01-27 | Stop reason: HOSPADM

## 2020-01-24 RX ORDER — GUAIFENESIN 600 MG/1
600 TABLET, EXTENDED RELEASE ORAL 2 TIMES DAILY
Status: DISCONTINUED | OUTPATIENT
Start: 2020-01-24 | End: 2020-01-27 | Stop reason: HOSPADM

## 2020-01-24 RX ORDER — ACETAMINOPHEN 325 MG/1
650 TABLET ORAL
Status: DISCONTINUED | OUTPATIENT
Start: 2020-01-24 | End: 2020-01-27 | Stop reason: HOSPADM

## 2020-01-24 RX ORDER — MAG HYDROX/ALUMINUM HYD/SIMETH 200-200-20
30 SUSPENSION, ORAL (FINAL DOSE FORM) ORAL
Status: DISCONTINUED | OUTPATIENT
Start: 2020-01-24 | End: 2020-01-27 | Stop reason: HOSPADM

## 2020-01-24 RX ADMIN — Medication 10 ML: at 18:25

## 2020-01-24 RX ADMIN — IPRATROPIUM BROMIDE AND ALBUTEROL SULFATE 3 ML: .5; 3 SOLUTION RESPIRATORY (INHALATION) at 13:06

## 2020-01-24 RX ADMIN — CEPHALEXIN 500 MG: 250 CAPSULE ORAL at 06:21

## 2020-01-24 RX ADMIN — ACETAMINOPHEN 650 MG: 325 TABLET ORAL at 06:59

## 2020-01-24 RX ADMIN — METOPROLOL TARTRATE 25 MG: 25 TABLET ORAL at 20:40

## 2020-01-24 RX ADMIN — LISINOPRIL 5 MG: 5 TABLET ORAL at 10:22

## 2020-01-24 RX ADMIN — METOPROLOL TARTRATE 25 MG: 25 TABLET ORAL at 10:22

## 2020-01-24 RX ADMIN — IPRATROPIUM BROMIDE AND ALBUTEROL SULFATE 3 ML: .5; 3 SOLUTION RESPIRATORY (INHALATION) at 04:01

## 2020-01-24 RX ADMIN — CEPHALEXIN 500 MG: 250 CAPSULE ORAL at 01:10

## 2020-01-24 RX ADMIN — AZITHROMYCIN MONOHYDRATE 250 MG: 250 TABLET ORAL at 20:40

## 2020-01-24 RX ADMIN — GUAIFENESIN 600 MG: 600 TABLET, EXTENDED RELEASE ORAL at 12:49

## 2020-01-24 RX ADMIN — Medication 10 ML: at 22:15

## 2020-01-24 RX ADMIN — GUAIFENESIN 600 MG: 600 TABLET, EXTENDED RELEASE ORAL at 18:25

## 2020-01-24 RX ADMIN — ALUMINUM HYDROXIDE, MAGNESIUM HYDROXIDE, AND SIMETHICONE 30 ML: 200; 200; 20 SUSPENSION ORAL at 13:47

## 2020-01-24 RX ADMIN — IPRATROPIUM BROMIDE AND ALBUTEROL SULFATE 3 ML: .5; 3 SOLUTION RESPIRATORY (INHALATION) at 09:00

## 2020-01-24 RX ADMIN — IPRATROPIUM BROMIDE AND ALBUTEROL SULFATE 3 ML: .5; 3 SOLUTION RESPIRATORY (INHALATION) at 15:49

## 2020-01-24 RX ADMIN — PREDNISONE 40 MG: 20 TABLET ORAL at 10:22

## 2020-01-24 RX ADMIN — ENOXAPARIN SODIUM 40 MG: 40 INJECTION SUBCUTANEOUS at 12:49

## 2020-01-24 RX ADMIN — IPRATROPIUM BROMIDE AND ALBUTEROL SULFATE 3 ML: .5; 3 SOLUTION RESPIRATORY (INHALATION) at 19:48

## 2020-01-24 RX ADMIN — Medication 10 ML: at 06:21

## 2020-01-24 RX ADMIN — ASPIRIN 162 MG: 81 TABLET ORAL at 10:22

## 2020-01-24 RX ADMIN — ALUMINUM HYDROXIDE, MAGNESIUM HYDROXIDE, AND SIMETHICONE 30 ML: 200; 200; 20 SUSPENSION ORAL at 20:39

## 2020-01-24 NOTE — PROGRESS NOTES
Bedside and Verbal shift change report given to Jose(oncoming nurse) by Tameka Valdez (offgoing nurse). Report included the following information SBAR, ED Summary, Intake/Output, MAR and Recent Results.

## 2020-01-24 NOTE — PROGRESS NOTES
EDMOND:  -see 1/23 CM note  -chart cclink'd to [address] [facility] to see how many skilled days remaining.  -will need KATHARINE orders for [facility] if pt dc'd home  -Please complete DDNR prior to d/c  -ascertain name of oxygen company. Will ask Edward to assess for admission.  Will follow on Monday

## 2020-01-24 NOTE — PROGRESS NOTES
ADULT PROTOCOL: JET AEROSOL ASSESSMENT    Patient  Macie Kiser     80 y.o.   female     1/24/2020  4:14 AM    Breath Sounds Pre Procedure: Right Breath Sounds: Lower, Crackles, Diminished                               Left Breath Sounds: Lower, Crackles, Diminished    Breath Sounds Post Procedure: Right Breath Sounds: Lower, Crackles, Diminished                                 Left Breath Sounds: Lower, Crackles, Diminished    Breathing pattern: Pre procedure Breathing Pattern: Dyspnea at rest          Post procedure Breathing Pattern: Dyspnea at rest    Heart Rate: Pre procedure Pulse: 71           Post procedure Pulse: 74    Resp Rate: Pre procedure Respirations: 20           Post procedure Respirations: 20    Peak Flow: Pre bronchodilator   n/a          Post bronchodilator   n/a    Incentive Spirometry:   n/a      n/a    Cough: Pre procedure Cough: Non-productive               Post procedure Cough: Non-productive    Sputum: Pre procedure  none                 Post procedure  none    Oxygen: O2 Device: Nasal cannula   Flow rate (L/min) 3     Changed: YES    SpO2: Pre procedure SpO2: 94 %   with oxygen              Post procedure SpO2: 94 %  with oxygen    Nebulizer Therapy: Current medications Aerosolized Medications: DuoNeb      Changed: NO    Problem List:   Patient Active Problem List   Diagnosis Code    COPD (chronic obstructive pulmonary disease) ([facility]) J44.9    PVD (peripheral vascular disease) ([facility]) I73.9    Seasonal allergic rhinitis J30.2    Bronchitis, acute J20.9    COPD exacerbation ([facility]) J44.1    Acute renal failure (ARF) ([facility]) N17.9    Acute and chronic respiratory failure with hypoxia ([facility]) J96.21    Sepsis ([facility]) A41.9    Sinus bradycardia R00.1    LBBB (left bundle branch block) I44.7    Essential hypertension I10    Acute respiratory failure ([facility]) J96.00    Acute on chronic respiratory failure with hypoxia ([facility] [address] J96.21       Respiratory Therapist: Roni Lopez RT

## 2020-01-24 NOTE — PROGRESS NOTES
Bedside and Verbal shift change report given to Evert Adorno RN (oncoming nurse) by Tommie Gurrola RN (offgoing nurse). Report included the following information SBAR, Kardex, Intake/Output and MAR.

## 2020-01-24 NOTE — HOME CARE
Patient is currently open to [facility] for SN/PT/OT with a certification period ending 2/5/2020. Resumption of care order will be required prior to discharge. If wound care is required, specific wound care orders will also be needed.

## 2020-01-24 NOTE — PROGRESS NOTES
Problem: Mobility Impaired (Adult and Pediatric)  Goal: *Acute Goals and Plan of Care (Insert Text)  Description  FUNCTIONAL STATUS PRIOR TO ADMISSION: Pt lives alone in one story home with 3 step entry. Prior to her hospitalization in Dec of 19 she was independent w/o device using some furniture walking and RW at night only, and doing well; also only wore O2 at night. She was able to manage all of her own ADls and IADLs including laundry. After that hositalization, she went to [facility] for rehab and was there from Dec 21 to Jan 22. They state she had home visit about a week ago with the therapists that went well but since has declined and after returning home yesterday particularly got worse and could barely tolerate making it to the bathroom this am. She uses a RW for amb and since hospitalization has been on 2L O2 full time. They also note that about 3 weeks ago, she developed R foot drop and was fitted with an AFO but they are unaware of the cause. HOME SUPPORT PRIOR TO ADMISSION: The patient lived alone with family in the area. Physical Therapy Goals  Initiated 1/23/2020  1. Patient will move from supine to sit and sit to supine , scoot up and down and roll side to side in bed with independence within 7 day(s). 2.  Patient will transfer from bed to chair and chair to bed with modified independence using the least restrictive device within 7 day(s). 3.  Patient will perform sit to stand with modified independence within 7 day(s). 4.  Patient will ambulate with modified independence for 150 feet with the least restrictive device within 7 day(s). 5.  Patient will ascend/descend 3 stairs with handrail(s) with supervision/set-up within 7 day(s). Outcome: Not Met   PHYSICAL THERAPY TREATMENT  Patient:  Al Martin (80 y.o. female)  Date: 1/24/2020  Diagnosis: Acute on chronic respiratory failure with hypoxia (HCC) [J96.21]   <principal problem not specified>         Chart, physical therapy assessment, plan of care and goals were reviewed. ASSESSMENT  Patient continues with skilled PT services and is not progressing towards goals. Patient continues to demonstrate impaired endurance and activity tolerance. Increased dyspnea noted with all mobility and O2 sats decreased to 88% on 2 l/min. O2 sats stable on 3 l/min during ambulation. Patient requiring CGA for transfers and ambulation. Gait is slow but steady with no overt LOB noted. AFO not available and patient demonstrating mild steppage gait on R. Patient reports concern regarding ability to return to home alone. She had just returned to home following a month long stay at [facility] rehab. Patient is below her previous baseline. She would benefit from further therapy and if she returns to home she would benefit from 24 hour supervision. Current Level of Function Impacting Discharge (mobility/balance): CGA             PLAN :  Patient continues to benefit from skilled intervention to address the above impairments. Continue treatment per established plan of care. to address goals. Recommendation for discharge: (in order for the patient to meet his/her long term goals)  To be determined: SNF vs HHPT with 24 hour supervision     This discharge recommendation:  A follow-up discussion with the attending provider and/or case management is planned    IF patient discharges home will need the following DME: patient owns DME required for discharge       SUBJECTIVE:   Patient stated I'm here so you all can assess me and help me get stronger.     OBJECTIVE DATA SUMMARY:   Critical Behavior:  Neurologic State: Alert  Orientation Level: Oriented X4  Cognition: Follows commands     Functional Mobility Training:  Bed Mobility:         Deferred; patient sitting in chair upon arrival           Transfers:  Sit to Stand: Contact guard assistance  Stand to Sit: Contact guard assistance                             Balance:  Sitting: Intact  Standing: Impaired  Standing - Static: Good;Constant support  Standing - Dynamic : Good;Constant support(using RW for support)  Ambulation/Gait Training:  Distance (ft): (10 feet x 2 then 30 feet)  Assistive Device: Walker, rolling;Gait belt  Ambulation - Level of Assistance: Contact guard assistance        Gait Abnormalities: Decreased step clearance(mild steppage gait on R)              Speed/Paige: Pace decreased (<100 feet/min); Slow  Step Length: Left shortened;Right shortened         Gait is slow but steady with no overt LOB noted         Activity Tolerance:   Poor, desaturates with exertion and requires oxygen, requires frequent rest breaks, and observed SOB with activity  Please refer to the flowsheet for vital signs taken during this treatment.     After treatment patient left in no apparent distress:   Sitting in chair and Call bell within reach    COMMUNICATION/COLLABORATION:   The patients plan of care was discussed with: Registered Nurse    Constantine Wall, PT   Time Calculation: 45 mins

## 2020-01-24 NOTE — WOUND CARE
Wound Care Consult for left ankle chronic wound:  Chart reviewed and patient assessed. Pt. Is familiar to the wound care team from past care provided to her, lastly in December of 2019. She has had Fem-Pop bypass in the past and has been a patient of Dr. Vinnie Toledo (Vascular) for several years. Assessment: The left ankle wound is actually an area of redness with an open wound with pink wound bed and a very thin layer of yellow (poss. Slough but most likely subcutaneous tissue. The skin around the wound is erythematous but now excessively. It is most likely colonized chronically with bacteria. Currently being treated with antibiotics. Treatment: The wound was already cleansed today by nursing but orders were written to simply add a piece of Hydrofera Blue foam dressing as the direct moist contact layer to assist in further debridement as well as add some antimicrobial environment to the wound bed. Plan: This will be a daily dressing. Off load the heels. Hydrofera Blue was obtained and placed in the patient's room. Discussed care needed with RN, Edith Jurado.   
Teresa Sosa, RN, BSN, [employer]

## 2020-01-25 PROCEDURE — 65270000029 HC RM PRIVATE

## 2020-01-25 PROCEDURE — 74011250636 HC RX REV CODE- 250/636: Performed by: GENERAL ACUTE CARE HOSPITAL

## 2020-01-25 PROCEDURE — 94640 AIRWAY INHALATION TREATMENT: CPT

## 2020-01-25 PROCEDURE — 97535 SELF CARE MNGMENT TRAINING: CPT | Performed by: OCCUPATIONAL THERAPIST

## 2020-01-25 PROCEDURE — 94760 N-INVAS EAR/PLS OXIMETRY 1: CPT

## 2020-01-25 PROCEDURE — 74011250637 HC RX REV CODE- 250/637: Performed by: NURSE PRACTITIONER

## 2020-01-25 PROCEDURE — 74011250637 HC RX REV CODE- 250/637: Performed by: GENERAL ACUTE CARE HOSPITAL

## 2020-01-25 PROCEDURE — 74011636637 HC RX REV CODE- 636/637: Performed by: GENERAL ACUTE CARE HOSPITAL

## 2020-01-25 PROCEDURE — 74011250637 HC RX REV CODE- 250/637: Performed by: HOSPITALIST

## 2020-01-25 PROCEDURE — 3331090001 HH PPS REVENUE CREDIT

## 2020-01-25 PROCEDURE — 74011250636 HC RX REV CODE- 250/636: Performed by: NURSE PRACTITIONER

## 2020-01-25 PROCEDURE — 74011000250 HC RX REV CODE- 250: Performed by: GENERAL ACUTE CARE HOSPITAL

## 2020-01-25 PROCEDURE — 97165 OT EVAL LOW COMPLEX 30 MIN: CPT | Performed by: OCCUPATIONAL THERAPIST

## 2020-01-25 PROCEDURE — 77010033678 HC OXYGEN DAILY

## 2020-01-25 PROCEDURE — 3331090002 HH PPS REVENUE DEBIT

## 2020-01-25 RX ORDER — SODIUM CHLORIDE 9 MG/ML
100 INJECTION, SOLUTION INTRAVENOUS CONTINUOUS
Status: DISCONTINUED | OUTPATIENT
Start: 2020-01-25 | End: 2020-01-26

## 2020-01-25 RX ORDER — IPRATROPIUM BROMIDE AND ALBUTEROL SULFATE 2.5; .5 MG/3ML; MG/3ML
3 SOLUTION RESPIRATORY (INHALATION)
Status: DISCONTINUED | OUTPATIENT
Start: 2020-01-25 | End: 2020-01-27 | Stop reason: HOSPADM

## 2020-01-25 RX ADMIN — Medication 10 ML: at 05:40

## 2020-01-25 RX ADMIN — Medication 10 ML: at 21:21

## 2020-01-25 RX ADMIN — ALUMINUM HYDROXIDE, MAGNESIUM HYDROXIDE, AND SIMETHICONE 30 ML: 200; 200; 20 SUSPENSION ORAL at 20:08

## 2020-01-25 RX ADMIN — PREDNISONE 40 MG: 20 TABLET ORAL at 09:00

## 2020-01-25 RX ADMIN — GUAIFENESIN 600 MG: 600 TABLET, EXTENDED RELEASE ORAL at 09:01

## 2020-01-25 RX ADMIN — SODIUM CHLORIDE 100 ML/HR: 900 INJECTION, SOLUTION INTRAVENOUS at 12:33

## 2020-01-25 RX ADMIN — ALUMINUM HYDROXIDE, MAGNESIUM HYDROXIDE, AND SIMETHICONE 30 ML: 200; 200; 20 SUSPENSION ORAL at 13:15

## 2020-01-25 RX ADMIN — SODIUM CHLORIDE 100 ML/HR: 900 INJECTION, SOLUTION INTRAVENOUS at 22:35

## 2020-01-25 RX ADMIN — LISINOPRIL 5 MG: 5 TABLET ORAL at 09:01

## 2020-01-25 RX ADMIN — Medication 10 ML: at 17:53

## 2020-01-25 RX ADMIN — ASPIRIN 162 MG: 81 TABLET ORAL at 09:01

## 2020-01-25 RX ADMIN — ENOXAPARIN SODIUM 40 MG: 40 INJECTION SUBCUTANEOUS at 12:32

## 2020-01-25 RX ADMIN — METOPROLOL TARTRATE 25 MG: 25 TABLET ORAL at 09:00

## 2020-01-25 RX ADMIN — GUAIFENESIN 600 MG: 600 TABLET, EXTENDED RELEASE ORAL at 17:53

## 2020-01-25 RX ADMIN — METOPROLOL TARTRATE 25 MG: 25 TABLET ORAL at 20:08

## 2020-01-25 RX ADMIN — ACETAMINOPHEN 650 MG: 325 TABLET ORAL at 17:53

## 2020-01-25 RX ADMIN — IPRATROPIUM BROMIDE AND ALBUTEROL SULFATE 3 ML: .5; 3 SOLUTION RESPIRATORY (INHALATION) at 00:42

## 2020-01-25 NOTE — PROGRESS NOTES
Ms Alicia Espinoza has her home MDI inhalers at bedside, she wants to start using them today, RN to verify with MD if it is ok for her to use her home inhalers instead of the nebulizer treatmets which she says does not help her, she says she gets more relief from her inhalers.   She is very insistent on using them in the AM

## 2020-01-25 NOTE — PROGRESS NOTES
I reviewed pertinent labs and imaging, and discussed /agreed on the plan of care with Dr. Brock Perze. Hospitalist Progress Note    NAME: Selina Puckett   :  11/10/1931   MRN:  [id number]     Assessment / Plan:  CM following for placement; patient stable for discharge once placement is arranged    Acute on chronic hypoxic respiratory failure secondary to COPD exacerbation, POA  · On chronic O2 at 2L NC; currently on 3L  · Continue with duonebs and steroids  · CTA negative for PE or pneumonia; showed significant emphysematous disease  · CM consulted for discharge; home vs LTC   · PT/OT consulted     LLE cellulitis  PVD  Chronic left ankle wound  · Continue Keflex  · Wound care consulted     HTN  · Continue lisinopril     18.5 - 24.9 Normal weight / Body mass index is 21.86 kg/m². Code status: DNR  Prophylaxis: Lovenox  Recommended Disposition: TBD     Subjective:     Chief Complaint / Reason for Physician Visit  \"I am just so weak. \" Discussed with RN events overnight. Review of Systems:  Symptom Y/N Comments  Symptom Y/N Comments   Fever/Chills n   Chest Pain n    Poor Appetite    Edema     Cough n   Abdominal Pain n    Sputum n   Joint Pain     SOB/GODINEZ n   Pruritis/Rash     Nausea/vomit    Tolerating PT/OT     Diarrhea    Tolerating Diet     Constipation    Other       Could NOT obtain due to:      Objective:     VITALS:   Last 24hrs VS reviewed since prior progress note.  Most recent are:  Patient Vitals for the past 24 hrs:   Temp Pulse Resp BP SpO2   20 0807 97.6 °F (36.4 °C) 70 20 154/80 95 %   20 0042     96 %   20 2317 98.4 °F (36.9 °C) (!) 107  147/86 91 %   20 1956 97.9 °F (36.6 °C) 71  146/73 93 %   20 1948     95 %   20 1550     97 %   20 1547 98.2 °F (36.8 °C) 78 20 150/86 95 %   20 1307     97 %       Intake/Output Summary (Last 24 hours) at 2020 1151  Last data filed at 2020 0249  Gross per 24 hour   Intake 240 ml Output 2 ml   Net 238 ml        PHYSICAL EXAM:  General: No acute distress. Pleasant elderly  female.     EENT:  EOMI. Anicteric sclerae. MMM  Resp:  Lungs diminished in bases, + wheezing in upper airways, no rales. No accessory muscle use  CV:  RRR,  No edema  GI:  Soft, Non distended, Non tender.  +Bowel sounds  Neurologic:  Alert and oriented X 3, normal speech,   Psych:   Good insight. Not anxious nor agitated  Skin:  No rashes. No jaundice    Reviewed most current lab test results and cultures  YES  Reviewed most current radiology test results   YES  Review and summation of old records today    NO  Reviewed patient's current orders and MAR    YES  PMH/SH reviewed - no change compared to H&P  ________________________________________________________________________  Care Plan discussed with:    Comments   Patient x    Family      RN x    Care Manager     Consultant                        Multidiciplinary team rounds were held today with , nursing, pharmacist and clinical coordinator. Patient's plan of care was discussed; medications were reviewed and discharge planning was addressed. ________________________________________________________________________  Total NON critical care TIME:  25   Minutes    Total CRITICAL CARE TIME Spent:   Minutes non procedure based      Comments   >50% of visit spent in counseling and coordination of care     ________________________________________________________________________  Stacey Ortiz NP     Procedures: see electronic medical records for all procedures/Xrays and details which were not copied into this note but were reviewed prior to creation of Plan. LABS:  I reviewed today's most current labs and imaging studies.   Pertinent labs include:  Recent Labs     01/23/20  0602   WBC 5.9   HGB 12.3   HCT 37.5        Recent Labs     01/23/20  0602   *   K 4.2   CL 95*   CO2 31   GLU 96   BUN 16   CREA 1.06*   CA 9.0   MG 2.1 ALB 3.3*   TBILI 0.6   SGOT 20   ALT 11*       Signed: Johana Randolph NP

## 2020-01-25 NOTE — PROGRESS NOTES
Patient wants to take her own home meds-- Anoro MDI and Combivent MDI. Discussed with RN who is in contact with Michael Lerner NP. Changed Duonebs to PRN and discontinued Brovana and atrovent.

## 2020-01-25 NOTE — PROGRESS NOTES
Bedside shift change report given to TOMI Peraza (oncoming nurse) by Tyesha Jenkins (offgoing nurse). Report included the following information SBAR, Kardex, Intake/Output, MAR and Recent Results.

## 2020-01-25 NOTE — PROGRESS NOTES
ADULT PROTOCOL: JET AEROSOL  REASSESSMENT    Patient  Analias Barrios     80 y.o.   female     1/25/2020  1:01 AM    Breath Sounds Pre Procedure: Right Breath Sounds: Diminished                               Left Breath Sounds: Diminished    Breath Sounds Post Procedure: Right Breath Sounds: Diminished                                 Left Breath Sounds: Diminished    Breathing pattern: Pre procedure Breathing Pattern: Regular          Post procedure Breathing Pattern: Regular    Heart Rate: Pre procedure Pulse: 68           Post procedure Pulse: 72    Resp Rate: Pre procedure Respirations: 16           Post procedure Respirations: 16    Peak Flow: Pre bronchodilator   n/a          Post bronchodilator   n/a    Incentive Spirometry:   n/a      n/a    Cough: Pre procedure Cough: Non-productive               Post procedure Cough: Non-productive    Sputum: Pre procedure  none                 Post procedure  none    Oxygen: O2 Device: Nasal cannula   Flow rate (L/min) 2, she is on 2 lpm nc at home     Changed: NO    SpO2: Pre procedure SpO2: 96 %   with oxygen              Post procedure SpO2: 96 %  with oxygen    Nebulizer Therapy: Current medications Aerosolized Medications: DuoNeb      Changed: NO      Problem List:   Patient Active Problem List   Diagnosis Code    COPD (chronic obstructive pulmonary disease) ([facility]) J44.9    PVD (peripheral vascular disease) ([facility]) I73.9    Seasonal allergic rhinitis J30.2    Bronchitis, acute J20.9    COPD exacerbation ([facility]) J44.1    Acute renal failure (ARF) ([facility]) N17.9    Acute and chronic respiratory failure with hypoxia ([facility]) J96.21    Sepsis ([facility]) A41.9    Sinus bradycardia R00.1    LBBB (left bundle branch block) I44.7    Essential hypertension I10    Acute respiratory failure ([facility]) J96.00    Acute on chronic respiratory failure with hypoxia ([facility] [address] J96.21       Respiratory Therapist: Merlin Balboa, RT

## 2020-01-25 NOTE — PROGRESS NOTES
Problem: Self Care Deficits Care Plan (Adult)  Goal: *Acute Goals and Plan of Care (Insert Text)  Description    FUNCTIONAL STATUS PRIOR TO ADMISSION: Pt lives alone and is I with basic ADLs, amb without AD. Pt hospitalized in 12/19, went to SNF and discharged home alone. Pt home less than 24 hours prior to re-admit to hospital.  Has supportive family in area. HOME SUPPORT: lives alone, only home 1 day from SNF    Occupational Therapy Goals  Initiated 1/25/2020  1. Patient will perform bathing with modified independence within 7 day(s). 2.  Patient will perform lower body dressing with modified independence within 7 day(s). 3.  Patient will perform standing ADLs for 5 minutes without rest break with modified independence within 7 day(s). 4.  Patient will perform toilet transfers with modified independence within 7 day(s). 5.  Patient will perform all aspects of toileting with modified independence within 7 day(s). 6.  Patient will participate in upper extremity therapeutic exercise/activities with modified independence for 5 minutes within 7 day(s). 7.  Patient will utilize energy conservation techniques during functional activities with verbal cues within 7 day(s). Outcome: Progressing Towards Goal       OCCUPATIONAL THERAPY EVALUATION  Patient: Kirsten Taylor (80 y.o. female)  Date: 1/25/2020  Primary Diagnosis: Acute on chronic respiratory failure with hypoxia (HCC) [J96.21]        Precautions: 2 L O2 at baseline  DNR, Fall    ASSESSMENT  Based on the objective data described below, the patient presents with decreased I with basic self care tasks due to decreased endurance, decreased dynamic standing balance, and decreased activity tolerance. Pt was discharged from SNF and returned home alone recently. Pt home less than 24 hours before being re-admitted to hospital due to SOB. Pt needing A for standing balance at times during standing ADLs.   Pt also needing frequent rest breaks and cues for PLB during functional tasks. Pt on 2 L O2 throughout session, sats 96% at rest.  Pt drop to 89% with toileting and LE ADLs, recover quickly to 94% with pursed lip breathing. Feel pt will benefit from skilled OT to maximize functional return. Unsure if pt is safe home alone as she had to be re-admitted to hospital after less than one day home. Pt will likely do well with [address] [place], [place] [address] and family supervision. If family unable to provide, will need possible LTC consideration. Current Level of Function Impacting Discharge (ADLs/self-care): CGA    Functional Outcome Measure: The patient scored Total: 50/100 on the Barthel Index outcome measure which is indicative of 50% impaired ability to care for basic self needs/dependency on others; Other factors to consider for discharge: pt just finished SNF stay, home less than 24 hours     Patient will benefit from skilled therapy intervention to address the above noted impairments. PLAN :  Recommendations and Planned Interventions: self care training, therapeutic exercise, therapeutic activities, endurance activities, patient education, and home safety training    Frequency/Duration: Patient will be followed by occupational therapy 4 times a week to address goals. Recommendation for discharge: (in order for the patient to meet his/her long term goals)  Occupational therapy at least 2 days/week in the home AND ensure assist and/or supervision for safety with ADLs/IADLs vs. SNF/LTC     This discharge recommendation:  Has not yet been discussed the attending provider and/or case management    IF patient discharges home will need the following DME: bedside commode and walker: rolling       SUBJECTIVE:   Patient stated I sure did enjoy smoking before I quit.     OBJECTIVE DATA SUMMARY:   HISTORY:   Past Medical History:   Diagnosis Date    Arthritis     generalized    COPD     Essential hypertension 9/4/2018    former smoker      >60pkyr quit 1/3/11    h/o DVT 1955    left leg    ischemic left lower extremitiy pain     above knee FemPop 1/3/11    PVD (peripheral vascular disease) (HCC)     Seasonal allergic rhinitis     Single kidney      Past Surgical History:   Procedure Laterality Date    BREAST SURGERY PROCEDURE UNLISTED  1955    excision left breast cyst    HX GI  10 yrs ago    colonoscopy    HX GYN      3 miscarriges    HX GYN      1 vaginal birth    [facility] ORTHOP[facility]EDIC      veinography left leg, stent left leg       Expanded or extensive additional review of patient history:     Home Situation  Home Environment: Private residence  # Steps to Enter: 3  Rails to Enter: Yes  Hand Rails : Left  One/Two Story Residence: One story  Living Alone: Yes(son is nearby but works during the day)  New Fabi: Child(genna)  Patient Expects to be Discharged to[de-identified] Private residence  Current DME Used/Available at Home: Commode, bedside, Walker, rolling, Oxygen, portable  Tub or Shower Type: Tub/Shower combination(pt been sponge bathing)    Hand dominance: Right    EXAMINATION OF PERFORMANCE DEFICITS:  Cognitive/Behavioral Status:  Neurologic State: Alert  Orientation Level: Appropriate for age  Cognition: Appropriate decision making; Follows commands  Perception: Appears intact  Perseveration: No perseveration noted  Safety/Judgement: Awareness of environment; Fall prevention    Skin: UE intact    Edema: UE intact    Hearing: Auditory  Auditory Impairment: None    Vision/Perceptual:                           Acuity: Within Defined Limits         Range of Motion:    AROM: Generally decreased, functional  PROM: Generally decreased, functional                      Strength:    Strength: Generally decreased, functional                Coordination:  Coordination: Generally decreased, functional  Fine Motor Skills-Upper: Left Intact; Right Intact    Gross Motor Skills-Upper: Left Intact; Right Intact    Tone & Sensation:    Tone: Normal  Sensation: Intact Balance:  Sitting: Intact  Standing: Impaired  Standing - Static: Good;Constant support  Standing - Dynamic : Fair;Constant support    Functional Mobility and Transfers for ADLs:  Bed Mobility:  Rolling: (NT, received, returned in chair)    Transfers:  Sit to Stand: Contact guard assistance  Stand to Sit: Contact guard assistance  Bathroom Mobility: Contact guard assistance  Toilet Transfer : Minimum assistance  Assistive Device : Gait Belt;Walker, rolling    ADL Assessment:  Feeding: Setup    Oral Facial Hygiene/Grooming: Setup    Bathing: Contact guard assistance    Upper Body Dressing: Setup    Lower Body Dressing: Contact guard assistance    Toileting: Contact guard assistance                ADL Intervention and task modifications:       Grooming  Grooming Assistance: Set-up  Brushing/Combing Hair: Set-up                   Lower Body Dressing Assistance  Dressing Assistance: Contact guard assistance  Underpants: Contact guard assistance; Compensatory technique training  Socks: Supervision; Compensatory technique training  Leg Crossed Method Used: Yes  Position Performed: Seated edge of bed;Seated in chair;Standing  Cues: Verbal cues provided;Visual cues provided    Toileting  Toileting Assistance: Contact guard assistance  Bladder Hygiene: Supervision; Compensatory technique training  Bowel Hygiene: Supervision; Compensatory technique training  Clothing Management: Contact guard assistance  Cues: Verbal cues provided;Visual cues provided  Adaptive Equipment: Grab bars; Walker    Cognitive Retraining  Safety/Judgement: Awareness of environment; Fall prevention    Therapeutic Exercise:     Functional Measure:  Barthel Index:    Bathin  Bladder: 5  Bowels: 10  Groomin  Dressin  Feeding: 10  Mobility: 0  Stairs: 0  Toilet Use: 5  Transfer (Bed to Chair and Back): 10  Total: 50/100        The Barthel ADL Index: Guidelines  1.  The index should be used as a record of what a patient does, not as a record of what a patient could do. 2. The main aim is to establish degree of independence from any help, physical or verbal, however minor and for whatever reason. 3. The need for supervision renders the patient not independent. 4. A patient's performance should be established using the best available evidence. Asking the patient, friends/relatives and nurses are the usual sources, but direct observation and common sense are also important. However direct testing is not needed. 5. Usually the patient's performance over the preceding 24-48 hours is important, but occasionally longer periods will be relevant. 6. Middle categories imply that the patient supplies over 50 per cent of the effort. 7. Use of aids to be independent is allowed. Nieves Roblero., Barthel, DAngelinaW. (0936). Functional evaluation: the Barthel Index. [address] [facility] (14)2. Lalit Hubbard casie HERNANDO Ortega, Rusty Solis., Reggie Durbin., [place], [address] [place] [address] (1999). Measuring the change indisability after inpatient rehabilitation; comparison of the responsiveness of the Barthel Index and Functional [place] Measure. Journal of Neurology, Neurosurgery, and Psychiatry, 66(4), 472-336. Suzanne Faust, N.J.A, JAGDISH Andersen, & Erin Yao, M.A. (2004.) Assessment of post-stroke quality of life in cost-effectiveness studies: The usefulness of the Barthel Index and the EuroQoL-5D. Quality of Life Research, 15, 429-66         Occupational Therapy Evaluation Charge Determination   History Examination Decision-Making   LOW Complexity : Brief history review  MEDIUM Complexity : 3-5 performance deficits relating to physical, cognitive , or psychosocial skils that result in activity limitations and / or participation restrictions MEDIUM Complexity : Patient may present with comorbidities that affect occupational performnce.  Miniml to moderate modification of tasks or assistance (eg, physical or verbal ) with assesment(s) is necessary to enable patient to complete evaluation Based on the above components, the patient evaluation is determined to be of the following complexity level: LOW   Pain Rating:  No c.o pain during session. Activity Tolerance:   desaturates with exertion and requires oxygen, requires frequent rest breaks, and observed SOB with activity  Please refer to the flowsheet for vital signs taken during this treatment. After treatment patient left in no apparent distress:    Sitting in chair, Call bell within reach, and Bed / chair alarm activated, RN aware    COMMUNICATION/EDUCATION:   The patients plan of care was discussed with: Registered Nurse. Home safety education was provided and the patient/caregiver indicated understanding. and Patient/family have participated as able in goal setting and plan of care. This patients plan of care is appropriate for delegation to [facility].     Thank you for this referral.  Denise Mckee OTR/BORA  Time Calculation: 30 mins

## 2020-01-25 NOTE — PROGRESS NOTES
Bedside and Verbal shift change report given to Genaro Atkins (oncoming nurse) by Juancarlos Alfred RN (offgoing nurse). Report included the following information SBAR, Kardex, Intake/Output, MAR and Recent Results.

## 2020-01-26 LAB
ANION GAP SERPL CALC-SCNC: 3 MMOL/L (ref 5–15)
BUN SERPL-MCNC: 20 MG/DL (ref 6–20)
BUN/CREAT SERPL: 25 (ref 12–20)
CALCIUM SERPL-MCNC: 8.7 MG/DL (ref 8.5–10.1)
CHLORIDE SERPL-SCNC: 100 MMOL/L (ref 97–108)
CO2 SERPL-SCNC: 28 MMOL/L (ref 21–32)
CREAT SERPL-MCNC: 0.8 MG/DL (ref 0.55–1.02)
ERYTHROCYTE [DISTWIDTH] IN BLOOD BY AUTOMATED COUNT: 14.1 % (ref 11.5–14.5)
GLUCOSE SERPL-MCNC: 89 MG/DL (ref 65–100)
HCT VFR BLD AUTO: 36.4 % (ref 35–47)
HGB BLD-MCNC: 11.8 G/DL (ref 11.5–16)
MCH RBC QN AUTO: 29.7 PG (ref 26–34)
MCHC RBC AUTO-ENTMCNC: 32.4 G/DL (ref 30–36.5)
MCV RBC AUTO: 91.7 FL (ref 80–99)
NRBC # BLD: 0 K/UL (ref 0–0.01)
NRBC BLD-RTO: 0 PER 100 WBC
PLATELET # BLD AUTO: 323 K/UL (ref 150–400)
PMV BLD AUTO: 9.2 FL (ref 8.9–12.9)
POTASSIUM SERPL-SCNC: 4.6 MMOL/L (ref 3.5–5.1)
RBC # BLD AUTO: 3.97 M/UL (ref 3.8–5.2)
SODIUM SERPL-SCNC: 131 MMOL/L (ref 136–145)
WBC # BLD AUTO: 9.5 K/UL (ref 3.6–11)

## 2020-01-26 PROCEDURE — 77030038269 HC DRN EXT URIN PURWCK BARD -A

## 2020-01-26 PROCEDURE — 74011250636 HC RX REV CODE- 250/636: Performed by: HOSPITALIST

## 2020-01-26 PROCEDURE — 74011000250 HC RX REV CODE- 250: Performed by: NURSE PRACTITIONER

## 2020-01-26 PROCEDURE — 3331090001 HH PPS REVENUE CREDIT

## 2020-01-26 PROCEDURE — 74011250636 HC RX REV CODE- 250/636: Performed by: INTERNAL MEDICINE

## 2020-01-26 PROCEDURE — 74011250636 HC RX REV CODE- 250/636: Performed by: NURSE PRACTITIONER

## 2020-01-26 PROCEDURE — 36415 COLL VENOUS BLD VENIPUNCTURE: CPT

## 2020-01-26 PROCEDURE — 94640 AIRWAY INHALATION TREATMENT: CPT

## 2020-01-26 PROCEDURE — 65270000029 HC RM PRIVATE

## 2020-01-26 PROCEDURE — 74011250637 HC RX REV CODE- 250/637: Performed by: GENERAL ACUTE CARE HOSPITAL

## 2020-01-26 PROCEDURE — 94760 N-INVAS EAR/PLS OXIMETRY 1: CPT

## 2020-01-26 PROCEDURE — 74011636637 HC RX REV CODE- 636/637: Performed by: GENERAL ACUTE CARE HOSPITAL

## 2020-01-26 PROCEDURE — 74011250637 HC RX REV CODE- 250/637: Performed by: NURSE PRACTITIONER

## 2020-01-26 PROCEDURE — 80048 BASIC METABOLIC PNL TOTAL CA: CPT

## 2020-01-26 PROCEDURE — 74011250636 HC RX REV CODE- 250/636: Performed by: GENERAL ACUTE CARE HOSPITAL

## 2020-01-26 PROCEDURE — 77010033678 HC OXYGEN DAILY

## 2020-01-26 PROCEDURE — 3331090002 HH PPS REVENUE DEBIT

## 2020-01-26 PROCEDURE — 85027 COMPLETE CBC AUTOMATED: CPT

## 2020-01-26 PROCEDURE — 74011250637 HC RX REV CODE- 250/637: Performed by: HOSPITALIST

## 2020-01-26 RX ORDER — HYDRALAZINE HYDROCHLORIDE 20 MG/ML
10 INJECTION INTRAMUSCULAR; INTRAVENOUS ONCE
Status: COMPLETED | OUTPATIENT
Start: 2020-01-26 | End: 2020-01-26

## 2020-01-26 RX ORDER — LORAZEPAM 1 MG/1
1 TABLET ORAL ONCE
Status: COMPLETED | OUTPATIENT
Start: 2020-01-26 | End: 2020-01-26

## 2020-01-26 RX ORDER — FUROSEMIDE 10 MG/ML
40 INJECTION INTRAMUSCULAR; INTRAVENOUS ONCE
Status: COMPLETED | OUTPATIENT
Start: 2020-01-26 | End: 2020-01-26

## 2020-01-26 RX ORDER — CEPHALEXIN 250 MG/1
500 CAPSULE ORAL 2 TIMES DAILY
Status: DISCONTINUED | OUTPATIENT
Start: 2020-01-26 | End: 2020-01-27 | Stop reason: HOSPADM

## 2020-01-26 RX ORDER — FUROSEMIDE 10 MG/ML
20 INJECTION INTRAMUSCULAR; INTRAVENOUS ONCE
Status: COMPLETED | OUTPATIENT
Start: 2020-01-26 | End: 2020-01-26

## 2020-01-26 RX ADMIN — FUROSEMIDE 40 MG: 10 INJECTION, SOLUTION INTRAMUSCULAR; INTRAVENOUS at 18:00

## 2020-01-26 RX ADMIN — CEPHALEXIN 500 MG: 250 CAPSULE ORAL at 18:01

## 2020-01-26 RX ADMIN — METOPROLOL TARTRATE 25 MG: 25 TABLET ORAL at 20:08

## 2020-01-26 RX ADMIN — GUAIFENESIN 600 MG: 600 TABLET, EXTENDED RELEASE ORAL at 09:13

## 2020-01-26 RX ADMIN — ENOXAPARIN SODIUM 40 MG: 40 INJECTION SUBCUTANEOUS at 11:21

## 2020-01-26 RX ADMIN — IPRATROPIUM BROMIDE AND ALBUTEROL SULFATE 3 ML: .5; 3 SOLUTION RESPIRATORY (INHALATION) at 04:55

## 2020-01-26 RX ADMIN — ASPIRIN 162 MG: 81 TABLET ORAL at 09:12

## 2020-01-26 RX ADMIN — CEPHALEXIN 500 MG: 250 CAPSULE ORAL at 11:21

## 2020-01-26 RX ADMIN — LORAZEPAM 1 MG: 1 TABLET ORAL at 11:21

## 2020-01-26 RX ADMIN — METOPROLOL TARTRATE 25 MG: 25 TABLET ORAL at 09:13

## 2020-01-26 RX ADMIN — Medication 10 ML: at 05:28

## 2020-01-26 RX ADMIN — PREDNISONE 40 MG: 20 TABLET ORAL at 09:12

## 2020-01-26 RX ADMIN — Medication 10 ML: at 18:01

## 2020-01-26 RX ADMIN — FUROSEMIDE 20 MG: 10 INJECTION, SOLUTION INTRAMUSCULAR; INTRAVENOUS at 05:34

## 2020-01-26 RX ADMIN — ACETAMINOPHEN 650 MG: 325 TABLET ORAL at 09:13

## 2020-01-26 RX ADMIN — Medication 10 ML: at 22:07

## 2020-01-26 RX ADMIN — GUAIFENESIN 600 MG: 600 TABLET, EXTENDED RELEASE ORAL at 18:01

## 2020-01-26 RX ADMIN — LISINOPRIL 5 MG: 5 TABLET ORAL at 09:13

## 2020-01-26 RX ADMIN — ACETAMINOPHEN 650 MG: 325 TABLET ORAL at 05:41

## 2020-01-26 RX ADMIN — HYDRALAZINE HYDROCHLORIDE 10 MG: 20 INJECTION INTRAMUSCULAR; INTRAVENOUS at 04:48

## 2020-01-26 NOTE — PROGRESS NOTES
Patient does not want to go back to [facility] for [address], pt requests to look at other options, pt is interested in [facility] for rehab.

## 2020-01-26 NOTE — PROGRESS NOTES
Bedside and Verbal shift change report given to Genaro Atkins (oncoming nurse) by Estrellita Toure RN (offgoing nurse). Report included the following information SBAR, Kardex, Intake/Output, MAR and Recent Results.

## 2020-01-26 NOTE — PROGRESS NOTES
0200 - Pt up to bedside commode. SOB upon returning to bed. Labs drawn and BP checked as it was high earlier in the shift. Dynamap resulted at 211/104. Rechecked manually with result of 182/86. Pt resting comfortably. 0405 - Pt with further difficulty breathing. Up to bedside commode with difficulty. Pt stating that she is not urinating as much as she should be. /83. Request sent to on call MD for PRN BP medication. Pt's O2 bumped up to 3L to increase saturations that were sitting at 91%. 8219 - Pt returned to bed with great difficulty breathing. Respiratory therapy paged to deliver breathing treatment. Pt with audible wheezes and some crackles in lungs. IV Fluids stopped. 0430 - Respiratory at bedside to give breathing treatment. Pt saturations at 95%-96%. O2 backed to 2L. Pt satting well. Still complaining of difficulty breathing. 1323- Pt complaining of increased strain to breath after breathing treatment completed. Pt O2 sat at 97%. 3979 - Request for diuretic sent to on call MD.     Edelmira Rico - Order received for lasix. Lasix given. 0600 - Pt urinating well following lasix dose. Breathing more comfortably at this time. Bedside shift change report given to Ankita Cortes (oncoming nurse) by Shawnee Mccoy (offgoing nurse). Report included the following information SBAR, Kardex, Intake/Output, MAR and Recent Results.

## 2020-01-26 NOTE — PROGRESS NOTES
I reviewed pertinent labs and imaging, and discussed /agreed on the plan of care with Dr. Sarah Lazaro. Hospitalist Progress Note    NAME: Lucina Hendrickson   :  11/10/1931   MRN:  [id number]     Assessment / Plan:  CM following for placement; patient stable for discharge once placement is arranged    Acute on chronic hypoxic respiratory failure secondary to COPD exacerbation, POA  Weakness, POA  · On chronic O2 at 2L NC; currently on 3L  · Continue with duonebs and steroids  · Continue Mucinex   · CTA negative for PE or pneumonia; showed significant emphysematous disease  · CM consulted for discharge; home vs LTC   · PT/OT consulted   · Patient reports feeling weak and volume overloaded during night shift. One dose of IV lasix given. Hyponatremia, POA  · Has a hx of the same  · Na 131  · FR 1200 ml/day  · Monitor  · No mental status changes noted     LLE cellulitis  PVD  Chronic left ankle wound  · Continue Keflex  · Wound care consulted     HTN  · Continue lisinopril     18.5 - 24.9 Normal weight / Body mass index is 21.86 kg/m². Code status: DNR  Prophylaxis: Lovenox  Recommended Disposition: TBD     Subjective:     Chief Complaint / Reason for Physician Visit  \"I still feel so weak and full of fluid. \" Discussed with RN events overnight. Review of Systems:  Symptom Y/N Comments  Symptom Y/N Comments   Fever/Chills n   Chest Pain n    Poor Appetite    Edema     Cough n   Abdominal Pain n    Sputum n   Joint Pain     SOB/GODINEZ y   Pruritis/Rash     Nausea/vomit    Tolerating PT/OT     Diarrhea    Tolerating Diet     Constipation    Other       Could NOT obtain due to:      Objective:     VITALS:   Last 24hrs VS reviewed since prior progress note.  Most recent are:  Patient Vitals for the past 24 hrs:   Temp Pulse Resp BP SpO2   20 0831 98.2 °F (36.8 °C) 75 16 137/66 95 %   20 0538    168/72 96 %   20 0454     97 %   20 0249    186/82    20 2252 97.5 °F (36.4 °C) 69 18 181/79 96 %   01/25/20 1509 98.8 °F (37.1 °C) 70 18 136/69 95 %       Intake/Output Summary (Last 24 hours) at 1/26/2020 1028  Last data filed at 1/26/2020 6371  Gross per 24 hour   Intake 1270 ml   Output    Net 1270 ml        PHYSICAL EXAM:  General: Chronically ill-appearing elderly  female. NAD.     EENT:  EOMI. Anicteric sclerae. MMM  Resp:  Lungs diminished in bases, coarse throughout, no rales. No accessory muscle use  CV:  Regular rate rhythm,  No edema  GI:  Soft, Non distended, Non tender.  +Bowel sounds  Neurologic:  Alert and oriented X 3, normal speech,   Psych:   Good insight. Not anxious nor agitated  Skin:  No rashes. No jaundice    Reviewed most current lab test results and cultures  YES  Reviewed most current radiology test results   YES  Review and summation of old records today    NO  Reviewed patient's current orders and MAR    YES  PMH/SH reviewed - no change compared to H&P  ________________________________________________________________________  Care Plan discussed with:    Comments   Patient x    Family      RN x    Care Manager     Consultant                        Multidiciplinary team rounds were held today with , nursing, pharmacist and clinical coordinator. Patient's plan of care was discussed; medications were reviewed and discharge planning was addressed. ________________________________________________________________________  Total NON critical care TIME:  25   Minutes    Total CRITICAL CARE TIME Spent:   Minutes non procedure based      Comments   >50% of visit spent in counseling and coordination of care     ________________________________________________________________________  Alexandre Lemon NP     Procedures: see electronic medical records for all procedures/Xrays and details which were not copied into this note but were reviewed prior to creation of Plan. LABS:  I reviewed today's most current labs and imaging studies.   Pertinent labs include:  Recent Labs     01/26/20 0223   WBC 9.5   HGB 11.8   HCT 36.4        Recent Labs     01/26/20 0223   *   K 4.6      CO2 28   GLU 89   BUN 20   CREA 0.80   CA 8.7       Signed: Hunt Peabody, NP

## 2020-01-27 VITALS
SYSTOLIC BLOOD PRESSURE: 177 MMHG | BODY MASS INDEX: 21.79 KG/M2 | RESPIRATION RATE: 20 BRPM | HEIGHT: 68 IN | OXYGEN SATURATION: 94 % | WEIGHT: 143.8 LBS | TEMPERATURE: 98.4 F | HEART RATE: 82 BPM | DIASTOLIC BLOOD PRESSURE: 72 MMHG

## 2020-01-27 PROBLEM — R00.1 SINUS BRADYCARDIA: Status: RESOLVED | Noted: 2018-07-28 | Resolved: 2020-01-27

## 2020-01-27 PROBLEM — L03.116 CELLULITIS OF LEFT LOWER EXTREMITY: Status: ACTIVE | Noted: 2020-01-27

## 2020-01-27 PROBLEM — N17.9 ACUTE RENAL FAILURE (ARF) (HCC): Status: RESOLVED | Noted: 2017-06-04 | Resolved: 2020-01-27

## 2020-01-27 PROBLEM — J20.9 BRONCHITIS, ACUTE: Status: RESOLVED | Noted: 2017-06-04 | Resolved: 2020-01-27

## 2020-01-27 PROBLEM — R53.1 WEAKNESS: Status: ACTIVE | Noted: 2020-01-27

## 2020-01-27 PROBLEM — E87.1 HYPONATREMIA: Status: ACTIVE | Noted: 2020-01-27

## 2020-01-27 PROBLEM — A41.9 SEPSIS (HCC): Status: RESOLVED | Noted: 2017-06-04 | Resolved: 2020-01-27

## 2020-01-27 LAB
ANION GAP SERPL CALC-SCNC: 6 MMOL/L (ref 5–15)
BUN SERPL-MCNC: 20 MG/DL (ref 6–20)
BUN/CREAT SERPL: 26 (ref 12–20)
CALCIUM SERPL-MCNC: 8.5 MG/DL (ref 8.5–10.1)
CHLORIDE SERPL-SCNC: 100 MMOL/L (ref 97–108)
CO2 SERPL-SCNC: 30 MMOL/L (ref 21–32)
CREAT SERPL-MCNC: 0.77 MG/DL (ref 0.55–1.02)
GLUCOSE SERPL-MCNC: 85 MG/DL (ref 65–100)
POTASSIUM SERPL-SCNC: 3.7 MMOL/L (ref 3.5–5.1)
SODIUM SERPL-SCNC: 136 MMOL/L (ref 136–145)

## 2020-01-27 PROCEDURE — 80048 BASIC METABOLIC PNL TOTAL CA: CPT

## 2020-01-27 PROCEDURE — 36415 COLL VENOUS BLD VENIPUNCTURE: CPT

## 2020-01-27 PROCEDURE — 74011250637 HC RX REV CODE- 250/637: Performed by: GENERAL ACUTE CARE HOSPITAL

## 2020-01-27 PROCEDURE — 3331090002 HH PPS REVENUE DEBIT

## 2020-01-27 PROCEDURE — 74011636637 HC RX REV CODE- 636/637: Performed by: GENERAL ACUTE CARE HOSPITAL

## 2020-01-27 PROCEDURE — 77010033678 HC OXYGEN DAILY

## 2020-01-27 PROCEDURE — 74011250637 HC RX REV CODE- 250/637: Performed by: HOSPITALIST

## 2020-01-27 PROCEDURE — 3331090001 HH PPS REVENUE CREDIT

## 2020-01-27 PROCEDURE — 74011250637 HC RX REV CODE- 250/637: Performed by: NURSE PRACTITIONER

## 2020-01-27 PROCEDURE — 94760 N-INVAS EAR/PLS OXIMETRY 1: CPT

## 2020-01-27 RX ORDER — CEPHALEXIN 500 MG/1
500 CAPSULE ORAL 2 TIMES DAILY
Qty: 10 CAP | Refills: 0 | Status: SHIPPED | OUTPATIENT
Start: 2020-01-27 | End: 2020-02-01

## 2020-01-27 RX ORDER — PREDNISONE 20 MG/1
TABLET ORAL
Qty: 11 TAB | Refills: 0 | Status: SHIPPED | OUTPATIENT
Start: 2020-01-28 | End: 2020-02-05

## 2020-01-27 RX ADMIN — PREDNISONE 40 MG: 20 TABLET ORAL at 09:28

## 2020-01-27 RX ADMIN — METOPROLOL TARTRATE 25 MG: 25 TABLET ORAL at 09:28

## 2020-01-27 RX ADMIN — CEPHALEXIN 500 MG: 250 CAPSULE ORAL at 09:28

## 2020-01-27 RX ADMIN — ASPIRIN 162 MG: 81 TABLET ORAL at 09:28

## 2020-01-27 RX ADMIN — GUAIFENESIN 600 MG: 600 TABLET, EXTENDED RELEASE ORAL at 09:28

## 2020-01-27 RX ADMIN — Medication 10 ML: at 05:26

## 2020-01-27 RX ADMIN — ALUMINUM HYDROXIDE, MAGNESIUM HYDROXIDE, AND SIMETHICONE 30 ML: 200; 200; 20 SUSPENSION ORAL at 12:27

## 2020-01-27 RX ADMIN — LISINOPRIL 5 MG: 5 TABLET ORAL at 09:28

## 2020-01-27 RX ADMIN — ACETAMINOPHEN 650 MG: 325 TABLET ORAL at 09:38

## 2020-01-27 NOTE — DISCHARGE INSTRUCTIONS
HOSPITALIST DISCHARGE INSTRUCTIONS    NAME: Zac Jalloh   :  11/10/1931   MRN:  [id number]     Date/Time:  2020 10:03 AM    ADMIT DATE: 2020   DISCHARGE DATE: 2020     Attending Physician: Faustino Steward NP    DISCHARGE DIAGNOSIS:  TEDDY/Nayla Hebert [address] Problems    Diagnosis Date Noted    Weakness 2020    Hyponatremia 2020    Cellulitis of left lower extremity 2020    Acute on chronic respiratory failure with hypoxia [facility] 2020    Essential hypertension 2018    PVD (peripheral vascular disease) ([facility]     COPD (chronic obstructive pulmonary disease) ([facility] 2012       Medications: Per above medication reconciliation. Pain Management: per above medications    Recommended diet: Regular Diet    Recommended activity: Activity as tolerated    Wound care: None    Indwelling devices:  None    Supplemental Oxygen: Chronic Oxygen,  2-3  LNC at baseline    Required Lab work: Per SNF routine    Glucose management:  Accucheck ACHS with sliding scale per SNF protocol    Code status: DNR        Outside physician follow up: Follow-up Information     Follow up With Specialties Details Why Contact Info    Марина Griggs MD Internal Medicine   [address] [id number] [phone]      Trevor Simon MD Pulmonary Disease Schedule an appointment as soon as possible for a visit  [address] [facility]  [facility]  Slade Andrea [address] [place] [address]  [phone]                 Skilled nursing facility/ SNF MD responsible for above on discharge. Information obtained by :  I understand that if any problems occur once I am at home I am to contact my physician. I understand and acknowledge receipt of the instructions indicated above.                                                                                                                                            Physician's or R.N.'s Signature Date/Time                                                                                                                                              Patient or Repres

## 2020-01-27 NOTE — PROGRESS NOTES
Bedside and Verbal shift change report given to Genaro Atkins (oncoming nurse) by Rebecca Pedro RN (offgoing nurse). Report included the following information SBAR, Kardex, Intake/Output, MAR and Recent Results.

## 2020-01-27 NOTE — PROGRESS NOTES
CM verified patient has a qualifying hospital stay for [facility]. Initial Inpatient Order was submitted on 1/23/2020. Marjo Record  Flores Swanson, [address] - ED [facility]  Advanced Steps ACP Facilitator  Zone Phone: [phone]      Mobile: [phone]

## 2020-01-27 NOTE — PROGRESS NOTES
10a  All in agreement with [facility] ambulance at [facility]. D/C plans discussed with Elio Melara, NP, staff, pt, son, and [facility] SNF. Please call report to [phone], send emar, d/c instructions, Rx for narcotics if any, facesheet//[facility] form//DDNR(on clipboard), and ensure pt has had lunch prior to departure. She will go into room 410. Thanks    Transition of Care Plan to SNF/Rehab    SNF/Rehab Transition:  Patient has been accepted to [facility] and meets criteria for admission. Patient will transported by [facility]. Communication to Patient/Family:  Met with patient and son Damon (identified care giver) and they are agreeable to the transition plan. Communication to SNF/Rehab:  Bedside RN, Johana Orta, has been notified to update the transition plan to the facility and call report as above  Discharge information has been updated on the AVS.     Discharge instructions to be fax'd to facility at (in cclink    SNF/Rehab Transition:  Patient to follow-up with,none)  PCP/Specialist: medical director  Ambulatory Care Management:    Reviewed and confirmed with facility, Karolina they can manage the patient care needs for the following:     [place] with (X) only those applicable:    Medication:  []  Medications will be available at the facility  []  IV Antibiotics   []  Controlled Substance - hard copy to be sent with patient   []  Weekly Labs   Documents:  [] Hard RX  [x] MAR  [] Kardex  [x] AVS  []Transfer Summary  []Discharge   Equipment:  []  CPAP/BiPAP  []  Wound Vacuum  []  P[place]on or Urinary Device  []  PICC/Central Line  []  Nebulizer  []  Ventilator   Treatment:  []Isolation (for MRSA, VRE, etc.)  []Surgical Drain Management  []Tracheostomy Care  []Dressing Changes  []Dialysis with transportation and chair time .   []PEG Care  []Oxygen  []Daily Weights for Heart Failure   Dietary:  []Any diet limitations  []Tube Feedings   []Total Parenteral Management (TPN)   Eligible for Medicaid Long Term Services and Supports  Yes:  [] Eligible for medical assistance or will become eligible within 180 days and UAI completed. [] Provider/Patient and/or support system has requested screening. [] UAI copy provided to patient or responsible party, .  [] UAI unavailable at discharge will send once processed to SNF provider. [] UAI unavailable at discharged mailed to patient  No:   [x] Private pay and is not financially eligible for Medicaid within the next 180 days. [] Reside out-of-state. [] A residents of a state owned/operated facility that is licensed  by [address] [facility] or [facility]  [] Enrollment in [facility] hospice services  [] [address]  [] Patient /Family declines to have screening completed or provide financial information for screening     Financial Resources:  Medicaid    [] Initiated and application pending   [] Full coverage     Advanced Care Plan:  []Surrogate Decision Maker of Care  []POA  [x]Communicated Code Status (DDNR\",)    Other           [address] [place] [address] SNF can accept today into a private room. Karolina, admissions, is contacting the son. She would need [facility] transport secondary to oxygen. DDNR has been completed. She has 68 skilled days remaining. EDMOND:  -see 1/23 CM note  -chart cclink'd to [facility] SNF to see how many skilled days remaining.~60days  -will need KATHARINE orders for [facility] if pt dc'd home  -Please complete DDNR prior to d/c  -ascertain name of Justyn Forrester  Son, Damon, called me, and they do not want [facility]. They want [address] [facility], private room, and may transition her to LTC out of pocket expense while there. Chart cclink'd to them.

## 2020-01-27 NOTE — PROGRESS NOTES
Bedside shift change report given to Ignacio Vázquez (oncoming nurse) by Celina Rowe (offgoing nurse). Report included the following information SBAR, Kardex, Intake/Output, MAR and Recent Results. Pt rested quietly through night. No complaints of SOB during shift.

## 2020-01-27 NOTE — DISCHARGE SUMMARY
Hospitalist Discharge Summary     Patient ID:  Fransico Diop  [id number]  80 y.o.  11/10/1931  1/23/2020    PCP on record: Cole Pace MD    Admit date: 1/23/2020  Discharge date and time: 1/27/2020    DISCHARGE DIAGNOSIS:    Active Hospital Problems    Diagnosis Date Noted    Weakness 01/27/2020    Hyponatremia 01/27/2020    Cellulitis of left lower extremity 01/27/2020    Acute on chronic respiratory failure with hypoxia ([facility] 01/23/2020    Essential hypertension 09/04/2018    PVD (peripheral vascular disease) ([facility]     COPD (chronic obstructive pulmonary disease) ([facility] 02/13/2012     CONSULTATIONS:  None    Excerpted HPI from H&P of Raoul Simmonds, MD:  Gilberto Krueger is a 80 y.o.  female who presents with CC listed above. Of note, pt was previously admitted to [facility] with similar complaints and discharged to rehab in December. Pt has been at rehab this entire time, and was only discharged yesterday back to her home. She woke up this morning complaining of worsening SOB and difficulty breathing and therefore EMS was called. Pt's family at bedside state that the pt was \"probably not ready\" to be discharged from rehab. Since her arrival to the ER, pt endorses improvement in her symptoms but she is still significantly below her baseline. \"  ______________________________________________________________________  DISCHARGE SUMMARY/HOSPITAL COURSE:  for full details see H&P, daily progress notes, labs, consult notes.    Claudette Bowman y.o. female was admitted to [facility] on 1/23/2020 and treated for the following medical complaints:     Acute on chronic hypoxic respiratory failure secondary to COPD exacerbation, POA  Weakness, POA  · On chronic O2 at 2L NC; currently on 3L  · Continue with duonebs and steroids  · Continue Mucinex   · CTA negative for PE or pneumonia; showed significant emphysematous disease  · CM consulted for discharge; home vs LTC   · PT/OT consulted    Hyponatremia, POA- resolved     LLE cellulitis  PVD  Chronic left ankle wound  · Continue Keflex  · Wound care consulted      HTN  · Continue lisinopril      Patient's plan of care has been reviewed with them. Patient and/or family have verbally conveyed their understanding and agreement of the patient's signs, symptoms, diagnosis, treatment and prognosis and additionally agree to follow up as recommended or return to [facility] should their condition change prior to follow-up. Discharge instructions have also been provided to the patient with some educational information regarding their diagnosis as well a list of reasons why they would want to return to the office prior to their follow-up appointment should their condition change. Lucian Adair to avoid frequent ED visits. [facility] can treat; pains, sprains, cuts, wounds, high fevers, upper respiratory infections and much more. There medical team is equipped with all the tools necessary to provide advanced medical care in the comfort of you home, workplace, or location of need. The medical team consists of doctors, nurse practitioners, and EMTs. [facility] [employer] [employer] is available 7 days per week 9 am to 9 pm.   Request care by calling [phone] or by going online at [facility]  _______________________________________________________________________  Patient seen and examined by me on discharge day. Pertinent Findings:  Gen:    Not in distress  Chest: Clear lungs  CVS:   Regular rhythm. No edema  Abd:  Soft, not distended, not tender  Neuro:  Alert, oriented  _______________________________________________________________________  DISCHARGE MEDICATIONS:   Current Discharge Medication List      START taking these medications    Details   cephALEXin (KEFLEX) 500 mg capsule Take 1 Cap by mouth two (2) times a day for 5 days.   Qty: 10 Cap, Refills: 0      predniSONE (DELTASONE) 20 mg tablet Take 40 mg by mouth daily (with breakfast) for 3 days, THEN 20 mg daily (with breakfast) for 5 days. Qty: 11 Tab, Refills: 0         CONTINUE these medications which have NOT CHANGED    Details   alum-mag hydroxide-simeth (MYLANTA) 200-200-20 mg/5 mL susp Take 30 mL by mouth every evening. acetaminophen (TYLENOL) 325 mg tablet Take 2 Tabs by mouth every four (4) hours as needed for Pain or Fever. Qty: 40 Tab, Refills: 0      furosemide (LASIX) 20 mg tablet Take 1 Tab by mouth daily. Qty: 30 Tab, Refills: 1      guaiFENesin (ROBITUSSIN) 100 mg/5 mL liquid Take 20 mL by mouth three (3) times daily. Qty: 473 mL, Refills: 0      metoprolol tartrate (LOPRESSOR) 25 mg tablet Take 1 Tab by mouth every twelve (12) hours. Qty: 60 Tab, Refills: 1      ipratropium-albuterol (COMBIVENT RESPIMAT)  mcg/actuation inhaler Take 1 Puff by inhalation four (4) times daily. loratadine (CLARITIN) 10 mg tablet Take 10 mg by mouth daily. lisinopril (PRINIVIL, ZESTRIL) 5 mg tablet TAKE ONE TABLET BY MOUTH EVERY DAY  Qty: 30 Tab, Refills: 3    Comments: $      azithromycin (ZITHROMAX) 250 mg tablet Take 250 mg by mouth every Monday, Wednesday, Friday. ANORO ELLIPTA 62.5-25 mcg/actuation inhaler Take 1 Puff by inhalation daily. aspirin delayed-release 81 mg tablet Take 162 mg by mouth daily. polyethylene glycol (MIRALAX) 17 gram/dose powder Take 17 g by mouth daily as needed (constipation). albuterol-ipratropium (DUO-NEB) 2.5 mg-0.5 mg/3 ml nebu 3 mL by Nebulization route every four (4) hours as needed for Wheezing or Shortness of Breath. calcium carbonate (TUMS) 200 mg calcium (500 mg) chew Take 0.5 Tabs by mouth daily as needed (stomach upset). STOP taking these medications       polyethylene glycol (MIRALAX) 17 gram packet Comments:   Reason for Stopping:                 Patient Follow Up Instructions:    Activity: Activity as tolerated  Diet: Regular Diet  Wound Care: As directed    Follow-up with PCP in 1 week.     Follow-up Information     Follow up With Specialties Details Why Contact Info    Marsha Haro MD Internal Medicine   [phone]      Sreedhar Yu MD Pulmonary Disease Schedule an appointment as soon as possible for a visit  [phone] [facility]  [facility]  [facility] [address]  [phone]          ________________________________________________________________    Risk of deterioration: Moderate    Condition at Discharge:  Stable  __________________________________________________________________    Disposition  SNF/LTC    ____________________________________________________________________    Code Status: DNR/DNI  ___________________________________________________________________      Total time in minutes spent coordinating this discharge (includes going over instructions, follow-up, prescriptions, and preparing report for sign off to her PCP) :  31 minutes    Signed:  Mayte Burnham NP

## 2020-01-27 NOTE — PROGRESS NOTES
Hospital to [facility] [address] [address]                                                                        80 y.o.   female    [facility]   Room: [facility]5/    MRM 3 MED TELE  Unit Phone# :  [phone]      [address]  MRM 3 MED TELE  [address] [facility]  [place] [address]  Dept: [phone]  Loc: [phone]                    SITUATION     Admitted:  1/23/2020         Attending Provider:  Nicole Boo MD       Consultations:  None    PCP:  Rosales Harris MD   [phone]    Treatment Team: Attending Provider: Nicole Boo MD; Care Manager: Tyrone Ward; Utilization Review: Smitha Marcus RN; Primary Nurse: Indigo Eastman RN    Admitting Dx:  Acute on chronic respiratory failure with hypoxia [facility]) [J96.21]       Principal Problem: Acute on chronic [facility]    * No surgery found * of      BY: * Surgery not found *             ON: * No surgery found *                  Code Status: DNR                Advance Directives:   Advance Care Planning 1/23/2020   Patient's Healthcare Decision Maker is: Legal Next of Len 69   Primary Decision Maker Name -   Primary Decision Maker Phone Number -   Primary Decision Maker Relationship to Patient -   Confirm Advance Directive Yes, on file   Does the patient have other document types Power of     (Send w/patient)   Not Received       Isolation:  There are currently no Active Isolations       MDRO: No current active infections    Pain Medications given:  na    Last dose: na    Special Equipment needed: no  Type of equipment:         BACKGROUND     Allergies:   Allergies   Allergen Reactions    Food Extracts Diarrhea     Onions and garlic causes abdominal pain,cramping     Sulfa (Sulfonamide Antibiotics) Other (comments)     Altered Mental Status       Past Medical History:   Diagnosis Date    Acute renal failure (ARF) ([facility] 6/4/2017    Arthritis     generalized    Bronchitis, acute 6/4/2017    COPD     Essential hypertension 9/4/2018    former smoker      >60pkyr quit 1/3/11    h/o DVT 1955    left leg    ischemic left lower extremitiy pain     above knee FemPop 1/3/11    PVD (peripheral vascular disease) ([facility]     Seasonal allergic rhinitis     Sepsis ([facility] 6/4/2017    Single kidney     Sinus bradycardia 7/28/2018       Past Surgical History:   Procedure Laterality Date    BREAST SURGERY PROCEDURE UNLISTED  1955    excision left breast cyst    HX GI  10 yrs ago    colonoscopy    HX GYN      3 miscarriges    HX GYN      1 vaginal birth   [facility]      veinography left leg, stent left leg       Medications Prior to Admission   Medication Sig    alum-mag hydroxide-simeth (MYLANTA) 200-200-20 mg/5 mL susp Take 30 mL by mouth every evening.  acetaminophen (TYLENOL) 325 mg tablet Take 2 Tabs by mouth every four (4) hours as needed for Pain or Fever.  furosemide (LASIX) 20 mg tablet Take 1 Tab by mouth daily.  guaiFENesin (ROBITUSSIN) 100 mg/5 mL liquid Take 20 mL by mouth three (3) times daily.  metoprolol tartrate (LOPRESSOR) 25 mg tablet Take 1 Tab by mouth every twelve (12) hours.  ipratropium-albuterol (COMBIVENT RESPIMAT)  mcg/actuation inhaler Take 1 Puff by inhalation four (4) times daily.  loratadine (CLARITIN) 10 mg tablet Take 10 mg by mouth daily.  lisinopril (PRINIVIL, ZESTRIL) 5 mg tablet TAKE ONE TABLET BY MOUTH EVERY DAY    azithromycin (ZITHROMAX) 250 mg tablet Take 250 mg by mouth every Monday, Wednesday, Friday.  ANORO ELLIPTA 62.5-25 mcg/actuation inhaler Take 1 Puff by inhalation daily.  aspirin delayed-release 81 mg tablet Take 162 mg by mouth daily.  polyethylene glycol (MIRALAX) 17 gram/dose powder Take 17 g by mouth daily as needed (constipation).     albuterol-ipratropium (DUO-NEB) 2.5 mg-0.5 mg/3 ml nebu 3 mL by Nebulization route every four (4) hours as needed for Wheezing or Shortness of Breath.  calcium carbonate (TUMS) 200 mg calcium (500 mg) chew Take 0.5 Tabs by mouth daily as needed (stomach upset). Hard scripts included in transfer packet no    Vaccinations:    Immunization History   Administered Date(s) Administered    (RETIRED) Pneumococcal Vaccine (Unspecified Type) 01/03/2011    Influenza Vaccine 11/13/2012    Influenza Vaccine Whole 10/01/2010       Readmission Risks:    Known Risks: Fall        The Charlson CoMorbitiy Index tool is an evidenced based tool that has more automatic generated information. The tool looks at many different items such as the age of the patient, how many times they were admitted in the last calendar year, current length of stay in the hospital and their diagnosis. All of these items are pulled automatically from information documented in the chart from various places and will generate a score that predicts whether a patient is at low (less than 13), medium (13-20) or high (21 or greater) risk of being readmitted.         ASSESSMENT                Temp: 98.4 °F (36.9 °C) (01/27/20 0724) Pulse (Heart Rate): 82 (01/27/20 0724)     Resp Rate: 20(2L) (01/27/20 0724)           BP: 177/72 (01/27/20 0724)     O2 Sat (%): 94 % (01/27/20 0724)     Weight: 65.2 kg (143 lb 12.8 oz)    Height: 5' 8\" (172.7 cm) (01/23/20 0545)       If above not within 1 hour of discharge:    BP:_____  P:____  R:____ T:_____ O2 Sat: ___%  O2: ______    Active Orders   Diet    DIET REGULAR FR 1200ML         Orientation: oriented to time, place, person and situation     Active Behaviors: None                                   Active Lines/Drains:  (Peg Tube / Peterson / CL or S/L?): no    Urinary Status: External catheter     Last BM: Last Bowel Movement Date: 01/26/20     Skin Integrity: Wound (add Wound LDA)             Mobility: Slightly limited   Weight Bearing Status: WBAT (Weight Bearing as Tolerated)      Gait Training  Assistive Device: Walker, rolling, Gait belt  Ambulation - Level of Assistance: Contact guard assistance  Distance (ft): (10 feet x 2 then 30 feet)         Lab Results   Component Value Date/Time    Glucose 85 01/27/2020 03:49 AM    INR 1.1 03/15/2011 10:40 AM    INR 1.1 03/09/2011 06:20 PM    HGB 11.8 01/26/2020 02:23 AM    HGB 12.3 01/23/2020 06:02 AM        RECOMMENDATION     See After Visit Summary (AVS) for:  · Discharge instructions  · After [address]   · Special equipment needed (entered pre-discharge by Care Management)  · Medication Reconciliation    · Follow up Appointment(s)         Report given/sent by:  hSad Daly RN                    Verbal report given to: Karime Johnston RN  FAXED to:  [facility]         Estimated discharge time:  1/27/2020 at 1200

## 2020-01-28 LAB
BACTERIA SPEC CULT: NORMAL
SERVICE CMNT-IMP: NORMAL

## 2020-01-28 PROCEDURE — 3331090001 HH PPS REVENUE CREDIT

## 2020-01-28 PROCEDURE — 3331090002 HH PPS REVENUE DEBIT

## 2020-01-29 PROCEDURE — 3331090002 HH PPS REVENUE DEBIT

## 2020-01-29 PROCEDURE — 3331090001 HH PPS REVENUE CREDIT

## 2020-01-30 PROCEDURE — 3331090002 HH PPS REVENUE DEBIT

## 2020-01-30 PROCEDURE — 3331090001 HH PPS REVENUE CREDIT

## 2020-01-31 PROCEDURE — 3331090001 HH PPS REVENUE CREDIT

## 2020-01-31 PROCEDURE — 3331090002 HH PPS REVENUE DEBIT

## 2020-02-01 PROCEDURE — 3331090002 HH PPS REVENUE DEBIT

## 2020-02-01 PROCEDURE — 3331090001 HH PPS REVENUE CREDIT

## 2020-02-02 PROCEDURE — 3331090001 HH PPS REVENUE CREDIT

## 2020-02-02 PROCEDURE — 3331090002 HH PPS REVENUE DEBIT

## 2020-02-03 PROCEDURE — 3331090002 HH PPS REVENUE DEBIT

## 2020-02-03 PROCEDURE — 3331090001 HH PPS REVENUE CREDIT

## 2020-02-04 PROCEDURE — 3331090002 HH PPS REVENUE DEBIT

## 2020-02-04 PROCEDURE — 3331090001 HH PPS REVENUE CREDIT

## 2020-02-05 PROCEDURE — 3331090002 HH PPS REVENUE DEBIT

## 2020-02-05 PROCEDURE — 3331090001 HH PPS REVENUE CREDIT

## 2020-02-10 ENCOUNTER — TELEPHONE (OUTPATIENT)
Dept: INTERNAL MEDICINE CLINIC | Age: 85
End: 2020-02-10

## 2020-02-10 NOTE — TELEPHONE ENCOUNTER
Son of patient called in stating that his mother will be release in the next 1 or 2 from [facility]. Patient will need a follow up visit in the next coming weeks.   [address]

## 2020-02-19 ENCOUNTER — HOME HEALTH ADMISSION (OUTPATIENT)
Dept: HOME HEALTH SERVICES | Facility: HOME HEALTH | Age: 85
End: 2020-02-19
Payer: MEDICARE

## 2020-02-20 ENCOUNTER — HOME CARE VISIT (OUTPATIENT)
Dept: HOME HEALTH SERVICES | Facility: HOME HEALTH | Age: 85
End: 2020-02-20
Payer: MEDICARE

## 2020-02-20 ENCOUNTER — HOME CARE VISIT (OUTPATIENT)
Dept: SCHEDULING | Facility: HOME HEALTH | Age: 85
End: 2020-02-20
Payer: MEDICARE

## 2020-02-20 VITALS
BODY MASS INDEX: 19.46 KG/M2 | DIASTOLIC BLOOD PRESSURE: 72 MMHG | RESPIRATION RATE: 18 BRPM | OXYGEN SATURATION: 95 % | WEIGHT: 128 LBS | SYSTOLIC BLOOD PRESSURE: 124 MMHG | HEART RATE: 81 BPM | TEMPERATURE: 97.2 F

## 2020-02-20 PROCEDURE — G0299 HHS/HOSPICE OF RN EA 15 MIN: HCPCS

## 2020-02-20 PROCEDURE — 400013 HH SOC

## 2020-02-20 PROCEDURE — 3331090002 HH PPS REVENUE DEBIT

## 2020-02-20 PROCEDURE — 3331090001 HH PPS REVENUE CREDIT

## 2020-02-21 ENCOUNTER — HOME CARE VISIT (OUTPATIENT)
Dept: SCHEDULING | Facility: HOME HEALTH | Age: 85
End: 2020-02-21
Payer: MEDICARE

## 2020-02-21 PROCEDURE — 3331090001 HH PPS REVENUE CREDIT

## 2020-02-21 PROCEDURE — G0151 HHCP-SERV OF PT,EA 15 MIN: HCPCS

## 2020-02-21 PROCEDURE — 3331090002 HH PPS REVENUE DEBIT

## 2020-02-22 ENCOUNTER — HOME CARE VISIT (OUTPATIENT)
Dept: SCHEDULING | Facility: HOME HEALTH | Age: 85
End: 2020-02-22
Payer: MEDICARE

## 2020-02-22 VITALS
SYSTOLIC BLOOD PRESSURE: 130 MMHG | DIASTOLIC BLOOD PRESSURE: 60 MMHG | HEART RATE: 72 BPM | RESPIRATION RATE: 22 BRPM | OXYGEN SATURATION: 95 % | TEMPERATURE: 98.2 F

## 2020-02-22 PROCEDURE — 3331090002 HH PPS REVENUE DEBIT

## 2020-02-22 PROCEDURE — 3331090001 HH PPS REVENUE CREDIT

## 2020-02-22 PROCEDURE — G0299 HHS/HOSPICE OF RN EA 15 MIN: HCPCS

## 2020-02-23 ENCOUNTER — HOME CARE VISIT (OUTPATIENT)
Dept: HOME HEALTH SERVICES | Facility: HOME HEALTH | Age: 85
End: 2020-02-23
Payer: MEDICARE

## 2020-02-23 VITALS
DIASTOLIC BLOOD PRESSURE: 87 MMHG | HEART RATE: 76 BPM | OXYGEN SATURATION: 97 % | SYSTOLIC BLOOD PRESSURE: 130 MMHG | TEMPERATURE: 98 F | RESPIRATION RATE: 19 BRPM

## 2020-02-23 PROCEDURE — 3331090001 HH PPS REVENUE CREDIT

## 2020-02-23 PROCEDURE — 3331090002 HH PPS REVENUE DEBIT

## 2020-02-24 ENCOUNTER — HOME CARE VISIT (OUTPATIENT)
Dept: SCHEDULING | Facility: HOME HEALTH | Age: 85
End: 2020-02-24
Payer: MEDICARE

## 2020-02-24 VITALS
TEMPERATURE: 98.3 F | HEART RATE: 85 BPM | OXYGEN SATURATION: 95 % | SYSTOLIC BLOOD PRESSURE: 176 MMHG | DIASTOLIC BLOOD PRESSURE: 96 MMHG

## 2020-02-24 PROCEDURE — G0153 HHCP-SVS OF S/L PATH,EA 15MN: HCPCS

## 2020-02-24 PROCEDURE — G0299 HHS/HOSPICE OF RN EA 15 MIN: HCPCS

## 2020-02-24 PROCEDURE — G0156 HHCP-SVS OF AIDE,EA 15 MIN: HCPCS

## 2020-02-24 PROCEDURE — 3331090002 HH PPS REVENUE DEBIT

## 2020-02-24 PROCEDURE — 3331090001 HH PPS REVENUE CREDIT

## 2020-02-25 ENCOUNTER — OFFICE VISIT (OUTPATIENT)
Dept: INTERNAL MEDICINE CLINIC | Age: 85
End: 2020-02-25

## 2020-02-25 VITALS
TEMPERATURE: 98.2 F | HEART RATE: 72 BPM | OXYGEN SATURATION: 95 % | RESPIRATION RATE: 22 BRPM | SYSTOLIC BLOOD PRESSURE: 150 MMHG | DIASTOLIC BLOOD PRESSURE: 80 MMHG

## 2020-02-25 VITALS
SYSTOLIC BLOOD PRESSURE: 136 MMHG | RESPIRATION RATE: 19 BRPM | HEIGHT: 68 IN | BODY MASS INDEX: 19.82 KG/M2 | HEART RATE: 78 BPM | TEMPERATURE: 98.1 F | OXYGEN SATURATION: 95 % | WEIGHT: 130.8 LBS | DIASTOLIC BLOOD PRESSURE: 86 MMHG

## 2020-02-25 DIAGNOSIS — L03.116 CELLULITIS OF LEFT LOWER EXTREMITY: ICD-10-CM

## 2020-02-25 DIAGNOSIS — R53.1 WEAKNESS: ICD-10-CM

## 2020-02-25 DIAGNOSIS — I10 ESSENTIAL HYPERTENSION: Chronic | ICD-10-CM

## 2020-02-25 DIAGNOSIS — E87.1 HYPONATREMIA: Chronic | ICD-10-CM

## 2020-02-25 DIAGNOSIS — Z99.81 CHRONIC RESPIRATORY FAILURE WITH HYPOXIA, ON HOME OXYGEN THERAPY (HCC): Chronic | ICD-10-CM

## 2020-02-25 DIAGNOSIS — I27.21 MODERATE PULMONARY ARTERIAL SYSTOLIC HYPERTENSION (HCC): Chronic | ICD-10-CM

## 2020-02-25 DIAGNOSIS — J44.1 CHRONIC OBSTRUCTIVE PULMONARY DISEASE WITH ACUTE EXACERBATION (HCC): Primary | Chronic | ICD-10-CM

## 2020-02-25 DIAGNOSIS — I73.9 PVD (PERIPHERAL VASCULAR DISEASE) (HCC): ICD-10-CM

## 2020-02-25 DIAGNOSIS — J96.11 CHRONIC RESPIRATORY FAILURE WITH HYPOXIA, ON HOME OXYGEN THERAPY (HCC): Chronic | ICD-10-CM

## 2020-02-25 PROBLEM — J96.21 ACUTE ON CHRONIC RESPIRATORY FAILURE WITH HYPOXIA (HCC): Status: RESOLVED | Noted: 2020-01-23 | Resolved: 2020-02-25

## 2020-02-25 PROBLEM — Z90.5 SINGLE KIDNEY: Status: ACTIVE | Noted: 2020-02-25

## 2020-02-25 PROBLEM — Z86.718 HISTORY OF DVT (DEEP VEIN THROMBOSIS): Status: ACTIVE | Noted: 2020-02-25

## 2020-02-25 LAB
A-G RATIO,AGRAT: 1.6 RATIO
ALBUMIN SERPL-MCNC: 3.9 G/DL (ref 3.9–5.4)
ALP SERPL-CCNC: 62 U/L (ref 38–126)
ALT SERPL-CCNC: 10 U/L (ref 0–35)
ANION GAP SERPL CALC-SCNC: 6 MMOL/L
AST SERPL W P-5'-P-CCNC: 22 U/L (ref 14–36)
BILIRUB SERPL-MCNC: 0.5 MG/DL (ref 0.2–1.3)
BUN SERPL-MCNC: 27 MG/DL (ref 7–17)
BUN/CREATININE RATIO,BUCR: 34 RATIO
CALCIUM SERPL-MCNC: 9.8 MG/DL (ref 8.4–10.2)
CHLORIDE SERPL-SCNC: 99 MMOL/L (ref 98–107)
CO2 SERPL-SCNC: 30 MMOL/L (ref 22–32)
CREAT SERPL-MCNC: 0.8 MG/DL (ref 0.7–1.2)
GLOBULIN,GLOB: 2.5
GLUCOSE SERPL-MCNC: 74 MG/DL (ref 65–105)
POTASSIUM SERPL-SCNC: 5.5 MMOL/L (ref 3.6–5)
PROT SERPL-MCNC: 6.4 G/DL (ref 6.3–8.2)
SODIUM SERPL-SCNC: 135 MMOL/L (ref 137–145)

## 2020-02-25 PROCEDURE — 3331090002 HH PPS REVENUE DEBIT

## 2020-02-25 PROCEDURE — 3331090001 HH PPS REVENUE CREDIT

## 2020-02-25 NOTE — PROGRESS NOTES
This note will not be viewable in [address] Liang Pelletier is a 80 y.o. female and presents with Transitions Of Care [facility] 1/23/-1/27/2020 for cellulitis, hyponatremia,acute chronic respiratory failure)  . Subjective:  Mrs. Alicia Espinoza is an 19-year-old female who presents to the office today and transition of care subsequent to a hospitalization at [facility] from 1/23 until 1/27 at which time she was discharged to [facility] for skilled rehab from 1/27 until 2/19. She is a former patient of Dr. Shoaib Chairez. The patient's history is remarkable in that she had been admitted in December for decompensation of her COPD and respiratory failure and subsequently went to [address] for skilled rehab. She was discharged for 1 day prior to redeveloping weakness, hyponatremia, cellulitis of her left leg, acute on chronic respiratory failure and decompensation of her COPD. 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 The patient also had acute on chronic respiratory failure with hypoxemia which was treated with steroids, antibiotics, oxygen and nebulizers. She had a cellulitis of her left leg which was treated with antibiotics and wound care and she does have a history of peripheral vascular disease followed by Dr. Lisandra Barraza which was not problematic. The patient was discharged to [facility] where she did receive physical therapy and wound care however no blood work was done. She was discharged home 6 days ago where she lives alone. She is currently on oxygen at 2 L/min chronically and does ambulate throughout the house with the use of a walker. Her son and neighbors check on her. She does do her own cooking.   [facility] is following her for wound care but she declined physical and occupational therapy for the time being. Patient did have an echocardiogram done in December which revealed moderate to severe pulmonary hypertension and a normal ejection fraction. She has had no signs or symptoms of heart failure. The patient remains debilitated and weak related to her recent hospitalizations but is hopeful of getting back to her baseline level. Past Medical History:   Diagnosis Date    Acute renal failure (ARF) ([address] 6/4/2017    Arthritis     generalized    Bronchitis, acute 6/4/2017    COPD     Essential hypertension 9/4/2018    former smoker      >60pkyr quit 1/3/11    h/o DVT 1955    left leg    ischemic left lower extremitiy pain     above knee FemPop 1/3/11    PVD (peripheral vascular disease) ([facility]     Seasonal allergic rhinitis     Sepsis ([facility] 6/4/2017    Single kidney     Sinus bradycardia 7/28/2018     Past Surgical History:   Procedure Laterality Date    BREAST SURGERY PROCEDURE UNLISTED  1955    excision left breast cyst    HX GI  10 yrs ago    colonoscopy    HX GYN      3 miscarriges    HX GYN      1 vaginal birth   [facility]      veinography left leg, stent left leg     Allergies   Allergen Reactions    Food Extracts Diarrhea     Onions and garlic causes abdominal pain,cramping     Sulfa (Sulfonamide Antibiotics) Other (comments)     Altered Mental Status     Current Outpatient Medications   Medication Sig Dispense Refill    predniSONE (DELTASONE) 5 mg tablet Take 5 mg by mouth every evening.  OXYGEN-AIR DELIVERY SYSTEMS 2 L by Nasal route continuous.  polyethylene glycol (MIRALAX) 17 gram/dose powder Take 17 g by mouth daily as needed (constipation).  acetaminophen (TYLENOL) 325 mg tablet Take 2 Tabs by mouth every four (4) hours as needed for Pain or Fever. 40 Tab 0    albuterol-ipratropium (DUO-NEB) 2.5 mg-0.5 mg/3 ml nebu 3 mL by Nebulization route every four (4) hours as needed for Wheezing or Shortness of Breath.       ipratropium-albuterol (COMBIVENT RESPIMAT)  mcg/actuation inhaler Take 1 Puff by inhalation four (4) times daily.  loratadine (CLARITIN) 10 mg tablet Take 10 mg by mouth daily.  lisinopril (PRINIVIL, ZESTRIL) 5 mg tablet TAKE ONE TABLET BY MOUTH EVERY DAY 30 Tab 3    azithromycin (ZITHROMAX) 250 mg tablet Take 250 mg by mouth every Monday, Wednesday, Friday.  ANORO ELLIPTA 62.5-25 mcg/actuation inhaler Take 1 Puff by inhalation daily.  aspirin delayed-release 81 mg tablet Take 162 mg by mouth daily.        Social History     Socioeconomic History    Marital status:      Spouse name: Not on file    Number of children: Not on file    Years of education: Not on file    Highest education level: Not on file   Tobacco Use    Smoking status: Former Smoker     Packs/day: 1.00     Years: 60.00     Pack years: 60.00     Last attempt to quit: 1/3/2011     Years since quittin.1    Smokeless tobacco: Never Used   Substance and Sexual Activity    Alcohol use: Yes     Comment: occasional liquor after dinner    Drug use: No     Types: Prescription, OTC     Family History   Problem Relation Age of Onset    Colon Cancer Mother     Lung Cancer Sister        Health Maintenance   Topic Date Due    DTaP/Tdap/Td series (1 - Tdap) 11/10/1942    Shingrix Vaccine Age 50> (1 of 2) 11/10/1981    GLAUCOMA SCREENING Q2Y  11/10/1996    Bone Densitometry (Dexa) Screening  11/10/1996    Pneumococcal 65+ years (1 of 1 - PPSV23) 2016    Medicare Yearly Exam  2018    Influenza Age 9 to Adult  2019        Review of Systems  Constitutional: Positive for chronic debility eyes:   negative for visual disturbance and irritation  ENT:   negative for tinnitus,sore throat,nasal congestion,ear pain,hoarseness  Respiratory:  Positive for chronic shortness of breath CV:   negative for chest pain, palpitations, lower extremity edema  GI:   negative for nausea, vomiting, diarrhea, abdominal pain,melena  Endo: negative for polyuria,polydipsia,polyphagia,heat intolerance  Genitourinary: negative for frequency, dysuria and hematuria  Integumentary: Positive for healing wound left ankle  Hematologic:  negative for easy bruising and gum/nose bleeding  Musculoskel: negative for myalgias, arthralgias, back pain, muscle weakness, joint pain  Neurological:  negative for headaches, dizziness, vertigo, memory problems and gait   Behavl/Psych: negative for feelings of anxiety, depression, mood changes  ROS otherwise negative      Objective:  Visit Vitals  /86   Pulse 78   Temp 98.1 °F (36.7 °C) (Oral)   Resp 19   Ht 5' 8\" (1.727 m)   Wt 130 lb 12.8 oz (59.3 kg)   SpO2 95% Comment: 2 liters O2   BMI 19.89 kg/m²     Body mass index is 19.89 kg/m². Physical Exam:   General appearance - alert, well appearing, and in no distress  Mental status - alert, oriented to person, place, and time  EYE-MARCO A, EOMI,conjunctiva normal bilaterally, lids normal  ENT-ENT exam normal, no neck nodes or sinus tenderness  Nose - normal and patent, no erythema,  Or discharge   Mouth - mucous membranes moist, pharynx normal without lesions  Neck - supple, no significant adenopathy or bruit  Chest -poor air movement noted without active wheezing or chest congestion. Heart - normal rate, regular rhythm, normal S1, S2, no murmurs, rubs, clicks or gallops   Abdomen - soft, nontender, nondistended, no masses or organomegaly  Lymph- no adenopathy palpable  Ext-peripheral pulses not well felt.   1+ ankle edema present left greater than right  Skin-healing wound of left ankle noted without cellulitis  Neuro -alert, oriented, normal speech, no focal findings or movement disorder noted      Assessment/Plan:  Diagnoses and all orders for this visit:    Chronic obstructive pulmonary disease with acute exacerbation (HCC)    Chronic respiratory failure with hypoxia, on home oxygen therapy (HCC)    Weakness    Hyponatremia  -     CBC WITH AUTOMATED DIFF  - COLLECTION VENOUS BLOOD,VENIPUNCTURE  -     METABOLIC PANEL, COMPREHENSIVE    Cellulitis of left lower extremity    Essential hypertension    PVD (peripheral vascular disease) (HCC)    Moderate pulmonary arterial systolic hypertension (HCC)        Other instructions: The patient's medications were reviewed and reconciled. No change in her current medical regimen will be made. Continue O2 at 2 L/min continuously    Continue home health nursing for left ankle wound care    Consider start of home PT/OT    Continued pulmonary follow-up with Dr. MCCOY [facility] records from 1/23 until 1/27 were reviewed including progress notes, imaging studies, laboratory studies during the course of this office visit    Follow-up CBC and CMP will be obtained    Follow-up 4 weeks    Follow-up and Dispositions    · Return in about 4 weeks (around 3/24/2020). I have reviewed with the patient details of the assessment and plan and all questions were answered. Relevent patient education was performed. The most recent lab findings were reviewed with the patient. An After Visit Summary was printed and given to the patient. Ayala Patterson MD    Please note that this dictation was completed with [employer], the computer voice recognition software. Quite often unanticipated grammatical, syntax, homophones, and other interpretive errors are inadvertently transcribed by the computer software. Please disregard these errors. Please excuse any errors that have escaped final proofreading.

## 2020-02-25 NOTE — PATIENT INSTRUCTIONS
COPD Exacerbation Plan: Care Instructions Your Care Instructions If you have chronic obstructive pulmonary disease (COPD), your usual shortness of breath could suddenly get worse. You may start coughing more and have more mucus. This flare-up is called a COPD exacerbation (say \"[id number]-BAY-[id number]\"). A lung infection or air pollution could set off an exacerbation. Sometimes it can happen after a quick change in temperature or being around chemicals. Work with your doctor to make a plan for dealing with an exacerbation. You can better manage it if you plan ahead. Follow-up care is a key part of your treatment and safety. Be sure to make and go to all appointments, and call your doctor if you are having problems. It's also a good idea to know your test results and keep a list of the medicines you take. How can you care for yourself at home? During an exacerbation · Do not panic if you start to have one. Quick treatment at home may help you prevent serious breathing problems. If you have a COPD exacerbation plan that you developed with your doctor, follow it. · Take your medicines exactly as your doctor tells you. 
? Use your inhaler as directed by your doctor. If your symptoms do not get better after you use your medicine, have someone take you to the emergency room. Call an ambulance if necessary. ? With inhaled medicines, a spacer or a nebulizer may help you get more medicine to your lungs. Ask your doctor or pharmacist how to use them properly. Practice using the spacer in front of a mirror before you have an exacerbation. This may help you get the medicine into your lungs quickly. ? If your doctor has given you steroid pills, take them as directed. ? Your doctor may have given you a prescription for antibiotics, which you can fill if you need it. ? Talk to your doctor if you have any problems with your medicine. And call your doctor if you have to use your antibiotic or steroid pills. Preventing an exacerbation · Do not smoke. This is the most important step you can take to prevent more damage to your lungs and prevent problems. If you already smoke, it is never too late to stop. If you need help quitting, talk to your doctor about stop-smoking programs and medicines. These can increase your chances of quitting for good. · Take your daily medicines as prescribed. · Avoid colds and flu. ? Get a pneumococcal vaccine. ? Get a flu vaccine each year, as soon as it is available. Ask those you live or work with to do the same, so they will not get the flu and infect you. ? Try to stay away from people with colds or the flu. ? Wash your hands often. · Avoid secondhand smoke; air pollution; cold, dry air; hot, humid air; and high altitudes. Stay at home with your windows closed when air pollution is bad. · Learn breathing techniques for COPD, such as breathing through pursed lips. These techniques can help you breathe easier during an exacerbation. When should you call for help? Call 911 anytime you think you may need emergency care. For example, call if: 
  · You have severe trouble breathing.  
  · You have severe chest pain.  
 Call your doctor now or seek immediate medical care if: 
  · You have new or worse shortness of breath.  
  · You develop new chest pain.  
  · You are coughing more deeply or more often, especially if you notice more mucus or a change in the color of your mucus.  
  · You cough up blood.  
  · You have new or increased swelling in your legs or belly.  
  · You have a fever.  
 Watch closely for changes in your health, and be sure to contact your doctor if: 
  · You need to use your antibiotic or steroid pills.  
  · Your symptoms are getting worse. Where can you learn more? Go to http://von-johnna.info/. Enter O628 in the search box to learn more about \"COPD Exacerbation Plan: Care Instructions. \" Current as of: June 9, 2019 Content Version: 12.2 © 6209-0332 [employer]. Care instructions adapted under license by [employer] (which disclaims liability or warranty for this information). If you have questions about a medical condition or this instruction, always ask your healthcare professional. [address] any warranty or liability for your use of this information.

## 2020-02-25 NOTE — PROGRESS NOTES
Chief Complaint   [facility]   [facility] for cellulitis, hyponatremia,acute chronic respiratory failure     Visit Vitals  /80 (BP 1 Location: Left arm, BP Patient Position: Sitting)   Pulse 78   Temp 98.1 °F (36.7 °C) (Oral)   Resp 19   Ht 5' 8\" (1.727 m)   Wt 130 lb 12.8 oz (59.3 kg)   SpO2 95% Comment: 2 liters O2   BMI 19.89 kg/m²     1. Have you been to the ER, urgent care clinic since your last visit? Hospitalized since your last visit? ED [facility] 1/23-1/27/2020 for cellulitis, acute chronic respiratory failure    2. Have you seen or consulted any other health care providers outside of the [address] [place] [address] since your last visit? Include any pap smears or colon screening.    Dr. Dorys Rossi Listen

## 2020-02-26 ENCOUNTER — HOME CARE VISIT (OUTPATIENT)
Dept: SCHEDULING | Facility: HOME HEALTH | Age: 85
End: 2020-02-26
Payer: MEDICARE

## 2020-02-26 LAB
BASOPHILS # BLD AUTO: 0.1 X10E3/UL (ref 0–0.2)
BASOPHILS NFR BLD AUTO: 1 %
EOSINOPHIL # BLD AUTO: 0.3 X10E3/UL (ref 0–0.4)
EOSINOPHIL NFR BLD AUTO: 3 %
ERYTHROCYTE [DISTWIDTH] IN BLOOD BY AUTOMATED COUNT: 13.4 % (ref 11.7–15.4)
HCT VFR BLD AUTO: 39.7 % (ref 34–46.6)
HGB BLD-MCNC: 13.1 G/DL (ref 11.1–15.9)
IMM GRANULOCYTES # BLD AUTO: 0 X10E3/UL (ref 0–0.1)
IMM GRANULOCYTES NFR BLD AUTO: 0 %
LYMPHOCYTES # BLD AUTO: 0.7 X10E3/UL (ref 0.7–3.1)
LYMPHOCYTES NFR BLD AUTO: 7 %
MCH RBC QN AUTO: 30.5 PG (ref 26.6–33)
MCHC RBC AUTO-ENTMCNC: 33 G/DL (ref 31.5–35.7)
MCV RBC AUTO: 93 FL (ref 79–97)
MONOCYTES # BLD AUTO: 0.7 X10E3/UL (ref 0.1–0.9)
MONOCYTES NFR BLD AUTO: 7 %
NEUTROPHILS # BLD AUTO: 8.1 X10E3/UL (ref 1.4–7)
NEUTROPHILS NFR BLD AUTO: 82 %
PLATELET # BLD AUTO: 295 X10E3/UL (ref 150–450)
RBC # BLD AUTO: 4.29 X10E6/UL (ref 3.77–5.28)
WBC # BLD AUTO: 9.8 X10E3/UL (ref 3.4–10.8)

## 2020-02-26 PROCEDURE — G0299 HHS/HOSPICE OF RN EA 15 MIN: HCPCS

## 2020-02-26 PROCEDURE — G0156 HHCP-SVS OF AIDE,EA 15 MIN: HCPCS

## 2020-02-26 PROCEDURE — 3331090001 HH PPS REVENUE CREDIT

## 2020-02-26 PROCEDURE — 3331090002 HH PPS REVENUE DEBIT

## 2020-02-27 ENCOUNTER — HOME CARE VISIT (OUTPATIENT)
Dept: SCHEDULING | Facility: HOME HEALTH | Age: 85
End: 2020-02-27
Payer: MEDICARE

## 2020-02-27 VITALS
OXYGEN SATURATION: 95 % | SYSTOLIC BLOOD PRESSURE: 140 MMHG | HEART RATE: 65 BPM | RESPIRATION RATE: 22 BRPM | DIASTOLIC BLOOD PRESSURE: 80 MMHG | TEMPERATURE: 98.6 F

## 2020-02-27 PROCEDURE — 3331090002 HH PPS REVENUE DEBIT

## 2020-02-27 PROCEDURE — 3331090001 HH PPS REVENUE CREDIT

## 2020-02-28 ENCOUNTER — HOME CARE VISIT (OUTPATIENT)
Dept: HOME HEALTH SERVICES | Facility: HOME HEALTH | Age: 85
End: 2020-02-28
Payer: MEDICARE

## 2020-02-28 ENCOUNTER — HOME CARE VISIT (OUTPATIENT)
Dept: SCHEDULING | Facility: HOME HEALTH | Age: 85
End: 2020-02-28
Payer: MEDICARE

## 2020-02-28 VITALS
SYSTOLIC BLOOD PRESSURE: 130 MMHG | TEMPERATURE: 98.6 F | DIASTOLIC BLOOD PRESSURE: 80 MMHG | HEART RATE: 71 BPM | RESPIRATION RATE: 20 BRPM | OXYGEN SATURATION: 95 %

## 2020-02-28 PROCEDURE — 3331090001 HH PPS REVENUE CREDIT

## 2020-02-28 PROCEDURE — G0299 HHS/HOSPICE OF RN EA 15 MIN: HCPCS

## 2020-02-28 PROCEDURE — 3331090002 HH PPS REVENUE DEBIT

## 2020-02-29 PROCEDURE — 3331090001 HH PPS REVENUE CREDIT

## 2020-02-29 PROCEDURE — 3331090002 HH PPS REVENUE DEBIT

## 2020-03-01 PROCEDURE — 3331090002 HH PPS REVENUE DEBIT

## 2020-03-01 PROCEDURE — 3331090001 HH PPS REVENUE CREDIT

## 2020-03-02 ENCOUNTER — HOME CARE VISIT (OUTPATIENT)
Dept: SCHEDULING | Facility: HOME HEALTH | Age: 85
End: 2020-03-02
Payer: MEDICARE

## 2020-03-02 ENCOUNTER — HOME CARE VISIT (OUTPATIENT)
Dept: HOME HEALTH SERVICES | Facility: HOME HEALTH | Age: 85
End: 2020-03-02
Payer: MEDICARE

## 2020-03-02 VITALS
TEMPERATURE: 98.4 F | OXYGEN SATURATION: 95 % | HEART RATE: 69 BPM | RESPIRATION RATE: 22 BRPM | SYSTOLIC BLOOD PRESSURE: 120 MMHG | DIASTOLIC BLOOD PRESSURE: 80 MMHG

## 2020-03-02 PROCEDURE — 3331090001 HH PPS REVENUE CREDIT

## 2020-03-02 PROCEDURE — 3331090002 HH PPS REVENUE DEBIT

## 2020-03-02 PROCEDURE — G0299 HHS/HOSPICE OF RN EA 15 MIN: HCPCS

## 2020-03-03 PROCEDURE — 3331090001 HH PPS REVENUE CREDIT

## 2020-03-03 PROCEDURE — 3331090002 HH PPS REVENUE DEBIT

## 2020-03-04 ENCOUNTER — HOME CARE VISIT (OUTPATIENT)
Dept: SCHEDULING | Facility: HOME HEALTH | Age: 85
End: 2020-03-04
Payer: MEDICARE

## 2020-03-04 ENCOUNTER — TELEPHONE (OUTPATIENT)
Dept: INTERNAL MEDICINE CLINIC | Age: 85
End: 2020-03-04

## 2020-03-04 PROCEDURE — 3331090002 HH PPS REVENUE DEBIT

## 2020-03-04 PROCEDURE — G0156 HHCP-SVS OF AIDE,EA 15 MIN: HCPCS

## 2020-03-04 PROCEDURE — G0299 HHS/HOSPICE OF RN EA 15 MIN: HCPCS

## 2020-03-04 PROCEDURE — 3331090001 HH PPS REVENUE CREDIT

## 2020-03-04 NOTE — TELEPHONE ENCOUNTER
Silvana was asking if Dr. Jose Francisco Morales would like to put the patient on medication for 1-2+ edema in her lower extremities.   AK:[phone]

## 2020-03-05 ENCOUNTER — TELEPHONE (OUTPATIENT)
Dept: INTERNAL MEDICINE CLINIC | Age: 85
End: 2020-03-05

## 2020-03-05 VITALS
HEART RATE: 68 BPM | TEMPERATURE: 98.5 F | DIASTOLIC BLOOD PRESSURE: 80 MMHG | SYSTOLIC BLOOD PRESSURE: 140 MMHG | OXYGEN SATURATION: 95 % | RESPIRATION RATE: 20 BRPM

## 2020-03-05 PROCEDURE — 3331090001 HH PPS REVENUE CREDIT

## 2020-03-05 PROCEDURE — 3331090002 HH PPS REVENUE DEBIT

## 2020-03-05 NOTE — TELEPHONE ENCOUNTER
Patient called back and stated her blood pressure is now at 97/55     States if she closes her eyes she'll fall asleep     [phone]

## 2020-03-06 ENCOUNTER — HOME CARE VISIT (OUTPATIENT)
Dept: SCHEDULING | Facility: HOME HEALTH | Age: 85
End: 2020-03-06
Payer: MEDICARE

## 2020-03-06 PROCEDURE — 3331090002 HH PPS REVENUE DEBIT

## 2020-03-06 PROCEDURE — 3331090001 HH PPS REVENUE CREDIT

## 2020-03-06 PROCEDURE — G0299 HHS/HOSPICE OF RN EA 15 MIN: HCPCS

## 2020-03-06 PROCEDURE — G0156 HHCP-SVS OF AIDE,EA 15 MIN: HCPCS

## 2020-03-07 ENCOUNTER — HOME CARE VISIT (OUTPATIENT)
Dept: HOME HEALTH SERVICES | Facility: HOME HEALTH | Age: 85
End: 2020-03-07
Payer: MEDICARE

## 2020-03-07 PROCEDURE — 3331090001 HH PPS REVENUE CREDIT

## 2020-03-07 PROCEDURE — 3331090002 HH PPS REVENUE DEBIT

## 2020-03-08 VITALS
OXYGEN SATURATION: 94 % | RESPIRATION RATE: 22 BRPM | HEART RATE: 77 BPM | SYSTOLIC BLOOD PRESSURE: 130 MMHG | TEMPERATURE: 98.3 F | DIASTOLIC BLOOD PRESSURE: 80 MMHG

## 2020-03-08 PROCEDURE — 3331090001 HH PPS REVENUE CREDIT

## 2020-03-08 PROCEDURE — 3331090002 HH PPS REVENUE DEBIT

## 2020-03-09 ENCOUNTER — HOME CARE VISIT (OUTPATIENT)
Dept: SCHEDULING | Facility: HOME HEALTH | Age: 85
End: 2020-03-09
Payer: MEDICARE

## 2020-03-09 VITALS
DIASTOLIC BLOOD PRESSURE: 70 MMHG | TEMPERATURE: 98.7 F | HEART RATE: 72 BPM | SYSTOLIC BLOOD PRESSURE: 120 MMHG | OXYGEN SATURATION: 95 % | RESPIRATION RATE: 24 BRPM

## 2020-03-09 PROCEDURE — 3331090001 HH PPS REVENUE CREDIT

## 2020-03-09 PROCEDURE — 3331090002 HH PPS REVENUE DEBIT

## 2020-03-09 PROCEDURE — G0299 HHS/HOSPICE OF RN EA 15 MIN: HCPCS

## 2020-03-10 ENCOUNTER — HOME CARE VISIT (OUTPATIENT)
Dept: SCHEDULING | Facility: HOME HEALTH | Age: 85
End: 2020-03-10
Payer: MEDICARE

## 2020-03-10 PROCEDURE — G0156 HHCP-SVS OF AIDE,EA 15 MIN: HCPCS

## 2020-03-10 PROCEDURE — 3331090002 HH PPS REVENUE DEBIT

## 2020-03-10 PROCEDURE — 3331090001 HH PPS REVENUE CREDIT

## 2020-03-11 ENCOUNTER — HOME CARE VISIT (OUTPATIENT)
Dept: SCHEDULING | Facility: HOME HEALTH | Age: 85
End: 2020-03-11
Payer: MEDICARE

## 2020-03-11 ENCOUNTER — HOME CARE VISIT (OUTPATIENT)
Dept: HOME HEALTH SERVICES | Facility: HOME HEALTH | Age: 85
End: 2020-03-11
Payer: MEDICARE

## 2020-03-11 PROCEDURE — G0299 HHS/HOSPICE OF RN EA 15 MIN: HCPCS

## 2020-03-11 PROCEDURE — 3331090001 HH PPS REVENUE CREDIT

## 2020-03-11 PROCEDURE — 3331090002 HH PPS REVENUE DEBIT

## 2020-03-12 VITALS
OXYGEN SATURATION: 95 % | RESPIRATION RATE: 20 BRPM | TEMPERATURE: 98.6 F | DIASTOLIC BLOOD PRESSURE: 80 MMHG | HEART RATE: 68 BPM | SYSTOLIC BLOOD PRESSURE: 130 MMHG

## 2020-03-12 PROCEDURE — 3331090002 HH PPS REVENUE DEBIT

## 2020-03-12 PROCEDURE — 3331090001 HH PPS REVENUE CREDIT

## 2020-03-13 ENCOUNTER — HOME CARE VISIT (OUTPATIENT)
Dept: SCHEDULING | Facility: HOME HEALTH | Age: 85
End: 2020-03-13
Payer: MEDICARE

## 2020-03-13 VITALS
HEART RATE: 72 BPM | OXYGEN SATURATION: 95 % | TEMPERATURE: 98.2 F | DIASTOLIC BLOOD PRESSURE: 70 MMHG | RESPIRATION RATE: 18 BRPM | SYSTOLIC BLOOD PRESSURE: 120 MMHG

## 2020-03-13 PROCEDURE — G0156 HHCP-SVS OF AIDE,EA 15 MIN: HCPCS

## 2020-03-13 PROCEDURE — 3331090001 HH PPS REVENUE CREDIT

## 2020-03-13 PROCEDURE — 3331090002 HH PPS REVENUE DEBIT

## 2020-03-13 PROCEDURE — G0299 HHS/HOSPICE OF RN EA 15 MIN: HCPCS

## 2020-03-14 PROCEDURE — 3331090001 HH PPS REVENUE CREDIT

## 2020-03-14 PROCEDURE — 3331090002 HH PPS REVENUE DEBIT

## 2020-03-15 ENCOUNTER — HOME CARE VISIT (OUTPATIENT)
Dept: HOME HEALTH SERVICES | Facility: HOME HEALTH | Age: 85
End: 2020-03-15
Payer: MEDICARE

## 2020-03-15 PROCEDURE — 3331090001 HH PPS REVENUE CREDIT

## 2020-03-15 PROCEDURE — 3331090002 HH PPS REVENUE DEBIT

## 2020-03-16 ENCOUNTER — HOME CARE VISIT (OUTPATIENT)
Dept: SCHEDULING | Facility: HOME HEALTH | Age: 85
End: 2020-03-16
Payer: MEDICARE

## 2020-03-16 VITALS
DIASTOLIC BLOOD PRESSURE: 70 MMHG | OXYGEN SATURATION: 95 % | RESPIRATION RATE: 20 BRPM | SYSTOLIC BLOOD PRESSURE: 118 MMHG | TEMPERATURE: 98.3 F | HEART RATE: 71 BPM

## 2020-03-16 PROCEDURE — G0299 HHS/HOSPICE OF RN EA 15 MIN: HCPCS

## 2020-03-16 PROCEDURE — 3331090001 HH PPS REVENUE CREDIT

## 2020-03-16 PROCEDURE — 3331090002 HH PPS REVENUE DEBIT

## 2020-03-17 ENCOUNTER — HOME CARE VISIT (OUTPATIENT)
Dept: HOME HEALTH SERVICES | Facility: HOME HEALTH | Age: 85
End: 2020-03-17
Payer: MEDICARE

## 2020-03-17 ENCOUNTER — HOME CARE VISIT (OUTPATIENT)
Dept: SCHEDULING | Facility: HOME HEALTH | Age: 85
End: 2020-03-17
Payer: MEDICARE

## 2020-03-17 PROCEDURE — 3331090001 HH PPS REVENUE CREDIT

## 2020-03-17 PROCEDURE — 3331090002 HH PPS REVENUE DEBIT

## 2020-03-17 PROCEDURE — G0156 HHCP-SVS OF AIDE,EA 15 MIN: HCPCS

## 2020-03-18 ENCOUNTER — HOME CARE VISIT (OUTPATIENT)
Dept: SCHEDULING | Facility: HOME HEALTH | Age: 85
End: 2020-03-18
Payer: MEDICARE

## 2020-03-18 VITALS
TEMPERATURE: 98.5 F | OXYGEN SATURATION: 96 % | DIASTOLIC BLOOD PRESSURE: 80 MMHG | HEART RATE: 64 BPM | RESPIRATION RATE: 22 BRPM | SYSTOLIC BLOOD PRESSURE: 120 MMHG

## 2020-03-18 PROCEDURE — 3331090002 HH PPS REVENUE DEBIT

## 2020-03-18 PROCEDURE — 3331090001 HH PPS REVENUE CREDIT

## 2020-03-18 PROCEDURE — G0299 HHS/HOSPICE OF RN EA 15 MIN: HCPCS

## 2020-03-19 ENCOUNTER — HOME CARE VISIT (OUTPATIENT)
Dept: HOME HEALTH SERVICES | Facility: HOME HEALTH | Age: 85
End: 2020-03-19
Payer: MEDICARE

## 2020-03-19 PROCEDURE — 3331090001 HH PPS REVENUE CREDIT

## 2020-03-19 PROCEDURE — 3331090002 HH PPS REVENUE DEBIT

## 2020-03-20 ENCOUNTER — HOME CARE VISIT (OUTPATIENT)
Dept: SCHEDULING | Facility: HOME HEALTH | Age: 85
End: 2020-03-20
Payer: MEDICARE

## 2020-03-20 PROCEDURE — G0299 HHS/HOSPICE OF RN EA 15 MIN: HCPCS

## 2020-03-20 PROCEDURE — G0156 HHCP-SVS OF AIDE,EA 15 MIN: HCPCS

## 2020-03-20 PROCEDURE — 3331090001 HH PPS REVENUE CREDIT

## 2020-03-20 PROCEDURE — 3331090002 HH PPS REVENUE DEBIT

## 2020-03-21 VITALS
SYSTOLIC BLOOD PRESSURE: 120 MMHG | TEMPERATURE: 98.4 F | RESPIRATION RATE: 20 BRPM | DIASTOLIC BLOOD PRESSURE: 70 MMHG | OXYGEN SATURATION: 96 % | HEART RATE: 69 BPM

## 2020-03-21 PROCEDURE — 3331090001 HH PPS REVENUE CREDIT

## 2020-03-21 PROCEDURE — 3331090002 HH PPS REVENUE DEBIT

## 2020-03-22 ENCOUNTER — HOME CARE VISIT (OUTPATIENT)
Dept: HOME HEALTH SERVICES | Facility: HOME HEALTH | Age: 85
End: 2020-03-22
Payer: MEDICARE

## 2020-03-22 PROCEDURE — 3331090001 HH PPS REVENUE CREDIT

## 2020-03-22 PROCEDURE — 3331090002 HH PPS REVENUE DEBIT

## 2020-03-23 ENCOUNTER — HOME CARE VISIT (OUTPATIENT)
Dept: SCHEDULING | Facility: HOME HEALTH | Age: 85
End: 2020-03-23
Payer: MEDICARE

## 2020-03-23 PROCEDURE — G0299 HHS/HOSPICE OF RN EA 15 MIN: HCPCS

## 2020-03-23 PROCEDURE — 400013 HH SOC

## 2020-03-23 PROCEDURE — 3331090002 HH PPS REVENUE DEBIT

## 2020-03-23 PROCEDURE — 3331090001 HH PPS REVENUE CREDIT

## 2020-03-24 ENCOUNTER — HOME CARE VISIT (OUTPATIENT)
Dept: HOME HEALTH SERVICES | Facility: HOME HEALTH | Age: 85
End: 2020-03-24
Payer: MEDICARE

## 2020-03-24 VITALS
OXYGEN SATURATION: 94 % | SYSTOLIC BLOOD PRESSURE: 130 MMHG | DIASTOLIC BLOOD PRESSURE: 76 MMHG | TEMPERATURE: 98.7 F | RESPIRATION RATE: 22 BRPM | HEART RATE: 67 BPM

## 2020-03-24 PROCEDURE — 3331090002 HH PPS REVENUE DEBIT

## 2020-03-24 PROCEDURE — 3331090001 HH PPS REVENUE CREDIT

## 2020-03-25 ENCOUNTER — HOME CARE VISIT (OUTPATIENT)
Dept: SCHEDULING | Facility: HOME HEALTH | Age: 85
End: 2020-03-25
Payer: MEDICARE

## 2020-03-25 VITALS
SYSTOLIC BLOOD PRESSURE: 122 MMHG | OXYGEN SATURATION: 95 % | RESPIRATION RATE: 22 BRPM | HEART RATE: 63 BPM | TEMPERATURE: 98.4 F | DIASTOLIC BLOOD PRESSURE: 80 MMHG

## 2020-03-25 PROCEDURE — 3331090002 HH PPS REVENUE DEBIT

## 2020-03-25 PROCEDURE — 3331090001 HH PPS REVENUE CREDIT

## 2020-03-25 PROCEDURE — G0299 HHS/HOSPICE OF RN EA 15 MIN: HCPCS

## 2020-03-25 PROCEDURE — A6260 WOUND CLEANSER ANY TYPE/SIZE: HCPCS

## 2020-03-26 ENCOUNTER — HOME CARE VISIT (OUTPATIENT)
Dept: HOME HEALTH SERVICES | Facility: HOME HEALTH | Age: 85
End: 2020-03-26
Payer: MEDICARE

## 2020-03-26 PROCEDURE — 3331090001 HH PPS REVENUE CREDIT

## 2020-03-26 PROCEDURE — 3331090002 HH PPS REVENUE DEBIT

## 2020-03-27 ENCOUNTER — HOME CARE VISIT (OUTPATIENT)
Dept: SCHEDULING | Facility: HOME HEALTH | Age: 85
End: 2020-03-27
Payer: MEDICARE

## 2020-03-27 VITALS
OXYGEN SATURATION: 94 % | HEART RATE: 75 BPM | SYSTOLIC BLOOD PRESSURE: 120 MMHG | RESPIRATION RATE: 22 BRPM | DIASTOLIC BLOOD PRESSURE: 72 MMHG | TEMPERATURE: 98.5 F

## 2020-03-27 PROCEDURE — G0299 HHS/HOSPICE OF RN EA 15 MIN: HCPCS

## 2020-03-27 PROCEDURE — 3331090001 HH PPS REVENUE CREDIT

## 2020-03-27 PROCEDURE — 3331090002 HH PPS REVENUE DEBIT

## 2020-03-28 PROCEDURE — 3331090002 HH PPS REVENUE DEBIT

## 2020-03-28 PROCEDURE — 3331090001 HH PPS REVENUE CREDIT

## 2020-03-29 ENCOUNTER — HOME CARE VISIT (OUTPATIENT)
Dept: HOME HEALTH SERVICES | Facility: HOME HEALTH | Age: 85
End: 2020-03-29
Payer: MEDICARE

## 2020-03-29 PROCEDURE — 3331090002 HH PPS REVENUE DEBIT

## 2020-03-29 PROCEDURE — 3331090001 HH PPS REVENUE CREDIT

## 2020-03-30 ENCOUNTER — HOME CARE VISIT (OUTPATIENT)
Dept: SCHEDULING | Facility: HOME HEALTH | Age: 85
End: 2020-03-30
Payer: MEDICARE

## 2020-03-30 PROCEDURE — 3331090001 HH PPS REVENUE CREDIT

## 2020-03-30 PROCEDURE — G0299 HHS/HOSPICE OF RN EA 15 MIN: HCPCS

## 2020-03-30 PROCEDURE — 3331090002 HH PPS REVENUE DEBIT

## 2020-03-30 PROCEDURE — G0156 HHCP-SVS OF AIDE,EA 15 MIN: HCPCS

## 2020-03-31 VITALS
OXYGEN SATURATION: 95 % | DIASTOLIC BLOOD PRESSURE: 80 MMHG | SYSTOLIC BLOOD PRESSURE: 120 MMHG | RESPIRATION RATE: 22 BRPM | HEART RATE: 69 BPM | TEMPERATURE: 98 F

## 2020-03-31 PROCEDURE — 3331090002 HH PPS REVENUE DEBIT

## 2020-03-31 PROCEDURE — 3331090001 HH PPS REVENUE CREDIT

## 2020-04-01 ENCOUNTER — HOME CARE VISIT (OUTPATIENT)
Dept: SCHEDULING | Facility: HOME HEALTH | Age: 85
End: 2020-04-01
Payer: MEDICARE

## 2020-04-01 ENCOUNTER — HOME CARE VISIT (OUTPATIENT)
Dept: HOME HEALTH SERVICES | Facility: HOME HEALTH | Age: 85
End: 2020-04-01
Payer: MEDICARE

## 2020-04-01 PROCEDURE — 3331090002 HH PPS REVENUE DEBIT

## 2020-04-01 PROCEDURE — G0299 HHS/HOSPICE OF RN EA 15 MIN: HCPCS

## 2020-04-01 PROCEDURE — 3331090001 HH PPS REVENUE CREDIT

## 2020-04-02 ENCOUNTER — HOME CARE VISIT (OUTPATIENT)
Dept: SCHEDULING | Facility: HOME HEALTH | Age: 85
End: 2020-04-02
Payer: MEDICARE

## 2020-04-02 VITALS
RESPIRATION RATE: 18 BRPM | OXYGEN SATURATION: 96 % | TEMPERATURE: 98.5 F | DIASTOLIC BLOOD PRESSURE: 80 MMHG | HEART RATE: 73 BPM | SYSTOLIC BLOOD PRESSURE: 130 MMHG

## 2020-04-02 PROCEDURE — 3331090001 HH PPS REVENUE CREDIT

## 2020-04-02 PROCEDURE — 3331090002 HH PPS REVENUE DEBIT

## 2020-04-02 PROCEDURE — G0156 HHCP-SVS OF AIDE,EA 15 MIN: HCPCS

## 2020-04-03 ENCOUNTER — HOME CARE VISIT (OUTPATIENT)
Dept: HOME HEALTH SERVICES | Facility: HOME HEALTH | Age: 85
End: 2020-04-03
Payer: MEDICARE

## 2020-04-03 ENCOUNTER — HOME CARE VISIT (OUTPATIENT)
Dept: SCHEDULING | Facility: HOME HEALTH | Age: 85
End: 2020-04-03
Payer: MEDICARE

## 2020-04-03 PROCEDURE — 3331090002 HH PPS REVENUE DEBIT

## 2020-04-03 PROCEDURE — G0299 HHS/HOSPICE OF RN EA 15 MIN: HCPCS

## 2020-04-03 PROCEDURE — 3331090001 HH PPS REVENUE CREDIT

## 2020-04-04 PROCEDURE — 3331090001 HH PPS REVENUE CREDIT

## 2020-04-04 PROCEDURE — 3331090002 HH PPS REVENUE DEBIT

## 2020-04-05 VITALS
OXYGEN SATURATION: 94 % | TEMPERATURE: 98.5 F | SYSTOLIC BLOOD PRESSURE: 120 MMHG | HEART RATE: 60 BPM | RESPIRATION RATE: 20 BRPM | DIASTOLIC BLOOD PRESSURE: 70 MMHG

## 2020-04-05 PROCEDURE — 3331090002 HH PPS REVENUE DEBIT

## 2020-04-05 PROCEDURE — 3331090001 HH PPS REVENUE CREDIT

## 2020-04-06 ENCOUNTER — HOME CARE VISIT (OUTPATIENT)
Dept: HOME HEALTH SERVICES | Facility: HOME HEALTH | Age: 85
End: 2020-04-06
Payer: MEDICARE

## 2020-04-06 ENCOUNTER — HOME CARE VISIT (OUTPATIENT)
Dept: SCHEDULING | Facility: HOME HEALTH | Age: 85
End: 2020-04-06
Payer: MEDICARE

## 2020-04-06 VITALS
DIASTOLIC BLOOD PRESSURE: 70 MMHG | RESPIRATION RATE: 22 BRPM | OXYGEN SATURATION: 95 % | TEMPERATURE: 97.8 F | HEART RATE: 74 BPM | SYSTOLIC BLOOD PRESSURE: 114 MMHG

## 2020-04-06 PROCEDURE — 3331090002 HH PPS REVENUE DEBIT

## 2020-04-06 PROCEDURE — 3331090001 HH PPS REVENUE CREDIT

## 2020-04-06 PROCEDURE — G0299 HHS/HOSPICE OF RN EA 15 MIN: HCPCS

## 2020-04-07 ENCOUNTER — HOME CARE VISIT (OUTPATIENT)
Dept: SCHEDULING | Facility: HOME HEALTH | Age: 85
End: 2020-04-07
Payer: MEDICARE

## 2020-04-07 PROCEDURE — 3331090001 HH PPS REVENUE CREDIT

## 2020-04-07 PROCEDURE — G0156 HHCP-SVS OF AIDE,EA 15 MIN: HCPCS

## 2020-04-07 PROCEDURE — 3331090002 HH PPS REVENUE DEBIT

## 2020-04-08 ENCOUNTER — HOME CARE VISIT (OUTPATIENT)
Dept: HOME HEALTH SERVICES | Facility: HOME HEALTH | Age: 85
End: 2020-04-08
Payer: MEDICARE

## 2020-04-08 ENCOUNTER — HOME CARE VISIT (OUTPATIENT)
Dept: SCHEDULING | Facility: HOME HEALTH | Age: 85
End: 2020-04-08
Payer: MEDICARE

## 2020-04-08 VITALS
SYSTOLIC BLOOD PRESSURE: 120 MMHG | HEART RATE: 68 BPM | DIASTOLIC BLOOD PRESSURE: 80 MMHG | OXYGEN SATURATION: 96 % | TEMPERATURE: 98.4 F | RESPIRATION RATE: 22 BRPM

## 2020-04-08 PROCEDURE — 3331090001 HH PPS REVENUE CREDIT

## 2020-04-08 PROCEDURE — G0299 HHS/HOSPICE OF RN EA 15 MIN: HCPCS

## 2020-04-08 PROCEDURE — 3331090002 HH PPS REVENUE DEBIT

## 2020-04-09 ENCOUNTER — HOME CARE VISIT (OUTPATIENT)
Dept: SCHEDULING | Facility: HOME HEALTH | Age: 85
End: 2020-04-09
Payer: MEDICARE

## 2020-04-09 ENCOUNTER — HOME CARE VISIT (OUTPATIENT)
Dept: HOME HEALTH SERVICES | Facility: HOME HEALTH | Age: 85
End: 2020-04-09
Payer: MEDICARE

## 2020-04-09 PROCEDURE — 3331090002 HH PPS REVENUE DEBIT

## 2020-04-09 PROCEDURE — 3331090001 HH PPS REVENUE CREDIT

## 2020-04-09 PROCEDURE — G0156 HHCP-SVS OF AIDE,EA 15 MIN: HCPCS

## 2020-04-10 ENCOUNTER — HOME CARE VISIT (OUTPATIENT)
Dept: HOME HEALTH SERVICES | Facility: HOME HEALTH | Age: 85
End: 2020-04-10
Payer: MEDICARE

## 2020-04-10 ENCOUNTER — HOME CARE VISIT (OUTPATIENT)
Dept: SCHEDULING | Facility: HOME HEALTH | Age: 85
End: 2020-04-10
Payer: MEDICARE

## 2020-04-10 PROCEDURE — G0299 HHS/HOSPICE OF RN EA 15 MIN: HCPCS

## 2020-04-10 PROCEDURE — 3331090002 HH PPS REVENUE DEBIT

## 2020-04-10 PROCEDURE — 3331090001 HH PPS REVENUE CREDIT

## 2020-04-11 VITALS
TEMPERATURE: 98.7 F | HEART RATE: 78 BPM | OXYGEN SATURATION: 96 % | DIASTOLIC BLOOD PRESSURE: 71 MMHG | SYSTOLIC BLOOD PRESSURE: 149 MMHG | RESPIRATION RATE: 22 BRPM

## 2020-04-11 PROCEDURE — 3331090001 HH PPS REVENUE CREDIT

## 2020-04-11 PROCEDURE — 3331090002 HH PPS REVENUE DEBIT

## 2020-04-12 PROCEDURE — 3331090002 HH PPS REVENUE DEBIT

## 2020-04-12 PROCEDURE — 3331090001 HH PPS REVENUE CREDIT

## 2020-04-13 ENCOUNTER — HOME CARE VISIT (OUTPATIENT)
Dept: SCHEDULING | Facility: HOME HEALTH | Age: 85
End: 2020-04-13
Payer: MEDICARE

## 2020-04-13 ENCOUNTER — HOME CARE VISIT (OUTPATIENT)
Dept: HOME HEALTH SERVICES | Facility: HOME HEALTH | Age: 85
End: 2020-04-13
Payer: MEDICARE

## 2020-04-13 VITALS
HEART RATE: 77 BPM | DIASTOLIC BLOOD PRESSURE: 80 MMHG | SYSTOLIC BLOOD PRESSURE: 130 MMHG | OXYGEN SATURATION: 96 % | TEMPERATURE: 98.4 F | RESPIRATION RATE: 22 BRPM

## 2020-04-13 PROCEDURE — 3331090002 HH PPS REVENUE DEBIT

## 2020-04-13 PROCEDURE — G0299 HHS/HOSPICE OF RN EA 15 MIN: HCPCS

## 2020-04-13 PROCEDURE — 3331090001 HH PPS REVENUE CREDIT

## 2020-04-14 ENCOUNTER — HOME CARE VISIT (OUTPATIENT)
Dept: HOME HEALTH SERVICES | Facility: HOME HEALTH | Age: 85
End: 2020-04-14
Payer: MEDICARE

## 2020-04-14 ENCOUNTER — HOME CARE VISIT (OUTPATIENT)
Dept: SCHEDULING | Facility: HOME HEALTH | Age: 85
End: 2020-04-14
Payer: MEDICARE

## 2020-04-14 PROCEDURE — G0156 HHCP-SVS OF AIDE,EA 15 MIN: HCPCS

## 2020-04-14 PROCEDURE — 3331090001 HH PPS REVENUE CREDIT

## 2020-04-14 PROCEDURE — 3331090002 HH PPS REVENUE DEBIT

## 2020-04-15 ENCOUNTER — HOME CARE VISIT (OUTPATIENT)
Dept: HOME HEALTH SERVICES | Facility: HOME HEALTH | Age: 85
End: 2020-04-15
Payer: MEDICARE

## 2020-04-15 ENCOUNTER — HOME CARE VISIT (OUTPATIENT)
Dept: SCHEDULING | Facility: HOME HEALTH | Age: 85
End: 2020-04-15
Payer: MEDICARE

## 2020-04-15 VITALS
OXYGEN SATURATION: 96 % | DIASTOLIC BLOOD PRESSURE: 80 MMHG | TEMPERATURE: 98.3 F | RESPIRATION RATE: 22 BRPM | SYSTOLIC BLOOD PRESSURE: 130 MMHG | HEART RATE: 83 BPM

## 2020-04-15 PROCEDURE — 3331090001 HH PPS REVENUE CREDIT

## 2020-04-15 PROCEDURE — G0299 HHS/HOSPICE OF RN EA 15 MIN: HCPCS

## 2020-04-15 PROCEDURE — 3331090002 HH PPS REVENUE DEBIT

## 2020-04-16 ENCOUNTER — HOME CARE VISIT (OUTPATIENT)
Dept: HOME HEALTH SERVICES | Facility: HOME HEALTH | Age: 85
End: 2020-04-16
Payer: MEDICARE

## 2020-04-16 ENCOUNTER — HOME CARE VISIT (OUTPATIENT)
Dept: SCHEDULING | Facility: HOME HEALTH | Age: 85
End: 2020-04-16
Payer: MEDICARE

## 2020-04-16 PROCEDURE — G0156 HHCP-SVS OF AIDE,EA 15 MIN: HCPCS

## 2020-04-16 PROCEDURE — 3331090002 HH PPS REVENUE DEBIT

## 2020-04-16 PROCEDURE — 3331090001 HH PPS REVENUE CREDIT

## 2020-04-17 ENCOUNTER — HOME CARE VISIT (OUTPATIENT)
Dept: SCHEDULING | Facility: HOME HEALTH | Age: 85
End: 2020-04-17
Payer: MEDICARE

## 2020-04-17 ENCOUNTER — HOME CARE VISIT (OUTPATIENT)
Dept: HOME HEALTH SERVICES | Facility: HOME HEALTH | Age: 85
End: 2020-04-17
Payer: MEDICARE

## 2020-04-17 PROCEDURE — G0299 HHS/HOSPICE OF RN EA 15 MIN: HCPCS

## 2020-04-17 PROCEDURE — 3331090002 HH PPS REVENUE DEBIT

## 2020-04-17 PROCEDURE — A4450 NON-WATERPROOF TAPE: HCPCS

## 2020-04-17 PROCEDURE — A6209 FOAM DRSG <=16 SQ IN W/O BDR: HCPCS

## 2020-04-17 PROCEDURE — 3331090001 HH PPS REVENUE CREDIT

## 2020-04-18 VITALS
RESPIRATION RATE: 24 BRPM | DIASTOLIC BLOOD PRESSURE: 60 MMHG | OXYGEN SATURATION: 95 % | SYSTOLIC BLOOD PRESSURE: 112 MMHG | TEMPERATURE: 98.7 F | HEART RATE: 71 BPM

## 2020-04-18 PROCEDURE — 3331090001 HH PPS REVENUE CREDIT

## 2020-04-18 PROCEDURE — 3331090002 HH PPS REVENUE DEBIT

## 2020-04-19 ENCOUNTER — HOME CARE VISIT (OUTPATIENT)
Dept: HOME HEALTH SERVICES | Facility: HOME HEALTH | Age: 85
End: 2020-04-19
Payer: MEDICARE

## 2020-04-19 ENCOUNTER — HOME CARE VISIT (OUTPATIENT)
Dept: SCHEDULING | Facility: HOME HEALTH | Age: 85
End: 2020-04-19
Payer: MEDICARE

## 2020-04-19 VITALS
TEMPERATURE: 98.5 F | RESPIRATION RATE: 22 BRPM | OXYGEN SATURATION: 96 % | SYSTOLIC BLOOD PRESSURE: 140 MMHG | DIASTOLIC BLOOD PRESSURE: 80 MMHG | HEART RATE: 60 BPM

## 2020-04-19 PROCEDURE — 3331090001 HH PPS REVENUE CREDIT

## 2020-04-19 PROCEDURE — 3331090002 HH PPS REVENUE DEBIT

## 2020-04-19 PROCEDURE — G0299 HHS/HOSPICE OF RN EA 15 MIN: HCPCS

## 2020-04-20 ENCOUNTER — HOME CARE VISIT (OUTPATIENT)
Dept: HOME HEALTH SERVICES | Facility: HOME HEALTH | Age: 85
End: 2020-04-20
Payer: MEDICARE

## 2020-04-20 ENCOUNTER — HOME CARE VISIT (OUTPATIENT)
Dept: SCHEDULING | Facility: HOME HEALTH | Age: 85
End: 2020-04-20
Payer: MEDICARE

## 2020-04-20 VITALS
SYSTOLIC BLOOD PRESSURE: 110 MMHG | OXYGEN SATURATION: 94 % | HEART RATE: 74 BPM | RESPIRATION RATE: 22 BRPM | DIASTOLIC BLOOD PRESSURE: 60 MMHG | TEMPERATURE: 98.6 F

## 2020-04-20 PROCEDURE — A6209 FOAM DRSG <=16 SQ IN W/O BDR: HCPCS

## 2020-04-20 PROCEDURE — 3331090002 HH PPS REVENUE DEBIT

## 2020-04-20 PROCEDURE — G0299 HHS/HOSPICE OF RN EA 15 MIN: HCPCS

## 2020-04-20 PROCEDURE — 3331090001 HH PPS REVENUE CREDIT

## 2020-04-20 PROCEDURE — 400014 HH F/U

## 2020-04-21 PROCEDURE — 3331090002 HH PPS REVENUE DEBIT

## 2020-04-21 PROCEDURE — 3331090001 HH PPS REVENUE CREDIT

## 2020-04-22 ENCOUNTER — HOME CARE VISIT (OUTPATIENT)
Dept: HOME HEALTH SERVICES | Facility: HOME HEALTH | Age: 85
End: 2020-04-22
Payer: MEDICARE

## 2020-04-22 ENCOUNTER — HOME CARE VISIT (OUTPATIENT)
Dept: SCHEDULING | Facility: HOME HEALTH | Age: 85
End: 2020-04-22
Payer: MEDICARE

## 2020-04-22 PROCEDURE — 3331090001 HH PPS REVENUE CREDIT

## 2020-04-22 PROCEDURE — G0299 HHS/HOSPICE OF RN EA 15 MIN: HCPCS

## 2020-04-22 PROCEDURE — G0156 HHCP-SVS OF AIDE,EA 15 MIN: HCPCS

## 2020-04-22 PROCEDURE — 3331090002 HH PPS REVENUE DEBIT

## 2020-04-23 VITALS
RESPIRATION RATE: 22 BRPM | OXYGEN SATURATION: 95 % | HEART RATE: 73 BPM | SYSTOLIC BLOOD PRESSURE: 140 MMHG | TEMPERATURE: 98.4 F | DIASTOLIC BLOOD PRESSURE: 80 MMHG

## 2020-04-23 PROCEDURE — 3331090001 HH PPS REVENUE CREDIT

## 2020-04-23 PROCEDURE — 3331090002 HH PPS REVENUE DEBIT

## 2020-04-24 ENCOUNTER — HOME CARE VISIT (OUTPATIENT)
Dept: SCHEDULING | Facility: HOME HEALTH | Age: 85
End: 2020-04-24
Payer: MEDICARE

## 2020-04-24 ENCOUNTER — HOME CARE VISIT (OUTPATIENT)
Dept: HOME HEALTH SERVICES | Facility: HOME HEALTH | Age: 85
End: 2020-04-24
Payer: MEDICARE

## 2020-04-24 VITALS
OXYGEN SATURATION: 96 % | HEART RATE: 58 BPM | TEMPERATURE: 98.4 F | SYSTOLIC BLOOD PRESSURE: 118 MMHG | RESPIRATION RATE: 22 BRPM | DIASTOLIC BLOOD PRESSURE: 80 MMHG

## 2020-04-24 PROCEDURE — 3331090001 HH PPS REVENUE CREDIT

## 2020-04-24 PROCEDURE — G0299 HHS/HOSPICE OF RN EA 15 MIN: HCPCS

## 2020-04-24 PROCEDURE — 3331090002 HH PPS REVENUE DEBIT

## 2020-04-24 PROCEDURE — G0156 HHCP-SVS OF AIDE,EA 15 MIN: HCPCS

## 2020-04-25 PROCEDURE — 3331090002 HH PPS REVENUE DEBIT

## 2020-04-25 PROCEDURE — 3331090001 HH PPS REVENUE CREDIT

## 2020-04-26 ENCOUNTER — HOME CARE VISIT (OUTPATIENT)
Dept: SCHEDULING | Facility: HOME HEALTH | Age: 85
End: 2020-04-26
Payer: MEDICARE

## 2020-04-26 ENCOUNTER — HOME CARE VISIT (OUTPATIENT)
Dept: HOME HEALTH SERVICES | Facility: HOME HEALTH | Age: 85
End: 2020-04-26
Payer: MEDICARE

## 2020-04-26 VITALS
TEMPERATURE: 98.4 F | OXYGEN SATURATION: 95 % | SYSTOLIC BLOOD PRESSURE: 140 MMHG | RESPIRATION RATE: 20 BRPM | DIASTOLIC BLOOD PRESSURE: 80 MMHG | HEART RATE: 64 BPM

## 2020-04-26 PROCEDURE — 3331090002 HH PPS REVENUE DEBIT

## 2020-04-26 PROCEDURE — 3331090001 HH PPS REVENUE CREDIT

## 2020-04-26 PROCEDURE — G0299 HHS/HOSPICE OF RN EA 15 MIN: HCPCS

## 2020-04-27 ENCOUNTER — HOME CARE VISIT (OUTPATIENT)
Dept: SCHEDULING | Facility: HOME HEALTH | Age: 85
End: 2020-04-27
Payer: MEDICARE

## 2020-04-27 ENCOUNTER — HOME CARE VISIT (OUTPATIENT)
Dept: HOME HEALTH SERVICES | Facility: HOME HEALTH | Age: 85
End: 2020-04-27
Payer: MEDICARE

## 2020-04-27 VITALS
DIASTOLIC BLOOD PRESSURE: 80 MMHG | RESPIRATION RATE: 20 BRPM | TEMPERATURE: 98.3 F | SYSTOLIC BLOOD PRESSURE: 130 MMHG | HEART RATE: 72 BPM | OXYGEN SATURATION: 96 %

## 2020-04-27 PROCEDURE — A6457 TUBULAR DRESSING: HCPCS

## 2020-04-27 PROCEDURE — G0156 HHCP-SVS OF AIDE,EA 15 MIN: HCPCS

## 2020-04-27 PROCEDURE — 3331090001 HH PPS REVENUE CREDIT

## 2020-04-27 PROCEDURE — G0299 HHS/HOSPICE OF RN EA 15 MIN: HCPCS

## 2020-04-27 PROCEDURE — 3331090002 HH PPS REVENUE DEBIT

## 2020-04-28 ENCOUNTER — HOME CARE VISIT (OUTPATIENT)
Dept: HOME HEALTH SERVICES | Facility: HOME HEALTH | Age: 85
End: 2020-04-28
Payer: MEDICARE

## 2020-04-28 PROCEDURE — 3331090001 HH PPS REVENUE CREDIT

## 2020-04-28 PROCEDURE — 3331090002 HH PPS REVENUE DEBIT

## 2020-04-29 ENCOUNTER — HOME CARE VISIT (OUTPATIENT)
Dept: SCHEDULING | Facility: HOME HEALTH | Age: 85
End: 2020-04-29
Payer: MEDICARE

## 2020-04-29 ENCOUNTER — HOME CARE VISIT (OUTPATIENT)
Dept: HOME HEALTH SERVICES | Facility: HOME HEALTH | Age: 85
End: 2020-04-29
Payer: MEDICARE

## 2020-04-29 VITALS
DIASTOLIC BLOOD PRESSURE: 80 MMHG | HEART RATE: 67 BPM | TEMPERATURE: 98.2 F | SYSTOLIC BLOOD PRESSURE: 120 MMHG | RESPIRATION RATE: 20 BRPM | OXYGEN SATURATION: 96 %

## 2020-04-29 PROCEDURE — 3331090001 HH PPS REVENUE CREDIT

## 2020-04-29 PROCEDURE — G0299 HHS/HOSPICE OF RN EA 15 MIN: HCPCS

## 2020-04-29 PROCEDURE — 3331090002 HH PPS REVENUE DEBIT

## 2020-04-29 PROCEDURE — G0156 HHCP-SVS OF AIDE,EA 15 MIN: HCPCS

## 2020-04-30 PROCEDURE — 3331090002 HH PPS REVENUE DEBIT

## 2020-04-30 PROCEDURE — 3331090001 HH PPS REVENUE CREDIT

## 2020-05-01 ENCOUNTER — HOME CARE VISIT (OUTPATIENT)
Dept: SCHEDULING | Facility: HOME HEALTH | Age: 85
End: 2020-05-01
Payer: MEDICARE

## 2020-05-01 ENCOUNTER — HOME CARE VISIT (OUTPATIENT)
Dept: HOME HEALTH SERVICES | Facility: HOME HEALTH | Age: 85
End: 2020-05-01
Payer: MEDICARE

## 2020-05-01 VITALS
RESPIRATION RATE: 20 BRPM | TEMPERATURE: 98.4 F | HEART RATE: 70 BPM | OXYGEN SATURATION: 94 % | SYSTOLIC BLOOD PRESSURE: 120 MMHG | DIASTOLIC BLOOD PRESSURE: 80 MMHG

## 2020-05-01 PROCEDURE — A6457 TUBULAR DRESSING: HCPCS

## 2020-05-01 PROCEDURE — 3331090001 HH PPS REVENUE CREDIT

## 2020-05-01 PROCEDURE — 3331090002 HH PPS REVENUE DEBIT

## 2020-05-01 PROCEDURE — G0299 HHS/HOSPICE OF RN EA 15 MIN: HCPCS

## 2020-05-02 PROCEDURE — 3331090001 HH PPS REVENUE CREDIT

## 2020-05-02 PROCEDURE — 3331090002 HH PPS REVENUE DEBIT

## 2020-05-03 PROCEDURE — 3331090002 HH PPS REVENUE DEBIT

## 2020-05-03 PROCEDURE — 3331090001 HH PPS REVENUE CREDIT

## 2020-05-04 ENCOUNTER — HOME CARE VISIT (OUTPATIENT)
Dept: SCHEDULING | Facility: HOME HEALTH | Age: 85
End: 2020-05-04
Payer: MEDICARE

## 2020-05-04 ENCOUNTER — HOME CARE VISIT (OUTPATIENT)
Dept: HOME HEALTH SERVICES | Facility: HOME HEALTH | Age: 85
End: 2020-05-04
Payer: MEDICARE

## 2020-05-04 PROCEDURE — G0299 HHS/HOSPICE OF RN EA 15 MIN: HCPCS

## 2020-05-04 PROCEDURE — 3331090002 HH PPS REVENUE DEBIT

## 2020-05-04 PROCEDURE — 3331090001 HH PPS REVENUE CREDIT

## 2020-05-05 ENCOUNTER — HOME CARE VISIT (OUTPATIENT)
Dept: SCHEDULING | Facility: HOME HEALTH | Age: 85
End: 2020-05-05
Payer: MEDICARE

## 2020-05-05 VITALS
OXYGEN SATURATION: 95 % | SYSTOLIC BLOOD PRESSURE: 120 MMHG | RESPIRATION RATE: 22 BRPM | DIASTOLIC BLOOD PRESSURE: 72 MMHG | HEART RATE: 67 BPM | TEMPERATURE: 98.2 F

## 2020-05-05 PROCEDURE — 3331090001 HH PPS REVENUE CREDIT

## 2020-05-05 PROCEDURE — G0156 HHCP-SVS OF AIDE,EA 15 MIN: HCPCS

## 2020-05-05 PROCEDURE — 3331090002 HH PPS REVENUE DEBIT

## 2020-05-06 ENCOUNTER — HOME CARE VISIT (OUTPATIENT)
Dept: SCHEDULING | Facility: HOME HEALTH | Age: 85
End: 2020-05-06
Payer: MEDICARE

## 2020-05-06 ENCOUNTER — HOME CARE VISIT (OUTPATIENT)
Dept: HOME HEALTH SERVICES | Facility: HOME HEALTH | Age: 85
End: 2020-05-06
Payer: MEDICARE

## 2020-05-06 PROCEDURE — G0299 HHS/HOSPICE OF RN EA 15 MIN: HCPCS

## 2020-05-06 PROCEDURE — 3331090002 HH PPS REVENUE DEBIT

## 2020-05-06 PROCEDURE — 3331090001 HH PPS REVENUE CREDIT

## 2020-05-07 ENCOUNTER — HOME CARE VISIT (OUTPATIENT)
Dept: HOME HEALTH SERVICES | Facility: HOME HEALTH | Age: 85
End: 2020-05-07
Payer: MEDICARE

## 2020-05-07 ENCOUNTER — HOME CARE VISIT (OUTPATIENT)
Dept: SCHEDULING | Facility: HOME HEALTH | Age: 85
End: 2020-05-07
Payer: MEDICARE

## 2020-05-07 VITALS
SYSTOLIC BLOOD PRESSURE: 140 MMHG | OXYGEN SATURATION: 95 % | RESPIRATION RATE: 22 BRPM | HEART RATE: 65 BPM | TEMPERATURE: 98.1 F | DIASTOLIC BLOOD PRESSURE: 80 MMHG

## 2020-05-07 PROCEDURE — G0156 HHCP-SVS OF AIDE,EA 15 MIN: HCPCS

## 2020-05-07 PROCEDURE — 3331090001 HH PPS REVENUE CREDIT

## 2020-05-07 PROCEDURE — 3331090002 HH PPS REVENUE DEBIT

## 2020-05-08 ENCOUNTER — HOME CARE VISIT (OUTPATIENT)
Dept: SCHEDULING | Facility: HOME HEALTH | Age: 85
End: 2020-05-08
Payer: MEDICARE

## 2020-05-08 ENCOUNTER — HOME CARE VISIT (OUTPATIENT)
Dept: HOME HEALTH SERVICES | Facility: HOME HEALTH | Age: 85
End: 2020-05-08
Payer: MEDICARE

## 2020-05-08 VITALS
OXYGEN SATURATION: 96 % | HEART RATE: 69 BPM | DIASTOLIC BLOOD PRESSURE: 70 MMHG | RESPIRATION RATE: 20 BRPM | SYSTOLIC BLOOD PRESSURE: 110 MMHG | TEMPERATURE: 98.3 F

## 2020-05-08 PROCEDURE — 3331090001 HH PPS REVENUE CREDIT

## 2020-05-08 PROCEDURE — G0299 HHS/HOSPICE OF RN EA 15 MIN: HCPCS

## 2020-05-08 PROCEDURE — 3331090002 HH PPS REVENUE DEBIT

## 2020-05-09 PROCEDURE — 3331090002 HH PPS REVENUE DEBIT

## 2020-05-09 PROCEDURE — 3331090001 HH PPS REVENUE CREDIT

## 2020-05-10 PROCEDURE — 3331090001 HH PPS REVENUE CREDIT

## 2020-05-10 PROCEDURE — 3331090002 HH PPS REVENUE DEBIT

## 2020-05-11 ENCOUNTER — HOME CARE VISIT (OUTPATIENT)
Dept: SCHEDULING | Facility: HOME HEALTH | Age: 85
End: 2020-05-11
Payer: MEDICARE

## 2020-05-11 ENCOUNTER — HOME CARE VISIT (OUTPATIENT)
Dept: HOME HEALTH SERVICES | Facility: HOME HEALTH | Age: 85
End: 2020-05-11
Payer: MEDICARE

## 2020-05-11 PROCEDURE — 3331090001 HH PPS REVENUE CREDIT

## 2020-05-11 PROCEDURE — 3331090002 HH PPS REVENUE DEBIT

## 2020-05-11 PROCEDURE — G0299 HHS/HOSPICE OF RN EA 15 MIN: HCPCS

## 2020-05-11 PROCEDURE — G0156 HHCP-SVS OF AIDE,EA 15 MIN: HCPCS

## 2020-05-12 ENCOUNTER — HOME CARE VISIT (OUTPATIENT)
Dept: HOME HEALTH SERVICES | Facility: HOME HEALTH | Age: 85
End: 2020-05-12
Payer: MEDICARE

## 2020-05-12 ENCOUNTER — HOME CARE VISIT (OUTPATIENT)
Dept: SCHEDULING | Facility: HOME HEALTH | Age: 85
End: 2020-05-12
Payer: MEDICARE

## 2020-05-12 PROCEDURE — A6216 NON-STERILE GAUZE<=16 SQ IN: HCPCS

## 2020-05-12 PROCEDURE — 3331090002 HH PPS REVENUE DEBIT

## 2020-05-12 PROCEDURE — G0299 HHS/HOSPICE OF RN EA 15 MIN: HCPCS

## 2020-05-12 PROCEDURE — 3331090001 HH PPS REVENUE CREDIT

## 2020-05-12 PROCEDURE — A6209 FOAM DRSG <=16 SQ IN W/O BDR: HCPCS

## 2020-05-13 ENCOUNTER — HOME CARE VISIT (OUTPATIENT)
Dept: SCHEDULING | Facility: HOME HEALTH | Age: 85
End: 2020-05-13
Payer: MEDICARE

## 2020-05-13 ENCOUNTER — HOME CARE VISIT (OUTPATIENT)
Dept: HOME HEALTH SERVICES | Facility: HOME HEALTH | Age: 85
End: 2020-05-13
Payer: MEDICARE

## 2020-05-13 VITALS
OXYGEN SATURATION: 95 % | TEMPERATURE: 98 F | TEMPERATURE: 98.4 F | SYSTOLIC BLOOD PRESSURE: 114 MMHG | RESPIRATION RATE: 22 BRPM | OXYGEN SATURATION: 95 % | RESPIRATION RATE: 22 BRPM | HEART RATE: 72 BPM | OXYGEN SATURATION: 95 % | SYSTOLIC BLOOD PRESSURE: 112 MMHG | HEART RATE: 68 BPM | SYSTOLIC BLOOD PRESSURE: 120 MMHG | DIASTOLIC BLOOD PRESSURE: 80 MMHG | HEART RATE: 79 BPM | DIASTOLIC BLOOD PRESSURE: 70 MMHG | TEMPERATURE: 98.6 F | RESPIRATION RATE: 22 BRPM | DIASTOLIC BLOOD PRESSURE: 80 MMHG

## 2020-05-13 PROCEDURE — G0299 HHS/HOSPICE OF RN EA 15 MIN: HCPCS

## 2020-05-13 PROCEDURE — 3331090002 HH PPS REVENUE DEBIT

## 2020-05-13 PROCEDURE — 3331090001 HH PPS REVENUE CREDIT

## 2020-05-14 ENCOUNTER — HOME CARE VISIT (OUTPATIENT)
Dept: SCHEDULING | Facility: HOME HEALTH | Age: 85
End: 2020-05-14
Payer: MEDICARE

## 2020-05-14 PROCEDURE — G0156 HHCP-SVS OF AIDE,EA 15 MIN: HCPCS

## 2020-05-14 PROCEDURE — 3331090001 HH PPS REVENUE CREDIT

## 2020-05-14 PROCEDURE — 3331090002 HH PPS REVENUE DEBIT

## 2020-05-15 ENCOUNTER — HOME CARE VISIT (OUTPATIENT)
Dept: HOME HEALTH SERVICES | Facility: HOME HEALTH | Age: 85
End: 2020-05-15
Payer: MEDICARE

## 2020-05-15 ENCOUNTER — HOME CARE VISIT (OUTPATIENT)
Dept: SCHEDULING | Facility: HOME HEALTH | Age: 85
End: 2020-05-15
Payer: MEDICARE

## 2020-05-15 PROCEDURE — G0299 HHS/HOSPICE OF RN EA 15 MIN: HCPCS

## 2020-05-15 PROCEDURE — 3331090002 HH PPS REVENUE DEBIT

## 2020-05-15 PROCEDURE — 3331090001 HH PPS REVENUE CREDIT

## 2020-05-16 VITALS
HEART RATE: 74 BPM | OXYGEN SATURATION: 95 % | SYSTOLIC BLOOD PRESSURE: 130 MMHG | TEMPERATURE: 98 F | RESPIRATION RATE: 22 BRPM | DIASTOLIC BLOOD PRESSURE: 80 MMHG

## 2020-05-16 PROCEDURE — 3331090001 HH PPS REVENUE CREDIT

## 2020-05-16 PROCEDURE — 3331090002 HH PPS REVENUE DEBIT

## 2020-05-17 ENCOUNTER — HOME CARE VISIT (OUTPATIENT)
Dept: HOME HEALTH SERVICES | Facility: HOME HEALTH | Age: 85
End: 2020-05-17
Payer: MEDICARE

## 2020-05-17 ENCOUNTER — HOME CARE VISIT (OUTPATIENT)
Dept: SCHEDULING | Facility: HOME HEALTH | Age: 85
End: 2020-05-17
Payer: MEDICARE

## 2020-05-17 VITALS
DIASTOLIC BLOOD PRESSURE: 80 MMHG | TEMPERATURE: 98.8 F | HEART RATE: 66 BPM | RESPIRATION RATE: 20 BRPM | OXYGEN SATURATION: 96 % | SYSTOLIC BLOOD PRESSURE: 130 MMHG

## 2020-05-17 PROCEDURE — 3331090002 HH PPS REVENUE DEBIT

## 2020-05-17 PROCEDURE — G0299 HHS/HOSPICE OF RN EA 15 MIN: HCPCS

## 2020-05-17 PROCEDURE — 3331090001 HH PPS REVENUE CREDIT

## 2020-05-18 ENCOUNTER — HOME CARE VISIT (OUTPATIENT)
Dept: SCHEDULING | Facility: HOME HEALTH | Age: 85
End: 2020-05-18
Payer: MEDICARE

## 2020-05-18 ENCOUNTER — HOME CARE VISIT (OUTPATIENT)
Dept: HOME HEALTH SERVICES | Facility: HOME HEALTH | Age: 85
End: 2020-05-18
Payer: MEDICARE

## 2020-05-18 VITALS
RESPIRATION RATE: 22 BRPM | OXYGEN SATURATION: 95 % | DIASTOLIC BLOOD PRESSURE: 80 MMHG | SYSTOLIC BLOOD PRESSURE: 126 MMHG | TEMPERATURE: 98.3 F | HEART RATE: 62 BPM

## 2020-05-18 PROCEDURE — 3331090001 HH PPS REVENUE CREDIT

## 2020-05-18 PROCEDURE — 3331090002 HH PPS REVENUE DEBIT

## 2020-05-18 PROCEDURE — G0299 HHS/HOSPICE OF RN EA 15 MIN: HCPCS

## 2020-05-19 ENCOUNTER — HOME CARE VISIT (OUTPATIENT)
Dept: SCHEDULING | Facility: HOME HEALTH | Age: 85
End: 2020-05-19
Payer: MEDICARE

## 2020-05-19 ENCOUNTER — HOME CARE VISIT (OUTPATIENT)
Dept: HOME HEALTH SERVICES | Facility: HOME HEALTH | Age: 85
End: 2020-05-19
Payer: MEDICARE

## 2020-05-19 PROCEDURE — G0156 HHCP-SVS OF AIDE,EA 15 MIN: HCPCS

## 2020-05-19 PROCEDURE — 3331090002 HH PPS REVENUE DEBIT

## 2020-05-19 PROCEDURE — 3331090003 HH PPS REVENUE ADJ

## 2020-05-19 PROCEDURE — 3331090001 HH PPS REVENUE CREDIT

## 2020-05-20 ENCOUNTER — HOME CARE VISIT (OUTPATIENT)
Dept: SCHEDULING | Facility: HOME HEALTH | Age: 85
End: 2020-05-20
Payer: MEDICARE

## 2020-05-20 ENCOUNTER — HOME CARE VISIT (OUTPATIENT)
Dept: HOME HEALTH SERVICES | Facility: HOME HEALTH | Age: 85
End: 2020-05-20
Payer: MEDICARE

## 2020-05-20 VITALS
TEMPERATURE: 98.5 F | SYSTOLIC BLOOD PRESSURE: 132 MMHG | HEART RATE: 70 BPM | RESPIRATION RATE: 24 BRPM | DIASTOLIC BLOOD PRESSURE: 70 MMHG | OXYGEN SATURATION: 96 %

## 2020-05-20 PROCEDURE — G0299 HHS/HOSPICE OF RN EA 15 MIN: HCPCS

## 2020-05-20 PROCEDURE — 3331090002 HH PPS REVENUE DEBIT

## 2020-05-20 PROCEDURE — 400014 HH F/U

## 2020-05-20 PROCEDURE — 3331090001 HH PPS REVENUE CREDIT

## 2020-05-21 ENCOUNTER — HOME CARE VISIT (OUTPATIENT)
Dept: HOME HEALTH SERVICES | Facility: HOME HEALTH | Age: 85
End: 2020-05-21
Payer: MEDICARE

## 2020-05-21 ENCOUNTER — HOME CARE VISIT (OUTPATIENT)
Dept: SCHEDULING | Facility: HOME HEALTH | Age: 85
End: 2020-05-21
Payer: MEDICARE

## 2020-05-21 PROCEDURE — 3331090002 HH PPS REVENUE DEBIT

## 2020-05-21 PROCEDURE — G0156 HHCP-SVS OF AIDE,EA 15 MIN: HCPCS

## 2020-05-21 PROCEDURE — 3331090001 HH PPS REVENUE CREDIT

## 2020-05-22 ENCOUNTER — HOME CARE VISIT (OUTPATIENT)
Dept: HOME HEALTH SERVICES | Facility: HOME HEALTH | Age: 85
End: 2020-05-22
Payer: MEDICARE

## 2020-05-22 ENCOUNTER — HOME CARE VISIT (OUTPATIENT)
Dept: SCHEDULING | Facility: HOME HEALTH | Age: 85
End: 2020-05-22
Payer: MEDICARE

## 2020-05-22 VITALS
SYSTOLIC BLOOD PRESSURE: 120 MMHG | DIASTOLIC BLOOD PRESSURE: 80 MMHG | HEART RATE: 70 BPM | TEMPERATURE: 48.2 F | OXYGEN SATURATION: 96 % | RESPIRATION RATE: 24 BRPM

## 2020-05-22 PROCEDURE — 3331090002 HH PPS REVENUE DEBIT

## 2020-05-22 PROCEDURE — G0299 HHS/HOSPICE OF RN EA 15 MIN: HCPCS

## 2020-05-22 PROCEDURE — 3331090001 HH PPS REVENUE CREDIT

## 2020-05-23 PROCEDURE — 3331090002 HH PPS REVENUE DEBIT

## 2020-05-23 PROCEDURE — 3331090001 HH PPS REVENUE CREDIT

## 2020-05-24 PROCEDURE — 3331090001 HH PPS REVENUE CREDIT

## 2020-05-24 PROCEDURE — 3331090002 HH PPS REVENUE DEBIT

## 2020-05-25 PROCEDURE — 3331090001 HH PPS REVENUE CREDIT

## 2020-05-25 PROCEDURE — 3331090002 HH PPS REVENUE DEBIT

## 2020-05-26 ENCOUNTER — HOME CARE VISIT (OUTPATIENT)
Dept: SCHEDULING | Facility: HOME HEALTH | Age: 85
End: 2020-05-26
Payer: MEDICARE

## 2020-05-26 ENCOUNTER — HOME CARE VISIT (OUTPATIENT)
Dept: HOME HEALTH SERVICES | Facility: HOME HEALTH | Age: 85
End: 2020-05-26
Payer: MEDICARE

## 2020-05-26 VITALS
OXYGEN SATURATION: 95 % | TEMPERATURE: 98.2 F | SYSTOLIC BLOOD PRESSURE: 116 MMHG | DIASTOLIC BLOOD PRESSURE: 60 MMHG | RESPIRATION RATE: 22 BRPM | HEART RATE: 69 BPM

## 2020-05-26 PROCEDURE — G0299 HHS/HOSPICE OF RN EA 15 MIN: HCPCS

## 2020-05-26 PROCEDURE — 3331090002 HH PPS REVENUE DEBIT

## 2020-05-26 PROCEDURE — 3331090001 HH PPS REVENUE CREDIT

## 2020-05-26 PROCEDURE — G0156 HHCP-SVS OF AIDE,EA 15 MIN: HCPCS

## 2020-05-27 ENCOUNTER — HOME CARE VISIT (OUTPATIENT)
Dept: SCHEDULING | Facility: HOME HEALTH | Age: 85
End: 2020-05-27
Payer: MEDICARE

## 2020-05-27 ENCOUNTER — HOME CARE VISIT (OUTPATIENT)
Dept: HOME HEALTH SERVICES | Facility: HOME HEALTH | Age: 85
End: 2020-05-27
Payer: MEDICARE

## 2020-05-27 PROCEDURE — 3331090002 HH PPS REVENUE DEBIT

## 2020-05-27 PROCEDURE — 3331090001 HH PPS REVENUE CREDIT

## 2020-05-27 PROCEDURE — G0299 HHS/HOSPICE OF RN EA 15 MIN: HCPCS

## 2020-05-28 ENCOUNTER — HOME CARE VISIT (OUTPATIENT)
Dept: HOME HEALTH SERVICES | Facility: HOME HEALTH | Age: 85
End: 2020-05-28
Payer: MEDICARE

## 2020-05-28 ENCOUNTER — HOME CARE VISIT (OUTPATIENT)
Dept: SCHEDULING | Facility: HOME HEALTH | Age: 85
End: 2020-05-28
Payer: MEDICARE

## 2020-05-28 VITALS
OXYGEN SATURATION: 95 % | RESPIRATION RATE: 22 BRPM | TEMPERATURE: 98.4 F | SYSTOLIC BLOOD PRESSURE: 120 MMHG | HEART RATE: 64 BPM | DIASTOLIC BLOOD PRESSURE: 70 MMHG

## 2020-05-28 PROCEDURE — 3331090001 HH PPS REVENUE CREDIT

## 2020-05-28 PROCEDURE — G0156 HHCP-SVS OF AIDE,EA 15 MIN: HCPCS

## 2020-05-28 PROCEDURE — 3331090002 HH PPS REVENUE DEBIT

## 2020-05-29 ENCOUNTER — HOME CARE VISIT (OUTPATIENT)
Dept: HOME HEALTH SERVICES | Facility: HOME HEALTH | Age: 85
End: 2020-05-29
Payer: MEDICARE

## 2020-05-29 ENCOUNTER — HOME CARE VISIT (OUTPATIENT)
Dept: SCHEDULING | Facility: HOME HEALTH | Age: 85
End: 2020-05-29
Payer: MEDICARE

## 2020-05-29 VITALS
SYSTOLIC BLOOD PRESSURE: 118 MMHG | DIASTOLIC BLOOD PRESSURE: 70 MMHG | TEMPERATURE: 98.5 F | OXYGEN SATURATION: 95 % | HEART RATE: 66 BPM | RESPIRATION RATE: 22 BRPM

## 2020-05-29 PROCEDURE — G0299 HHS/HOSPICE OF RN EA 15 MIN: HCPCS

## 2020-05-29 PROCEDURE — 3331090002 HH PPS REVENUE DEBIT

## 2020-05-29 PROCEDURE — 3331090001 HH PPS REVENUE CREDIT

## 2020-05-30 PROCEDURE — 3331090001 HH PPS REVENUE CREDIT

## 2020-05-30 PROCEDURE — 3331090002 HH PPS REVENUE DEBIT

## 2020-05-31 PROCEDURE — 3331090001 HH PPS REVENUE CREDIT

## 2020-05-31 PROCEDURE — 3331090002 HH PPS REVENUE DEBIT

## 2020-06-01 ENCOUNTER — HOME CARE VISIT (OUTPATIENT)
Dept: SCHEDULING | Facility: HOME HEALTH | Age: 85
End: 2020-06-01
Payer: MEDICARE

## 2020-06-01 ENCOUNTER — HOME CARE VISIT (OUTPATIENT)
Dept: HOME HEALTH SERVICES | Facility: HOME HEALTH | Age: 85
End: 2020-06-01
Payer: MEDICARE

## 2020-06-01 VITALS
OXYGEN SATURATION: 95 % | SYSTOLIC BLOOD PRESSURE: 120 MMHG | HEART RATE: 65 BPM | DIASTOLIC BLOOD PRESSURE: 80 MMHG | RESPIRATION RATE: 22 BRPM | TEMPERATURE: 98.5 F

## 2020-06-01 PROCEDURE — 3331090002 HH PPS REVENUE DEBIT

## 2020-06-01 PROCEDURE — G0299 HHS/HOSPICE OF RN EA 15 MIN: HCPCS

## 2020-06-01 PROCEDURE — 3331090001 HH PPS REVENUE CREDIT

## 2020-06-02 ENCOUNTER — HOME CARE VISIT (OUTPATIENT)
Dept: SCHEDULING | Facility: HOME HEALTH | Age: 85
End: 2020-06-02
Payer: MEDICARE

## 2020-06-02 ENCOUNTER — HOME CARE VISIT (OUTPATIENT)
Dept: HOME HEALTH SERVICES | Facility: HOME HEALTH | Age: 85
End: 2020-06-02
Payer: MEDICARE

## 2020-06-02 PROCEDURE — 3331090001 HH PPS REVENUE CREDIT

## 2020-06-02 PROCEDURE — 3331090002 HH PPS REVENUE DEBIT

## 2020-06-02 PROCEDURE — G0156 HHCP-SVS OF AIDE,EA 15 MIN: HCPCS

## 2020-06-03 ENCOUNTER — HOME CARE VISIT (OUTPATIENT)
Dept: HOME HEALTH SERVICES | Facility: HOME HEALTH | Age: 85
End: 2020-06-03
Payer: MEDICARE

## 2020-06-03 ENCOUNTER — HOME CARE VISIT (OUTPATIENT)
Dept: SCHEDULING | Facility: HOME HEALTH | Age: 85
End: 2020-06-03
Payer: MEDICARE

## 2020-06-03 VITALS
SYSTOLIC BLOOD PRESSURE: 120 MMHG | TEMPERATURE: 98.4 F | DIASTOLIC BLOOD PRESSURE: 78 MMHG | OXYGEN SATURATION: 9 % | HEART RATE: 71 BPM | RESPIRATION RATE: 22 BRPM

## 2020-06-03 PROCEDURE — G0299 HHS/HOSPICE OF RN EA 15 MIN: HCPCS

## 2020-06-03 PROCEDURE — 3331090001 HH PPS REVENUE CREDIT

## 2020-06-03 PROCEDURE — 3331090002 HH PPS REVENUE DEBIT

## 2020-06-04 ENCOUNTER — HOME CARE VISIT (OUTPATIENT)
Dept: SCHEDULING | Facility: HOME HEALTH | Age: 85
End: 2020-06-04
Payer: MEDICARE

## 2020-06-04 ENCOUNTER — HOME CARE VISIT (OUTPATIENT)
Dept: HOME HEALTH SERVICES | Facility: HOME HEALTH | Age: 85
End: 2020-06-04
Payer: MEDICARE

## 2020-06-04 PROCEDURE — 3331090002 HH PPS REVENUE DEBIT

## 2020-06-04 PROCEDURE — G0156 HHCP-SVS OF AIDE,EA 15 MIN: HCPCS

## 2020-06-04 PROCEDURE — 3331090001 HH PPS REVENUE CREDIT

## 2020-06-05 ENCOUNTER — HOME CARE VISIT (OUTPATIENT)
Dept: HOME HEALTH SERVICES | Facility: HOME HEALTH | Age: 85
End: 2020-06-05
Payer: MEDICARE

## 2020-06-05 ENCOUNTER — HOME CARE VISIT (OUTPATIENT)
Dept: SCHEDULING | Facility: HOME HEALTH | Age: 85
End: 2020-06-05
Payer: MEDICARE

## 2020-06-05 VITALS
OXYGEN SATURATION: 95 % | TEMPERATURE: 98.5 F | HEART RATE: 66 BPM | DIASTOLIC BLOOD PRESSURE: 62 MMHG | RESPIRATION RATE: 22 BRPM | SYSTOLIC BLOOD PRESSURE: 116 MMHG

## 2020-06-05 PROCEDURE — G0299 HHS/HOSPICE OF RN EA 15 MIN: HCPCS

## 2020-06-05 PROCEDURE — 3331090001 HH PPS REVENUE CREDIT

## 2020-06-05 PROCEDURE — 3331090002 HH PPS REVENUE DEBIT

## 2020-06-06 PROCEDURE — 3331090001 HH PPS REVENUE CREDIT

## 2020-06-06 PROCEDURE — 3331090002 HH PPS REVENUE DEBIT

## 2020-06-07 PROCEDURE — 3331090001 HH PPS REVENUE CREDIT

## 2020-06-07 PROCEDURE — 3331090002 HH PPS REVENUE DEBIT

## 2020-06-08 ENCOUNTER — HOME CARE VISIT (OUTPATIENT)
Dept: SCHEDULING | Facility: HOME HEALTH | Age: 85
End: 2020-06-08
Payer: MEDICARE

## 2020-06-08 ENCOUNTER — HOME CARE VISIT (OUTPATIENT)
Dept: HOME HEALTH SERVICES | Facility: HOME HEALTH | Age: 85
End: 2020-06-08
Payer: MEDICARE

## 2020-06-08 PROCEDURE — A6457 TUBULAR DRESSING: HCPCS

## 2020-06-08 PROCEDURE — 3331090002 HH PPS REVENUE DEBIT

## 2020-06-08 PROCEDURE — 3331090001 HH PPS REVENUE CREDIT

## 2020-06-08 PROCEDURE — G0299 HHS/HOSPICE OF RN EA 15 MIN: HCPCS

## 2020-06-09 ENCOUNTER — HOME CARE VISIT (OUTPATIENT)
Dept: SCHEDULING | Facility: HOME HEALTH | Age: 85
End: 2020-06-09
Payer: MEDICARE

## 2020-06-09 ENCOUNTER — HOME CARE VISIT (OUTPATIENT)
Dept: HOME HEALTH SERVICES | Facility: HOME HEALTH | Age: 85
End: 2020-06-09
Payer: MEDICARE

## 2020-06-09 VITALS
RESPIRATION RATE: 24 BRPM | HEART RATE: 69 BPM | DIASTOLIC BLOOD PRESSURE: 70 MMHG | OXYGEN SATURATION: 94 % | SYSTOLIC BLOOD PRESSURE: 110 MMHG | TEMPERATURE: 98.2 F

## 2020-06-09 PROCEDURE — 3331090002 HH PPS REVENUE DEBIT

## 2020-06-09 PROCEDURE — 3331090001 HH PPS REVENUE CREDIT

## 2020-06-09 PROCEDURE — G0156 HHCP-SVS OF AIDE,EA 15 MIN: HCPCS

## 2020-06-10 ENCOUNTER — HOME CARE VISIT (OUTPATIENT)
Dept: SCHEDULING | Facility: HOME HEALTH | Age: 85
End: 2020-06-10
Payer: MEDICARE

## 2020-06-10 ENCOUNTER — HOME CARE VISIT (OUTPATIENT)
Dept: HOME HEALTH SERVICES | Facility: HOME HEALTH | Age: 85
End: 2020-06-10
Payer: MEDICARE

## 2020-06-10 VITALS
SYSTOLIC BLOOD PRESSURE: 120 MMHG | DIASTOLIC BLOOD PRESSURE: 70 MMHG | RESPIRATION RATE: 24 BRPM | OXYGEN SATURATION: 95 % | TEMPERATURE: 98.8 F | HEART RATE: 70 BPM

## 2020-06-10 PROCEDURE — 3331090001 HH PPS REVENUE CREDIT

## 2020-06-10 PROCEDURE — G0299 HHS/HOSPICE OF RN EA 15 MIN: HCPCS

## 2020-06-10 PROCEDURE — 3331090002 HH PPS REVENUE DEBIT

## 2020-06-11 ENCOUNTER — HOME CARE VISIT (OUTPATIENT)
Dept: SCHEDULING | Facility: HOME HEALTH | Age: 85
End: 2020-06-11
Payer: MEDICARE

## 2020-06-11 ENCOUNTER — HOME CARE VISIT (OUTPATIENT)
Dept: HOME HEALTH SERVICES | Facility: HOME HEALTH | Age: 85
End: 2020-06-11
Payer: MEDICARE

## 2020-06-11 PROCEDURE — 3331090001 HH PPS REVENUE CREDIT

## 2020-06-11 PROCEDURE — 3331090002 HH PPS REVENUE DEBIT

## 2020-06-11 PROCEDURE — G0156 HHCP-SVS OF AIDE,EA 15 MIN: HCPCS

## 2020-06-12 ENCOUNTER — HOME CARE VISIT (OUTPATIENT)
Dept: SCHEDULING | Facility: HOME HEALTH | Age: 85
End: 2020-06-12
Payer: MEDICARE

## 2020-06-12 ENCOUNTER — HOME CARE VISIT (OUTPATIENT)
Dept: HOME HEALTH SERVICES | Facility: HOME HEALTH | Age: 85
End: 2020-06-12
Payer: MEDICARE

## 2020-06-12 VITALS
RESPIRATION RATE: 22 BRPM | TEMPERATURE: 98.4 F | OXYGEN SATURATION: 95 % | SYSTOLIC BLOOD PRESSURE: 120 MMHG | DIASTOLIC BLOOD PRESSURE: 80 MMHG | HEART RATE: 70 BPM

## 2020-06-12 PROCEDURE — 3331090002 HH PPS REVENUE DEBIT

## 2020-06-12 PROCEDURE — G0299 HHS/HOSPICE OF RN EA 15 MIN: HCPCS

## 2020-06-12 PROCEDURE — 3331090001 HH PPS REVENUE CREDIT

## 2020-06-13 PROCEDURE — 3331090001 HH PPS REVENUE CREDIT

## 2020-06-13 PROCEDURE — 3331090002 HH PPS REVENUE DEBIT

## 2020-06-14 PROCEDURE — 3331090001 HH PPS REVENUE CREDIT

## 2020-06-14 PROCEDURE — 3331090002 HH PPS REVENUE DEBIT

## 2020-06-15 ENCOUNTER — HOME CARE VISIT (OUTPATIENT)
Dept: SCHEDULING | Facility: HOME HEALTH | Age: 85
End: 2020-06-15
Payer: MEDICARE

## 2020-06-15 ENCOUNTER — HOME CARE VISIT (OUTPATIENT)
Dept: HOME HEALTH SERVICES | Facility: HOME HEALTH | Age: 85
End: 2020-06-15
Payer: MEDICARE

## 2020-06-15 VITALS
DIASTOLIC BLOOD PRESSURE: 70 MMHG | TEMPERATURE: 98.4 F | SYSTOLIC BLOOD PRESSURE: 114 MMHG | HEART RATE: 68 BPM | RESPIRATION RATE: 24 BRPM | OXYGEN SATURATION: 95 %

## 2020-06-15 PROCEDURE — G0299 HHS/HOSPICE OF RN EA 15 MIN: HCPCS

## 2020-06-15 PROCEDURE — 3331090001 HH PPS REVENUE CREDIT

## 2020-06-15 PROCEDURE — 3331090002 HH PPS REVENUE DEBIT

## 2020-06-16 ENCOUNTER — HOME CARE VISIT (OUTPATIENT)
Dept: HOME HEALTH SERVICES | Facility: HOME HEALTH | Age: 85
End: 2020-06-16
Payer: MEDICARE

## 2020-06-16 ENCOUNTER — HOME CARE VISIT (OUTPATIENT)
Dept: SCHEDULING | Facility: HOME HEALTH | Age: 85
End: 2020-06-16
Payer: MEDICARE

## 2020-06-16 PROCEDURE — 3331090002 HH PPS REVENUE DEBIT

## 2020-06-16 PROCEDURE — 3331090001 HH PPS REVENUE CREDIT

## 2020-06-16 PROCEDURE — G0156 HHCP-SVS OF AIDE,EA 15 MIN: HCPCS

## 2020-06-17 ENCOUNTER — HOME CARE VISIT (OUTPATIENT)
Dept: HOME HEALTH SERVICES | Facility: HOME HEALTH | Age: 85
End: 2020-06-17
Payer: MEDICARE

## 2020-06-17 ENCOUNTER — HOME CARE VISIT (OUTPATIENT)
Dept: SCHEDULING | Facility: HOME HEALTH | Age: 85
End: 2020-06-17
Payer: MEDICARE

## 2020-06-17 VITALS
OXYGEN SATURATION: 96 % | RESPIRATION RATE: 22 BRPM | SYSTOLIC BLOOD PRESSURE: 120 MMHG | HEART RATE: 56 BPM | TEMPERATURE: 98.3 F | DIASTOLIC BLOOD PRESSURE: 70 MMHG

## 2020-06-17 PROCEDURE — 3331090001 HH PPS REVENUE CREDIT

## 2020-06-17 PROCEDURE — 3331090002 HH PPS REVENUE DEBIT

## 2020-06-17 PROCEDURE — G0299 HHS/HOSPICE OF RN EA 15 MIN: HCPCS

## 2020-06-18 ENCOUNTER — HOME CARE VISIT (OUTPATIENT)
Dept: SCHEDULING | Facility: HOME HEALTH | Age: 85
End: 2020-06-18
Payer: MEDICARE

## 2020-06-18 ENCOUNTER — HOME CARE VISIT (OUTPATIENT)
Dept: HOME HEALTH SERVICES | Facility: HOME HEALTH | Age: 85
End: 2020-06-18
Payer: MEDICARE

## 2020-06-18 PROCEDURE — 3331090001 HH PPS REVENUE CREDIT

## 2020-06-18 PROCEDURE — 3331090003 HH PPS REVENUE ADJ

## 2020-06-18 PROCEDURE — 3331090002 HH PPS REVENUE DEBIT

## 2020-06-18 PROCEDURE — G0156 HHCP-SVS OF AIDE,EA 15 MIN: HCPCS

## 2020-06-18 PROCEDURE — G0299 HHS/HOSPICE OF RN EA 15 MIN: HCPCS

## 2020-06-19 ENCOUNTER — HOME CARE VISIT (OUTPATIENT)
Dept: SCHEDULING | Facility: HOME HEALTH | Age: 85
End: 2020-06-19
Payer: MEDICARE

## 2020-06-19 ENCOUNTER — HOME CARE VISIT (OUTPATIENT)
Dept: HOME HEALTH SERVICES | Facility: HOME HEALTH | Age: 85
End: 2020-06-19
Payer: MEDICARE

## 2020-06-19 VITALS
SYSTOLIC BLOOD PRESSURE: 130 MMHG | RESPIRATION RATE: 22 BRPM | TEMPERATURE: 98.2 F | OXYGEN SATURATION: 95 % | OXYGEN SATURATION: 95 % | HEART RATE: 59 BPM | RESPIRATION RATE: 24 BRPM | HEART RATE: 72 BPM | DIASTOLIC BLOOD PRESSURE: 60 MMHG | TEMPERATURE: 98.3 F | SYSTOLIC BLOOD PRESSURE: 110 MMHG | DIASTOLIC BLOOD PRESSURE: 70 MMHG

## 2020-06-19 PROCEDURE — 3331090002 HH PPS REVENUE DEBIT

## 2020-06-19 PROCEDURE — 400014 HH F/U

## 2020-06-19 PROCEDURE — 3331090001 HH PPS REVENUE CREDIT

## 2020-06-19 PROCEDURE — G0299 HHS/HOSPICE OF RN EA 15 MIN: HCPCS

## 2020-06-19 PROCEDURE — A6209 FOAM DRSG <=16 SQ IN W/O BDR: HCPCS

## 2020-06-20 PROCEDURE — 3331090001 HH PPS REVENUE CREDIT

## 2020-06-20 PROCEDURE — 3331090002 HH PPS REVENUE DEBIT

## 2020-06-21 PROCEDURE — 3331090001 HH PPS REVENUE CREDIT

## 2020-06-21 PROCEDURE — 3331090002 HH PPS REVENUE DEBIT

## 2020-06-22 ENCOUNTER — HOME CARE VISIT (OUTPATIENT)
Dept: HOME HEALTH SERVICES | Facility: HOME HEALTH | Age: 85
End: 2020-06-22
Payer: MEDICARE

## 2020-06-22 ENCOUNTER — HOME CARE VISIT (OUTPATIENT)
Dept: SCHEDULING | Facility: HOME HEALTH | Age: 85
End: 2020-06-22
Payer: MEDICARE

## 2020-06-22 PROCEDURE — 3331090001 HH PPS REVENUE CREDIT

## 2020-06-22 PROCEDURE — 3331090002 HH PPS REVENUE DEBIT

## 2020-06-22 PROCEDURE — G0299 HHS/HOSPICE OF RN EA 15 MIN: HCPCS

## 2020-06-23 ENCOUNTER — HOME CARE VISIT (OUTPATIENT)
Dept: HOME HEALTH SERVICES | Facility: HOME HEALTH | Age: 85
End: 2020-06-23
Payer: MEDICARE

## 2020-06-23 ENCOUNTER — HOME CARE VISIT (OUTPATIENT)
Dept: SCHEDULING | Facility: HOME HEALTH | Age: 85
End: 2020-06-23
Payer: MEDICARE

## 2020-06-23 VITALS
TEMPERATURE: 98.5 F | HEART RATE: 64 BPM | RESPIRATION RATE: 22 BRPM | DIASTOLIC BLOOD PRESSURE: 78 MMHG | SYSTOLIC BLOOD PRESSURE: 120 MMHG | OXYGEN SATURATION: 95 %

## 2020-06-23 PROCEDURE — G0156 HHCP-SVS OF AIDE,EA 15 MIN: HCPCS

## 2020-06-23 PROCEDURE — 3331090002 HH PPS REVENUE DEBIT

## 2020-06-23 PROCEDURE — 3331090001 HH PPS REVENUE CREDIT

## 2020-06-24 ENCOUNTER — HOME CARE VISIT (OUTPATIENT)
Dept: HOME HEALTH SERVICES | Facility: HOME HEALTH | Age: 85
End: 2020-06-24
Payer: MEDICARE

## 2020-06-24 ENCOUNTER — HOME CARE VISIT (OUTPATIENT)
Dept: SCHEDULING | Facility: HOME HEALTH | Age: 85
End: 2020-06-24
Payer: MEDICARE

## 2020-06-24 VITALS
TEMPERATURE: 98.1 F | DIASTOLIC BLOOD PRESSURE: 70 MMHG | RESPIRATION RATE: 24 BRPM | SYSTOLIC BLOOD PRESSURE: 120 MMHG | OXYGEN SATURATION: 95 % | HEART RATE: 58 BPM

## 2020-06-24 PROCEDURE — 3331090002 HH PPS REVENUE DEBIT

## 2020-06-24 PROCEDURE — 3331090001 HH PPS REVENUE CREDIT

## 2020-06-24 PROCEDURE — G0299 HHS/HOSPICE OF RN EA 15 MIN: HCPCS

## 2020-06-25 ENCOUNTER — HOME CARE VISIT (OUTPATIENT)
Dept: HOME HEALTH SERVICES | Facility: HOME HEALTH | Age: 85
End: 2020-06-25
Payer: MEDICARE

## 2020-06-25 ENCOUNTER — HOME CARE VISIT (OUTPATIENT)
Dept: SCHEDULING | Facility: HOME HEALTH | Age: 85
End: 2020-06-25
Payer: MEDICARE

## 2020-06-25 PROCEDURE — 3331090001 HH PPS REVENUE CREDIT

## 2020-06-25 PROCEDURE — G0156 HHCP-SVS OF AIDE,EA 15 MIN: HCPCS

## 2020-06-25 PROCEDURE — 3331090002 HH PPS REVENUE DEBIT

## 2020-06-26 ENCOUNTER — HOME CARE VISIT (OUTPATIENT)
Dept: SCHEDULING | Facility: HOME HEALTH | Age: 85
End: 2020-06-26
Payer: MEDICARE

## 2020-06-26 ENCOUNTER — HOME CARE VISIT (OUTPATIENT)
Dept: HOME HEALTH SERVICES | Facility: HOME HEALTH | Age: 85
End: 2020-06-26
Payer: MEDICARE

## 2020-06-26 VITALS
DIASTOLIC BLOOD PRESSURE: 70 MMHG | OXYGEN SATURATION: 96 % | HEART RATE: 72 BPM | RESPIRATION RATE: 24 BRPM | SYSTOLIC BLOOD PRESSURE: 120 MMHG | TEMPERATURE: 98.5 F

## 2020-06-26 PROCEDURE — 3331090002 HH PPS REVENUE DEBIT

## 2020-06-26 PROCEDURE — 3331090001 HH PPS REVENUE CREDIT

## 2020-06-26 PROCEDURE — G0299 HHS/HOSPICE OF RN EA 15 MIN: HCPCS

## 2020-06-27 PROCEDURE — 3331090001 HH PPS REVENUE CREDIT

## 2020-06-27 PROCEDURE — 3331090002 HH PPS REVENUE DEBIT

## 2020-06-28 ENCOUNTER — HOME CARE VISIT (OUTPATIENT)
Dept: SCHEDULING | Facility: HOME HEALTH | Age: 85
End: 2020-06-28
Payer: MEDICARE

## 2020-06-28 ENCOUNTER — HOME CARE VISIT (OUTPATIENT)
Dept: HOME HEALTH SERVICES | Facility: HOME HEALTH | Age: 85
End: 2020-06-28
Payer: MEDICARE

## 2020-06-28 VITALS
RESPIRATION RATE: 22 BRPM | SYSTOLIC BLOOD PRESSURE: 120 MMHG | OXYGEN SATURATION: 96 % | TEMPERATURE: 98.5 F | HEART RATE: 60 BPM | DIASTOLIC BLOOD PRESSURE: 80 MMHG

## 2020-06-28 PROCEDURE — G0299 HHS/HOSPICE OF RN EA 15 MIN: HCPCS

## 2020-06-28 PROCEDURE — 3331090002 HH PPS REVENUE DEBIT

## 2020-06-28 PROCEDURE — 3331090001 HH PPS REVENUE CREDIT

## 2020-06-29 ENCOUNTER — HOME CARE VISIT (OUTPATIENT)
Dept: SCHEDULING | Facility: HOME HEALTH | Age: 85
End: 2020-06-29
Payer: MEDICARE

## 2020-06-29 ENCOUNTER — HOME CARE VISIT (OUTPATIENT)
Dept: HOME HEALTH SERVICES | Facility: HOME HEALTH | Age: 85
End: 2020-06-29
Payer: MEDICARE

## 2020-06-29 VITALS
TEMPERATURE: 98.4 F | RESPIRATION RATE: 24 BRPM | SYSTOLIC BLOOD PRESSURE: 130 MMHG | DIASTOLIC BLOOD PRESSURE: 80 MMHG | OXYGEN SATURATION: 95 % | HEART RATE: 68 BPM

## 2020-06-29 PROCEDURE — 3331090002 HH PPS REVENUE DEBIT

## 2020-06-29 PROCEDURE — 3331090001 HH PPS REVENUE CREDIT

## 2020-06-29 PROCEDURE — G0299 HHS/HOSPICE OF RN EA 15 MIN: HCPCS

## 2020-06-30 ENCOUNTER — HOME CARE VISIT (OUTPATIENT)
Dept: SCHEDULING | Facility: HOME HEALTH | Age: 85
End: 2020-06-30
Payer: MEDICARE

## 2020-06-30 ENCOUNTER — HOME CARE VISIT (OUTPATIENT)
Dept: HOME HEALTH SERVICES | Facility: HOME HEALTH | Age: 85
End: 2020-06-30
Payer: MEDICARE

## 2020-06-30 PROCEDURE — G0156 HHCP-SVS OF AIDE,EA 15 MIN: HCPCS

## 2020-06-30 PROCEDURE — 3331090002 HH PPS REVENUE DEBIT

## 2020-06-30 PROCEDURE — 3331090001 HH PPS REVENUE CREDIT

## 2020-07-01 ENCOUNTER — HOME CARE VISIT (OUTPATIENT)
Dept: SCHEDULING | Facility: HOME HEALTH | Age: 85
End: 2020-07-01
Payer: MEDICARE

## 2020-07-01 ENCOUNTER — HOME CARE VISIT (OUTPATIENT)
Dept: HOME HEALTH SERVICES | Facility: HOME HEALTH | Age: 85
End: 2020-07-01
Payer: MEDICARE

## 2020-07-01 VITALS
SYSTOLIC BLOOD PRESSURE: 118 MMHG | DIASTOLIC BLOOD PRESSURE: 70 MMHG | OXYGEN SATURATION: 94 % | TEMPERATURE: 98.5 F | HEART RATE: 58 BPM | RESPIRATION RATE: 24 BRPM

## 2020-07-01 PROCEDURE — G0299 HHS/HOSPICE OF RN EA 15 MIN: HCPCS

## 2020-07-01 PROCEDURE — 3331090002 HH PPS REVENUE DEBIT

## 2020-07-01 PROCEDURE — 3331090001 HH PPS REVENUE CREDIT

## 2020-07-02 ENCOUNTER — HOME CARE VISIT (OUTPATIENT)
Dept: SCHEDULING | Facility: HOME HEALTH | Age: 85
End: 2020-07-02
Payer: MEDICARE

## 2020-07-02 ENCOUNTER — HOME CARE VISIT (OUTPATIENT)
Dept: HOME HEALTH SERVICES | Facility: HOME HEALTH | Age: 85
End: 2020-07-02
Payer: MEDICARE

## 2020-07-02 PROCEDURE — 3331090002 HH PPS REVENUE DEBIT

## 2020-07-02 PROCEDURE — 3331090001 HH PPS REVENUE CREDIT

## 2020-07-02 PROCEDURE — G0156 HHCP-SVS OF AIDE,EA 15 MIN: HCPCS

## 2020-07-03 ENCOUNTER — HOME CARE VISIT (OUTPATIENT)
Dept: SCHEDULING | Facility: HOME HEALTH | Age: 85
End: 2020-07-03
Payer: MEDICARE

## 2020-07-03 ENCOUNTER — HOME CARE VISIT (OUTPATIENT)
Dept: HOME HEALTH SERVICES | Facility: HOME HEALTH | Age: 85
End: 2020-07-03
Payer: MEDICARE

## 2020-07-03 VITALS
SYSTOLIC BLOOD PRESSURE: 120 MMHG | TEMPERATURE: 98.7 F | OXYGEN SATURATION: 94 % | HEART RATE: 60 BPM | RESPIRATION RATE: 24 BRPM | DIASTOLIC BLOOD PRESSURE: 70 MMHG

## 2020-07-03 PROCEDURE — G0299 HHS/HOSPICE OF RN EA 15 MIN: HCPCS

## 2020-07-03 PROCEDURE — 3331090001 HH PPS REVENUE CREDIT

## 2020-07-03 PROCEDURE — 3331090002 HH PPS REVENUE DEBIT

## 2020-07-04 PROCEDURE — 3331090002 HH PPS REVENUE DEBIT

## 2020-07-04 PROCEDURE — 3331090001 HH PPS REVENUE CREDIT

## 2020-07-05 PROCEDURE — 3331090002 HH PPS REVENUE DEBIT

## 2020-07-05 PROCEDURE — 3331090001 HH PPS REVENUE CREDIT

## 2020-07-06 ENCOUNTER — HOME CARE VISIT (OUTPATIENT)
Dept: SCHEDULING | Facility: HOME HEALTH | Age: 85
End: 2020-07-06
Payer: MEDICARE

## 2020-07-06 ENCOUNTER — HOME CARE VISIT (OUTPATIENT)
Dept: HOME HEALTH SERVICES | Facility: HOME HEALTH | Age: 85
End: 2020-07-06
Payer: MEDICARE

## 2020-07-06 VITALS
SYSTOLIC BLOOD PRESSURE: 116 MMHG | RESPIRATION RATE: 24 BRPM | OXYGEN SATURATION: 96 % | HEART RATE: 68 BPM | DIASTOLIC BLOOD PRESSURE: 80 MMHG | TEMPERATURE: 98.6 F

## 2020-07-06 PROCEDURE — G0299 HHS/HOSPICE OF RN EA 15 MIN: HCPCS

## 2020-07-06 PROCEDURE — 3331090002 HH PPS REVENUE DEBIT

## 2020-07-06 PROCEDURE — 3331090001 HH PPS REVENUE CREDIT

## 2020-07-07 ENCOUNTER — HOME CARE VISIT (OUTPATIENT)
Dept: SCHEDULING | Facility: HOME HEALTH | Age: 85
End: 2020-07-07
Payer: MEDICARE

## 2020-07-07 ENCOUNTER — HOME CARE VISIT (OUTPATIENT)
Dept: HOME HEALTH SERVICES | Facility: HOME HEALTH | Age: 85
End: 2020-07-07
Payer: MEDICARE

## 2020-07-07 PROCEDURE — G0156 HHCP-SVS OF AIDE,EA 15 MIN: HCPCS

## 2020-07-07 PROCEDURE — 3331090001 HH PPS REVENUE CREDIT

## 2020-07-07 PROCEDURE — 3331090002 HH PPS REVENUE DEBIT

## 2020-07-08 ENCOUNTER — HOME CARE VISIT (OUTPATIENT)
Dept: HOME HEALTH SERVICES | Facility: HOME HEALTH | Age: 85
End: 2020-07-08
Payer: MEDICARE

## 2020-07-08 ENCOUNTER — HOME CARE VISIT (OUTPATIENT)
Dept: SCHEDULING | Facility: HOME HEALTH | Age: 85
End: 2020-07-08
Payer: MEDICARE

## 2020-07-08 VITALS
TEMPERATURE: 98.4 F | HEART RATE: 72 BPM | SYSTOLIC BLOOD PRESSURE: 118 MMHG | OXYGEN SATURATION: 95 % | DIASTOLIC BLOOD PRESSURE: 70 MMHG | RESPIRATION RATE: 22 BRPM

## 2020-07-08 PROCEDURE — 3331090002 HH PPS REVENUE DEBIT

## 2020-07-08 PROCEDURE — 3331090001 HH PPS REVENUE CREDIT

## 2020-07-08 PROCEDURE — G0299 HHS/HOSPICE OF RN EA 15 MIN: HCPCS

## 2020-07-09 ENCOUNTER — HOME CARE VISIT (OUTPATIENT)
Dept: HOME HEALTH SERVICES | Facility: HOME HEALTH | Age: 85
End: 2020-07-09
Payer: MEDICARE

## 2020-07-09 ENCOUNTER — HOME CARE VISIT (OUTPATIENT)
Dept: SCHEDULING | Facility: HOME HEALTH | Age: 85
End: 2020-07-09
Payer: MEDICARE

## 2020-07-09 PROCEDURE — G0156 HHCP-SVS OF AIDE,EA 15 MIN: HCPCS

## 2020-07-09 PROCEDURE — 3331090001 HH PPS REVENUE CREDIT

## 2020-07-09 PROCEDURE — 3331090002 HH PPS REVENUE DEBIT

## 2020-07-10 ENCOUNTER — HOME CARE VISIT (OUTPATIENT)
Dept: HOME HEALTH SERVICES | Facility: HOME HEALTH | Age: 85
End: 2020-07-10
Payer: MEDICARE

## 2020-07-10 ENCOUNTER — HOME CARE VISIT (OUTPATIENT)
Dept: SCHEDULING | Facility: HOME HEALTH | Age: 85
End: 2020-07-10
Payer: MEDICARE

## 2020-07-10 PROCEDURE — G0299 HHS/HOSPICE OF RN EA 15 MIN: HCPCS

## 2020-07-10 PROCEDURE — 3331090001 HH PPS REVENUE CREDIT

## 2020-07-10 PROCEDURE — 3331090002 HH PPS REVENUE DEBIT

## 2020-07-11 VITALS
OXYGEN SATURATION: 95 % | TEMPERATURE: 98 F | SYSTOLIC BLOOD PRESSURE: 110 MMHG | RESPIRATION RATE: 24 BRPM | HEART RATE: 77 BPM | DIASTOLIC BLOOD PRESSURE: 70 MMHG

## 2020-07-11 PROCEDURE — 3331090002 HH PPS REVENUE DEBIT

## 2020-07-11 PROCEDURE — 3331090001 HH PPS REVENUE CREDIT

## 2020-07-12 PROCEDURE — 3331090002 HH PPS REVENUE DEBIT

## 2020-07-12 PROCEDURE — 3331090001 HH PPS REVENUE CREDIT

## 2020-07-13 ENCOUNTER — HOME CARE VISIT (OUTPATIENT)
Dept: HOME HEALTH SERVICES | Facility: HOME HEALTH | Age: 85
End: 2020-07-13
Payer: MEDICARE

## 2020-07-13 ENCOUNTER — HOME CARE VISIT (OUTPATIENT)
Dept: SCHEDULING | Facility: HOME HEALTH | Age: 85
End: 2020-07-13
Payer: MEDICARE

## 2020-07-13 VITALS
DIASTOLIC BLOOD PRESSURE: 70 MMHG | SYSTOLIC BLOOD PRESSURE: 120 MMHG | RESPIRATION RATE: 24 BRPM | OXYGEN SATURATION: 96 % | HEART RATE: 60 BPM | TEMPERATURE: 98.2 F

## 2020-07-13 PROCEDURE — G0299 HHS/HOSPICE OF RN EA 15 MIN: HCPCS

## 2020-07-13 PROCEDURE — 3331090001 HH PPS REVENUE CREDIT

## 2020-07-13 PROCEDURE — 3331090002 HH PPS REVENUE DEBIT

## 2020-07-14 ENCOUNTER — HOME CARE VISIT (OUTPATIENT)
Dept: HOME HEALTH SERVICES | Facility: HOME HEALTH | Age: 85
End: 2020-07-14
Payer: MEDICARE

## 2020-07-14 ENCOUNTER — HOME CARE VISIT (OUTPATIENT)
Dept: SCHEDULING | Facility: HOME HEALTH | Age: 85
End: 2020-07-14
Payer: MEDICARE

## 2020-07-14 PROCEDURE — 3331090002 HH PPS REVENUE DEBIT

## 2020-07-14 PROCEDURE — G0156 HHCP-SVS OF AIDE,EA 15 MIN: HCPCS

## 2020-07-14 PROCEDURE — 3331090001 HH PPS REVENUE CREDIT

## 2020-07-15 ENCOUNTER — HOME CARE VISIT (OUTPATIENT)
Dept: HOME HEALTH SERVICES | Facility: HOME HEALTH | Age: 85
End: 2020-07-15
Payer: MEDICARE

## 2020-07-15 ENCOUNTER — HOME CARE VISIT (OUTPATIENT)
Dept: SCHEDULING | Facility: HOME HEALTH | Age: 85
End: 2020-07-15
Payer: MEDICARE

## 2020-07-15 VITALS
SYSTOLIC BLOOD PRESSURE: 116 MMHG | TEMPERATURE: 98.1 F | HEART RATE: 62 BPM | RESPIRATION RATE: 22 BRPM | OXYGEN SATURATION: 95 % | DIASTOLIC BLOOD PRESSURE: 80 MMHG

## 2020-07-15 PROCEDURE — 3331090002 HH PPS REVENUE DEBIT

## 2020-07-15 PROCEDURE — G0299 HHS/HOSPICE OF RN EA 15 MIN: HCPCS

## 2020-07-15 PROCEDURE — 3331090001 HH PPS REVENUE CREDIT

## 2020-07-16 ENCOUNTER — HOME CARE VISIT (OUTPATIENT)
Dept: HOME HEALTH SERVICES | Facility: HOME HEALTH | Age: 85
End: 2020-07-16
Payer: MEDICARE

## 2020-07-16 ENCOUNTER — HOME CARE VISIT (OUTPATIENT)
Dept: SCHEDULING | Facility: HOME HEALTH | Age: 85
End: 2020-07-16
Payer: MEDICARE

## 2020-07-16 PROCEDURE — 3331090001 HH PPS REVENUE CREDIT

## 2020-07-16 PROCEDURE — 3331090002 HH PPS REVENUE DEBIT

## 2020-07-16 PROCEDURE — G0156 HHCP-SVS OF AIDE,EA 15 MIN: HCPCS

## 2020-07-17 ENCOUNTER — HOME CARE VISIT (OUTPATIENT)
Dept: HOME HEALTH SERVICES | Facility: HOME HEALTH | Age: 85
End: 2020-07-17
Payer: MEDICARE

## 2020-07-17 ENCOUNTER — HOME CARE VISIT (OUTPATIENT)
Dept: SCHEDULING | Facility: HOME HEALTH | Age: 85
End: 2020-07-17
Payer: MEDICARE

## 2020-07-17 VITALS
HEART RATE: 67 BPM | DIASTOLIC BLOOD PRESSURE: 60 MMHG | TEMPERATURE: 98.4 F | SYSTOLIC BLOOD PRESSURE: 110 MMHG | OXYGEN SATURATION: 93 % | RESPIRATION RATE: 22 BRPM

## 2020-07-17 PROCEDURE — G0299 HHS/HOSPICE OF RN EA 15 MIN: HCPCS

## 2020-07-17 PROCEDURE — A6457 TUBULAR DRESSING: HCPCS

## 2020-07-17 PROCEDURE — A6260 WOUND CLEANSER ANY TYPE/SIZE: HCPCS

## 2020-07-17 PROCEDURE — 3331090001 HH PPS REVENUE CREDIT

## 2020-07-17 PROCEDURE — A6216 NON-STERILE GAUZE<=16 SQ IN: HCPCS

## 2020-07-17 PROCEDURE — 3331090002 HH PPS REVENUE DEBIT

## 2020-07-18 PROCEDURE — 3331090001 HH PPS REVENUE CREDIT

## 2020-07-18 PROCEDURE — 3331090003 HH PPS REVENUE ADJ

## 2020-07-18 PROCEDURE — 3331090002 HH PPS REVENUE DEBIT

## 2020-07-19 PROCEDURE — 3331090001 HH PPS REVENUE CREDIT

## 2020-07-19 PROCEDURE — 3331090002 HH PPS REVENUE DEBIT

## 2020-07-20 ENCOUNTER — HOME CARE VISIT (OUTPATIENT)
Dept: SCHEDULING | Facility: HOME HEALTH | Age: 85
End: 2020-07-20
Payer: MEDICARE

## 2020-07-20 ENCOUNTER — HOME CARE VISIT (OUTPATIENT)
Dept: HOME HEALTH SERVICES | Facility: HOME HEALTH | Age: 85
End: 2020-07-20
Payer: MEDICARE

## 2020-07-20 PROCEDURE — G0299 HHS/HOSPICE OF RN EA 15 MIN: HCPCS

## 2020-07-20 PROCEDURE — 3331090001 HH PPS REVENUE CREDIT

## 2020-07-20 PROCEDURE — 400014 HH F/U

## 2020-07-20 PROCEDURE — 3331090002 HH PPS REVENUE DEBIT

## 2020-07-21 ENCOUNTER — HOME CARE VISIT (OUTPATIENT)
Dept: SCHEDULING | Facility: HOME HEALTH | Age: 85
End: 2020-07-21
Payer: MEDICARE

## 2020-07-21 ENCOUNTER — HOME CARE VISIT (OUTPATIENT)
Dept: HOME HEALTH SERVICES | Facility: HOME HEALTH | Age: 85
End: 2020-07-21
Payer: MEDICARE

## 2020-07-21 VITALS
HEART RATE: 61 BPM | DIASTOLIC BLOOD PRESSURE: 80 MMHG | OXYGEN SATURATION: 96 % | RESPIRATION RATE: 22 BRPM | SYSTOLIC BLOOD PRESSURE: 130 MMHG | TEMPERATURE: 98.4 F

## 2020-07-21 PROCEDURE — G0299 HHS/HOSPICE OF RN EA 15 MIN: HCPCS

## 2020-07-21 PROCEDURE — 3331090002 HH PPS REVENUE DEBIT

## 2020-07-21 PROCEDURE — G0156 HHCP-SVS OF AIDE,EA 15 MIN: HCPCS

## 2020-07-21 PROCEDURE — 3331090001 HH PPS REVENUE CREDIT

## 2020-07-22 ENCOUNTER — HOME CARE VISIT (OUTPATIENT)
Dept: SCHEDULING | Facility: HOME HEALTH | Age: 85
End: 2020-07-22
Payer: MEDICARE

## 2020-07-22 ENCOUNTER — HOME CARE VISIT (OUTPATIENT)
Dept: HOME HEALTH SERVICES | Facility: HOME HEALTH | Age: 85
End: 2020-07-22
Payer: MEDICARE

## 2020-07-22 VITALS
HEART RATE: 62 BPM | RESPIRATION RATE: 22 BRPM | SYSTOLIC BLOOD PRESSURE: 110 MMHG | RESPIRATION RATE: 24 BRPM | DIASTOLIC BLOOD PRESSURE: 80 MMHG | HEART RATE: 69 BPM | TEMPERATURE: 98.5 F | DIASTOLIC BLOOD PRESSURE: 60 MMHG | OXYGEN SATURATION: 96 % | SYSTOLIC BLOOD PRESSURE: 160 MMHG | TEMPERATURE: 98.8 F | OXYGEN SATURATION: 96 %

## 2020-07-22 PROCEDURE — G0299 HHS/HOSPICE OF RN EA 15 MIN: HCPCS

## 2020-07-22 PROCEDURE — 3331090001 HH PPS REVENUE CREDIT

## 2020-07-22 PROCEDURE — 3331090002 HH PPS REVENUE DEBIT

## 2020-07-23 ENCOUNTER — HOME CARE VISIT (OUTPATIENT)
Dept: SCHEDULING | Facility: HOME HEALTH | Age: 85
End: 2020-07-23
Payer: MEDICARE

## 2020-07-23 ENCOUNTER — HOME CARE VISIT (OUTPATIENT)
Dept: HOME HEALTH SERVICES | Facility: HOME HEALTH | Age: 85
End: 2020-07-23
Payer: MEDICARE

## 2020-07-23 PROCEDURE — 3331090001 HH PPS REVENUE CREDIT

## 2020-07-23 PROCEDURE — G0156 HHCP-SVS OF AIDE,EA 15 MIN: HCPCS

## 2020-07-23 PROCEDURE — G0299 HHS/HOSPICE OF RN EA 15 MIN: HCPCS

## 2020-07-23 PROCEDURE — 3331090002 HH PPS REVENUE DEBIT

## 2020-07-24 ENCOUNTER — HOME CARE VISIT (OUTPATIENT)
Dept: HOME HEALTH SERVICES | Facility: HOME HEALTH | Age: 85
End: 2020-07-24
Payer: MEDICARE

## 2020-07-24 ENCOUNTER — HOME CARE VISIT (OUTPATIENT)
Dept: SCHEDULING | Facility: HOME HEALTH | Age: 85
End: 2020-07-24
Payer: MEDICARE

## 2020-07-24 PROCEDURE — 3331090002 HH PPS REVENUE DEBIT

## 2020-07-24 PROCEDURE — 3331090001 HH PPS REVENUE CREDIT

## 2020-07-24 PROCEDURE — A6197 ALGINATE DRSG >16 <=48 SQ IN: HCPCS

## 2020-07-24 PROCEDURE — G0299 HHS/HOSPICE OF RN EA 15 MIN: HCPCS

## 2020-07-25 PROCEDURE — 3331090002 HH PPS REVENUE DEBIT

## 2020-07-25 PROCEDURE — 3331090001 HH PPS REVENUE CREDIT

## 2020-07-26 VITALS
OXYGEN SATURATION: 96 % | RESPIRATION RATE: 22 BRPM | TEMPERATURE: 98.7 F | HEART RATE: 73 BPM | DIASTOLIC BLOOD PRESSURE: 70 MMHG | SYSTOLIC BLOOD PRESSURE: 120 MMHG

## 2020-07-26 VITALS
TEMPERATURE: 98.7 F | OXYGEN SATURATION: 95 % | SYSTOLIC BLOOD PRESSURE: 126 MMHG | DIASTOLIC BLOOD PRESSURE: 80 MMHG | HEART RATE: 70 BPM | RESPIRATION RATE: 22 BRPM

## 2020-07-26 PROCEDURE — 3331090002 HH PPS REVENUE DEBIT

## 2020-07-26 PROCEDURE — 3331090001 HH PPS REVENUE CREDIT

## 2020-07-27 ENCOUNTER — HOME CARE VISIT (OUTPATIENT)
Dept: SCHEDULING | Facility: HOME HEALTH | Age: 85
End: 2020-07-27
Payer: MEDICARE

## 2020-07-27 ENCOUNTER — HOME CARE VISIT (OUTPATIENT)
Dept: HOME HEALTH SERVICES | Facility: HOME HEALTH | Age: 85
End: 2020-07-27
Payer: MEDICARE

## 2020-07-27 VITALS
RESPIRATION RATE: 22 BRPM | DIASTOLIC BLOOD PRESSURE: 70 MMHG | SYSTOLIC BLOOD PRESSURE: 116 MMHG | HEART RATE: 74 BPM | OXYGEN SATURATION: 95 % | TEMPERATURE: 98.9 F

## 2020-07-27 PROCEDURE — G0299 HHS/HOSPICE OF RN EA 15 MIN: HCPCS

## 2020-07-27 PROCEDURE — 3331090002 HH PPS REVENUE DEBIT

## 2020-07-27 PROCEDURE — 3331090001 HH PPS REVENUE CREDIT

## 2020-07-28 ENCOUNTER — HOME CARE VISIT (OUTPATIENT)
Dept: SCHEDULING | Facility: HOME HEALTH | Age: 85
End: 2020-07-28
Payer: MEDICARE

## 2020-07-28 PROCEDURE — 3331090002 HH PPS REVENUE DEBIT

## 2020-07-28 PROCEDURE — 3331090001 HH PPS REVENUE CREDIT

## 2020-07-29 ENCOUNTER — HOME CARE VISIT (OUTPATIENT)
Dept: HOME HEALTH SERVICES | Facility: HOME HEALTH | Age: 85
End: 2020-07-29
Payer: MEDICARE

## 2020-07-29 ENCOUNTER — HOME CARE VISIT (OUTPATIENT)
Dept: SCHEDULING | Facility: HOME HEALTH | Age: 85
End: 2020-07-29
Payer: MEDICARE

## 2020-07-29 VITALS
RESPIRATION RATE: 22 BRPM | TEMPERATURE: 98.2 F | HEART RATE: 69 BPM | SYSTOLIC BLOOD PRESSURE: 120 MMHG | DIASTOLIC BLOOD PRESSURE: 80 MMHG | OXYGEN SATURATION: 95 %

## 2020-07-29 PROCEDURE — 3331090002 HH PPS REVENUE DEBIT

## 2020-07-29 PROCEDURE — 3331090001 HH PPS REVENUE CREDIT

## 2020-07-29 PROCEDURE — G0299 HHS/HOSPICE OF RN EA 15 MIN: HCPCS

## 2020-07-30 ENCOUNTER — HOME CARE VISIT (OUTPATIENT)
Dept: SCHEDULING | Facility: HOME HEALTH | Age: 85
End: 2020-07-30
Payer: MEDICARE

## 2020-07-30 PROCEDURE — 3331090001 HH PPS REVENUE CREDIT

## 2020-07-30 PROCEDURE — 3331090002 HH PPS REVENUE DEBIT

## 2020-07-31 ENCOUNTER — HOME CARE VISIT (OUTPATIENT)
Dept: HOME HEALTH SERVICES | Facility: HOME HEALTH | Age: 85
End: 2020-07-31
Payer: MEDICARE

## 2020-07-31 ENCOUNTER — HOME CARE VISIT (OUTPATIENT)
Dept: SCHEDULING | Facility: HOME HEALTH | Age: 85
End: 2020-07-31
Payer: MEDICARE

## 2020-07-31 VITALS
TEMPERATURE: 98.6 F | DIASTOLIC BLOOD PRESSURE: 60 MMHG | RESPIRATION RATE: 22 BRPM | HEART RATE: 80 BPM | OXYGEN SATURATION: 95 % | SYSTOLIC BLOOD PRESSURE: 112 MMHG

## 2020-07-31 PROCEDURE — 3331090001 HH PPS REVENUE CREDIT

## 2020-07-31 PROCEDURE — 3331090002 HH PPS REVENUE DEBIT

## 2020-07-31 PROCEDURE — G0299 HHS/HOSPICE OF RN EA 15 MIN: HCPCS

## 2020-08-01 PROCEDURE — 3331090002 HH PPS REVENUE DEBIT

## 2020-08-01 PROCEDURE — 3331090001 HH PPS REVENUE CREDIT

## 2020-08-02 ENCOUNTER — HOME CARE VISIT (OUTPATIENT)
Dept: SCHEDULING | Facility: HOME HEALTH | Age: 85
End: 2020-08-02
Payer: MEDICARE

## 2020-08-02 ENCOUNTER — HOME CARE VISIT (OUTPATIENT)
Dept: HOME HEALTH SERVICES | Facility: HOME HEALTH | Age: 85
End: 2020-08-02
Payer: MEDICARE

## 2020-08-02 VITALS
DIASTOLIC BLOOD PRESSURE: 80 MMHG | SYSTOLIC BLOOD PRESSURE: 138 MMHG | HEART RATE: 75 BPM | RESPIRATION RATE: 22 BRPM | OXYGEN SATURATION: 94 % | TEMPERATURE: 97.8 F

## 2020-08-02 PROCEDURE — G0299 HHS/HOSPICE OF RN EA 15 MIN: HCPCS

## 2020-08-02 PROCEDURE — 3331090001 HH PPS REVENUE CREDIT

## 2020-08-02 PROCEDURE — 3331090002 HH PPS REVENUE DEBIT

## 2020-08-03 ENCOUNTER — HOME CARE VISIT (OUTPATIENT)
Dept: HOME HEALTH SERVICES | Facility: HOME HEALTH | Age: 85
End: 2020-08-03
Payer: MEDICARE

## 2020-08-03 ENCOUNTER — HOME CARE VISIT (OUTPATIENT)
Dept: SCHEDULING | Facility: HOME HEALTH | Age: 85
End: 2020-08-03
Payer: MEDICARE

## 2020-08-03 PROCEDURE — 3331090001 HH PPS REVENUE CREDIT

## 2020-08-03 PROCEDURE — 3331090002 HH PPS REVENUE DEBIT

## 2020-08-03 PROCEDURE — G0299 HHS/HOSPICE OF RN EA 15 MIN: HCPCS

## 2020-08-04 ENCOUNTER — HOME CARE VISIT (OUTPATIENT)
Dept: SCHEDULING | Facility: HOME HEALTH | Age: 85
End: 2020-08-04
Payer: MEDICARE

## 2020-08-04 ENCOUNTER — TELEPHONE (OUTPATIENT)
Dept: INTERNAL MEDICINE CLINIC | Age: 85
End: 2020-08-04

## 2020-08-04 ENCOUNTER — HOME CARE VISIT (OUTPATIENT)
Dept: HOME HEALTH SERVICES | Facility: HOME HEALTH | Age: 85
End: 2020-08-04
Payer: MEDICARE

## 2020-08-04 VITALS
SYSTOLIC BLOOD PRESSURE: 130 MMHG | RESPIRATION RATE: 24 BRPM | DIASTOLIC BLOOD PRESSURE: 70 MMHG | TEMPERATURE: 97.4 F | HEART RATE: 51 BPM | OXYGEN SATURATION: 96 %

## 2020-08-04 VITALS
HEART RATE: 63 BPM | TEMPERATURE: 98.5 F | SYSTOLIC BLOOD PRESSURE: 120 MMHG | RESPIRATION RATE: 22 BRPM | OXYGEN SATURATION: 96 % | DIASTOLIC BLOOD PRESSURE: 80 MMHG

## 2020-08-04 PROCEDURE — 3331090001 HH PPS REVENUE CREDIT

## 2020-08-04 PROCEDURE — A6197 ALGINATE DRSG >16 <=48 SQ IN: HCPCS

## 2020-08-04 PROCEDURE — 3331090002 HH PPS REVENUE DEBIT

## 2020-08-04 PROCEDURE — G0299 HHS/HOSPICE OF RN EA 15 MIN: HCPCS

## 2020-08-04 RX ORDER — BUSPIRONE HYDROCHLORIDE 5 MG/1
5 TABLET ORAL DAILY
Qty: 30 TAB | Refills: 0 | Status: SHIPPED | OUTPATIENT
Start: 2020-08-04 | End: 2021-04-20 | Stop reason: ALTCHOICE

## 2020-08-04 NOTE — TELEPHONE ENCOUNTER
Please send pended Rx to [facility]     Pending Prescriptions Disp Refills    busPIRone (BUSPAR) 5 mg tablet 30 Tab 0     Sig: Take 1 Tab by mouth daily.

## 2020-08-04 NOTE — TELEPHONE ENCOUNTER
[address]     States patient is having periods of anxiety that is affecting her breathing.     Wants to know if Dr Beverly Enrique can put her on \"Something mild\" that would help.     Silvana states her anxiety attacks are affecting \"every aspect of Scarlett's life\"      [phone]

## 2020-08-05 ENCOUNTER — HOME CARE VISIT (OUTPATIENT)
Dept: HOME HEALTH SERVICES | Facility: HOME HEALTH | Age: 85
End: 2020-08-05
Payer: MEDICARE

## 2020-08-05 ENCOUNTER — HOME CARE VISIT (OUTPATIENT)
Dept: SCHEDULING | Facility: HOME HEALTH | Age: 85
End: 2020-08-05
Payer: MEDICARE

## 2020-08-05 VITALS
OXYGEN SATURATION: 95 % | DIASTOLIC BLOOD PRESSURE: 70 MMHG | TEMPERATURE: 98.5 F | RESPIRATION RATE: 24 BRPM | SYSTOLIC BLOOD PRESSURE: 118 MMHG | HEART RATE: 60 BPM

## 2020-08-05 PROCEDURE — 3331090002 HH PPS REVENUE DEBIT

## 2020-08-05 PROCEDURE — 3331090001 HH PPS REVENUE CREDIT

## 2020-08-05 PROCEDURE — G0299 HHS/HOSPICE OF RN EA 15 MIN: HCPCS

## 2020-08-06 ENCOUNTER — HOME CARE VISIT (OUTPATIENT)
Dept: SCHEDULING | Facility: HOME HEALTH | Age: 85
End: 2020-08-06
Payer: MEDICARE

## 2020-08-06 ENCOUNTER — HOME CARE VISIT (OUTPATIENT)
Dept: HOME HEALTH SERVICES | Facility: HOME HEALTH | Age: 85
End: 2020-08-06
Payer: MEDICARE

## 2020-08-06 VITALS
RESPIRATION RATE: 22 BRPM | OXYGEN SATURATION: 95 % | SYSTOLIC BLOOD PRESSURE: 120 MMHG | TEMPERATURE: 98.4 F | HEART RATE: 53 BPM | DIASTOLIC BLOOD PRESSURE: 70 MMHG

## 2020-08-06 PROCEDURE — 3331090001 HH PPS REVENUE CREDIT

## 2020-08-06 PROCEDURE — 3331090002 HH PPS REVENUE DEBIT

## 2020-08-06 PROCEDURE — G0299 HHS/HOSPICE OF RN EA 15 MIN: HCPCS

## 2020-08-07 ENCOUNTER — HOME CARE VISIT (OUTPATIENT)
Dept: SCHEDULING | Facility: HOME HEALTH | Age: 85
End: 2020-08-07
Payer: MEDICARE

## 2020-08-07 PROCEDURE — 3331090002 HH PPS REVENUE DEBIT

## 2020-08-07 PROCEDURE — 3331090001 HH PPS REVENUE CREDIT

## 2020-08-07 PROCEDURE — G0156 HHCP-SVS OF AIDE,EA 15 MIN: HCPCS

## 2020-08-08 PROCEDURE — 3331090001 HH PPS REVENUE CREDIT

## 2020-08-08 PROCEDURE — 3331090002 HH PPS REVENUE DEBIT

## 2020-08-09 ENCOUNTER — APPOINTMENT (OUTPATIENT)
Dept: GENERAL RADIOLOGY | Age: 85
End: 2020-08-09
Attending: EMERGENCY MEDICINE
Payer: MEDICARE

## 2020-08-09 ENCOUNTER — HOSPITAL ENCOUNTER (EMERGENCY)
Age: 85
Discharge: HOME | End: 2020-08-09
Attending: EMERGENCY MEDICINE | Admitting: EMERGENCY MEDICINE
Payer: MEDICARE

## 2020-08-09 VITALS
RESPIRATION RATE: 18 BRPM | HEART RATE: 71 BPM | OXYGEN SATURATION: 96 % | TEMPERATURE: 97.8 F | SYSTOLIC BLOOD PRESSURE: 176 MMHG | DIASTOLIC BLOOD PRESSURE: 79 MMHG

## 2020-08-09 DIAGNOSIS — F41.1 ANXIETY STATE: Primary | ICD-10-CM

## 2020-08-09 LAB
ANION GAP SERPL CALC-SCNC: 3 MMOL/L (ref 5–15)
BASOPHILS # BLD: 0.1 K/UL (ref 0–0.1)
BASOPHILS NFR BLD: 1 % (ref 0–1)
BUN SERPL-MCNC: 16 MG/DL (ref 6–20)
BUN/CREAT SERPL: 20 (ref 12–20)
CALCIUM SERPL-MCNC: 8.8 MG/DL (ref 8.5–10.1)
CHLORIDE SERPL-SCNC: 90 MMOL/L (ref 97–108)
CO2 SERPL-SCNC: 32 MMOL/L (ref 21–32)
CREAT SERPL-MCNC: 0.79 MG/DL (ref 0.55–1.02)
DIFFERENTIAL METHOD BLD: ABNORMAL
EOSINOPHIL # BLD: 0.1 K/UL (ref 0–0.4)
EOSINOPHIL NFR BLD: 2 % (ref 0–7)
ERYTHROCYTE [DISTWIDTH] IN BLOOD BY AUTOMATED COUNT: 13.5 % (ref 11.5–14.5)
GLUCOSE SERPL-MCNC: 90 MG/DL (ref 65–100)
HCT VFR BLD AUTO: 38.6 % (ref 35–47)
HGB BLD-MCNC: 12.5 G/DL (ref 11.5–16)
IMM GRANULOCYTES # BLD AUTO: 0 K/UL (ref 0–0.04)
IMM GRANULOCYTES NFR BLD AUTO: 0 % (ref 0–0.5)
LYMPHOCYTES # BLD: 0.7 K/UL (ref 0.8–3.5)
LYMPHOCYTES NFR BLD: 9 % (ref 12–49)
MCH RBC QN AUTO: 29.4 PG (ref 26–34)
MCHC RBC AUTO-ENTMCNC: 32.4 G/DL (ref 30–36.5)
MCV RBC AUTO: 90.8 FL (ref 80–99)
MONOCYTES # BLD: 0.7 K/UL (ref 0–1)
MONOCYTES NFR BLD: 9 % (ref 5–13)
NEUTS SEG # BLD: 5.8 K/UL (ref 1.8–8)
NEUTS SEG NFR BLD: 79 % (ref 32–75)
NRBC # BLD: 0 K/UL (ref 0–0.01)
NRBC BLD-RTO: 0 PER 100 WBC
PLATELET # BLD AUTO: 261 K/UL (ref 150–400)
PMV BLD AUTO: 8.8 FL (ref 8.9–12.9)
POTASSIUM SERPL-SCNC: 4.4 MMOL/L (ref 3.5–5.1)
RBC # BLD AUTO: 4.25 M/UL (ref 3.8–5.2)
RBC MORPH BLD: ABNORMAL
SODIUM SERPL-SCNC: 125 MMOL/L (ref 136–145)
TROPONIN I SERPL-MCNC: <0.05 NG/ML
WBC # BLD AUTO: 7.4 K/UL (ref 3.6–11)

## 2020-08-09 PROCEDURE — 80048 BASIC METABOLIC PNL TOTAL CA: CPT

## 2020-08-09 PROCEDURE — 99285 EMERGENCY DEPT VISIT HI MDM: CPT

## 2020-08-09 PROCEDURE — 85025 COMPLETE CBC W/AUTO DIFF WBC: CPT

## 2020-08-09 PROCEDURE — 84484 ASSAY OF TROPONIN QUANT: CPT

## 2020-08-09 PROCEDURE — 93005 ELECTROCARDIOGRAM TRACING: CPT

## 2020-08-09 PROCEDURE — 3331090001 HH PPS REVENUE CREDIT

## 2020-08-09 PROCEDURE — 71045 X-RAY EXAM CHEST 1 VIEW: CPT

## 2020-08-09 PROCEDURE — 36415 COLL VENOUS BLD VENIPUNCTURE: CPT

## 2020-08-09 PROCEDURE — 3331090002 HH PPS REVENUE DEBIT

## 2020-08-09 NOTE — ED NOTES
Pt. Presents to ED today via EMS for complaints of increasing SOB which she attributes to her anxiety. Pt. States that she called dispatch health today for her SOB and when an EKG was completed they expressed concern that she needed to come to the hospital.  Upon arrival patient alert and oriented x4 with complaints of anxiety. Pt. Placed in position of comfort with call bell in reach.

## 2020-08-09 NOTE — DISCHARGE INSTRUCTIONS
Follow up with your physician for further evaluation in 1-2 days.     Return to ED with worsening of condition or new concerning symptoms

## 2020-08-09 NOTE — ED PROVIDER NOTES
EMERGENCY DEPARTMENT HISTORY AND PHYSICAL EXAM      Date: 8/9/2020  Patient Name: Benji Conklin    History of Presenting Illness     Chief Complaint   Patient presents with    Shortness of Breath    Anxiety         HPI: Benji Conklin, 80 y.o. female presents to the ED with cc of anxiety. Patient states she has a history of anxiety. She was feeling anxious this morning, [facility] and was sent to ED for evaluation. Patient states her only symptom is feeling very anxious, denies chest pain, shortness of breath, diaphoresis, nausea, vomiting, or other. She states this feels like previous episodes of anxiety. She feels very anxious about the state of the world she states. There are no other complaints, changes, or physical findings at this time. PCP: Shon Merritt MD    No current facility-administered medications on file prior to encounter. Current Outpatient Medications on File Prior to Encounter   Medication Sig Dispense Refill    busPIRone (BUSPAR) 5 mg tablet Take 1 Tab by mouth daily. 30 Tab 0    cephALEXin (KEFLEX) 500 mg capsule Take 500 mg by mouth four (4) times daily. x10 days      busPIRone (BUSPAR) 5 mg tablet Take 5 mg by mouth nightly.  doxycycline (ADOXA) 100 mg tablet Take 100 mg by mouth two (2) times a day. x7 days      predniSONE (DELTASONE) 5 mg tablet Take 5 mg by mouth two (2) times a day. X3 day      predniSONE (DELTASONE) 5 mg tablet Take 5 mg by mouth daily. x3 days and then stop  this is to begin on 5/25/20      doxycycline (ADOXA) 100 mg tablet Take 100 mg by mouth two (2) times a day. x10 days      azelastine (ASTEPRO) 0.15 % (205.5 mcg) 2 Sprays by Both Nostrils route daily.  FLUTICASONE PROPIONATE, BULK, 2 Sprays by Nasal route daily. each nostril      predniSONE (DELTASONE) 5 mg tablet Take 5 mg by mouth every evening.  OXYGEN-AIR DELIVERY SYSTEMS 2 L by Nasal route continuous.       polyethylene glycol (MIRALAX) 17 gram/dose powder Take 17 g by mouth daily as needed (constipation).  acetaminophen (TYLENOL) 325 mg tablet Take 2 Tabs by mouth every four (4) hours as needed for Pain or Fever. 40 Tab 0    albuterol-ipratropium (DUO-NEB) 2.5 mg-0.5 mg/3 ml nebu 3 mL by Nebulization route every four (4) hours as needed for Wheezing or Shortness of Breath.  ipratropium-albuterol (COMBIVENT RESPIMAT)  mcg/actuation inhaler Take 1 Puff by inhalation four (4) times daily.  loratadine (CLARITIN) 10 mg tablet Take 10 mg by mouth daily.  lisinopril (PRINIVIL, ZESTRIL) 5 mg tablet TAKE ONE TABLET BY MOUTH EVERY DAY 30 Tab 3    azithromycin (ZITHROMAX) 250 mg tablet Take 250 mg by mouth every Monday, Wednesday, Friday. Indications: prevent lung infections      ANORO ELLIPTA 62.5-25 mcg/actuation inhaler Take 1 Puff by inhalation daily.  aspirin delayed-release 81 mg tablet Take 162 mg by mouth daily.          Past History     Past Medical History:  Past Medical History:   Diagnosis Date    Acute renal failure (ARF) ([address] 6/4/2017    Arthritis     generalized    Bronchitis, acute 6/4/2017    COPD     Essential hypertension 9/4/2018    former smoker      >60pkyr quit 1/3/11    h/o DVT 1955    left leg    ischemic left lower extremitiy pain     above knee FemPop 1/3/11    PVD (peripheral vascular disease) ([address]     Seasonal allergic rhinitis     Sepsis ([facility] 6/4/2017    Single kidney     Sinus bradycardia 7/28/2018       Past Surgical History:  Past Surgical History:   Procedure Laterality Date    BREAST SURGERY PROCEDURE UNLISTED  1955    excision left breast cyst    HX GI  10 yrs ago    colonoscopy    HX GYN      3 miscarriges    HX GYN      1 vaginal birth   [de-identified] ORTHOPAEDIC      veinography left leg, stent left leg       Family History:  Family History   Problem Relation Age of Onset    Colon Cancer Mother     Lung Cancer Sister        Social History:  Social History Tobacco Use    Smoking status: Former Smoker     Packs/day: 1.00     Years: 60.00     Pack years: 60.00     Last attempt to quit: 1/3/2011     Years since quittin.6    Smokeless tobacco: Never Used   Substance Use Topics    Alcohol use: Yes     Comment: occasional liquor after dinner    Drug use: No     Types: Prescription, OTC       Allergies: Allergies   Allergen Reactions    Food Extracts Diarrhea     Onions and garlic causes abdominal pain,cramping     Sulfa (Sulfonamide Antibiotics) Other (comments)     Altered Mental Status         Review of Systems   Review of Systems   Constitutional: Negative for chills and fever. HENT: Negative for rhinorrhea. Eyes: Negative for redness. Respiratory: Negative for shortness of breath. Cardiovascular: Negative for chest pain. Gastrointestinal: Negative for abdominal pain. Genitourinary: Negative for dysuria. Musculoskeletal: Negative for back pain. Neurological: Negative for syncope. Psychiatric/Behavioral: The patient is nervous/anxious. All other systems reviewed and are negative. Physical Exam   Physical Exam  Vitals signs and nursing note reviewed. Constitutional:       Appearance: Normal appearance. HENT:      Head: Normocephalic and atraumatic. Mouth/Throat:      Mouth: Mucous membranes are moist.   Eyes:      Extraocular Movements: Extraocular movements intact. Neck:      Musculoskeletal: Neck supple. Cardiovascular:      Rate and Rhythm: Normal rate and regular rhythm. Pulses: Normal pulses. Pulmonary:      Effort: Pulmonary effort is normal.      Breath sounds: Normal breath sounds. Abdominal:      Palpations: Abdomen is soft. Tenderness: There is no abdominal tenderness. Musculoskeletal:         General: No deformity. Skin:     General: Skin is warm and dry. Neurological:      General: No focal deficit present. Mental Status: She is alert.    Psychiatric:         Mood and Affect: Mood normal.         Behavior: Behavior normal.         Diagnostic Study Results     Labs -     Recent Results (from the past 24 hour(s))   EKG, 12 LEAD, INITIAL    Collection Time: 08/09/20 11:42 AM   Result Value Ref Range    Ventricular Rate 78 BPM    Atrial Rate 66 BPM    P-R Interval 178 ms    QRS Duration 118 ms    Q-T Interval 392 ms    QTC Calculation (Bezet) 446 ms    Calculated P Axis 41 degrees    Calculated R Axis -30 degrees    Calculated T Axis 82 degrees    Diagnosis       Sinus rhythm with premature atrial complexes  Left axis deviation  Septal infarct , age undetermined  When compared with ECG of 23-JAN-2020 05:49,  premature atrial complexes are now present  Left bundle branch block is no longer present  Septal infarct is now present     CBC WITH AUTOMATED DIFF    Collection Time: 08/09/20 11:56 AM   Result Value Ref Range    WBC 7.4 3.6 - 11.0 K/uL    RBC 4.25 3.80 - 5.20 M/uL    HGB 12.5 11.5 - 16.0 g/dL    HCT 38.6 35.0 - 47.0 %    MCV 90.8 80.0 - 99.0 FL    MCH 29.4 26.0 - 34.0 PG    MCHC 32.4 30.0 - 36.5 g/dL    RDW 13.5 11.5 - 14.5 %    PLATELET 217 227 - 098 K/uL    MPV 8.8 (L) 8.9 - 12.9 FL    NRBC 0.0 0  WBC    ABSOLUTE NRBC 0.00 0.00 - 0.01 K/uL    NEUTROPHILS 79 (H) 32 - 75 %    LYMPHOCYTES 9 (L) 12 - 49 %    MONOCYTES 9 5 - 13 %    EOSINOPHILS 2 0 - 7 %    BASOPHILS 1 0 - 1 %    IMMATURE GRANULOCYTES 0 0.0 - 0.5 %    ABS. NEUTROPHILS 5.8 1.8 - 8.0 K/UL    ABS. LYMPHOCYTES 0.7 (L) 0.8 - 3.5 K/UL    ABS. MONOCYTES 0.7 0.0 - 1.0 K/UL    ABS. EOSINOPHILS 0.1 0.0 - 0.4 K/UL    ABS. BASOPHILS 0.1 0.0 - 0.1 K/UL    ABS. IMM.  GRANS. 0.0 0.00 - 0.04 K/UL    DF SMEAR SCANNED      RBC COMMENTS NORMOCYTIC, NORMOCHROMIC     METABOLIC PANEL, BASIC    Collection Time: 08/09/20 11:56 AM   Result Value Ref Range    Sodium 125 (L) 136 - 145 mmol/L    Potassium 4.4 3.5 - 5.1 mmol/L    Chloride 90 (L) 97 - 108 mmol/L    CO2 32 21 - 32 mmol/L    Anion gap 3 (L) 5 - 15 mmol/L    Glucose 90 65 - 100 mg/dL BUN 16 6 - 20 MG/DL    Creatinine 0.79 0.55 - 1.02 MG/DL    BUN/Creatinine ratio 20 12 - 20      GFR est AA >60 >60 ml/min/1.73m2    GFR est non-AA >60 >60 ml/min/1.73m2    Calcium 8.8 8.5 - 10.1 MG/DL   TROPONIN I    Collection Time: 08/09/20 11:56 AM   Result Value Ref Range    Troponin-I, Qt. <0.05 <0.05 ng/mL       Radiologic Studies -   XR CHEST PORT   Final Result   IMPRESSION:      No acute pulmonary process. CT Results  (Last 48 hours)    None        CXR Results  (Last 48 hours)               08/09/20 1229  XR CHEST PORT Final result    Impression:  IMPRESSION:       No acute pulmonary process. Narrative:  history: Shortness of breath       COMPARISON: 12/15/2019       FINDINGS:       Frontal chest radiograph submitted for review. Patient is rotated to the right. Stable heart size. No acute pulmonary process. No pleural effusion or pneumothorax. Medical Decision Making   I am the first provider for this patient. I reviewed the vital signs, available nursing notes, past medical history, past surgical history, family history and social history. Vital Signs-Reviewed the patient's vital signs. Patient Vitals for the past 24 hrs:   Temp Pulse Resp BP SpO2   08/09/20 1330  64  147/70 96 %   08/09/20 1230  68  156/59 97 %   08/09/20 1145 97.8 °F (36.6 °C) 70 18 161/79 98 %         Provider Notes (Medical Decision Making):   EKG sinus, rate 78, nonspecific ST T changes, PVC noted, normal QT    Very pleasant, well-appearing 80-year-old lady presenting to ED with chief complaint of anxiety. She says her symptoms are consistent with her previous episodes of anxiety. She is not sure why this, home health organization wanted her to be evaluated. She never had chest pain or shortness of breath. EKG with no ischemia, unchanged from previous. Troponin is negative. Does not sound like ACS. Chest x-ray with no pneumonia or pneumothorax.   Symptoms does not sound like PE. Ultimately, patient is asymptomatic on my evaluation. I think she is safe for discharge with plan to follow-up with primary care physician. Return precautions given. ED Course:     Initial assessment performed. The patients presenting problems have been discussed, and they are in agreement with the care plan formulated and outlined with them. I have encouraged them to ask questions as they arise throughout their visit. Disposition:  dc    PLAN:  1. Current Discharge Medication List        2.    Follow-up Information     Follow up With Specialties Details Why Contact Info    Daniel Ku MD Internal Medicine   [facility] [address]  [address] [address] [phone]          Return to ED if worse     Diagnosis     Clinical Impression: anxiety

## 2020-08-09 NOTE — ED NOTES
Received call from [facility] Information faxed to [facility] at this time.

## 2020-08-09 NOTE — ED NOTES
"[facility]  Urology  History & Physical    Patient Name: Destinee Luo  MRN: [id number]  Admission Date: (Not on file)  Code Status: [unfilled]   Attending Provider: Kiet Arellano MD   Primary Care Physician: Edilberto Cassidy MD  Principal Problem:[unfilled]    Subjective:     HPI:   86 WF  preop for right distal ureterectomy and reimplant vs nephroureterectomy for right ureteral ca    Also for simply hyst for endometrial ca by Dr Carlos Mann    Pt has recurrent UTIs  Currently on cipro        Past Medical History:   Diagnosis Date    High cholesterol     Hypertension     Macular degeneration     Stroke 2012    TIA, no deficits       Past Surgical History:   Procedure Laterality Date    NO PAST SURGERIES         Review of patient's allergies indicates:   Allergen Reactions    Sulfa (sulfonamide antibiotics)     Codeine Anxiety       Family History     None          Social History Main Topics    Smoking status: Never Smoker    Smokeless tobacco: Never Used    Alcohol use No    Drug use: Unknown    Sexual activity: Not on file       Review of Systems    Objective:     [unfilled]     Body mass index is 26.57 kg/m².    [unfilled]       Lines/Drains/Airways          No matching active lines, drains, or airways          Physical Exam    Ao*4  resp normal rate' breathing not labored; cta b  cv normal rate; rrr no mur  Ab nondistended  Ext no edema      Significant Labs:    Urine cs multiple org  Repeat cs 12/28 pending    Cr 1.3  GFR 37  Hct 35  Platelets normal    FINAL PATHOLOGIC DIAGNOSIS  1. 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  2. URETERAL BIOPSY: HIGH-GRADE UROTHELIAL CARCINOMA.  Note: The ureteral tumor appears mostly "in situ" within the small biopsy. There is a small area of at least  superficial stromal invasion. It appears histologically distinct from the endometrial tumor.  [facility] [place]osed by: Desmond Hernandez M.D.  (Electronically Signed: 2017-11-28 " [facility] transport set up for patient as she does not have her chronic O2 available. ETA 1630. 09:17:46)    Significant Imaging:  -   Impression         1. Moderate to severe right-sided hydronephrosis to the level of the distal right ureteral mass worrisome for an obstructive neoplasm in that is thought to be enhancing.    2. Hypodensity off of the right ovary nonspecific on this examination possibly relating to a cyst or follicle. Female pelvis ultrasound would be suggested to exclude a cystic ovarian neoplastic mass    3. Suggestion of fluid or abnormal density in the uterus centrally presumably involving the endometrium, female pelvis ultrasound evaluation would be suggested to exclude endometrial thickening as can be seen with a neoplastic process, hyperplasia, or polyps.    4. Gas within the bladder, please correlate for recent instrumentation, fistula formation or air forming organism could appear similar.    5. Adrenal nodules, statistically favored related to adenomas but not ideally evaluated secondary to size. Followup for size stability would be reasonable         Assessment and Plan:     There are no hospital problems to display for this patient.    Right ureteral cancer high grade T1  CKD  Recurrent UTIs  Endometrial ca      Right robotic vs open distal ureteral ureterectomy and reimplant, possible lymphadenectomy vs nephroureterectomy  Cont cipro  Repeat urine cs  Hysterectomy per Dr RENEE Mann    We discussed the increased risk of complications given her age, CKD, inguinal hernias, recurrent UTIs and multiple malignancies she is at increased risk for complications  I agree that concurrent repair of the inguinal hernias is not the best course at this time    She understands that she may require complete removal of the kidney and ureter and that she may require open surgery.    Answered all of her questions and her family's questions.    They agree with the plan and wish to proceed    40 min face to face; more than 50% time spent counseling and coordinating care           Kiet Arellano,  MD  Urology  [facility]  Urology

## 2020-08-10 ENCOUNTER — HOME CARE VISIT (OUTPATIENT)
Dept: HOME HEALTH SERVICES | Facility: HOME HEALTH | Age: 85
End: 2020-08-10
Payer: MEDICARE

## 2020-08-10 ENCOUNTER — HOME CARE VISIT (OUTPATIENT)
Dept: SCHEDULING | Facility: HOME HEALTH | Age: 85
End: 2020-08-10
Payer: MEDICARE

## 2020-08-10 ENCOUNTER — PATIENT OUTREACH (OUTPATIENT)
Dept: CASE MANAGEMENT | Age: 85
End: 2020-08-10

## 2020-08-10 VITALS
HEART RATE: 62 BPM | RESPIRATION RATE: 24 BRPM | OXYGEN SATURATION: 94 % | SYSTOLIC BLOOD PRESSURE: 110 MMHG | DIASTOLIC BLOOD PRESSURE: 60 MMHG | TEMPERATURE: 98.5 F

## 2020-08-10 LAB
ATRIAL RATE: 66 BPM
CALCULATED P AXIS, ECG09: 41 DEGREES
CALCULATED R AXIS, ECG10: -30 DEGREES
CALCULATED T AXIS, ECG11: 82 DEGREES
DIAGNOSIS, 93000: NORMAL
P-R INTERVAL, ECG05: 178 MS
Q-T INTERVAL, ECG07: 392 MS
QRS DURATION, ECG06: 118 MS
QTC CALCULATION (BEZET), ECG08: 446 MS
VENTRICULAR RATE, ECG03: 78 BPM

## 2020-08-10 PROCEDURE — G0299 HHS/HOSPICE OF RN EA 15 MIN: HCPCS

## 2020-08-10 PROCEDURE — 3331090002 HH PPS REVENUE DEBIT

## 2020-08-10 PROCEDURE — 3331090001 HH PPS REVENUE CREDIT

## 2020-08-10 NOTE — PROGRESS NOTES
Patient contacted regarding recent discharge and COVID-19 risk. Discussed COVID-19 related testing which was not done at this time. Test results were not done. Patient informed of results, if available? no    Care Transition Nurse/ Ambulatory Care Manager/ LPN Care Coordinator contacted the patient by telephone to perform post discharge assessment. Verified name and  with patient as identifiers. Patient has following risk factors of: chronic respiratory failure Hx. . CTN/ACM/LPN reviewed discharge instructions, medical action plan and red flags related to discharge diagnosis. Reviewed and educated them on any new and changed medications related to discharge diagnosis. Advised obtaining a 90-day supply of all daily and as-needed medications. Advance Care Planning:   Does patient have an Advance Directive: not on file; education provided   Mony Wood remains:  Son- [facility] [place]    Education provided regarding infection prevention, and signs and symptoms of COVID-19 and when to seek medical attention with patient who verbalized understanding. Discussed exposure protocols and quarantine from [address] you at higher risk for severe illness  and given an opportunity for questions and concerns. The patient agrees to contact the COVID-19 hotline [phone] or PCP office for questions related to their healthcare. CTN/ACM/LPN provided contact information for future reference. From CDC: Are you at higher risk for severe illness?  Wash your hands often.  Avoid close contact (6 feet, which is about two arm lengths) with people who are sick.  Put distance between yourself and other people if COVID-19 is spreading in your community.  Clean and disinfect frequently touched surfaces.  Avoid all cruise travel and non-essential air travel.  Call your healthcare professional if you have concerns about COVID-19 and your underlying condition or if you are sick.     For more information on steps you can take to protect yourself, see CDC's How to Protect Yourself      Patient/family/caregiver given information for [employer] and agrees to enroll no    Plan for follow-up call in 7-14 days based on severity of symptoms and risk factors.

## 2020-08-11 ENCOUNTER — HOME CARE VISIT (OUTPATIENT)
Dept: SCHEDULING | Facility: HOME HEALTH | Age: 85
End: 2020-08-11
Payer: MEDICARE

## 2020-08-11 ENCOUNTER — HOME CARE VISIT (OUTPATIENT)
Dept: HOME HEALTH SERVICES | Facility: HOME HEALTH | Age: 85
End: 2020-08-11
Payer: MEDICARE

## 2020-08-11 VITALS
RESPIRATION RATE: 22 BRPM | TEMPERATURE: 99 F | OXYGEN SATURATION: 95 % | HEART RATE: 60 BPM | SYSTOLIC BLOOD PRESSURE: 110 MMHG | DIASTOLIC BLOOD PRESSURE: 60 MMHG

## 2020-08-11 PROCEDURE — 3331090001 HH PPS REVENUE CREDIT

## 2020-08-11 PROCEDURE — 3331090002 HH PPS REVENUE DEBIT

## 2020-08-11 PROCEDURE — G0156 HHCP-SVS OF AIDE,EA 15 MIN: HCPCS

## 2020-08-11 PROCEDURE — G0299 HHS/HOSPICE OF RN EA 15 MIN: HCPCS

## 2020-08-12 ENCOUNTER — HOME CARE VISIT (OUTPATIENT)
Dept: HOME HEALTH SERVICES | Facility: HOME HEALTH | Age: 85
End: 2020-08-12
Payer: MEDICARE

## 2020-08-12 ENCOUNTER — HOME CARE VISIT (OUTPATIENT)
Dept: SCHEDULING | Facility: HOME HEALTH | Age: 85
End: 2020-08-12
Payer: MEDICARE

## 2020-08-12 VITALS
OXYGEN SATURATION: 95 % | DIASTOLIC BLOOD PRESSURE: 80 MMHG | TEMPERATURE: 98.8 F | HEART RATE: 70 BPM | SYSTOLIC BLOOD PRESSURE: 130 MMHG | RESPIRATION RATE: 24 BRPM

## 2020-08-12 PROCEDURE — 3331090002 HH PPS REVENUE DEBIT

## 2020-08-12 PROCEDURE — G0299 HHS/HOSPICE OF RN EA 15 MIN: HCPCS

## 2020-08-12 PROCEDURE — 3331090001 HH PPS REVENUE CREDIT

## 2020-08-13 ENCOUNTER — HOME CARE VISIT (OUTPATIENT)
Dept: SCHEDULING | Facility: HOME HEALTH | Age: 85
End: 2020-08-13
Payer: MEDICARE

## 2020-08-13 ENCOUNTER — HOME CARE VISIT (OUTPATIENT)
Dept: HOME HEALTH SERVICES | Facility: HOME HEALTH | Age: 85
End: 2020-08-13
Payer: MEDICARE

## 2020-08-13 VITALS
SYSTOLIC BLOOD PRESSURE: 130 MMHG | TEMPERATURE: 98.7 F | DIASTOLIC BLOOD PRESSURE: 70 MMHG | OXYGEN SATURATION: 93 % | HEART RATE: 70 BPM | RESPIRATION RATE: 24 BRPM

## 2020-08-13 PROCEDURE — 3331090002 HH PPS REVENUE DEBIT

## 2020-08-13 PROCEDURE — 3331090001 HH PPS REVENUE CREDIT

## 2020-08-13 PROCEDURE — G0156 HHCP-SVS OF AIDE,EA 15 MIN: HCPCS

## 2020-08-13 PROCEDURE — G0299 HHS/HOSPICE OF RN EA 15 MIN: HCPCS

## 2020-08-14 ENCOUNTER — HOME CARE VISIT (OUTPATIENT)
Dept: HOME HEALTH SERVICES | Facility: HOME HEALTH | Age: 85
End: 2020-08-14
Payer: MEDICARE

## 2020-08-14 ENCOUNTER — HOME CARE VISIT (OUTPATIENT)
Dept: SCHEDULING | Facility: HOME HEALTH | Age: 85
End: 2020-08-14
Payer: MEDICARE

## 2020-08-14 VITALS
DIASTOLIC BLOOD PRESSURE: 70 MMHG | OXYGEN SATURATION: 94 % | TEMPERATURE: 98.4 F | RESPIRATION RATE: 22 BRPM | SYSTOLIC BLOOD PRESSURE: 120 MMHG | HEART RATE: 67 BPM

## 2020-08-14 PROCEDURE — 3331090001 HH PPS REVENUE CREDIT

## 2020-08-14 PROCEDURE — 3331090002 HH PPS REVENUE DEBIT

## 2020-08-14 PROCEDURE — G0299 HHS/HOSPICE OF RN EA 15 MIN: HCPCS

## 2020-08-15 PROCEDURE — 3331090002 HH PPS REVENUE DEBIT

## 2020-08-15 PROCEDURE — 3331090001 HH PPS REVENUE CREDIT

## 2020-08-16 PROCEDURE — 3331090002 HH PPS REVENUE DEBIT

## 2020-08-16 PROCEDURE — 3331090001 HH PPS REVENUE CREDIT

## 2020-08-17 ENCOUNTER — HOME CARE VISIT (OUTPATIENT)
Dept: HOME HEALTH SERVICES | Facility: HOME HEALTH | Age: 85
End: 2020-08-17
Payer: MEDICARE

## 2020-08-17 ENCOUNTER — HOME CARE VISIT (OUTPATIENT)
Dept: SCHEDULING | Facility: HOME HEALTH | Age: 85
End: 2020-08-17
Payer: MEDICARE

## 2020-08-17 PROCEDURE — 3331090002 HH PPS REVENUE DEBIT

## 2020-08-17 PROCEDURE — G0299 HHS/HOSPICE OF RN EA 15 MIN: HCPCS

## 2020-08-17 PROCEDURE — A6216 NON-STERILE GAUZE<=16 SQ IN: HCPCS

## 2020-08-17 PROCEDURE — 3331090003 HH PPS REVENUE ADJ

## 2020-08-17 PROCEDURE — 3331090001 HH PPS REVENUE CREDIT

## 2020-08-17 PROCEDURE — A6210 FOAM DRG >16<=48 SQ IN W/O B: HCPCS

## 2020-08-18 ENCOUNTER — HOME CARE VISIT (OUTPATIENT)
Dept: SCHEDULING | Facility: HOME HEALTH | Age: 85
End: 2020-08-18
Payer: MEDICARE

## 2020-08-18 ENCOUNTER — HOME CARE VISIT (OUTPATIENT)
Dept: HOME HEALTH SERVICES | Facility: HOME HEALTH | Age: 85
End: 2020-08-18
Payer: MEDICARE

## 2020-08-18 VITALS
HEART RATE: 58 BPM | DIASTOLIC BLOOD PRESSURE: 70 MMHG | RESPIRATION RATE: 24 BRPM | TEMPERATURE: 98.3 F | OXYGEN SATURATION: 96 % | SYSTOLIC BLOOD PRESSURE: 112 MMHG

## 2020-08-18 PROCEDURE — 400014 HH F/U

## 2020-08-18 PROCEDURE — 3331090001 HH PPS REVENUE CREDIT

## 2020-08-18 PROCEDURE — G0299 HHS/HOSPICE OF RN EA 15 MIN: HCPCS

## 2020-08-18 PROCEDURE — 3331090002 HH PPS REVENUE DEBIT

## 2020-08-19 ENCOUNTER — HOME CARE VISIT (OUTPATIENT)
Dept: HOME HEALTH SERVICES | Facility: HOME HEALTH | Age: 85
End: 2020-08-19
Payer: MEDICARE

## 2020-08-19 ENCOUNTER — HOME CARE VISIT (OUTPATIENT)
Dept: SCHEDULING | Facility: HOME HEALTH | Age: 85
End: 2020-08-19
Payer: MEDICARE

## 2020-08-19 VITALS
RESPIRATION RATE: 22 BRPM | SYSTOLIC BLOOD PRESSURE: 140 MMHG | TEMPERATURE: 98.8 F | TEMPERATURE: 98.6 F | SYSTOLIC BLOOD PRESSURE: 112 MMHG | DIASTOLIC BLOOD PRESSURE: 60 MMHG | HEART RATE: 68 BPM | DIASTOLIC BLOOD PRESSURE: 80 MMHG | OXYGEN SATURATION: 98 % | RESPIRATION RATE: 24 BRPM | OXYGEN SATURATION: 95 % | HEART RATE: 60 BPM

## 2020-08-19 PROCEDURE — G0299 HHS/HOSPICE OF RN EA 15 MIN: HCPCS

## 2020-08-19 PROCEDURE — 3331090001 HH PPS REVENUE CREDIT

## 2020-08-19 PROCEDURE — 3331090002 HH PPS REVENUE DEBIT

## 2020-08-20 ENCOUNTER — HOME CARE VISIT (OUTPATIENT)
Dept: SCHEDULING | Facility: HOME HEALTH | Age: 85
End: 2020-08-20
Payer: MEDICARE

## 2020-08-20 ENCOUNTER — HOME CARE VISIT (OUTPATIENT)
Dept: HOME HEALTH SERVICES | Facility: HOME HEALTH | Age: 85
End: 2020-08-20
Payer: MEDICARE

## 2020-08-20 ENCOUNTER — TELEPHONE (OUTPATIENT)
Dept: INTERNAL MEDICINE CLINIC | Age: 85
End: 2020-08-20

## 2020-08-20 VITALS
OXYGEN SATURATION: 96 % | RESPIRATION RATE: 22 BRPM | SYSTOLIC BLOOD PRESSURE: 126 MMHG | TEMPERATURE: 98.9 F | DIASTOLIC BLOOD PRESSURE: 80 MMHG | HEART RATE: 60 BPM

## 2020-08-20 PROCEDURE — 3331090001 HH PPS REVENUE CREDIT

## 2020-08-20 PROCEDURE — G0299 HHS/HOSPICE OF RN EA 15 MIN: HCPCS

## 2020-08-20 PROCEDURE — 3331090002 HH PPS REVENUE DEBIT

## 2020-08-20 PROCEDURE — G0156 HHCP-SVS OF AIDE,EA 15 MIN: HCPCS

## 2020-08-20 NOTE — TELEPHONE ENCOUNTER
[address] called and states Scarlett's anxiety is out of control. The BuSpar 5 mg is not working well. Can you prescribe her something else?     [facility] [phone] Makeda Schumacher

## 2020-08-20 NOTE — PROGRESS NOTES
Airway  Date/Time: 8/20/2020 11:03 AM  Urgency: elective      General Information and Staff    Patient location during procedure: OR  Anesthesiologist: Panchito Rosado MD  Performed: anesthesiologist     Indications and Patient Condition  Indications for airwa I reviewed pertinent labs and imaging, and discussed /agreed on the plan of care with Dr. Scarlett Boston. Hospitalist Progress Note    NAME: Yareli Ragsdale   :  11/10/1931   MRN:  [id number]     Assessment / Plan:  Acute on chronic hypoxic respiratory failure secondary to COPD exacerbation, POA  · On chronic O2 at 2L NC; currently on 3L  · Continue with duonebs and steroids  · CTA negative for PE or pneumonia; showed significant emphysematous disease  · CM consulted for discharge; home vs LTC   · PT/OT consulted     LLE cellulitis  PVD  Chronic left ankle wound  · Continue Keflex  · Wound care consulted     HTN  · Continue lisinopril     18.5 - 24.9 Normal weight / Body mass index is 21.86 kg/m². Code status: DNR  Prophylaxis: Lovenox  Recommended Disposition: TBD     Subjective:     Chief Complaint / Reason for Physician Visit  Patient arrived to [facility] after a long-term stay at [facility]. She was discharged home and within 24 hours she was back in the ED for SOB. She lives alone and states her son lives about 10 miles away. Discussed with RN events overnight. Review of Systems:  Symptom Y/N Comments  Symptom Y/N Comments   Fever/Chills n   Chest Pain n    Poor Appetite    Edema     Cough n   Abdominal Pain n    Sputum n   Joint Pain     SOB/GODINEZ n   Pruritis/Rash     Nausea/vomit    Tolerating PT/OT     Diarrhea    Tolerating Diet     Constipation    Other       Could NOT obtain due to:      Objective:     VITALS:   Last 24hrs VS reviewed since prior progress note.  Most recent are:  Patient Vitals for the past 24 hrs:   Temp Pulse Resp BP SpO2   20 1547 98.2 °F (36.8 °C) 94 20 150/86 (!) 78 %   20 1307     97 %   20 1057 97.8 °F (36.6 °C) 71 22 148/64 97 %   20 1022  68  142/56    20 0902     97 %   20 0727 97.6 °F (36.4 °C) 68 20 142/56 97 %   20 0403     94 %   20 2352     94 %   20 2233 97.8 °F (36.6 °C) 66 16 135/76 93 %   20 2135     92 %       Intake/Output Summary (Last 24 hours) at 1/24/2020 1550  Last data filed at 1/24/2020 1235  Gross per 24 hour   Intake 780 ml   Output 550 ml   Net 230 ml        PHYSICAL EXAM:  General: Pleasant elderly  female. NAD    EENT:  EOMI. Anicteric sclerae. MMM  Resp:  Lungs diminished in bases, no wheezing or rales. No accessory muscle use  CV:  Regular rate rhythm,  No edema  GI:  Soft, Non distended, Non tender.  +Bowel sounds  Neurologic:  Alert and oriented X 3, normal speech,   Psych:   Good insight. Not anxious nor agitated  Skin:  No rashes. No jaundice    Reviewed most current lab test results and cultures  YES  Reviewed most current radiology test results   YES  Review and summation of old records today    NO  Reviewed patient's current orders and MAR    YES  PMH/SH reviewed - no change compared to H&P  ________________________________________________________________________  Care Plan discussed with:    Comments   Patient x    Family      RN x    Care Manager     Consultant                        Multidiciplinary team rounds were held today with , nursing, pharmacist and clinical coordinator. Patient's plan of care was discussed; medications were reviewed and discharge planning was addressed. ________________________________________________________________________  Total NON critical care TIME:  25   Minutes    Total CRITICAL CARE TIME Spent:   Minutes non procedure based      Comments   >50% of visit spent in counseling and coordination of care     ________________________________________________________________________  Stacey Ortiz NP     Procedures: see electronic medical records for all procedures/Xrays and details which were not copied into this note but were reviewed prior to creation of Plan. LABS:  I reviewed today's most current labs and imaging studies.   Pertinent labs include:  Recent Labs     01/23/20  0602   WBC 5.9   HGB 12.3   HCT 37.5      Recent Labs     01/23/20  0602   *   K 4.2   CL 95*   CO2 31   GLU 96   BUN 16   CREA 1.06*   CA 9.0   MG 2.1   ALB 3.3*   TBILI 0.6   SGOT 20   ALT 11*       Signed: True Links, NP

## 2020-08-21 ENCOUNTER — HOME CARE VISIT (OUTPATIENT)
Dept: SCHEDULING | Facility: HOME HEALTH | Age: 85
End: 2020-08-21
Payer: MEDICARE

## 2020-08-21 ENCOUNTER — HOME CARE VISIT (OUTPATIENT)
Dept: HOME HEALTH SERVICES | Facility: HOME HEALTH | Age: 85
End: 2020-08-21
Payer: MEDICARE

## 2020-08-21 VITALS
RESPIRATION RATE: 22 BRPM | TEMPERATURE: 98.8 F | OXYGEN SATURATION: 95 % | SYSTOLIC BLOOD PRESSURE: 120 MMHG | HEART RATE: 58 BPM | DIASTOLIC BLOOD PRESSURE: 70 MMHG

## 2020-08-21 PROCEDURE — 3331090002 HH PPS REVENUE DEBIT

## 2020-08-21 PROCEDURE — G0299 HHS/HOSPICE OF RN EA 15 MIN: HCPCS

## 2020-08-21 PROCEDURE — 3331090001 HH PPS REVENUE CREDIT

## 2020-08-22 ENCOUNTER — HOME CARE VISIT (OUTPATIENT)
Dept: SCHEDULING | Facility: HOME HEALTH | Age: 85
End: 2020-08-22
Payer: MEDICARE

## 2020-08-22 ENCOUNTER — HOME CARE VISIT (OUTPATIENT)
Dept: HOME HEALTH SERVICES | Facility: HOME HEALTH | Age: 85
End: 2020-08-22
Payer: MEDICARE

## 2020-08-22 PROCEDURE — 3331090002 HH PPS REVENUE DEBIT

## 2020-08-22 PROCEDURE — 3331090001 HH PPS REVENUE CREDIT

## 2020-08-22 PROCEDURE — G0299 HHS/HOSPICE OF RN EA 15 MIN: HCPCS

## 2020-08-23 PROCEDURE — 3331090002 HH PPS REVENUE DEBIT

## 2020-08-23 PROCEDURE — 3331090001 HH PPS REVENUE CREDIT

## 2020-08-24 ENCOUNTER — PATIENT OUTREACH (OUTPATIENT)
Dept: CASE MANAGEMENT | Age: 85
End: 2020-08-24

## 2020-08-24 ENCOUNTER — HOME CARE VISIT (OUTPATIENT)
Dept: SCHEDULING | Facility: HOME HEALTH | Age: 85
End: 2020-08-24
Payer: MEDICARE

## 2020-08-24 ENCOUNTER — HOME CARE VISIT (OUTPATIENT)
Dept: HOME HEALTH SERVICES | Facility: HOME HEALTH | Age: 85
End: 2020-08-24
Payer: MEDICARE

## 2020-08-24 VITALS
OXYGEN SATURATION: 94 % | RESPIRATION RATE: 24 BRPM | HEART RATE: 62 BPM | SYSTOLIC BLOOD PRESSURE: 130 MMHG | SYSTOLIC BLOOD PRESSURE: 136 MMHG | DIASTOLIC BLOOD PRESSURE: 80 MMHG | RESPIRATION RATE: 26 BRPM | TEMPERATURE: 98.4 F | HEART RATE: 84 BPM | OXYGEN SATURATION: 96 % | DIASTOLIC BLOOD PRESSURE: 70 MMHG | TEMPERATURE: 98.3 F

## 2020-08-24 PROCEDURE — 3331090002 HH PPS REVENUE DEBIT

## 2020-08-24 PROCEDURE — G0299 HHS/HOSPICE OF RN EA 15 MIN: HCPCS

## 2020-08-24 PROCEDURE — 3331090001 HH PPS REVENUE CREDIT

## 2020-08-24 NOTE — PROGRESS NOTES
Education Provided due to At Risk Condition:  ED:  SOB/Anxiety f/u  Patient resolved from Transition of Care episode on 8/24/2020  Discussed COVID-19 related testing which was not done at this time. Test results were not done. Patient informed of results, if available? no     Patient/family has been provided the following resources and education related to COVID-19:                         Signs, symptoms and red flags related to COVID-19            CDC exposure and quarantine guidelines            [facility] exposure contact - [phone]            Contact for their local Department of Health                 Patient currently reports that the following symptoms have improved:  no new symptoms. No further outreach scheduled with this CTN/ACM/LPN/HC/ MA. Episode of Care resolved. Patient has this CTN/ACM/LPN/HC/MA contact information if future needs arise.

## 2020-08-25 ENCOUNTER — HOME CARE VISIT (OUTPATIENT)
Dept: SCHEDULING | Facility: HOME HEALTH | Age: 85
End: 2020-08-25
Payer: MEDICARE

## 2020-08-25 ENCOUNTER — HOME CARE VISIT (OUTPATIENT)
Dept: HOME HEALTH SERVICES | Facility: HOME HEALTH | Age: 85
End: 2020-08-25
Payer: MEDICARE

## 2020-08-25 PROCEDURE — 3331090001 HH PPS REVENUE CREDIT

## 2020-08-25 PROCEDURE — 3331090002 HH PPS REVENUE DEBIT

## 2020-08-25 PROCEDURE — G0156 HHCP-SVS OF AIDE,EA 15 MIN: HCPCS

## 2020-08-26 ENCOUNTER — HOME CARE VISIT (OUTPATIENT)
Dept: SCHEDULING | Facility: HOME HEALTH | Age: 85
End: 2020-08-26
Payer: MEDICARE

## 2020-08-26 ENCOUNTER — HOME CARE VISIT (OUTPATIENT)
Dept: HOME HEALTH SERVICES | Facility: HOME HEALTH | Age: 85
End: 2020-08-26
Payer: MEDICARE

## 2020-08-26 VITALS
DIASTOLIC BLOOD PRESSURE: 70 MMHG | HEART RATE: 66 BPM | RESPIRATION RATE: 22 BRPM | TEMPERATURE: 97.7 F | OXYGEN SATURATION: 97 % | SYSTOLIC BLOOD PRESSURE: 110 MMHG

## 2020-08-26 PROCEDURE — G0299 HHS/HOSPICE OF RN EA 15 MIN: HCPCS

## 2020-08-26 PROCEDURE — 3331090002 HH PPS REVENUE DEBIT

## 2020-08-26 PROCEDURE — 3331090001 HH PPS REVENUE CREDIT

## 2020-08-27 ENCOUNTER — HOME CARE VISIT (OUTPATIENT)
Dept: HOME HEALTH SERVICES | Facility: HOME HEALTH | Age: 85
End: 2020-08-27
Payer: MEDICARE

## 2020-08-27 ENCOUNTER — HOME CARE VISIT (OUTPATIENT)
Dept: SCHEDULING | Facility: HOME HEALTH | Age: 85
End: 2020-08-27
Payer: MEDICARE

## 2020-08-27 PROCEDURE — 3331090002 HH PPS REVENUE DEBIT

## 2020-08-27 PROCEDURE — G0156 HHCP-SVS OF AIDE,EA 15 MIN: HCPCS

## 2020-08-27 PROCEDURE — 3331090001 HH PPS REVENUE CREDIT

## 2020-08-28 ENCOUNTER — HOME CARE VISIT (OUTPATIENT)
Dept: HOME HEALTH SERVICES | Facility: HOME HEALTH | Age: 85
End: 2020-08-28
Payer: MEDICARE

## 2020-08-28 ENCOUNTER — HOME CARE VISIT (OUTPATIENT)
Dept: SCHEDULING | Facility: HOME HEALTH | Age: 85
End: 2020-08-28
Payer: MEDICARE

## 2020-08-28 VITALS
HEART RATE: 56 BPM | DIASTOLIC BLOOD PRESSURE: 70 MMHG | OXYGEN SATURATION: 96 % | RESPIRATION RATE: 22 BRPM | SYSTOLIC BLOOD PRESSURE: 110 MMHG | TEMPERATURE: 98.5 F

## 2020-08-28 PROCEDURE — 3331090002 HH PPS REVENUE DEBIT

## 2020-08-28 PROCEDURE — G0299 HHS/HOSPICE OF RN EA 15 MIN: HCPCS

## 2020-08-28 PROCEDURE — 3331090001 HH PPS REVENUE CREDIT

## 2020-08-28 RX ORDER — BUSPIRONE HYDROCHLORIDE 5 MG/1
5 TABLET ORAL
Qty: 30 TAB | Refills: 3 | Status: SHIPPED | OUTPATIENT
Start: 2020-08-28 | End: 2020-12-17

## 2020-08-28 NOTE — TELEPHONE ENCOUNTER
Requested Prescriptions     Pending Prescriptions Disp Refills    busPIRone (BUSPAR) 5 mg tablet        Sig: Take 1 Tab by mouth nightly.        Last Refill: 8/4/20  Next Appointment:

## 2020-08-29 PROCEDURE — 3331090002 HH PPS REVENUE DEBIT

## 2020-08-29 PROCEDURE — 3331090001 HH PPS REVENUE CREDIT

## 2020-08-30 PROCEDURE — 3331090002 HH PPS REVENUE DEBIT

## 2020-08-30 PROCEDURE — 3331090001 HH PPS REVENUE CREDIT

## 2020-08-31 ENCOUNTER — HOME CARE VISIT (OUTPATIENT)
Dept: HOME HEALTH SERVICES | Facility: HOME HEALTH | Age: 85
End: 2020-08-31
Payer: MEDICARE

## 2020-08-31 ENCOUNTER — HOME CARE VISIT (OUTPATIENT)
Dept: SCHEDULING | Facility: HOME HEALTH | Age: 85
End: 2020-08-31
Payer: MEDICARE

## 2020-08-31 VITALS
SYSTOLIC BLOOD PRESSURE: 120 MMHG | HEART RATE: 62 BPM | DIASTOLIC BLOOD PRESSURE: 78 MMHG | OXYGEN SATURATION: 95 % | TEMPERATURE: 98.6 F | RESPIRATION RATE: 22 BRPM

## 2020-08-31 PROCEDURE — 3331090002 HH PPS REVENUE DEBIT

## 2020-08-31 PROCEDURE — 3331090001 HH PPS REVENUE CREDIT

## 2020-08-31 PROCEDURE — G0299 HHS/HOSPICE OF RN EA 15 MIN: HCPCS

## 2020-09-01 ENCOUNTER — HOME CARE VISIT (OUTPATIENT)
Dept: HOME HEALTH SERVICES | Facility: HOME HEALTH | Age: 85
End: 2020-09-01
Payer: MEDICARE

## 2020-09-01 ENCOUNTER — HOME CARE VISIT (OUTPATIENT)
Dept: SCHEDULING | Facility: HOME HEALTH | Age: 85
End: 2020-09-01
Payer: MEDICARE

## 2020-09-01 PROCEDURE — 3331090001 HH PPS REVENUE CREDIT

## 2020-09-01 PROCEDURE — G0156 HHCP-SVS OF AIDE,EA 15 MIN: HCPCS

## 2020-09-01 PROCEDURE — 3331090002 HH PPS REVENUE DEBIT

## 2020-09-02 ENCOUNTER — HOME CARE VISIT (OUTPATIENT)
Dept: HOME HEALTH SERVICES | Facility: HOME HEALTH | Age: 85
End: 2020-09-02
Payer: MEDICARE

## 2020-09-02 ENCOUNTER — HOME CARE VISIT (OUTPATIENT)
Dept: SCHEDULING | Facility: HOME HEALTH | Age: 85
End: 2020-09-02
Payer: MEDICARE

## 2020-09-02 VITALS
RESPIRATION RATE: 22 BRPM | DIASTOLIC BLOOD PRESSURE: 70 MMHG | TEMPERATURE: 98.5 F | OXYGEN SATURATION: 96 % | HEART RATE: 60 BPM | SYSTOLIC BLOOD PRESSURE: 120 MMHG

## 2020-09-02 PROCEDURE — 3331090001 HH PPS REVENUE CREDIT

## 2020-09-02 PROCEDURE — G0299 HHS/HOSPICE OF RN EA 15 MIN: HCPCS

## 2020-09-02 PROCEDURE — 3331090002 HH PPS REVENUE DEBIT

## 2020-09-03 ENCOUNTER — HOME CARE VISIT (OUTPATIENT)
Dept: SCHEDULING | Facility: HOME HEALTH | Age: 85
End: 2020-09-03
Payer: MEDICARE

## 2020-09-03 ENCOUNTER — HOME CARE VISIT (OUTPATIENT)
Dept: HOME HEALTH SERVICES | Facility: HOME HEALTH | Age: 85
End: 2020-09-03
Payer: MEDICARE

## 2020-09-03 PROCEDURE — 3331090001 HH PPS REVENUE CREDIT

## 2020-09-03 PROCEDURE — G0299 HHS/HOSPICE OF RN EA 15 MIN: HCPCS

## 2020-09-03 PROCEDURE — 3331090002 HH PPS REVENUE DEBIT

## 2020-09-04 ENCOUNTER — HOME CARE VISIT (OUTPATIENT)
Dept: HOME HEALTH SERVICES | Facility: HOME HEALTH | Age: 85
End: 2020-09-04
Payer: MEDICARE

## 2020-09-04 ENCOUNTER — HOME CARE VISIT (OUTPATIENT)
Dept: SCHEDULING | Facility: HOME HEALTH | Age: 85
End: 2020-09-04
Payer: MEDICARE

## 2020-09-04 PROCEDURE — 3331090001 HH PPS REVENUE CREDIT

## 2020-09-04 PROCEDURE — G0299 HHS/HOSPICE OF RN EA 15 MIN: HCPCS

## 2020-09-04 PROCEDURE — 3331090002 HH PPS REVENUE DEBIT

## 2020-09-04 PROCEDURE — G0156 HHCP-SVS OF AIDE,EA 15 MIN: HCPCS

## 2020-09-05 VITALS
SYSTOLIC BLOOD PRESSURE: 122 MMHG | OXYGEN SATURATION: 95 % | HEART RATE: 68 BPM | RESPIRATION RATE: 22 BRPM | DIASTOLIC BLOOD PRESSURE: 80 MMHG | TEMPERATURE: 98.4 F

## 2020-09-05 VITALS
OXYGEN SATURATION: 96 % | SYSTOLIC BLOOD PRESSURE: 120 MMHG | HEART RATE: 64 BPM | RESPIRATION RATE: 22 BRPM | TEMPERATURE: 98.5 F | DIASTOLIC BLOOD PRESSURE: 80 MMHG

## 2020-09-05 PROCEDURE — 3331090001 HH PPS REVENUE CREDIT

## 2020-09-05 PROCEDURE — 3331090002 HH PPS REVENUE DEBIT

## 2020-09-06 PROCEDURE — 3331090001 HH PPS REVENUE CREDIT

## 2020-09-06 PROCEDURE — 3331090002 HH PPS REVENUE DEBIT

## 2020-09-07 ENCOUNTER — HOME CARE VISIT (OUTPATIENT)
Dept: SCHEDULING | Facility: HOME HEALTH | Age: 85
End: 2020-09-07
Payer: MEDICARE

## 2020-09-07 ENCOUNTER — HOME CARE VISIT (OUTPATIENT)
Dept: HOME HEALTH SERVICES | Facility: HOME HEALTH | Age: 85
End: 2020-09-07
Payer: MEDICARE

## 2020-09-07 PROCEDURE — G0299 HHS/HOSPICE OF RN EA 15 MIN: HCPCS

## 2020-09-07 PROCEDURE — 3331090002 HH PPS REVENUE DEBIT

## 2020-09-07 PROCEDURE — 3331090001 HH PPS REVENUE CREDIT

## 2020-09-08 ENCOUNTER — HOME CARE VISIT (OUTPATIENT)
Dept: HOME HEALTH SERVICES | Facility: HOME HEALTH | Age: 85
End: 2020-09-08
Payer: MEDICARE

## 2020-09-08 ENCOUNTER — HOME CARE VISIT (OUTPATIENT)
Dept: SCHEDULING | Facility: HOME HEALTH | Age: 85
End: 2020-09-08
Payer: MEDICARE

## 2020-09-08 VITALS
OXYGEN SATURATION: 96 % | DIASTOLIC BLOOD PRESSURE: 70 MMHG | SYSTOLIC BLOOD PRESSURE: 120 MMHG | TEMPERATURE: 98.4 F | HEART RATE: 56 BPM | RESPIRATION RATE: 22 BRPM

## 2020-09-08 PROCEDURE — G0156 HHCP-SVS OF AIDE,EA 15 MIN: HCPCS

## 2020-09-08 PROCEDURE — G0299 HHS/HOSPICE OF RN EA 15 MIN: HCPCS

## 2020-09-08 PROCEDURE — 3331090002 HH PPS REVENUE DEBIT

## 2020-09-08 PROCEDURE — 3331090001 HH PPS REVENUE CREDIT

## 2020-09-09 ENCOUNTER — HOME CARE VISIT (OUTPATIENT)
Dept: SCHEDULING | Facility: HOME HEALTH | Age: 85
End: 2020-09-09
Payer: MEDICARE

## 2020-09-09 ENCOUNTER — HOME CARE VISIT (OUTPATIENT)
Dept: HOME HEALTH SERVICES | Facility: HOME HEALTH | Age: 85
End: 2020-09-09
Payer: MEDICARE

## 2020-09-09 VITALS
TEMPERATURE: 98.6 F | RESPIRATION RATE: 22 BRPM | HEART RATE: 65 BPM | DIASTOLIC BLOOD PRESSURE: 80 MMHG | SYSTOLIC BLOOD PRESSURE: 140 MMHG | OXYGEN SATURATION: 97 %

## 2020-09-09 PROCEDURE — 3331090002 HH PPS REVENUE DEBIT

## 2020-09-09 PROCEDURE — G0299 HHS/HOSPICE OF RN EA 15 MIN: HCPCS

## 2020-09-09 PROCEDURE — 3331090001 HH PPS REVENUE CREDIT

## 2020-09-10 ENCOUNTER — HOME CARE VISIT (OUTPATIENT)
Dept: SCHEDULING | Facility: HOME HEALTH | Age: 85
End: 2020-09-10
Payer: MEDICARE

## 2020-09-10 ENCOUNTER — HOME CARE VISIT (OUTPATIENT)
Dept: HOME HEALTH SERVICES | Facility: HOME HEALTH | Age: 85
End: 2020-09-10
Payer: MEDICARE

## 2020-09-10 VITALS
RESPIRATION RATE: 24 BRPM | OXYGEN SATURATION: 96 % | HEART RATE: 60 BPM | TEMPERATURE: 98.6 F | SYSTOLIC BLOOD PRESSURE: 124 MMHG | DIASTOLIC BLOOD PRESSURE: 76 MMHG

## 2020-09-10 PROCEDURE — G0156 HHCP-SVS OF AIDE,EA 15 MIN: HCPCS

## 2020-09-10 PROCEDURE — 3331090001 HH PPS REVENUE CREDIT

## 2020-09-10 PROCEDURE — 3331090002 HH PPS REVENUE DEBIT

## 2020-09-11 ENCOUNTER — HOME CARE VISIT (OUTPATIENT)
Dept: SCHEDULING | Facility: HOME HEALTH | Age: 85
End: 2020-09-11
Payer: MEDICARE

## 2020-09-11 ENCOUNTER — HOME CARE VISIT (OUTPATIENT)
Dept: HOME HEALTH SERVICES | Facility: HOME HEALTH | Age: 85
End: 2020-09-11
Payer: MEDICARE

## 2020-09-11 VITALS
OXYGEN SATURATION: 96 % | TEMPERATURE: 98.6 F | DIASTOLIC BLOOD PRESSURE: 80 MMHG | HEART RATE: 52 BPM | RESPIRATION RATE: 22 BRPM | SYSTOLIC BLOOD PRESSURE: 118 MMHG

## 2020-09-11 PROCEDURE — 3331090001 HH PPS REVENUE CREDIT

## 2020-09-11 PROCEDURE — 3331090002 HH PPS REVENUE DEBIT

## 2020-09-11 PROCEDURE — G0299 HHS/HOSPICE OF RN EA 15 MIN: HCPCS

## 2020-09-12 PROCEDURE — 3331090001 HH PPS REVENUE CREDIT

## 2020-09-12 PROCEDURE — 3331090002 HH PPS REVENUE DEBIT

## 2020-09-13 PROCEDURE — 3331090001 HH PPS REVENUE CREDIT

## 2020-09-13 PROCEDURE — 3331090002 HH PPS REVENUE DEBIT

## 2020-09-14 ENCOUNTER — HOME CARE VISIT (OUTPATIENT)
Dept: HOME HEALTH SERVICES | Facility: HOME HEALTH | Age: 85
End: 2020-09-14
Payer: MEDICARE

## 2020-09-14 ENCOUNTER — HOME CARE VISIT (OUTPATIENT)
Dept: SCHEDULING | Facility: HOME HEALTH | Age: 85
End: 2020-09-14
Payer: MEDICARE

## 2020-09-14 VITALS
DIASTOLIC BLOOD PRESSURE: 70 MMHG | RESPIRATION RATE: 22 BRPM | HEART RATE: 52 BPM | SYSTOLIC BLOOD PRESSURE: 120 MMHG | OXYGEN SATURATION: 96 % | TEMPERATURE: 98.7 F

## 2020-09-14 PROCEDURE — G0299 HHS/HOSPICE OF RN EA 15 MIN: HCPCS

## 2020-09-14 PROCEDURE — 3331090002 HH PPS REVENUE DEBIT

## 2020-09-14 PROCEDURE — 3331090001 HH PPS REVENUE CREDIT

## 2020-09-15 ENCOUNTER — HOME CARE VISIT (OUTPATIENT)
Dept: HOME HEALTH SERVICES | Facility: HOME HEALTH | Age: 85
End: 2020-09-15
Payer: MEDICARE

## 2020-09-15 ENCOUNTER — HOME CARE VISIT (OUTPATIENT)
Dept: SCHEDULING | Facility: HOME HEALTH | Age: 85
End: 2020-09-15
Payer: MEDICARE

## 2020-09-15 PROCEDURE — 3331090002 HH PPS REVENUE DEBIT

## 2020-09-15 PROCEDURE — 3331090001 HH PPS REVENUE CREDIT

## 2020-09-15 PROCEDURE — G0156 HHCP-SVS OF AIDE,EA 15 MIN: HCPCS

## 2020-09-16 ENCOUNTER — HOME CARE VISIT (OUTPATIENT)
Dept: SCHEDULING | Facility: HOME HEALTH | Age: 85
End: 2020-09-16
Payer: MEDICARE

## 2020-09-16 ENCOUNTER — HOME CARE VISIT (OUTPATIENT)
Dept: HOME HEALTH SERVICES | Facility: HOME HEALTH | Age: 85
End: 2020-09-16
Payer: MEDICARE

## 2020-09-16 VITALS
RESPIRATION RATE: 24 BRPM | SYSTOLIC BLOOD PRESSURE: 110 MMHG | TEMPERATURE: 98.4 F | DIASTOLIC BLOOD PRESSURE: 70 MMHG | HEART RATE: 82 BPM | OXYGEN SATURATION: 96 %

## 2020-09-16 PROCEDURE — 3331090001 HH PPS REVENUE CREDIT

## 2020-09-16 PROCEDURE — G0299 HHS/HOSPICE OF RN EA 15 MIN: HCPCS

## 2020-09-16 PROCEDURE — 3331090003 HH PPS REVENUE ADJ

## 2020-09-16 PROCEDURE — 3331090002 HH PPS REVENUE DEBIT

## 2020-09-17 PROCEDURE — 3331090001 HH PPS REVENUE CREDIT

## 2020-09-17 PROCEDURE — 3331090002 HH PPS REVENUE DEBIT

## 2020-09-18 ENCOUNTER — HOME CARE VISIT (OUTPATIENT)
Dept: HOME HEALTH SERVICES | Facility: HOME HEALTH | Age: 85
End: 2020-09-18
Payer: MEDICARE

## 2020-09-18 ENCOUNTER — HOME CARE VISIT (OUTPATIENT)
Dept: SCHEDULING | Facility: HOME HEALTH | Age: 85
End: 2020-09-18
Payer: MEDICARE

## 2020-09-18 VITALS
DIASTOLIC BLOOD PRESSURE: 80 MMHG | TEMPERATURE: 97.7 F | SYSTOLIC BLOOD PRESSURE: 120 MMHG | RESPIRATION RATE: 24 BRPM | HEART RATE: 55 BPM | OXYGEN SATURATION: 96 %

## 2020-09-18 PROCEDURE — 400014 HH F/U

## 2020-09-18 PROCEDURE — G0156 HHCP-SVS OF AIDE,EA 15 MIN: HCPCS

## 2020-09-18 PROCEDURE — G0299 HHS/HOSPICE OF RN EA 15 MIN: HCPCS

## 2020-09-18 PROCEDURE — 3331090001 HH PPS REVENUE CREDIT

## 2020-09-18 PROCEDURE — 3331090002 HH PPS REVENUE DEBIT

## 2020-09-19 PROCEDURE — 3331090001 HH PPS REVENUE CREDIT

## 2020-09-19 PROCEDURE — 3331090002 HH PPS REVENUE DEBIT

## 2020-09-20 PROCEDURE — 3331090002 HH PPS REVENUE DEBIT

## 2020-09-20 PROCEDURE — 3331090001 HH PPS REVENUE CREDIT

## 2020-09-21 ENCOUNTER — HOME CARE VISIT (OUTPATIENT)
Dept: HOME HEALTH SERVICES | Facility: HOME HEALTH | Age: 85
End: 2020-09-21
Payer: MEDICARE

## 2020-09-21 ENCOUNTER — HOME CARE VISIT (OUTPATIENT)
Dept: SCHEDULING | Facility: HOME HEALTH | Age: 85
End: 2020-09-21
Payer: MEDICARE

## 2020-09-21 PROCEDURE — G0299 HHS/HOSPICE OF RN EA 15 MIN: HCPCS

## 2020-09-21 PROCEDURE — 3331090002 HH PPS REVENUE DEBIT

## 2020-09-21 PROCEDURE — 3331090001 HH PPS REVENUE CREDIT

## 2020-09-22 ENCOUNTER — HOME CARE VISIT (OUTPATIENT)
Dept: HOME HEALTH SERVICES | Facility: HOME HEALTH | Age: 85
End: 2020-09-22
Payer: MEDICARE

## 2020-09-22 ENCOUNTER — HOME CARE VISIT (OUTPATIENT)
Dept: SCHEDULING | Facility: HOME HEALTH | Age: 85
End: 2020-09-22
Payer: MEDICARE

## 2020-09-22 VITALS
HEART RATE: 59 BPM | SYSTOLIC BLOOD PRESSURE: 110 MMHG | DIASTOLIC BLOOD PRESSURE: 70 MMHG | OXYGEN SATURATION: 96 % | TEMPERATURE: 98.7 F | RESPIRATION RATE: 22 BRPM

## 2020-09-22 PROCEDURE — 3331090002 HH PPS REVENUE DEBIT

## 2020-09-22 PROCEDURE — G0156 HHCP-SVS OF AIDE,EA 15 MIN: HCPCS

## 2020-09-22 PROCEDURE — 3331090001 HH PPS REVENUE CREDIT

## 2020-09-23 ENCOUNTER — HOME CARE VISIT (OUTPATIENT)
Dept: SCHEDULING | Facility: HOME HEALTH | Age: 85
End: 2020-09-23
Payer: MEDICARE

## 2020-09-23 ENCOUNTER — HOME CARE VISIT (OUTPATIENT)
Dept: HOME HEALTH SERVICES | Facility: HOME HEALTH | Age: 85
End: 2020-09-23
Payer: MEDICARE

## 2020-09-23 VITALS
SYSTOLIC BLOOD PRESSURE: 130 MMHG | TEMPERATURE: 98.5 F | RESPIRATION RATE: 22 BRPM | DIASTOLIC BLOOD PRESSURE: 70 MMHG | HEART RATE: 50 BPM | OXYGEN SATURATION: 95 %

## 2020-09-23 PROCEDURE — G0299 HHS/HOSPICE OF RN EA 15 MIN: HCPCS

## 2020-09-23 PROCEDURE — 3331090002 HH PPS REVENUE DEBIT

## 2020-09-23 PROCEDURE — 3331090001 HH PPS REVENUE CREDIT

## 2020-09-24 ENCOUNTER — HOME CARE VISIT (OUTPATIENT)
Dept: HOME HEALTH SERVICES | Facility: HOME HEALTH | Age: 85
End: 2020-09-24
Payer: MEDICARE

## 2020-09-24 ENCOUNTER — HOME CARE VISIT (OUTPATIENT)
Dept: SCHEDULING | Facility: HOME HEALTH | Age: 85
End: 2020-09-24
Payer: MEDICARE

## 2020-09-24 PROCEDURE — G0156 HHCP-SVS OF AIDE,EA 15 MIN: HCPCS

## 2020-09-24 PROCEDURE — 3331090001 HH PPS REVENUE CREDIT

## 2020-09-24 PROCEDURE — 3331090002 HH PPS REVENUE DEBIT

## 2020-09-25 ENCOUNTER — HOME CARE VISIT (OUTPATIENT)
Dept: SCHEDULING | Facility: HOME HEALTH | Age: 85
End: 2020-09-25
Payer: MEDICARE

## 2020-09-25 ENCOUNTER — HOME CARE VISIT (OUTPATIENT)
Dept: HOME HEALTH SERVICES | Facility: HOME HEALTH | Age: 85
End: 2020-09-25
Payer: MEDICARE

## 2020-09-25 PROCEDURE — 3331090001 HH PPS REVENUE CREDIT

## 2020-09-25 PROCEDURE — 3331090002 HH PPS REVENUE DEBIT

## 2020-09-25 PROCEDURE — G0299 HHS/HOSPICE OF RN EA 15 MIN: HCPCS

## 2020-09-26 VITALS
TEMPERATURE: 98.4 F | RESPIRATION RATE: 22 BRPM | OXYGEN SATURATION: 95 % | HEART RATE: 55 BPM | DIASTOLIC BLOOD PRESSURE: 80 MMHG | SYSTOLIC BLOOD PRESSURE: 130 MMHG

## 2020-09-26 PROCEDURE — 3331090002 HH PPS REVENUE DEBIT

## 2020-09-26 PROCEDURE — 3331090001 HH PPS REVENUE CREDIT

## 2020-09-27 PROCEDURE — 3331090002 HH PPS REVENUE DEBIT

## 2020-09-27 PROCEDURE — 3331090001 HH PPS REVENUE CREDIT

## 2020-09-28 ENCOUNTER — HOME CARE VISIT (OUTPATIENT)
Dept: SCHEDULING | Facility: HOME HEALTH | Age: 85
End: 2020-09-28
Payer: MEDICARE

## 2020-09-28 ENCOUNTER — HOME CARE VISIT (OUTPATIENT)
Dept: HOME HEALTH SERVICES | Facility: HOME HEALTH | Age: 85
End: 2020-09-28
Payer: MEDICARE

## 2020-09-28 PROCEDURE — G0299 HHS/HOSPICE OF RN EA 15 MIN: HCPCS

## 2020-09-28 PROCEDURE — 3331090001 HH PPS REVENUE CREDIT

## 2020-09-28 PROCEDURE — A6252 ABSORPT DRG >16 <=48 W/O BDR: HCPCS

## 2020-09-28 PROCEDURE — 3331090002 HH PPS REVENUE DEBIT

## 2020-09-28 PROCEDURE — A6197 ALGINATE DRSG >16 <=48 SQ IN: HCPCS

## 2020-09-29 ENCOUNTER — HOME CARE VISIT (OUTPATIENT)
Dept: SCHEDULING | Facility: HOME HEALTH | Age: 85
End: 2020-09-29
Payer: MEDICARE

## 2020-09-29 ENCOUNTER — HOME CARE VISIT (OUTPATIENT)
Dept: HOME HEALTH SERVICES | Facility: HOME HEALTH | Age: 85
End: 2020-09-29
Payer: MEDICARE

## 2020-09-29 VITALS
SYSTOLIC BLOOD PRESSURE: 110 MMHG | RESPIRATION RATE: 22 BRPM | TEMPERATURE: 98.5 F | HEART RATE: 76 BPM | DIASTOLIC BLOOD PRESSURE: 60 MMHG | OXYGEN SATURATION: 97 %

## 2020-09-29 PROCEDURE — 3331090002 HH PPS REVENUE DEBIT

## 2020-09-29 PROCEDURE — G0156 HHCP-SVS OF AIDE,EA 15 MIN: HCPCS

## 2020-09-29 PROCEDURE — 3331090001 HH PPS REVENUE CREDIT

## 2020-09-30 ENCOUNTER — HOME CARE VISIT (OUTPATIENT)
Dept: SCHEDULING | Facility: HOME HEALTH | Age: 85
End: 2020-09-30
Payer: MEDICARE

## 2020-09-30 ENCOUNTER — HOME CARE VISIT (OUTPATIENT)
Dept: HOME HEALTH SERVICES | Facility: HOME HEALTH | Age: 85
End: 2020-09-30
Payer: MEDICARE

## 2020-09-30 VITALS
SYSTOLIC BLOOD PRESSURE: 120 MMHG | DIASTOLIC BLOOD PRESSURE: 80 MMHG | RESPIRATION RATE: 22 BRPM | TEMPERATURE: 98 F | OXYGEN SATURATION: 95 % | HEART RATE: 59 BPM

## 2020-09-30 PROCEDURE — G0299 HHS/HOSPICE OF RN EA 15 MIN: HCPCS

## 2020-09-30 PROCEDURE — 3331090002 HH PPS REVENUE DEBIT

## 2020-09-30 PROCEDURE — 3331090001 HH PPS REVENUE CREDIT

## 2020-10-01 ENCOUNTER — HOME CARE VISIT (OUTPATIENT)
Dept: SCHEDULING | Facility: HOME HEALTH | Age: 85
End: 2020-10-01
Payer: MEDICARE

## 2020-10-01 ENCOUNTER — HOME CARE VISIT (OUTPATIENT)
Dept: HOME HEALTH SERVICES | Facility: HOME HEALTH | Age: 85
End: 2020-10-01
Payer: MEDICARE

## 2020-10-01 PROCEDURE — G0156 HHCP-SVS OF AIDE,EA 15 MIN: HCPCS

## 2020-10-01 PROCEDURE — 3331090002 HH PPS REVENUE DEBIT

## 2020-10-01 PROCEDURE — 3331090001 HH PPS REVENUE CREDIT

## 2020-10-02 ENCOUNTER — HOME CARE VISIT (OUTPATIENT)
Dept: SCHEDULING | Facility: HOME HEALTH | Age: 85
End: 2020-10-02
Payer: MEDICARE

## 2020-10-02 ENCOUNTER — HOME CARE VISIT (OUTPATIENT)
Dept: HOME HEALTH SERVICES | Facility: HOME HEALTH | Age: 85
End: 2020-10-02
Payer: MEDICARE

## 2020-10-02 PROCEDURE — G0299 HHS/HOSPICE OF RN EA 15 MIN: HCPCS

## 2020-10-02 PROCEDURE — A6197 ALGINATE DRSG >16 <=48 SQ IN: HCPCS

## 2020-10-02 PROCEDURE — 3331090001 HH PPS REVENUE CREDIT

## 2020-10-02 PROCEDURE — 3331090002 HH PPS REVENUE DEBIT

## 2020-10-03 PROCEDURE — 3331090001 HH PPS REVENUE CREDIT

## 2020-10-03 PROCEDURE — 3331090002 HH PPS REVENUE DEBIT

## 2020-10-04 VITALS
SYSTOLIC BLOOD PRESSURE: 120 MMHG | HEART RATE: 70 BPM | TEMPERATURE: 99.4 F | DIASTOLIC BLOOD PRESSURE: 70 MMHG | RESPIRATION RATE: 22 BRPM | OXYGEN SATURATION: 94 %

## 2020-10-04 PROCEDURE — 3331090001 HH PPS REVENUE CREDIT

## 2020-10-04 PROCEDURE — 3331090002 HH PPS REVENUE DEBIT

## 2020-10-05 ENCOUNTER — HOME CARE VISIT (OUTPATIENT)
Dept: SCHEDULING | Facility: HOME HEALTH | Age: 85
End: 2020-10-05
Payer: MEDICARE

## 2020-10-05 ENCOUNTER — HOME CARE VISIT (OUTPATIENT)
Dept: HOME HEALTH SERVICES | Facility: HOME HEALTH | Age: 85
End: 2020-10-05
Payer: MEDICARE

## 2020-10-05 VITALS
RESPIRATION RATE: 24 BRPM | HEART RATE: 62 BPM | OXYGEN SATURATION: 95 % | SYSTOLIC BLOOD PRESSURE: 130 MMHG | TEMPERATURE: 98.9 F | DIASTOLIC BLOOD PRESSURE: 70 MMHG

## 2020-10-05 PROCEDURE — 3331090001 HH PPS REVENUE CREDIT

## 2020-10-05 PROCEDURE — G0299 HHS/HOSPICE OF RN EA 15 MIN: HCPCS

## 2020-10-05 PROCEDURE — 3331090002 HH PPS REVENUE DEBIT

## 2020-10-06 ENCOUNTER — HOME CARE VISIT (OUTPATIENT)
Dept: HOME HEALTH SERVICES | Facility: HOME HEALTH | Age: 85
End: 2020-10-06
Payer: MEDICARE

## 2020-10-06 ENCOUNTER — HOME CARE VISIT (OUTPATIENT)
Dept: SCHEDULING | Facility: HOME HEALTH | Age: 85
End: 2020-10-06
Payer: MEDICARE

## 2020-10-06 PROCEDURE — 3331090002 HH PPS REVENUE DEBIT

## 2020-10-06 PROCEDURE — G0156 HHCP-SVS OF AIDE,EA 15 MIN: HCPCS

## 2020-10-06 PROCEDURE — 3331090001 HH PPS REVENUE CREDIT

## 2020-10-07 ENCOUNTER — HOME CARE VISIT (OUTPATIENT)
Dept: SCHEDULING | Facility: HOME HEALTH | Age: 85
End: 2020-10-07
Payer: MEDICARE

## 2020-10-07 PROCEDURE — G0299 HHS/HOSPICE OF RN EA 15 MIN: HCPCS

## 2020-10-07 PROCEDURE — 3331090001 HH PPS REVENUE CREDIT

## 2020-10-07 PROCEDURE — 3331090002 HH PPS REVENUE DEBIT

## 2020-10-08 ENCOUNTER — HOME CARE VISIT (OUTPATIENT)
Dept: SCHEDULING | Facility: HOME HEALTH | Age: 85
End: 2020-10-08
Payer: MEDICARE

## 2020-10-08 VITALS
OXYGEN SATURATION: 95 % | RESPIRATION RATE: 22 BRPM | DIASTOLIC BLOOD PRESSURE: 70 MMHG | SYSTOLIC BLOOD PRESSURE: 130 MMHG | HEART RATE: 54 BPM | TEMPERATURE: 98.7 F

## 2020-10-08 PROCEDURE — G0156 HHCP-SVS OF AIDE,EA 15 MIN: HCPCS

## 2020-10-08 PROCEDURE — 3331090002 HH PPS REVENUE DEBIT

## 2020-10-08 PROCEDURE — 3331090001 HH PPS REVENUE CREDIT

## 2020-10-09 ENCOUNTER — HOME CARE VISIT (OUTPATIENT)
Dept: SCHEDULING | Facility: HOME HEALTH | Age: 85
End: 2020-10-09
Payer: MEDICARE

## 2020-10-09 VITALS
TEMPERATURE: 98.1 F | DIASTOLIC BLOOD PRESSURE: 80 MMHG | OXYGEN SATURATION: 95 % | RESPIRATION RATE: 18 BRPM | SYSTOLIC BLOOD PRESSURE: 120 MMHG | HEART RATE: 70 BPM

## 2020-10-09 PROCEDURE — G0299 HHS/HOSPICE OF RN EA 15 MIN: HCPCS

## 2020-10-09 PROCEDURE — 3331090002 HH PPS REVENUE DEBIT

## 2020-10-09 PROCEDURE — 3331090001 HH PPS REVENUE CREDIT

## 2020-10-10 PROCEDURE — 3331090001 HH PPS REVENUE CREDIT

## 2020-10-10 PROCEDURE — 3331090002 HH PPS REVENUE DEBIT

## 2020-10-11 PROCEDURE — 3331090002 HH PPS REVENUE DEBIT

## 2020-10-11 PROCEDURE — 3331090001 HH PPS REVENUE CREDIT

## 2020-10-12 ENCOUNTER — HOME CARE VISIT (OUTPATIENT)
Dept: SCHEDULING | Facility: HOME HEALTH | Age: 85
End: 2020-10-12
Payer: MEDICARE

## 2020-10-12 VITALS
RESPIRATION RATE: 24 BRPM | SYSTOLIC BLOOD PRESSURE: 118 MMHG | TEMPERATURE: 98.7 F | HEART RATE: 56 BPM | DIASTOLIC BLOOD PRESSURE: 60 MMHG | OXYGEN SATURATION: 96 %

## 2020-10-12 PROCEDURE — 3331090001 HH PPS REVENUE CREDIT

## 2020-10-12 PROCEDURE — G0299 HHS/HOSPICE OF RN EA 15 MIN: HCPCS

## 2020-10-12 PROCEDURE — 3331090002 HH PPS REVENUE DEBIT

## 2020-10-13 ENCOUNTER — HOME CARE VISIT (OUTPATIENT)
Dept: SCHEDULING | Facility: HOME HEALTH | Age: 85
End: 2020-10-13
Payer: MEDICARE

## 2020-10-13 PROCEDURE — 3331090002 HH PPS REVENUE DEBIT

## 2020-10-13 PROCEDURE — G0156 HHCP-SVS OF AIDE,EA 15 MIN: HCPCS

## 2020-10-13 PROCEDURE — 3331090001 HH PPS REVENUE CREDIT

## 2020-10-14 ENCOUNTER — HOME CARE VISIT (OUTPATIENT)
Dept: SCHEDULING | Facility: HOME HEALTH | Age: 85
End: 2020-10-14
Payer: MEDICARE

## 2020-10-14 ENCOUNTER — HOME CARE VISIT (OUTPATIENT)
Dept: HOME HEALTH SERVICES | Facility: HOME HEALTH | Age: 85
End: 2020-10-14
Payer: MEDICARE

## 2020-10-14 ENCOUNTER — TELEPHONE (OUTPATIENT)
Dept: INTERNAL MEDICINE CLINIC | Age: 85
End: 2020-10-14

## 2020-10-14 PROCEDURE — 3331090002 HH PPS REVENUE DEBIT

## 2020-10-14 PROCEDURE — 3331090001 HH PPS REVENUE CREDIT

## 2020-10-14 PROCEDURE — G0299 HHS/HOSPICE OF RN EA 15 MIN: HCPCS

## 2020-10-14 NOTE — TELEPHONE ENCOUNTER
[address] states patients dosage of buspar was increased but she has not seen a change.       [phone]

## 2020-10-15 ENCOUNTER — HOME CARE VISIT (OUTPATIENT)
Dept: SCHEDULING | Facility: HOME HEALTH | Age: 85
End: 2020-10-15
Payer: MEDICARE

## 2020-10-15 PROCEDURE — 3331090002 HH PPS REVENUE DEBIT

## 2020-10-15 PROCEDURE — G0156 HHCP-SVS OF AIDE,EA 15 MIN: HCPCS

## 2020-10-15 PROCEDURE — 3331090001 HH PPS REVENUE CREDIT

## 2020-10-16 ENCOUNTER — HOME CARE VISIT (OUTPATIENT)
Dept: SCHEDULING | Facility: HOME HEALTH | Age: 85
End: 2020-10-16
Payer: MEDICARE

## 2020-10-16 PROCEDURE — 3331090001 HH PPS REVENUE CREDIT

## 2020-10-16 PROCEDURE — 3331090003 HH PPS REVENUE ADJ

## 2020-10-16 PROCEDURE — G0299 HHS/HOSPICE OF RN EA 15 MIN: HCPCS

## 2020-10-16 PROCEDURE — 3331090002 HH PPS REVENUE DEBIT

## 2020-10-17 VITALS
DIASTOLIC BLOOD PRESSURE: 70 MMHG | OXYGEN SATURATION: 95 % | SYSTOLIC BLOOD PRESSURE: 122 MMHG | OXYGEN SATURATION: 96 % | HEART RATE: 71 BPM | DIASTOLIC BLOOD PRESSURE: 60 MMHG | TEMPERATURE: 98.2 F | HEART RATE: 56 BPM | RESPIRATION RATE: 22 BRPM | TEMPERATURE: 98.4 F | SYSTOLIC BLOOD PRESSURE: 116 MMHG | RESPIRATION RATE: 22 BRPM

## 2020-10-17 PROCEDURE — 3331090001 HH PPS REVENUE CREDIT

## 2020-10-17 PROCEDURE — 3331090002 HH PPS REVENUE DEBIT

## 2020-10-18 PROCEDURE — 3331090002 HH PPS REVENUE DEBIT

## 2020-10-18 PROCEDURE — 3331090001 HH PPS REVENUE CREDIT

## 2020-10-19 ENCOUNTER — HOME CARE VISIT (OUTPATIENT)
Dept: SCHEDULING | Facility: HOME HEALTH | Age: 85
End: 2020-10-19
Payer: MEDICARE

## 2020-10-19 VITALS
HEART RATE: 56 BPM | TEMPERATURE: 98.3 F | SYSTOLIC BLOOD PRESSURE: 120 MMHG | OXYGEN SATURATION: 95 % | RESPIRATION RATE: 22 BRPM | DIASTOLIC BLOOD PRESSURE: 78 MMHG

## 2020-10-19 PROCEDURE — 3331090001 HH PPS REVENUE CREDIT

## 2020-10-19 PROCEDURE — 3331090002 HH PPS REVENUE DEBIT

## 2020-10-19 PROCEDURE — G0299 HHS/HOSPICE OF RN EA 15 MIN: HCPCS

## 2020-10-19 PROCEDURE — 400014 HH F/U

## 2020-10-20 PROCEDURE — 3331090001 HH PPS REVENUE CREDIT

## 2020-10-20 PROCEDURE — 3331090002 HH PPS REVENUE DEBIT

## 2020-10-21 ENCOUNTER — HOME CARE VISIT (OUTPATIENT)
Dept: SCHEDULING | Facility: HOME HEALTH | Age: 85
End: 2020-10-21
Payer: MEDICARE

## 2020-10-21 VITALS
RESPIRATION RATE: 22 BRPM | SYSTOLIC BLOOD PRESSURE: 140 MMHG | OXYGEN SATURATION: 95 % | TEMPERATURE: 98.7 F | DIASTOLIC BLOOD PRESSURE: 80 MMHG | HEART RATE: 59 BPM

## 2020-10-21 PROCEDURE — G0299 HHS/HOSPICE OF RN EA 15 MIN: HCPCS

## 2020-10-21 PROCEDURE — 3331090002 HH PPS REVENUE DEBIT

## 2020-10-21 PROCEDURE — 3331090001 HH PPS REVENUE CREDIT

## 2020-10-22 ENCOUNTER — HOME CARE VISIT (OUTPATIENT)
Dept: SCHEDULING | Facility: HOME HEALTH | Age: 85
End: 2020-10-22
Payer: MEDICARE

## 2020-10-22 VITALS
HEART RATE: 57 BPM | RESPIRATION RATE: 20 BRPM | OXYGEN SATURATION: 97 % | TEMPERATURE: 97.2 F | SYSTOLIC BLOOD PRESSURE: 150 MMHG | DIASTOLIC BLOOD PRESSURE: 60 MMHG

## 2020-10-22 PROCEDURE — G0151 HHCP-SERV OF PT,EA 15 MIN: HCPCS

## 2020-10-22 PROCEDURE — 3331090002 HH PPS REVENUE DEBIT

## 2020-10-22 PROCEDURE — G0156 HHCP-SVS OF AIDE,EA 15 MIN: HCPCS

## 2020-10-22 PROCEDURE — 3331090001 HH PPS REVENUE CREDIT

## 2020-10-23 ENCOUNTER — HOME CARE VISIT (OUTPATIENT)
Dept: SCHEDULING | Facility: HOME HEALTH | Age: 85
End: 2020-10-23
Payer: MEDICARE

## 2020-10-23 VITALS
TEMPERATURE: 98.5 F | SYSTOLIC BLOOD PRESSURE: 130 MMHG | RESPIRATION RATE: 22 BRPM | DIASTOLIC BLOOD PRESSURE: 80 MMHG | OXYGEN SATURATION: 96 % | HEART RATE: 61 BPM

## 2020-10-23 PROCEDURE — 3331090002 HH PPS REVENUE DEBIT

## 2020-10-23 PROCEDURE — G0299 HHS/HOSPICE OF RN EA 15 MIN: HCPCS

## 2020-10-23 PROCEDURE — 3331090001 HH PPS REVENUE CREDIT

## 2020-10-24 PROCEDURE — 3331090002 HH PPS REVENUE DEBIT

## 2020-10-24 PROCEDURE — 3331090001 HH PPS REVENUE CREDIT

## 2020-10-25 PROCEDURE — 3331090002 HH PPS REVENUE DEBIT

## 2020-10-25 PROCEDURE — 3331090001 HH PPS REVENUE CREDIT

## 2020-10-26 ENCOUNTER — HOME CARE VISIT (OUTPATIENT)
Dept: SCHEDULING | Facility: HOME HEALTH | Age: 85
End: 2020-10-26
Payer: MEDICARE

## 2020-10-26 VITALS
RESPIRATION RATE: 22 BRPM | TEMPERATURE: 98.7 F | OXYGEN SATURATION: 95 % | SYSTOLIC BLOOD PRESSURE: 118 MMHG | DIASTOLIC BLOOD PRESSURE: 70 MMHG | HEART RATE: 56 BPM

## 2020-10-26 PROCEDURE — G0299 HHS/HOSPICE OF RN EA 15 MIN: HCPCS

## 2020-10-26 PROCEDURE — 3331090001 HH PPS REVENUE CREDIT

## 2020-10-26 PROCEDURE — 3331090002 HH PPS REVENUE DEBIT

## 2020-10-27 PROCEDURE — 3331090001 HH PPS REVENUE CREDIT

## 2020-10-27 PROCEDURE — 3331090002 HH PPS REVENUE DEBIT

## 2020-10-28 ENCOUNTER — HOME CARE VISIT (OUTPATIENT)
Dept: SCHEDULING | Facility: HOME HEALTH | Age: 85
End: 2020-10-28
Payer: MEDICARE

## 2020-10-28 VITALS
TEMPERATURE: 98.7 F | RESPIRATION RATE: 24 BRPM | OXYGEN SATURATION: 96 % | DIASTOLIC BLOOD PRESSURE: 60 MMHG | HEART RATE: 67 BPM | SYSTOLIC BLOOD PRESSURE: 130 MMHG

## 2020-10-28 PROCEDURE — 3331090001 HH PPS REVENUE CREDIT

## 2020-10-28 PROCEDURE — G0299 HHS/HOSPICE OF RN EA 15 MIN: HCPCS

## 2020-10-28 PROCEDURE — 3331090002 HH PPS REVENUE DEBIT

## 2020-10-29 PROCEDURE — 3331090001 HH PPS REVENUE CREDIT

## 2020-10-29 PROCEDURE — 3331090002 HH PPS REVENUE DEBIT

## 2020-10-30 ENCOUNTER — HOME CARE VISIT (OUTPATIENT)
Dept: SCHEDULING | Facility: HOME HEALTH | Age: 85
End: 2020-10-30
Payer: MEDICARE

## 2020-10-30 VITALS
SYSTOLIC BLOOD PRESSURE: 120 MMHG | TEMPERATURE: 98.1 F | DIASTOLIC BLOOD PRESSURE: 70 MMHG | RESPIRATION RATE: 22 BRPM | HEART RATE: 77 BPM | OXYGEN SATURATION: 95 %

## 2020-10-30 PROCEDURE — G0299 HHS/HOSPICE OF RN EA 15 MIN: HCPCS

## 2020-10-30 PROCEDURE — G0156 HHCP-SVS OF AIDE,EA 15 MIN: HCPCS

## 2020-10-30 PROCEDURE — 3331090002 HH PPS REVENUE DEBIT

## 2020-10-30 PROCEDURE — 3331090001 HH PPS REVENUE CREDIT

## 2020-10-31 PROCEDURE — 3331090001 HH PPS REVENUE CREDIT

## 2020-10-31 PROCEDURE — 3331090002 HH PPS REVENUE DEBIT

## 2020-11-01 PROCEDURE — 3331090002 HH PPS REVENUE DEBIT

## 2020-11-01 PROCEDURE — 3331090001 HH PPS REVENUE CREDIT

## 2020-11-02 ENCOUNTER — TELEPHONE (OUTPATIENT)
Dept: INTERNAL MEDICINE CLINIC | Age: 85
End: 2020-11-02

## 2020-11-02 ENCOUNTER — HOME CARE VISIT (OUTPATIENT)
Dept: SCHEDULING | Facility: HOME HEALTH | Age: 85
End: 2020-11-02
Payer: MEDICARE

## 2020-11-02 VITALS
HEART RATE: 70 BPM | TEMPERATURE: 98.6 F | SYSTOLIC BLOOD PRESSURE: 120 MMHG | DIASTOLIC BLOOD PRESSURE: 70 MMHG | OXYGEN SATURATION: 95 %

## 2020-11-02 PROCEDURE — 3331090001 HH PPS REVENUE CREDIT

## 2020-11-02 PROCEDURE — G0299 HHS/HOSPICE OF RN EA 15 MIN: HCPCS

## 2020-11-02 PROCEDURE — 3331090002 HH PPS REVENUE DEBIT

## 2020-11-02 NOTE — TELEPHONE ENCOUNTER
[facility] states patient has a small wound on her lower left extremity. She will send over orders but wanted to make you aware.     Verla Fees [phone] Chun Garvin

## 2020-11-03 PROCEDURE — 3331090002 HH PPS REVENUE DEBIT

## 2020-11-03 PROCEDURE — 3331090001 HH PPS REVENUE CREDIT

## 2020-11-03 NOTE — ED TRIAGE NOTES
Assumed care of pt from triage. Pt is A&O x 4. Pt reports CC of SOB. Pt has hx of chronic COPD. Pt reports SOB has gotten worse over the past 2 days. Pt denies CP/ N/V. Pt wears 2L O2 via NC at baseline. Pt placed on 2L in room 13. Pt placed on monitor x 2. VSS. No

## 2020-11-04 ENCOUNTER — HOME CARE VISIT (OUTPATIENT)
Dept: SCHEDULING | Facility: HOME HEALTH | Age: 85
End: 2020-11-04
Payer: MEDICARE

## 2020-11-04 PROCEDURE — 3331090001 HH PPS REVENUE CREDIT

## 2020-11-04 PROCEDURE — 3331090002 HH PPS REVENUE DEBIT

## 2020-11-04 PROCEDURE — G0299 HHS/HOSPICE OF RN EA 15 MIN: HCPCS

## 2020-11-05 ENCOUNTER — HOME CARE VISIT (OUTPATIENT)
Dept: SCHEDULING | Facility: HOME HEALTH | Age: 85
End: 2020-11-05
Payer: MEDICARE

## 2020-11-05 VITALS
OXYGEN SATURATION: 94 % | RESPIRATION RATE: 22 BRPM | DIASTOLIC BLOOD PRESSURE: 80 MMHG | SYSTOLIC BLOOD PRESSURE: 120 MMHG | HEART RATE: 71 BPM | TEMPERATURE: 98.1 F

## 2020-11-05 PROCEDURE — 3331090002 HH PPS REVENUE DEBIT

## 2020-11-05 PROCEDURE — 3331090001 HH PPS REVENUE CREDIT

## 2020-11-05 PROCEDURE — G0156 HHCP-SVS OF AIDE,EA 15 MIN: HCPCS

## 2020-11-06 ENCOUNTER — HOME CARE VISIT (OUTPATIENT)
Dept: SCHEDULING | Facility: HOME HEALTH | Age: 85
End: 2020-11-06
Payer: MEDICARE

## 2020-11-06 PROCEDURE — 3331090001 HH PPS REVENUE CREDIT

## 2020-11-06 PROCEDURE — 3331090002 HH PPS REVENUE DEBIT

## 2020-11-06 PROCEDURE — G0299 HHS/HOSPICE OF RN EA 15 MIN: HCPCS

## 2020-11-07 VITALS
HEART RATE: 67 BPM | OXYGEN SATURATION: 95 % | TEMPERATURE: 98 F | DIASTOLIC BLOOD PRESSURE: 70 MMHG | RESPIRATION RATE: 22 BRPM | SYSTOLIC BLOOD PRESSURE: 118 MMHG

## 2020-11-07 PROCEDURE — 3331090001 HH PPS REVENUE CREDIT

## 2020-11-07 PROCEDURE — 3331090002 HH PPS REVENUE DEBIT

## 2020-11-08 PROCEDURE — 3331090001 HH PPS REVENUE CREDIT

## 2020-11-08 PROCEDURE — 3331090002 HH PPS REVENUE DEBIT

## 2020-11-09 ENCOUNTER — HOME CARE VISIT (OUTPATIENT)
Dept: SCHEDULING | Facility: HOME HEALTH | Age: 85
End: 2020-11-09
Payer: MEDICARE

## 2020-11-09 VITALS
DIASTOLIC BLOOD PRESSURE: 80 MMHG | OXYGEN SATURATION: 95 % | RESPIRATION RATE: 24 BRPM | HEART RATE: 66 BPM | TEMPERATURE: 98.5 F | SYSTOLIC BLOOD PRESSURE: 130 MMHG

## 2020-11-09 PROCEDURE — 3331090002 HH PPS REVENUE DEBIT

## 2020-11-09 PROCEDURE — 3331090001 HH PPS REVENUE CREDIT

## 2020-11-09 PROCEDURE — G0299 HHS/HOSPICE OF RN EA 15 MIN: HCPCS

## 2020-11-09 PROCEDURE — A4450 NON-WATERPROOF TAPE: HCPCS

## 2020-11-09 PROCEDURE — A6216 NON-STERILE GAUZE<=16 SQ IN: HCPCS

## 2020-11-09 PROCEDURE — A6260 WOUND CLEANSER ANY TYPE/SIZE: HCPCS

## 2020-11-10 PROCEDURE — 3331090002 HH PPS REVENUE DEBIT

## 2020-11-10 PROCEDURE — 3331090001 HH PPS REVENUE CREDIT

## 2020-11-11 ENCOUNTER — HOME CARE VISIT (OUTPATIENT)
Dept: SCHEDULING | Facility: HOME HEALTH | Age: 85
End: 2020-11-11
Payer: MEDICARE

## 2020-11-11 VITALS
HEART RATE: 69 BPM | RESPIRATION RATE: 20 BRPM | TEMPERATURE: 98.3 F | DIASTOLIC BLOOD PRESSURE: 80 MMHG | SYSTOLIC BLOOD PRESSURE: 126 MMHG | OXYGEN SATURATION: 95 %

## 2020-11-11 PROCEDURE — G0299 HHS/HOSPICE OF RN EA 15 MIN: HCPCS

## 2020-11-11 PROCEDURE — 3331090001 HH PPS REVENUE CREDIT

## 2020-11-11 PROCEDURE — 3331090002 HH PPS REVENUE DEBIT

## 2020-11-12 ENCOUNTER — HOME CARE VISIT (OUTPATIENT)
Dept: SCHEDULING | Facility: HOME HEALTH | Age: 85
End: 2020-11-12
Payer: MEDICARE

## 2020-11-12 PROCEDURE — 3331090001 HH PPS REVENUE CREDIT

## 2020-11-12 PROCEDURE — 3331090002 HH PPS REVENUE DEBIT

## 2020-11-12 PROCEDURE — G0156 HHCP-SVS OF AIDE,EA 15 MIN: HCPCS

## 2020-11-13 ENCOUNTER — HOME CARE VISIT (OUTPATIENT)
Dept: SCHEDULING | Facility: HOME HEALTH | Age: 85
End: 2020-11-13
Payer: MEDICARE

## 2020-11-13 VITALS
TEMPERATURE: 98.7 F | RESPIRATION RATE: 22 BRPM | HEART RATE: 56 BPM | SYSTOLIC BLOOD PRESSURE: 118 MMHG | DIASTOLIC BLOOD PRESSURE: 80 MMHG | OXYGEN SATURATION: 96 %

## 2020-11-13 PROCEDURE — 3331090001 HH PPS REVENUE CREDIT

## 2020-11-13 PROCEDURE — G0299 HHS/HOSPICE OF RN EA 15 MIN: HCPCS

## 2020-11-13 PROCEDURE — 3331090002 HH PPS REVENUE DEBIT

## 2020-11-14 PROCEDURE — 3331090001 HH PPS REVENUE CREDIT

## 2020-11-14 PROCEDURE — 3331090002 HH PPS REVENUE DEBIT

## 2020-11-15 PROCEDURE — 3331090003 HH PPS REVENUE ADJ

## 2020-11-15 PROCEDURE — 3331090001 HH PPS REVENUE CREDIT

## 2020-11-15 PROCEDURE — 3331090002 HH PPS REVENUE DEBIT

## 2020-11-16 ENCOUNTER — HOME CARE VISIT (OUTPATIENT)
Dept: SCHEDULING | Facility: HOME HEALTH | Age: 85
End: 2020-11-16
Payer: MEDICARE

## 2020-11-16 VITALS
SYSTOLIC BLOOD PRESSURE: 120 MMHG | DIASTOLIC BLOOD PRESSURE: 80 MMHG | TEMPERATURE: 98.1 F | HEART RATE: 59 BPM | OXYGEN SATURATION: 95 % | RESPIRATION RATE: 22 BRPM

## 2020-11-16 PROCEDURE — 3331090001 HH PPS REVENUE CREDIT

## 2020-11-16 PROCEDURE — 400014 HH F/U

## 2020-11-16 PROCEDURE — 3331090002 HH PPS REVENUE DEBIT

## 2020-11-16 PROCEDURE — G0299 HHS/HOSPICE OF RN EA 15 MIN: HCPCS

## 2020-11-17 PROCEDURE — 3331090002 HH PPS REVENUE DEBIT

## 2020-11-17 PROCEDURE — 3331090001 HH PPS REVENUE CREDIT

## 2020-11-18 ENCOUNTER — HOME CARE VISIT (OUTPATIENT)
Dept: SCHEDULING | Facility: HOME HEALTH | Age: 85
End: 2020-11-18
Payer: MEDICARE

## 2020-11-18 VITALS
SYSTOLIC BLOOD PRESSURE: 120 MMHG | HEART RATE: 59 BPM | RESPIRATION RATE: 22 BRPM | DIASTOLIC BLOOD PRESSURE: 64 MMHG | TEMPERATURE: 98.4 F | OXYGEN SATURATION: 95 %

## 2020-11-18 PROCEDURE — 3331090001 HH PPS REVENUE CREDIT

## 2020-11-18 PROCEDURE — G0299 HHS/HOSPICE OF RN EA 15 MIN: HCPCS

## 2020-11-18 PROCEDURE — 3331090002 HH PPS REVENUE DEBIT

## 2020-11-19 ENCOUNTER — HOME CARE VISIT (OUTPATIENT)
Dept: SCHEDULING | Facility: HOME HEALTH | Age: 85
End: 2020-11-19
Payer: MEDICARE

## 2020-11-19 PROCEDURE — 3331090001 HH PPS REVENUE CREDIT

## 2020-11-19 PROCEDURE — 3331090002 HH PPS REVENUE DEBIT

## 2020-11-19 PROCEDURE — G0156 HHCP-SVS OF AIDE,EA 15 MIN: HCPCS

## 2020-11-20 ENCOUNTER — HOME CARE VISIT (OUTPATIENT)
Dept: SCHEDULING | Facility: HOME HEALTH | Age: 85
End: 2020-11-20
Payer: MEDICARE

## 2020-11-20 PROCEDURE — 3331090001 HH PPS REVENUE CREDIT

## 2020-11-20 PROCEDURE — G0299 HHS/HOSPICE OF RN EA 15 MIN: HCPCS

## 2020-11-20 PROCEDURE — 3331090002 HH PPS REVENUE DEBIT

## 2020-11-21 VITALS
HEART RATE: 71 BPM | OXYGEN SATURATION: 95 % | DIASTOLIC BLOOD PRESSURE: 80 MMHG | TEMPERATURE: 98.7 F | RESPIRATION RATE: 22 BRPM | SYSTOLIC BLOOD PRESSURE: 128 MMHG

## 2020-11-21 PROCEDURE — 3331090001 HH PPS REVENUE CREDIT

## 2020-11-21 PROCEDURE — 3331090002 HH PPS REVENUE DEBIT

## 2020-11-22 PROCEDURE — 3331090001 HH PPS REVENUE CREDIT

## 2020-11-22 PROCEDURE — 3331090002 HH PPS REVENUE DEBIT

## 2020-11-23 ENCOUNTER — HOME CARE VISIT (OUTPATIENT)
Dept: SCHEDULING | Facility: HOME HEALTH | Age: 85
End: 2020-11-23
Payer: MEDICARE

## 2020-11-23 VITALS
HEART RATE: 71 BPM | OXYGEN SATURATION: 96 % | DIASTOLIC BLOOD PRESSURE: 70 MMHG | SYSTOLIC BLOOD PRESSURE: 116 MMHG | RESPIRATION RATE: 22 BRPM | TEMPERATURE: 98 F

## 2020-11-23 PROCEDURE — 3331090001 HH PPS REVENUE CREDIT

## 2020-11-23 PROCEDURE — G0299 HHS/HOSPICE OF RN EA 15 MIN: HCPCS

## 2020-11-23 PROCEDURE — 3331090002 HH PPS REVENUE DEBIT

## 2020-11-23 PROCEDURE — A6197 ALGINATE DRSG >16 <=48 SQ IN: HCPCS

## 2020-11-24 ENCOUNTER — HOME CARE VISIT (OUTPATIENT)
Dept: SCHEDULING | Facility: HOME HEALTH | Age: 85
End: 2020-11-24
Payer: MEDICARE

## 2020-11-24 PROCEDURE — G0156 HHCP-SVS OF AIDE,EA 15 MIN: HCPCS

## 2020-11-24 PROCEDURE — 3331090001 HH PPS REVENUE CREDIT

## 2020-11-24 PROCEDURE — 3331090002 HH PPS REVENUE DEBIT

## 2020-11-25 ENCOUNTER — HOME CARE VISIT (OUTPATIENT)
Dept: SCHEDULING | Facility: HOME HEALTH | Age: 85
End: 2020-11-25
Payer: MEDICARE

## 2020-11-25 VITALS
HEART RATE: 53 BPM | RESPIRATION RATE: 20 BRPM | OXYGEN SATURATION: 96 % | DIASTOLIC BLOOD PRESSURE: 70 MMHG | SYSTOLIC BLOOD PRESSURE: 116 MMHG | TEMPERATURE: 98.6 F

## 2020-11-25 PROCEDURE — G0299 HHS/HOSPICE OF RN EA 15 MIN: HCPCS

## 2020-11-25 PROCEDURE — 3331090002 HH PPS REVENUE DEBIT

## 2020-11-25 PROCEDURE — 3331090001 HH PPS REVENUE CREDIT

## 2020-11-26 ENCOUNTER — HOME CARE VISIT (OUTPATIENT)
Dept: SCHEDULING | Facility: HOME HEALTH | Age: 85
End: 2020-11-26
Payer: MEDICARE

## 2020-11-26 ENCOUNTER — HOME CARE VISIT (OUTPATIENT)
Dept: HOME HEALTH SERVICES | Facility: HOME HEALTH | Age: 85
End: 2020-11-26
Payer: MEDICARE

## 2020-11-26 VITALS
TEMPERATURE: 96.6 F | RESPIRATION RATE: 18 BRPM | DIASTOLIC BLOOD PRESSURE: 62 MMHG | OXYGEN SATURATION: 96 % | SYSTOLIC BLOOD PRESSURE: 102 MMHG | HEART RATE: 66 BPM

## 2020-11-26 PROCEDURE — 3331090002 HH PPS REVENUE DEBIT

## 2020-11-26 PROCEDURE — G0300 HHS/HOSPICE OF LPN EA 15 MIN: HCPCS

## 2020-11-26 PROCEDURE — 3331090001 HH PPS REVENUE CREDIT

## 2020-11-27 PROCEDURE — 3331090002 HH PPS REVENUE DEBIT

## 2020-11-27 PROCEDURE — 3331090001 HH PPS REVENUE CREDIT

## 2020-11-28 PROCEDURE — 3331090001 HH PPS REVENUE CREDIT

## 2020-11-28 PROCEDURE — 3331090002 HH PPS REVENUE DEBIT

## 2020-11-29 PROCEDURE — 3331090001 HH PPS REVENUE CREDIT

## 2020-11-29 PROCEDURE — 3331090002 HH PPS REVENUE DEBIT

## 2020-11-30 ENCOUNTER — HOME CARE VISIT (OUTPATIENT)
Dept: SCHEDULING | Facility: HOME HEALTH | Age: 85
End: 2020-11-30
Payer: MEDICARE

## 2020-11-30 VITALS
TEMPERATURE: 98 F | DIASTOLIC BLOOD PRESSURE: 70 MMHG | OXYGEN SATURATION: 95 % | RESPIRATION RATE: 20 BRPM | HEART RATE: 64 BPM | SYSTOLIC BLOOD PRESSURE: 116 MMHG

## 2020-11-30 PROCEDURE — G0299 HHS/HOSPICE OF RN EA 15 MIN: HCPCS

## 2020-11-30 PROCEDURE — 3331090002 HH PPS REVENUE DEBIT

## 2020-11-30 PROCEDURE — 3331090001 HH PPS REVENUE CREDIT

## 2020-12-01 PROCEDURE — 3331090001 HH PPS REVENUE CREDIT

## 2020-12-01 PROCEDURE — 3331090002 HH PPS REVENUE DEBIT

## 2020-12-02 ENCOUNTER — HOME CARE VISIT (OUTPATIENT)
Dept: SCHEDULING | Facility: HOME HEALTH | Age: 85
End: 2020-12-02
Payer: MEDICARE

## 2020-12-02 VITALS
HEART RATE: 56 BPM | SYSTOLIC BLOOD PRESSURE: 118 MMHG | TEMPERATURE: 98 F | DIASTOLIC BLOOD PRESSURE: 6 MMHG | RESPIRATION RATE: 20 BRPM | OXYGEN SATURATION: 95 %

## 2020-12-02 PROCEDURE — 3331090002 HH PPS REVENUE DEBIT

## 2020-12-02 PROCEDURE — G0299 HHS/HOSPICE OF RN EA 15 MIN: HCPCS

## 2020-12-02 PROCEDURE — 3331090001 HH PPS REVENUE CREDIT

## 2020-12-03 ENCOUNTER — HOME CARE VISIT (OUTPATIENT)
Dept: SCHEDULING | Facility: HOME HEALTH | Age: 85
End: 2020-12-03
Payer: MEDICARE

## 2020-12-03 PROCEDURE — 3331090001 HH PPS REVENUE CREDIT

## 2020-12-03 PROCEDURE — 3331090002 HH PPS REVENUE DEBIT

## 2020-12-03 PROCEDURE — G0156 HHCP-SVS OF AIDE,EA 15 MIN: HCPCS

## 2020-12-04 ENCOUNTER — HOME CARE VISIT (OUTPATIENT)
Dept: SCHEDULING | Facility: HOME HEALTH | Age: 85
End: 2020-12-04
Payer: MEDICARE

## 2020-12-04 VITALS
HEART RATE: 68 BPM | RESPIRATION RATE: 20 BRPM | TEMPERATURE: 98.7 F | OXYGEN SATURATION: 94 % | SYSTOLIC BLOOD PRESSURE: 120 MMHG | DIASTOLIC BLOOD PRESSURE: 70 MMHG

## 2020-12-04 PROCEDURE — 3331090001 HH PPS REVENUE CREDIT

## 2020-12-04 PROCEDURE — G0299 HHS/HOSPICE OF RN EA 15 MIN: HCPCS

## 2020-12-04 PROCEDURE — 3331090002 HH PPS REVENUE DEBIT

## 2020-12-05 PROCEDURE — 3331090001 HH PPS REVENUE CREDIT

## 2020-12-05 PROCEDURE — 3331090002 HH PPS REVENUE DEBIT

## 2020-12-06 PROCEDURE — 3331090002 HH PPS REVENUE DEBIT

## 2020-12-06 PROCEDURE — 3331090001 HH PPS REVENUE CREDIT

## 2020-12-07 ENCOUNTER — HOME CARE VISIT (OUTPATIENT)
Dept: SCHEDULING | Facility: HOME HEALTH | Age: 85
End: 2020-12-07
Payer: MEDICARE

## 2020-12-07 VITALS
RESPIRATION RATE: 20 BRPM | TEMPERATURE: 98.4 F | DIASTOLIC BLOOD PRESSURE: 72 MMHG | HEART RATE: 50 BPM | SYSTOLIC BLOOD PRESSURE: 120 MMHG | OXYGEN SATURATION: 95 %

## 2020-12-07 PROCEDURE — 3331090001 HH PPS REVENUE CREDIT

## 2020-12-07 PROCEDURE — G0299 HHS/HOSPICE OF RN EA 15 MIN: HCPCS

## 2020-12-07 PROCEDURE — 3331090002 HH PPS REVENUE DEBIT

## 2020-12-08 PROCEDURE — 3331090002 HH PPS REVENUE DEBIT

## 2020-12-08 PROCEDURE — 3331090001 HH PPS REVENUE CREDIT

## 2020-12-09 ENCOUNTER — HOME CARE VISIT (OUTPATIENT)
Dept: SCHEDULING | Facility: HOME HEALTH | Age: 85
End: 2020-12-09
Payer: MEDICARE

## 2020-12-09 ENCOUNTER — HOME CARE VISIT (OUTPATIENT)
Dept: HOME HEALTH SERVICES | Facility: HOME HEALTH | Age: 85
End: 2020-12-09
Payer: MEDICARE

## 2020-12-09 VITALS
DIASTOLIC BLOOD PRESSURE: 70 MMHG | OXYGEN SATURATION: 96 % | HEART RATE: 52 BPM | RESPIRATION RATE: 20 BRPM | TEMPERATURE: 98.7 F | SYSTOLIC BLOOD PRESSURE: 118 MMHG

## 2020-12-09 PROCEDURE — 3331090002 HH PPS REVENUE DEBIT

## 2020-12-09 PROCEDURE — G0299 HHS/HOSPICE OF RN EA 15 MIN: HCPCS

## 2020-12-09 PROCEDURE — 3331090001 HH PPS REVENUE CREDIT

## 2020-12-10 ENCOUNTER — HOME CARE VISIT (OUTPATIENT)
Dept: SCHEDULING | Facility: HOME HEALTH | Age: 85
End: 2020-12-10
Payer: MEDICARE

## 2020-12-10 PROCEDURE — 3331090001 HH PPS REVENUE CREDIT

## 2020-12-10 PROCEDURE — 3331090002 HH PPS REVENUE DEBIT

## 2020-12-10 PROCEDURE — G0156 HHCP-SVS OF AIDE,EA 15 MIN: HCPCS

## 2020-12-11 ENCOUNTER — HOME CARE VISIT (OUTPATIENT)
Dept: SCHEDULING | Facility: HOME HEALTH | Age: 85
End: 2020-12-11
Payer: MEDICARE

## 2020-12-11 VITALS
HEART RATE: 56 BPM | DIASTOLIC BLOOD PRESSURE: 68 MMHG | OXYGEN SATURATION: 95 % | RESPIRATION RATE: 20 BRPM | SYSTOLIC BLOOD PRESSURE: 120 MMHG | TEMPERATURE: 98 F

## 2020-12-11 PROCEDURE — G0299 HHS/HOSPICE OF RN EA 15 MIN: HCPCS

## 2020-12-11 PROCEDURE — 3331090002 HH PPS REVENUE DEBIT

## 2020-12-11 PROCEDURE — 3331090001 HH PPS REVENUE CREDIT

## 2020-12-12 PROCEDURE — 3331090002 HH PPS REVENUE DEBIT

## 2020-12-12 PROCEDURE — 3331090001 HH PPS REVENUE CREDIT

## 2020-12-13 PROCEDURE — 3331090001 HH PPS REVENUE CREDIT

## 2020-12-13 PROCEDURE — 3331090002 HH PPS REVENUE DEBIT

## 2020-12-14 ENCOUNTER — HOME CARE VISIT (OUTPATIENT)
Dept: SCHEDULING | Facility: HOME HEALTH | Age: 85
End: 2020-12-14
Payer: MEDICARE

## 2020-12-14 PROCEDURE — G0299 HHS/HOSPICE OF RN EA 15 MIN: HCPCS

## 2020-12-14 PROCEDURE — 3331090002 HH PPS REVENUE DEBIT

## 2020-12-14 PROCEDURE — 3331090001 HH PPS REVENUE CREDIT

## 2020-12-15 VITALS
HEART RATE: 54 BPM | DIASTOLIC BLOOD PRESSURE: 70 MMHG | SYSTOLIC BLOOD PRESSURE: 118 MMHG | TEMPERATURE: 98.4 F | OXYGEN SATURATION: 95 %

## 2020-12-15 PROCEDURE — 3331090001 HH PPS REVENUE CREDIT

## 2020-12-15 PROCEDURE — 3331090002 HH PPS REVENUE DEBIT

## 2020-12-16 ENCOUNTER — HOME CARE VISIT (OUTPATIENT)
Dept: SCHEDULING | Facility: HOME HEALTH | Age: 85
End: 2020-12-16
Payer: MEDICARE

## 2020-12-16 VITALS
OXYGEN SATURATION: 94 % | HEART RATE: 53 BPM | DIASTOLIC BLOOD PRESSURE: 70 MMHG | SYSTOLIC BLOOD PRESSURE: 120 MMHG | TEMPERATURE: 97.9 F | RESPIRATION RATE: 20 BRPM

## 2020-12-16 PROCEDURE — A6197 ALGINATE DRSG >16 <=48 SQ IN: HCPCS

## 2020-12-16 PROCEDURE — 3331090002 HH PPS REVENUE DEBIT

## 2020-12-16 PROCEDURE — A6216 NON-STERILE GAUZE<=16 SQ IN: HCPCS

## 2020-12-16 PROCEDURE — 3331090001 HH PPS REVENUE CREDIT

## 2020-12-16 PROCEDURE — 400014 HH F/U

## 2020-12-16 PROCEDURE — G0299 HHS/HOSPICE OF RN EA 15 MIN: HCPCS

## 2020-12-17 ENCOUNTER — HOME CARE VISIT (OUTPATIENT)
Dept: SCHEDULING | Facility: HOME HEALTH | Age: 85
End: 2020-12-17
Payer: MEDICARE

## 2020-12-17 PROCEDURE — 3331090002 HH PPS REVENUE DEBIT

## 2020-12-17 PROCEDURE — G0156 HHCP-SVS OF AIDE,EA 15 MIN: HCPCS

## 2020-12-17 PROCEDURE — 3331090001 HH PPS REVENUE CREDIT

## 2020-12-17 RX ORDER — BUSPIRONE HYDROCHLORIDE 5 MG/1
TABLET ORAL
Qty: 30 TAB | Refills: 0 | Status: SHIPPED | OUTPATIENT
Start: 2020-12-17 | End: 2021-01-18

## 2020-12-18 ENCOUNTER — HOME CARE VISIT (OUTPATIENT)
Dept: SCHEDULING | Facility: HOME HEALTH | Age: 85
End: 2020-12-18
Payer: MEDICARE

## 2020-12-18 VITALS
SYSTOLIC BLOOD PRESSURE: 120 MMHG | RESPIRATION RATE: 20 BRPM | TEMPERATURE: 98.2 F | OXYGEN SATURATION: 94 % | DIASTOLIC BLOOD PRESSURE: 70 MMHG | HEART RATE: 70 BPM

## 2020-12-18 PROCEDURE — G0299 HHS/HOSPICE OF RN EA 15 MIN: HCPCS

## 2020-12-18 PROCEDURE — 3331090001 HH PPS REVENUE CREDIT

## 2020-12-18 PROCEDURE — 3331090002 HH PPS REVENUE DEBIT

## 2020-12-19 PROCEDURE — 3331090002 HH PPS REVENUE DEBIT

## 2020-12-19 PROCEDURE — 3331090001 HH PPS REVENUE CREDIT

## 2020-12-20 PROCEDURE — 3331090001 HH PPS REVENUE CREDIT

## 2020-12-20 PROCEDURE — 3331090002 HH PPS REVENUE DEBIT

## 2020-12-21 ENCOUNTER — HOME CARE VISIT (OUTPATIENT)
Dept: SCHEDULING | Facility: HOME HEALTH | Age: 85
End: 2020-12-21
Payer: MEDICARE

## 2020-12-21 VITALS
OXYGEN SATURATION: 96 % | RESPIRATION RATE: 22 BRPM | DIASTOLIC BLOOD PRESSURE: 70 MMHG | TEMPERATURE: 98.5 F | HEART RATE: 71 BPM | SYSTOLIC BLOOD PRESSURE: 120 MMHG

## 2020-12-21 PROCEDURE — G0299 HHS/HOSPICE OF RN EA 15 MIN: HCPCS

## 2020-12-21 PROCEDURE — 3331090001 HH PPS REVENUE CREDIT

## 2020-12-21 PROCEDURE — 3331090002 HH PPS REVENUE DEBIT

## 2020-12-22 PROCEDURE — 3331090001 HH PPS REVENUE CREDIT

## 2020-12-22 PROCEDURE — 3331090002 HH PPS REVENUE DEBIT

## 2020-12-23 ENCOUNTER — HOME CARE VISIT (OUTPATIENT)
Dept: SCHEDULING | Facility: HOME HEALTH | Age: 85
End: 2020-12-23
Payer: MEDICARE

## 2020-12-23 VITALS
DIASTOLIC BLOOD PRESSURE: 70 MMHG | RESPIRATION RATE: 20 BRPM | TEMPERATURE: 98.3 F | SYSTOLIC BLOOD PRESSURE: 120 MMHG | HEART RATE: 70 BPM | OXYGEN SATURATION: 95 %

## 2020-12-23 PROCEDURE — G0156 HHCP-SVS OF AIDE,EA 15 MIN: HCPCS

## 2020-12-23 PROCEDURE — 3331090001 HH PPS REVENUE CREDIT

## 2020-12-23 PROCEDURE — 3331090002 HH PPS REVENUE DEBIT

## 2020-12-23 PROCEDURE — G0299 HHS/HOSPICE OF RN EA 15 MIN: HCPCS

## 2020-12-24 PROCEDURE — 3331090002 HH PPS REVENUE DEBIT

## 2020-12-24 PROCEDURE — 3331090001 HH PPS REVENUE CREDIT

## 2020-12-25 PROCEDURE — 3331090002 HH PPS REVENUE DEBIT

## 2020-12-25 PROCEDURE — 3331090001 HH PPS REVENUE CREDIT

## 2020-12-26 ENCOUNTER — HOME CARE VISIT (OUTPATIENT)
Dept: SCHEDULING | Facility: HOME HEALTH | Age: 85
End: 2020-12-26
Payer: MEDICARE

## 2020-12-26 VITALS
DIASTOLIC BLOOD PRESSURE: 70 MMHG | OXYGEN SATURATION: 96 % | TEMPERATURE: 97.7 F | RESPIRATION RATE: 20 BRPM | SYSTOLIC BLOOD PRESSURE: 116 MMHG | HEART RATE: 72 BPM

## 2020-12-26 PROCEDURE — 3331090002 HH PPS REVENUE DEBIT

## 2020-12-26 PROCEDURE — 3331090001 HH PPS REVENUE CREDIT

## 2020-12-26 PROCEDURE — G0299 HHS/HOSPICE OF RN EA 15 MIN: HCPCS

## 2020-12-27 PROCEDURE — 3331090001 HH PPS REVENUE CREDIT

## 2020-12-27 PROCEDURE — 3331090002 HH PPS REVENUE DEBIT

## 2020-12-28 ENCOUNTER — HOME CARE VISIT (OUTPATIENT)
Dept: SCHEDULING | Facility: HOME HEALTH | Age: 85
End: 2020-12-28
Payer: MEDICARE

## 2020-12-28 VITALS
TEMPERATURE: 98.7 F | OXYGEN SATURATION: 95 % | RESPIRATION RATE: 22 BRPM | DIASTOLIC BLOOD PRESSURE: 68 MMHG | SYSTOLIC BLOOD PRESSURE: 120 MMHG | HEART RATE: 58 BPM

## 2020-12-28 PROCEDURE — 3331090002 HH PPS REVENUE DEBIT

## 2020-12-28 PROCEDURE — G0299 HHS/HOSPICE OF RN EA 15 MIN: HCPCS

## 2020-12-28 PROCEDURE — 3331090001 HH PPS REVENUE CREDIT

## 2020-12-29 PROCEDURE — 3331090001 HH PPS REVENUE CREDIT

## 2020-12-29 PROCEDURE — 3331090002 HH PPS REVENUE DEBIT

## 2020-12-30 ENCOUNTER — HOME CARE VISIT (OUTPATIENT)
Dept: SCHEDULING | Facility: HOME HEALTH | Age: 85
End: 2020-12-30
Payer: MEDICARE

## 2020-12-30 VITALS
OXYGEN SATURATION: 94 % | TEMPERATURE: 98.2 F | RESPIRATION RATE: 22 BRPM | DIASTOLIC BLOOD PRESSURE: 70 MMHG | HEART RATE: 76 BPM | SYSTOLIC BLOOD PRESSURE: 120 MMHG

## 2020-12-30 PROCEDURE — 3331090001 HH PPS REVENUE CREDIT

## 2020-12-30 PROCEDURE — 3331090002 HH PPS REVENUE DEBIT

## 2020-12-30 PROCEDURE — G0299 HHS/HOSPICE OF RN EA 15 MIN: HCPCS

## 2020-12-31 ENCOUNTER — HOME CARE VISIT (OUTPATIENT)
Dept: SCHEDULING | Facility: HOME HEALTH | Age: 85
End: 2020-12-31
Payer: MEDICARE

## 2020-12-31 PROCEDURE — 3331090001 HH PPS REVENUE CREDIT

## 2020-12-31 PROCEDURE — 3331090002 HH PPS REVENUE DEBIT

## 2020-12-31 PROCEDURE — G0156 HHCP-SVS OF AIDE,EA 15 MIN: HCPCS

## 2021-01-01 ENCOUNTER — HOME CARE VISIT (OUTPATIENT)
Dept: SCHEDULING | Facility: HOME HEALTH | Age: 86
End: 2021-01-01
Payer: MEDICARE

## 2021-01-01 ENCOUNTER — HOME CARE VISIT (OUTPATIENT)
Dept: HOME HEALTH SERVICES | Facility: HOME HEALTH | Age: 86
End: 2021-01-01
Payer: MEDICARE

## 2021-01-01 ENCOUNTER — OFFICE VISIT (OUTPATIENT)
Dept: INTERNAL MEDICINE CLINIC | Age: 86
End: 2021-01-01
Payer: MEDICARE

## 2021-01-01 VITALS
OXYGEN SATURATION: 95 % | DIASTOLIC BLOOD PRESSURE: 68 MMHG | SYSTOLIC BLOOD PRESSURE: 120 MMHG | TEMPERATURE: 98.1 F | HEART RATE: 60 BPM

## 2021-01-01 VITALS
TEMPERATURE: 98.5 F | RESPIRATION RATE: 22 BRPM | SYSTOLIC BLOOD PRESSURE: 120 MMHG | DIASTOLIC BLOOD PRESSURE: 72 MMHG | HEART RATE: 60 BPM | OXYGEN SATURATION: 95 %

## 2021-01-01 VITALS
HEART RATE: 68 BPM | SYSTOLIC BLOOD PRESSURE: 120 MMHG | TEMPERATURE: 98.4 F | OXYGEN SATURATION: 95 % | RESPIRATION RATE: 20 BRPM | DIASTOLIC BLOOD PRESSURE: 60 MMHG

## 2021-01-01 VITALS
SYSTOLIC BLOOD PRESSURE: 118 MMHG | DIASTOLIC BLOOD PRESSURE: 70 MMHG | OXYGEN SATURATION: 93 % | TEMPERATURE: 98.7 F | HEART RATE: 60 BPM

## 2021-01-01 VITALS
HEART RATE: 71 BPM | DIASTOLIC BLOOD PRESSURE: 80 MMHG | SYSTOLIC BLOOD PRESSURE: 120 MMHG | TEMPERATURE: 97.7 F | RESPIRATION RATE: 22 BRPM | OXYGEN SATURATION: 91 %

## 2021-01-01 VITALS
SYSTOLIC BLOOD PRESSURE: 120 MMHG | RESPIRATION RATE: 22 BRPM | HEART RATE: 60 BPM | OXYGEN SATURATION: 94 % | DIASTOLIC BLOOD PRESSURE: 70 MMHG | TEMPERATURE: 98.3 F

## 2021-01-01 VITALS
OXYGEN SATURATION: 94 % | HEART RATE: 60 BPM | RESPIRATION RATE: 24 BRPM | SYSTOLIC BLOOD PRESSURE: 120 MMHG | OXYGEN SATURATION: 96 % | TEMPERATURE: 98.3 F | DIASTOLIC BLOOD PRESSURE: 78 MMHG | HEART RATE: 60 BPM | DIASTOLIC BLOOD PRESSURE: 60 MMHG | SYSTOLIC BLOOD PRESSURE: 118 MMHG | RESPIRATION RATE: 18 BRPM | TEMPERATURE: 98.1 F

## 2021-01-01 VITALS
OXYGEN SATURATION: 93 % | SYSTOLIC BLOOD PRESSURE: 114 MMHG | DIASTOLIC BLOOD PRESSURE: 60 MMHG | HEART RATE: 65 BPM | TEMPERATURE: 98.6 F | RESPIRATION RATE: 20 BRPM

## 2021-01-01 VITALS
TEMPERATURE: 98.7 F | HEART RATE: 58 BPM | DIASTOLIC BLOOD PRESSURE: 60 MMHG | RESPIRATION RATE: 20 BRPM | OXYGEN SATURATION: 96 % | SYSTOLIC BLOOD PRESSURE: 110 MMHG

## 2021-01-01 VITALS
DIASTOLIC BLOOD PRESSURE: 70 MMHG | TEMPERATURE: 98 F | HEART RATE: 60 BPM | SYSTOLIC BLOOD PRESSURE: 120 MMHG | OXYGEN SATURATION: 94 % | RESPIRATION RATE: 22 BRPM

## 2021-01-01 VITALS
SYSTOLIC BLOOD PRESSURE: 120 MMHG | HEART RATE: 60 BPM | TEMPERATURE: 98.2 F | OXYGEN SATURATION: 95 % | DIASTOLIC BLOOD PRESSURE: 80 MMHG | RESPIRATION RATE: 20 BRPM

## 2021-01-01 VITALS
DIASTOLIC BLOOD PRESSURE: 60 MMHG | HEART RATE: 60 BPM | OXYGEN SATURATION: 95 % | TEMPERATURE: 98 F | SYSTOLIC BLOOD PRESSURE: 110 MMHG | RESPIRATION RATE: 22 BRPM

## 2021-01-01 VITALS
SYSTOLIC BLOOD PRESSURE: 120 MMHG | RESPIRATION RATE: 22 BRPM | DIASTOLIC BLOOD PRESSURE: 70 MMHG | HEART RATE: 70 BPM | OXYGEN SATURATION: 95 % | TEMPERATURE: 98.5 F

## 2021-01-01 VITALS
TEMPERATURE: 98.3 F | HEART RATE: 60 BPM | OXYGEN SATURATION: 95 % | DIASTOLIC BLOOD PRESSURE: 60 MMHG | SYSTOLIC BLOOD PRESSURE: 110 MMHG | RESPIRATION RATE: 24 BRPM

## 2021-01-01 VITALS
RESPIRATION RATE: 22 BRPM | OXYGEN SATURATION: 93 % | TEMPERATURE: 98.2 F | DIASTOLIC BLOOD PRESSURE: 60 MMHG | TEMPERATURE: 98.3 F | SYSTOLIC BLOOD PRESSURE: 120 MMHG | HEART RATE: 60 BPM | HEART RATE: 60 BPM | DIASTOLIC BLOOD PRESSURE: 60 MMHG | SYSTOLIC BLOOD PRESSURE: 120 MMHG | OXYGEN SATURATION: 93 % | RESPIRATION RATE: 22 BRPM

## 2021-01-01 VITALS
RESPIRATION RATE: 22 BRPM | HEART RATE: 60 BPM | TEMPERATURE: 98.1 F | DIASTOLIC BLOOD PRESSURE: 60 MMHG | OXYGEN SATURATION: 94 % | SYSTOLIC BLOOD PRESSURE: 118 MMHG

## 2021-01-01 VITALS
HEART RATE: 60 BPM | SYSTOLIC BLOOD PRESSURE: 118 MMHG | TEMPERATURE: 98.3 F | OXYGEN SATURATION: 95 % | DIASTOLIC BLOOD PRESSURE: 62 MMHG | RESPIRATION RATE: 24 BRPM

## 2021-01-01 VITALS
SYSTOLIC BLOOD PRESSURE: 120 MMHG | HEART RATE: 71 BPM | DIASTOLIC BLOOD PRESSURE: 80 MMHG | TEMPERATURE: 98.5 F | RESPIRATION RATE: 24 BRPM | OXYGEN SATURATION: 95 %

## 2021-01-01 VITALS
RESPIRATION RATE: 22 BRPM | TEMPERATURE: 98.1 F | SYSTOLIC BLOOD PRESSURE: 120 MMHG | DIASTOLIC BLOOD PRESSURE: 70 MMHG | OXYGEN SATURATION: 94 % | HEART RATE: 60 BPM

## 2021-01-01 VITALS
SYSTOLIC BLOOD PRESSURE: 120 MMHG | TEMPERATURE: 98.1 F | OXYGEN SATURATION: 95 % | DIASTOLIC BLOOD PRESSURE: 70 MMHG | RESPIRATION RATE: 20 BRPM | HEART RATE: 60 BPM

## 2021-01-01 VITALS
HEART RATE: 60 BPM | RESPIRATION RATE: 22 BRPM | SYSTOLIC BLOOD PRESSURE: 110 MMHG | TEMPERATURE: 98.6 F | DIASTOLIC BLOOD PRESSURE: 70 MMHG | OXYGEN SATURATION: 96 %

## 2021-01-01 VITALS
HEIGHT: 68 IN | WEIGHT: 126 LBS | SYSTOLIC BLOOD PRESSURE: 130 MMHG | TEMPERATURE: 98.7 F | DIASTOLIC BLOOD PRESSURE: 76 MMHG | HEART RATE: 83 BPM | RESPIRATION RATE: 20 BRPM | OXYGEN SATURATION: 95 % | BODY MASS INDEX: 19.1 KG/M2

## 2021-01-01 VITALS
DIASTOLIC BLOOD PRESSURE: 70 MMHG | RESPIRATION RATE: 22 BRPM | TEMPERATURE: 98.2 F | SYSTOLIC BLOOD PRESSURE: 118 MMHG | HEART RATE: 62 BPM | OXYGEN SATURATION: 98 %

## 2021-01-01 VITALS
DIASTOLIC BLOOD PRESSURE: 70 MMHG | SYSTOLIC BLOOD PRESSURE: 120 MMHG | OXYGEN SATURATION: 93 % | TEMPERATURE: 98.6 F | DIASTOLIC BLOOD PRESSURE: 80 MMHG | HEART RATE: 64 BPM | SYSTOLIC BLOOD PRESSURE: 120 MMHG | TEMPERATURE: 98 F | OXYGEN SATURATION: 92 % | HEART RATE: 67 BPM

## 2021-01-01 VITALS
SYSTOLIC BLOOD PRESSURE: 110 MMHG | TEMPERATURE: 98 F | DIASTOLIC BLOOD PRESSURE: 72 MMHG | RESPIRATION RATE: 22 BRPM | HEART RATE: 60 BPM | OXYGEN SATURATION: 94 %

## 2021-01-01 VITALS
OXYGEN SATURATION: 93 % | HEART RATE: 60 BPM | RESPIRATION RATE: 22 BRPM | SYSTOLIC BLOOD PRESSURE: 120 MMHG | TEMPERATURE: 98.8 F | DIASTOLIC BLOOD PRESSURE: 80 MMHG

## 2021-01-01 VITALS
TEMPERATURE: 98.6 F | SYSTOLIC BLOOD PRESSURE: 118 MMHG | RESPIRATION RATE: 22 BRPM | DIASTOLIC BLOOD PRESSURE: 70 MMHG | OXYGEN SATURATION: 94 % | TEMPERATURE: 98.5 F | DIASTOLIC BLOOD PRESSURE: 70 MMHG | HEART RATE: 60 BPM | OXYGEN SATURATION: 93 % | SYSTOLIC BLOOD PRESSURE: 120 MMHG | RESPIRATION RATE: 22 BRPM | HEART RATE: 60 BPM

## 2021-01-01 VITALS
DIASTOLIC BLOOD PRESSURE: 70 MMHG | SYSTOLIC BLOOD PRESSURE: 120 MMHG | HEART RATE: 60 BPM | TEMPERATURE: 98.3 F | RESPIRATION RATE: 22 BRPM | OXYGEN SATURATION: 93 %

## 2021-01-01 VITALS
HEART RATE: 60 BPM | OXYGEN SATURATION: 95 % | TEMPERATURE: 98.5 F | DIASTOLIC BLOOD PRESSURE: 60 MMHG | RESPIRATION RATE: 22 BRPM | SYSTOLIC BLOOD PRESSURE: 120 MMHG

## 2021-01-01 VITALS
RESPIRATION RATE: 24 BRPM | DIASTOLIC BLOOD PRESSURE: 70 MMHG | OXYGEN SATURATION: 92 % | TEMPERATURE: 98.2 F | SYSTOLIC BLOOD PRESSURE: 118 MMHG | HEART RATE: 60 BPM

## 2021-01-01 VITALS
TEMPERATURE: 99.2 F | SYSTOLIC BLOOD PRESSURE: 116 MMHG | RESPIRATION RATE: 24 BRPM | HEART RATE: 60 BPM | OXYGEN SATURATION: 94 % | DIASTOLIC BLOOD PRESSURE: 70 MMHG

## 2021-01-01 VITALS
DIASTOLIC BLOOD PRESSURE: 70 MMHG | TEMPERATURE: 98.6 F | HEART RATE: 60 BPM | SYSTOLIC BLOOD PRESSURE: 120 MMHG | DIASTOLIC BLOOD PRESSURE: 70 MMHG | SYSTOLIC BLOOD PRESSURE: 120 MMHG | TEMPERATURE: 98.4 F | RESPIRATION RATE: 22 BRPM | HEART RATE: 58 BPM | OXYGEN SATURATION: 95 % | OXYGEN SATURATION: 93 % | RESPIRATION RATE: 22 BRPM

## 2021-01-01 VITALS
OXYGEN SATURATION: 95 % | SYSTOLIC BLOOD PRESSURE: 120 MMHG | TEMPERATURE: 98.3 F | RESPIRATION RATE: 20 BRPM | RESPIRATION RATE: 22 BRPM | SYSTOLIC BLOOD PRESSURE: 120 MMHG | HEART RATE: 73 BPM | HEART RATE: 64 BPM | OXYGEN SATURATION: 94 % | DIASTOLIC BLOOD PRESSURE: 62 MMHG | TEMPERATURE: 98.3 F | DIASTOLIC BLOOD PRESSURE: 80 MMHG

## 2021-01-01 VITALS
HEART RATE: 60 BPM | DIASTOLIC BLOOD PRESSURE: 60 MMHG | SYSTOLIC BLOOD PRESSURE: 116 MMHG | RESPIRATION RATE: 24 BRPM | TEMPERATURE: 98.9 F | OXYGEN SATURATION: 95 %

## 2021-01-01 VITALS
RESPIRATION RATE: 22 BRPM | SYSTOLIC BLOOD PRESSURE: 120 MMHG | TEMPERATURE: 98.4 F | HEART RATE: 60 BPM | DIASTOLIC BLOOD PRESSURE: 70 MMHG | OXYGEN SATURATION: 95 %

## 2021-01-01 VITALS
SYSTOLIC BLOOD PRESSURE: 120 MMHG | OXYGEN SATURATION: 94 % | HEART RATE: 64 BPM | RESPIRATION RATE: 24 BRPM | TEMPERATURE: 98.3 F | DIASTOLIC BLOOD PRESSURE: 60 MMHG

## 2021-01-01 VITALS
OXYGEN SATURATION: 93 % | SYSTOLIC BLOOD PRESSURE: 120 MMHG | RESPIRATION RATE: 24 BRPM | TEMPERATURE: 98.7 F | DIASTOLIC BLOOD PRESSURE: 70 MMHG | HEART RATE: 60 BPM

## 2021-01-01 VITALS
SYSTOLIC BLOOD PRESSURE: 118 MMHG | OXYGEN SATURATION: 94 % | TEMPERATURE: 97.5 F | RESPIRATION RATE: 22 BRPM | DIASTOLIC BLOOD PRESSURE: 60 MMHG | HEART RATE: 60 BPM

## 2021-01-01 VITALS
DIASTOLIC BLOOD PRESSURE: 70 MMHG | RESPIRATION RATE: 20 BRPM | HEART RATE: 60 BPM | TEMPERATURE: 98.2 F | SYSTOLIC BLOOD PRESSURE: 110 MMHG | OXYGEN SATURATION: 95 %

## 2021-01-01 VITALS
HEART RATE: 60 BPM | DIASTOLIC BLOOD PRESSURE: 70 MMHG | TEMPERATURE: 98.4 F | RESPIRATION RATE: 22 BRPM | SYSTOLIC BLOOD PRESSURE: 118 MMHG | OXYGEN SATURATION: 94 %

## 2021-01-01 VITALS
TEMPERATURE: 98.1 F | OXYGEN SATURATION: 94 % | SYSTOLIC BLOOD PRESSURE: 120 MMHG | HEART RATE: 60 BPM | DIASTOLIC BLOOD PRESSURE: 70 MMHG | RESPIRATION RATE: 22 BRPM

## 2021-01-01 VITALS
RESPIRATION RATE: 24 BRPM | OXYGEN SATURATION: 94 % | HEART RATE: 68 BPM | SYSTOLIC BLOOD PRESSURE: 120 MMHG | TEMPERATURE: 97.8 F | DIASTOLIC BLOOD PRESSURE: 78 MMHG

## 2021-01-01 VITALS
DIASTOLIC BLOOD PRESSURE: 70 MMHG | SYSTOLIC BLOOD PRESSURE: 120 MMHG | HEART RATE: 60 BPM | RESPIRATION RATE: 22 BRPM | OXYGEN SATURATION: 93 % | TEMPERATURE: 98 F

## 2021-01-01 VITALS
SYSTOLIC BLOOD PRESSURE: 120 MMHG | TEMPERATURE: 97.9 F | HEART RATE: 71 BPM | OXYGEN SATURATION: 95 % | RESPIRATION RATE: 22 BRPM | DIASTOLIC BLOOD PRESSURE: 80 MMHG

## 2021-01-01 VITALS
HEART RATE: 60 BPM | SYSTOLIC BLOOD PRESSURE: 120 MMHG | TEMPERATURE: 98.3 F | DIASTOLIC BLOOD PRESSURE: 80 MMHG | RESPIRATION RATE: 22 BRPM | OXYGEN SATURATION: 95 %

## 2021-01-01 VITALS
HEART RATE: 60 BPM | TEMPERATURE: 98 F | SYSTOLIC BLOOD PRESSURE: 116 MMHG | DIASTOLIC BLOOD PRESSURE: 70 MMHG | RESPIRATION RATE: 22 BRPM | OXYGEN SATURATION: 93 %

## 2021-01-01 VITALS
OXYGEN SATURATION: 96 % | SYSTOLIC BLOOD PRESSURE: 112 MMHG | RESPIRATION RATE: 22 BRPM | HEART RATE: 60 BPM | DIASTOLIC BLOOD PRESSURE: 70 MMHG | TEMPERATURE: 98.1 F

## 2021-01-01 VITALS
OXYGEN SATURATION: 91 % | SYSTOLIC BLOOD PRESSURE: 120 MMHG | HEART RATE: 60 BPM | RESPIRATION RATE: 24 BRPM | DIASTOLIC BLOOD PRESSURE: 72 MMHG | TEMPERATURE: 98.4 F

## 2021-01-01 VITALS
RESPIRATION RATE: 22 BRPM | DIASTOLIC BLOOD PRESSURE: 60 MMHG | RESPIRATION RATE: 22 BRPM | OXYGEN SATURATION: 96 % | HEART RATE: 60 BPM | TEMPERATURE: 98.1 F | DIASTOLIC BLOOD PRESSURE: 70 MMHG | HEART RATE: 60 BPM | SYSTOLIC BLOOD PRESSURE: 112 MMHG | SYSTOLIC BLOOD PRESSURE: 118 MMHG | OXYGEN SATURATION: 94 % | TEMPERATURE: 98.2 F

## 2021-01-01 VITALS
SYSTOLIC BLOOD PRESSURE: 120 MMHG | OXYGEN SATURATION: 93 % | TEMPERATURE: 98.5 F | HEART RATE: 60 BPM | RESPIRATION RATE: 22 BRPM | DIASTOLIC BLOOD PRESSURE: 80 MMHG

## 2021-01-01 VITALS
DIASTOLIC BLOOD PRESSURE: 70 MMHG | HEART RATE: 60 BPM | OXYGEN SATURATION: 97 % | TEMPERATURE: 98.3 F | SYSTOLIC BLOOD PRESSURE: 120 MMHG | RESPIRATION RATE: 22 BRPM

## 2021-01-01 VITALS
TEMPERATURE: 98.6 F | DIASTOLIC BLOOD PRESSURE: 70 MMHG | RESPIRATION RATE: 24 BRPM | OXYGEN SATURATION: 92 % | SYSTOLIC BLOOD PRESSURE: 118 MMHG | HEART RATE: 61 BPM

## 2021-01-01 VITALS
HEART RATE: 60 BPM | TEMPERATURE: 98.4 F | SYSTOLIC BLOOD PRESSURE: 118 MMHG | OXYGEN SATURATION: 95 % | DIASTOLIC BLOOD PRESSURE: 60 MMHG | RESPIRATION RATE: 22 BRPM

## 2021-01-01 VITALS
RESPIRATION RATE: 22 BRPM | OXYGEN SATURATION: 94 % | HEART RATE: 60 BPM | TEMPERATURE: 98.3 F | DIASTOLIC BLOOD PRESSURE: 60 MMHG | SYSTOLIC BLOOD PRESSURE: 110 MMHG

## 2021-01-01 VITALS
OXYGEN SATURATION: 93 % | RESPIRATION RATE: 22 BRPM | HEART RATE: 60 BPM | DIASTOLIC BLOOD PRESSURE: 80 MMHG | TEMPERATURE: 98.8 F | SYSTOLIC BLOOD PRESSURE: 122 MMHG

## 2021-01-01 DIAGNOSIS — Z99.81 CHRONIC RESPIRATORY FAILURE WITH HYPOXIA, ON HOME OXYGEN THERAPY (HCC): Chronic | ICD-10-CM

## 2021-01-01 DIAGNOSIS — I27.21 MODERATE PULMONARY ARTERIAL SYSTOLIC HYPERTENSION (HCC): Chronic | ICD-10-CM

## 2021-01-01 DIAGNOSIS — L97.321 VENOUS STASIS ULCER OF LEFT ANKLE LIMITED TO BREAKDOWN OF SKIN WITHOUT VARICOSE VEINS (HCC): ICD-10-CM

## 2021-01-01 DIAGNOSIS — I87.2 VENOUS STASIS ULCER OF LEFT ANKLE LIMITED TO BREAKDOWN OF SKIN WITHOUT VARICOSE VEINS (HCC): ICD-10-CM

## 2021-01-01 DIAGNOSIS — Z23 NEEDS FLU SHOT: ICD-10-CM

## 2021-01-01 DIAGNOSIS — E87.1 HYPONATREMIA: Chronic | ICD-10-CM

## 2021-01-01 DIAGNOSIS — I73.9 PVD (PERIPHERAL VASCULAR DISEASE) (HCC): Chronic | ICD-10-CM

## 2021-01-01 DIAGNOSIS — I10 ESSENTIAL HYPERTENSION: Chronic | ICD-10-CM

## 2021-01-01 DIAGNOSIS — J96.11 CHRONIC RESPIRATORY FAILURE WITH HYPOXIA, ON HOME OXYGEN THERAPY (HCC): Chronic | ICD-10-CM

## 2021-01-01 DIAGNOSIS — F41.9 ANXIETY: ICD-10-CM

## 2021-01-01 DIAGNOSIS — J44.1 CHRONIC OBSTRUCTIVE PULMONARY DISEASE WITH ACUTE EXACERBATION (HCC): Primary | Chronic | ICD-10-CM

## 2021-01-01 LAB
ALBUMIN SERPL-MCNC: 3.7 G/DL (ref 3.5–5)
ALBUMIN/GLOB SERPL: 1.1 {RATIO} (ref 1.1–2.2)
ALP SERPL-CCNC: 60 U/L (ref 45–117)
ALT SERPL-CCNC: 12 U/L (ref 12–78)
ANION GAP SERPL CALC-SCNC: 5 MMOL/L (ref 5–15)
AST SERPL-CCNC: 12 U/L (ref 15–37)
BASOPHILS # BLD: 0.1 K/UL (ref 0–0.1)
BASOPHILS NFR BLD: 1 % (ref 0–1)
BILIRUB SERPL-MCNC: 0.4 MG/DL (ref 0.2–1)
BUN SERPL-MCNC: 29 MG/DL (ref 6–20)
BUN/CREAT SERPL: 27 (ref 12–20)
CALCIUM SERPL-MCNC: 9.4 MG/DL (ref 8.5–10.1)
CHLORIDE SERPL-SCNC: 103 MMOL/L (ref 97–108)
CHOLEST SERPL-MCNC: 167 MG/DL
CO2 SERPL-SCNC: 31 MMOL/L (ref 21–32)
COMMENT, HOLDF: NORMAL
CREAT SERPL-MCNC: 1.08 MG/DL (ref 0.55–1.02)
DIFFERENTIAL METHOD BLD: ABNORMAL
EOSINOPHIL # BLD: 0.4 K/UL (ref 0–0.4)
EOSINOPHIL NFR BLD: 5 % (ref 0–7)
ERYTHROCYTE [DISTWIDTH] IN BLOOD BY AUTOMATED COUNT: 14.7 % (ref 11.5–14.5)
GLOBULIN SER CALC-MCNC: 3.4 G/DL (ref 2–4)
GLUCOSE SERPL-MCNC: 91 MG/DL (ref 65–100)
HCT VFR BLD AUTO: 41.6 % (ref 35–47)
HDLC SERPL-MCNC: 81 MG/DL
HDLC SERPL: 2.1 {RATIO} (ref 0–5)
HGB BLD-MCNC: 12.4 G/DL (ref 11.5–16)
IMM GRANULOCYTES # BLD AUTO: 0.1 K/UL (ref 0–0.04)
IMM GRANULOCYTES NFR BLD AUTO: 1 % (ref 0–0.5)
LDLC SERPL CALC-MCNC: 59.6 MG/DL (ref 0–100)
LYMPHOCYTES # BLD: 0.9 K/UL (ref 0.8–3.5)
LYMPHOCYTES NFR BLD: 12 % (ref 12–49)
MCH RBC QN AUTO: 29.6 PG (ref 26–34)
MCHC RBC AUTO-ENTMCNC: 29.8 G/DL (ref 30–36.5)
MCV RBC AUTO: 99.3 FL (ref 80–99)
MONOCYTES # BLD: 0.6 K/UL (ref 0–1)
MONOCYTES NFR BLD: 7 % (ref 5–13)
NEUTS SEG # BLD: 5.7 K/UL (ref 1.8–8)
NEUTS SEG NFR BLD: 74 % (ref 32–75)
NRBC # BLD: 0 K/UL (ref 0–0.01)
NRBC BLD-RTO: 0 PER 100 WBC
PLATELET # BLD AUTO: 244 K/UL (ref 150–400)
PMV BLD AUTO: 10 FL (ref 8.9–12.9)
POTASSIUM SERPL-SCNC: 5.3 MMOL/L (ref 3.5–5.1)
PROT SERPL-MCNC: 7.1 G/DL (ref 6.4–8.2)
RBC # BLD AUTO: 4.19 M/UL (ref 3.8–5.2)
SAMPLES BEING HELD,HOLD: NORMAL
SARS-COV-2: NOT DETECTED
SODIUM SERPL-SCNC: 139 MMOL/L (ref 136–145)
TRIGL SERPL-MCNC: 132 MG/DL (ref ?–150)
TSH SERPL DL<=0.05 MIU/L-ACNC: 2.52 UIU/ML (ref 0.36–3.74)
VLDLC SERPL CALC-MCNC: 26.4 MG/DL
WBC # BLD AUTO: 7.7 K/UL (ref 3.6–11)

## 2021-01-01 PROCEDURE — G0299 HHS/HOSPICE OF RN EA 15 MIN: HCPCS

## 2021-01-01 PROCEDURE — 3331090002 HH PPS REVENUE DEBIT

## 2021-01-01 PROCEDURE — A6252 ABSORPT DRG >16 <=48 W/O BDR: HCPCS

## 2021-01-01 PROCEDURE — 3331090001 HH PPS REVENUE CREDIT

## 2021-01-01 PROCEDURE — 3331090003 HH PPS REVENUE ADJ

## 2021-01-01 PROCEDURE — 400018 HH-NO PAY CLAIM PROCEDURE

## 2021-01-01 PROCEDURE — G8510 SCR DEP NEG, NO PLAN REQD: HCPCS | Performed by: INTERNAL MEDICINE

## 2021-01-01 PROCEDURE — G0156 HHCP-SVS OF AIDE,EA 15 MIN: HCPCS

## 2021-01-01 PROCEDURE — 400014 HH F/U

## 2021-01-01 PROCEDURE — A6446 CONFORM BAND S W>=3" <5"/YD: HCPCS

## 2021-01-01 PROCEDURE — A6197 ALGINATE DRSG >16 <=48 SQ IN: HCPCS

## 2021-01-01 PROCEDURE — A9270 NON-COVERED ITEM OR SERVICE: HCPCS

## 2021-01-01 PROCEDURE — 90694 VACC AIIV4 NO PRSRV 0.5ML IM: CPT | Performed by: INTERNAL MEDICINE

## 2021-01-01 PROCEDURE — G8427 DOCREV CUR MEDS BY ELIG CLIN: HCPCS | Performed by: INTERNAL MEDICINE

## 2021-01-01 PROCEDURE — 1090F PRES/ABSN URINE INCON ASSESS: CPT | Performed by: INTERNAL MEDICINE

## 2021-01-01 PROCEDURE — A6449 LT COMPRES BAND >=3" <5"/YD: HCPCS

## 2021-01-01 PROCEDURE — G8420 CALC BMI NORM PARAMETERS: HCPCS | Performed by: INTERNAL MEDICINE

## 2021-01-01 PROCEDURE — A6216 NON-STERILE GAUZE<=16 SQ IN: HCPCS

## 2021-01-01 PROCEDURE — G0008 ADMIN INFLUENZA VIRUS VAC: HCPCS | Performed by: INTERNAL MEDICINE

## 2021-01-01 PROCEDURE — G8536 NO DOC ELDER MAL SCRN: HCPCS | Performed by: INTERNAL MEDICINE

## 2021-01-01 PROCEDURE — 1101F PT FALLS ASSESS-DOCD LE1/YR: CPT | Performed by: INTERNAL MEDICINE

## 2021-01-01 PROCEDURE — 99214 OFFICE O/P EST MOD 30 MIN: CPT | Performed by: INTERNAL MEDICINE

## 2021-01-01 RX ORDER — BUSPIRONE HYDROCHLORIDE 5 MG/1
TABLET ORAL
Qty: 30 TABLET | Refills: 0 | Status: SHIPPED | OUTPATIENT
Start: 2021-01-01 | End: 2022-01-01

## 2021-01-02 PROCEDURE — 3331090001 HH PPS REVENUE CREDIT

## 2021-01-02 PROCEDURE — 3331090002 HH PPS REVENUE DEBIT

## 2021-01-03 PROCEDURE — 3331090002 HH PPS REVENUE DEBIT

## 2021-01-03 PROCEDURE — 3331090001 HH PPS REVENUE CREDIT

## 2021-01-04 ENCOUNTER — HOME CARE VISIT (OUTPATIENT)
Dept: SCHEDULING | Facility: HOME HEALTH | Age: 86
End: 2021-01-04
Payer: MEDICARE

## 2021-01-04 PROCEDURE — G0299 HHS/HOSPICE OF RN EA 15 MIN: HCPCS

## 2021-01-04 PROCEDURE — 3331090001 HH PPS REVENUE CREDIT

## 2021-01-04 PROCEDURE — 3331090002 HH PPS REVENUE DEBIT

## 2021-01-05 VITALS
DIASTOLIC BLOOD PRESSURE: 70 MMHG | OXYGEN SATURATION: 94 % | TEMPERATURE: 98.7 F | SYSTOLIC BLOOD PRESSURE: 110 MMHG | RESPIRATION RATE: 20 BRPM | HEART RATE: 60 BPM

## 2021-01-05 PROCEDURE — 3331090001 HH PPS REVENUE CREDIT

## 2021-01-05 PROCEDURE — 3331090002 HH PPS REVENUE DEBIT

## 2021-01-06 ENCOUNTER — HOME CARE VISIT (OUTPATIENT)
Dept: SCHEDULING | Facility: HOME HEALTH | Age: 86
End: 2021-01-06
Payer: MEDICARE

## 2021-01-06 VITALS
HEART RATE: 62 BPM | RESPIRATION RATE: 20 BRPM | TEMPERATURE: 98.2 F | OXYGEN SATURATION: 96 % | SYSTOLIC BLOOD PRESSURE: 122 MMHG | DIASTOLIC BLOOD PRESSURE: 60 MMHG

## 2021-01-06 PROCEDURE — 3331090002 HH PPS REVENUE DEBIT

## 2021-01-06 PROCEDURE — G0299 HHS/HOSPICE OF RN EA 15 MIN: HCPCS

## 2021-01-06 PROCEDURE — 3331090001 HH PPS REVENUE CREDIT

## 2021-01-07 ENCOUNTER — HOME CARE VISIT (OUTPATIENT)
Dept: SCHEDULING | Facility: HOME HEALTH | Age: 86
End: 2021-01-07
Payer: MEDICARE

## 2021-01-07 PROCEDURE — 3331090001 HH PPS REVENUE CREDIT

## 2021-01-07 PROCEDURE — 3331090002 HH PPS REVENUE DEBIT

## 2021-01-07 PROCEDURE — G0156 HHCP-SVS OF AIDE,EA 15 MIN: HCPCS

## 2021-01-08 ENCOUNTER — HOME CARE VISIT (OUTPATIENT)
Dept: HOME HEALTH SERVICES | Facility: HOME HEALTH | Age: 86
End: 2021-01-08
Payer: MEDICARE

## 2021-01-08 ENCOUNTER — HOME CARE VISIT (OUTPATIENT)
Dept: SCHEDULING | Facility: HOME HEALTH | Age: 86
End: 2021-01-08
Payer: MEDICARE

## 2021-01-08 VITALS
HEART RATE: 73 BPM | SYSTOLIC BLOOD PRESSURE: 110 MMHG | TEMPERATURE: 98.3 F | OXYGEN SATURATION: 95 % | DIASTOLIC BLOOD PRESSURE: 60 MMHG

## 2021-01-08 PROCEDURE — G0299 HHS/HOSPICE OF RN EA 15 MIN: HCPCS

## 2021-01-08 PROCEDURE — 3331090001 HH PPS REVENUE CREDIT

## 2021-01-08 PROCEDURE — 3331090002 HH PPS REVENUE DEBIT

## 2021-01-09 PROCEDURE — 3331090001 HH PPS REVENUE CREDIT

## 2021-01-09 PROCEDURE — 3331090002 HH PPS REVENUE DEBIT

## 2021-01-10 PROCEDURE — 3331090002 HH PPS REVENUE DEBIT

## 2021-01-10 PROCEDURE — 3331090001 HH PPS REVENUE CREDIT

## 2021-01-11 ENCOUNTER — HOME CARE VISIT (OUTPATIENT)
Dept: SCHEDULING | Facility: HOME HEALTH | Age: 86
End: 2021-01-11
Payer: MEDICARE

## 2021-01-11 PROCEDURE — 3331090001 HH PPS REVENUE CREDIT

## 2021-01-11 PROCEDURE — 3331090002 HH PPS REVENUE DEBIT

## 2021-01-11 PROCEDURE — G0299 HHS/HOSPICE OF RN EA 15 MIN: HCPCS

## 2021-01-12 VITALS
DIASTOLIC BLOOD PRESSURE: 70 MMHG | RESPIRATION RATE: 22 BRPM | HEART RATE: 56 BPM | TEMPERATURE: 98.1 F | OXYGEN SATURATION: 95 % | SYSTOLIC BLOOD PRESSURE: 130 MMHG

## 2021-01-12 PROCEDURE — 3331090002 HH PPS REVENUE DEBIT

## 2021-01-12 PROCEDURE — 3331090001 HH PPS REVENUE CREDIT

## 2021-01-13 ENCOUNTER — HOME CARE VISIT (OUTPATIENT)
Dept: SCHEDULING | Facility: HOME HEALTH | Age: 86
End: 2021-01-13
Payer: MEDICARE

## 2021-01-13 VITALS
RESPIRATION RATE: 22 BRPM | HEART RATE: 66 BPM | OXYGEN SATURATION: 96 % | SYSTOLIC BLOOD PRESSURE: 110 MMHG | DIASTOLIC BLOOD PRESSURE: 60 MMHG | TEMPERATURE: 98 F

## 2021-01-13 PROCEDURE — 3331090001 HH PPS REVENUE CREDIT

## 2021-01-13 PROCEDURE — G0299 HHS/HOSPICE OF RN EA 15 MIN: HCPCS

## 2021-01-13 PROCEDURE — 3331090002 HH PPS REVENUE DEBIT

## 2021-01-14 ENCOUNTER — HOME CARE VISIT (OUTPATIENT)
Dept: SCHEDULING | Facility: HOME HEALTH | Age: 86
End: 2021-01-14
Payer: MEDICARE

## 2021-01-14 PROCEDURE — 3331090001 HH PPS REVENUE CREDIT

## 2021-01-14 PROCEDURE — 3331090003 HH PPS REVENUE ADJ

## 2021-01-14 PROCEDURE — 3331090002 HH PPS REVENUE DEBIT

## 2021-01-14 PROCEDURE — G0156 HHCP-SVS OF AIDE,EA 15 MIN: HCPCS

## 2021-01-15 ENCOUNTER — HOME CARE VISIT (OUTPATIENT)
Dept: SCHEDULING | Facility: HOME HEALTH | Age: 86
End: 2021-01-15
Payer: MEDICARE

## 2021-01-15 PROCEDURE — 400014 HH F/U

## 2021-01-15 PROCEDURE — 400018 HH-NO PAY CLAIM PROCEDURE

## 2021-01-15 PROCEDURE — 3331090001 HH PPS REVENUE CREDIT

## 2021-01-15 PROCEDURE — 3331090002 HH PPS REVENUE DEBIT

## 2021-01-15 PROCEDURE — G0299 HHS/HOSPICE OF RN EA 15 MIN: HCPCS

## 2021-01-16 PROCEDURE — 3331090002 HH PPS REVENUE DEBIT

## 2021-01-16 PROCEDURE — 3331090001 HH PPS REVENUE CREDIT

## 2021-01-17 VITALS
OXYGEN SATURATION: 95 % | TEMPERATURE: 98.8 F | RESPIRATION RATE: 22 BRPM | SYSTOLIC BLOOD PRESSURE: 120 MMHG | HEART RATE: 66 BPM | DIASTOLIC BLOOD PRESSURE: 80 MMHG

## 2021-01-17 PROCEDURE — 3331090001 HH PPS REVENUE CREDIT

## 2021-01-17 PROCEDURE — 3331090002 HH PPS REVENUE DEBIT

## 2021-01-18 ENCOUNTER — HOME CARE VISIT (OUTPATIENT)
Dept: SCHEDULING | Facility: HOME HEALTH | Age: 86
End: 2021-01-18
Payer: MEDICARE

## 2021-01-18 VITALS
OXYGEN SATURATION: 95 % | SYSTOLIC BLOOD PRESSURE: 122 MMHG | TEMPERATURE: 98.6 F | HEART RATE: 60 BPM | DIASTOLIC BLOOD PRESSURE: 60 MMHG | RESPIRATION RATE: 20 BRPM

## 2021-01-18 PROCEDURE — G0299 HHS/HOSPICE OF RN EA 15 MIN: HCPCS

## 2021-01-18 PROCEDURE — 3331090002 HH PPS REVENUE DEBIT

## 2021-01-18 PROCEDURE — 3331090001 HH PPS REVENUE CREDIT

## 2021-01-18 RX ORDER — BUSPIRONE HYDROCHLORIDE 5 MG/1
TABLET ORAL
Qty: 30 TAB | Refills: 0 | Status: SHIPPED | OUTPATIENT
Start: 2021-01-18 | End: 2021-02-16

## 2021-01-19 PROCEDURE — 3331090001 HH PPS REVENUE CREDIT

## 2021-01-19 PROCEDURE — 3331090002 HH PPS REVENUE DEBIT

## 2021-01-20 ENCOUNTER — HOME CARE VISIT (OUTPATIENT)
Dept: SCHEDULING | Facility: HOME HEALTH | Age: 86
End: 2021-01-20
Payer: MEDICARE

## 2021-01-20 VITALS
RESPIRATION RATE: 22 BRPM | TEMPERATURE: 98.5 F | OXYGEN SATURATION: 95 % | DIASTOLIC BLOOD PRESSURE: 60 MMHG | SYSTOLIC BLOOD PRESSURE: 120 MMHG | HEART RATE: 52 BPM

## 2021-01-20 PROCEDURE — G0299 HHS/HOSPICE OF RN EA 15 MIN: HCPCS

## 2021-01-20 PROCEDURE — 3331090001 HH PPS REVENUE CREDIT

## 2021-01-20 PROCEDURE — G0156 HHCP-SVS OF AIDE,EA 15 MIN: HCPCS

## 2021-01-20 PROCEDURE — 3331090002 HH PPS REVENUE DEBIT

## 2021-01-21 PROCEDURE — 3331090002 HH PPS REVENUE DEBIT

## 2021-01-21 PROCEDURE — 3331090001 HH PPS REVENUE CREDIT

## 2021-01-22 ENCOUNTER — HOME CARE VISIT (OUTPATIENT)
Dept: SCHEDULING | Facility: HOME HEALTH | Age: 86
End: 2021-01-22
Payer: MEDICARE

## 2021-01-22 PROCEDURE — G0299 HHS/HOSPICE OF RN EA 15 MIN: HCPCS

## 2021-01-22 PROCEDURE — 3331090002 HH PPS REVENUE DEBIT

## 2021-01-22 PROCEDURE — 3331090001 HH PPS REVENUE CREDIT

## 2021-01-23 VITALS
TEMPERATURE: 98 F | RESPIRATION RATE: 22 BRPM | DIASTOLIC BLOOD PRESSURE: 64 MMHG | HEART RATE: 70 BPM | SYSTOLIC BLOOD PRESSURE: 110 MMHG | OXYGEN SATURATION: 95 %

## 2021-01-23 PROCEDURE — 3331090002 HH PPS REVENUE DEBIT

## 2021-01-23 PROCEDURE — 3331090001 HH PPS REVENUE CREDIT

## 2021-01-24 PROCEDURE — 3331090002 HH PPS REVENUE DEBIT

## 2021-01-24 PROCEDURE — 3331090001 HH PPS REVENUE CREDIT

## 2021-01-25 ENCOUNTER — HOME CARE VISIT (OUTPATIENT)
Dept: SCHEDULING | Facility: HOME HEALTH | Age: 86
End: 2021-01-25
Payer: MEDICARE

## 2021-01-25 VITALS
OXYGEN SATURATION: 94 % | RESPIRATION RATE: 20 BRPM | TEMPERATURE: 98.5 F | DIASTOLIC BLOOD PRESSURE: 70 MMHG | SYSTOLIC BLOOD PRESSURE: 120 MMHG | HEART RATE: 60 BPM

## 2021-01-25 PROCEDURE — G0299 HHS/HOSPICE OF RN EA 15 MIN: HCPCS

## 2021-01-25 PROCEDURE — 3331090001 HH PPS REVENUE CREDIT

## 2021-01-25 PROCEDURE — 3331090002 HH PPS REVENUE DEBIT

## 2021-01-26 PROCEDURE — 3331090001 HH PPS REVENUE CREDIT

## 2021-01-26 PROCEDURE — 3331090002 HH PPS REVENUE DEBIT

## 2021-01-27 ENCOUNTER — HOME CARE VISIT (OUTPATIENT)
Dept: SCHEDULING | Facility: HOME HEALTH | Age: 86
End: 2021-01-27
Payer: MEDICARE

## 2021-01-27 VITALS
RESPIRATION RATE: 22 BRPM | HEART RATE: 57 BPM | OXYGEN SATURATION: 94 % | DIASTOLIC BLOOD PRESSURE: 68 MMHG | SYSTOLIC BLOOD PRESSURE: 120 MMHG | TEMPERATURE: 98.9 F

## 2021-01-27 PROCEDURE — 3331090001 HH PPS REVENUE CREDIT

## 2021-01-27 PROCEDURE — G0299 HHS/HOSPICE OF RN EA 15 MIN: HCPCS

## 2021-01-27 PROCEDURE — 3331090002 HH PPS REVENUE DEBIT

## 2021-01-27 PROCEDURE — G0156 HHCP-SVS OF AIDE,EA 15 MIN: HCPCS

## 2021-01-28 PROCEDURE — 3331090002 HH PPS REVENUE DEBIT

## 2021-01-28 PROCEDURE — 3331090001 HH PPS REVENUE CREDIT

## 2021-01-29 ENCOUNTER — HOME CARE VISIT (OUTPATIENT)
Dept: SCHEDULING | Facility: HOME HEALTH | Age: 86
End: 2021-01-29
Payer: MEDICARE

## 2021-01-29 VITALS
DIASTOLIC BLOOD PRESSURE: 80 MMHG | OXYGEN SATURATION: 95 % | HEART RATE: 66 BPM | TEMPERATURE: 98.4 F | SYSTOLIC BLOOD PRESSURE: 120 MMHG

## 2021-01-29 PROCEDURE — 3331090001 HH PPS REVENUE CREDIT

## 2021-01-29 PROCEDURE — G0299 HHS/HOSPICE OF RN EA 15 MIN: HCPCS

## 2021-01-29 PROCEDURE — 3331090002 HH PPS REVENUE DEBIT

## 2021-01-30 PROCEDURE — 3331090002 HH PPS REVENUE DEBIT

## 2021-01-30 PROCEDURE — 3331090001 HH PPS REVENUE CREDIT

## 2021-01-31 PROCEDURE — 3331090002 HH PPS REVENUE DEBIT

## 2021-01-31 PROCEDURE — 3331090001 HH PPS REVENUE CREDIT

## 2021-02-01 ENCOUNTER — HOME CARE VISIT (OUTPATIENT)
Dept: SCHEDULING | Facility: HOME HEALTH | Age: 86
End: 2021-02-01
Payer: MEDICARE

## 2021-02-01 PROCEDURE — 3331090001 HH PPS REVENUE CREDIT

## 2021-02-01 PROCEDURE — G0299 HHS/HOSPICE OF RN EA 15 MIN: HCPCS

## 2021-02-01 PROCEDURE — 3331090002 HH PPS REVENUE DEBIT

## 2021-02-02 VITALS
HEART RATE: 67 BPM | SYSTOLIC BLOOD PRESSURE: 114 MMHG | TEMPERATURE: 98.8 F | DIASTOLIC BLOOD PRESSURE: 80 MMHG | RESPIRATION RATE: 22 BRPM | OXYGEN SATURATION: 95 %

## 2021-02-02 PROCEDURE — 3331090001 HH PPS REVENUE CREDIT

## 2021-02-02 PROCEDURE — 3331090002 HH PPS REVENUE DEBIT

## 2021-02-03 ENCOUNTER — HOME CARE VISIT (OUTPATIENT)
Dept: SCHEDULING | Facility: HOME HEALTH | Age: 86
End: 2021-02-03
Payer: MEDICARE

## 2021-02-03 VITALS
TEMPERATURE: 98.9 F | HEART RATE: 59 BPM | SYSTOLIC BLOOD PRESSURE: 130 MMHG | OXYGEN SATURATION: 94 % | RESPIRATION RATE: 22 BRPM | DIASTOLIC BLOOD PRESSURE: 70 MMHG

## 2021-02-03 PROCEDURE — 3331090002 HH PPS REVENUE DEBIT

## 2021-02-03 PROCEDURE — 3331090001 HH PPS REVENUE CREDIT

## 2021-02-03 PROCEDURE — G0299 HHS/HOSPICE OF RN EA 15 MIN: HCPCS

## 2021-02-04 ENCOUNTER — HOME CARE VISIT (OUTPATIENT)
Dept: SCHEDULING | Facility: HOME HEALTH | Age: 86
End: 2021-02-04
Payer: MEDICARE

## 2021-02-04 PROCEDURE — 3331090002 HH PPS REVENUE DEBIT

## 2021-02-04 PROCEDURE — G0156 HHCP-SVS OF AIDE,EA 15 MIN: HCPCS

## 2021-02-04 PROCEDURE — 3331090001 HH PPS REVENUE CREDIT

## 2021-02-05 ENCOUNTER — HOME CARE VISIT (OUTPATIENT)
Dept: SCHEDULING | Facility: HOME HEALTH | Age: 86
End: 2021-02-05
Payer: MEDICARE

## 2021-02-05 VITALS
OXYGEN SATURATION: 94 % | DIASTOLIC BLOOD PRESSURE: 80 MMHG | TEMPERATURE: 98.1 F | SYSTOLIC BLOOD PRESSURE: 120 MMHG | HEART RATE: 72 BPM | RESPIRATION RATE: 22 BRPM

## 2021-02-05 PROCEDURE — A6252 ABSORPT DRG >16 <=48 W/O BDR: HCPCS

## 2021-02-05 PROCEDURE — A6197 ALGINATE DRSG >16 <=48 SQ IN: HCPCS

## 2021-02-05 PROCEDURE — G0299 HHS/HOSPICE OF RN EA 15 MIN: HCPCS

## 2021-02-05 PROCEDURE — 3331090002 HH PPS REVENUE DEBIT

## 2021-02-05 PROCEDURE — A9270 NON-COVERED ITEM OR SERVICE: HCPCS

## 2021-02-05 PROCEDURE — 3331090001 HH PPS REVENUE CREDIT

## 2021-02-06 PROCEDURE — 3331090001 HH PPS REVENUE CREDIT

## 2021-02-06 PROCEDURE — 3331090002 HH PPS REVENUE DEBIT

## 2021-02-07 PROCEDURE — 3331090001 HH PPS REVENUE CREDIT

## 2021-02-07 PROCEDURE — 3331090002 HH PPS REVENUE DEBIT

## 2021-02-08 ENCOUNTER — TELEPHONE (OUTPATIENT)
Dept: INTERNAL MEDICINE CLINIC | Age: 86
End: 2021-02-08

## 2021-02-08 ENCOUNTER — HOME CARE VISIT (OUTPATIENT)
Dept: SCHEDULING | Facility: HOME HEALTH | Age: 86
End: 2021-02-08
Payer: MEDICARE

## 2021-02-08 VITALS
SYSTOLIC BLOOD PRESSURE: 120 MMHG | OXYGEN SATURATION: 95 % | HEART RATE: 57 BPM | DIASTOLIC BLOOD PRESSURE: 80 MMHG | RESPIRATION RATE: 22 BRPM | TEMPERATURE: 98.6 F

## 2021-02-08 DIAGNOSIS — J44.1 CHRONIC OBSTRUCTIVE PULMONARY DISEASE WITH ACUTE EXACERBATION (HCC): Primary | Chronic | ICD-10-CM

## 2021-02-08 PROCEDURE — 3331090001 HH PPS REVENUE CREDIT

## 2021-02-08 PROCEDURE — G0299 HHS/HOSPICE OF RN EA 15 MIN: HCPCS

## 2021-02-08 PROCEDURE — 3331090002 HH PPS REVENUE DEBIT

## 2021-02-09 PROCEDURE — 3331090002 HH PPS REVENUE DEBIT

## 2021-02-09 PROCEDURE — 3331090001 HH PPS REVENUE CREDIT

## 2021-02-10 ENCOUNTER — HOME CARE VISIT (OUTPATIENT)
Dept: SCHEDULING | Facility: HOME HEALTH | Age: 86
End: 2021-02-10
Payer: MEDICARE

## 2021-02-10 VITALS
RESPIRATION RATE: 22 BRPM | HEART RATE: 72 BPM | DIASTOLIC BLOOD PRESSURE: 80 MMHG | OXYGEN SATURATION: 95 % | SYSTOLIC BLOOD PRESSURE: 120 MMHG | TEMPERATURE: 98.6 F

## 2021-02-10 PROCEDURE — G0151 HHCP-SERV OF PT,EA 15 MIN: HCPCS

## 2021-02-10 PROCEDURE — 3331090001 HH PPS REVENUE CREDIT

## 2021-02-10 PROCEDURE — G0299 HHS/HOSPICE OF RN EA 15 MIN: HCPCS

## 2021-02-10 PROCEDURE — 3331090002 HH PPS REVENUE DEBIT

## 2021-02-11 ENCOUNTER — HOME CARE VISIT (OUTPATIENT)
Dept: SCHEDULING | Facility: HOME HEALTH | Age: 86
End: 2021-02-11
Payer: MEDICARE

## 2021-02-11 PROCEDURE — 3331090001 HH PPS REVENUE CREDIT

## 2021-02-11 PROCEDURE — 3331090002 HH PPS REVENUE DEBIT

## 2021-02-11 PROCEDURE — G0156 HHCP-SVS OF AIDE,EA 15 MIN: HCPCS

## 2021-02-12 ENCOUNTER — HOME CARE VISIT (OUTPATIENT)
Dept: SCHEDULING | Facility: HOME HEALTH | Age: 86
End: 2021-02-12
Payer: MEDICARE

## 2021-02-12 VITALS
TEMPERATURE: 98.3 F | RESPIRATION RATE: 22 BRPM | DIASTOLIC BLOOD PRESSURE: 70 MMHG | OXYGEN SATURATION: 95 % | HEART RATE: 79 BPM | SYSTOLIC BLOOD PRESSURE: 122 MMHG

## 2021-02-12 PROCEDURE — 3331090001 HH PPS REVENUE CREDIT

## 2021-02-12 PROCEDURE — 3331090002 HH PPS REVENUE DEBIT

## 2021-02-12 PROCEDURE — G0299 HHS/HOSPICE OF RN EA 15 MIN: HCPCS

## 2021-02-13 PROCEDURE — 3331090003 HH PPS REVENUE ADJ

## 2021-02-13 PROCEDURE — 3331090002 HH PPS REVENUE DEBIT

## 2021-02-13 PROCEDURE — 3331090001 HH PPS REVENUE CREDIT

## 2021-02-14 PROCEDURE — 3331090001 HH PPS REVENUE CREDIT

## 2021-02-14 PROCEDURE — 400018 HH-NO PAY CLAIM PROCEDURE

## 2021-02-14 PROCEDURE — 3331090002 HH PPS REVENUE DEBIT

## 2021-02-15 ENCOUNTER — HOME CARE VISIT (OUTPATIENT)
Dept: SCHEDULING | Facility: HOME HEALTH | Age: 86
End: 2021-02-15
Payer: MEDICARE

## 2021-02-15 VITALS
TEMPERATURE: 98.8 F | OXYGEN SATURATION: 93 % | DIASTOLIC BLOOD PRESSURE: 72 MMHG | HEART RATE: 78 BPM | SYSTOLIC BLOOD PRESSURE: 120 MMHG

## 2021-02-15 VITALS
HEART RATE: 82 BPM | OXYGEN SATURATION: 94 % | SYSTOLIC BLOOD PRESSURE: 122 MMHG | DIASTOLIC BLOOD PRESSURE: 80 MMHG | RESPIRATION RATE: 22 BRPM | TEMPERATURE: 98 F

## 2021-02-15 PROCEDURE — 3331090002 HH PPS REVENUE DEBIT

## 2021-02-15 PROCEDURE — G0299 HHS/HOSPICE OF RN EA 15 MIN: HCPCS

## 2021-02-15 PROCEDURE — 3331090001 HH PPS REVENUE CREDIT

## 2021-02-15 PROCEDURE — 400014 HH F/U

## 2021-02-15 PROCEDURE — G0157 HHC PT ASSISTANT EA 15: HCPCS

## 2021-02-16 PROCEDURE — 3331090001 HH PPS REVENUE CREDIT

## 2021-02-16 PROCEDURE — 3331090002 HH PPS REVENUE DEBIT

## 2021-02-16 RX ORDER — BUSPIRONE HYDROCHLORIDE 5 MG/1
TABLET ORAL
Qty: 30 TAB | Refills: 0 | Status: SHIPPED | OUTPATIENT
Start: 2021-02-16 | End: 2021-01-01

## 2021-02-17 ENCOUNTER — HOME CARE VISIT (OUTPATIENT)
Dept: SCHEDULING | Facility: HOME HEALTH | Age: 86
End: 2021-02-17
Payer: MEDICARE

## 2021-02-17 PROCEDURE — 3331090002 HH PPS REVENUE DEBIT

## 2021-02-17 PROCEDURE — 3331090001 HH PPS REVENUE CREDIT

## 2021-02-17 PROCEDURE — G0299 HHS/HOSPICE OF RN EA 15 MIN: HCPCS

## 2021-02-18 VITALS
OXYGEN SATURATION: 94 % | HEART RATE: 71 BPM | TEMPERATURE: 98 F | DIASTOLIC BLOOD PRESSURE: 80 MMHG | RESPIRATION RATE: 22 BRPM | SYSTOLIC BLOOD PRESSURE: 120 MMHG

## 2021-02-18 PROCEDURE — 3331090002 HH PPS REVENUE DEBIT

## 2021-02-18 PROCEDURE — 3331090001 HH PPS REVENUE CREDIT

## 2021-02-19 ENCOUNTER — HOME CARE VISIT (OUTPATIENT)
Dept: HOME HEALTH SERVICES | Facility: HOME HEALTH | Age: 86
End: 2021-02-19
Payer: MEDICARE

## 2021-02-19 PROCEDURE — G0156 HHCP-SVS OF AIDE,EA 15 MIN: HCPCS

## 2021-02-19 PROCEDURE — 3331090001 HH PPS REVENUE CREDIT

## 2021-02-19 PROCEDURE — 3331090002 HH PPS REVENUE DEBIT

## 2021-02-20 ENCOUNTER — HOME CARE VISIT (OUTPATIENT)
Dept: SCHEDULING | Facility: HOME HEALTH | Age: 86
End: 2021-02-20
Payer: MEDICARE

## 2021-02-20 VITALS
TEMPERATURE: 98.4 F | DIASTOLIC BLOOD PRESSURE: 76 MMHG | HEART RATE: 70 BPM | OXYGEN SATURATION: 95 % | SYSTOLIC BLOOD PRESSURE: 118 MMHG | RESPIRATION RATE: 22 BRPM

## 2021-02-20 PROCEDURE — 3331090001 HH PPS REVENUE CREDIT

## 2021-02-20 PROCEDURE — 3331090002 HH PPS REVENUE DEBIT

## 2021-02-20 PROCEDURE — G0299 HHS/HOSPICE OF RN EA 15 MIN: HCPCS

## 2021-02-21 PROCEDURE — 3331090002 HH PPS REVENUE DEBIT

## 2021-02-21 PROCEDURE — 3331090001 HH PPS REVENUE CREDIT

## 2021-02-22 ENCOUNTER — HOME CARE VISIT (OUTPATIENT)
Dept: SCHEDULING | Facility: HOME HEALTH | Age: 86
End: 2021-02-22
Payer: MEDICARE

## 2021-02-22 VITALS
SYSTOLIC BLOOD PRESSURE: 130 MMHG | RESPIRATION RATE: 24 BRPM | OXYGEN SATURATION: 95 % | HEART RATE: 55 BPM | DIASTOLIC BLOOD PRESSURE: 70 MMHG | TEMPERATURE: 98.2 F

## 2021-02-22 PROCEDURE — G0299 HHS/HOSPICE OF RN EA 15 MIN: HCPCS

## 2021-02-22 PROCEDURE — 3331090001 HH PPS REVENUE CREDIT

## 2021-02-22 PROCEDURE — 3331090002 HH PPS REVENUE DEBIT

## 2021-02-22 NOTE — PROGRESS NOTES
Pt declined visit stating her breathing wasn't good today and she didn't think she could tolerate ex.

## 2021-02-23 ENCOUNTER — HOME CARE VISIT (OUTPATIENT)
Dept: SCHEDULING | Facility: HOME HEALTH | Age: 86
End: 2021-02-23
Payer: MEDICARE

## 2021-02-23 PROCEDURE — 3331090001 HH PPS REVENUE CREDIT

## 2021-02-23 PROCEDURE — G0157 HHC PT ASSISTANT EA 15: HCPCS

## 2021-02-23 PROCEDURE — 3331090002 HH PPS REVENUE DEBIT

## 2021-02-24 ENCOUNTER — HOME CARE VISIT (OUTPATIENT)
Dept: SCHEDULING | Facility: HOME HEALTH | Age: 86
End: 2021-02-24
Payer: MEDICARE

## 2021-02-24 VITALS
RESPIRATION RATE: 17 BRPM | HEART RATE: 71 BPM | SYSTOLIC BLOOD PRESSURE: 132 MMHG | OXYGEN SATURATION: 94 % | TEMPERATURE: 98.1 F | DIASTOLIC BLOOD PRESSURE: 80 MMHG

## 2021-02-24 VITALS
SYSTOLIC BLOOD PRESSURE: 120 MMHG | RESPIRATION RATE: 22 BRPM | TEMPERATURE: 98.2 F | HEART RATE: 64 BPM | DIASTOLIC BLOOD PRESSURE: 70 MMHG | OXYGEN SATURATION: 94 %

## 2021-02-24 PROCEDURE — 3331090002 HH PPS REVENUE DEBIT

## 2021-02-24 PROCEDURE — 3331090001 HH PPS REVENUE CREDIT

## 2021-02-24 PROCEDURE — G0299 HHS/HOSPICE OF RN EA 15 MIN: HCPCS

## 2021-02-25 ENCOUNTER — HOME CARE VISIT (OUTPATIENT)
Dept: SCHEDULING | Facility: HOME HEALTH | Age: 86
End: 2021-02-25
Payer: MEDICARE

## 2021-02-25 PROCEDURE — G0156 HHCP-SVS OF AIDE,EA 15 MIN: HCPCS

## 2021-02-25 PROCEDURE — G0157 HHC PT ASSISTANT EA 15: HCPCS

## 2021-02-25 PROCEDURE — G0299 HHS/HOSPICE OF RN EA 15 MIN: HCPCS

## 2021-02-25 PROCEDURE — 3331090002 HH PPS REVENUE DEBIT

## 2021-02-25 PROCEDURE — 3331090001 HH PPS REVENUE CREDIT

## 2021-02-26 ENCOUNTER — HOME CARE VISIT (OUTPATIENT)
Dept: SCHEDULING | Facility: HOME HEALTH | Age: 86
End: 2021-02-26
Payer: MEDICARE

## 2021-02-26 VITALS
HEART RATE: 62 BPM | OXYGEN SATURATION: 93 % | TEMPERATURE: 99.3 F | SYSTOLIC BLOOD PRESSURE: 122 MMHG | DIASTOLIC BLOOD PRESSURE: 70 MMHG

## 2021-02-26 VITALS
HEART RATE: 68 BPM | TEMPERATURE: 98.5 F | OXYGEN SATURATION: 94 % | TEMPERATURE: 98.2 F | HEART RATE: 60 BPM | SYSTOLIC BLOOD PRESSURE: 122 MMHG | DIASTOLIC BLOOD PRESSURE: 70 MMHG | OXYGEN SATURATION: 93 % | RESPIRATION RATE: 22 BRPM | DIASTOLIC BLOOD PRESSURE: 76 MMHG | RESPIRATION RATE: 22 BRPM | SYSTOLIC BLOOD PRESSURE: 120 MMHG

## 2021-02-26 PROCEDURE — 3331090002 HH PPS REVENUE DEBIT

## 2021-02-26 PROCEDURE — G0299 HHS/HOSPICE OF RN EA 15 MIN: HCPCS

## 2021-02-26 PROCEDURE — 3331090001 HH PPS REVENUE CREDIT

## 2021-02-27 PROCEDURE — 3331090002 HH PPS REVENUE DEBIT

## 2021-02-27 PROCEDURE — 3331090001 HH PPS REVENUE CREDIT

## 2021-02-28 PROCEDURE — 3331090001 HH PPS REVENUE CREDIT

## 2021-02-28 PROCEDURE — 3331090002 HH PPS REVENUE DEBIT

## 2021-03-01 ENCOUNTER — HOME CARE VISIT (OUTPATIENT)
Dept: SCHEDULING | Facility: HOME HEALTH | Age: 86
End: 2021-03-01
Payer: MEDICARE

## 2021-03-01 VITALS
HEART RATE: 66 BPM | RESPIRATION RATE: 22 BRPM | SYSTOLIC BLOOD PRESSURE: 114 MMHG | TEMPERATURE: 98.2 F | DIASTOLIC BLOOD PRESSURE: 70 MMHG | OXYGEN SATURATION: 94 %

## 2021-03-01 PROCEDURE — G0299 HHS/HOSPICE OF RN EA 15 MIN: HCPCS

## 2021-03-01 PROCEDURE — 3331090001 HH PPS REVENUE CREDIT

## 2021-03-01 PROCEDURE — 3331090002 HH PPS REVENUE DEBIT

## 2021-03-02 ENCOUNTER — HOME CARE VISIT (OUTPATIENT)
Dept: SCHEDULING | Facility: HOME HEALTH | Age: 86
End: 2021-03-02
Payer: MEDICARE

## 2021-03-02 PROCEDURE — G0157 HHC PT ASSISTANT EA 15: HCPCS

## 2021-03-02 PROCEDURE — 3331090002 HH PPS REVENUE DEBIT

## 2021-03-02 PROCEDURE — 3331090001 HH PPS REVENUE CREDIT

## 2021-03-03 ENCOUNTER — HOME CARE VISIT (OUTPATIENT)
Dept: SCHEDULING | Facility: HOME HEALTH | Age: 86
End: 2021-03-03
Payer: MEDICARE

## 2021-03-03 VITALS
OXYGEN SATURATION: 94 % | DIASTOLIC BLOOD PRESSURE: 82 MMHG | HEART RATE: 72 BPM | TEMPERATURE: 98.4 F | SYSTOLIC BLOOD PRESSURE: 152 MMHG

## 2021-03-03 VITALS
RESPIRATION RATE: 22 BRPM | HEART RATE: 66 BPM | TEMPERATURE: 98.6 F | DIASTOLIC BLOOD PRESSURE: 70 MMHG | SYSTOLIC BLOOD PRESSURE: 118 MMHG | OXYGEN SATURATION: 94 %

## 2021-03-03 PROCEDURE — 3331090001 HH PPS REVENUE CREDIT

## 2021-03-03 PROCEDURE — A6216 NON-STERILE GAUZE<=16 SQ IN: HCPCS

## 2021-03-03 PROCEDURE — 3331090002 HH PPS REVENUE DEBIT

## 2021-03-03 PROCEDURE — G0299 HHS/HOSPICE OF RN EA 15 MIN: HCPCS

## 2021-03-03 PROCEDURE — A6197 ALGINATE DRSG >16 <=48 SQ IN: HCPCS

## 2021-03-03 PROCEDURE — A6252 ABSORPT DRG >16 <=48 W/O BDR: HCPCS

## 2021-03-04 ENCOUNTER — HOME CARE VISIT (OUTPATIENT)
Dept: SCHEDULING | Facility: HOME HEALTH | Age: 86
End: 2021-03-04
Payer: MEDICARE

## 2021-03-04 PROCEDURE — G0156 HHCP-SVS OF AIDE,EA 15 MIN: HCPCS

## 2021-03-04 PROCEDURE — 3331090001 HH PPS REVENUE CREDIT

## 2021-03-04 PROCEDURE — 3331090002 HH PPS REVENUE DEBIT

## 2021-03-04 PROCEDURE — G0157 HHC PT ASSISTANT EA 15: HCPCS

## 2021-03-05 ENCOUNTER — HOME CARE VISIT (OUTPATIENT)
Dept: SCHEDULING | Facility: HOME HEALTH | Age: 86
End: 2021-03-05
Payer: MEDICARE

## 2021-03-05 VITALS
SYSTOLIC BLOOD PRESSURE: 130 MMHG | OXYGEN SATURATION: 94 % | RESPIRATION RATE: 22 BRPM | TEMPERATURE: 98.1 F | DIASTOLIC BLOOD PRESSURE: 64 MMHG | HEART RATE: 63 BPM

## 2021-03-05 VITALS
TEMPERATURE: 98.1 F | OXYGEN SATURATION: 94 % | HEART RATE: 71 BPM | SYSTOLIC BLOOD PRESSURE: 132 MMHG | DIASTOLIC BLOOD PRESSURE: 70 MMHG

## 2021-03-05 PROCEDURE — G0299 HHS/HOSPICE OF RN EA 15 MIN: HCPCS

## 2021-03-05 PROCEDURE — 3331090001 HH PPS REVENUE CREDIT

## 2021-03-05 PROCEDURE — 3331090002 HH PPS REVENUE DEBIT

## 2021-03-06 PROCEDURE — 3331090002 HH PPS REVENUE DEBIT

## 2021-03-06 PROCEDURE — 3331090001 HH PPS REVENUE CREDIT

## 2021-03-07 PROCEDURE — 3331090001 HH PPS REVENUE CREDIT

## 2021-03-07 PROCEDURE — 3331090002 HH PPS REVENUE DEBIT

## 2021-03-08 ENCOUNTER — HOME CARE VISIT (OUTPATIENT)
Dept: SCHEDULING | Facility: HOME HEALTH | Age: 86
End: 2021-03-08
Payer: MEDICARE

## 2021-03-08 PROCEDURE — G0299 HHS/HOSPICE OF RN EA 15 MIN: HCPCS

## 2021-03-08 PROCEDURE — 3331090001 HH PPS REVENUE CREDIT

## 2021-03-08 PROCEDURE — G0157 HHC PT ASSISTANT EA 15: HCPCS

## 2021-03-08 PROCEDURE — 3331090002 HH PPS REVENUE DEBIT

## 2021-03-09 VITALS
SYSTOLIC BLOOD PRESSURE: 124 MMHG | OXYGEN SATURATION: 94 % | DIASTOLIC BLOOD PRESSURE: 78 MMHG | TEMPERATURE: 98 F | HEART RATE: 72 BPM

## 2021-03-09 VITALS
HEART RATE: 65 BPM | DIASTOLIC BLOOD PRESSURE: 60 MMHG | SYSTOLIC BLOOD PRESSURE: 122 MMHG | OXYGEN SATURATION: 94 % | TEMPERATURE: 98.6 F | RESPIRATION RATE: 22 BRPM

## 2021-03-09 PROCEDURE — 3331090001 HH PPS REVENUE CREDIT

## 2021-03-09 PROCEDURE — 3331090002 HH PPS REVENUE DEBIT

## 2021-03-09 NOTE — PROGRESS NOTES
Problem: Mobility Impaired (Adult and Pediatric)  Goal: *Acute Goals and Plan of Care (Insert Text)  Description  FUNCTIONAL STATUS PRIOR TO ADMISSION: Patient was modified independent using a rolling walker for functional mobility. HOME SUPPORT PRIOR TO ADMISSION: The patient lived alone in 1 story home. Family may be able to help if needed. But patient has been very independent at home and in community. Physical Therapy Goals  Initiated 12/16/2019  1. Patient will move from supine to sit and sit to supine  in bed with independence within 7 day(s). 2.  Patient will transfer from bed to chair and chair to bed with modified independence using the least restrictive device within 7 day(s). 3.  Patient will perform sit to stand with independence within 7 day(s). 4.  Patient will ambulate with modified independence for 150 feet with the least restrictive device within 7 day(s). 5.  Patient will ascend/descend 4 stairs with 1 handrail(s) with standby within 7 day(s). Outcome: Progressing Towards Goal   PHYSICAL THERAPY TREATMENT  Patient: Keshia Adam (80 y.o. female)  Date: 12/19/2019  Diagnosis: COPD (chronic obstructive pulmonary disease) ([facility] [J44.9]   <principal problem not specified>       Precautions: Fall  Chart, physical therapy assessment, plan of care and goals were reviewed. ASSESSMENT  Patient continues with skilled PT services and is progressing towards goals. Pt presents with decreased strength and endurance. Pt reported having increased SOB but agreeable to therapy. Pt on 2LPM on arrival. Pt able to stand at RW at [facility]/SBA x5 mins for hygiene. Pt with GODINEZ with standing but with no LOB. Pts o2 stats at 91% after standing. Pt able to ambulate after a seated rest break. Pt ambulated 40ft with RW at [facility]. Pt getting fatigued with decreased safety awareness when fatigued sitting prematurely before getting all the way back to chair. Pts o2 stats at 95% although very SOB.  Pt educated on safety and sitting taking rest breaks before getting to exhausted. Pt with understanding. .     Current Level of Function Impacting Discharge (mobility/balance): sit to stand transfer at [facility], ambulation at [facility]    Other factors to consider for discharge: decreased endurance, needs increased supervision           PLAN :  Patient continues to benefit from skilled intervention to address the above impairments. Continue treatment per established plan of care. to address goals. Recommendation for discharge: (in order for the patient to meet his/her long term goals)  Therapy up to 5 days/week in SNF setting or intensive home health therapy program with supervision     This discharge recommendation:  Has been made in collaboration with the attending provider and/or case management    IF patient discharges home will need the following DME: rolling walker       SUBJECTIVE:   Patient stated  I don't know how I'm going to do today.     OBJECTIVE DATA SUMMARY:   Critical Behavior:  Neurologic State: Alert, Appropriate for age  Orientation Level: Oriented X4  Cognition: Appropriate decision making, Follows commands  Safety/Judgement: Awareness of environment, Insight into deficits, Home safety, Fall prevention  Functional Mobility Training:  Bed Mobility:                    Transfers:  Sit to Stand: Contact guard assistance  Stand to Sit: Stand-by assistance                             Balance:  Sitting: Intact  Standing: Impaired  Standing - Static: Good;Constant support  Standing - Dynamic : Fair;Constant support  Ambulation/Gait Training:  Distance (ft): 40 Feet (ft)  Assistive Device: Gait belt;Walker, rolling  Ambulation - Level of Assistance: Contact guard assistance        Gait Abnormalities: Decreased step clearance        Base of Support: Other (comment)     Speed/Paige: Pace decreased (<100 feet/min)  Step Length: Right shortened;Left shortened     Pain Rating:  Pt with no complaints of pain     Activity Tolerance:   Fair and observed SOB with activity but with improved mobility tolerance. Please refer to the flowsheet for vital signs taken during this treatment.     After treatment patient left in no apparent distress:   Sitting in chair and Call bell within reach    COMMUNICATION/COLLABORATION:   The patients plan of care was discussed with: Registered Nurse    Jennifer Rodriguez PTA   Time Calculation: 25 mins Cartilage Graft Text: The defect edges were debeveled with a #15 scalpel blade.  Given the location of the defect, shape of the defect, the fact the defect involved a full thickness cartilage defect a cartilage graft was deemed most appropriate.  An appropriate donor site was identified, cleansed, and anesthetized. The cartilage graft was then harvested and transferred to the recipient site, oriented appropriately and then sutured into place.  The secondary defect was then repaired using a primary closure.

## 2021-03-10 ENCOUNTER — TELEPHONE (OUTPATIENT)
Dept: INTERNAL MEDICINE CLINIC | Age: 86
End: 2021-03-10

## 2021-03-10 ENCOUNTER — HOME CARE VISIT (OUTPATIENT)
Dept: SCHEDULING | Facility: HOME HEALTH | Age: 86
End: 2021-03-10
Payer: MEDICARE

## 2021-03-10 VITALS
RESPIRATION RATE: 22 BRPM | SYSTOLIC BLOOD PRESSURE: 126 MMHG | TEMPERATURE: 98.9 F | OXYGEN SATURATION: 94 % | HEART RATE: 68 BPM | DIASTOLIC BLOOD PRESSURE: 80 MMHG

## 2021-03-10 VITALS
OXYGEN SATURATION: 97 % | SYSTOLIC BLOOD PRESSURE: 130 MMHG | DIASTOLIC BLOOD PRESSURE: 76 MMHG | HEART RATE: 78 BPM | RESPIRATION RATE: 18 BRPM | TEMPERATURE: 98.5 F

## 2021-03-10 PROCEDURE — 3331090002 HH PPS REVENUE DEBIT

## 2021-03-10 PROCEDURE — G0299 HHS/HOSPICE OF RN EA 15 MIN: HCPCS

## 2021-03-10 PROCEDURE — 3331090001 HH PPS REVENUE CREDIT

## 2021-03-10 PROCEDURE — G0151 HHCP-SERV OF PT,EA 15 MIN: HCPCS

## 2021-03-10 NOTE — TELEPHONE ENCOUNTER
[address] [place] [address] Road states she needs a verbal order to extend PT for 2 weeks twice a week.     Nehal Black [phone] Sue Neil

## 2021-03-11 ENCOUNTER — HOME CARE VISIT (OUTPATIENT)
Dept: SCHEDULING | Facility: HOME HEALTH | Age: 86
End: 2021-03-11
Payer: MEDICARE

## 2021-03-11 PROCEDURE — 3331090001 HH PPS REVENUE CREDIT

## 2021-03-11 PROCEDURE — 3331090002 HH PPS REVENUE DEBIT

## 2021-03-11 PROCEDURE — G0156 HHCP-SVS OF AIDE,EA 15 MIN: HCPCS

## 2021-03-12 ENCOUNTER — HOME CARE VISIT (OUTPATIENT)
Dept: SCHEDULING | Facility: HOME HEALTH | Age: 86
End: 2021-03-12
Payer: MEDICARE

## 2021-03-12 VITALS
DIASTOLIC BLOOD PRESSURE: 70 MMHG | OXYGEN SATURATION: 94 % | HEART RATE: 60 BPM | SYSTOLIC BLOOD PRESSURE: 120 MMHG | RESPIRATION RATE: 22 BRPM | TEMPERATURE: 98.8 F

## 2021-03-12 PROCEDURE — 3331090002 HH PPS REVENUE DEBIT

## 2021-03-12 PROCEDURE — G0299 HHS/HOSPICE OF RN EA 15 MIN: HCPCS

## 2021-03-12 PROCEDURE — 3331090001 HH PPS REVENUE CREDIT

## 2021-03-13 PROCEDURE — 3331090002 HH PPS REVENUE DEBIT

## 2021-03-13 PROCEDURE — 3331090001 HH PPS REVENUE CREDIT

## 2021-03-14 PROCEDURE — 3331090001 HH PPS REVENUE CREDIT

## 2021-03-14 PROCEDURE — 3331090002 HH PPS REVENUE DEBIT

## 2021-03-15 ENCOUNTER — HOME CARE VISIT (OUTPATIENT)
Dept: SCHEDULING | Facility: HOME HEALTH | Age: 86
End: 2021-03-15
Payer: MEDICARE

## 2021-03-15 VITALS
SYSTOLIC BLOOD PRESSURE: 120 MMHG | HEART RATE: 66 BPM | DIASTOLIC BLOOD PRESSURE: 60 MMHG | RESPIRATION RATE: 24 BRPM | TEMPERATURE: 98.5 F | OXYGEN SATURATION: 94 %

## 2021-03-15 PROCEDURE — 3331090002 HH PPS REVENUE DEBIT

## 2021-03-15 PROCEDURE — G0299 HHS/HOSPICE OF RN EA 15 MIN: HCPCS

## 2021-03-15 PROCEDURE — 3331090001 HH PPS REVENUE CREDIT

## 2021-03-15 PROCEDURE — 3331090003 HH PPS REVENUE ADJ

## 2021-03-16 PROCEDURE — 400018 HH-NO PAY CLAIM PROCEDURE

## 2021-03-16 PROCEDURE — 3331090001 HH PPS REVENUE CREDIT

## 2021-03-16 PROCEDURE — 3331090002 HH PPS REVENUE DEBIT

## 2021-03-17 ENCOUNTER — HOME CARE VISIT (OUTPATIENT)
Dept: SCHEDULING | Facility: HOME HEALTH | Age: 86
End: 2021-03-17
Payer: MEDICARE

## 2021-03-17 VITALS
TEMPERATURE: 98.6 F | RESPIRATION RATE: 22 BRPM | SYSTOLIC BLOOD PRESSURE: 110 MMHG | DIASTOLIC BLOOD PRESSURE: 80 MMHG | HEART RATE: 68 BPM | OXYGEN SATURATION: 94 %

## 2021-03-17 PROCEDURE — 400014 HH F/U

## 2021-03-17 PROCEDURE — 3331090002 HH PPS REVENUE DEBIT

## 2021-03-17 PROCEDURE — 3331090001 HH PPS REVENUE CREDIT

## 2021-03-17 PROCEDURE — G0157 HHC PT ASSISTANT EA 15: HCPCS

## 2021-03-17 PROCEDURE — G0299 HHS/HOSPICE OF RN EA 15 MIN: HCPCS

## 2021-03-18 ENCOUNTER — HOME CARE VISIT (OUTPATIENT)
Dept: SCHEDULING | Facility: HOME HEALTH | Age: 86
End: 2021-03-18
Payer: MEDICARE

## 2021-03-18 PROCEDURE — 3331090002 HH PPS REVENUE DEBIT

## 2021-03-18 PROCEDURE — 3331090001 HH PPS REVENUE CREDIT

## 2021-03-18 PROCEDURE — G0156 HHCP-SVS OF AIDE,EA 15 MIN: HCPCS

## 2021-03-19 ENCOUNTER — HOME CARE VISIT (OUTPATIENT)
Dept: SCHEDULING | Facility: HOME HEALTH | Age: 86
End: 2021-03-19
Payer: MEDICARE

## 2021-03-19 VITALS
HEART RATE: 66 BPM | SYSTOLIC BLOOD PRESSURE: 110 MMHG | OXYGEN SATURATION: 94 % | DIASTOLIC BLOOD PRESSURE: 70 MMHG | TEMPERATURE: 98.3 F | RESPIRATION RATE: 20 BRPM

## 2021-03-19 PROCEDURE — 3331090001 HH PPS REVENUE CREDIT

## 2021-03-19 PROCEDURE — 3331090002 HH PPS REVENUE DEBIT

## 2021-03-19 PROCEDURE — G0157 HHC PT ASSISTANT EA 15: HCPCS

## 2021-03-19 PROCEDURE — G0299 HHS/HOSPICE OF RN EA 15 MIN: HCPCS

## 2021-03-20 PROCEDURE — 3331090002 HH PPS REVENUE DEBIT

## 2021-03-20 PROCEDURE — 3331090001 HH PPS REVENUE CREDIT

## 2021-03-21 PROCEDURE — 3331090001 HH PPS REVENUE CREDIT

## 2021-03-21 PROCEDURE — 3331090002 HH PPS REVENUE DEBIT

## 2021-03-22 ENCOUNTER — HOME CARE VISIT (OUTPATIENT)
Dept: SCHEDULING | Facility: HOME HEALTH | Age: 86
End: 2021-03-22
Payer: MEDICARE

## 2021-03-22 VITALS
HEART RATE: 84 BPM | TEMPERATURE: 98.7 F | SYSTOLIC BLOOD PRESSURE: 110 MMHG | RESPIRATION RATE: 18 BRPM | DIASTOLIC BLOOD PRESSURE: 72 MMHG | OXYGEN SATURATION: 94 %

## 2021-03-22 VITALS
DIASTOLIC BLOOD PRESSURE: 60 MMHG | OXYGEN SATURATION: 94 % | SYSTOLIC BLOOD PRESSURE: 110 MMHG | RESPIRATION RATE: 22 BRPM | HEART RATE: 61 BPM | TEMPERATURE: 98.7 F

## 2021-03-22 PROCEDURE — G0299 HHS/HOSPICE OF RN EA 15 MIN: HCPCS

## 2021-03-22 PROCEDURE — 3331090001 HH PPS REVENUE CREDIT

## 2021-03-22 PROCEDURE — 3331090002 HH PPS REVENUE DEBIT

## 2021-03-22 PROCEDURE — G0157 HHC PT ASSISTANT EA 15: HCPCS

## 2021-03-23 VITALS
SYSTOLIC BLOOD PRESSURE: 112 MMHG | TEMPERATURE: 98 F | OXYGEN SATURATION: 92 % | DIASTOLIC BLOOD PRESSURE: 64 MMHG | HEART RATE: 64 BPM

## 2021-03-23 PROCEDURE — 3331090001 HH PPS REVENUE CREDIT

## 2021-03-23 PROCEDURE — 3331090002 HH PPS REVENUE DEBIT

## 2021-03-24 ENCOUNTER — HOME CARE VISIT (OUTPATIENT)
Dept: SCHEDULING | Facility: HOME HEALTH | Age: 86
End: 2021-03-24
Payer: MEDICARE

## 2021-03-24 VITALS
HEART RATE: 66 BPM | RESPIRATION RATE: 22 BRPM | DIASTOLIC BLOOD PRESSURE: 80 MMHG | OXYGEN SATURATION: 94 % | SYSTOLIC BLOOD PRESSURE: 120 MMHG | TEMPERATURE: 98.5 F

## 2021-03-24 PROCEDURE — G0299 HHS/HOSPICE OF RN EA 15 MIN: HCPCS

## 2021-03-24 PROCEDURE — A6252 ABSORPT DRG >16 <=48 W/O BDR: HCPCS

## 2021-03-24 PROCEDURE — A6216 NON-STERILE GAUZE<=16 SQ IN: HCPCS

## 2021-03-24 PROCEDURE — A4450 NON-WATERPROOF TAPE: HCPCS

## 2021-03-24 PROCEDURE — 3331090001 HH PPS REVENUE CREDIT

## 2021-03-24 PROCEDURE — 3331090002 HH PPS REVENUE DEBIT

## 2021-03-25 ENCOUNTER — HOME CARE VISIT (OUTPATIENT)
Dept: HOME HEALTH SERVICES | Facility: HOME HEALTH | Age: 86
End: 2021-03-25
Payer: MEDICARE

## 2021-03-25 ENCOUNTER — HOME CARE VISIT (OUTPATIENT)
Dept: SCHEDULING | Facility: HOME HEALTH | Age: 86
End: 2021-03-25
Payer: MEDICARE

## 2021-03-25 PROCEDURE — 3331090002 HH PPS REVENUE DEBIT

## 2021-03-25 PROCEDURE — G0156 HHCP-SVS OF AIDE,EA 15 MIN: HCPCS

## 2021-03-25 PROCEDURE — 3331090001 HH PPS REVENUE CREDIT

## 2021-03-25 PROCEDURE — G0151 HHCP-SERV OF PT,EA 15 MIN: HCPCS

## 2021-03-26 ENCOUNTER — HOME CARE VISIT (OUTPATIENT)
Dept: SCHEDULING | Facility: HOME HEALTH | Age: 86
End: 2021-03-26
Payer: MEDICARE

## 2021-03-26 VITALS
TEMPERATURE: 97.5 F | SYSTOLIC BLOOD PRESSURE: 134 MMHG | OXYGEN SATURATION: 98 % | DIASTOLIC BLOOD PRESSURE: 76 MMHG | RESPIRATION RATE: 18 BRPM | HEART RATE: 76 BPM

## 2021-03-26 VITALS
HEART RATE: 66 BPM | OXYGEN SATURATION: 94 % | DIASTOLIC BLOOD PRESSURE: 60 MMHG | RESPIRATION RATE: 22 BRPM | SYSTOLIC BLOOD PRESSURE: 118 MMHG | TEMPERATURE: 98.3 F

## 2021-03-26 PROCEDURE — 3331090002 HH PPS REVENUE DEBIT

## 2021-03-26 PROCEDURE — G0299 HHS/HOSPICE OF RN EA 15 MIN: HCPCS

## 2021-03-26 PROCEDURE — 3331090001 HH PPS REVENUE CREDIT

## 2021-03-26 RX ORDER — BUSPIRONE HYDROCHLORIDE 10 MG/1
10 TABLET ORAL DAILY
Qty: 30 TAB | Refills: 0 | Status: SHIPPED | OUTPATIENT
Start: 2021-03-26 | End: 2021-04-20 | Stop reason: ALTCHOICE

## 2021-03-26 NOTE — TELEPHONE ENCOUNTER
Requested Prescriptions     Pending Prescriptions Disp Refills    busPIRone (BUSPAR) 10 mg tablet        Sig: Take 1 Tab by mouth daily.        Last Refill: 10/17/20  Next Appointment:4/20/21

## 2021-03-27 PROCEDURE — 3331090002 HH PPS REVENUE DEBIT

## 2021-03-27 PROCEDURE — 3331090001 HH PPS REVENUE CREDIT

## 2021-03-28 PROCEDURE — 3331090002 HH PPS REVENUE DEBIT

## 2021-03-28 PROCEDURE — 3331090001 HH PPS REVENUE CREDIT

## 2021-03-29 ENCOUNTER — HOME CARE VISIT (OUTPATIENT)
Dept: SCHEDULING | Facility: HOME HEALTH | Age: 86
End: 2021-03-29
Payer: MEDICARE

## 2021-03-29 VITALS
DIASTOLIC BLOOD PRESSURE: 78 MMHG | SYSTOLIC BLOOD PRESSURE: 120 MMHG | HEART RATE: 64 BPM | OXYGEN SATURATION: 94 % | TEMPERATURE: 98 F | RESPIRATION RATE: 20 BRPM

## 2021-03-29 PROCEDURE — 3331090002 HH PPS REVENUE DEBIT

## 2021-03-29 PROCEDURE — 3331090001 HH PPS REVENUE CREDIT

## 2021-03-29 PROCEDURE — G0299 HHS/HOSPICE OF RN EA 15 MIN: HCPCS

## 2021-03-30 PROCEDURE — 3331090002 HH PPS REVENUE DEBIT

## 2021-03-30 PROCEDURE — 3331090001 HH PPS REVENUE CREDIT

## 2021-03-31 ENCOUNTER — HOME CARE VISIT (OUTPATIENT)
Dept: SCHEDULING | Facility: HOME HEALTH | Age: 86
End: 2021-03-31
Payer: MEDICARE

## 2021-03-31 VITALS
OXYGEN SATURATION: 95 % | HEART RATE: 60 BPM | DIASTOLIC BLOOD PRESSURE: 70 MMHG | TEMPERATURE: 98.5 F | RESPIRATION RATE: 22 BRPM | SYSTOLIC BLOOD PRESSURE: 116 MMHG

## 2021-03-31 PROCEDURE — 3331090002 HH PPS REVENUE DEBIT

## 2021-03-31 PROCEDURE — 3331090001 HH PPS REVENUE CREDIT

## 2021-03-31 PROCEDURE — G0299 HHS/HOSPICE OF RN EA 15 MIN: HCPCS

## 2021-04-01 ENCOUNTER — HOME CARE VISIT (OUTPATIENT)
Dept: SCHEDULING | Facility: HOME HEALTH | Age: 86
End: 2021-04-01
Payer: MEDICARE

## 2021-04-01 PROCEDURE — 3331090002 HH PPS REVENUE DEBIT

## 2021-04-01 PROCEDURE — 3331090001 HH PPS REVENUE CREDIT

## 2021-04-01 PROCEDURE — G0156 HHCP-SVS OF AIDE,EA 15 MIN: HCPCS

## 2021-04-02 ENCOUNTER — HOME CARE VISIT (OUTPATIENT)
Dept: SCHEDULING | Facility: HOME HEALTH | Age: 86
End: 2021-04-02
Payer: MEDICARE

## 2021-04-02 VITALS
TEMPERATURE: 98.1 F | SYSTOLIC BLOOD PRESSURE: 110 MMHG | DIASTOLIC BLOOD PRESSURE: 70 MMHG | RESPIRATION RATE: 22 BRPM | HEART RATE: 61 BPM | OXYGEN SATURATION: 93 %

## 2021-04-02 PROCEDURE — 3331090002 HH PPS REVENUE DEBIT

## 2021-04-02 PROCEDURE — 3331090001 HH PPS REVENUE CREDIT

## 2021-04-02 PROCEDURE — G0299 HHS/HOSPICE OF RN EA 15 MIN: HCPCS

## 2021-04-03 PROCEDURE — 3331090002 HH PPS REVENUE DEBIT

## 2021-04-03 PROCEDURE — 3331090001 HH PPS REVENUE CREDIT

## 2021-04-04 PROCEDURE — 3331090001 HH PPS REVENUE CREDIT

## 2021-04-04 PROCEDURE — 3331090002 HH PPS REVENUE DEBIT

## 2021-04-05 ENCOUNTER — HOME CARE VISIT (OUTPATIENT)
Dept: SCHEDULING | Facility: HOME HEALTH | Age: 86
End: 2021-04-05
Payer: MEDICARE

## 2021-04-05 VITALS
HEART RATE: 76 BPM | TEMPERATURE: 98.6 F | DIASTOLIC BLOOD PRESSURE: 80 MMHG | OXYGEN SATURATION: 94 % | RESPIRATION RATE: 22 BRPM | SYSTOLIC BLOOD PRESSURE: 130 MMHG

## 2021-04-05 PROCEDURE — 3331090001 HH PPS REVENUE CREDIT

## 2021-04-05 PROCEDURE — 3331090002 HH PPS REVENUE DEBIT

## 2021-04-05 PROCEDURE — G0299 HHS/HOSPICE OF RN EA 15 MIN: HCPCS

## 2021-04-06 PROCEDURE — 3331090002 HH PPS REVENUE DEBIT

## 2021-04-06 PROCEDURE — 3331090001 HH PPS REVENUE CREDIT

## 2021-04-07 ENCOUNTER — HOME CARE VISIT (OUTPATIENT)
Dept: SCHEDULING | Facility: HOME HEALTH | Age: 86
End: 2021-04-07
Payer: MEDICARE

## 2021-04-07 ENCOUNTER — HOME CARE VISIT (OUTPATIENT)
Dept: HOME HEALTH SERVICES | Facility: HOME HEALTH | Age: 86
End: 2021-04-07
Payer: MEDICARE

## 2021-04-07 VITALS
TEMPERATURE: 98.3 F | SYSTOLIC BLOOD PRESSURE: 120 MMHG | DIASTOLIC BLOOD PRESSURE: 80 MMHG | OXYGEN SATURATION: 92 % | HEART RATE: 71 BPM | RESPIRATION RATE: 22 BRPM

## 2021-04-07 PROCEDURE — 3331090001 HH PPS REVENUE CREDIT

## 2021-04-07 PROCEDURE — 3331090002 HH PPS REVENUE DEBIT

## 2021-04-07 PROCEDURE — G0299 HHS/HOSPICE OF RN EA 15 MIN: HCPCS

## 2021-04-08 ENCOUNTER — HOME CARE VISIT (OUTPATIENT)
Dept: SCHEDULING | Facility: HOME HEALTH | Age: 86
End: 2021-04-08
Payer: MEDICARE

## 2021-04-08 PROCEDURE — G0156 HHCP-SVS OF AIDE,EA 15 MIN: HCPCS

## 2021-04-08 PROCEDURE — 3331090001 HH PPS REVENUE CREDIT

## 2021-04-08 PROCEDURE — 3331090002 HH PPS REVENUE DEBIT

## 2021-04-09 ENCOUNTER — HOME CARE VISIT (OUTPATIENT)
Dept: SCHEDULING | Facility: HOME HEALTH | Age: 86
End: 2021-04-09
Payer: MEDICARE

## 2021-04-09 VITALS
RESPIRATION RATE: 22 BRPM | OXYGEN SATURATION: 93 % | TEMPERATURE: 98.3 F | DIASTOLIC BLOOD PRESSURE: 80 MMHG | HEART RATE: 79 BPM | SYSTOLIC BLOOD PRESSURE: 122 MMHG

## 2021-04-09 PROCEDURE — G0299 HHS/HOSPICE OF RN EA 15 MIN: HCPCS

## 2021-04-09 PROCEDURE — 3331090001 HH PPS REVENUE CREDIT

## 2021-04-09 PROCEDURE — 3331090002 HH PPS REVENUE DEBIT

## 2021-04-10 PROCEDURE — 3331090002 HH PPS REVENUE DEBIT

## 2021-04-10 PROCEDURE — 3331090001 HH PPS REVENUE CREDIT

## 2021-04-11 PROCEDURE — 3331090001 HH PPS REVENUE CREDIT

## 2021-04-11 PROCEDURE — 3331090002 HH PPS REVENUE DEBIT

## 2021-04-12 ENCOUNTER — HOME CARE VISIT (OUTPATIENT)
Dept: SCHEDULING | Facility: HOME HEALTH | Age: 86
End: 2021-04-12
Payer: MEDICARE

## 2021-04-12 VITALS
DIASTOLIC BLOOD PRESSURE: 78 MMHG | OXYGEN SATURATION: 93 % | RESPIRATION RATE: 22 BRPM | SYSTOLIC BLOOD PRESSURE: 120 MMHG | HEART RATE: 74 BPM | TEMPERATURE: 98.8 F

## 2021-04-12 PROCEDURE — A6216 NON-STERILE GAUZE<=16 SQ IN: HCPCS

## 2021-04-12 PROCEDURE — A6252 ABSORPT DRG >16 <=48 W/O BDR: HCPCS

## 2021-04-12 PROCEDURE — G0299 HHS/HOSPICE OF RN EA 15 MIN: HCPCS

## 2021-04-12 PROCEDURE — 3331090002 HH PPS REVENUE DEBIT

## 2021-04-12 PROCEDURE — 3331090001 HH PPS REVENUE CREDIT

## 2021-04-12 PROCEDURE — A6197 ALGINATE DRSG >16 <=48 SQ IN: HCPCS

## 2021-04-13 PROCEDURE — 3331090002 HH PPS REVENUE DEBIT

## 2021-04-13 PROCEDURE — 3331090001 HH PPS REVENUE CREDIT

## 2021-04-14 ENCOUNTER — HOME CARE VISIT (OUTPATIENT)
Dept: SCHEDULING | Facility: HOME HEALTH | Age: 86
End: 2021-04-14
Payer: MEDICARE

## 2021-04-14 VITALS
TEMPERATURE: 98.4 F | SYSTOLIC BLOOD PRESSURE: 116 MMHG | OXYGEN SATURATION: 93 % | RESPIRATION RATE: 24 BRPM | HEART RATE: 60 BPM | DIASTOLIC BLOOD PRESSURE: 80 MMHG

## 2021-04-14 PROCEDURE — 3331090001 HH PPS REVENUE CREDIT

## 2021-04-14 PROCEDURE — 3331090002 HH PPS REVENUE DEBIT

## 2021-04-14 PROCEDURE — G0299 HHS/HOSPICE OF RN EA 15 MIN: HCPCS

## 2021-04-14 PROCEDURE — 3331090003 HH PPS REVENUE ADJ

## 2021-04-15 ENCOUNTER — HOME CARE VISIT (OUTPATIENT)
Dept: SCHEDULING | Facility: HOME HEALTH | Age: 86
End: 2021-04-15
Payer: MEDICARE

## 2021-04-15 PROCEDURE — 400018 HH-NO PAY CLAIM PROCEDURE

## 2021-04-15 PROCEDURE — G0156 HHCP-SVS OF AIDE,EA 15 MIN: HCPCS

## 2021-04-15 PROCEDURE — 400014 HH F/U

## 2021-04-15 PROCEDURE — 3331090002 HH PPS REVENUE DEBIT

## 2021-04-15 PROCEDURE — 3331090001 HH PPS REVENUE CREDIT

## 2021-04-16 ENCOUNTER — HOME CARE VISIT (OUTPATIENT)
Dept: SCHEDULING | Facility: HOME HEALTH | Age: 86
End: 2021-04-16
Payer: MEDICARE

## 2021-04-16 PROCEDURE — G0299 HHS/HOSPICE OF RN EA 15 MIN: HCPCS

## 2021-04-16 PROCEDURE — 3331090002 HH PPS REVENUE DEBIT

## 2021-04-16 PROCEDURE — 3331090001 HH PPS REVENUE CREDIT

## 2021-04-17 VITALS
OXYGEN SATURATION: 93 % | RESPIRATION RATE: 22 BRPM | DIASTOLIC BLOOD PRESSURE: 80 MMHG | HEART RATE: 60 BPM | TEMPERATURE: 98.5 F | SYSTOLIC BLOOD PRESSURE: 120 MMHG

## 2021-04-17 PROCEDURE — 3331090001 HH PPS REVENUE CREDIT

## 2021-04-17 PROCEDURE — 3331090002 HH PPS REVENUE DEBIT

## 2021-04-18 PROCEDURE — 3331090001 HH PPS REVENUE CREDIT

## 2021-04-18 PROCEDURE — 3331090002 HH PPS REVENUE DEBIT

## 2021-04-19 ENCOUNTER — HOME CARE VISIT (OUTPATIENT)
Dept: SCHEDULING | Facility: HOME HEALTH | Age: 86
End: 2021-04-19
Payer: MEDICARE

## 2021-04-19 VITALS
HEART RATE: 60 BPM | TEMPERATURE: 98.3 F | OXYGEN SATURATION: 93 % | SYSTOLIC BLOOD PRESSURE: 120 MMHG | RESPIRATION RATE: 22 BRPM | DIASTOLIC BLOOD PRESSURE: 70 MMHG

## 2021-04-19 PROCEDURE — 3331090002 HH PPS REVENUE DEBIT

## 2021-04-19 PROCEDURE — G0299 HHS/HOSPICE OF RN EA 15 MIN: HCPCS

## 2021-04-19 PROCEDURE — 3331090001 HH PPS REVENUE CREDIT

## 2021-04-20 ENCOUNTER — OFFICE VISIT (OUTPATIENT)
Dept: INTERNAL MEDICINE CLINIC | Age: 86
End: 2021-04-20
Payer: MEDICARE

## 2021-04-20 VITALS
RESPIRATION RATE: 20 BRPM | TEMPERATURE: 97.9 F | OXYGEN SATURATION: 94 % | DIASTOLIC BLOOD PRESSURE: 88 MMHG | HEART RATE: 66 BPM | HEIGHT: 68 IN | WEIGHT: 130 LBS | SYSTOLIC BLOOD PRESSURE: 128 MMHG | BODY MASS INDEX: 19.7 KG/M2

## 2021-04-20 DIAGNOSIS — J44.1 CHRONIC OBSTRUCTIVE PULMONARY DISEASE WITH ACUTE EXACERBATION (HCC): Chronic | ICD-10-CM

## 2021-04-20 DIAGNOSIS — Z99.81 CHRONIC RESPIRATORY FAILURE WITH HYPOXIA, ON HOME OXYGEN THERAPY (HCC): Chronic | ICD-10-CM

## 2021-04-20 DIAGNOSIS — I27.21 MODERATE PULMONARY ARTERIAL SYSTOLIC HYPERTENSION (HCC): Chronic | ICD-10-CM

## 2021-04-20 DIAGNOSIS — I10 ESSENTIAL HYPERTENSION: Chronic | ICD-10-CM

## 2021-04-20 DIAGNOSIS — L97.321 VENOUS STASIS ULCER OF LEFT ANKLE LIMITED TO BREAKDOWN OF SKIN WITHOUT VARICOSE VEINS (HCC): ICD-10-CM

## 2021-04-20 DIAGNOSIS — I87.2 VENOUS STASIS ULCER OF LEFT ANKLE LIMITED TO BREAKDOWN OF SKIN WITHOUT VARICOSE VEINS (HCC): ICD-10-CM

## 2021-04-20 DIAGNOSIS — I73.9 PVD (PERIPHERAL VASCULAR DISEASE) (HCC): Chronic | ICD-10-CM

## 2021-04-20 DIAGNOSIS — Z00.00 INITIAL MEDICARE ANNUAL WELLNESS VISIT: Primary | ICD-10-CM

## 2021-04-20 DIAGNOSIS — J96.11 CHRONIC RESPIRATORY FAILURE WITH HYPOXIA, ON HOME OXYGEN THERAPY (HCC): Chronic | ICD-10-CM

## 2021-04-20 LAB
ALBUMIN SERPL-MCNC: 3.6 G/DL (ref 3.5–5)
ALBUMIN/GLOB SERPL: 1.1 {RATIO} (ref 1.1–2.2)
ALP SERPL-CCNC: 70 U/L (ref 45–117)
ALT SERPL-CCNC: 12 U/L (ref 12–78)
ANION GAP SERPL CALC-SCNC: 6 MMOL/L (ref 5–15)
AST SERPL-CCNC: 15 U/L (ref 15–37)
BASOPHILS # BLD: 0.1 K/UL (ref 0–0.1)
BASOPHILS NFR BLD: 1 % (ref 0–1)
BILIRUB SERPL-MCNC: 0.5 MG/DL (ref 0.2–1)
BUN SERPL-MCNC: 24 MG/DL (ref 6–20)
BUN/CREAT SERPL: 33 (ref 12–20)
CALCIUM SERPL-MCNC: 9.1 MG/DL (ref 8.5–10.1)
CHLORIDE SERPL-SCNC: 102 MMOL/L (ref 97–108)
CO2 SERPL-SCNC: 31 MMOL/L (ref 21–32)
CREAT SERPL-MCNC: 0.72 MG/DL (ref 0.55–1.02)
DIFFERENTIAL METHOD BLD: ABNORMAL
EOSINOPHIL # BLD: 0.3 K/UL (ref 0–0.4)
EOSINOPHIL NFR BLD: 4 % (ref 0–7)
ERYTHROCYTE [DISTWIDTH] IN BLOOD BY AUTOMATED COUNT: 14.4 % (ref 11.5–14.5)
GLOBULIN SER CALC-MCNC: 3.3 G/DL (ref 2–4)
GLUCOSE SERPL-MCNC: 91 MG/DL (ref 65–100)
HCT VFR BLD AUTO: 41.4 % (ref 35–47)
HGB BLD-MCNC: 12.7 G/DL (ref 11.5–16)
IMM GRANULOCYTES # BLD AUTO: 0.1 K/UL (ref 0–0.04)
IMM GRANULOCYTES NFR BLD AUTO: 1 % (ref 0–0.5)
LYMPHOCYTES # BLD: 0.7 K/UL (ref 0.8–3.5)
LYMPHOCYTES NFR BLD: 8 % (ref 12–49)
MCH RBC QN AUTO: 29.5 PG (ref 26–34)
MCHC RBC AUTO-ENTMCNC: 30.7 G/DL (ref 30–36.5)
MCV RBC AUTO: 96.1 FL (ref 80–99)
MONOCYTES # BLD: 0.6 K/UL (ref 0–1)
MONOCYTES NFR BLD: 7 % (ref 5–13)
NEUTS SEG # BLD: 6.7 K/UL (ref 1.8–8)
NEUTS SEG NFR BLD: 79 % (ref 32–75)
NRBC # BLD: 0 K/UL (ref 0–0.01)
NRBC BLD-RTO: 0 PER 100 WBC
PLATELET # BLD AUTO: 242 K/UL (ref 150–400)
PMV BLD AUTO: 10.4 FL (ref 8.9–12.9)
POTASSIUM SERPL-SCNC: 4.6 MMOL/L (ref 3.5–5.1)
PROT SERPL-MCNC: 6.9 G/DL (ref 6.4–8.2)
RBC # BLD AUTO: 4.31 M/UL (ref 3.8–5.2)
RBC MORPH BLD: ABNORMAL
SODIUM SERPL-SCNC: 139 MMOL/L (ref 136–145)
WBC # BLD AUTO: 8.5 K/UL (ref 3.6–11)

## 2021-04-20 PROCEDURE — G8420 CALC BMI NORM PARAMETERS: HCPCS | Performed by: INTERNAL MEDICINE

## 2021-04-20 PROCEDURE — G8510 SCR DEP NEG, NO PLAN REQD: HCPCS | Performed by: INTERNAL MEDICINE

## 2021-04-20 PROCEDURE — 99214 OFFICE O/P EST MOD 30 MIN: CPT | Performed by: INTERNAL MEDICINE

## 2021-04-20 PROCEDURE — G8536 NO DOC ELDER MAL SCRN: HCPCS | Performed by: INTERNAL MEDICINE

## 2021-04-20 PROCEDURE — G8427 DOCREV CUR MEDS BY ELIG CLIN: HCPCS | Performed by: INTERNAL MEDICINE

## 2021-04-20 PROCEDURE — 1090F PRES/ABSN URINE INCON ASSESS: CPT | Performed by: INTERNAL MEDICINE

## 2021-04-20 PROCEDURE — 3331090001 HH PPS REVENUE CREDIT

## 2021-04-20 PROCEDURE — 3331090002 HH PPS REVENUE DEBIT

## 2021-04-20 PROCEDURE — 1101F PT FALLS ASSESS-DOCD LE1/YR: CPT | Performed by: INTERNAL MEDICINE

## 2021-04-20 PROCEDURE — G0439 PPPS, SUBSEQ VISIT: HCPCS | Performed by: INTERNAL MEDICINE

## 2021-04-20 NOTE — PROGRESS NOTES
Chief Complaint   Patient presents with    Annual Wellness Visit       Depression Risk Factor Screening:     3 most recent PHQ Screens 4/20/2021   Little interest or pleasure in doing things Not at all   Feeling down, depressed, irritable, or hopeless More than half the days   Total Score PHQ 2 2       Functional Ability and Level of Safety:     Activities of Daily Living  ADL Assessment 4/20/2021   Feeding yourself No Help Needed   Getting from bed to chair No Help Needed   Getting dressed No Help Needed   Bathing or showering No Help Needed   Walk across the room (includes cane/walker) No Help Needed   Using the telphone No Help Needed   Taking your medications No Help Needed   Preparing meals Help Needed   Managing money (expenses/bills) Help Needed   Moderately strenuous housework (laundry) Help Needed   Shopping for personal items (toiletries/medicines) Help Needed   Shopping for groceries Help Needed   Driving Help Needed   Climbing a flight of stairs Help Needed   Getting to places beyond walking distances Help Needed       Fall Risk  Fall Risk Assessment, last 12 mths 4/20/2021   Able to walk? Yes   Fall in past 12 months? 0   Do you feel unsteady? 0   Are you worried about falling 0       Abuse Screen  Abuse Screening Questionnaire 4/20/2021   Do you ever feel afraid of your partner? N   Are you in a relationship with someone who physically or mentally threatens you? N   Is it safe for you to go home?  Y         Patient Care Team   Patient Care Team:  Saulo Mccord MD as PCP - General (Internal Medicine)  Saulo Mccord MD as PCP - [facility]

## 2021-04-20 NOTE — PATIENT INSTRUCTIONS
The best way to stay healthy is to live a healthy lifestyle. A healthy lifestyle includes regular exercise, eating a well-balanced diet, keeping a healthy weight and not smoking. Regular physical exams and screening tests are another important way to take care of yourself. Preventive exams provided by health care providers can find health problems early when treatment works best and can keep you from getting certain diseases or illnesses. Preventive services include exams, lab tests, screenings, shots, monitoring and information to help you take care of your own health. All people over 65 should have a pneumonia shot. Pneumonia shots are usually only needed once in a lifetime unless your doctor decides differently. In addition to your physical exam, some screening tests are recommended: 
 
All people over 65 should have a yearly flu shot. People over 65 are at medium to high risk for Hepatitis B. Three shots are needed for complete protection. Bone mass measurement (dexa scan) is recommended every two years. Diabetes Mellitus screening is recommended every year. Glaucoma is an eye disease caused by high pressure in the eye. An eye exam is recommended every year. Cardiovascular screening tests that check your cholesterol and other blood fat (lipid) levels are recommended every five years. Colorectal Cancer screening tests help to find pre-cancerous polyps (growths in the colon) so they can be removed before they turn into cancer. Tests ordered for screening depend on your personal and family history risk factors. Prostate Cancer Screening (annually up to age 76) Screening for breast cancer is recommended yearly with a Mammogram. 
 
Screening for cervical and vaginal cancer is recommended with a pelvic and Pap test every two years.  However if you have had an abnormal pap in the past  three years or at high risk for cervical or vaginal cancer Medicare will cover a pap test and a pelvic exam every year. Here is a list of your current Health Maintenance items with a due date: 
Health Maintenance Due Topic Date Due  
 COVID-19 Vaccine (1) Never done  DTaP/Tdap/Td  (1 - Tdap) Never done  Shingles Vaccine (1 of 2) Never done  Bone Mineral Density   Never done  Pneumococcal Vaccine (1 of 1 - PPSV23) Never done [address] [place] [address] Annual Well Visit  11/29/2018

## 2021-04-20 NOTE — PROGRESS NOTES
Elissa Ma is a 80 y.o. female who presents for an Initial Medicare Annual Wellness Exam (AWV) and follow up of chronic medical conditions. I have reviewed the patient's medical history in detail and updated the computerized patient record. History     Subjective:  Mrs. Jennifer Reno is an 55-year-old female who presents to the office today for Medicare wellness check and follow-up of multiple medical problems. The patient has COPD with chronic respiratory failure for which she is chronically on oxygen. She remains on 2 L of oxygen continuously. She does use Combivent Respimat and Anoro Ellipta. She is also on azithromycin 3 times a week to prevent bronchitis/pneumonia. She has been doing fairly well with no recent exacerbations. She does have a history of moderate pulmonary arterial systolic hypertension however has had no chest pains. She has a history of hypertension which is currently managed on a no added salt diet. She denies headaches, numbness, tingling or focal neurological problems. Patient has a prior history of DVT of the left leg with subsequent swelling. She has developed recurrent venous stasis ulcers of the left leg. She has had problems with peripheral vascular disease involving the left leg for which she had revascularization done by Dr. Elly Leos in 2011. She currently is being followed by home health nursing for recurrent stasis ulcer and receiving wound care at home. She has had recurrent cellulitis in the past requiring antibiotic therapy and this has worked in controlling this and she has had no recent exacerbation of this problem. She notes slow healing of the wound.     Patient Active Problem List   Diagnosis Code    [facility]ronic obstructive pulmonary disease) ([facility] J44.9    PVD (peripheral vascular disease) ([facility]) I73.9    Seasonal allergic rhinitis J30.2    LBBB (left bundle branch block) I44.7    Essential hypertension I10    Weakness R53.1    Hyponatremia E87.1    Cellulitis of left lower extremity L03. 116    History of DVT (deep vein thrombosis) Z86.718    Single kidney Z90.5    Chronic respiratory failure with hypoxia, on home oxygen therapy (HCC) J96.11, Z99.81    Moderate pulmonary arterial systolic hypertension (HCC) I27.21    Venous stasis ulcer of left ankle limited to breakdown of skin without varicose veins ([address] I87.2, L97.321       Patient Care Team:  Marcela Sharif MD as PCP - General (Internal Medicine)  Marcela Sharif MD as PCP - [facility]ATION [facility]    Past Medical History:   Diagnosis Date    Acute renal failure (ARF) ([address] 6/4/2017    Arthritis     generalized    Bronchitis, acute 6/4/2017    COPD     Essential hypertension 9/4/2018    former smoker      >60pkyr quit 1/3/11    h/o DVT 1955    left leg    ischemic left lower extremitiy pain     above knee FemPop 1/3/11    PVD (peripheral vascular disease) ([facility]     Seasonal allergic rhinitis     Sepsis ([facility] 6/4/2017    Single kidney     Sinus bradycardia 7/28/2018     Past Surgical History:   Procedure Laterality Date    HX GI  10 yrs ago    colonoscopy    HX GYN      3 miscarriges    HX GYN      1 vaginal birth   Ciprius.Deck HX ORTHOPAEDIC      veinography left leg, stent left leg    MD BREAST SURGERY PROCEDURE UNLISTED  1955    excision left breast cyst     Allergies   Allergen Reactions    Food Extracts Diarrhea     Onions and garlic causes abdominal pain,cramping     Amlodipine Swelling    Sulfa (Sulfonamide Antibiotics) Other (comments)     Altered Mental Status     Current Outpatient Medications   Medication Sig Dispense Refill    simethicone (MYLANTA GAS PO) Take  by mouth daily as needed.  busPIRone (BUSPAR) 5 mg tablet TAKE ONE TABLET BY MOUTH EVERY NIGHT 30 Tab 0    OXYGEN-AIR DELIVERY SYSTEMS 2 L by Nasal route continuous.  acetaminophen (TYLENOL) 325 mg tablet Take 2 Tabs by mouth every four (4) hours as needed for Pain or Fever.  40 Tab 0    ipratropium-albuterol (COMBIVENT RESPIMAT)  mcg/actuation inhaler Take 1 Puff by inhalation four (4) times daily.  loratadine (CLARITIN) 10 mg tablet Take 10 mg by mouth daily.  azithromycin (ZITHROMAX) 250 mg tablet Take 250 mg by mouth every Monday, Wednesday, Friday. Indications: prevent lung infections      ANORO ELLIPTA 62.5-25 mcg/actuation inhaler Take 1 Puff by inhalation daily.  aspirin delayed-release 81 mg tablet Take 162 mg by mouth daily.        Social History     Socioeconomic History    Marital status:      Spouse name: Not on file    Number of children: Not on file    Years of education: Not on file    Highest education level: Not on file   Tobacco Use    Smoking status: Former Smoker     Packs/day: 1.00     Years: 60.00     Pack years: 60.00     Quit date: 1/3/2011     Years since quitting: 10.3    Smokeless tobacco: Never Used   Substance and Sexual Activity    Alcohol use: Yes     Comment: occasional liquor after dinner    Drug use: No     Types: Prescription, OTC     Family History   Problem Relation Age of Onset    Colon Cancer Mother     Lung Cancer Sister      Health Maintenance   Topic Date Due    COVID-19 Vaccine (1) Never done    DTaP/Tdap/Td series (1 - Tdap) Never done    Shingrix Vaccine Age 50> (1 of 2) Never done    Bone Densitometry (Dexa) Screening  Never done    Pneumococcal 65+ years (1 of 1 - PPSV23) Never done    Flu Vaccine (Season Ended) 09/01/2021    Medicare Yearly Exam  04/21/2022          Review of Systems  Constitutional: negative for fevers, chills, anorexia and weight loss  Eyes:   negative for visual disturbance and irritation  ENT:   negative for tinnitus,sore throat,nasal congestion,ear pain,hoarseness  Respiratory:  Positive for chronic shortness of breath  CV:   negative for chest pain, palpitations, lower extremity edema  GI:   negative for nausea, vomiting, diarrhea, abdominal pain,melena  Endo:               negative for polyuria,polydipsia,polyphagia,heat intolerance  Genitourinary: negative for frequency, dysuria and hematuria  Integumentary: Positive for healing stasis ulcer of left ankle  Hematologic:  negative for easy bruising and gum/nose bleeding  Musculoskel: negative for myalgias, arthralgias, back pain, muscle weakness, joint pain  Neurological:  negative for headaches, dizziness, vertigo, memory problems and gait   Behavl/Psych: negative for feelings of anxiety, depression, mood changes  ROS otherwise negative      Depression Risk Factor Screening:     3 most recent PHQ Screens 4/20/2021   Little interest or pleasure in doing things Not at all   Feeling down, depressed, irritable, or hopeless More than half the days   Total Score PHQ 2 2       Alcohol Risk Factor Screening:   Do you average more than 1 drink per night or more than 7 drinks a week:  No    On any one occasion in the past three months have you have had more than 3 drinks containing alcohol:  No    Functional Ability and Level of Safety:   Hearing Loss  Hearing is good. Activities of Daily Living  ADL Assessment 4/20/2021   Feeding yourself No Help Needed   Getting from bed to chair No Help Needed   Getting dressed No Help Needed   Bathing or showering No Help Needed   Walk across the room (includes cane/walker) No Help Needed   Using the telphone No Help Needed   Taking your medications No Help Needed   Preparing meals Help Needed   Managing money (expenses/bills) Help Needed   Moderately strenuous housework (laundry) Help Needed   Shopping for personal items (toiletries/medicines) Help Needed   Shopping for groceries Help Needed   Driving Help Needed   Climbing a flight of stairs Help Needed   Getting to places beyond walking distances Help Needed       Fall Risk  Fall Risk Assessment, last 12 mths 4/20/2021   Able to walk? Yes   Fall in past 12 months? 0   Do you feel unsteady?  0   Are you worried about falling 0       Abuse Screen  Abuse Screening Questionnaire 4/20/2021   Do you ever feel afraid of your partner? N   Are you in a relationship with someone who physically or mentally threatens you? N   Is it safe for you to go home? Y       Cognitive Screening   Evaluation of Cognitive Function:  Has your family/caregiver stated any concerns about your memory: no    Physical Exam     Visit Vitals  /88 (BP 1 Location: Left upper arm, BP Patient Position: Sitting, BP Cuff Size: Adult)   Pulse 66   Temp 97.9 °F (36.6 °C) (Oral)   Resp 20   Ht 5' 8\" (1.727 m) Comment: patient reported   Wt 130 lb (59 kg) Comment: patient reported   SpO2 94% Comment: on 2 lpm of O2   BMI 19.77 kg/m²     Body mass index is 19.77 kg/m². General appearance - alert, well appearing, and in no distress  Mental status - alert, oriented to person, place, and time  EYE-MARCO A, EOMI,conjunctiva normal bilaterally, lids normal  ENT-ENT exam normal, no neck nodes or sinus tenderness  Nose - normal and patent, no erythema,  Or discharge   Mouth - mucous membranes moist, pharynx normal without lesions  Neck - supple, no significant adenopathy or bruit  Chest -chest hyperexpanded. Poor air movement noted on auscultation without active wheezes. Heart - normal rate, regular rhythm, normal S1, S2, no murmurs, rubs, clicks or gallops   Abdomen - soft, nontender, nondistended, no masses or organomegaly  Lymph- no adenopathy palpable  Ext-2+ edema of left leg. Healing stasis ulcer of left ankle present without cellulitis  Skin-Warm and dry. no hyperpigmentation, vitiligo, or suspicious lesions  Neuro -alert, oriented, normal speech, no focal findings or movement disorder noted    Assessment/Plan   [place] education and counseling provided:  Age appropriate evidence-based preventive care recommendations based on today's review and evaluation; including relevant cancer screening guidelines, and vaccination recommendations.   An After Visit Summary was printed and given to the patient which information about these guidelines, and a personalized schedule for health maintenance items. Whe appropriate and with patient agreement, orders noted below were placed to complete missing health maintenance items. Additional Plan for follow up chronic medical conditions includes:  Diagnoses and all orders for this visit:    Initial Medicare annual wellness visit    Chronic obstructive pulmonary disease with acute exacerbation (HCC)    Chronic respiratory failure with hypoxia, on home oxygen therapy (HCC)    Moderate pulmonary arterial systolic hypertension (HCC)    Essential hypertension  -     COLLECTION VENOUS BLOOD,VENIPUNCTURE  -     CBC WITH AUTOMATED DIFF; Future  -     METABOLIC PANEL, COMPREHENSIVE; Future    PVD (peripheral vascular disease) (HCC)    Venous stasis ulcer of left ankle limited to breakdown of skin without varicose veins ([facility]          Other instructions: The patient's medications were reviewed and reconciled. No change in her current medical regimen will be made. A no added salt diet is encouraged    Health maintenance issues were reviewed and bone density testing is declined. Age-appropriate vaccinations were reviewed and she states that she has had a Pneumovax 23 in the past.  I have recommended Covid19 vaccination and she states that she will sign up for this soon. Tdap and shingles vaccine series were recommended for later in the year. Continued home health nursing for wound care until stasis ulcer is healed    Await results of follow-up labs    Follow-up in 6 months    Follow-up and Dispositions    · Return in about 6 months (around 10/20/2021). I have reviewed with the patient details of the assessment and plan and all questions were answered. Relevent patient education was performed. The most recent lab findings were reviewed with the patient.     Mary Kay Monk MD    Please note that this dictation was completed with [employer] voice recognition software. Quite often unanticipated grammatical, syntax, homophones, and other interpretive errors are inadvertently transcribed by the computer software. Please disregard these errors. Please excuse any errors that have escaped final proofreading.

## 2021-04-21 ENCOUNTER — HOME CARE VISIT (OUTPATIENT)
Dept: SCHEDULING | Facility: HOME HEALTH | Age: 86
End: 2021-04-21
Payer: MEDICARE

## 2021-04-21 VITALS
OXYGEN SATURATION: 93 % | DIASTOLIC BLOOD PRESSURE: 80 MMHG | RESPIRATION RATE: 22 BRPM | TEMPERATURE: 98.4 F | HEART RATE: 70 BPM | SYSTOLIC BLOOD PRESSURE: 122 MMHG

## 2021-04-21 PROCEDURE — G0299 HHS/HOSPICE OF RN EA 15 MIN: HCPCS

## 2021-04-21 PROCEDURE — 3331090001 HH PPS REVENUE CREDIT

## 2021-04-21 PROCEDURE — 3331090002 HH PPS REVENUE DEBIT

## 2021-04-22 ENCOUNTER — HOME CARE VISIT (OUTPATIENT)
Dept: SCHEDULING | Facility: HOME HEALTH | Age: 86
End: 2021-04-22
Payer: MEDICARE

## 2021-04-22 PROCEDURE — 3331090001 HH PPS REVENUE CREDIT

## 2021-04-22 PROCEDURE — 3331090002 HH PPS REVENUE DEBIT

## 2021-04-22 PROCEDURE — G0156 HHCP-SVS OF AIDE,EA 15 MIN: HCPCS

## 2021-04-23 ENCOUNTER — HOME CARE VISIT (OUTPATIENT)
Dept: SCHEDULING | Facility: HOME HEALTH | Age: 86
End: 2021-04-23
Payer: MEDICARE

## 2021-04-23 PROCEDURE — G0299 HHS/HOSPICE OF RN EA 15 MIN: HCPCS

## 2021-04-23 PROCEDURE — 3331090001 HH PPS REVENUE CREDIT

## 2021-04-23 PROCEDURE — 3331090002 HH PPS REVENUE DEBIT

## 2021-04-23 NOTE — TELEPHONE ENCOUNTER
Requested Prescriptions     Pending Prescriptions Disp Refills    busPIRone (BUSPAR) 5 mg tablet        Sig: Take 1 Tab by mouth every evening.        Last Refill: 2/16/21  Next Appointment:4/20/21

## 2021-04-24 VITALS
SYSTOLIC BLOOD PRESSURE: 120 MMHG | OXYGEN SATURATION: 92 % | HEART RATE: 60 BPM | RESPIRATION RATE: 22 BRPM | DIASTOLIC BLOOD PRESSURE: 70 MMHG | TEMPERATURE: 98.3 F

## 2021-04-24 PROCEDURE — 3331090001 HH PPS REVENUE CREDIT

## 2021-04-24 PROCEDURE — 3331090002 HH PPS REVENUE DEBIT

## 2021-04-24 RX ORDER — BUSPIRONE HYDROCHLORIDE 5 MG/1
5 TABLET ORAL EVERY EVENING
Qty: 30 TAB | Refills: 0 | Status: SHIPPED | OUTPATIENT
Start: 2021-04-24 | End: 2021-05-27

## 2021-04-25 PROCEDURE — 3331090001 HH PPS REVENUE CREDIT

## 2021-04-25 PROCEDURE — 3331090002 HH PPS REVENUE DEBIT

## 2021-04-26 ENCOUNTER — HOME CARE VISIT (OUTPATIENT)
Dept: SCHEDULING | Facility: HOME HEALTH | Age: 86
End: 2021-04-26
Payer: MEDICARE

## 2021-04-26 PROCEDURE — A6252 ABSORPT DRG >16 <=48 W/O BDR: HCPCS

## 2021-04-26 PROCEDURE — A6216 NON-STERILE GAUZE<=16 SQ IN: HCPCS

## 2021-04-26 PROCEDURE — A9270 NON-COVERED ITEM OR SERVICE: HCPCS

## 2021-04-26 PROCEDURE — G0299 HHS/HOSPICE OF RN EA 15 MIN: HCPCS

## 2021-04-26 PROCEDURE — 3331090001 HH PPS REVENUE CREDIT

## 2021-04-26 PROCEDURE — 3331090002 HH PPS REVENUE DEBIT

## 2021-04-27 VITALS
SYSTOLIC BLOOD PRESSURE: 116 MMHG | DIASTOLIC BLOOD PRESSURE: 70 MMHG | TEMPERATURE: 98.6 F | RESPIRATION RATE: 22 BRPM | OXYGEN SATURATION: 91 % | HEART RATE: 60 BPM

## 2021-04-27 PROCEDURE — 3331090002 HH PPS REVENUE DEBIT

## 2021-04-27 PROCEDURE — 3331090001 HH PPS REVENUE CREDIT

## 2021-04-28 ENCOUNTER — HOME CARE VISIT (OUTPATIENT)
Dept: SCHEDULING | Facility: HOME HEALTH | Age: 86
End: 2021-04-28
Payer: MEDICARE

## 2021-04-28 VITALS
RESPIRATION RATE: 24 BRPM | DIASTOLIC BLOOD PRESSURE: 80 MMHG | OXYGEN SATURATION: 94 % | SYSTOLIC BLOOD PRESSURE: 120 MMHG | TEMPERATURE: 98.3 F | HEART RATE: 68 BPM

## 2021-04-28 PROCEDURE — 3331090002 HH PPS REVENUE DEBIT

## 2021-04-28 PROCEDURE — G0299 HHS/HOSPICE OF RN EA 15 MIN: HCPCS

## 2021-04-28 PROCEDURE — 3331090001 HH PPS REVENUE CREDIT

## 2021-04-29 ENCOUNTER — HOME CARE VISIT (OUTPATIENT)
Dept: SCHEDULING | Facility: HOME HEALTH | Age: 86
End: 2021-04-29
Payer: MEDICARE

## 2021-04-29 VITALS
SYSTOLIC BLOOD PRESSURE: 120 MMHG | OXYGEN SATURATION: 91 % | RESPIRATION RATE: 22 BRPM | TEMPERATURE: 98.3 F | HEART RATE: 61 BPM | DIASTOLIC BLOOD PRESSURE: 70 MMHG

## 2021-04-29 PROCEDURE — G0299 HHS/HOSPICE OF RN EA 15 MIN: HCPCS

## 2021-04-29 PROCEDURE — 3331090001 HH PPS REVENUE CREDIT

## 2021-04-29 PROCEDURE — G0156 HHCP-SVS OF AIDE,EA 15 MIN: HCPCS

## 2021-04-29 PROCEDURE — 3331090002 HH PPS REVENUE DEBIT

## 2021-04-30 PROCEDURE — 3331090001 HH PPS REVENUE CREDIT

## 2021-04-30 PROCEDURE — 3331090002 HH PPS REVENUE DEBIT

## 2021-05-01 PROCEDURE — 3331090001 HH PPS REVENUE CREDIT

## 2021-05-01 PROCEDURE — 3331090002 HH PPS REVENUE DEBIT

## 2021-05-02 PROCEDURE — 3331090001 HH PPS REVENUE CREDIT

## 2021-05-02 PROCEDURE — 3331090002 HH PPS REVENUE DEBIT

## 2021-05-03 ENCOUNTER — HOME CARE VISIT (OUTPATIENT)
Dept: SCHEDULING | Facility: HOME HEALTH | Age: 86
End: 2021-05-03
Payer: MEDICARE

## 2021-05-03 ENCOUNTER — HOME CARE VISIT (OUTPATIENT)
Dept: HOME HEALTH SERVICES | Facility: HOME HEALTH | Age: 86
End: 2021-05-03
Payer: MEDICARE

## 2021-05-03 VITALS
TEMPERATURE: 98.3 F | SYSTOLIC BLOOD PRESSURE: 118 MMHG | DIASTOLIC BLOOD PRESSURE: 70 MMHG | HEART RATE: 85 BPM | OXYGEN SATURATION: 93 % | RESPIRATION RATE: 22 BRPM

## 2021-05-03 PROCEDURE — G0299 HHS/HOSPICE OF RN EA 15 MIN: HCPCS

## 2021-05-03 PROCEDURE — 3331090002 HH PPS REVENUE DEBIT

## 2021-05-03 PROCEDURE — 3331090001 HH PPS REVENUE CREDIT

## 2021-05-03 PROCEDURE — A6449 LT COMPRES BAND >=3" <5"/YD: HCPCS

## 2021-05-03 PROCEDURE — A6197 ALGINATE DRSG >16 <=48 SQ IN: HCPCS

## 2021-05-04 PROCEDURE — 3331090001 HH PPS REVENUE CREDIT

## 2021-05-04 PROCEDURE — 3331090002 HH PPS REVENUE DEBIT

## 2021-05-05 ENCOUNTER — HOME CARE VISIT (OUTPATIENT)
Dept: SCHEDULING | Facility: HOME HEALTH | Age: 86
End: 2021-05-05
Payer: MEDICARE

## 2021-05-05 VITALS
HEART RATE: 82 BPM | OXYGEN SATURATION: 92 % | RESPIRATION RATE: 22 BRPM | TEMPERATURE: 98.5 F | SYSTOLIC BLOOD PRESSURE: 120 MMHG | DIASTOLIC BLOOD PRESSURE: 70 MMHG

## 2021-05-05 PROCEDURE — 3331090002 HH PPS REVENUE DEBIT

## 2021-05-05 PROCEDURE — 3331090001 HH PPS REVENUE CREDIT

## 2021-05-05 PROCEDURE — G0299 HHS/HOSPICE OF RN EA 15 MIN: HCPCS

## 2021-05-06 ENCOUNTER — HOME CARE VISIT (OUTPATIENT)
Dept: SCHEDULING | Facility: HOME HEALTH | Age: 86
End: 2021-05-06
Payer: MEDICARE

## 2021-05-06 PROCEDURE — 3331090002 HH PPS REVENUE DEBIT

## 2021-05-06 PROCEDURE — 3331090001 HH PPS REVENUE CREDIT

## 2021-05-06 PROCEDURE — G0156 HHCP-SVS OF AIDE,EA 15 MIN: HCPCS

## 2021-05-07 ENCOUNTER — HOME CARE VISIT (OUTPATIENT)
Dept: SCHEDULING | Facility: HOME HEALTH | Age: 86
End: 2021-05-07
Payer: MEDICARE

## 2021-05-07 VITALS — HEART RATE: 81 BPM | TEMPERATURE: 98.7 F | OXYGEN SATURATION: 93 %

## 2021-05-07 PROCEDURE — G0299 HHS/HOSPICE OF RN EA 15 MIN: HCPCS

## 2021-05-07 PROCEDURE — 3331090002 HH PPS REVENUE DEBIT

## 2021-05-07 PROCEDURE — 3331090001 HH PPS REVENUE CREDIT

## 2021-05-08 PROCEDURE — 3331090002 HH PPS REVENUE DEBIT

## 2021-05-08 PROCEDURE — 3331090001 HH PPS REVENUE CREDIT

## 2021-05-09 PROCEDURE — 3331090001 HH PPS REVENUE CREDIT

## 2021-05-09 PROCEDURE — 3331090002 HH PPS REVENUE DEBIT

## 2021-05-10 ENCOUNTER — HOME CARE VISIT (OUTPATIENT)
Dept: HOME HEALTH SERVICES | Facility: HOME HEALTH | Age: 86
End: 2021-05-10
Payer: MEDICARE

## 2021-05-10 ENCOUNTER — HOME CARE VISIT (OUTPATIENT)
Dept: SCHEDULING | Facility: HOME HEALTH | Age: 86
End: 2021-05-10
Payer: MEDICARE

## 2021-05-10 PROCEDURE — G0299 HHS/HOSPICE OF RN EA 15 MIN: HCPCS

## 2021-05-10 PROCEDURE — 3331090002 HH PPS REVENUE DEBIT

## 2021-05-10 PROCEDURE — 3331090001 HH PPS REVENUE CREDIT

## 2021-05-11 VITALS
TEMPERATURE: 98.4 F | OXYGEN SATURATION: 93 % | HEART RATE: 75 BPM | DIASTOLIC BLOOD PRESSURE: 80 MMHG | SYSTOLIC BLOOD PRESSURE: 126 MMHG

## 2021-05-11 PROCEDURE — 3331090002 HH PPS REVENUE DEBIT

## 2021-05-11 PROCEDURE — 3331090001 HH PPS REVENUE CREDIT

## 2021-05-12 ENCOUNTER — HOME CARE VISIT (OUTPATIENT)
Dept: SCHEDULING | Facility: HOME HEALTH | Age: 86
End: 2021-05-12
Payer: MEDICARE

## 2021-05-12 VITALS
RESPIRATION RATE: 24 BRPM | OXYGEN SATURATION: 93 % | HEART RATE: 70 BPM | TEMPERATURE: 98.4 F | SYSTOLIC BLOOD PRESSURE: 130 MMHG | DIASTOLIC BLOOD PRESSURE: 80 MMHG

## 2021-05-12 PROCEDURE — G0299 HHS/HOSPICE OF RN EA 15 MIN: HCPCS

## 2021-05-12 PROCEDURE — 3331090002 HH PPS REVENUE DEBIT

## 2021-05-12 PROCEDURE — 3331090001 HH PPS REVENUE CREDIT

## 2021-05-13 ENCOUNTER — HOME CARE VISIT (OUTPATIENT)
Dept: SCHEDULING | Facility: HOME HEALTH | Age: 86
End: 2021-05-13
Payer: MEDICARE

## 2021-05-13 PROCEDURE — 3331090001 HH PPS REVENUE CREDIT

## 2021-05-13 PROCEDURE — 3331090002 HH PPS REVENUE DEBIT

## 2021-05-13 PROCEDURE — G0156 HHCP-SVS OF AIDE,EA 15 MIN: HCPCS

## 2021-05-14 ENCOUNTER — HOME CARE VISIT (OUTPATIENT)
Dept: SCHEDULING | Facility: HOME HEALTH | Age: 86
End: 2021-05-14
Payer: MEDICARE

## 2021-05-14 VITALS
SYSTOLIC BLOOD PRESSURE: 120 MMHG | HEART RATE: 74 BPM | TEMPERATURE: 98.2 F | RESPIRATION RATE: 24 BRPM | DIASTOLIC BLOOD PRESSURE: 80 MMHG | OXYGEN SATURATION: 93 %

## 2021-05-14 PROCEDURE — 3331090002 HH PPS REVENUE DEBIT

## 2021-05-14 PROCEDURE — 3331090001 HH PPS REVENUE CREDIT

## 2021-05-14 PROCEDURE — G0299 HHS/HOSPICE OF RN EA 15 MIN: HCPCS

## 2021-05-14 PROCEDURE — 3331090003 HH PPS REVENUE ADJ

## 2021-05-15 PROCEDURE — 400018 HH-NO PAY CLAIM PROCEDURE

## 2021-05-15 PROCEDURE — 3331090002 HH PPS REVENUE DEBIT

## 2021-05-15 PROCEDURE — 3331090001 HH PPS REVENUE CREDIT

## 2021-05-16 PROCEDURE — 3331090002 HH PPS REVENUE DEBIT

## 2021-05-16 PROCEDURE — 3331090001 HH PPS REVENUE CREDIT

## 2021-05-17 ENCOUNTER — HOME CARE VISIT (OUTPATIENT)
Dept: SCHEDULING | Facility: HOME HEALTH | Age: 86
End: 2021-05-17
Payer: MEDICARE

## 2021-05-17 VITALS
TEMPERATURE: 98.7 F | DIASTOLIC BLOOD PRESSURE: 70 MMHG | HEART RATE: 80 BPM | RESPIRATION RATE: 24 BRPM | SYSTOLIC BLOOD PRESSURE: 116 MMHG | OXYGEN SATURATION: 93 %

## 2021-05-17 PROCEDURE — 3331090001 HH PPS REVENUE CREDIT

## 2021-05-17 PROCEDURE — 400014 HH F/U

## 2021-05-17 PROCEDURE — G0299 HHS/HOSPICE OF RN EA 15 MIN: HCPCS

## 2021-05-17 PROCEDURE — 3331090002 HH PPS REVENUE DEBIT

## 2021-05-18 PROCEDURE — 3331090001 HH PPS REVENUE CREDIT

## 2021-05-18 PROCEDURE — 3331090002 HH PPS REVENUE DEBIT

## 2021-05-19 ENCOUNTER — HOME CARE VISIT (OUTPATIENT)
Dept: SCHEDULING | Facility: HOME HEALTH | Age: 86
End: 2021-05-19
Payer: MEDICARE

## 2021-05-19 PROCEDURE — 3331090001 HH PPS REVENUE CREDIT

## 2021-05-19 PROCEDURE — 3331090002 HH PPS REVENUE DEBIT

## 2021-05-19 PROCEDURE — G0299 HHS/HOSPICE OF RN EA 15 MIN: HCPCS

## 2021-05-20 ENCOUNTER — HOME CARE VISIT (OUTPATIENT)
Dept: SCHEDULING | Facility: HOME HEALTH | Age: 86
End: 2021-05-20
Payer: MEDICARE

## 2021-05-20 VITALS
RESPIRATION RATE: 24 BRPM | OXYGEN SATURATION: 94 % | DIASTOLIC BLOOD PRESSURE: 80 MMHG | TEMPERATURE: 98.4 F | HEART RATE: 85 BPM | SYSTOLIC BLOOD PRESSURE: 118 MMHG

## 2021-05-20 PROCEDURE — G0156 HHCP-SVS OF AIDE,EA 15 MIN: HCPCS

## 2021-05-20 PROCEDURE — 3331090001 HH PPS REVENUE CREDIT

## 2021-05-20 PROCEDURE — 3331090002 HH PPS REVENUE DEBIT

## 2021-05-21 ENCOUNTER — HOME CARE VISIT (OUTPATIENT)
Dept: SCHEDULING | Facility: HOME HEALTH | Age: 86
End: 2021-05-21
Payer: MEDICARE

## 2021-05-21 VITALS
SYSTOLIC BLOOD PRESSURE: 120 MMHG | OXYGEN SATURATION: 93 % | TEMPERATURE: 98.6 F | HEART RATE: 67 BPM | DIASTOLIC BLOOD PRESSURE: 80 MMHG

## 2021-05-21 PROCEDURE — 3331090002 HH PPS REVENUE DEBIT

## 2021-05-21 PROCEDURE — G0299 HHS/HOSPICE OF RN EA 15 MIN: HCPCS

## 2021-05-21 PROCEDURE — 3331090001 HH PPS REVENUE CREDIT

## 2021-05-22 PROCEDURE — 3331090001 HH PPS REVENUE CREDIT

## 2021-05-22 PROCEDURE — 3331090002 HH PPS REVENUE DEBIT

## 2021-05-23 PROCEDURE — 3331090002 HH PPS REVENUE DEBIT

## 2021-05-23 PROCEDURE — 3331090001 HH PPS REVENUE CREDIT

## 2021-05-24 ENCOUNTER — HOME CARE VISIT (OUTPATIENT)
Dept: SCHEDULING | Facility: HOME HEALTH | Age: 86
End: 2021-05-24
Payer: MEDICARE

## 2021-05-24 VITALS
RESPIRATION RATE: 24 BRPM | TEMPERATURE: 98.9 F | SYSTOLIC BLOOD PRESSURE: 120 MMHG | HEART RATE: 84 BPM | DIASTOLIC BLOOD PRESSURE: 70 MMHG | OXYGEN SATURATION: 91 %

## 2021-05-24 PROCEDURE — 3331090002 HH PPS REVENUE DEBIT

## 2021-05-24 PROCEDURE — 3331090001 HH PPS REVENUE CREDIT

## 2021-05-24 PROCEDURE — G0299 HHS/HOSPICE OF RN EA 15 MIN: HCPCS

## 2021-05-25 PROCEDURE — 3331090002 HH PPS REVENUE DEBIT

## 2021-05-25 PROCEDURE — A6197 ALGINATE DRSG >16 <=48 SQ IN: HCPCS

## 2021-05-25 PROCEDURE — 3331090001 HH PPS REVENUE CREDIT

## 2021-05-25 PROCEDURE — A6252 ABSORPT DRG >16 <=48 W/O BDR: HCPCS

## 2021-05-26 ENCOUNTER — HOME CARE VISIT (OUTPATIENT)
Dept: SCHEDULING | Facility: HOME HEALTH | Age: 86
End: 2021-05-26
Payer: MEDICARE

## 2021-05-26 PROCEDURE — 3331090001 HH PPS REVENUE CREDIT

## 2021-05-26 PROCEDURE — G0299 HHS/HOSPICE OF RN EA 15 MIN: HCPCS

## 2021-05-26 PROCEDURE — 3331090002 HH PPS REVENUE DEBIT

## 2021-05-27 ENCOUNTER — HOME CARE VISIT (OUTPATIENT)
Dept: SCHEDULING | Facility: HOME HEALTH | Age: 86
End: 2021-05-27
Payer: MEDICARE

## 2021-05-27 VITALS
TEMPERATURE: 97.7 F | DIASTOLIC BLOOD PRESSURE: 70 MMHG | SYSTOLIC BLOOD PRESSURE: 122 MMHG | OXYGEN SATURATION: 92 % | HEART RATE: 67 BPM | RESPIRATION RATE: 22 BRPM

## 2021-05-27 PROCEDURE — 3331090001 HH PPS REVENUE CREDIT

## 2021-05-27 PROCEDURE — 3331090002 HH PPS REVENUE DEBIT

## 2021-05-27 PROCEDURE — G0156 HHCP-SVS OF AIDE,EA 15 MIN: HCPCS

## 2021-05-28 ENCOUNTER — HOME CARE VISIT (OUTPATIENT)
Dept: SCHEDULING | Facility: HOME HEALTH | Age: 86
End: 2021-05-28
Payer: MEDICARE

## 2021-05-28 VITALS
HEART RATE: 77 BPM | OXYGEN SATURATION: 93 % | DIASTOLIC BLOOD PRESSURE: 70 MMHG | RESPIRATION RATE: 22 BRPM | SYSTOLIC BLOOD PRESSURE: 120 MMHG | TEMPERATURE: 98.3 F

## 2021-05-28 PROCEDURE — 3331090002 HH PPS REVENUE DEBIT

## 2021-05-28 PROCEDURE — 3331090001 HH PPS REVENUE CREDIT

## 2021-05-28 PROCEDURE — G0299 HHS/HOSPICE OF RN EA 15 MIN: HCPCS

## 2021-05-29 PROCEDURE — 3331090002 HH PPS REVENUE DEBIT

## 2021-05-29 PROCEDURE — 3331090001 HH PPS REVENUE CREDIT

## 2021-05-30 PROCEDURE — 3331090002 HH PPS REVENUE DEBIT

## 2021-05-30 PROCEDURE — 3331090001 HH PPS REVENUE CREDIT

## 2021-05-31 ENCOUNTER — HOME CARE VISIT (OUTPATIENT)
Dept: SCHEDULING | Facility: HOME HEALTH | Age: 86
End: 2021-05-31
Payer: MEDICARE

## 2021-05-31 VITALS
TEMPERATURE: 98.2 F | OXYGEN SATURATION: 93 % | RESPIRATION RATE: 24 BRPM | DIASTOLIC BLOOD PRESSURE: 80 MMHG | SYSTOLIC BLOOD PRESSURE: 120 MMHG | HEART RATE: 65 BPM

## 2021-05-31 PROCEDURE — 3331090002 HH PPS REVENUE DEBIT

## 2021-05-31 PROCEDURE — G0299 HHS/HOSPICE OF RN EA 15 MIN: HCPCS

## 2021-05-31 PROCEDURE — 3331090001 HH PPS REVENUE CREDIT

## 2021-06-01 PROCEDURE — 3331090001 HH PPS REVENUE CREDIT

## 2021-06-01 PROCEDURE — 3331090002 HH PPS REVENUE DEBIT

## 2021-06-02 ENCOUNTER — HOME CARE VISIT (OUTPATIENT)
Dept: SCHEDULING | Facility: HOME HEALTH | Age: 86
End: 2021-06-02
Payer: MEDICARE

## 2021-06-02 VITALS
SYSTOLIC BLOOD PRESSURE: 120 MMHG | DIASTOLIC BLOOD PRESSURE: 80 MMHG | TEMPERATURE: 98.1 F | RESPIRATION RATE: 18 BRPM | HEART RATE: 62 BPM | OXYGEN SATURATION: 91 %

## 2021-06-02 PROCEDURE — G0299 HHS/HOSPICE OF RN EA 15 MIN: HCPCS

## 2021-06-02 PROCEDURE — 3331090002 HH PPS REVENUE DEBIT

## 2021-06-02 PROCEDURE — 3331090001 HH PPS REVENUE CREDIT

## 2021-06-03 ENCOUNTER — HOME CARE VISIT (OUTPATIENT)
Dept: SCHEDULING | Facility: HOME HEALTH | Age: 86
End: 2021-06-03
Payer: MEDICARE

## 2021-06-03 PROCEDURE — 3331090001 HH PPS REVENUE CREDIT

## 2021-06-03 PROCEDURE — G0156 HHCP-SVS OF AIDE,EA 15 MIN: HCPCS

## 2021-06-03 PROCEDURE — 3331090002 HH PPS REVENUE DEBIT

## 2021-06-04 ENCOUNTER — HOME CARE VISIT (OUTPATIENT)
Dept: SCHEDULING | Facility: HOME HEALTH | Age: 86
End: 2021-06-04
Payer: MEDICARE

## 2021-06-04 PROCEDURE — 3331090001 HH PPS REVENUE CREDIT

## 2021-06-04 PROCEDURE — G0299 HHS/HOSPICE OF RN EA 15 MIN: HCPCS

## 2021-06-04 PROCEDURE — 3331090002 HH PPS REVENUE DEBIT

## 2021-06-05 VITALS
TEMPERATURE: 98.2 F | DIASTOLIC BLOOD PRESSURE: 80 MMHG | HEART RATE: 76 BPM | SYSTOLIC BLOOD PRESSURE: 120 MMHG | OXYGEN SATURATION: 93 % | RESPIRATION RATE: 22 BRPM

## 2021-06-05 PROCEDURE — 3331090002 HH PPS REVENUE DEBIT

## 2021-06-05 PROCEDURE — 3331090001 HH PPS REVENUE CREDIT

## 2021-06-06 PROCEDURE — 3331090001 HH PPS REVENUE CREDIT

## 2021-06-06 PROCEDURE — 3331090002 HH PPS REVENUE DEBIT

## 2021-06-07 ENCOUNTER — HOME CARE VISIT (OUTPATIENT)
Dept: SCHEDULING | Facility: HOME HEALTH | Age: 86
End: 2021-06-07
Payer: MEDICARE

## 2021-06-07 PROCEDURE — 3331090002 HH PPS REVENUE DEBIT

## 2021-06-07 PROCEDURE — G0299 HHS/HOSPICE OF RN EA 15 MIN: HCPCS

## 2021-06-07 PROCEDURE — 3331090001 HH PPS REVENUE CREDIT

## 2021-06-08 VITALS
RESPIRATION RATE: 24 BRPM | SYSTOLIC BLOOD PRESSURE: 116 MMHG | TEMPERATURE: 98.6 F | DIASTOLIC BLOOD PRESSURE: 70 MMHG | HEART RATE: 60 BPM | OXYGEN SATURATION: 92 %

## 2021-06-08 PROCEDURE — 3331090002 HH PPS REVENUE DEBIT

## 2021-06-08 PROCEDURE — 3331090001 HH PPS REVENUE CREDIT

## 2021-06-09 ENCOUNTER — HOME CARE VISIT (OUTPATIENT)
Dept: HOME HEALTH SERVICES | Facility: HOME HEALTH | Age: 86
End: 2021-06-09
Payer: MEDICARE

## 2021-06-09 ENCOUNTER — HOME CARE VISIT (OUTPATIENT)
Dept: SCHEDULING | Facility: HOME HEALTH | Age: 86
End: 2021-06-09
Payer: MEDICARE

## 2021-06-09 PROCEDURE — G0299 HHS/HOSPICE OF RN EA 15 MIN: HCPCS

## 2021-06-09 PROCEDURE — 3331090001 HH PPS REVENUE CREDIT

## 2021-06-09 PROCEDURE — 3331090002 HH PPS REVENUE DEBIT

## 2021-06-10 ENCOUNTER — HOME CARE VISIT (OUTPATIENT)
Dept: SCHEDULING | Facility: HOME HEALTH | Age: 86
End: 2021-06-10
Payer: MEDICARE

## 2021-06-10 VITALS
SYSTOLIC BLOOD PRESSURE: 120 MMHG | DIASTOLIC BLOOD PRESSURE: 80 MMHG | RESPIRATION RATE: 24 BRPM | TEMPERATURE: 98.5 F | HEART RATE: 60 BPM | OXYGEN SATURATION: 92 %

## 2021-06-10 PROCEDURE — 3331090001 HH PPS REVENUE CREDIT

## 2021-06-10 PROCEDURE — G0156 HHCP-SVS OF AIDE,EA 15 MIN: HCPCS

## 2021-06-10 PROCEDURE — A6197 ALGINATE DRSG >16 <=48 SQ IN: HCPCS

## 2021-06-10 PROCEDURE — 3331090002 HH PPS REVENUE DEBIT

## 2021-06-10 PROCEDURE — A6216 NON-STERILE GAUZE<=16 SQ IN: HCPCS

## 2021-06-11 ENCOUNTER — HOME CARE VISIT (OUTPATIENT)
Dept: SCHEDULING | Facility: HOME HEALTH | Age: 86
End: 2021-06-11
Payer: MEDICARE

## 2021-06-11 PROCEDURE — 3331090002 HH PPS REVENUE DEBIT

## 2021-06-11 PROCEDURE — G0299 HHS/HOSPICE OF RN EA 15 MIN: HCPCS

## 2021-06-11 PROCEDURE — 3331090001 HH PPS REVENUE CREDIT

## 2021-06-12 PROCEDURE — 3331090002 HH PPS REVENUE DEBIT

## 2021-06-12 PROCEDURE — 3331090001 HH PPS REVENUE CREDIT

## 2021-06-13 VITALS
SYSTOLIC BLOOD PRESSURE: 120 MMHG | OXYGEN SATURATION: 93 % | RESPIRATION RATE: 24 BRPM | HEART RATE: 73 BPM | DIASTOLIC BLOOD PRESSURE: 80 MMHG | TEMPERATURE: 98.5 F

## 2021-06-13 PROCEDURE — 3331090001 HH PPS REVENUE CREDIT

## 2021-06-13 PROCEDURE — 3331090003 HH PPS REVENUE ADJ

## 2021-06-13 PROCEDURE — 3331090002 HH PPS REVENUE DEBIT

## 2021-06-14 ENCOUNTER — HOME CARE VISIT (OUTPATIENT)
Dept: SCHEDULING | Facility: HOME HEALTH | Age: 86
End: 2021-06-14
Payer: MEDICARE

## 2021-06-14 VITALS
DIASTOLIC BLOOD PRESSURE: 70 MMHG | RESPIRATION RATE: 22 BRPM | HEART RATE: 64 BPM | OXYGEN SATURATION: 91 % | SYSTOLIC BLOOD PRESSURE: 118 MMHG | TEMPERATURE: 98.5 F

## 2021-06-14 PROCEDURE — 3331090002 HH PPS REVENUE DEBIT

## 2021-06-14 PROCEDURE — 3331090001 HH PPS REVENUE CREDIT

## 2021-06-14 PROCEDURE — 400014 HH F/U

## 2021-06-14 PROCEDURE — 400018 HH-NO PAY CLAIM PROCEDURE

## 2021-06-14 PROCEDURE — G0299 HHS/HOSPICE OF RN EA 15 MIN: HCPCS

## 2021-06-15 PROCEDURE — 3331090002 HH PPS REVENUE DEBIT

## 2021-06-15 PROCEDURE — 3331090001 HH PPS REVENUE CREDIT

## 2021-06-16 ENCOUNTER — HOME CARE VISIT (OUTPATIENT)
Dept: HOME HEALTH SERVICES | Facility: HOME HEALTH | Age: 86
End: 2021-06-16
Payer: MEDICARE

## 2021-06-16 PROCEDURE — 3331090001 HH PPS REVENUE CREDIT

## 2021-06-16 PROCEDURE — 3331090002 HH PPS REVENUE DEBIT

## 2021-06-17 ENCOUNTER — HOME CARE VISIT (OUTPATIENT)
Dept: HOME HEALTH SERVICES | Facility: HOME HEALTH | Age: 86
End: 2021-06-17
Payer: MEDICARE

## 2021-06-17 ENCOUNTER — HOME CARE VISIT (OUTPATIENT)
Dept: SCHEDULING | Facility: HOME HEALTH | Age: 86
End: 2021-06-17
Payer: MEDICARE

## 2021-06-17 VITALS
RESPIRATION RATE: 24 BRPM | SYSTOLIC BLOOD PRESSURE: 120 MMHG | OXYGEN SATURATION: 94 % | HEART RATE: 70 BPM | TEMPERATURE: 98.8 F | DIASTOLIC BLOOD PRESSURE: 80 MMHG

## 2021-06-17 PROCEDURE — 3331090002 HH PPS REVENUE DEBIT

## 2021-06-17 PROCEDURE — 3331090001 HH PPS REVENUE CREDIT

## 2021-06-17 PROCEDURE — G0156 HHCP-SVS OF AIDE,EA 15 MIN: HCPCS

## 2021-06-17 PROCEDURE — G0299 HHS/HOSPICE OF RN EA 15 MIN: HCPCS

## 2021-06-18 ENCOUNTER — HOME CARE VISIT (OUTPATIENT)
Dept: SCHEDULING | Facility: HOME HEALTH | Age: 86
End: 2021-06-18
Payer: MEDICARE

## 2021-06-18 VITALS
RESPIRATION RATE: 22 BRPM | HEART RATE: 71 BPM | SYSTOLIC BLOOD PRESSURE: 120 MMHG | OXYGEN SATURATION: 93 % | TEMPERATURE: 98.3 F | DIASTOLIC BLOOD PRESSURE: 70 MMHG

## 2021-06-18 PROCEDURE — 3331090001 HH PPS REVENUE CREDIT

## 2021-06-18 PROCEDURE — 3331090002 HH PPS REVENUE DEBIT

## 2021-06-18 PROCEDURE — G0299 HHS/HOSPICE OF RN EA 15 MIN: HCPCS

## 2021-06-19 PROCEDURE — 3331090001 HH PPS REVENUE CREDIT

## 2021-06-19 PROCEDURE — 3331090002 HH PPS REVENUE DEBIT

## 2021-06-20 PROCEDURE — 3331090001 HH PPS REVENUE CREDIT

## 2021-06-20 PROCEDURE — 3331090002 HH PPS REVENUE DEBIT

## 2021-06-21 ENCOUNTER — HOME CARE VISIT (OUTPATIENT)
Dept: SCHEDULING | Facility: HOME HEALTH | Age: 86
End: 2021-06-21
Payer: MEDICARE

## 2021-06-21 VITALS
DIASTOLIC BLOOD PRESSURE: 78 MMHG | SYSTOLIC BLOOD PRESSURE: 122 MMHG | OXYGEN SATURATION: 92 % | RESPIRATION RATE: 24 BRPM | HEART RATE: 72 BPM | TEMPERATURE: 98.6 F

## 2021-06-21 PROCEDURE — 3331090001 HH PPS REVENUE CREDIT

## 2021-06-21 PROCEDURE — G0299 HHS/HOSPICE OF RN EA 15 MIN: HCPCS

## 2021-06-21 PROCEDURE — 3331090002 HH PPS REVENUE DEBIT

## 2021-06-22 PROCEDURE — 3331090001 HH PPS REVENUE CREDIT

## 2021-06-22 PROCEDURE — 3331090002 HH PPS REVENUE DEBIT

## 2021-06-23 ENCOUNTER — HOME CARE VISIT (OUTPATIENT)
Dept: SCHEDULING | Facility: HOME HEALTH | Age: 86
End: 2021-06-23
Payer: MEDICARE

## 2021-06-23 PROCEDURE — 3331090002 HH PPS REVENUE DEBIT

## 2021-06-23 PROCEDURE — 3331090001 HH PPS REVENUE CREDIT

## 2021-06-23 PROCEDURE — G0299 HHS/HOSPICE OF RN EA 15 MIN: HCPCS

## 2021-06-24 VITALS
TEMPERATURE: 98.3 F | DIASTOLIC BLOOD PRESSURE: 80 MMHG | HEART RATE: 72 BPM | SYSTOLIC BLOOD PRESSURE: 120 MMHG | OXYGEN SATURATION: 92 %

## 2021-06-24 PROCEDURE — 3331090001 HH PPS REVENUE CREDIT

## 2021-06-24 PROCEDURE — 3331090002 HH PPS REVENUE DEBIT

## 2021-06-25 ENCOUNTER — HOME CARE VISIT (OUTPATIENT)
Dept: SCHEDULING | Facility: HOME HEALTH | Age: 86
End: 2021-06-25
Payer: MEDICARE

## 2021-06-25 VITALS
TEMPERATURE: 98.3 F | SYSTOLIC BLOOD PRESSURE: 120 MMHG | RESPIRATION RATE: 24 BRPM | OXYGEN SATURATION: 93 % | HEART RATE: 72 BPM | DIASTOLIC BLOOD PRESSURE: 70 MMHG

## 2021-06-25 PROCEDURE — 3331090001 HH PPS REVENUE CREDIT

## 2021-06-25 PROCEDURE — 3331090002 HH PPS REVENUE DEBIT

## 2021-06-25 PROCEDURE — G0299 HHS/HOSPICE OF RN EA 15 MIN: HCPCS

## 2021-06-25 PROCEDURE — G0156 HHCP-SVS OF AIDE,EA 15 MIN: HCPCS

## 2021-06-26 PROCEDURE — 3331090001 HH PPS REVENUE CREDIT

## 2021-06-26 PROCEDURE — 3331090002 HH PPS REVENUE DEBIT

## 2021-06-27 PROCEDURE — 3331090002 HH PPS REVENUE DEBIT

## 2021-06-27 PROCEDURE — 3331090001 HH PPS REVENUE CREDIT

## 2021-06-28 ENCOUNTER — HOME CARE VISIT (OUTPATIENT)
Dept: SCHEDULING | Facility: HOME HEALTH | Age: 86
End: 2021-06-28
Payer: MEDICARE

## 2021-06-28 VITALS
SYSTOLIC BLOOD PRESSURE: 122 MMHG | OXYGEN SATURATION: 92 % | HEART RATE: 84 BPM | TEMPERATURE: 98.4 F | DIASTOLIC BLOOD PRESSURE: 80 MMHG

## 2021-06-28 PROCEDURE — 3331090001 HH PPS REVENUE CREDIT

## 2021-06-28 PROCEDURE — G0299 HHS/HOSPICE OF RN EA 15 MIN: HCPCS

## 2021-06-28 PROCEDURE — 3331090002 HH PPS REVENUE DEBIT

## 2021-06-29 PROCEDURE — 3331090002 HH PPS REVENUE DEBIT

## 2021-06-29 PROCEDURE — 3331090001 HH PPS REVENUE CREDIT

## 2021-06-29 PROCEDURE — A6252 ABSORPT DRG >16 <=48 W/O BDR: HCPCS

## 2021-06-29 PROCEDURE — A6446 CONFORM BAND S W>=3" <5"/YD: HCPCS

## 2021-06-29 PROCEDURE — A6216 NON-STERILE GAUZE<=16 SQ IN: HCPCS

## 2021-06-29 PROCEDURE — A6199 ALGINATE DRSG WOUND FILLER: HCPCS

## 2021-06-30 ENCOUNTER — HOME CARE VISIT (OUTPATIENT)
Dept: SCHEDULING | Facility: HOME HEALTH | Age: 86
End: 2021-06-30
Payer: MEDICARE

## 2021-06-30 VITALS
OXYGEN SATURATION: 92 % | DIASTOLIC BLOOD PRESSURE: 80 MMHG | SYSTOLIC BLOOD PRESSURE: 122 MMHG | HEART RATE: 85 BPM | TEMPERATURE: 98.4 F | RESPIRATION RATE: 24 BRPM

## 2021-06-30 PROCEDURE — G0299 HHS/HOSPICE OF RN EA 15 MIN: HCPCS

## 2021-06-30 PROCEDURE — 3331090002 HH PPS REVENUE DEBIT

## 2021-06-30 PROCEDURE — 3331090001 HH PPS REVENUE CREDIT

## 2021-07-01 ENCOUNTER — HOME CARE VISIT (OUTPATIENT)
Dept: SCHEDULING | Facility: HOME HEALTH | Age: 86
End: 2021-07-01
Payer: MEDICARE

## 2021-07-01 PROCEDURE — 3331090002 HH PPS REVENUE DEBIT

## 2021-07-01 PROCEDURE — 3331090001 HH PPS REVENUE CREDIT

## 2021-07-01 PROCEDURE — G0156 HHCP-SVS OF AIDE,EA 15 MIN: HCPCS

## 2021-07-02 ENCOUNTER — HOME CARE VISIT (OUTPATIENT)
Dept: SCHEDULING | Facility: HOME HEALTH | Age: 86
End: 2021-07-02
Payer: MEDICARE

## 2021-07-02 VITALS
DIASTOLIC BLOOD PRESSURE: 80 MMHG | SYSTOLIC BLOOD PRESSURE: 120 MMHG | TEMPERATURE: 98.1 F | OXYGEN SATURATION: 93 % | RESPIRATION RATE: 24 BRPM | HEART RATE: 65 BPM

## 2021-07-02 PROCEDURE — 3331090001 HH PPS REVENUE CREDIT

## 2021-07-02 PROCEDURE — G0299 HHS/HOSPICE OF RN EA 15 MIN: HCPCS

## 2021-07-02 PROCEDURE — 3331090002 HH PPS REVENUE DEBIT

## 2021-07-02 PROCEDURE — A6197 ALGINATE DRSG >16 <=48 SQ IN: HCPCS

## 2021-07-03 PROCEDURE — 3331090001 HH PPS REVENUE CREDIT

## 2021-07-03 PROCEDURE — 3331090002 HH PPS REVENUE DEBIT

## 2021-07-04 PROCEDURE — 3331090002 HH PPS REVENUE DEBIT

## 2021-07-04 PROCEDURE — 3331090001 HH PPS REVENUE CREDIT

## 2021-07-05 ENCOUNTER — HOME CARE VISIT (OUTPATIENT)
Dept: SCHEDULING | Facility: HOME HEALTH | Age: 86
End: 2021-07-05
Payer: MEDICARE

## 2021-07-05 VITALS
DIASTOLIC BLOOD PRESSURE: 80 MMHG | SYSTOLIC BLOOD PRESSURE: 120 MMHG | TEMPERATURE: 98.7 F | OXYGEN SATURATION: 93 % | HEART RATE: 74 BPM | RESPIRATION RATE: 24 BRPM

## 2021-07-05 PROCEDURE — G0299 HHS/HOSPICE OF RN EA 15 MIN: HCPCS

## 2021-07-05 PROCEDURE — 3331090001 HH PPS REVENUE CREDIT

## 2021-07-05 PROCEDURE — 3331090002 HH PPS REVENUE DEBIT

## 2021-07-06 PROCEDURE — 3331090002 HH PPS REVENUE DEBIT

## 2021-07-06 PROCEDURE — 3331090001 HH PPS REVENUE CREDIT

## 2021-07-07 ENCOUNTER — HOME CARE VISIT (OUTPATIENT)
Dept: SCHEDULING | Facility: HOME HEALTH | Age: 86
End: 2021-07-07
Payer: MEDICARE

## 2021-07-07 PROCEDURE — 3331090002 HH PPS REVENUE DEBIT

## 2021-07-07 PROCEDURE — G0300 HHS/HOSPICE OF LPN EA 15 MIN: HCPCS

## 2021-07-07 PROCEDURE — 3331090001 HH PPS REVENUE CREDIT

## 2021-07-08 ENCOUNTER — HOME CARE VISIT (OUTPATIENT)
Dept: SCHEDULING | Facility: HOME HEALTH | Age: 86
End: 2021-07-08
Payer: MEDICARE

## 2021-07-08 PROCEDURE — 3331090001 HH PPS REVENUE CREDIT

## 2021-07-08 PROCEDURE — 3331090002 HH PPS REVENUE DEBIT

## 2021-07-08 PROCEDURE — G0156 HHCP-SVS OF AIDE,EA 15 MIN: HCPCS

## 2021-07-09 ENCOUNTER — HOME CARE VISIT (OUTPATIENT)
Dept: SCHEDULING | Facility: HOME HEALTH | Age: 86
End: 2021-07-09
Payer: MEDICARE

## 2021-07-09 VITALS
SYSTOLIC BLOOD PRESSURE: 117 MMHG | DIASTOLIC BLOOD PRESSURE: 50 MMHG | OXYGEN SATURATION: 96 % | DIASTOLIC BLOOD PRESSURE: 65 MMHG | HEART RATE: 51 BPM | TEMPERATURE: 96.9 F | HEART RATE: 60 BPM | SYSTOLIC BLOOD PRESSURE: 101 MMHG | RESPIRATION RATE: 18 BRPM | RESPIRATION RATE: 20 BRPM | TEMPERATURE: 97.4 F | OXYGEN SATURATION: 95 %

## 2021-07-09 PROCEDURE — 3331090001 HH PPS REVENUE CREDIT

## 2021-07-09 PROCEDURE — 3331090002 HH PPS REVENUE DEBIT

## 2021-07-09 PROCEDURE — A6448 LT COMPRES BAND <3"/YD: HCPCS

## 2021-07-09 PROCEDURE — A6446 CONFORM BAND S W>=3" <5"/YD: HCPCS

## 2021-07-09 PROCEDURE — A6252 ABSORPT DRG >16 <=48 W/O BDR: HCPCS

## 2021-07-09 PROCEDURE — G0300 HHS/HOSPICE OF LPN EA 15 MIN: HCPCS

## 2021-07-09 PROCEDURE — A9270 NON-COVERED ITEM OR SERVICE: HCPCS

## 2021-07-10 PROCEDURE — 3331090002 HH PPS REVENUE DEBIT

## 2021-07-10 PROCEDURE — 3331090001 HH PPS REVENUE CREDIT

## 2021-07-11 PROCEDURE — 3331090002 HH PPS REVENUE DEBIT

## 2021-07-11 PROCEDURE — 3331090001 HH PPS REVENUE CREDIT

## 2021-07-12 ENCOUNTER — HOME CARE VISIT (OUTPATIENT)
Dept: HOME HEALTH SERVICES | Facility: HOME HEALTH | Age: 86
End: 2021-07-12
Payer: MEDICARE

## 2021-07-12 ENCOUNTER — HOME CARE VISIT (OUTPATIENT)
Dept: SCHEDULING | Facility: HOME HEALTH | Age: 86
End: 2021-07-12
Payer: MEDICARE

## 2021-07-12 VITALS
DIASTOLIC BLOOD PRESSURE: 70 MMHG | RESPIRATION RATE: 22 BRPM | OXYGEN SATURATION: 93 % | TEMPERATURE: 98 F | HEART RATE: 73 BPM | SYSTOLIC BLOOD PRESSURE: 120 MMHG

## 2021-07-12 PROCEDURE — G0299 HHS/HOSPICE OF RN EA 15 MIN: HCPCS

## 2021-07-12 PROCEDURE — 3331090002 HH PPS REVENUE DEBIT

## 2021-07-12 PROCEDURE — 3331090001 HH PPS REVENUE CREDIT

## 2021-07-13 PROCEDURE — 3331090001 HH PPS REVENUE CREDIT

## 2021-07-13 PROCEDURE — 3331090002 HH PPS REVENUE DEBIT

## 2021-07-13 PROCEDURE — 3331090003 HH PPS REVENUE ADJ

## 2021-07-14 ENCOUNTER — HOME CARE VISIT (OUTPATIENT)
Dept: SCHEDULING | Facility: HOME HEALTH | Age: 86
End: 2021-07-14
Payer: MEDICARE

## 2021-07-14 PROCEDURE — 3331090002 HH PPS REVENUE DEBIT

## 2021-07-14 PROCEDURE — 3331090001 HH PPS REVENUE CREDIT

## 2021-07-14 PROCEDURE — G0299 HHS/HOSPICE OF RN EA 15 MIN: HCPCS

## 2021-07-14 PROCEDURE — 400014 HH F/U

## 2021-07-14 PROCEDURE — 400018 HH-NO PAY CLAIM PROCEDURE

## 2021-07-15 ENCOUNTER — HOME CARE VISIT (OUTPATIENT)
Dept: SCHEDULING | Facility: HOME HEALTH | Age: 86
End: 2021-07-15
Payer: MEDICARE

## 2021-07-15 VITALS
TEMPERATURE: 98.6 F | HEART RATE: 81 BPM | RESPIRATION RATE: 22 BRPM | SYSTOLIC BLOOD PRESSURE: 107 MMHG | OXYGEN SATURATION: 93 % | DIASTOLIC BLOOD PRESSURE: 60 MMHG

## 2021-07-15 PROCEDURE — 3331090001 HH PPS REVENUE CREDIT

## 2021-07-15 PROCEDURE — G0156 HHCP-SVS OF AIDE,EA 15 MIN: HCPCS

## 2021-07-15 PROCEDURE — 3331090002 HH PPS REVENUE DEBIT

## 2021-07-16 ENCOUNTER — HOME CARE VISIT (OUTPATIENT)
Dept: SCHEDULING | Facility: HOME HEALTH | Age: 86
End: 2021-07-16
Payer: MEDICARE

## 2021-07-16 PROCEDURE — 3331090002 HH PPS REVENUE DEBIT

## 2021-07-16 PROCEDURE — G0299 HHS/HOSPICE OF RN EA 15 MIN: HCPCS

## 2021-07-16 PROCEDURE — 3331090001 HH PPS REVENUE CREDIT

## 2021-07-17 PROCEDURE — 3331090001 HH PPS REVENUE CREDIT

## 2021-07-17 PROCEDURE — 3331090002 HH PPS REVENUE DEBIT

## 2021-07-18 VITALS
SYSTOLIC BLOOD PRESSURE: 112 MMHG | DIASTOLIC BLOOD PRESSURE: 60 MMHG | HEART RATE: 84 BPM | RESPIRATION RATE: 22 BRPM | TEMPERATURE: 98.9 F | OXYGEN SATURATION: 94 %

## 2021-07-18 PROCEDURE — 3331090001 HH PPS REVENUE CREDIT

## 2021-07-18 PROCEDURE — 3331090002 HH PPS REVENUE DEBIT

## 2021-07-19 ENCOUNTER — HOME CARE VISIT (OUTPATIENT)
Dept: SCHEDULING | Facility: HOME HEALTH | Age: 86
End: 2021-07-19
Payer: MEDICARE

## 2021-07-19 VITALS
TEMPERATURE: 98.9 F | RESPIRATION RATE: 22 BRPM | DIASTOLIC BLOOD PRESSURE: 71 MMHG | SYSTOLIC BLOOD PRESSURE: 120 MMHG | HEART RATE: 87 BPM | OXYGEN SATURATION: 93 %

## 2021-07-19 PROCEDURE — 3331090001 HH PPS REVENUE CREDIT

## 2021-07-19 PROCEDURE — 3331090002 HH PPS REVENUE DEBIT

## 2021-07-19 PROCEDURE — G0299 HHS/HOSPICE OF RN EA 15 MIN: HCPCS

## 2021-07-20 PROCEDURE — 3331090002 HH PPS REVENUE DEBIT

## 2021-07-20 PROCEDURE — 3331090001 HH PPS REVENUE CREDIT

## 2021-07-21 ENCOUNTER — HOME CARE VISIT (OUTPATIENT)
Dept: SCHEDULING | Facility: HOME HEALTH | Age: 86
End: 2021-07-21
Payer: MEDICARE

## 2021-07-21 VITALS
SYSTOLIC BLOOD PRESSURE: 110 MMHG | TEMPERATURE: 98.7 F | HEART RATE: 79 BPM | DIASTOLIC BLOOD PRESSURE: 60 MMHG | RESPIRATION RATE: 22 BRPM | OXYGEN SATURATION: 93 %

## 2021-07-21 PROCEDURE — 3331090002 HH PPS REVENUE DEBIT

## 2021-07-21 PROCEDURE — G0299 HHS/HOSPICE OF RN EA 15 MIN: HCPCS

## 2021-07-21 PROCEDURE — 3331090001 HH PPS REVENUE CREDIT

## 2021-07-22 PROCEDURE — 3331090002 HH PPS REVENUE DEBIT

## 2021-07-22 PROCEDURE — 3331090001 HH PPS REVENUE CREDIT

## 2021-07-23 ENCOUNTER — HOME CARE VISIT (OUTPATIENT)
Dept: SCHEDULING | Facility: HOME HEALTH | Age: 86
End: 2021-07-23
Payer: MEDICARE

## 2021-07-23 PROCEDURE — 3331090001 HH PPS REVENUE CREDIT

## 2021-07-23 PROCEDURE — 3331090002 HH PPS REVENUE DEBIT

## 2021-07-23 PROCEDURE — G0156 HHCP-SVS OF AIDE,EA 15 MIN: HCPCS

## 2021-07-23 PROCEDURE — G0299 HHS/HOSPICE OF RN EA 15 MIN: HCPCS

## 2021-07-24 VITALS
SYSTOLIC BLOOD PRESSURE: 118 MMHG | OXYGEN SATURATION: 94 % | RESPIRATION RATE: 22 BRPM | TEMPERATURE: 98.7 F | DIASTOLIC BLOOD PRESSURE: 70 MMHG | HEART RATE: 60 BPM

## 2021-07-24 PROCEDURE — 3331090001 HH PPS REVENUE CREDIT

## 2021-07-24 PROCEDURE — 3331090002 HH PPS REVENUE DEBIT

## 2021-07-25 PROCEDURE — 3331090002 HH PPS REVENUE DEBIT

## 2021-07-25 PROCEDURE — 3331090001 HH PPS REVENUE CREDIT

## 2021-07-26 ENCOUNTER — HOME CARE VISIT (OUTPATIENT)
Dept: SCHEDULING | Facility: HOME HEALTH | Age: 86
End: 2021-07-26
Payer: MEDICARE

## 2021-07-26 VITALS
OXYGEN SATURATION: 94 % | TEMPERATURE: 98.9 F | SYSTOLIC BLOOD PRESSURE: 110 MMHG | DIASTOLIC BLOOD PRESSURE: 70 MMHG | HEART RATE: 81 BPM

## 2021-07-26 PROCEDURE — 3331090001 HH PPS REVENUE CREDIT

## 2021-07-26 PROCEDURE — 3331090002 HH PPS REVENUE DEBIT

## 2021-07-26 PROCEDURE — G0299 HHS/HOSPICE OF RN EA 15 MIN: HCPCS

## 2021-07-27 PROCEDURE — 3331090001 HH PPS REVENUE CREDIT

## 2021-07-27 PROCEDURE — 3331090002 HH PPS REVENUE DEBIT

## 2021-07-28 ENCOUNTER — HOME CARE VISIT (OUTPATIENT)
Dept: SCHEDULING | Facility: HOME HEALTH | Age: 86
End: 2021-07-28
Payer: MEDICARE

## 2021-07-28 VITALS
TEMPERATURE: 98.5 F | OXYGEN SATURATION: 94 % | DIASTOLIC BLOOD PRESSURE: 70 MMHG | HEART RATE: 94 BPM | SYSTOLIC BLOOD PRESSURE: 120 MMHG | RESPIRATION RATE: 22 BRPM

## 2021-07-28 PROCEDURE — 3331090002 HH PPS REVENUE DEBIT

## 2021-07-28 PROCEDURE — 3331090001 HH PPS REVENUE CREDIT

## 2021-07-28 PROCEDURE — G0156 HHCP-SVS OF AIDE,EA 15 MIN: HCPCS

## 2021-07-28 PROCEDURE — G0299 HHS/HOSPICE OF RN EA 15 MIN: HCPCS

## 2021-07-29 PROCEDURE — A6197 ALGINATE DRSG >16 <=48 SQ IN: HCPCS

## 2021-07-29 PROCEDURE — 3331090002 HH PPS REVENUE DEBIT

## 2021-07-29 PROCEDURE — A6216 NON-STERILE GAUZE<=16 SQ IN: HCPCS

## 2021-07-29 PROCEDURE — 3331090001 HH PPS REVENUE CREDIT

## 2021-07-30 ENCOUNTER — HOME CARE VISIT (OUTPATIENT)
Dept: SCHEDULING | Facility: HOME HEALTH | Age: 86
End: 2021-07-30
Payer: MEDICARE

## 2021-07-30 PROCEDURE — G0299 HHS/HOSPICE OF RN EA 15 MIN: HCPCS

## 2021-07-30 PROCEDURE — 3331090002 HH PPS REVENUE DEBIT

## 2021-07-30 PROCEDURE — 3331090001 HH PPS REVENUE CREDIT

## 2021-07-31 VITALS
HEART RATE: 60 BPM | SYSTOLIC BLOOD PRESSURE: 120 MMHG | TEMPERATURE: 98.2 F | OXYGEN SATURATION: 95 % | RESPIRATION RATE: 22 BRPM | DIASTOLIC BLOOD PRESSURE: 70 MMHG

## 2021-07-31 PROCEDURE — 3331090002 HH PPS REVENUE DEBIT

## 2021-07-31 PROCEDURE — 3331090001 HH PPS REVENUE CREDIT

## 2021-08-01 PROCEDURE — 3331090002 HH PPS REVENUE DEBIT

## 2021-08-01 PROCEDURE — 3331090001 HH PPS REVENUE CREDIT

## 2021-08-02 ENCOUNTER — HOME CARE VISIT (OUTPATIENT)
Dept: SCHEDULING | Facility: HOME HEALTH | Age: 86
End: 2021-08-02
Payer: MEDICARE

## 2021-08-02 VITALS
OXYGEN SATURATION: 94 % | TEMPERATURE: 98.3 F | SYSTOLIC BLOOD PRESSURE: 120 MMHG | RESPIRATION RATE: 20 BRPM | DIASTOLIC BLOOD PRESSURE: 70 MMHG | HEART RATE: 60 BPM

## 2021-08-02 PROCEDURE — 3331090001 HH PPS REVENUE CREDIT

## 2021-08-02 PROCEDURE — G0299 HHS/HOSPICE OF RN EA 15 MIN: HCPCS

## 2021-08-02 PROCEDURE — 3331090002 HH PPS REVENUE DEBIT

## 2021-08-03 PROCEDURE — 3331090001 HH PPS REVENUE CREDIT

## 2021-08-03 PROCEDURE — 3331090002 HH PPS REVENUE DEBIT

## 2021-08-04 ENCOUNTER — HOME CARE VISIT (OUTPATIENT)
Dept: HOME HEALTH SERVICES | Facility: HOME HEALTH | Age: 86
End: 2021-08-04
Payer: MEDICARE

## 2021-08-04 ENCOUNTER — HOME CARE VISIT (OUTPATIENT)
Dept: SCHEDULING | Facility: HOME HEALTH | Age: 86
End: 2021-08-04
Payer: MEDICARE

## 2021-08-04 PROCEDURE — G0299 HHS/HOSPICE OF RN EA 15 MIN: HCPCS

## 2021-08-04 PROCEDURE — 3331090002 HH PPS REVENUE DEBIT

## 2021-08-04 PROCEDURE — 3331090001 HH PPS REVENUE CREDIT

## 2021-08-05 ENCOUNTER — HOME CARE VISIT (OUTPATIENT)
Dept: SCHEDULING | Facility: HOME HEALTH | Age: 86
End: 2021-08-05
Payer: MEDICARE

## 2021-08-05 VITALS
RESPIRATION RATE: 24 BRPM | OXYGEN SATURATION: 96 % | HEART RATE: 67 BPM | SYSTOLIC BLOOD PRESSURE: 120 MMHG | DIASTOLIC BLOOD PRESSURE: 80 MMHG | TEMPERATURE: 98.3 F

## 2021-08-05 PROCEDURE — 3331090002 HH PPS REVENUE DEBIT

## 2021-08-05 PROCEDURE — G0156 HHCP-SVS OF AIDE,EA 15 MIN: HCPCS

## 2021-08-05 PROCEDURE — 3331090001 HH PPS REVENUE CREDIT

## 2021-08-06 ENCOUNTER — HOME CARE VISIT (OUTPATIENT)
Dept: SCHEDULING | Facility: HOME HEALTH | Age: 86
End: 2021-08-06
Payer: MEDICARE

## 2021-08-06 PROCEDURE — G0299 HHS/HOSPICE OF RN EA 15 MIN: HCPCS

## 2021-08-06 PROCEDURE — 3331090002 HH PPS REVENUE DEBIT

## 2021-08-06 PROCEDURE — 3331090001 HH PPS REVENUE CREDIT

## 2021-08-07 VITALS
OXYGEN SATURATION: 95 % | SYSTOLIC BLOOD PRESSURE: 116 MMHG | HEART RATE: 60 BPM | DIASTOLIC BLOOD PRESSURE: 60 MMHG | RESPIRATION RATE: 24 BRPM | TEMPERATURE: 98.3 F

## 2021-08-07 PROCEDURE — 3331090001 HH PPS REVENUE CREDIT

## 2021-08-07 PROCEDURE — 3331090002 HH PPS REVENUE DEBIT

## 2021-08-08 PROCEDURE — 3331090002 HH PPS REVENUE DEBIT

## 2021-08-08 PROCEDURE — 3331090001 HH PPS REVENUE CREDIT

## 2021-08-09 ENCOUNTER — HOME CARE VISIT (OUTPATIENT)
Dept: HOME HEALTH SERVICES | Facility: HOME HEALTH | Age: 86
End: 2021-08-09
Payer: MEDICARE

## 2021-08-09 ENCOUNTER — HOME CARE VISIT (OUTPATIENT)
Dept: SCHEDULING | Facility: HOME HEALTH | Age: 86
End: 2021-08-09
Payer: MEDICARE

## 2021-08-09 VITALS
OXYGEN SATURATION: 95 % | RESPIRATION RATE: 20 BRPM | SYSTOLIC BLOOD PRESSURE: 116 MMHG | DIASTOLIC BLOOD PRESSURE: 70 MMHG | TEMPERATURE: 98 F | HEART RATE: 60 BPM

## 2021-08-09 PROCEDURE — 3331090002 HH PPS REVENUE DEBIT

## 2021-08-09 PROCEDURE — G0299 HHS/HOSPICE OF RN EA 15 MIN: HCPCS

## 2021-08-09 PROCEDURE — 3331090001 HH PPS REVENUE CREDIT

## 2021-08-10 PROCEDURE — 3331090001 HH PPS REVENUE CREDIT

## 2021-08-10 PROCEDURE — 3331090002 HH PPS REVENUE DEBIT

## 2021-08-11 ENCOUNTER — HOME CARE VISIT (OUTPATIENT)
Dept: SCHEDULING | Facility: HOME HEALTH | Age: 86
End: 2021-08-11
Payer: MEDICARE

## 2021-08-11 PROCEDURE — 3331090001 HH PPS REVENUE CREDIT

## 2021-08-11 PROCEDURE — A6197 ALGINATE DRSG >16 <=48 SQ IN: HCPCS

## 2021-08-11 PROCEDURE — G0299 HHS/HOSPICE OF RN EA 15 MIN: HCPCS

## 2021-08-11 PROCEDURE — 3331090002 HH PPS REVENUE DEBIT

## 2021-08-12 ENCOUNTER — HOME CARE VISIT (OUTPATIENT)
Dept: SCHEDULING | Facility: HOME HEALTH | Age: 86
End: 2021-08-12
Payer: MEDICARE

## 2021-08-12 VITALS
DIASTOLIC BLOOD PRESSURE: 70 MMHG | SYSTOLIC BLOOD PRESSURE: 116 MMHG | TEMPERATURE: 98.4 F | RESPIRATION RATE: 20 BRPM | OXYGEN SATURATION: 95 % | HEART RATE: 60 BPM

## 2021-08-12 PROCEDURE — G0156 HHCP-SVS OF AIDE,EA 15 MIN: HCPCS

## 2021-08-12 PROCEDURE — 3331090001 HH PPS REVENUE CREDIT

## 2021-08-12 PROCEDURE — 3331090003 HH PPS REVENUE ADJ

## 2021-08-12 PROCEDURE — 3331090002 HH PPS REVENUE DEBIT

## 2021-08-13 ENCOUNTER — HOME CARE VISIT (OUTPATIENT)
Dept: SCHEDULING | Facility: HOME HEALTH | Age: 86
End: 2021-08-13
Payer: MEDICARE

## 2021-08-13 VITALS
DIASTOLIC BLOOD PRESSURE: 70 MMHG | SYSTOLIC BLOOD PRESSURE: 118 MMHG | RESPIRATION RATE: 24 BRPM | TEMPERATURE: 98.6 F | HEART RATE: 60 BPM | OXYGEN SATURATION: 95 %

## 2021-08-13 PROCEDURE — 3331090002 HH PPS REVENUE DEBIT

## 2021-08-13 PROCEDURE — 3331090001 HH PPS REVENUE CREDIT

## 2021-08-13 PROCEDURE — 400018 HH-NO PAY CLAIM PROCEDURE

## 2021-08-13 PROCEDURE — G0299 HHS/HOSPICE OF RN EA 15 MIN: HCPCS

## 2021-08-13 PROCEDURE — 400014 HH F/U

## 2021-11-11 PROBLEM — F41.9 ANXIETY: Status: ACTIVE | Noted: 2021-01-01

## 2021-11-11 NOTE — PROGRESS NOTES
Subjective: This is Sharlotte Apley presents to the office today in follow-up of multiple medical problems. The patient has COPD with chronic respiratory failure with hypoxia for which she is on 2 L of oxygen continuously. Associated with this has been moderate pulmonary arterial systolic hypertension and some chronic hyponatremia. The patient lives a rather sedentary life and does have shortness of breath with any type of physical activity. She denies any recent sputum production or exacerbation of her breathing. She is seen by pulmonary and is on bronchodilators. She is up-to-date on Covid-19 vaccination and is due to have an influenza vaccination today. She has a history of hypertension currently managed on a no added salt diet. She denies any headaches, numbness, tingling or focal neurological problems. She has a history of peripheral vascular disease involving the left leg for which she had phlebitis many years ago. She is seen and followed by Dr. Yareli Mitchell. The patient does have chronic venous stasis ulcers of the left ankle for which she is seen and followed by home health on a regular basis because of continued problems with this. She has had a recent exacerbation and has had ulcers which are slowly healing. She denies any swelling in the leg and has had no fevers or chills. Patient has chronic anxiety and remains on BuSpar for this. Her anxiety does fluctuate and she notes that the BuSpar for the most part does help. She denies any depression or panic attacks.     Past Medical History:   Diagnosis Date    Acute renal failure (ARF) ([address] 6/4/2017    Arthritis     generalized    Bronchitis, acute 6/4/2017    COPD     Essential hypertension 9/4/2018    former smoker      >60pkyr quit 1/3/11    h/o DVT 1955    left leg    ischemic left lower extremitiy pain     above knee FemPop 1/3/11    PVD (peripheral vascular disease) ([address]     Seasonal allergic rhinitis     Sepsis ([address] 6/4/2017    Single kidney     Sinus bradycardia 7/28/2018     Past Surgical History:   Procedure Laterality Date    HX GI  10 yrs ago    colonoscopy    HX GYN      3 miscarriges    HX GYN      1 vaginal birth   Ismael Wallace HX ORTHOPAEDIC      veinography left leg, stent left leg    [place] BREAST SURGERY PROCEDURE UNLISTED  1955    excision left breast cyst     Allergies   Allergen Reactions    Food Extracts Diarrhea     Onions and garlic causes abdominal pain,cramping     Amlodipine Swelling    Sulfa (Sulfonamide Antibiotics) Other (comments)     Altered Mental Status     Current Outpatient Medications   Medication Sig Dispense Refill    calcium carb/magnesium hydrox (MYLANTA PO) Take 15-30 mL by mouth two (2) times daily as needed for Other (constipation).  calcium carbonate (TUMS PO) Take 1 Tablet by mouth three (3) times daily as needed for Other (indigestion).  fluticasone-umeclidinium-vilanterol (Trelegy Ellipta) 100-62.5-25 mcg inhaler Take 1 Puff by inhalation daily.  acetaminophen (TYLENOL) 500 mg tablet Take 1,000 mg by mouth every six (6) hours as needed for Pain.  simethicone (MYLANTA GAS PO) Take  by mouth daily as needed.  busPIRone (BUSPAR) 5 mg tablet TAKE ONE TABLET BY MOUTH EVERY NIGHT 30 Tab 0    OXYGEN-AIR DELIVERY SYSTEMS 2 L by Nasal route continuous.  acetaminophen (TYLENOL) 325 mg tablet Take 2 Tabs by mouth every four (4) hours as needed for Pain or Fever. 40 Tab 0    ipratropium-albuterol (COMBIVENT RESPIMAT)  mcg/actuation inhaler Take 1 Puff by inhalation four (4) times daily.  loratadine (CLARITIN) 10 mg tablet Take 10 mg by mouth daily.  azithromycin (ZITHROMAX) 250 mg tablet Take 250 mg by mouth every Monday, Wednesday, Friday. Indications: prevent lung infections      aspirin delayed-release 81 mg tablet Take 162 mg by mouth daily.        Social History     Socioeconomic History    Marital status:    Tobacco Use    Smoking status: Former Smoker     Packs/day: 1.00     Years: 60.00     Pack years: 60.00     Quit date: 1/3/2011     Years since quitting: 10.8    Smokeless tobacco: Never Used   Vaping Use    Vaping Use: Never used   Substance and Sexual Activity    Alcohol use: Yes     Comment: occasional liquor after dinner    Drug use: No     Types: Prescription, OTC     Family History   Problem Relation Age of Onset    Colon Cancer Mother     Lung Cancer Sister        Review of Systems:  GEN: no weight loss, weight gain, fatigue or night sweats  CV: no PND, orthopnea, or palpitations  Resp: no dyspnea on exertion, no cough  Abd: no nausea, vomiting or diarrhea  EXT: denies edema, claudication  Endocrine: no hair loss, excessive thirst or polyuria  Neurological ROS: no TIA or stroke symptoms  ROS otherwise negative      Objective:     Visit Vitals  /76 (BP 1 Location: Right upper arm, BP Patient Position: Sitting, BP Cuff Size: Adult)   Pulse 83   Temp 98.7 °F (37.1 °C) (Oral)   Resp 20   Ht 5' 8\" (1.727 m)   Wt 126 lb (57.2 kg)   SpO2 95% Comment: on 2 lpm of O2   BMI 19.16 kg/m²     Body mass index is 19.16 kg/m². General:   alert, cooperative and no distress   Eyes: conjunctivae/sclerae clear. PERRL, EOM's intact   Mouth:  No oral lesions, no pharyngeal erythema, no exudates   Neck: Trachea midline, no thyromegaly, no bruits   Heart: S1 and S2 normal,no murmurs noted    Lungs:  Chest hyperinflated. Breath sounds diminished in all lung fields with occasional rhonchi heard   Abdomen: Soft, nontender. Normal bowel sounds   Extremities: No edema or cyanosis.   Left ankle bandaged   Neuro: ..alert, oriented x3,speech normal in context and clarity, cranial nerves II-XII intact,motor strength: full proximally and distally,gait: normal  reflexes: full and symmetric     Physical exam otherwise negative         Assessment/Plan:     Diagnoses and all orders for this visit:    Chronic obstructive pulmonary disease with acute exacerbation ([address]    Chronic respiratory failure with hypoxia, on home oxygen therapy (HCC)    Essential hypertension  -     FLU (FLUAD QUAD INFLUENZA VACCINE,QUAD,ADJUVANTED)  -     COLLECTION VENOUS BLOOD,VENIPUNCTURE  -     CBC WITH AUTOMATED DIFF; Future  -     LIPID PANEL; Future  -     METABOLIC PANEL, COMPREHENSIVE; Future  -     TSH 3RD GENERATION; Future  -     URINALYSIS W/ REFLEX CULTURE; Future    Moderate pulmonary arterial systolic hypertension (HCC)    PVD (peripheral vascular disease) (HCC)    Hyponatremia    Venous stasis ulcer of left ankle limited to breakdown of skin without varicose veins (HCC)    Anxiety    Needs flu shot  -     FLU (FLUAD QUAD INFLUENZA VACCINE,QUAD,ADJUVANTED)        Other instructions: The patient's medications were reviewed and reconciled. No change in her current medical regimen will be made. A no added salt, prudent diet is encouraged    Continue home health nursing for treatment and management of her recurrent stasis ulcers of the left leg. An influenza vaccination is administered    Continue to follow-up with pulmonary in regards to her chronic respiratory failure    Await results of multiple labs    Follow-up in 6 months    Follow-up and Dispositions    · Return in about 6 months (around 5/11/2022). Evelyn Fenton MD    Please note that this dictation was completed with Launchr, the computer voice recognition software. Quite often unanticipated grammatical, syntax, homophones, and other interpretive errors are inadvertently transcribed by the computer software. Please disregard these errors. Please excuse any errors that have escaped final proofreading.

## 2021-11-11 NOTE — PROGRESS NOTES
Arlette Burkitt is a 80 y.o. female presenting for Follow Up Chronic Condition  . 1. Have you been to the ER, urgent care clinic since your last visit? Hospitalized since your last visit? Arlette Burkitt is a 80 y.o. female presenting for Follow Up Chronic Condition  . 1. Have you been to the ER, urgent care clinic since your last visit? Hospitalized since your last visit? No    2. Have you seen or consulted any other health care providers outside of the [address] [place] [address] since your last visit? Include any pap smears or colon screening. Pulmonary    Fall Risk Assessment, last 12 mths 4/20/2021   Able to walk? Yes   Fall in past 12 months? 0   Do you feel unsteady? 0   Are you worried about falling 0         Abuse Screening Questionnaire 4/20/2021   Do you ever feel afraid of your partner? N   Are you in a relationship with someone who physically or mentally threatens you? N   Is it safe for you to go home? Y       3 most recent PHQ Screens 11/11/2021   Little interest or pleasure in doing things Not at all   Feeling down, depressed, irritable, or hopeless Not at all   Total Score PHQ 2 0       There are no discontinued medications. 2. Have you seen or consulted any other health care providers outside of the [address] [place] [address] since your last visit? Include any pap smears or colon screening. No    Fall Risk Assessment, last 12 mths 4/20/2021   Able to walk? Yes   Fall in past 12 months? 0   Do you feel unsteady? 0   Are you worried about falling 0         Abuse Screening Questionnaire 4/20/2021   Do you ever feel afraid of your partner? N   Are you in a relationship with someone who physically or mentally threatens you? N   Is it safe for you to go home?  Y       3 most recent PHQ Screens 11/11/2021   Little interest or pleasure in doing things Not at all   Feeling down, depressed, irritable, or hopeless Not at all   Total Score PHQ 2 0       There are no discontinued medications. After obtaining written consent and per orders of Dr. Dena Estrada, injection of Fluad given by [facility], [address] [place], [place] [address]richa. Order and injection/medication verified by Dr Hamlet Gonzalez. Patient tolerated procedure well. VIS was given to them. No reactions noted.

## 2021-11-11 NOTE — PATIENT INSTRUCTIONS
Vaccine Information Statement    Influenza (Flu) Vaccine (Inactivated or Recombinant): What You Need to Know    Many vaccine information statements are available in [place] and other languages. See www.immunize.org/vis. Hojas de información sobre vacunas están disponibles en español y en muchos otros idiomas. Visite www.immunize.org/vis. 1. Why get vaccinated? Influenza vaccine can prevent influenza (flu). Flu is a contagious disease that spreads around the [facility] every year, usually between October and May. Anyone can get the flu, but it is more dangerous for some people. Infants and young children, people 72 years and older, pregnant people, and people with certain health conditions or a weakened immune system are at greatest risk of flu complications. Pneumonia, bronchitis, sinus infections, and ear infections are examples of flu-related complications. If you have a medical condition, such as heart disease, cancer, or diabetes, flu can make it worse. Flu can cause fever and chills, sore throat, muscle aches, fatigue, cough, headache, and runny or stuffy nose. Some people may have vomiting and diarrhea, though this is more common in children than adults. In an average year, thousands of people in the [facility] die from flu, and many more are hospitalized. Flu vaccine prevents millions of illnesses and flu-related visits to the doctor each year. 2. Influenza vaccines     CDC recommends everyone 6 months and older get vaccinated every flu season. Children 6 months through 6years of age may need 2 doses during a single flu season. Everyone else needs only 1 dose each flu season. It takes about 2 weeks for protection to develop after vaccination. There are many flu viruses, and they are always changing. Each year a new flu vaccine is made to protect against the influenza viruses believed to be likely to cause disease in the upcoming flu season.  Even when the vaccine doesnt exactly match these viruses, it may still provide some protection. Influenza vaccine does not cause flu. Influenza vaccine may be given at the same time as other vaccines. 3. Talk with your health care provider    Tell your vaccination provider if the person getting the vaccine:   Has had an allergic reaction after a previous dose of influenza vaccine, or has any severe, life-threatening allergies    Has ever had Guillain-Barré Syndrome (also called GBS)    In some cases, your health care provider may decide to postpone influenza vaccination until a future visit. Influenza vaccine can be administered at any time during pregnancy. People who are or will be pregnant during influenza season should receive inactivated influenza vaccine. People with minor illnesses, such as a cold, may be vaccinated. People who are moderately or severely ill should usually wait until they recover before getting influenza vaccine. Your health care provider can give you more information. 4. Risks of a vaccine reaction     Soreness, redness, and swelling where the shot is given, fever, muscle aches, and headache can happen after influenza vaccination.  There may be a very small increased risk of Guillain-Barré Syndrome (GBS) after inactivated influenza vaccine (the flu shot). Shantel Silva children who get the flu shot along with pneumococcal vaccine (PCV13) and/or DTaP vaccine at the same time might be slightly more likely to have a seizure caused by fever. Tell your health care provider if a child who is getting flu vaccine has ever had a seizure. People sometimes faint after medical procedures, including vaccination. Tell your provider if you feel dizzy or have vision changes or ringing in the ears. As with any medicine, there is a very remote chance of a vaccine causing a severe allergic reaction, other serious injury, or death. 5. What if there is a serious problem?     An allergic reaction could occur after the vaccinated person leaves the clinic. If you see signs of a severe allergic reaction (hives, swelling of the face and throat, difficulty breathing, a fast heartbeat, dizziness, or weakness), call 9-1-1 and get the person to the nearest hospital.    For other signs that concern you, call your health care provider. Adverse reactions should be reported to the Vaccine Adverse Event Reporting System (VAERS). Your health care provider will usually file this report, or you can do it yourself. Visit the VAERS website at www.vaers. [facility].gov or call 8-605.903.6264. VAERS is only for reporting reactions, and VAERS staff members do not give medical advice. 6. The National Vaccine Injury Compensation Program    The [facility] Vaccine Injury Compensation Program (VICP) is a federal program that was created to compensate people who may have been injured by certain vaccines. Claims regarding alleged injury or death due to vaccination have a time limit for filing, which may be as short as two years. Visit the VICP website at www.[place].gov/vaccinecompensation or call 1-513.342.7191 to learn about the program and about filing a claim. 7. How can I learn more?  Ask your health care provider.  Call your local or state health department.  Visit the website of the Food and Drug Administration (FDA) for vaccine package inserts and additional information at www.fda.gov/vaccines-blood-biologics/vaccines.  Contact the Centers for Disease Control and Prevention (CDC):  - Call 6-173.102.6499 (9-559-UEB-INFO) or  - Visit CDCs influenza website at www.cdc.gov/flu. Vaccine Information Statement   Inactivated Influenza Vaccine   8/6/2021  42 TATIANNA Carpio [id number]   Department of Health and Human Services  Centers for Disease Control and Prevention    Office Use Only         DASH Diet: Care Instructions  Your Care Instructions     The DASH diet is an eating plan that can help lower your blood pressure.  DASH stands for Dietary Approaches to Stop Hypertension. Hypertension is high blood pressure. The DASH diet focuses on eating foods that are high in calcium, potassium, and magnesium. These nutrients can lower blood pressure. The foods that are highest in these nutrients are fruits, vegetables, low-fat dairy products, nuts, seeds, and legumes. But taking calcium, potassium, and magnesium supplements instead of eating foods that are high in those nutrients does not have the same effect. The DASH diet also includes whole grains, fish, and poultry. The DASH diet is one of several lifestyle changes your doctor may recommend to lower your high blood pressure. Your doctor may also want you to decrease the amount of sodium in your diet. Lowering sodium while following the DASH diet can lower blood pressure even further than just the DASH diet alone. Follow-up care is a key part of your treatment and safety. Be sure to make and go to all appointments, and call your doctor if you are having problems. It's also a good idea to know your test results and keep a list of the medicines you take. How can you care for yourself at home? Following the DASH diet  · Eat 4 to 5 servings of fruit each day. A serving is 1 medium-sized piece of fruit, ½ cup chopped or canned fruit, 1/4 cup dried fruit, or 4 ounces (½ cup) of fruit juice. Choose fruit more often than fruit juice. · Eat 4 to 5 servings of vegetables each day. A serving is 1 cup of lettuce or raw leafy vegetables, ½ cup of chopped or cooked vegetables, or 4 ounces (½ cup) of vegetable juice. Choose vegetables more often than vegetable juice. · Get 2 to 3 servings of low-fat and fat-free dairy each day. A serving is 8 ounces of milk, 1 cup of yogurt, or 1 ½ ounces of cheese. · Eat 6 to 8 servings of grains each day. A serving is 1 slice of bread, 1 ounce of dry cereal, or ½ cup of cooked rice, pasta, or cooked cereal. Try to choose whole-grain products as much as possible.   · Limit lean meat, poultry, and fish to 2 servings each day. A serving is 3 ounces, about the size of a deck of cards. · Eat 4 to 5 servings of nuts, seeds, and legumes (cooked dried beans, lentils, and split peas) each week. A serving is 1/3 cup of nuts, 2 tablespoons of seeds, or ½ cup of cooked beans or peas. · Limit fats and oils to 2 to 3 servings each day. A serving is 1 teaspoon of vegetable oil or 2 tablespoons of salad dressing. · Limit sweets and added sugars to 5 servings or less a week. A serving is 1 tablespoon jelly or jam, ½ cup sorbet, or 1 cup of lemonade. · Eat less than 2,300 milligrams (mg) of sodium a day. If you limit your sodium to 1,500 mg a day, you can lower your blood pressure even more. · Be aware that all of these are the suggested number of servings for people who eat 1,800 to 2,000 calories a day. Your recommended number of servings may be different if you need more or fewer calories. Tips for success  · Start small. Do not try to make dramatic changes to your diet all at once. You might feel that you are missing out on your favorite foods and then be more likely to not follow the plan. Make small changes, and stick with them. Once those changes become habit, add a few more changes. · Try some of the following:  ? Make it a goal to eat a fruit or vegetable at every meal and at snacks. This will make it easy to get the recommended amount of fruits and vegetables each day. ? Try yogurt topped with fruit and nuts for a snack or healthy dessert. ? Add lettuce, tomato, cucumber, and onion to sandwiches. ? Combine a ready-made pizza crust with low-fat mozzarella cheese and lots of vegetable toppings. Try using tomatoes, squash, spinach, broccoli, carrots, cauliflower, and onions. ? Have a variety of cut-up vegetables with a low-fat dip as an appetizer instead of chips and dip. ? Sprinkle sunflower seeds or chopped almonds over salads.  Or try adding chopped walnuts or almonds to cooked vegetables. ? Try some vegetarian meals using beans and peas. Add garbanzo or kidney beans to salads. Make burritos and tacos with mashed wilson beans or black beans. Where can you learn more? Go to http://www.thomason.com/  Enter H967 in the search box to learn more about \"DASH Diet: Care Instructions. \"  Current as of: April 29, 2021               Content Version: 13.0  © 0147-5958 [employer]wise, [employer]. Care instructions adapted under license by [employer] (which disclaims liability or warranty for this information). If you have questions about a medical condition or this instruction, always ask your healthcare professional. [address] any warranty or liability for your use of this information.

## 2022-01-01 ENCOUNTER — APPOINTMENT (OUTPATIENT)
Dept: GENERAL RADIOLOGY | Age: 87
DRG: 190 | End: 2022-01-01
Attending: RADIOLOGY
Payer: MEDICARE

## 2022-01-01 ENCOUNTER — HOME CARE VISIT (OUTPATIENT)
Dept: SCHEDULING | Facility: HOME HEALTH | Age: 87
End: 2022-01-01
Payer: MEDICARE

## 2022-01-01 ENCOUNTER — APPOINTMENT (OUTPATIENT)
Dept: ULTRASOUND IMAGING | Age: 87
DRG: 190 | End: 2022-01-01
Attending: INTERNAL MEDICINE
Payer: MEDICARE

## 2022-01-01 ENCOUNTER — TELEPHONE (OUTPATIENT)
Dept: INTERNAL MEDICINE CLINIC | Age: 87
End: 2022-01-01

## 2022-01-01 ENCOUNTER — APPOINTMENT (OUTPATIENT)
Dept: NON INVASIVE DIAGNOSTICS | Age: 87
DRG: 190 | End: 2022-01-01
Attending: HOSPITALIST
Payer: MEDICARE

## 2022-01-01 ENCOUNTER — PATIENT OUTREACH (OUTPATIENT)
Dept: CASE MANAGEMENT | Age: 87
End: 2022-01-01

## 2022-01-01 ENCOUNTER — HOME CARE VISIT (OUTPATIENT)
Dept: HOME HEALTH SERVICES | Facility: HOME HEALTH | Age: 87
End: 2022-01-01
Payer: MEDICARE

## 2022-01-01 ENCOUNTER — APPOINTMENT (OUTPATIENT)
Dept: GENERAL RADIOLOGY | Age: 87
DRG: 190 | End: 2022-01-01
Attending: GENERAL ACUTE CARE HOSPITAL
Payer: MEDICARE

## 2022-01-01 ENCOUNTER — APPOINTMENT (OUTPATIENT)
Dept: GENERAL RADIOLOGY | Age: 87
DRG: 190 | End: 2022-01-01
Attending: EMERGENCY MEDICINE
Payer: MEDICARE

## 2022-01-01 ENCOUNTER — ANESTHESIA (OUTPATIENT)
Dept: ENDOSCOPY | Age: 87
DRG: 190 | End: 2022-01-01
Payer: MEDICARE

## 2022-01-01 ENCOUNTER — ANESTHESIA EVENT (OUTPATIENT)
Dept: ENDOSCOPY | Age: 87
DRG: 190 | End: 2022-01-01
Payer: MEDICARE

## 2022-01-01 ENCOUNTER — HOSPITAL ENCOUNTER (INPATIENT)
Age: 87
LOS: 12 days | Discharge: NURSING, REHAB OR LONG-TERM CARE | DRG: 190 | End: 2022-05-18
Attending: EMERGENCY MEDICINE | Admitting: STUDENT IN AN ORGANIZED HEALTH CARE EDUCATION/TRAINING PROGRAM
Payer: MEDICARE

## 2022-01-01 ENCOUNTER — APPOINTMENT (OUTPATIENT)
Dept: CT IMAGING | Age: 87
DRG: 190 | End: 2022-01-01
Attending: INTERNAL MEDICINE
Payer: MEDICARE

## 2022-01-01 ENCOUNTER — OFFICE VISIT (OUTPATIENT)
Dept: INTERNAL MEDICINE CLINIC | Age: 87
End: 2022-01-01
Payer: MEDICARE

## 2022-01-01 VITALS
OXYGEN SATURATION: 92 % | DIASTOLIC BLOOD PRESSURE: 70 MMHG | HEART RATE: 60 BPM | SYSTOLIC BLOOD PRESSURE: 118 MMHG | RESPIRATION RATE: 22 BRPM | TEMPERATURE: 97.8 F

## 2022-01-01 VITALS
SYSTOLIC BLOOD PRESSURE: 116 MMHG | OXYGEN SATURATION: 95 % | DIASTOLIC BLOOD PRESSURE: 70 MMHG | HEART RATE: 62 BPM | TEMPERATURE: 98.9 F

## 2022-01-01 VITALS
OXYGEN SATURATION: 93 % | RESPIRATION RATE: 22 BRPM | TEMPERATURE: 98.6 F | DIASTOLIC BLOOD PRESSURE: 80 MMHG | SYSTOLIC BLOOD PRESSURE: 120 MMHG | HEART RATE: 60 BPM

## 2022-01-01 VITALS
SYSTOLIC BLOOD PRESSURE: 114 MMHG | DIASTOLIC BLOOD PRESSURE: 80 MMHG | RESPIRATION RATE: 22 BRPM | OXYGEN SATURATION: 93 % | TEMPERATURE: 98.7 F | HEART RATE: 60 BPM

## 2022-01-01 VITALS
TEMPERATURE: 98.6 F | SYSTOLIC BLOOD PRESSURE: 112 MMHG | OXYGEN SATURATION: 94 % | RESPIRATION RATE: 22 BRPM | HEART RATE: 62 BPM | DIASTOLIC BLOOD PRESSURE: 70 MMHG

## 2022-01-01 VITALS
DIASTOLIC BLOOD PRESSURE: 78 MMHG | TEMPERATURE: 98.5 F | SYSTOLIC BLOOD PRESSURE: 110 MMHG | OXYGEN SATURATION: 92 % | HEART RATE: 64 BPM | RESPIRATION RATE: 22 BRPM

## 2022-01-01 VITALS
TEMPERATURE: 98.5 F | SYSTOLIC BLOOD PRESSURE: 110 MMHG | RESPIRATION RATE: 22 BRPM | OXYGEN SATURATION: 92 % | HEART RATE: 60 BPM | DIASTOLIC BLOOD PRESSURE: 70 MMHG

## 2022-01-01 VITALS
RESPIRATION RATE: 22 BRPM | SYSTOLIC BLOOD PRESSURE: 120 MMHG | DIASTOLIC BLOOD PRESSURE: 80 MMHG | TEMPERATURE: 98.4 F | HEART RATE: 60 BPM | OXYGEN SATURATION: 92 %

## 2022-01-01 VITALS
TEMPERATURE: 97.6 F | OXYGEN SATURATION: 94 % | RESPIRATION RATE: 22 BRPM | SYSTOLIC BLOOD PRESSURE: 118 MMHG | HEART RATE: 60 BPM | DIASTOLIC BLOOD PRESSURE: 70 MMHG

## 2022-01-01 VITALS
SYSTOLIC BLOOD PRESSURE: 120 MMHG | DIASTOLIC BLOOD PRESSURE: 70 MMHG | TEMPERATURE: 98.2 F | HEART RATE: 60 BPM | RESPIRATION RATE: 22 BRPM | OXYGEN SATURATION: 92 %

## 2022-01-01 VITALS
TEMPERATURE: 97.9 F | SYSTOLIC BLOOD PRESSURE: 118 MMHG | OXYGEN SATURATION: 92 % | RESPIRATION RATE: 22 BRPM | HEART RATE: 62 BPM | DIASTOLIC BLOOD PRESSURE: 68 MMHG

## 2022-01-01 VITALS
DIASTOLIC BLOOD PRESSURE: 80 MMHG | HEART RATE: 62 BPM | SYSTOLIC BLOOD PRESSURE: 120 MMHG | TEMPERATURE: 97.9 F | OXYGEN SATURATION: 92 % | RESPIRATION RATE: 22 BRPM

## 2022-01-01 VITALS
HEIGHT: 68 IN | BODY MASS INDEX: 19.16 KG/M2 | DIASTOLIC BLOOD PRESSURE: 82 MMHG | HEART RATE: 61 BPM | TEMPERATURE: 98.7 F | OXYGEN SATURATION: 92 % | SYSTOLIC BLOOD PRESSURE: 169 MMHG

## 2022-01-01 VITALS
SYSTOLIC BLOOD PRESSURE: 116 MMHG | DIASTOLIC BLOOD PRESSURE: 62 MMHG | HEART RATE: 60 BPM | RESPIRATION RATE: 22 BRPM | TEMPERATURE: 98.9 F | OXYGEN SATURATION: 94 %

## 2022-01-01 VITALS
DIASTOLIC BLOOD PRESSURE: 70 MMHG | HEART RATE: 60 BPM | OXYGEN SATURATION: 91 % | TEMPERATURE: 98.6 F | RESPIRATION RATE: 20 BRPM | SYSTOLIC BLOOD PRESSURE: 120 MMHG

## 2022-01-01 VITALS
OXYGEN SATURATION: 92 % | SYSTOLIC BLOOD PRESSURE: 118 MMHG | DIASTOLIC BLOOD PRESSURE: 72 MMHG | HEART RATE: 60 BPM | RESPIRATION RATE: 22 BRPM | TEMPERATURE: 98.5 F

## 2022-01-01 VITALS
DIASTOLIC BLOOD PRESSURE: 70 MMHG | TEMPERATURE: 98.6 F | HEART RATE: 64 BPM | OXYGEN SATURATION: 91 % | RESPIRATION RATE: 24 BRPM | SYSTOLIC BLOOD PRESSURE: 120 MMHG

## 2022-01-01 VITALS
HEART RATE: 60 BPM | TEMPERATURE: 98.4 F | RESPIRATION RATE: 22 BRPM | SYSTOLIC BLOOD PRESSURE: 120 MMHG | OXYGEN SATURATION: 91 % | DIASTOLIC BLOOD PRESSURE: 78 MMHG

## 2022-01-01 VITALS
SYSTOLIC BLOOD PRESSURE: 116 MMHG | RESPIRATION RATE: 22 BRPM | OXYGEN SATURATION: 92 % | DIASTOLIC BLOOD PRESSURE: 70 MMHG | TEMPERATURE: 98.6 F

## 2022-01-01 VITALS
HEART RATE: 60 BPM | OXYGEN SATURATION: 91 % | DIASTOLIC BLOOD PRESSURE: 70 MMHG | DIASTOLIC BLOOD PRESSURE: 72 MMHG | RESPIRATION RATE: 24 BRPM | OXYGEN SATURATION: 92 % | RESPIRATION RATE: 22 BRPM | HEART RATE: 64 BPM | TEMPERATURE: 98.7 F | SYSTOLIC BLOOD PRESSURE: 120 MMHG | SYSTOLIC BLOOD PRESSURE: 126 MMHG | TEMPERATURE: 98.2 F

## 2022-01-01 VITALS
OXYGEN SATURATION: 91 % | RESPIRATION RATE: 24 BRPM | SYSTOLIC BLOOD PRESSURE: 120 MMHG | HEART RATE: 64 BPM | DIASTOLIC BLOOD PRESSURE: 70 MMHG | TEMPERATURE: 98.4 F

## 2022-01-01 VITALS
OXYGEN SATURATION: 90 % | RESPIRATION RATE: 24 BRPM | DIASTOLIC BLOOD PRESSURE: 70 MMHG | TEMPERATURE: 98.5 F | HEART RATE: 65 BPM | SYSTOLIC BLOOD PRESSURE: 100 MMHG

## 2022-01-01 VITALS
RESPIRATION RATE: 22 BRPM | RESPIRATION RATE: 22 BRPM | TEMPERATURE: 98.1 F | SYSTOLIC BLOOD PRESSURE: 118 MMHG | TEMPERATURE: 98.8 F | DIASTOLIC BLOOD PRESSURE: 70 MMHG | HEART RATE: 60 BPM | HEART RATE: 60 BPM | SYSTOLIC BLOOD PRESSURE: 128 MMHG | DIASTOLIC BLOOD PRESSURE: 70 MMHG | OXYGEN SATURATION: 93 % | OXYGEN SATURATION: 92 %

## 2022-01-01 VITALS
HEART RATE: 65 BPM | OXYGEN SATURATION: 92 % | RESPIRATION RATE: 24 BRPM | TEMPERATURE: 98.6 F | DIASTOLIC BLOOD PRESSURE: 70 MMHG | SYSTOLIC BLOOD PRESSURE: 110 MMHG

## 2022-01-01 VITALS
SYSTOLIC BLOOD PRESSURE: 114 MMHG | OXYGEN SATURATION: 92 % | DIASTOLIC BLOOD PRESSURE: 70 MMHG | HEART RATE: 64 BPM | TEMPERATURE: 98.5 F | RESPIRATION RATE: 24 BRPM

## 2022-01-01 VITALS
TEMPERATURE: 98.9 F | DIASTOLIC BLOOD PRESSURE: 70 MMHG | SYSTOLIC BLOOD PRESSURE: 118 MMHG | OXYGEN SATURATION: 92 % | RESPIRATION RATE: 22 BRPM | HEART RATE: 60 BPM

## 2022-01-01 VITALS
HEART RATE: 62 BPM | RESPIRATION RATE: 22 BRPM | TEMPERATURE: 98.9 F | OXYGEN SATURATION: 92 % | DIASTOLIC BLOOD PRESSURE: 80 MMHG | SYSTOLIC BLOOD PRESSURE: 120 MMHG

## 2022-01-01 VITALS
HEART RATE: 60 BPM | SYSTOLIC BLOOD PRESSURE: 116 MMHG | DIASTOLIC BLOOD PRESSURE: 80 MMHG | RESPIRATION RATE: 22 BRPM | OXYGEN SATURATION: 92 % | TEMPERATURE: 98.8 F

## 2022-01-01 VITALS
TEMPERATURE: 97.8 F | HEART RATE: 60 BPM | RESPIRATION RATE: 20 BRPM | DIASTOLIC BLOOD PRESSURE: 60 MMHG | SYSTOLIC BLOOD PRESSURE: 110 MMHG | OXYGEN SATURATION: 92 %

## 2022-01-01 VITALS
RESPIRATION RATE: 22 BRPM | TEMPERATURE: 97.3 F | OXYGEN SATURATION: 94 % | HEART RATE: 65 BPM | DIASTOLIC BLOOD PRESSURE: 70 MMHG | SYSTOLIC BLOOD PRESSURE: 110 MMHG

## 2022-01-01 VITALS
DIASTOLIC BLOOD PRESSURE: 70 MMHG | OXYGEN SATURATION: 93 % | RESPIRATION RATE: 22 BRPM | TEMPERATURE: 97.9 F | SYSTOLIC BLOOD PRESSURE: 120 MMHG | HEART RATE: 60 BPM

## 2022-01-01 VITALS
DIASTOLIC BLOOD PRESSURE: 70 MMHG | TEMPERATURE: 98.1 F | SYSTOLIC BLOOD PRESSURE: 118 MMHG | HEART RATE: 72 BPM | RESPIRATION RATE: 22 BRPM | OXYGEN SATURATION: 90 %

## 2022-01-01 VITALS
HEART RATE: 60 BPM | RESPIRATION RATE: 22 BRPM | TEMPERATURE: 98.3 F | DIASTOLIC BLOOD PRESSURE: 60 MMHG | OXYGEN SATURATION: 92 % | SYSTOLIC BLOOD PRESSURE: 120 MMHG

## 2022-01-01 VITALS
OXYGEN SATURATION: 94 % | SYSTOLIC BLOOD PRESSURE: 124 MMHG | RESPIRATION RATE: 22 BRPM | HEART RATE: 60 BPM | TEMPERATURE: 98.2 F | DIASTOLIC BLOOD PRESSURE: 70 MMHG

## 2022-01-01 VITALS
TEMPERATURE: 98.8 F | DIASTOLIC BLOOD PRESSURE: 80 MMHG | SYSTOLIC BLOOD PRESSURE: 120 MMHG | HEART RATE: 74 BPM | RESPIRATION RATE: 22 BRPM | OXYGEN SATURATION: 92 %

## 2022-01-01 VITALS
OXYGEN SATURATION: 92 % | RESPIRATION RATE: 22 BRPM | SYSTOLIC BLOOD PRESSURE: 118 MMHG | TEMPERATURE: 98.1 F | DIASTOLIC BLOOD PRESSURE: 70 MMHG

## 2022-01-01 VITALS
SYSTOLIC BLOOD PRESSURE: 118 MMHG | RESPIRATION RATE: 22 BRPM | OXYGEN SATURATION: 92 % | DIASTOLIC BLOOD PRESSURE: 80 MMHG | HEART RATE: 62 BPM | TEMPERATURE: 98.9 F

## 2022-01-01 VITALS
OXYGEN SATURATION: 93 % | HEART RATE: 69 BPM | TEMPERATURE: 98.2 F | SYSTOLIC BLOOD PRESSURE: 110 MMHG | DIASTOLIC BLOOD PRESSURE: 70 MMHG | RESPIRATION RATE: 22 BRPM

## 2022-01-01 VITALS
SYSTOLIC BLOOD PRESSURE: 120 MMHG | OXYGEN SATURATION: 92 % | TEMPERATURE: 97.5 F | HEART RATE: 75 BPM | RESPIRATION RATE: 22 BRPM | DIASTOLIC BLOOD PRESSURE: 72 MMHG

## 2022-01-01 VITALS
RESPIRATION RATE: 24 BRPM | TEMPERATURE: 98.4 F | DIASTOLIC BLOOD PRESSURE: 70 MMHG | RESPIRATION RATE: 22 BRPM | SYSTOLIC BLOOD PRESSURE: 122 MMHG | OXYGEN SATURATION: 92 % | HEART RATE: 62 BPM | TEMPERATURE: 98.6 F | SYSTOLIC BLOOD PRESSURE: 122 MMHG | DIASTOLIC BLOOD PRESSURE: 70 MMHG | OXYGEN SATURATION: 94 % | HEART RATE: 64 BPM

## 2022-01-01 VITALS
DIASTOLIC BLOOD PRESSURE: 70 MMHG | HEART RATE: 60 BPM | RESPIRATION RATE: 22 BRPM | OXYGEN SATURATION: 92 % | SYSTOLIC BLOOD PRESSURE: 120 MMHG | TEMPERATURE: 98.9 F

## 2022-01-01 VITALS
RESPIRATION RATE: 22 BRPM | TEMPERATURE: 98.6 F | SYSTOLIC BLOOD PRESSURE: 120 MMHG | HEART RATE: 60 BPM | DIASTOLIC BLOOD PRESSURE: 70 MMHG | OXYGEN SATURATION: 92 %

## 2022-01-01 VITALS
SYSTOLIC BLOOD PRESSURE: 120 MMHG | OXYGEN SATURATION: 92 % | TEMPERATURE: 98.6 F | RESPIRATION RATE: 22 BRPM | HEART RATE: 60 BPM | DIASTOLIC BLOOD PRESSURE: 80 MMHG

## 2022-01-01 VITALS
RESPIRATION RATE: 24 BRPM | DIASTOLIC BLOOD PRESSURE: 70 MMHG | TEMPERATURE: 97.7 F | HEART RATE: 65 BPM | OXYGEN SATURATION: 92 % | SYSTOLIC BLOOD PRESSURE: 120 MMHG

## 2022-01-01 VITALS
OXYGEN SATURATION: 93 % | TEMPERATURE: 97.5 F | DIASTOLIC BLOOD PRESSURE: 70 MMHG | RESPIRATION RATE: 22 BRPM | HEART RATE: 60 BPM | SYSTOLIC BLOOD PRESSURE: 118 MMHG

## 2022-01-01 VITALS
SYSTOLIC BLOOD PRESSURE: 116 MMHG | DIASTOLIC BLOOD PRESSURE: 70 MMHG | TEMPERATURE: 98.9 F | RESPIRATION RATE: 22 BRPM | OXYGEN SATURATION: 92 % | HEART RATE: 60 BPM

## 2022-01-01 VITALS
DIASTOLIC BLOOD PRESSURE: 70 MMHG | RESPIRATION RATE: 22 BRPM | SYSTOLIC BLOOD PRESSURE: 120 MMHG | TEMPERATURE: 98.8 F | OXYGEN SATURATION: 91 % | HEART RATE: 60 BPM

## 2022-01-01 VITALS
RESPIRATION RATE: 22 BRPM | TEMPERATURE: 98.7 F | OXYGEN SATURATION: 93 % | DIASTOLIC BLOOD PRESSURE: 70 MMHG | SYSTOLIC BLOOD PRESSURE: 122 MMHG | HEART RATE: 60 BPM

## 2022-01-01 VITALS
DIASTOLIC BLOOD PRESSURE: 70 MMHG | SYSTOLIC BLOOD PRESSURE: 110 MMHG | TEMPERATURE: 98.5 F | HEART RATE: 60 BPM | RESPIRATION RATE: 22 BRPM | OXYGEN SATURATION: 92 %

## 2022-01-01 VITALS
SYSTOLIC BLOOD PRESSURE: 112 MMHG | HEART RATE: 62 BPM | TEMPERATURE: 98.3 F | OXYGEN SATURATION: 92 % | DIASTOLIC BLOOD PRESSURE: 70 MMHG | RESPIRATION RATE: 22 BRPM

## 2022-01-01 VITALS
RESPIRATION RATE: 22 BRPM | TEMPERATURE: 98.3 F | DIASTOLIC BLOOD PRESSURE: 76 MMHG | SYSTOLIC BLOOD PRESSURE: 120 MMHG | OXYGEN SATURATION: 93 % | HEART RATE: 56 BPM

## 2022-01-01 VITALS
OXYGEN SATURATION: 92 % | TEMPERATURE: 97.6 F | DIASTOLIC BLOOD PRESSURE: 70 MMHG | HEART RATE: 60 BPM | SYSTOLIC BLOOD PRESSURE: 118 MMHG

## 2022-01-01 VITALS
RESPIRATION RATE: 16 BRPM | HEART RATE: 64 BPM | HEIGHT: 68 IN | TEMPERATURE: 98.1 F | OXYGEN SATURATION: 92 % | WEIGHT: 125 LBS | SYSTOLIC BLOOD PRESSURE: 145 MMHG | BODY MASS INDEX: 18.94 KG/M2 | DIASTOLIC BLOOD PRESSURE: 66 MMHG

## 2022-01-01 DIAGNOSIS — R64 PULMONARY CACHEXIA DUE TO COPD (HCC): ICD-10-CM

## 2022-01-01 DIAGNOSIS — I87.2 VENOUS STASIS ULCER OF LEFT ANKLE LIMITED TO BREAKDOWN OF SKIN WITHOUT VARICOSE VEINS (HCC): ICD-10-CM

## 2022-01-01 DIAGNOSIS — J44.9 PULMONARY CACHEXIA DUE TO COPD (HCC): ICD-10-CM

## 2022-01-01 DIAGNOSIS — L97.321 VENOUS STASIS ULCER OF LEFT ANKLE LIMITED TO BREAKDOWN OF SKIN WITHOUT VARICOSE VEINS (HCC): ICD-10-CM

## 2022-01-01 DIAGNOSIS — I27.21 MODERATE PULMONARY ARTERIAL SYSTOLIC HYPERTENSION (HCC): ICD-10-CM

## 2022-01-01 DIAGNOSIS — J96.11 CHRONIC RESPIRATORY FAILURE WITH HYPOXIA, ON HOME OXYGEN THERAPY (HCC): Primary | ICD-10-CM

## 2022-01-01 DIAGNOSIS — Z99.81 CHRONIC RESPIRATORY FAILURE WITH HYPOXIA, ON HOME OXYGEN THERAPY (HCC): Primary | ICD-10-CM

## 2022-01-01 DIAGNOSIS — I73.9 PVD (PERIPHERAL VASCULAR DISEASE) (HCC): ICD-10-CM

## 2022-01-01 DIAGNOSIS — Z00.00 ROUTINE ADULT HEALTH MAINTENANCE: Primary | ICD-10-CM

## 2022-01-01 DIAGNOSIS — J96.01 ACUTE RESPIRATORY FAILURE WITH HYPOXIA (HCC): Primary | ICD-10-CM

## 2022-01-01 DIAGNOSIS — J44.1 CHRONIC OBSTRUCTIVE PULMONARY DISEASE WITH ACUTE EXACERBATION (HCC): ICD-10-CM

## 2022-01-01 LAB
ACID FAST STN SPEC: NEGATIVE
ALBUMIN SERPL-MCNC: 2.7 G/DL (ref 3.5–5)
ALBUMIN SERPL-MCNC: 2.7 G/DL (ref 3.5–5)
ALBUMIN SERPL-MCNC: 2.8 G/DL (ref 3.5–5)
ALBUMIN SERPL-MCNC: 3.2 G/DL (ref 3.5–5)
ALBUMIN/GLOB SERPL: 0.9 {RATIO} (ref 1.1–2.2)
ALBUMIN/GLOB SERPL: 1 {RATIO} (ref 1.1–2.2)
ALBUMIN/GLOB SERPL: 1.1 {RATIO} (ref 1.1–2.2)
ALBUMIN/GLOB SERPL: 1.1 {RATIO} (ref 1.1–2.2)
ALP SERPL-CCNC: 40 U/L (ref 45–117)
ALP SERPL-CCNC: 40 U/L (ref 45–117)
ALP SERPL-CCNC: 45 U/L (ref 45–117)
ALP SERPL-CCNC: 64 U/L (ref 45–117)
ALT SERPL-CCNC: 15 U/L (ref 12–78)
ALT SERPL-CCNC: 17 U/L (ref 12–78)
ALT SERPL-CCNC: 18 U/L (ref 12–78)
ALT SERPL-CCNC: 18 U/L (ref 12–78)
ANION GAP SERPL CALC-SCNC: 0 MMOL/L (ref 5–15)
ANION GAP SERPL CALC-SCNC: 1 MMOL/L (ref 5–15)
ANION GAP SERPL CALC-SCNC: 2 MMOL/L (ref 5–15)
ANION GAP SERPL CALC-SCNC: 3 MMOL/L (ref 5–15)
ANION GAP SERPL CALC-SCNC: ABNORMAL MMOL/L (ref 5–15)
APPEARANCE FLD: CLEAR
APTT PPP: 25.7 SEC (ref 22.1–31)
AST SERPL-CCNC: 15 U/L (ref 15–37)
AST SERPL-CCNC: 16 U/L (ref 15–37)
AST SERPL-CCNC: 18 U/L (ref 15–37)
AST SERPL-CCNC: 21 U/L (ref 15–37)
ATRIAL RATE: 86 BPM
BACTERIA SPEC CULT: NORMAL
BACTERIA SPEC CULT: NORMAL
BASOPHILS # BLD: 0 K/UL (ref 0–0.1)
BASOPHILS # BLD: 0.1 K/UL (ref 0–0.1)
BASOPHILS NFR BLD: 0 % (ref 0–1)
BASOPHILS NFR BLD: 1 % (ref 0–1)
BILIRUB SERPL-MCNC: 0.4 MG/DL (ref 0.2–1)
BILIRUB SERPL-MCNC: 0.5 MG/DL (ref 0.2–1)
BILIRUB SERPL-MCNC: 0.7 MG/DL (ref 0.2–1)
BILIRUB SERPL-MCNC: 0.8 MG/DL (ref 0.2–1)
BNP SERPL-MCNC: 5737 PG/ML
BNP SERPL-MCNC: 5921 PG/ML
BUN SERPL-MCNC: 26 MG/DL (ref 6–20)
BUN SERPL-MCNC: 29 MG/DL (ref 6–20)
BUN SERPL-MCNC: 31 MG/DL (ref 6–20)
BUN SERPL-MCNC: 32 MG/DL (ref 6–20)
BUN SERPL-MCNC: 34 MG/DL (ref 6–20)
BUN SERPL-MCNC: 36 MG/DL (ref 6–20)
BUN SERPL-MCNC: 36 MG/DL (ref 6–20)
BUN SERPL-MCNC: 37 MG/DL (ref 6–20)
BUN SERPL-MCNC: 37 MG/DL (ref 6–20)
BUN/CREAT SERPL: 22 (ref 12–20)
BUN/CREAT SERPL: 30 (ref 12–20)
BUN/CREAT SERPL: 37 (ref 12–20)
BUN/CREAT SERPL: 43 (ref 12–20)
BUN/CREAT SERPL: 45 (ref 12–20)
BUN/CREAT SERPL: 46 (ref 12–20)
BUN/CREAT SERPL: 49 (ref 12–20)
CALCIUM SERPL-MCNC: 8.4 MG/DL (ref 8.5–10.1)
CALCIUM SERPL-MCNC: 8.5 MG/DL (ref 8.5–10.1)
CALCIUM SERPL-MCNC: 8.9 MG/DL (ref 8.5–10.1)
CALCIUM SERPL-MCNC: 9 MG/DL (ref 8.5–10.1)
CALCIUM SERPL-MCNC: 9 MG/DL (ref 8.5–10.1)
CALCIUM SERPL-MCNC: 9.1 MG/DL (ref 8.5–10.1)
CALCIUM SERPL-MCNC: 9.2 MG/DL (ref 8.5–10.1)
CALCIUM SERPL-MCNC: 9.2 MG/DL (ref 8.5–10.1)
CALCIUM SERPL-MCNC: 9.3 MG/DL (ref 8.5–10.1)
CALCULATED R AXIS, ECG10: -51 DEGREES
CALCULATED T AXIS, ECG11: 84 DEGREES
CHLORIDE SERPL-SCNC: 101 MMOL/L (ref 97–108)
CHLORIDE SERPL-SCNC: 101 MMOL/L (ref 97–108)
CHLORIDE SERPL-SCNC: 102 MMOL/L (ref 97–108)
CHLORIDE SERPL-SCNC: 106 MMOL/L (ref 97–108)
CHLORIDE SERPL-SCNC: 106 MMOL/L (ref 97–108)
CHLORIDE SERPL-SCNC: 108 MMOL/L (ref 97–108)
CHLORIDE SERPL-SCNC: 98 MMOL/L (ref 97–108)
CHLORIDE SERPL-SCNC: 99 MMOL/L (ref 97–108)
CHLORIDE SERPL-SCNC: 99 MMOL/L (ref 97–108)
CO2 SERPL-SCNC: 33 MMOL/L (ref 21–32)
CO2 SERPL-SCNC: 35 MMOL/L (ref 21–32)
CO2 SERPL-SCNC: 36 MMOL/L (ref 21–32)
CO2 SERPL-SCNC: 37 MMOL/L (ref 21–32)
CO2 SERPL-SCNC: 40 MMOL/L (ref 21–32)
CO2 SERPL-SCNC: 41 MMOL/L (ref 21–32)
CO2 SERPL-SCNC: 42 MMOL/L (ref 21–32)
COLOR FLD: YELLOW
COMMENT, HOLDF: NORMAL
COVID-19 RAPID TEST, COVR: NOT DETECTED
CREAT SERPL-MCNC: 0.72 MG/DL (ref 0.55–1.02)
CREAT SERPL-MCNC: 0.73 MG/DL (ref 0.55–1.02)
CREAT SERPL-MCNC: 0.75 MG/DL (ref 0.55–1.02)
CREAT SERPL-MCNC: 0.75 MG/DL (ref 0.55–1.02)
CREAT SERPL-MCNC: 0.81 MG/DL (ref 0.55–1.02)
CREAT SERPL-MCNC: 0.83 MG/DL (ref 0.55–1.02)
CREAT SERPL-MCNC: 0.86 MG/DL (ref 0.55–1.02)
CREAT SERPL-MCNC: 1.01 MG/DL (ref 0.55–1.02)
CREAT SERPL-MCNC: 1.3 MG/DL (ref 0.55–1.02)
D DIMER PPP FEU-MCNC: 4.53 MG/L FEU (ref 0–0.65)
DIAGNOSIS, 93000: NORMAL
DIFFERENTIAL METHOD BLD: ABNORMAL
ECHO AO ROOT DIAM: 2.3 CM
ECHO AO ROOT INDEX: 1.38 CM/M2
ECHO AV AREA PEAK VELOCITY: 0.5 CM2
ECHO AV AREA VTI: 0.5 CM2
ECHO AV AREA/BSA PEAK VELOCITY: 0.3 CM2/M2
ECHO AV AREA/BSA VTI: 0.3 CM2/M2
ECHO AV MEAN GRADIENT: 22 MMHG
ECHO AV MEAN VELOCITY: 2.3 M/S
ECHO AV PEAK GRADIENT: 37 MMHG
ECHO AV PEAK VELOCITY: 3 M/S
ECHO AV VELOCITY RATIO: 0.3
ECHO AV VTI: 58.8 CM
ECHO LA DIAMETER INDEX: 2.28 CM/M2
ECHO LA DIAMETER: 3.8 CM
ECHO LA TO AORTIC ROOT RATIO: 1.65
ECHO LA VOL 4C: 57 ML (ref 22–52)
ECHO LA VOLUME INDEX A4C: 34 ML/M2 (ref 16–34)
ECHO LV EDV A4C: 112 ML
ECHO LV EDV INDEX A4C: 67 ML/M2
ECHO LV EJECTION FRACTION A4C: 22 %
ECHO LV ESV A4C: 88 ML
ECHO LV ESV INDEX A4C: 53 ML/M2
ECHO LV FRACTIONAL SHORTENING: 22 % (ref 28–44)
ECHO LV INTERNAL DIMENSION DIASTOLE INDEX: 2.99 CM/M2
ECHO LV INTERNAL DIMENSION DIASTOLIC: 5 CM (ref 3.9–5.3)
ECHO LV INTERNAL DIMENSION SYSTOLIC INDEX: 2.34 CM/M2
ECHO LV INTERNAL DIMENSION SYSTOLIC: 3.9 CM
ECHO LV IVSD: 1.1 CM (ref 0.6–0.9)
ECHO LV MASS 2D: 207.1 G (ref 67–162)
ECHO LV MASS INDEX 2D: 124 G/M2 (ref 43–95)
ECHO LV POSTERIOR WALL DIASTOLIC: 1.1 CM (ref 0.6–0.9)
ECHO LV RELATIVE WALL THICKNESS RATIO: 0.44
ECHO LVOT AREA: 1.8 CM2
ECHO LVOT AV VTI INDEX: 0.32
ECHO LVOT DIAM: 1.5 CM
ECHO LVOT MEAN GRADIENT: 2 MMHG
ECHO LVOT PEAK GRADIENT: 4 MMHG
ECHO LVOT PEAK VELOCITY: 0.9 M/S
ECHO LVOT STROKE VOLUME INDEX: 19.9 ML/M2
ECHO LVOT SV: 33.2 ML
ECHO LVOT VTI: 18.8 CM
ECHO MV A VELOCITY: 1.03 M/S
ECHO MV AREA VTI: 0.8 CM2
ECHO MV E DECELERATION TIME (DT): 194.3 MS
ECHO MV E VELOCITY: 0.83 M/S
ECHO MV E/A RATIO: 0.81
ECHO MV LVOT VTI INDEX: 2.2
ECHO MV MAX VELOCITY: 1.5 M/S
ECHO MV MEAN GRADIENT: 3 MMHG
ECHO MV MEAN VELOCITY: 0.8 M/S
ECHO MV PEAK GRADIENT: 8 MMHG
ECHO MV REGURGITANT ALIASING (NYQUIST) VELOCITY: 44 CM/S
ECHO MV REGURGITANT VELOCITY PISA: 6.6 M/S
ECHO MV REGURGITANT VTIA: 235.5 CM
ECHO MV VTI: 41.3 CM
ECHO RV TAPSE: 2.2 CM (ref 1.7–?)
ECHO TV ALIASING VELOCITY (NYQUIST): 53 CM/S
ECHO TV EROA: 0.9 CM2
ECHO TV REGURGITANT MAX VELOCITY: 3.92 M/S
ECHO TV REGURGITANT PEAK GRADIENT: 61 MMHG
ECHO TV REGURGITANT VELOCITY PISA: 3.8 M/S
ECHO TV REGURGITANT VTI: 116.3 CM
EOSINOPHIL # BLD: 0 K/UL (ref 0–0.4)
EOSINOPHIL # BLD: 0.1 K/UL (ref 0–0.4)
EOSINOPHIL # BLD: 0.2 K/UL (ref 0–0.4)
EOSINOPHIL NFR BLD: 0 % (ref 0–7)
EOSINOPHIL NFR BLD: 1 % (ref 0–7)
EOSINOPHIL NFR BLD: 3 % (ref 0–7)
ERYTHROCYTE [DISTWIDTH] IN BLOOD BY AUTOMATED COUNT: 14.6 % (ref 11.5–14.5)
ERYTHROCYTE [DISTWIDTH] IN BLOOD BY AUTOMATED COUNT: 14.7 % (ref 11.5–14.5)
ERYTHROCYTE [DISTWIDTH] IN BLOOD BY AUTOMATED COUNT: 14.9 % (ref 11.5–14.5)
ERYTHROCYTE [DISTWIDTH] IN BLOOD BY AUTOMATED COUNT: 15.2 % (ref 11.5–14.5)
ERYTHROCYTE [DISTWIDTH] IN BLOOD BY AUTOMATED COUNT: 15.5 % (ref 11.5–14.5)
GLOBULIN SER CALC-MCNC: 2.4 G/DL (ref 2–4)
GLOBULIN SER CALC-MCNC: 2.5 G/DL (ref 2–4)
GLOBULIN SER CALC-MCNC: 2.9 G/DL (ref 2–4)
GLOBULIN SER CALC-MCNC: 3.7 G/DL (ref 2–4)
GLUCOSE SERPL-MCNC: 103 MG/DL (ref 65–100)
GLUCOSE SERPL-MCNC: 104 MG/DL (ref 65–100)
GLUCOSE SERPL-MCNC: 132 MG/DL (ref 65–100)
GLUCOSE SERPL-MCNC: 142 MG/DL (ref 65–100)
GLUCOSE SERPL-MCNC: 161 MG/DL (ref 65–100)
GLUCOSE SERPL-MCNC: 249 MG/DL (ref 65–100)
GLUCOSE SERPL-MCNC: 99 MG/DL (ref 65–100)
GRAM STN SPEC: NORMAL
GRAM STN SPEC: NORMAL
HCT VFR BLD AUTO: 38.5 % (ref 35–47)
HCT VFR BLD AUTO: 38.5 % (ref 35–47)
HCT VFR BLD AUTO: 38.6 % (ref 35–47)
HCT VFR BLD AUTO: 38.6 % (ref 35–47)
HCT VFR BLD AUTO: 40.7 % (ref 35–47)
HCT VFR BLD AUTO: 40.9 % (ref 35–47)
HCT VFR BLD AUTO: 41.7 % (ref 35–47)
HGB BLD-MCNC: 11.4 G/DL (ref 11.5–16)
HGB BLD-MCNC: 11.5 G/DL (ref 11.5–16)
HGB BLD-MCNC: 11.6 G/DL (ref 11.5–16)
HGB BLD-MCNC: 11.7 G/DL (ref 11.5–16)
HGB BLD-MCNC: 12.1 G/DL (ref 11.5–16)
HGB BLD-MCNC: 12.2 G/DL (ref 11.5–16)
HGB BLD-MCNC: 12.2 G/DL (ref 11.5–16)
IMM GRANULOCYTES # BLD AUTO: 0 K/UL (ref 0–0.04)
IMM GRANULOCYTES # BLD AUTO: 0.1 K/UL (ref 0–0.04)
IMM GRANULOCYTES NFR BLD AUTO: 0 % (ref 0–0.5)
IMM GRANULOCYTES NFR BLD AUTO: 1 % (ref 0–0.5)
LACTATE SERPL-SCNC: 1 MMOL/L (ref 0.4–2)
LACTATE SERPL-SCNC: 4.2 MMOL/L (ref 0.4–2)
LDH FLD L TO P-CCNC: 72 U/L
LDH SERPL L TO P-CCNC: 227 U/L (ref 81–246)
LYMPHOCYTES # BLD: 0.2 K/UL (ref 0.8–3.5)
LYMPHOCYTES # BLD: 0.3 K/UL (ref 0.8–3.5)
LYMPHOCYTES # BLD: 0.7 K/UL (ref 0.8–3.5)
LYMPHOCYTES # BLD: 0.8 K/UL (ref 0.8–3.5)
LYMPHOCYTES NFR BLD: 11 % (ref 12–49)
LYMPHOCYTES NFR BLD: 2 % (ref 12–49)
LYMPHOCYTES NFR BLD: 2 % (ref 12–49)
LYMPHOCYTES NFR BLD: 3 % (ref 12–49)
LYMPHOCYTES NFR BLD: 3 % (ref 12–49)
LYMPHOCYTES NFR BLD: 7 % (ref 12–49)
LYMPHOCYTES NFR FLD: 38 %
MAGNESIUM SERPL-MCNC: 2.8 MG/DL (ref 1.6–2.4)
MCH RBC QN AUTO: 28.8 PG (ref 26–34)
MCH RBC QN AUTO: 28.9 PG (ref 26–34)
MCH RBC QN AUTO: 29.3 PG (ref 26–34)
MCH RBC QN AUTO: 29.3 PG (ref 26–34)
MCH RBC QN AUTO: 29.4 PG (ref 26–34)
MCH RBC QN AUTO: 29.5 PG (ref 26–34)
MCH RBC QN AUTO: 29.6 PG (ref 26–34)
MCHC RBC AUTO-ENTMCNC: 29.3 G/DL (ref 30–36.5)
MCHC RBC AUTO-ENTMCNC: 29.6 G/DL (ref 30–36.5)
MCHC RBC AUTO-ENTMCNC: 29.7 G/DL (ref 30–36.5)
MCHC RBC AUTO-ENTMCNC: 29.8 G/DL (ref 30–36.5)
MCHC RBC AUTO-ENTMCNC: 29.9 G/DL (ref 30–36.5)
MCHC RBC AUTO-ENTMCNC: 30.1 G/DL (ref 30–36.5)
MCHC RBC AUTO-ENTMCNC: 30.3 G/DL (ref 30–36.5)
MCV RBC AUTO: 100.2 FL (ref 80–99)
MCV RBC AUTO: 96.5 FL (ref 80–99)
MCV RBC AUTO: 96.7 FL (ref 80–99)
MCV RBC AUTO: 97.2 FL (ref 80–99)
MCV RBC AUTO: 98.5 FL (ref 80–99)
MCV RBC AUTO: 98.8 FL (ref 80–99)
MCV RBC AUTO: 98.8 FL (ref 80–99)
MESOTHL CELL NFR FLD: 20 %
MONOCYTES # BLD: 0.3 K/UL (ref 0–1)
MONOCYTES # BLD: 0.4 K/UL (ref 0–1)
MONOCYTES # BLD: 0.5 K/UL (ref 0–1)
MONOCYTES # BLD: 0.6 K/UL (ref 0–1)
MONOCYTES # BLD: 0.7 K/UL (ref 0–1)
MONOCYTES # BLD: 0.9 K/UL (ref 0–1)
MONOCYTES NFR BLD: 10 % (ref 5–13)
MONOCYTES NFR BLD: 4 % (ref 5–13)
MONOCYTES NFR BLD: 5 % (ref 5–13)
MONOCYTES NFR BLD: 6 % (ref 5–13)
MONOCYTES NFR BLD: 7 % (ref 5–13)
MONOCYTES NFR BLD: 9 % (ref 5–13)
MONOS+MACROS NFR FLD: 34 %
MYCOBACTERIUM SPEC QL CULT: NEGATIVE
NEUTROPHILS NFR FLD: 8 %
NEUTS SEG # BLD: 5.6 K/UL (ref 1.8–8)
NEUTS SEG # BLD: 7.3 K/UL (ref 1.8–8)
NEUTS SEG # BLD: 7.6 K/UL (ref 1.8–8)
NEUTS SEG # BLD: 7.6 K/UL (ref 1.8–8)
NEUTS SEG # BLD: 8.3 K/UL (ref 1.8–8)
NEUTS SEG # BLD: 9 K/UL (ref 1.8–8)
NEUTS SEG NFR BLD: 76 % (ref 32–75)
NEUTS SEG NFR BLD: 81 % (ref 32–75)
NEUTS SEG NFR BLD: 89 % (ref 32–75)
NEUTS SEG NFR BLD: 91 % (ref 32–75)
NEUTS SEG NFR BLD: 92 % (ref 32–75)
NEUTS SEG NFR BLD: 92 % (ref 32–75)
NRBC # BLD: 0 K/UL (ref 0–0.01)
NRBC BLD-RTO: 0 PER 100 WBC
NUC CELL # FLD: 108 /CU MM
P-R INTERVAL, ECG05: 208 MS
PLATELET # BLD AUTO: 106 K/UL (ref 150–400)
PLATELET # BLD AUTO: 122 K/UL (ref 150–400)
PLATELET # BLD AUTO: 123 K/UL (ref 150–400)
PLATELET # BLD AUTO: 125 K/UL (ref 150–400)
PLATELET # BLD AUTO: 168 K/UL (ref 150–400)
PLATELET # BLD AUTO: 172 K/UL (ref 150–400)
PLATELET # BLD AUTO: 176 K/UL (ref 150–400)
PMV BLD AUTO: 10.1 FL (ref 8.9–12.9)
PMV BLD AUTO: 10.4 FL (ref 8.9–12.9)
PMV BLD AUTO: 10.5 FL (ref 8.9–12.9)
PMV BLD AUTO: 11 FL (ref 8.9–12.9)
PMV BLD AUTO: 11.1 FL (ref 8.9–12.9)
PMV BLD AUTO: 11.4 FL (ref 8.9–12.9)
PMV BLD AUTO: 11.7 FL (ref 8.9–12.9)
POTASSIUM SERPL-SCNC: 3.7 MMOL/L (ref 3.5–5.1)
POTASSIUM SERPL-SCNC: 3.9 MMOL/L (ref 3.5–5.1)
POTASSIUM SERPL-SCNC: 4.1 MMOL/L (ref 3.5–5.1)
POTASSIUM SERPL-SCNC: 4.1 MMOL/L (ref 3.5–5.1)
POTASSIUM SERPL-SCNC: 4.2 MMOL/L (ref 3.5–5.1)
POTASSIUM SERPL-SCNC: 4.3 MMOL/L (ref 3.5–5.1)
POTASSIUM SERPL-SCNC: 4.7 MMOL/L (ref 3.5–5.1)
POTASSIUM SERPL-SCNC: 4.8 MMOL/L (ref 3.5–5.1)
POTASSIUM SERPL-SCNC: 5.4 MMOL/L (ref 3.5–5.1)
PROCALCITONIN SERPL-MCNC: <0.05 NG/ML
PROT FLD-MCNC: 1.6 G/DL
PROT SERPL-MCNC: 5.1 G/DL (ref 6.4–8.2)
PROT SERPL-MCNC: 5.2 G/DL (ref 6.4–8.2)
PROT SERPL-MCNC: 5.7 G/DL (ref 6.4–8.2)
PROT SERPL-MCNC: 6.9 G/DL (ref 6.4–8.2)
Q-T INTERVAL, ECG07: 430 MS
QRS DURATION, ECG06: 136 MS
QTC CALCULATION (BEZET), ECG08: 514 MS
RBC # BLD AUTO: 3.92 M/UL (ref 3.8–5.2)
RBC # BLD AUTO: 3.96 M/UL (ref 3.8–5.2)
RBC # BLD AUTO: 3.98 M/UL (ref 3.8–5.2)
RBC # BLD AUTO: 4 M/UL (ref 3.8–5.2)
RBC # BLD AUTO: 4.12 M/UL (ref 3.8–5.2)
RBC # BLD AUTO: 4.14 M/UL (ref 3.8–5.2)
RBC # BLD AUTO: 4.16 M/UL (ref 3.8–5.2)
RBC # FLD: 45 /CU MM
RBC MORPH BLD: ABNORMAL
SAMPLES BEING HELD,HOLD: NORMAL
SERVICE CMNT-IMP: NORMAL
SODIUM SERPL-SCNC: 139 MMOL/L (ref 136–145)
SODIUM SERPL-SCNC: 139 MMOL/L (ref 136–145)
SODIUM SERPL-SCNC: 140 MMOL/L (ref 136–145)
SODIUM SERPL-SCNC: 142 MMOL/L (ref 136–145)
SODIUM SERPL-SCNC: 142 MMOL/L (ref 136–145)
SODIUM SERPL-SCNC: 143 MMOL/L (ref 136–145)
SODIUM SERPL-SCNC: 144 MMOL/L (ref 136–145)
SOURCE, COVRS: NORMAL
SPECIMEN PREPARATION: NORMAL
SPECIMEN SOURCE FLD: ABNORMAL
SPECIMEN SOURCE FLD: NORMAL
SPECIMEN SOURCE FLD: NORMAL
SPECIMEN SOURCE: NORMAL
THERAPEUTIC RANGE,PTTT: NORMAL SECS (ref 58–77)
TROPONIN-HIGH SENSITIVITY: 6 NG/L (ref 0–51)
VENTRICULAR RATE, ECG03: 86 BPM
WBC # BLD AUTO: 10.1 K/UL (ref 3.6–11)
WBC # BLD AUTO: 5 K/UL (ref 3.6–11)
WBC # BLD AUTO: 7.3 K/UL (ref 3.6–11)
WBC # BLD AUTO: 7.9 K/UL (ref 3.6–11)
WBC # BLD AUTO: 8.3 K/UL (ref 3.6–11)
WBC # BLD AUTO: 9.1 K/UL (ref 3.6–11)
WBC # BLD AUTO: 9.4 K/UL (ref 3.6–11)

## 2022-01-01 PROCEDURE — 3331090002 HH PPS REVENUE DEBIT

## 2022-01-01 PROCEDURE — 94640 AIRWAY INHALATION TREATMENT: CPT

## 2022-01-01 PROCEDURE — G0299 HHS/HOSPICE OF RN EA 15 MIN: HCPCS

## 2022-01-01 PROCEDURE — 3331090001 HH PPS REVENUE CREDIT

## 2022-01-01 PROCEDURE — 74011000258 HC RX REV CODE- 258: Performed by: GENERAL ACUTE CARE HOSPITAL

## 2022-01-01 PROCEDURE — 74011250636 HC RX REV CODE- 250/636: Performed by: HOSPITALIST

## 2022-01-01 PROCEDURE — 74011000250 HC RX REV CODE- 250: Performed by: INTERNAL MEDICINE

## 2022-01-01 PROCEDURE — 93306 TTE W/DOPPLER COMPLETE: CPT | Performed by: INTERNAL MEDICINE

## 2022-01-01 PROCEDURE — 83735 ASSAY OF MAGNESIUM: CPT

## 2022-01-01 PROCEDURE — 74011250637 HC RX REV CODE- 250/637: Performed by: NURSE PRACTITIONER

## 2022-01-01 PROCEDURE — 36415 COLL VENOUS BLD VENIPUNCTURE: CPT

## 2022-01-01 PROCEDURE — 93005 ELECTROCARDIOGRAM TRACING: CPT

## 2022-01-01 PROCEDURE — 74011250637 HC RX REV CODE- 250/637: Performed by: INTERNAL MEDICINE

## 2022-01-01 PROCEDURE — 77010033678 HC OXYGEN DAILY

## 2022-01-01 PROCEDURE — 74011250637 HC RX REV CODE- 250/637: Performed by: STUDENT IN AN ORGANIZED HEALTH CARE EDUCATION/TRAINING PROGRAM

## 2022-01-01 PROCEDURE — 94761 N-INVAS EAR/PLS OXIMETRY MLT: CPT

## 2022-01-01 PROCEDURE — 88305 TISSUE EXAM BY PATHOLOGIST: CPT

## 2022-01-01 PROCEDURE — A6446 CONFORM BAND S W>=3" <5"/YD: HCPCS

## 2022-01-01 PROCEDURE — 74011250636 HC RX REV CODE- 250/636: Performed by: INTERNAL MEDICINE

## 2022-01-01 PROCEDURE — 65270000046 HC RM TELEMETRY

## 2022-01-01 PROCEDURE — 93306 TTE W/DOPPLER COMPLETE: CPT

## 2022-01-01 PROCEDURE — A6252 ABSORPT DRG >16 <=48 W/O BDR: HCPCS

## 2022-01-01 PROCEDURE — 74011000250 HC RX REV CODE- 250: Performed by: STUDENT IN AN ORGANIZED HEALTH CARE EDUCATION/TRAINING PROGRAM

## 2022-01-01 PROCEDURE — 77030039825 HC MSK NSL PAP SUPERNO2VA VYRM -B: Performed by: ANESTHESIOLOGY

## 2022-01-01 PROCEDURE — 87116 MYCOBACTERIA CULTURE: CPT

## 2022-01-01 PROCEDURE — 85025 COMPLETE CBC W/AUTO DIFF WBC: CPT

## 2022-01-01 PROCEDURE — 74011250637 HC RX REV CODE- 250/637: Performed by: EMERGENCY MEDICINE

## 2022-01-01 PROCEDURE — 76060000032 HC ANESTHESIA 0.5 TO 1 HR: Performed by: INTERNAL MEDICINE

## 2022-01-01 PROCEDURE — 97530 THERAPEUTIC ACTIVITIES: CPT | Performed by: OCCUPATIONAL THERAPIST

## 2022-01-01 PROCEDURE — G0156 HHCP-SVS OF AIDE,EA 15 MIN: HCPCS

## 2022-01-01 PROCEDURE — 94762 N-INVAS EAR/PLS OXIMTRY CONT: CPT

## 2022-01-01 PROCEDURE — 2709999900 HC NON-CHARGEABLE SUPPLY: Performed by: INTERNAL MEDICINE

## 2022-01-01 PROCEDURE — A9270 NON-COVERED ITEM OR SERVICE: HCPCS

## 2022-01-01 PROCEDURE — A6216 NON-STERILE GAUZE<=16 SQ IN: HCPCS

## 2022-01-01 PROCEDURE — 74011250636 HC RX REV CODE- 250/636: Performed by: STUDENT IN AN ORGANIZED HEALTH CARE EDUCATION/TRAINING PROGRAM

## 2022-01-01 PROCEDURE — 97110 THERAPEUTIC EXERCISES: CPT

## 2022-01-01 PROCEDURE — C1729 CATH, DRAINAGE: HCPCS

## 2022-01-01 PROCEDURE — 3331090003 HH PPS REVENUE ADJ

## 2022-01-01 PROCEDURE — 74011250636 HC RX REV CODE- 250/636: Performed by: GENERAL ACUTE CARE HOSPITAL

## 2022-01-01 PROCEDURE — 97530 THERAPEUTIC ACTIVITIES: CPT

## 2022-01-01 PROCEDURE — 51798 US URINE CAPACITY MEASURE: CPT

## 2022-01-01 PROCEDURE — A6197 ALGINATE DRSG >16 <=48 SQ IN: HCPCS

## 2022-01-01 PROCEDURE — C1726 CATH, BAL DIL, NON-VASCULAR: HCPCS | Performed by: INTERNAL MEDICINE

## 2022-01-01 PROCEDURE — 74011250637 HC RX REV CODE- 250/637

## 2022-01-01 PROCEDURE — 80048 BASIC METABOLIC PNL TOTAL CA: CPT

## 2022-01-01 PROCEDURE — 97161 PT EVAL LOW COMPLEX 20 MIN: CPT

## 2022-01-01 PROCEDURE — 80053 COMPREHEN METABOLIC PANEL: CPT

## 2022-01-01 PROCEDURE — 74011250636 HC RX REV CODE- 250/636: Performed by: ANESTHESIOLOGY

## 2022-01-01 PROCEDURE — 400014 HH F/U

## 2022-01-01 PROCEDURE — 97530 THERAPEUTIC ACTIVITIES: CPT | Performed by: PHYSICAL THERAPIST

## 2022-01-01 PROCEDURE — 74011636637 HC RX REV CODE- 636/637: Performed by: HOSPITALIST

## 2022-01-01 PROCEDURE — 92610 EVALUATE SWALLOWING FUNCTION: CPT

## 2022-01-01 PROCEDURE — 87635 SARS-COV-2 COVID-19 AMP PRB: CPT

## 2022-01-01 PROCEDURE — 0DB58ZX EXCISION OF ESOPHAGUS, VIA NATURAL OR ARTIFICIAL OPENING ENDOSCOPIC, DIAGNOSTIC: ICD-10-PCS | Performed by: INTERNAL MEDICINE

## 2022-01-01 PROCEDURE — 97116 GAIT TRAINING THERAPY: CPT

## 2022-01-01 PROCEDURE — 400018 HH-NO PAY CLAIM PROCEDURE

## 2022-01-01 PROCEDURE — 89050 BODY FLUID CELL COUNT: CPT

## 2022-01-01 PROCEDURE — 83880 ASSAY OF NATRIURETIC PEPTIDE: CPT

## 2022-01-01 PROCEDURE — 0W993ZZ DRAINAGE OF RIGHT PLEURAL CAVITY, PERCUTANEOUS APPROACH: ICD-10-PCS | Performed by: INTERNAL MEDICINE

## 2022-01-01 PROCEDURE — 84157 ASSAY OF PROTEIN OTHER: CPT

## 2022-01-01 PROCEDURE — 88112 CYTOPATH CELL ENHANCE TECH: CPT

## 2022-01-01 PROCEDURE — 83605 ASSAY OF LACTIC ACID: CPT

## 2022-01-01 PROCEDURE — 71045 X-RAY EXAM CHEST 1 VIEW: CPT

## 2022-01-01 PROCEDURE — 85027 COMPLETE CBC AUTOMATED: CPT

## 2022-01-01 PROCEDURE — 96374 THER/PROPH/DIAG INJ IV PUSH: CPT

## 2022-01-01 PROCEDURE — 74011000250 HC RX REV CODE- 250: Performed by: ANESTHESIOLOGY

## 2022-01-01 PROCEDURE — 99213 OFFICE O/P EST LOW 20 MIN: CPT | Performed by: INTERNAL MEDICINE

## 2022-01-01 PROCEDURE — 74220 X-RAY XM ESOPHAGUS 1CNTRST: CPT

## 2022-01-01 PROCEDURE — 76040000007: Performed by: INTERNAL MEDICINE

## 2022-01-01 PROCEDURE — 74011000636 HC RX REV CODE- 636: Performed by: INTERNAL MEDICINE

## 2022-01-01 PROCEDURE — 74011250636 HC RX REV CODE- 250/636: Performed by: EMERGENCY MEDICINE

## 2022-01-01 PROCEDURE — 0D758ZZ DILATION OF ESOPHAGUS, VIA NATURAL OR ARTIFICIAL OPENING ENDOSCOPIC: ICD-10-PCS | Performed by: INTERNAL MEDICINE

## 2022-01-01 PROCEDURE — 84145 PROCALCITONIN (PCT): CPT

## 2022-01-01 PROCEDURE — A4450 NON-WATERPROOF TAPE: HCPCS

## 2022-01-01 PROCEDURE — 97165 OT EVAL LOW COMPLEX 30 MIN: CPT | Performed by: OCCUPATIONAL THERAPIST

## 2022-01-01 PROCEDURE — 77030019988 HC FCPS ENDOSC DISP BSC -B: Performed by: INTERNAL MEDICINE

## 2022-01-01 PROCEDURE — 99285 EMERGENCY DEPT VISIT HI MDM: CPT

## 2022-01-01 PROCEDURE — 85730 THROMBOPLASTIN TIME PARTIAL: CPT

## 2022-01-01 PROCEDURE — 84484 ASSAY OF TROPONIN QUANT: CPT

## 2022-01-01 PROCEDURE — 71275 CT ANGIOGRAPHY CHEST: CPT

## 2022-01-01 PROCEDURE — 74011000250 HC RX REV CODE- 250: Performed by: EMERGENCY MEDICINE

## 2022-01-01 PROCEDURE — 87205 SMEAR GRAM STAIN: CPT

## 2022-01-01 PROCEDURE — 85379 FIBRIN DEGRADATION QUANT: CPT

## 2022-01-01 PROCEDURE — 83615 LACTATE (LD) (LDH) ENZYME: CPT

## 2022-01-01 PROCEDURE — 77030018712 HC DEV BLLN INFL BSC -B: Performed by: INTERNAL MEDICINE

## 2022-01-01 PROCEDURE — 97535 SELF CARE MNGMENT TRAINING: CPT | Performed by: OCCUPATIONAL THERAPIST

## 2022-01-01 RX ORDER — POLYETHYLENE GLYCOL 3350 17 G/17G
17 POWDER, FOR SOLUTION ORAL DAILY PRN
Status: DISCONTINUED | OUTPATIENT
Start: 2022-01-01 | End: 2022-01-01 | Stop reason: HOSPADM

## 2022-01-01 RX ORDER — SODIUM CHLORIDE 9 MG/ML
100 INJECTION, SOLUTION INTRAVENOUS CONTINUOUS
Status: DISPENSED | OUTPATIENT
Start: 2022-01-01 | End: 2022-01-01

## 2022-01-01 RX ORDER — MAG HYDROX/ALUMINUM HYD/SIMETH 200-200-20
30 SUSPENSION, ORAL (FINAL DOSE FORM) ORAL
Status: DISCONTINUED | OUTPATIENT
Start: 2022-01-01 | End: 2022-01-01 | Stop reason: HOSPADM

## 2022-01-01 RX ORDER — PROPOFOL 10 MG/ML
INJECTION, EMULSION INTRAVENOUS AS NEEDED
Status: DISCONTINUED | OUTPATIENT
Start: 2022-01-01 | End: 2022-01-01 | Stop reason: HOSPADM

## 2022-01-01 RX ORDER — ASPIRIN 81 MG/1
162 TABLET ORAL DAILY
Status: DISCONTINUED | OUTPATIENT
Start: 2022-01-01 | End: 2022-01-01 | Stop reason: HOSPADM

## 2022-01-01 RX ORDER — ENOXAPARIN SODIUM 100 MG/ML
40 INJECTION SUBCUTANEOUS DAILY
Status: DISCONTINUED | OUTPATIENT
Start: 2022-01-01 | End: 2022-01-01 | Stop reason: HOSPADM

## 2022-01-01 RX ORDER — SODIUM CHLORIDE 0.9 % (FLUSH) 0.9 %
5-40 SYRINGE (ML) INJECTION AS NEEDED
Status: DISCONTINUED | OUTPATIENT
Start: 2022-01-01 | End: 2022-01-01 | Stop reason: HOSPADM

## 2022-01-01 RX ORDER — IPRATROPIUM BROMIDE AND ALBUTEROL SULFATE 2.5; .5 MG/3ML; MG/3ML
3 SOLUTION RESPIRATORY (INHALATION)
Status: CANCELLED | OUTPATIENT
Start: 2022-01-01 | End: 2022-01-01

## 2022-01-01 RX ORDER — EPINEPHRINE 0.1 MG/ML
1 INJECTION INTRACARDIAC; INTRAVENOUS
Status: DISCONTINUED | OUTPATIENT
Start: 2022-01-01 | End: 2022-01-01 | Stop reason: HOSPADM

## 2022-01-01 RX ORDER — ACETAMINOPHEN 650 MG/1
650 SUPPOSITORY RECTAL
Status: DISCONTINUED | OUTPATIENT
Start: 2022-01-01 | End: 2022-01-01 | Stop reason: HOSPADM

## 2022-01-01 RX ORDER — ARFORMOTEROL TARTRATE 15 UG/2ML
15 SOLUTION RESPIRATORY (INHALATION)
Status: DISCONTINUED | OUTPATIENT
Start: 2022-01-01 | End: 2022-01-01 | Stop reason: HOSPADM

## 2022-01-01 RX ORDER — SIMETHICONE 80 MG
40 TABLET,CHEWABLE ORAL
Status: DISCONTINUED | OUTPATIENT
Start: 2022-01-01 | End: 2022-01-01 | Stop reason: HOSPADM

## 2022-01-01 RX ORDER — IPRATROPIUM BROMIDE AND ALBUTEROL SULFATE 2.5; .5 MG/3ML; MG/3ML
3 SOLUTION RESPIRATORY (INHALATION)
Status: DISCONTINUED | OUTPATIENT
Start: 2022-01-01 | End: 2022-01-01

## 2022-01-01 RX ORDER — BUDESONIDE 0.25 MG/2ML
250 INHALANT ORAL
Status: DISCONTINUED | OUTPATIENT
Start: 2022-01-01 | End: 2022-01-01 | Stop reason: HOSPADM

## 2022-01-01 RX ORDER — DEXTROMETHORPHAN/PSEUDOEPHED 2.5-7.5/.8
1.2 DROPS ORAL
Status: DISCONTINUED | OUTPATIENT
Start: 2022-01-01 | End: 2022-01-01 | Stop reason: HOSPADM

## 2022-01-01 RX ORDER — NALOXONE HYDROCHLORIDE 0.4 MG/ML
0.4 INJECTION, SOLUTION INTRAMUSCULAR; INTRAVENOUS; SUBCUTANEOUS
Status: ACTIVE | OUTPATIENT
Start: 2022-01-01 | End: 2022-01-01

## 2022-01-01 RX ORDER — CALCIUM CARBONATE 200(500)MG
200 TABLET,CHEWABLE ORAL
Status: DISCONTINUED | OUTPATIENT
Start: 2022-01-01 | End: 2022-01-01 | Stop reason: HOSPADM

## 2022-01-01 RX ORDER — ENOXAPARIN SODIUM 100 MG/ML
40 INJECTION SUBCUTANEOUS DAILY
Status: DISCONTINUED | OUTPATIENT
Start: 2022-01-01 | End: 2022-01-01

## 2022-01-01 RX ORDER — IPRATROPIUM BROMIDE AND ALBUTEROL SULFATE 2.5; .5 MG/3ML; MG/3ML
3 SOLUTION RESPIRATORY (INHALATION)
Status: COMPLETED | OUTPATIENT
Start: 2022-01-01 | End: 2022-01-01

## 2022-01-01 RX ORDER — FUROSEMIDE 40 MG/1
40 TABLET ORAL DAILY
Status: DISCONTINUED | OUTPATIENT
Start: 2022-01-01 | End: 2022-01-01 | Stop reason: HOSPADM

## 2022-01-01 RX ORDER — AZITHROMYCIN 250 MG/1
250 TABLET, FILM COATED ORAL
Status: DISCONTINUED | OUTPATIENT
Start: 2022-01-01 | End: 2022-01-01 | Stop reason: HOSPADM

## 2022-01-01 RX ORDER — FUROSEMIDE 10 MG/ML
20 INJECTION INTRAMUSCULAR; INTRAVENOUS ONCE
Status: COMPLETED | OUTPATIENT
Start: 2022-01-01 | End: 2022-01-01

## 2022-01-01 RX ORDER — SODIUM CHLORIDE 0.9 % (FLUSH) 0.9 %
5-40 SYRINGE (ML) INJECTION AS NEEDED
Status: DISCONTINUED | OUTPATIENT
Start: 2022-01-01 | End: 2022-01-01 | Stop reason: SDUPTHER

## 2022-01-01 RX ORDER — ACETAMINOPHEN 325 MG/1
650 TABLET ORAL
Status: DISCONTINUED | OUTPATIENT
Start: 2022-01-01 | End: 2022-01-01 | Stop reason: SDUPTHER

## 2022-01-01 RX ORDER — LEVOFLOXACIN 5 MG/ML
750 INJECTION, SOLUTION INTRAVENOUS
Status: COMPLETED | OUTPATIENT
Start: 2022-01-01 | End: 2022-01-01

## 2022-01-01 RX ORDER — GUAIFENESIN 600 MG/1
600 TABLET, EXTENDED RELEASE ORAL EVERY 12 HOURS
Status: DISCONTINUED | OUTPATIENT
Start: 2022-01-01 | End: 2022-01-01

## 2022-01-01 RX ORDER — LIDOCAINE HYDROCHLORIDE 20 MG/ML
INJECTION, SOLUTION EPIDURAL; INFILTRATION; INTRACAUDAL; PERINEURAL AS NEEDED
Status: DISCONTINUED | OUTPATIENT
Start: 2022-01-01 | End: 2022-01-01 | Stop reason: HOSPADM

## 2022-01-01 RX ORDER — ENOXAPARIN SODIUM 100 MG/ML
30 INJECTION SUBCUTANEOUS DAILY
Status: DISCONTINUED | OUTPATIENT
Start: 2022-01-01 | End: 2022-01-01

## 2022-01-01 RX ORDER — SODIUM CHLORIDE 9 MG/ML
75 INJECTION, SOLUTION INTRAVENOUS CONTINUOUS
Status: DISPENSED | OUTPATIENT
Start: 2022-01-01 | End: 2022-01-01

## 2022-01-01 RX ORDER — SODIUM CHLORIDE 0.9 % (FLUSH) 0.9 %
5-40 SYRINGE (ML) INJECTION EVERY 8 HOURS
Status: DISCONTINUED | OUTPATIENT
Start: 2022-01-01 | End: 2022-01-01 | Stop reason: HOSPADM

## 2022-01-01 RX ORDER — OXYMETAZOLINE HCL 0.05 %
2 SPRAY, NON-AEROSOL (ML) NASAL 2 TIMES DAILY
Status: COMPLETED | OUTPATIENT
Start: 2022-01-01 | End: 2022-01-01

## 2022-01-01 RX ORDER — GUAIFENESIN 100 MG/5ML
100 SOLUTION ORAL
Status: DISCONTINUED | OUTPATIENT
Start: 2022-01-01 | End: 2022-01-01 | Stop reason: HOSPADM

## 2022-01-01 RX ORDER — DEXTROSE MONOHYDRATE AND SODIUM CHLORIDE 5; .9 G/100ML; G/100ML
75 INJECTION, SOLUTION INTRAVENOUS CONTINUOUS
Status: DISCONTINUED | OUTPATIENT
Start: 2022-01-01 | End: 2022-01-01

## 2022-01-01 RX ORDER — LIDOCAINE HYDROCHLORIDE 10 MG/ML
10 INJECTION, SOLUTION EPIDURAL; INFILTRATION; INTRACAUDAL; PERINEURAL
Status: COMPLETED | OUTPATIENT
Start: 2022-01-01 | End: 2022-01-01

## 2022-01-01 RX ORDER — IPRATROPIUM BROMIDE AND ALBUTEROL SULFATE 2.5; .5 MG/3ML; MG/3ML
3 SOLUTION RESPIRATORY (INHALATION)
Status: DISCONTINUED | OUTPATIENT
Start: 2022-01-01 | End: 2022-01-01 | Stop reason: HOSPADM

## 2022-01-01 RX ORDER — IPRATROPIUM BROMIDE 0.5 MG/2.5ML
0.5 SOLUTION RESPIRATORY (INHALATION)
Status: DISCONTINUED | OUTPATIENT
Start: 2022-01-01 | End: 2022-01-01

## 2022-01-01 RX ORDER — FUROSEMIDE 10 MG/ML
40 INJECTION INTRAMUSCULAR; INTRAVENOUS ONCE
Status: COMPLETED | OUTPATIENT
Start: 2022-01-01 | End: 2022-01-01

## 2022-01-01 RX ORDER — FUROSEMIDE 10 MG/ML
40 INJECTION INTRAMUSCULAR; INTRAVENOUS DAILY
Status: DISCONTINUED | OUTPATIENT
Start: 2022-01-01 | End: 2022-01-01

## 2022-01-01 RX ORDER — LEVOFLOXACIN 5 MG/ML
750 INJECTION, SOLUTION INTRAVENOUS
Status: DISCONTINUED | OUTPATIENT
Start: 2022-01-01 | End: 2022-01-01

## 2022-01-01 RX ORDER — SODIUM CHLORIDE 0.9 % (FLUSH) 0.9 %
5-40 SYRINGE (ML) INJECTION EVERY 8 HOURS
Status: DISCONTINUED | OUTPATIENT
Start: 2022-01-01 | End: 2022-01-01 | Stop reason: SDUPTHER

## 2022-01-01 RX ORDER — MIDAZOLAM HYDROCHLORIDE 1 MG/ML
.25-5 INJECTION, SOLUTION INTRAMUSCULAR; INTRAVENOUS
Status: ACTIVE | OUTPATIENT
Start: 2022-01-01 | End: 2022-01-01

## 2022-01-01 RX ORDER — SODIUM CHLORIDE 9 MG/ML
75 INJECTION, SOLUTION INTRAVENOUS CONTINUOUS
Status: DISCONTINUED | OUTPATIENT
Start: 2022-01-01 | End: 2022-01-01

## 2022-01-01 RX ORDER — PREDNISONE 20 MG/1
40 TABLET ORAL 2 TIMES DAILY WITH MEALS
Status: DISCONTINUED | OUTPATIENT
Start: 2022-01-01 | End: 2022-01-01

## 2022-01-01 RX ORDER — ONDANSETRON 4 MG/1
4 TABLET, ORALLY DISINTEGRATING ORAL
Status: DISCONTINUED | OUTPATIENT
Start: 2022-01-01 | End: 2022-01-01 | Stop reason: HOSPADM

## 2022-01-01 RX ORDER — FLUMAZENIL 0.1 MG/ML
0.2 INJECTION INTRAVENOUS
Status: ACTIVE | OUTPATIENT
Start: 2022-01-01 | End: 2022-01-01

## 2022-01-01 RX ORDER — ATROPINE SULFATE 0.1 MG/ML
0.5 INJECTION INTRAVENOUS
Status: DISCONTINUED | OUTPATIENT
Start: 2022-01-01 | End: 2022-01-01 | Stop reason: HOSPADM

## 2022-01-01 RX ORDER — FUROSEMIDE 40 MG/1
40 TABLET ORAL DAILY
Qty: 30 TABLET | Refills: 0 | Status: SHIPPED
Start: 2022-01-01

## 2022-01-01 RX ORDER — ONDANSETRON 2 MG/ML
4 INJECTION INTRAMUSCULAR; INTRAVENOUS
Status: DISCONTINUED | OUTPATIENT
Start: 2022-01-01 | End: 2022-01-01 | Stop reason: HOSPADM

## 2022-01-01 RX ORDER — FLUTICASONE PROPIONATE 50 MCG
2 SPRAY, SUSPENSION (ML) NASAL DAILY
Status: DISCONTINUED | OUTPATIENT
Start: 2022-01-01 | End: 2022-01-01 | Stop reason: HOSPADM

## 2022-01-01 RX ORDER — GUAIFENESIN 600 MG/1
1200 TABLET, EXTENDED RELEASE ORAL EVERY 12 HOURS
Status: DISCONTINUED | OUTPATIENT
Start: 2022-01-01 | End: 2022-01-01 | Stop reason: HOSPADM

## 2022-01-01 RX ORDER — ACETAMINOPHEN 325 MG/1
650 TABLET ORAL
Status: DISCONTINUED | OUTPATIENT
Start: 2022-01-01 | End: 2022-01-01 | Stop reason: HOSPADM

## 2022-01-01 RX ADMIN — SODIUM CHLORIDE, PRESERVATIVE FREE 10 ML: 5 INJECTION INTRAVENOUS at 22:09

## 2022-01-01 RX ADMIN — OXYMETAZOLINE HCL 2 SPRAY: 0.05 SPRAY NASAL at 17:32

## 2022-01-01 RX ADMIN — IPRATROPIUM BROMIDE AND ALBUTEROL SULFATE 3 ML: .5; 3 SOLUTION RESPIRATORY (INHALATION) at 15:30

## 2022-01-01 RX ADMIN — BUDESONIDE 250 MCG: 0.25 INHALANT RESPIRATORY (INHALATION) at 19:57

## 2022-01-01 RX ADMIN — METHYLPREDNISOLONE SODIUM SUCCINATE 40 MG: 40 INJECTION, POWDER, FOR SOLUTION INTRAMUSCULAR; INTRAVENOUS at 21:14

## 2022-01-01 RX ADMIN — ALUMINUM HYDROXIDE, MAGNESIUM HYDROXIDE, AND SIMETHICONE 30 ML: 200; 200; 20 SUSPENSION ORAL at 14:54

## 2022-01-01 RX ADMIN — ARFORMOTEROL TARTRATE 15 MCG: 15 SOLUTION RESPIRATORY (INHALATION) at 20:12

## 2022-01-01 RX ADMIN — ENOXAPARIN SODIUM 40 MG: 100 INJECTION SUBCUTANEOUS at 10:11

## 2022-01-01 RX ADMIN — SODIUM CHLORIDE, PRESERVATIVE FREE 10 ML: 5 INJECTION INTRAVENOUS at 06:59

## 2022-01-01 RX ADMIN — ARFORMOTEROL TARTRATE 15 MCG: 15 SOLUTION RESPIRATORY (INHALATION) at 20:29

## 2022-01-01 RX ADMIN — IPRATROPIUM BROMIDE AND ALBUTEROL SULFATE 3 ML: .5; 3 SOLUTION RESPIRATORY (INHALATION) at 20:12

## 2022-01-01 RX ADMIN — SODIUM CHLORIDE, PRESERVATIVE FREE 10 ML: 5 INJECTION INTRAVENOUS at 14:00

## 2022-01-01 RX ADMIN — FLUTICASONE PROPIONATE 2 SPRAY: 50 SPRAY, METERED NASAL at 09:00

## 2022-01-01 RX ADMIN — FLUTICASONE PROPIONATE 2 SPRAY: 50 SPRAY, METERED NASAL at 10:12

## 2022-01-01 RX ADMIN — OXYMETAZOLINE HCL 2 SPRAY: 0.05 SPRAY NASAL at 09:47

## 2022-01-01 RX ADMIN — BUDESONIDE 250 MCG: 0.25 INHALANT RESPIRATORY (INHALATION) at 09:20

## 2022-01-01 RX ADMIN — CALCIUM CARBONATE (ANTACID) CHEW TAB 500 MG 200 MG: 500 CHEW TAB at 21:53

## 2022-01-01 RX ADMIN — BUDESONIDE 250 MCG: 0.25 INHALANT RESPIRATORY (INHALATION) at 20:59

## 2022-01-01 RX ADMIN — ENOXAPARIN SODIUM 30 MG: 100 INJECTION SUBCUTANEOUS at 09:46

## 2022-01-01 RX ADMIN — BUDESONIDE 250 MCG: 0.25 INHALANT RESPIRATORY (INHALATION) at 03:35

## 2022-01-01 RX ADMIN — FLUTICASONE PROPIONATE 2 SPRAY: 50 SPRAY, METERED NASAL at 09:47

## 2022-01-01 RX ADMIN — ALUMINUM HYDROXIDE, MAGNESIUM HYDROXIDE, AND SIMETHICONE 30 ML: 200; 200; 20 SUSPENSION ORAL at 20:15

## 2022-01-01 RX ADMIN — SODIUM CHLORIDE, PRESERVATIVE FREE 10 ML: 5 INJECTION INTRAVENOUS at 05:55

## 2022-01-01 RX ADMIN — BUDESONIDE 250 MCG: 0.25 INHALANT RESPIRATORY (INHALATION) at 07:55

## 2022-01-01 RX ADMIN — IPRATROPIUM BROMIDE AND ALBUTEROL SULFATE 3 ML: .5; 3 SOLUTION RESPIRATORY (INHALATION) at 11:46

## 2022-01-01 RX ADMIN — ALUMINUM HYDROXIDE, MAGNESIUM HYDROXIDE, AND SIMETHICONE 30 ML: 200; 200; 20 SUSPENSION ORAL at 09:30

## 2022-01-01 RX ADMIN — FLUTICASONE PROPIONATE 2 SPRAY: 50 SPRAY, METERED NASAL at 08:54

## 2022-01-01 RX ADMIN — ASPIRIN 162 MG: 81 TABLET, COATED ORAL at 10:10

## 2022-01-01 RX ADMIN — BUDESONIDE 250 MCG: 0.25 INHALANT RESPIRATORY (INHALATION) at 07:27

## 2022-01-01 RX ADMIN — ARFORMOTEROL TARTRATE 15 MCG: 15 SOLUTION RESPIRATORY (INHALATION) at 07:26

## 2022-01-01 RX ADMIN — LEVOFLOXACIN 750 MG: 5 INJECTION, SOLUTION INTRAVENOUS at 18:03

## 2022-01-01 RX ADMIN — METHYLPREDNISOLONE SODIUM SUCCINATE 40 MG: 40 INJECTION, POWDER, FOR SOLUTION INTRAMUSCULAR; INTRAVENOUS at 05:55

## 2022-01-01 RX ADMIN — METHYLPREDNISOLONE SODIUM SUCCINATE 40 MG: 40 INJECTION, POWDER, FOR SOLUTION INTRAMUSCULAR; INTRAVENOUS at 02:00

## 2022-01-01 RX ADMIN — IPRATROPIUM BROMIDE AND ALBUTEROL SULFATE 3 ML: .5; 3 SOLUTION RESPIRATORY (INHALATION) at 17:30

## 2022-01-01 RX ADMIN — METHYLPREDNISOLONE SODIUM SUCCINATE 125 MG: 125 INJECTION, POWDER, FOR SOLUTION INTRAMUSCULAR; INTRAVENOUS at 15:28

## 2022-01-01 RX ADMIN — CALCIUM CARBONATE (ANTACID) CHEW TAB 500 MG 200 MG: 500 CHEW TAB at 16:41

## 2022-01-01 RX ADMIN — IPRATROPIUM BROMIDE AND ALBUTEROL SULFATE 3 ML: .5; 3 SOLUTION RESPIRATORY (INHALATION) at 20:56

## 2022-01-01 RX ADMIN — IPRATROPIUM BROMIDE AND ALBUTEROL SULFATE 3 ML: .5; 3 SOLUTION RESPIRATORY (INHALATION) at 17:09

## 2022-01-01 RX ADMIN — ENOXAPARIN SODIUM 40 MG: 100 INJECTION SUBCUTANEOUS at 09:18

## 2022-01-01 RX ADMIN — ARFORMOTEROL TARTRATE 15 MCG: 15 SOLUTION RESPIRATORY (INHALATION) at 09:12

## 2022-01-01 RX ADMIN — BUDESONIDE 250 MCG: 0.25 INHALANT RESPIRATORY (INHALATION) at 07:26

## 2022-01-01 RX ADMIN — ARFORMOTEROL TARTRATE 15 MCG: 15 SOLUTION RESPIRATORY (INHALATION) at 07:27

## 2022-01-01 RX ADMIN — AZITHROMYCIN 250 MG: 250 TABLET, FILM COATED ORAL at 21:12

## 2022-01-01 RX ADMIN — IPRATROPIUM BROMIDE AND ALBUTEROL SULFATE 3 ML: .5; 3 SOLUTION RESPIRATORY (INHALATION) at 11:19

## 2022-01-01 RX ADMIN — SODIUM CHLORIDE, PRESERVATIVE FREE 10 ML: 5 INJECTION INTRAVENOUS at 23:02

## 2022-01-01 RX ADMIN — METHYLPREDNISOLONE SODIUM SUCCINATE 40 MG: 40 INJECTION, POWDER, FOR SOLUTION INTRAMUSCULAR; INTRAVENOUS at 01:53

## 2022-01-01 RX ADMIN — CALCIUM CARBONATE (ANTACID) CHEW TAB 500 MG 200 MG: 500 CHEW TAB at 23:01

## 2022-01-01 RX ADMIN — GUAIFENESIN 1200 MG: 600 TABLET, EXTENDED RELEASE ORAL at 23:01

## 2022-01-01 RX ADMIN — CALCIUM CARBONATE (ANTACID) CHEW TAB 500 MG 200 MG: 500 CHEW TAB at 22:19

## 2022-01-01 RX ADMIN — ALUMINUM HYDROXIDE, MAGNESIUM HYDROXIDE, AND SIMETHICONE 30 ML: 200; 200; 20 SUSPENSION ORAL at 11:04

## 2022-01-01 RX ADMIN — SODIUM CHLORIDE 75 ML/HR: 9 INJECTION, SOLUTION INTRAVENOUS at 13:20

## 2022-01-01 RX ADMIN — PROPOFOL 40 MG: 10 INJECTION, EMULSION INTRAVENOUS at 11:46

## 2022-01-01 RX ADMIN — METHYLPREDNISOLONE SODIUM SUCCINATE 40 MG: 40 INJECTION, POWDER, FOR SOLUTION INTRAMUSCULAR; INTRAVENOUS at 17:20

## 2022-01-01 RX ADMIN — ACETAMINOPHEN 650 MG: 325 TABLET ORAL at 20:35

## 2022-01-01 RX ADMIN — METHYLPREDNISOLONE SODIUM SUCCINATE 60 MG: 40 INJECTION, POWDER, FOR SOLUTION INTRAMUSCULAR; INTRAVENOUS at 13:12

## 2022-01-01 RX ADMIN — SODIUM CHLORIDE, PRESERVATIVE FREE 10 ML: 5 INJECTION INTRAVENOUS at 16:00

## 2022-01-01 RX ADMIN — FLUTICASONE PROPIONATE 2 SPRAY: 50 SPRAY, METERED NASAL at 10:16

## 2022-01-01 RX ADMIN — LEVOFLOXACIN 750 MG: 5 INJECTION, SOLUTION INTRAVENOUS at 20:40

## 2022-01-01 RX ADMIN — BUDESONIDE 250 MCG: 0.25 INHALANT RESPIRATORY (INHALATION) at 20:27

## 2022-01-01 RX ADMIN — SODIUM CHLORIDE, PRESERVATIVE FREE 10 ML: 5 INJECTION INTRAVENOUS at 04:54

## 2022-01-01 RX ADMIN — ARFORMOTEROL TARTRATE 15 MCG: 15 SOLUTION RESPIRATORY (INHALATION) at 20:17

## 2022-01-01 RX ADMIN — AZITHROMYCIN 250 MG: 250 TABLET, FILM COATED ORAL at 23:01

## 2022-01-01 RX ADMIN — PREDNISONE 40 MG: 20 TABLET ORAL at 17:59

## 2022-01-01 RX ADMIN — IPRATROPIUM BROMIDE AND ALBUTEROL SULFATE 3 ML: .5; 3 SOLUTION RESPIRATORY (INHALATION) at 08:08

## 2022-01-01 RX ADMIN — ACETAMINOPHEN 650 MG: 325 TABLET ORAL at 15:09

## 2022-01-01 RX ADMIN — ARFORMOTEROL TARTRATE 15 MCG: 15 SOLUTION RESPIRATORY (INHALATION) at 08:03

## 2022-01-01 RX ADMIN — CALCIUM CARBONATE (ANTACID) CHEW TAB 500 MG 200 MG: 500 CHEW TAB at 20:27

## 2022-01-01 RX ADMIN — SODIUM CHLORIDE, PRESERVATIVE FREE 10 ML: 5 INJECTION INTRAVENOUS at 09:08

## 2022-01-01 RX ADMIN — METHYLPREDNISOLONE SODIUM SUCCINATE 40 MG: 40 INJECTION, POWDER, FOR SOLUTION INTRAMUSCULAR; INTRAVENOUS at 09:15

## 2022-01-01 RX ADMIN — FUROSEMIDE 40 MG: 10 INJECTION, SOLUTION INTRAMUSCULAR; INTRAVENOUS at 09:08

## 2022-01-01 RX ADMIN — SODIUM CHLORIDE, PRESERVATIVE FREE 10 ML: 5 INJECTION INTRAVENOUS at 23:44

## 2022-01-01 RX ADMIN — IPRATROPIUM BROMIDE AND ALBUTEROL SULFATE 3 ML: .5; 3 SOLUTION RESPIRATORY (INHALATION) at 15:49

## 2022-01-01 RX ADMIN — Medication 1 LOZENGE: at 18:33

## 2022-01-01 RX ADMIN — ASPIRIN 162 MG: 81 TABLET, COATED ORAL at 08:50

## 2022-01-01 RX ADMIN — ASPIRIN 162 MG: 81 TABLET, COATED ORAL at 09:07

## 2022-01-01 RX ADMIN — SODIUM CHLORIDE, PRESERVATIVE FREE 10 ML: 5 INJECTION INTRAVENOUS at 15:30

## 2022-01-01 RX ADMIN — SODIUM CHLORIDE, PRESERVATIVE FREE 10 ML: 5 INJECTION INTRAVENOUS at 13:12

## 2022-01-01 RX ADMIN — ACETAMINOPHEN 650 MG: 325 TABLET ORAL at 09:27

## 2022-01-01 RX ADMIN — GUAIFENESIN 1200 MG: 600 TABLET, EXTENDED RELEASE ORAL at 21:16

## 2022-01-01 RX ADMIN — METHYLPREDNISOLONE SODIUM SUCCINATE 60 MG: 40 INJECTION, POWDER, FOR SOLUTION INTRAMUSCULAR; INTRAVENOUS at 06:58

## 2022-01-01 RX ADMIN — ARFORMOTEROL TARTRATE 15 MCG: 15 SOLUTION RESPIRATORY (INHALATION) at 07:55

## 2022-01-01 RX ADMIN — CALCIUM CARBONATE (ANTACID) CHEW TAB 500 MG 200 MG: 500 CHEW TAB at 10:33

## 2022-01-01 RX ADMIN — METHYLPREDNISOLONE SODIUM SUCCINATE 40 MG: 40 INJECTION, POWDER, FOR SOLUTION INTRAMUSCULAR; INTRAVENOUS at 09:18

## 2022-01-01 RX ADMIN — ARFORMOTEROL TARTRATE 15 MCG: 15 SOLUTION RESPIRATORY (INHALATION) at 08:33

## 2022-01-01 RX ADMIN — OXYMETAZOLINE HCL 2 SPRAY: 0.05 SPRAY NASAL at 10:18

## 2022-01-01 RX ADMIN — BUDESONIDE 250 MCG: 0.25 INHALANT RESPIRATORY (INHALATION) at 08:14

## 2022-01-01 RX ADMIN — ALUMINUM HYDROXIDE, MAGNESIUM HYDROXIDE, AND SIMETHICONE 30 ML: 200; 200; 20 SUSPENSION ORAL at 22:24

## 2022-01-01 RX ADMIN — CALCIUM CARBONATE (ANTACID) CHEW TAB 500 MG 200 MG: 500 CHEW TAB at 15:22

## 2022-01-01 RX ADMIN — IPRATROPIUM BROMIDE AND ALBUTEROL SULFATE 3 ML: .5; 3 SOLUTION RESPIRATORY (INHALATION) at 19:50

## 2022-01-01 RX ADMIN — GUAIFENESIN 600 MG: 600 TABLET, EXTENDED RELEASE ORAL at 09:08

## 2022-01-01 RX ADMIN — BUDESONIDE 250 MCG: 0.25 INHALANT RESPIRATORY (INHALATION) at 19:58

## 2022-01-01 RX ADMIN — IPRATROPIUM BROMIDE AND ALBUTEROL SULFATE 3 ML: .5; 3 SOLUTION RESPIRATORY (INHALATION) at 20:23

## 2022-01-01 RX ADMIN — BUDESONIDE 250 MCG: 0.25 INHALANT RESPIRATORY (INHALATION) at 07:59

## 2022-01-01 RX ADMIN — IPRATROPIUM BROMIDE AND ALBUTEROL SULFATE 3 ML: .5; 3 SOLUTION RESPIRATORY (INHALATION) at 08:56

## 2022-01-01 RX ADMIN — ASPIRIN 162 MG: 81 TABLET, COATED ORAL at 09:08

## 2022-01-01 RX ADMIN — ENOXAPARIN SODIUM 30 MG: 100 INJECTION SUBCUTANEOUS at 11:50

## 2022-01-01 RX ADMIN — METHYLPREDNISOLONE SODIUM SUCCINATE 40 MG: 40 INJECTION, POWDER, FOR SOLUTION INTRAMUSCULAR; INTRAVENOUS at 17:29

## 2022-01-01 RX ADMIN — OXYMETAZOLINE HCL 2 SPRAY: 0.05 SPRAY NASAL at 20:28

## 2022-01-01 RX ADMIN — BUDESONIDE 250 MCG: 0.25 INHALANT RESPIRATORY (INHALATION) at 19:34

## 2022-01-01 RX ADMIN — SODIUM CHLORIDE, PRESERVATIVE FREE 10 ML: 5 INJECTION INTRAVENOUS at 13:40

## 2022-01-01 RX ADMIN — METHYLPREDNISOLONE SODIUM SUCCINATE 40 MG: 40 INJECTION, POWDER, FOR SOLUTION INTRAMUSCULAR; INTRAVENOUS at 03:29

## 2022-01-01 RX ADMIN — ASPIRIN 162 MG: 81 TABLET, COATED ORAL at 08:55

## 2022-01-01 RX ADMIN — SODIUM CHLORIDE, PRESERVATIVE FREE 10 ML: 5 INJECTION INTRAVENOUS at 05:59

## 2022-01-01 RX ADMIN — ARFORMOTEROL TARTRATE 15 MCG: 15 SOLUTION RESPIRATORY (INHALATION) at 19:34

## 2022-01-01 RX ADMIN — SODIUM CHLORIDE, PRESERVATIVE FREE 10 ML: 5 INJECTION INTRAVENOUS at 06:53

## 2022-01-01 RX ADMIN — ARFORMOTEROL TARTRATE 15 MCG: 15 SOLUTION RESPIRATORY (INHALATION) at 08:13

## 2022-01-01 RX ADMIN — GUAIFENESIN 1200 MG: 600 TABLET, EXTENDED RELEASE ORAL at 10:10

## 2022-01-01 RX ADMIN — SODIUM CHLORIDE, PRESERVATIVE FREE 10 ML: 5 INJECTION INTRAVENOUS at 15:12

## 2022-01-01 RX ADMIN — IPRATROPIUM BROMIDE AND ALBUTEROL SULFATE 3 ML: .5; 3 SOLUTION RESPIRATORY (INHALATION) at 01:08

## 2022-01-01 RX ADMIN — METHYLPREDNISOLONE SODIUM SUCCINATE 60 MG: 40 INJECTION, POWDER, FOR SOLUTION INTRAMUSCULAR; INTRAVENOUS at 06:53

## 2022-01-01 RX ADMIN — IPRATROPIUM BROMIDE AND ALBUTEROL SULFATE 3 ML: .5; 3 SOLUTION RESPIRATORY (INHALATION) at 19:42

## 2022-01-01 RX ADMIN — ASPIRIN 162 MG: 81 TABLET, COATED ORAL at 09:19

## 2022-01-01 RX ADMIN — GUAIFENESIN 1200 MG: 600 TABLET, EXTENDED RELEASE ORAL at 09:19

## 2022-01-01 RX ADMIN — IPRATROPIUM BROMIDE AND ALBUTEROL SULFATE 3 ML: .5; 3 SOLUTION RESPIRATORY (INHALATION) at 09:11

## 2022-01-01 RX ADMIN — ARFORMOTEROL TARTRATE 15 MCG: 15 SOLUTION RESPIRATORY (INHALATION) at 09:20

## 2022-01-01 RX ADMIN — BUDESONIDE 250 MCG: 0.25 INHALANT RESPIRATORY (INHALATION) at 07:44

## 2022-01-01 RX ADMIN — SODIUM CHLORIDE 100 ML/HR: 9 INJECTION, SOLUTION INTRAVENOUS at 11:25

## 2022-01-01 RX ADMIN — ARFORMOTEROL TARTRATE 15 MCG: 15 SOLUTION RESPIRATORY (INHALATION) at 20:59

## 2022-01-01 RX ADMIN — METHYLPREDNISOLONE SODIUM SUCCINATE 40 MG: 40 INJECTION, POWDER, FOR SOLUTION INTRAMUSCULAR; INTRAVENOUS at 10:11

## 2022-01-01 RX ADMIN — ACETAMINOPHEN 650 MG: 325 TABLET ORAL at 18:45

## 2022-01-01 RX ADMIN — SODIUM CHLORIDE, PRESERVATIVE FREE 10 ML: 5 INJECTION INTRAVENOUS at 20:38

## 2022-01-01 RX ADMIN — IPRATROPIUM BROMIDE AND ALBUTEROL SULFATE 3 ML: .5; 3 SOLUTION RESPIRATORY (INHALATION) at 14:41

## 2022-01-01 RX ADMIN — ASPIRIN 162 MG: 81 TABLET, COATED ORAL at 10:06

## 2022-01-01 RX ADMIN — IPRATROPIUM BROMIDE AND ALBUTEROL SULFATE 3 ML: .5; 3 SOLUTION RESPIRATORY (INHALATION) at 21:34

## 2022-01-01 RX ADMIN — IPRATROPIUM BROMIDE AND ALBUTEROL SULFATE 3 ML: .5; 3 SOLUTION RESPIRATORY (INHALATION) at 11:39

## 2022-01-01 RX ADMIN — BUDESONIDE 250 MCG: 0.25 INHALANT RESPIRATORY (INHALATION) at 20:17

## 2022-01-01 RX ADMIN — ARFORMOTEROL TARTRATE 15 MCG: 15 SOLUTION RESPIRATORY (INHALATION) at 20:56

## 2022-01-01 RX ADMIN — METHYLPREDNISOLONE SODIUM SUCCINATE 40 MG: 40 INJECTION, POWDER, FOR SOLUTION INTRAMUSCULAR; INTRAVENOUS at 17:32

## 2022-01-01 RX ADMIN — ENOXAPARIN SODIUM 30 MG: 100 INJECTION SUBCUTANEOUS at 08:52

## 2022-01-01 RX ADMIN — METHYLPREDNISOLONE SODIUM SUCCINATE 40 MG: 40 INJECTION, POWDER, FOR SOLUTION INTRAMUSCULAR; INTRAVENOUS at 00:52

## 2022-01-01 RX ADMIN — IPRATROPIUM BROMIDE AND ALBUTEROL SULFATE 3 ML: .5; 3 SOLUTION RESPIRATORY (INHALATION) at 19:34

## 2022-01-01 RX ADMIN — CALCIUM CARBONATE (ANTACID) CHEW TAB 500 MG 200 MG: 500 CHEW TAB at 21:12

## 2022-01-01 RX ADMIN — METHYLPREDNISOLONE SODIUM SUCCINATE 60 MG: 40 INJECTION, POWDER, FOR SOLUTION INTRAMUSCULAR; INTRAVENOUS at 22:09

## 2022-01-01 RX ADMIN — GUAIFENESIN 600 MG: 600 TABLET, EXTENDED RELEASE ORAL at 10:07

## 2022-01-01 RX ADMIN — METHYLPREDNISOLONE SODIUM SUCCINATE 40 MG: 40 INJECTION, POWDER, FOR SOLUTION INTRAMUSCULAR; INTRAVENOUS at 17:38

## 2022-01-01 RX ADMIN — METHYLPREDNISOLONE SODIUM SUCCINATE 40 MG: 40 INJECTION, POWDER, FOR SOLUTION INTRAMUSCULAR; INTRAVENOUS at 14:21

## 2022-01-01 RX ADMIN — ACETAMINOPHEN 650 MG: 325 TABLET ORAL at 08:50

## 2022-01-01 RX ADMIN — SODIUM CHLORIDE, PRESERVATIVE FREE 10 ML: 5 INJECTION INTRAVENOUS at 07:14

## 2022-01-01 RX ADMIN — LEVOFLOXACIN 750 MG: 5 INJECTION, SOLUTION INTRAVENOUS at 17:33

## 2022-01-01 RX ADMIN — IPRATROPIUM BROMIDE AND ALBUTEROL SULFATE 3 ML: .5; 3 SOLUTION RESPIRATORY (INHALATION) at 14:54

## 2022-01-01 RX ADMIN — IPRATROPIUM BROMIDE AND ALBUTEROL SULFATE 3 ML: .5; 3 SOLUTION RESPIRATORY (INHALATION) at 20:20

## 2022-01-01 RX ADMIN — ARFORMOTEROL TARTRATE 15 MCG: 15 SOLUTION RESPIRATORY (INHALATION) at 03:35

## 2022-01-01 RX ADMIN — ASPIRIN 162 MG: 81 TABLET, COATED ORAL at 09:52

## 2022-01-01 RX ADMIN — ASPIRIN 162 MG: 81 TABLET, COATED ORAL at 09:45

## 2022-01-01 RX ADMIN — SODIUM CHLORIDE, PRESERVATIVE FREE 10 ML: 5 INJECTION INTRAVENOUS at 17:50

## 2022-01-01 RX ADMIN — BUDESONIDE 250 MCG: 0.25 INHALANT RESPIRATORY (INHALATION) at 21:31

## 2022-01-01 RX ADMIN — IPRATROPIUM BROMIDE AND ALBUTEROL SULFATE 3 ML: .5; 3 SOLUTION RESPIRATORY (INHALATION) at 07:27

## 2022-01-01 RX ADMIN — PREDNISONE 40 MG: 20 TABLET ORAL at 08:59

## 2022-01-01 RX ADMIN — SODIUM CHLORIDE, PRESERVATIVE FREE 10 ML: 5 INJECTION INTRAVENOUS at 22:26

## 2022-01-01 RX ADMIN — ACETAMINOPHEN 650 MG: 325 TABLET ORAL at 22:23

## 2022-01-01 RX ADMIN — SODIUM CHLORIDE, PRESERVATIVE FREE 10 ML: 5 INJECTION INTRAVENOUS at 20:56

## 2022-01-01 RX ADMIN — FUROSEMIDE 40 MG: 40 TABLET ORAL at 09:19

## 2022-01-01 RX ADMIN — METHYLPREDNISOLONE SODIUM SUCCINATE 40 MG: 40 INJECTION, POWDER, FOR SOLUTION INTRAMUSCULAR; INTRAVENOUS at 09:45

## 2022-01-01 RX ADMIN — LIDOCAINE HYDROCHLORIDE 10 ML: 10 INJECTION, SOLUTION EPIDURAL; INFILTRATION; INTRACAUDAL; PERINEURAL at 17:00

## 2022-01-01 RX ADMIN — IPRATROPIUM BROMIDE AND ALBUTEROL SULFATE 3 ML: .5; 3 SOLUTION RESPIRATORY (INHALATION) at 20:53

## 2022-01-01 RX ADMIN — IPRATROPIUM BROMIDE AND ALBUTEROL SULFATE 3 ML: .5; 3 SOLUTION RESPIRATORY (INHALATION) at 12:58

## 2022-01-01 RX ADMIN — GUAIFENESIN 600 MG: 600 TABLET, EXTENDED RELEASE ORAL at 21:57

## 2022-01-01 RX ADMIN — IPRATROPIUM BROMIDE AND ALBUTEROL SULFATE 3 ML: .5; 3 SOLUTION RESPIRATORY (INHALATION) at 09:20

## 2022-01-01 RX ADMIN — DEXTROSE AND SODIUM CHLORIDE 75 ML/HR: 5; 900 INJECTION, SOLUTION INTRAVENOUS at 11:43

## 2022-01-01 RX ADMIN — IPRATROPIUM BROMIDE AND ALBUTEROL SULFATE 3 ML: .5; 3 SOLUTION RESPIRATORY (INHALATION) at 07:51

## 2022-01-01 RX ADMIN — FUROSEMIDE 40 MG: 10 INJECTION, SOLUTION INTRAMUSCULAR; INTRAVENOUS at 09:45

## 2022-01-01 RX ADMIN — IPRATROPIUM BROMIDE AND ALBUTEROL SULFATE 3 ML: .5; 3 SOLUTION RESPIRATORY (INHALATION) at 08:33

## 2022-01-01 RX ADMIN — FLUTICASONE PROPIONATE 2 SPRAY: 50 SPRAY, METERED NASAL at 08:59

## 2022-01-01 RX ADMIN — SODIUM CHLORIDE, PRESERVATIVE FREE 10 ML: 5 INJECTION INTRAVENOUS at 22:06

## 2022-01-01 RX ADMIN — ARFORMOTEROL TARTRATE 15 MCG: 15 SOLUTION RESPIRATORY (INHALATION) at 19:58

## 2022-01-01 RX ADMIN — IPRATROPIUM BROMIDE AND ALBUTEROL SULFATE 3 ML: .5; 3 SOLUTION RESPIRATORY (INHALATION) at 19:57

## 2022-01-01 RX ADMIN — METHYLPREDNISOLONE SODIUM SUCCINATE 40 MG: 40 INJECTION, POWDER, FOR SOLUTION INTRAMUSCULAR; INTRAVENOUS at 15:09

## 2022-01-01 RX ADMIN — ARFORMOTEROL TARTRATE 15 MCG: 15 SOLUTION RESPIRATORY (INHALATION) at 09:11

## 2022-01-01 RX ADMIN — ARFORMOTEROL TARTRATE 15 MCG: 15 SOLUTION RESPIRATORY (INHALATION) at 07:59

## 2022-01-01 RX ADMIN — ALUMINUM HYDROXIDE, MAGNESIUM HYDROXIDE, AND SIMETHICONE 30 ML: 200; 200; 20 SUSPENSION ORAL at 22:06

## 2022-01-01 RX ADMIN — FLUTICASONE PROPIONATE 2 SPRAY: 50 SPRAY, METERED NASAL at 09:16

## 2022-01-01 RX ADMIN — METHYLPREDNISOLONE SODIUM SUCCINATE 40 MG: 40 INJECTION, POWDER, FOR SOLUTION INTRAMUSCULAR; INTRAVENOUS at 04:54

## 2022-01-01 RX ADMIN — SODIUM CHLORIDE, PRESERVATIVE FREE 10 ML: 5 INJECTION INTRAVENOUS at 05:03

## 2022-01-01 RX ADMIN — IPRATROPIUM BROMIDE AND ALBUTEROL SULFATE 3 ML: .5; 3 SOLUTION RESPIRATORY (INHALATION) at 02:24

## 2022-01-01 RX ADMIN — GUAIFENESIN 600 MG: 600 TABLET, EXTENDED RELEASE ORAL at 21:53

## 2022-01-01 RX ADMIN — PREDNISONE 40 MG: 20 TABLET ORAL at 09:03

## 2022-01-01 RX ADMIN — ENOXAPARIN SODIUM 40 MG: 100 INJECTION SUBCUTANEOUS at 08:53

## 2022-01-01 RX ADMIN — GUAIFENESIN 600 MG: 600 TABLET, EXTENDED RELEASE ORAL at 21:12

## 2022-01-01 RX ADMIN — METHYLPREDNISOLONE SODIUM SUCCINATE 60 MG: 40 INJECTION, POWDER, FOR SOLUTION INTRAMUSCULAR; INTRAVENOUS at 22:07

## 2022-01-01 RX ADMIN — ACETAMINOPHEN 650 MG: 325 TABLET ORAL at 09:56

## 2022-01-01 RX ADMIN — ARFORMOTEROL TARTRATE 15 MCG: 15 SOLUTION RESPIRATORY (INHALATION) at 21:31

## 2022-01-01 RX ADMIN — IPRATROPIUM BROMIDE AND ALBUTEROL SULFATE 3 ML: .5; 3 SOLUTION RESPIRATORY (INHALATION) at 03:30

## 2022-01-01 RX ADMIN — BUDESONIDE 250 MCG: 0.25 INHALANT RESPIRATORY (INHALATION) at 20:29

## 2022-01-01 RX ADMIN — IPRATROPIUM BROMIDE AND ALBUTEROL SULFATE 3 ML: .5; 3 SOLUTION RESPIRATORY (INHALATION) at 07:26

## 2022-01-01 RX ADMIN — ASPIRIN 162 MG: 81 TABLET, COATED ORAL at 11:50

## 2022-01-01 RX ADMIN — CALCIUM CARBONATE (ANTACID) CHEW TAB 500 MG 200 MG: 500 CHEW TAB at 09:27

## 2022-01-01 RX ADMIN — IPRATROPIUM BROMIDE AND ALBUTEROL SULFATE 3 ML: .5; 3 SOLUTION RESPIRATORY (INHALATION) at 15:54

## 2022-01-01 RX ADMIN — METHYLPREDNISOLONE SODIUM SUCCINATE 40 MG: 40 INJECTION, POWDER, FOR SOLUTION INTRAMUSCULAR; INTRAVENOUS at 18:05

## 2022-01-01 RX ADMIN — FUROSEMIDE 20 MG: 10 INJECTION, SOLUTION INTRAMUSCULAR; INTRAVENOUS at 20:37

## 2022-01-01 RX ADMIN — GUAIFENESIN 1200 MG: 600 TABLET, EXTENDED RELEASE ORAL at 22:59

## 2022-01-01 RX ADMIN — ACETAMINOPHEN 650 MG: 325 TABLET ORAL at 15:12

## 2022-01-01 RX ADMIN — ACETAMINOPHEN 650 MG: 325 TABLET ORAL at 14:22

## 2022-01-01 RX ADMIN — BUDESONIDE 250 MCG: 0.25 INHALANT RESPIRATORY (INHALATION) at 20:12

## 2022-01-01 RX ADMIN — PREDNISONE 40 MG: 20 TABLET ORAL at 17:48

## 2022-01-01 RX ADMIN — SODIUM CHLORIDE, PRESERVATIVE FREE 10 ML: 5 INJECTION INTRAVENOUS at 21:14

## 2022-01-01 RX ADMIN — ASPIRIN 162 MG: 81 TABLET, COATED ORAL at 08:51

## 2022-01-01 RX ADMIN — BUDESONIDE 250 MCG: 0.25 INHALANT RESPIRATORY (INHALATION) at 07:52

## 2022-01-01 RX ADMIN — IPRATROPIUM BROMIDE AND ALBUTEROL SULFATE 3 ML: .5; 3 SOLUTION RESPIRATORY (INHALATION) at 07:57

## 2022-01-01 RX ADMIN — ARFORMOTEROL TARTRATE 15 MCG: 15 SOLUTION RESPIRATORY (INHALATION) at 19:42

## 2022-01-01 RX ADMIN — LIDOCAINE HYDROCHLORIDE 80 MG: 20 INJECTION, SOLUTION EPIDURAL; INFILTRATION; INTRACAUDAL; PERINEURAL at 11:30

## 2022-01-01 RX ADMIN — FLUTICASONE PROPIONATE 2 SPRAY: 50 SPRAY, METERED NASAL at 09:08

## 2022-01-01 RX ADMIN — IPRATROPIUM BROMIDE AND ALBUTEROL SULFATE 3 ML: .5; 3 SOLUTION RESPIRATORY (INHALATION) at 08:03

## 2022-01-01 RX ADMIN — SODIUM CHLORIDE, PRESERVATIVE FREE 10 ML: 5 INJECTION INTRAVENOUS at 06:11

## 2022-01-01 RX ADMIN — IOPAMIDOL 80 ML: 755 INJECTION, SOLUTION INTRAVENOUS at 12:56

## 2022-01-01 RX ADMIN — IPRATROPIUM BROMIDE AND ALBUTEROL SULFATE 3 ML: .5; 3 SOLUTION RESPIRATORY (INHALATION) at 11:37

## 2022-01-01 RX ADMIN — PREDNISONE 40 MG: 20 TABLET ORAL at 20:27

## 2022-01-01 RX ADMIN — IPRATROPIUM BROMIDE AND ALBUTEROL SULFATE 3 ML: .5; 3 SOLUTION RESPIRATORY (INHALATION) at 16:46

## 2022-01-01 RX ADMIN — BUDESONIDE 250 MCG: 0.25 INHALANT RESPIRATORY (INHALATION) at 09:11

## 2022-01-01 RX ADMIN — METHYLPREDNISOLONE SODIUM SUCCINATE 40 MG: 40 INJECTION, POWDER, FOR SOLUTION INTRAMUSCULAR; INTRAVENOUS at 21:00

## 2022-01-01 RX ADMIN — SODIUM CHLORIDE, PRESERVATIVE FREE 10 ML: 5 INJECTION INTRAVENOUS at 05:17

## 2022-01-01 RX ADMIN — ARFORMOTEROL TARTRATE 15 MCG: 15 SOLUTION RESPIRATORY (INHALATION) at 07:44

## 2022-01-01 RX ADMIN — FLUTICASONE PROPIONATE 2 SPRAY: 50 SPRAY, METERED NASAL at 09:20

## 2022-01-01 RX ADMIN — ENOXAPARIN SODIUM 40 MG: 40 INJECTION SUBCUTANEOUS at 08:50

## 2022-01-01 RX ADMIN — IPRATROPIUM BROMIDE AND ALBUTEROL SULFATE 3 ML: .5; 3 SOLUTION RESPIRATORY (INHALATION) at 16:01

## 2022-01-01 RX ADMIN — BUDESONIDE 250 MCG: 0.25 INHALANT RESPIRATORY (INHALATION) at 19:42

## 2022-01-01 RX ADMIN — SODIUM CHLORIDE, PRESERVATIVE FREE 10 ML: 5 INJECTION INTRAVENOUS at 13:57

## 2022-01-01 RX ADMIN — BUDESONIDE 250 MCG: 0.25 INHALANT RESPIRATORY (INHALATION) at 09:12

## 2022-01-01 RX ADMIN — CALCIUM CARBONATE (ANTACID) CHEW TAB 500 MG 200 MG: 500 CHEW TAB at 21:16

## 2022-01-01 RX ADMIN — BUDESONIDE 250 MCG: 0.25 INHALANT RESPIRATORY (INHALATION) at 08:03

## 2022-01-01 RX ADMIN — ENOXAPARIN SODIUM 40 MG: 100 INJECTION SUBCUTANEOUS at 09:07

## 2022-01-01 RX ADMIN — ARFORMOTEROL TARTRATE 15 MCG: 15 SOLUTION RESPIRATORY (INHALATION) at 07:52

## 2022-01-01 RX ADMIN — GUAIFENESIN 600 MG: 600 TABLET, EXTENDED RELEASE ORAL at 09:07

## 2022-01-01 RX ADMIN — IPRATROPIUM BROMIDE AND ALBUTEROL SULFATE 3 ML: .5; 3 SOLUTION RESPIRATORY (INHALATION) at 15:31

## 2022-01-01 RX ADMIN — SODIUM CHLORIDE, PRESERVATIVE FREE 10 ML: 5 INJECTION INTRAVENOUS at 18:06

## 2022-01-01 RX ADMIN — BUDESONIDE 250 MCG: 0.25 INHALANT RESPIRATORY (INHALATION) at 08:33

## 2022-01-01 RX ADMIN — ENOXAPARIN SODIUM 30 MG: 100 INJECTION SUBCUTANEOUS at 09:03

## 2022-01-01 RX ADMIN — IPRATROPIUM BROMIDE AND ALBUTEROL SULFATE 3 ML: .5; 3 SOLUTION RESPIRATORY (INHALATION) at 07:50

## 2022-01-01 RX ADMIN — CALCIUM CARBONATE (ANTACID) CHEW TAB 500 MG 200 MG: 500 CHEW TAB at 17:38

## 2022-01-01 RX ADMIN — IPRATROPIUM BROMIDE AND ALBUTEROL SULFATE 3 ML: .5; 3 SOLUTION RESPIRATORY (INHALATION) at 11:27

## 2022-01-01 RX ADMIN — METHYLPREDNISOLONE SODIUM SUCCINATE 40 MG: 40 INJECTION, POWDER, FOR SOLUTION INTRAMUSCULAR; INTRAVENOUS at 09:08

## 2022-01-01 RX ADMIN — FLUTICASONE PROPIONATE 2 SPRAY: 50 SPRAY, METERED NASAL at 09:53

## 2022-01-01 RX ADMIN — ASPIRIN 162 MG: 81 TABLET, COATED ORAL at 09:03

## 2022-01-01 RX ADMIN — SODIUM CHLORIDE, PRESERVATIVE FREE 10 ML: 5 INJECTION INTRAVENOUS at 21:12

## 2022-01-01 RX ADMIN — SODIUM CHLORIDE, PRESERVATIVE FREE 10 ML: 5 INJECTION INTRAVENOUS at 21:13

## 2022-01-01 RX ADMIN — GUAIFENESIN 600 MG: 600 TABLET, EXTENDED RELEASE ORAL at 09:45

## 2022-01-01 RX ADMIN — ARFORMOTEROL TARTRATE 15 MCG: 15 SOLUTION RESPIRATORY (INHALATION) at 20:27

## 2022-01-01 RX ADMIN — ARFORMOTEROL TARTRATE 15 MCG: 15 SOLUTION RESPIRATORY (INHALATION) at 19:57

## 2022-01-01 RX ADMIN — ENOXAPARIN SODIUM 30 MG: 100 INJECTION SUBCUTANEOUS at 09:08

## 2022-01-01 RX ADMIN — ACETAMINOPHEN 650 MG: 325 TABLET ORAL at 15:22

## 2022-01-01 RX ADMIN — SODIUM CHLORIDE, PRESERVATIVE FREE 10 ML: 5 INJECTION INTRAVENOUS at 22:58

## 2022-01-01 RX ADMIN — BUDESONIDE 250 MCG: 0.25 INHALANT RESPIRATORY (INHALATION) at 21:00

## 2022-01-01 RX ADMIN — SODIUM CHLORIDE, PRESERVATIVE FREE 10 ML: 5 INJECTION INTRAVENOUS at 21:16

## 2022-01-01 RX ADMIN — CALCIUM CARBONATE (ANTACID) CHEW TAB 500 MG 200 MG: 500 CHEW TAB at 12:09

## 2022-01-01 RX ADMIN — METHYLPREDNISOLONE SODIUM SUCCINATE 40 MG: 40 INJECTION, POWDER, FOR SOLUTION INTRAMUSCULAR; INTRAVENOUS at 06:11

## 2022-01-01 RX ADMIN — GUAIFENESIN 1200 MG: 600 TABLET, EXTENDED RELEASE ORAL at 08:53

## 2022-03-18 PROBLEM — F41.9 ANXIETY: Status: ACTIVE | Noted: 2021-01-01

## 2022-03-18 PROBLEM — I27.21 MODERATE PULMONARY ARTERIAL SYSTOLIC HYPERTENSION (HCC): Status: ACTIVE | Noted: 2020-02-25

## 2022-03-19 PROBLEM — L97.321 VENOUS STASIS ULCER OF LEFT ANKLE LIMITED TO BREAKDOWN OF SKIN WITHOUT VARICOSE VEINS (HCC): Status: ACTIVE | Noted: 2021-04-20

## 2022-03-19 PROBLEM — Z86.718 HISTORY OF DVT (DEEP VEIN THROMBOSIS): Status: ACTIVE | Noted: 2020-02-25

## 2022-03-19 PROBLEM — I10 ESSENTIAL HYPERTENSION: Status: ACTIVE | Noted: 2018-09-04

## 2022-03-19 PROBLEM — E87.1 HYPONATREMIA: Status: ACTIVE | Noted: 2020-01-27

## 2022-03-19 PROBLEM — R53.1 WEAKNESS: Status: ACTIVE | Noted: 2020-01-27

## 2022-03-19 PROBLEM — I44.7 LBBB (LEFT BUNDLE BRANCH BLOCK): Status: ACTIVE | Noted: 2018-07-28

## 2022-03-19 PROBLEM — I87.2 VENOUS STASIS ULCER OF LEFT ANKLE LIMITED TO BREAKDOWN OF SKIN WITHOUT VARICOSE VEINS (HCC): Status: ACTIVE | Noted: 2021-04-20

## 2022-03-19 PROBLEM — Z90.5 SINGLE KIDNEY: Status: ACTIVE | Noted: 2020-02-25

## 2022-03-19 PROBLEM — L03.116 CELLULITIS OF LEFT LOWER EXTREMITY: Status: ACTIVE | Noted: 2020-01-27

## 2022-03-20 PROBLEM — Z99.81 CHRONIC RESPIRATORY FAILURE WITH HYPOXIA, ON HOME OXYGEN THERAPY (HCC): Status: ACTIVE | Noted: 2020-02-25

## 2022-03-20 PROBLEM — J96.11 CHRONIC RESPIRATORY FAILURE WITH HYPOXIA, ON HOME OXYGEN THERAPY (HCC): Status: ACTIVE | Noted: 2020-02-25

## 2022-04-05 NOTE — PROGRESS NOTES
Subjective:     Chief Complaint   Patient presents with   Reymundo Cui is a 80 y.o. F.  She has a complex medical history to include pulmonary hypertension, peripheral vascular disease, and chronic obstructive pulmonary disease, and is typically seen by home health care. Her previous PCM was Dr. Yovany Ricci. She was last seen in the office in November 2021 for routine follow-up. She was noted to be using oxygen continuously for her history of chronic respiratory failure with hypoxia. Her blood pressure was being managed with a no salt diet, and she was being followed for her history of peripheral vascular disease by Dr. Yissel Randhawa. Her physical examination was notable for a BMI of 19.16 kg/m². Chronic venous stasis ulcers of the left ankle were noted, and these were being managed by home health care. These were noted to be slowly healing. She was set up for an influenza vaccination, and was advised to follow-up with pulmonary medicine for her chronic respiratory failure. 6-month follow-up had been advised. Today, the patient comes in for follow-up. She says that the main reason for her visit today is to obtain an extension of the authorization for continued wound care for her left lower extremity venous stasis ulcer. She says that this is healing well, with the assistance of a home health aide, who comes in 3 times per week to do dressing changes and otherwise clean the wound. She says that there has been no purulent drainage from the site, nor any redness, and overall she feels that this is much better than before. No fevers, chills, or other constitutional or systemic complaint.     She otherwise feels about the same as usual.  She has oxygen dependent COPD, and says that with the allergy season, she has occasionally had to use her rescue inhaler more often, but otherwise denies any change in her breathing, worsening dyspnea, production, wheezing, orthopnea, or paroxysmal nocturnal dyspnea. She reports that she thinks she has lost weight over the past several years. She says her appetite is somewhat diminished compared to before, and that she does not eat quite as much. She wonders if something can be done about this. Her review of systems is otherwise negative. Past Medical History:  Past Medical History:   Diagnosis Date    Acute renal failure (ARF) ([facility] 6/4/2017    Arthritis     generalized    Bronchitis, acute 6/4/2017    COPD (chronic obstructive pulmonary disease) ([facility]     Essential hypertension 9/4/2018    former smoker      >60pkyr quit 1/3/11    History of DVT (deep vein thrombosis) 1955    left leg    History of sepsis 06/04/2017    ischemic left lower extremitiy pain     above knee FemPop 1/3/11    PVD (peripheral vascular disease) ([facility]     Seasonal allergic rhinitis     Single kidney     Sinus bradycardia 7/28/2018       Past Surgical Histor:  Past Surgical History:   Procedure Laterality Date    HX GI  10 yrs ago    colonoscopy    HX GYN      3 miscarriges    HX GYN      1 vaginal birth    HX ORTHOPAEDIC      veinography left leg, stent left leg    MS BREAST SURGERY PROCEDURE UNLISTED  1955    excision left breast cyst       Allergies: Allergies   Allergen Reactions    Food Extracts Diarrhea     Onions and garlic causes abdominal pain,cramping     Amlodipine Swelling    Sulfa (Sulfonamide Antibiotics) Other (comments)     Altered Mental Status       Medications:  Current Outpatient Medications   Medication Sig Dispense Refill    azelastine (ASTEPRO) 205.5 mcg (0.15 %) 2 Sprays by Both Nostrils route daily.  fluticasone propionate (FLONASE) 50 mcg/actuation nasal spray 2 Sprays by Both Nostrils route daily.  fluticasone-umeclidinium-vilanterol (Trelegy Ellipta) 100-62.5-25 mcg inhaler Take 1 Puff by inhalation daily.  acetaminophen (TYLENOL) 500 mg tablet Take 1,000 mg by mouth every six (6) hours as needed for Pain.       OXYGEN-AIR DELIVERY SYSTEMS 2 L by Nasal route continuous.  acetaminophen (TYLENOL) 325 mg tablet Take 2 Tabs by mouth every four (4) hours as needed for Pain or Fever. 40 Tab 0    ipratropium-albuterol (COMBIVENT RESPIMAT)  mcg/actuation inhaler Take 1 Puff by inhalation four (4) times daily.  loratadine (CLARITIN) 10 mg tablet Take 10 mg by mouth daily.  azithromycin (ZITHROMAX) 250 mg tablet Take 250 mg by mouth every Monday, Wednesday, Friday. Indications: prevent lung infections      aspirin delayed-release 81 mg tablet Take 162 mg by mouth daily.  busPIRone (BUSPAR) 5 mg tablet Take 1 Tab by mouth every evening. (Patient not taking: Reported on 2022) 30 Tablet 0    calcium carb/magnesium hydrox (MYLANTA PO) Take 15-30 mL by mouth two (2) times daily as needed for Other (constipation). (Patient not taking: Reported on 2022)      calcium carbonate (TUMS PO) Take 1 Tablet by mouth three (3) times daily as needed for Other (indigestion). (Patient not taking: Reported on 2022)      simethicone (MYLANTA GAS PO) Take  by mouth daily as needed.  (Patient not taking: Reported on 2022)         Social History:  Social History     Socioeconomic History    Marital status:    Tobacco Use    Smoking status: Former Smoker     Packs/day: 1.00     Years: 60.00     Pack years: 60.00     Quit date: 1/3/2011     Years since quittin.3    Smokeless tobacco: Never Used   Vaping Use    Vaping Use: Never used   Substance and Sexual Activity    Alcohol use: Yes     Comment: occasional liquor after dinner    Drug use: No     Types: Prescription, OTC       Family History:  Family History   Problem Relation Age of Onset    Colon Cancer Mother     Lung Cancer Sister        Immunizations:  Immunization History   Administered Date(s) Administered    (RETIRED) Pneumococcal Vaccine (Unspecified Type) 2011    ADELINAID-19, Alba Guzman, Primary or Immunocompromised Series, MRNA, PF, 100mcg/0.5mL 06/16/2021, 07/29/2021    Influenza Vaccine 11/13/2012    Influenza Vaccine Whole 10/01/2010    Influenza, Quadrivalent, Adjuvanted (>65 Yrs FLUAD QUAD [id number]) 11/11/2021        Healthcare Maintenance:  Health Maintenance   Topic Date Due    Pneumococcal 65+ years (1 - PCV) Never done    DTaP/Tdap/Td series (1 - Tdap) Never done    Shingrix Vaccine Age 50> (1 of 2) Never done    COVID-19 Vaccine (3 - Booster for Moderna series) 12/29/2021    Medicare Yearly Exam  04/21/2022    Bone Densitometry (Dexa) Screening  04/20/2022 (Originally 11/10/1996)    Depression Screen  11/11/2022    Flu Vaccine  Completed        Review of Systems:  ROS:  Review of Systems   Constitutional: Negative. HENT: Negative. Eyes: Negative. Respiratory: Positive for cough and sputum production. Negative for hemoptysis, shortness of breath and wheezing. Cardiovascular: Positive for leg swelling (chronic). Negative for chest pain, palpitations, orthopnea, claudication and PND. Gastrointestinal: Negative. Genitourinary: Negative. Musculoskeletal: Negative. Skin: Negative. Neurological: Negative. Endo/Heme/Allergies: Negative. Psychiatric/Behavioral: Negative. ROS otherwise negative      Objective:     Vital Signs:  Visit Vitals  BP (!) 169/82 (BP 1 Location: Left upper arm, BP Patient Position: Sitting, BP Cuff Size: Adult)   Pulse 61   Temp 98.7 °F (37.1 °C) (Oral)   Ht 5' 8\" (1.727 m)   SpO2 92%   BMI 19.16 kg/m²       BMI:  Body mass index is 19.16 kg/m². Physical Examination:  Physical Exam  Constitutional:       Appearance: Normal appearance. She is not ill-appearing or toxic-appearing. Comments: Frail, chronically ill looking, but pleasant and cheerful   HENT:      Head: Normocephalic and atraumatic.       Right Ear: External ear normal.      Left Ear: External ear normal.      Nose: Nose normal.      Mouth/Throat:      Mouth: Mucous membranes are moist. Pharynx: Oropharynx is clear. No oropharyngeal exudate or posterior oropharyngeal erythema. Cardiovascular:      Rate and Rhythm: Normal rate and regular rhythm. Pulses: Normal pulses. Heart sounds: Normal heart sounds. No murmur heard. No friction rub. No gallop. Comments: With respirophasic variation  Pulmonary:      Effort: Pulmonary effort is normal. No respiratory distress. Breath sounds: No wheezing, rhonchi or rales. Comments: Breath sounds diminished throughout bilaterally. Bibasilar velcro crackles with inspiration without rales. Abdominal:      General: Abdomen is flat. Bowel sounds are normal. There is no distension. Palpations: Abdomen is soft. Tenderness: There is no abdominal tenderness. There is no guarding. Musculoskeletal:      Cervical back: Normal range of motion and neck supple. No rigidity or tenderness. Right lower leg: Edema present. Left lower leg: Edema present. Skin:     General: Skin is warm and dry. Findings: No erythema, lesion or rash. Neurological:      General: No focal deficit present. Mental Status: She is alert and oriented to person, place, and time. Mental status is at baseline. Psychiatric:         Mood and Affect: Mood normal.         Behavior: Behavior normal.         Judgment: Judgment normal.          Physical exam otherwise negative    Diagnostic Testing:    Laboratory Studies:  Office Visit on 11/11/2021   Component Date Value Ref Range Status    TSH 11/11/2021 2.52  0.36 - 3.74 uIU/mL Final    Comment:   Due to TSH heterogeneity, both structurally and degree of glycosylation,  monoclonal antibodies used in the TSH assay may not accurately quantitate TSH. Therefore, this result should be correlated with clinical findings as well as  with other assessments of thyroid function, e.g., free T4, free T3.       Sodium 11/11/2021 139  136 - 145 mmol/L Final    Potassium 11/11/2021 5.3* 3.5 - 5.1 mmol/L Final  Chloride 11/11/2021 103  97 - 108 mmol/L Final    CO2 11/11/2021 31  21 - 32 mmol/L Final    Anion gap 11/11/2021 5  5 - 15 mmol/L Final    Glucose 11/11/2021 91  65 - 100 mg/dL Final    BUN 11/11/2021 29* 6 - 20 MG/DL Final    Creatinine 11/11/2021 1.08* 0.55 - 1.02 MG/DL Final    BUN/Creatinine ratio 11/11/2021 27* 12 - 20   Final    GFR est AA 11/11/2021 58* >60 ml/min/1.73m2 Final    GFR est non-AA 11/11/2021 48* >60 ml/min/1.73m2 Final    Comment: Estimated GFR is calculated using the IDMS-traceable Modification of Diet in  Renal Disease (MDRD) Study equation, reported for both  Americans  (GFRAA) and non- Americans (GFRNA), and normalized to 1.73m2 body  surface area. The physician must decide which value applies to the patient.  Calcium 11/11/2021 9.4  8.5 - 10.1 MG/DL Final    Bilirubin, total 11/11/2021 0.4  0.2 - 1.0 MG/DL Final    ALT (SGPT) 11/11/2021 12  12 - 78 U/L Final    AST (SGOT) 11/11/2021 12* 15 - 37 U/L Final    Alk. phosphatase 11/11/2021 60  45 - 117 U/L Final    Protein, total 11/11/2021 7.1  6.4 - 8.2 g/dL Final    Albumin 11/11/2021 3.7  3.5 - 5.0 g/dL Final    Globulin 11/11/2021 3.4  2.0 - 4.0 g/dL Final    A-G Ratio 11/11/2021 1.1  1.1 - 2.2   Final    Cholesterol, total 11/11/2021 167  <200 MG/DL Final    Triglyceride 11/11/2021 132  <150 MG/DL Final    Comment: Based on NCEP-ATP III:  Triglycerides <150 mg/dL  is considered normal, 150-199  mg/dL  borderline high,  200-499 mg/dL high and  greater than or equal to 500  mg/dL very high.  HDL Cholesterol 11/11/2021 81  MG/DL Final    Comment: Based on NCEP ATP III, HDL Cholesterol <40 mg/dL is considered low and >60  mg/dL is elevated.       LDL, calculated 11/11/2021 59.6  0 - 100 MG/DL Final    Comment: Based on the NCEP-ATP: LDL-C concentrations are considered  optimal <100 mg/dL,  near optimal/above Normal 100-129 mg/dL Borderline High: 130-159, High: 160-189  mg/dL Very High: Greater than or equal to 190 mg/dL      VLDL, calculated 11/11/2021 26.4  MG/DL Final    CHOL/HDL Ratio 11/11/2021 2.1  0.0 - 5.0   Final    WBC 11/11/2021 7.7  3.6 - 11.0 K/uL Final    RBC 11/11/2021 4.19  3.80 - 5.20 M/uL Final    HGB 11/11/2021 12.4  11.5 - 16.0 g/dL Final    HCT 11/11/2021 41.6  35.0 - 47.0 % Final    MCV 11/11/2021 99.3* 80.0 - 99.0 FL Final    MCH 11/11/2021 29.6  26.0 - 34.0 PG Final    MCHC 11/11/2021 29.8* 30.0 - 36.5 g/dL Final    RDW 11/11/2021 14.7* 11.5 - 14.5 % Final    PLATELET 57/12/4337 370  150 - 400 K/uL Final    MPV 11/11/2021 10.0  8.9 - 12.9 FL Final    NRBC 11/11/2021 0.0  0  WBC Final    ABSOLUTE NRBC 11/11/2021 0.00  0.00 - 0.01 K/uL Final    NEUTROPHILS 11/11/2021 74  32 - 75 % Final    LYMPHOCYTES 11/11/2021 12  12 - 49 % Final    MONOCYTES 11/11/2021 7  5 - 13 % Final    EOSINOPHILS 11/11/2021 5  0 - 7 % Final    BASOPHILS 11/11/2021 1  0 - 1 % Final    IMMATURE GRANULOCYTES 11/11/2021 1* 0.0 - 0.5 % Final    ABS. NEUTROPHILS 11/11/2021 5.7  1.8 - 8.0 K/UL Final    ABS. LYMPHOCYTES 11/11/2021 0.9  0.8 - 3.5 K/UL Final    ABS. MONOCYTES 11/11/2021 0.6  0.0 - 1.0 K/UL Final    ABS. EOSINOPHILS 11/11/2021 0.4  0.0 - 0.4 K/UL Final    ABS. BASOPHILS 11/11/2021 0.1  0.0 - 0.1 K/UL Final    ABS. IMM. GRANS. 11/11/2021 0.1* 0.00 - 0.04 K/UL Final    DF 11/11/2021 AUTOMATED    Final    SAMPLES BEING HELD 11/11/2021 1SST   Final    COMMENT 11/11/2021 Add-on orders for these samples will be processed based on acceptable specimen integrity and analyte stability, which may vary by analyte. Final         Radiographic Studies:  XR Results (most recent):  Results from Abstract encounter on 12/11/20    XR CHEST PA LAT     MARKO Results (most recent):  No results found for this or any previous visit. CT Results (most recent):  No results found for this or any previous visit.      DEXA Results (most recent):  No results found for this or any previous visit.     MRI Results (most recent):  No results found for this or any previous visit. Assessment/Plan:       ICD-10-CM ICD-9-CM    1. Routine adult health maintenance  Z00.00 V70.0    2. Venous stasis ulcer of left ankle limited to breakdown of skin without varicose veins ([facility])  I87.2 459.81 REFERRAL TO HOME HEALTH    L97.321 707.13    3. Moderate pulmonary arterial systolic hypertension ([facility])  I27.21 416.8    4. PVD (peripheral vascular disease) ([facility])  I73.9 443.9    5. 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 She otherwise has been feeling about the same as usual, with stable breathing, stable oxygen requirements, and no evidence to suggest ongoing COPD exacerbation. Venous stasis ulcer:   Healing well by patient report with the assistance of home health   Continuing with current care, referral back to home health placed per patient request    Moderate pulmonary arterial systolic hypertension:   As per history, secondary to end-stage/oxygen dependent COPD. Continuing with oxygen supplementation. Patient otherwise asymptomatic with stable tolerance of minimal activity. Peripheral vascular disease:   Secondary to longstanding history of smoking. Notably, patient is not on an antiplatelet agent, statin medication, or other typical therapy. This is likely secondary to the patient's advanced age. I am not convinced that the patient would derive significant mortality benefit from adding these at this time, but these should be considered and discussed with the patient at the next opportunity. Pulmonary cachexia:   The patient is wheelchair-bound, so we are unable to weigh her today. However, most likely her complaint of weight loss is related to pulmonary cachexia.   In addition to trying to treat the COPD itself with current inhaler therapy and oxygen therapy, referral to nutritional medicine for consideration of additional nutritional supplementation and other measures to help the patient maintain a healthy weight. Follow-up with me in 3 to 6 months, earlier if necessary. Edyta Villalpando MD    Please note that this dictation was completed with [employer], the computer voice recognition software. Quite often unanticipated grammatical, syntax, homophones, and other interpretive errors are inadvertently transcribed by the computer software. Please disregard these errors. Please excuse any errors that have escaped final proofreading.

## 2022-04-20 NOTE — PROGRESS NOTES
Chief Complaint   Patient presents with    Establish Care     Visit Vitals  BP (!) 169/82 (BP 1 Location: Left upper arm, BP Patient Position: Sitting, BP Cuff Size: Adult)   Pulse 61   Temp 98.7 °F (37.1 °C) (Oral)   Ht 5' 8\" (1.727 m)   SpO2 92%   BMI 19.16 kg/m²           1. \"Have you been to the ER, urgent care clinic since your last visit? Hospitalized since your last visit? \" No    2. \"Have you seen or consulted any other health care providers outside of the [address] [place] [address] since your last visit? \" No     3. For patients aged 39-70: Has the patient had a colonoscopy / FIT/ Cologuard? No      If the patient is female:    4. For patients aged 41-77: Has the patient had a mammogram within the past 2 years? No      5. For patients aged 21-65: Has the patient had a pap smear?  NA - based on age or sex

## 2022-05-06 PROBLEM — J44.1 COPD EXACERBATION (HCC): Status: ACTIVE | Noted: 2022-01-01

## 2022-05-06 NOTE — H&P
Hospitalist Admission Note    NAME: Sondra Landeros   :  11/10/1931   MRN:  [id number]     Date/Time:  2022 5:30 PM    Patient PCP: Amador Parker MD  ______________________________________________________________________  Given the patient's current clinical presentation, I have a high level of concern for decompensation if discharged from the emergency department. Complex decision making was performed, which includes reviewing the patient's available past medical records, laboratory results, and x-ray films. My assessment of this patient's clinical condition and my plan of care is as follows. Assessment / Plan:  Acute exacerbation of COPD    On 4 L oxygen currently, baseline of 2 L  Chest x-ray did not show any acute pathology  Has bilateral wheezing  Will treat with IV steroids, empiric antibiotics, duo nebs. Takes azithromycin 3 times a week as prophylaxis, can be switched to this on discharge  BNP elevated to 5000, likely mild fluid overload, will give lasix 20 mg iv x 1  ECHO 2019:  showed moderate to severe pulmonary HTN    Hyperkalemia:  Potassium 5.4, will monitor  Mentions this is a chronic issue  Low K diet    Hypertension  Peripheral vascular disease  Resume home meds    Code Status: DNR as per her wishes  Surrogate Decision Maker:    DVT Prophylaxis: Lovenox  GI Prophylaxis: not indicated    Baseline:       Subjective:   CHIEF COMPLAINT: Shortness of breath    HISTORY OF PRESENT ILLNESS:     Farhana Gunn is a 80 y.o. female with past medical history of COPD, hypertension, peripheral vascular disease presented with worsening shortness of breath for the last week. She started having worsening shortness of breath for the last week, this morning's care nurse noted that her oxygen saturation was 88% on 2 L oxygen. She also has a cough with a sputum production. She denies any chest pain, belly pain, change in bladder or bowel habits.   She has some chronic sores in her left leg, mentions having intermittent swelling in both legs. She denies any fever or chills. Currently she is on 4 L oxygen and saturating well. At baseline she uses 2 L    We were asked to admit for work up and evaluation of the above problems. Past Medical History:   Diagnosis Date    Acute renal failure (ARF) ([facility] 2017    Arthritis     generalized    Bronchitis, acute 2017    COPD [facility]ch[facility] obstructive pulmonary disease) ([facility]     Essential hypertension 2018    former smoker      >60pkyr quit 1/3/11    History of DVT (deep vein thrombosis)     left leg    History of sepsis 2017    ischemic left lower extremitiy pain     above knee FemPop 1/3/11    PVD (peripheral vascular disease) ([facility]     Seasonal allergic rhinitis     Single kidney     Sinus bradycardia 2018        Past Surgical History:   Procedure Laterality Date    HX GI  10 yrs ago    colonoscopy    HX GYN      3 miscarriges    HX GYN      1 vaginal birth   [facility]      veinography left leg, stent left leg    IA BREAST SURGERY PROCEDURE UNLISTED      excision left breast cyst       Social History     Tobacco Use    Smoking status: Former Smoker     Packs/day: 1.00     Years: 60.00     Pack years: 60.00     Quit date: 1/3/2011     Years since quittin.3    Smokeless tobacco: Never Used   Substance Use Topics    Alcohol use: Yes     Comment: occasional liquor after dinner        Family History   Problem Relation Age of Onset    Colon Cancer Mother     Lung Cancer Sister      Allergies   Allergen Reactions    Food Extracts Diarrhea     Onions and garlic causes abdominal pain,cramping     Amlodipine Swelling    Sulfa (Sulfonamide Antibiotics) Other (comments)     Altered Mental Status        Prior to Admission medications    Medication Sig Start Date End Date Taking? Authorizing Provider   azelastine (ASTEPRO) 205.5 mcg (0.15 %) 2 Sprays by Both Nostrils route daily.     Provider, Historical   fluticasone propionate (FLONASE) 50 mcg/actuation nasal spray 2 Sprays by Both Nostrils route daily. Provider, Historical   busPIRone (BUSPAR) 5 mg tablet Take 1 Tab by mouth every evening. Patient not taking: Reported on 4/20/2022 12/23/21   Marly Yusuf MD   calcium carb/magnesium hydrox (MYLANTA PO) Take 15-30 mL by mouth two (2) times daily as needed for Other (constipation). Patient not taking: Reported on 4/20/2022    Provider, Historical   calcium carbonate (TUMS PO) Take 1 Tablet by mouth three (3) times daily as needed for Other (indigestion). Patient not taking: Reported on 4/20/2022    Provider, Historical   fluticasone-umeclidinium-vilanterol (Trelegy Ellipta) 100-62.5-25 mcg inhaler Take 1 Puff by inhalation daily. Provider, Historical   acetaminophen (TYLENOL) 500 mg tablet Take 1,000 mg by mouth every six (6) hours as needed for Pain. Provider, Historical   simethicone (MYLANTA GAS PO) Take  by mouth daily as needed. Patient not taking: Reported on 4/20/2022    Provider, Historical   OXYGEN-AIR DELIVERY SYSTEMS 2 L by Nasal route continuous. Provider, Historical   acetaminophen (TYLENOL) 325 mg tablet Take 2 Tabs by mouth every four (4) hours as needed for Pain or Fever. 12/21/19   Tiffany Lew MD   ipratropium-albuterol (COMBIVENT RESPIMAT)  mcg/actuation inhaler Take 1 Puff by inhalation four (4) times daily. Provider, Historical   loratadine (CLARITIN) 10 mg tablet Take 10 mg by mouth daily. Provider, Historical   azithromycin (ZITHROMAX) 250 mg tablet Take 250 mg by mouth every Monday, Wednesday, Friday. Indications: prevent lung infections    Provider, Historical   aspirin delayed-release 81 mg tablet Take 162 mg by mouth daily.     Provider, Historical       REVIEW OF SYSTEMS:         Total of 12 systems reviewed as follows:       POSITIVE= underlined text  Negative = text not underlined  General:  fever, chills, sweats, generalized weakness, weight loss/gain,      loss of appetite   Eyes:    blurred vision, eye pain, loss of vision, double vision  ENT:    rhinorrhea, pharyngitis   Respiratory:   cough, sputum production, SOB, GODINEZ, wheezing, pleuritic pain   Cardiology:   chest pain, palpitations, orthopnea, PND, edema, syncope   Gastrointestinal:  abdominal pain , N/V, diarrhea, dysphagia, constipation, bleeding   Genitourinary:  frequency, urgency, dysuria, hematuria, incontinence   Muskuloskeletal :  arthralgia, myalgia, back pain  Hematology:  easy bruising, nose or gum bleeding, lymphadenopathy   Dermatological: rash, ulceration, pruritis, color change / jaundice  Endocrine:   hot flashes or polydipsia   Neurological:  headache, dizziness, confusion, focal weakness, paresthesia,     Speech difficulties, memory loss, gait difficulty  Psychological: Feelings of anxiety, depression, agitation    Objective:   VITALS:    Visit Vitals  BP (!) 141/100   Pulse 60   Temp 97.7 °F (36.5 °C)   Resp 24   Ht 5' 8\" (1.727 m)   Wt 56.7 kg (125 lb)   SpO2 90%   BMI 19.01 kg/m²       PHYSICAL EXAM:    General:    Alert, cooperative, no distress, appears stated age. HEENT: Atraumatic, anicteric sclerae, pink conjunctivae     No oral ulcers, mucosa moist, throat clear, dentition fair  Neck:  Supple, symmetrical,  thyroid: non tender  Lungs:   Bilateral wheezing  Chest wall:  No tenderness  No Accessory muscle use. Heart:   Regular  rhythm,  No  murmur   No edema  Abdomen:   Soft, non-tender. Not distended. Bowel sounds normal  Extremities: No cyanosis. No clubbing,      Skin turgor normal, Capillary refill normal, Radial dial pulse 2+  Skin:     Not pale. Not Jaundiced  No rashes   Psych:  Good insight. Not depressed. Not anxious or agitated.   Neurologic: AOx3 nonfocal  _______________________________________________________________________  Care Plan discussed with:    Comments   Patient y    Family      RN y    Care Manager Consultant:      _______________________________________________________________________  Expected  Disposition:   Home with Family y   HH/PT/OT/RN    SNF/LTC    JORGE ALBERTO    ________________________________________________________________________  TOTAL TIME: [address] Provided     Minutes non procedure based      Comments     Reviewed previous records   >50% of visit spent in counseling and coordination of care  Discussion with patient and/or family and questions answered       ________________________________________________________________________  Signed: Igor Cabrera MD    Procedures: see electronic medical records for all procedures/Xrays and details which were not copied into this note but were reviewed prior to creation of Plan.     LAB DATA REVIEWED:    Recent Results (from the past 24 hour(s))   EKG, 12 LEAD, INITIAL    Collection Time: 05/06/22  1:27 PM   Result Value Ref Range    Ventricular Rate 77 BPM    Atrial Rate 67 BPM    P-R Interval 198 ms    QRS Duration 134 ms    Q-T Interval 434 ms    QTC Calculation (Bezet) 491 ms    Calculated R Axis -117 degrees    Calculated T Axis 95 degrees    Diagnosis       ** Suspect arm lead reversal, interpretation assumes no reversal  Sinus rhythm with occasional premature ventricular complexes and premature   atrial complexes  Right superior axis deviation  Nonspecific intraventricular block  Inferior infarct , age undetermined  T wave abnormality, consider lateral ischemia  When compared with ECG of 09-AUG-2020 11:42,  premature ventricular complexes are now present  QRS duration has increased  Criteria for Septal infarct are no longer present  Inferior infarct is now present     CBC WITH AUTOMATED DIFF    Collection Time: 05/06/22  1:48 PM   Result Value Ref Range    WBC 7.3 3.6 - 11.0 K/uL    RBC 4.12 3.80 - 5.20 M/uL    HGB 12.1 11.5 - 16.0 g/dL    HCT 40.7 35.0 - 47.0 %    MCV 98.8 80.0 - 99.0 FL    MCH 29.4 26.0 - 34.0 PG    MCHC 29.7 (L) 30.0 - 36.5 g/dL    RDW 15.5 (H) 11.5 - 14.5 %    PLATELET 095 867 - 461 K/uL    MPV 10.4 8.9 - 12.9 FL    NRBC 0.0 0  WBC    ABSOLUTE NRBC 0.00 0.00 - 0.01 K/uL    NEUTROPHILS 76 (H) 32 - 75 %    LYMPHOCYTES 11 (L) 12 - 49 %    MONOCYTES 9 5 - 13 %    EOSINOPHILS 3 0 - 7 %    BASOPHILS 1 0 - 1 %    IMMATURE GRANULOCYTES 0 0.0 - 0.5 %    ABS. NEUTROPHILS 5.6 1.8 - 8.0 K/UL    ABS. LYMPHOCYTES 0.8 0.8 - 3.5 K/UL    ABS. MONOCYTES 0.6 0.0 - 1.0 K/UL    ABS. EOSINOPHILS 0.2 0.0 - 0.4 K/UL    ABS. BASOPHILS 0.1 0.0 - 0.1 K/UL    ABS. IMM. GRANS. 0.0 0.00 - 0.04 K/UL    DF AUTOMATED     METABOLIC PANEL, COMPREHENSIVE    Collection Time: 05/06/22  1:48 PM   Result Value Ref Range    Sodium 139 136 - 145 mmol/L    Potassium 5.4 (H) 3.5 - 5.1 mmol/L    Chloride 106 97 - 108 mmol/L    CO2 33 (H) 21 - 32 mmol/L    Anion gap 0 (L) 5 - 15 mmol/L    Glucose 99 65 - 100 mg/dL    BUN 26 (H) 6 - 20 MG/DL    Creatinine 0.86 0.55 - 1.02 MG/DL    BUN/Creatinine ratio 30 (H) 12 - 20      GFR est AA >60 >60 ml/min/1.73m2    GFR est non-AA >60 >60 ml/min/1.73m2    Calcium 9.3 8.5 - 10.1 MG/DL    Bilirubin, total 0.4 0.2 - 1.0 MG/DL    ALT (SGPT) 15 12 - 78 U/L    AST (SGOT) 21 15 - 37 U/L    Alk. phosphatase 64 45 - 117 U/L    Protein, total 6.9 6.4 - 8.2 g/dL    Albumin 3.2 (L) 3.5 - 5.0 g/dL    Globulin 3.7 2.0 - 4.0 g/dL    A-G Ratio 0.9 (L) 1.1 - 2.2     TROPONIN-HIGH SENSITIVITY    Collection Time: 05/06/22  1:48 PM   Result Value Ref Range    Troponin-High Sensitivity 6 0 - 51 ng/L   NT-PRO BNP    Collection Time: 05/06/22  1:48 PM   Result Value Ref Range    NT pro-BNP 5,737 (H) <450 PG/ML   SAMPLES BEING HELD    Collection Time: 05/06/22  1:48 PM   Result Value Ref Range    SAMPLES BEING HELD LAV     COMMENT        Add-on orders for these samples will be processed based on acceptable specimen integrity and analyte stability, which may vary by analyte.

## 2022-05-06 NOTE — ED NOTES
1410 pt arrived from home. Sent by home health RN for increased hypoxia and shortness of breath, sating 86% on room air. Pt a/o x2-3 at baseline. Pt on 2 liters nasal cannula at baseline. Pt tolerating 3-5l nc on arrival.       1500 contacted respiratory for Neb.     1530 placed pt on purewick. Pt unable to urinate at this time. Caregiver remains at the bedside. 1630 answered pt call bell pt states she cant pee with purewick    1645 ambulated to bedside commode, pt in need of full assist. Pt desaturated to 88% on 4 liters increased to 5l    1750 Patient is being transferred to [address], Room # 2137. Report given to Lilly De La Fuente on [facility] for routine progression of care. Report consisted of the following information SBAR, Kardex and ED Summary. Patient transferred to receiving unit by: Transport (RN or tech name). Outstanding consults needed: No     Next labs due: Yes    The following personal items will be sent with the patient during transfer to the floor:     All valuables:    Cardiac monitoring ordered: Yes    The following CURRENT information was reported to the receiving RN:    Code status: DNR at time of transfer    Last set of vital signs:  Vital Signs  Level of Consciousness: Alert (0) (05/06/22 1318)  Temp: 97.7 °F (36.5 °C) (05/06/22 1552)  Temp Source: Oral (05/06/22 1318)  Pulse (Heart Rate): 60 (05/06/22 1318)  Resp Rate: 24 (05/06/22 1318)  BP: (!) 141/100 (05/06/22 1318)  MAP (Calculated): 114 (05/06/22 1318)         Oxygen Therapy  O2 Sat (%): 91 % (05/06/22 1731)  O2 Device: Nasal cannula (05/06/22 1731)  O2 Flow Rate (L/min): 2 l/min (05/06/22 1731)      Last pain assessment:  Pain 1  Pain Scale 1: Numeric (0 - 10)  Pain Intensity 1: 0  Patient Stated Pain Goal: 0      Wounds: No     Urinary catheter: voiding  Is there a verde order: No     LDAs:       Peripheral IV 05/06/22 Left;Posterior Hand (Active)         Opportunity for questions and clarification was provided.     Wilda Lou RN

## 2022-05-06 NOTE — ED PROVIDER NOTES
EMERGENCY DEPARTMENT HISTORY AND PHYSICAL EXAM      Date: 5/6/2022  Patient Name: Shivam Snow  Patient Age and Sex: 80 y.o. female     History of Presenting Illness     Chief Complaint   Patient presents with    Shortness of Breath     pt with hx of COPD arrives by EMS for exacerbation of her COPD. She is on 2L at baseline and this morning was 88%. EMS gave a breathing tx and SPO improved to 99%. History Provided By: Patient    HPI: Shivam Snow is a 45-year-old female with a history of COPD presenting for shortness of breath. According to patient she is always short of breath but today started having worsening shortness of breath. States she is also been having a little bit of a cough. This morning home health noticed that her saturation was 88% on her 2 L nasal cannula. She is always on 2 L.  EMS gave her breathing treatment in route and patient states that she is feeling slightly better. Denies any chest pain, nausea, vomiting, fevers, leg swelling. There are no other complaints, changes, or physical findings at this time. PCP: Keshia Ruvalcaba MD    No current facility-administered medications on file prior to encounter. Current Outpatient Medications on File Prior to Encounter   Medication Sig Dispense Refill    azelastine (ASTEPRO) 205.5 mcg (0.15 %) 2 Sprays by Both Nostrils route daily.  fluticasone propionate (FLONASE) 50 mcg/actuation nasal spray 2 Sprays by Both Nostrils route daily.  busPIRone (BUSPAR) 5 mg tablet Take 1 Tab by mouth every evening. (Patient not taking: Reported on 4/20/2022) 30 Tablet 0    calcium carb/magnesium hydrox (MYLANTA PO) Take 15-30 mL by mouth two (2) times daily as needed for Other (constipation). (Patient not taking: Reported on 4/20/2022)      calcium carbonate (TUMS PO) Take 1 Tablet by mouth three (3) times daily as needed for Other (indigestion).  (Patient not taking: Reported on 4/20/2022)      fluticasone-umeclidinium-vilanterol (Trelegy Ellipta) 100-62.5-25 mcg inhaler Take 1 Puff by inhalation daily.  acetaminophen (TYLENOL) 500 mg tablet Take 1,000 mg by mouth every six (6) hours as needed for Pain.  simethicone (MYLANTA GAS PO) Take  by mouth daily as needed. (Patient not taking: Reported on 4/20/2022)      OXYGEN-AIR DELIVERY SYSTEMS 2 L by Nasal route continuous.  acetaminophen (TYLENOL) 325 mg tablet Take 2 Tabs by mouth every four (4) hours as needed for Pain or Fever. 40 Tab 0    ipratropium-albuterol (COMBIVENT RESPIMAT)  mcg/actuation inhaler Take 1 Puff by inhalation four (4) times daily.  loratadine (CLARITIN) 10 mg tablet Take 10 mg by mouth daily.  azithromycin (ZITHROMAX) 250 mg tablet Take 250 mg by mouth every Monday, Wednesday, Friday. Indications: prevent lung infections      aspirin delayed-release 81 mg tablet Take 162 mg by mouth daily.          Past History     Past Medical History:  Past Medical History:   Diagnosis Date    Acute renal failure (ARF) ([address] 6/4/2017    Arthritis     generalized    Bronchitis, acute 6/4/2017    COPD (chronic obstructive pulmonary disease) ([address]     Essential hypertension 9/4/2018    former smoker      >60pkyr quit 1/3/11    History of DVT (deep vein thrombosis) 1955    left leg    History of sepsis 06/04/2017    ischemic left lower extremitiy pain     above knee FemPop 1/3/11    PVD (peripheral vascular disease) ([address]     Seasonal allergic rhinitis     Single kidney     Sinus bradycardia 7/28/2018       Past Surgical History:  Past Surgical History:   Procedure Laterality Date    HX GI  10 yrs ago    colonoscopy    HX GYN      3 miscarriges    HX GYN      1 vaginal birth   [de-identified] ORTHOPAEDIC      veinography left leg, stent left leg    [place] BREAST SURGERY PROCEDURE UNLISTED  1955    excision left breast cyst       Family History:  Family History   Problem Relation Age of Onset    Colon Cancer Mother    Claudia Lung Cancer Sister        Social History:  Social History     Tobacco Use    Smoking status: Former Smoker     Packs/day: 1.00     Years: 60.00     Pack years: 60.00     Quit date: 1/3/2011     Years since quittin.3    Smokeless tobacco: Never Used   Vaping Use    Vaping Use: Never used   Substance Use Topics    Alcohol use: Yes     Comment: occasional liquor after dinner    Drug use: No     Types: Prescription, OTC       Allergies: Allergies   Allergen Reactions    Food Extracts Diarrhea     Onions and garlic causes abdominal pain,cramping     Amlodipine Swelling    Sulfa (Sulfonamide Antibiotics) Other (comments)     Altered Mental Status         Review of Systems   Review of Systems   Constitutional: Negative for chills and fever. Respiratory: Positive for cough, shortness of breath and wheezing. Cardiovascular: Negative for chest pain. Gastrointestinal: Negative for abdominal pain, constipation, diarrhea, nausea and vomiting. Genitourinary: Negative for dysuria, frequency and hematuria. Neurological: Negative for weakness and numbness. All other systems reviewed and are negative. Physical Exam   Physical Exam  Vitals and nursing note reviewed. Constitutional:       Appearance: She is well-developed. HENT:      Head: Normocephalic and atraumatic. Nose: Nose normal.      Mouth/Throat:      Mouth: Mucous membranes are moist.   Eyes:      Extraocular Movements: Extraocular movements intact. Conjunctiva/sclera: Conjunctivae normal.   Cardiovascular:      Rate and Rhythm: Normal rate and regular rhythm. Pulmonary:      Effort: Pulmonary effort is normal. No respiratory distress. Breath sounds: Wheezing and rhonchi present. Comments: Patient saturating at 90% on 2 L currently  Abdominal:      General: There is no distension. Palpations: Abdomen is soft. Tenderness: There is no abdominal tenderness.    Musculoskeletal:         General: Normal range of motion. Cervical back: Normal range of motion and neck supple. Right lower leg: Edema present. Left lower leg: Edema present. Comments: Patient does have some edema of the bilateral legs but states that that is baseline   Skin:     General: Skin is warm and dry. Neurological:      General: No focal deficit present. Mental Status: She is alert and oriented to person, place, and time. Mental status is at baseline.    Psychiatric:         Mood and Affect: Mood normal.          Diagnostic Study Results     Labs -     Recent Results (from the past 12 hour(s))   EKG, 12 LEAD, INITIAL    Collection Time: 05/06/22  1:27 PM   Result Value Ref Range    Ventricular Rate 77 BPM    Atrial Rate 67 BPM    P-R Interval 198 ms    QRS Duration 134 ms    Q-T Interval 434 ms    QTC Calculation (Bezet) 491 ms    Calculated R Axis -117 degrees    Calculated T Axis 95 degrees    Diagnosis       ** Suspect arm lead reversal, interpretation assumes no reversal  Sinus rhythm with occasional premature ventricular complexes and premature   atrial complexes  Right superior axis deviation  Nonspecific intraventricular block  Inferior infarct , age undetermined  T wave abnormality, consider lateral ischemia  When compared with ECG of 09-AUG-2020 11:42,  premature ventricular complexes are now present  QRS duration has increased  Criteria for Septal infarct are no longer present  Inferior infarct is now present     CBC WITH AUTOMATED DIFF    Collection Time: 05/06/22  1:48 PM   Result Value Ref Range    WBC 7.3 3.6 - 11.0 K/uL    RBC 4.12 3.80 - 5.20 M/uL    HGB 12.1 11.5 - 16.0 g/dL    HCT 40.7 35.0 - 47.0 %    MCV 98.8 80.0 - 99.0 FL    MCH 29.4 26.0 - 34.0 PG    MCHC 29.7 (L) 30.0 - 36.5 g/dL    RDW 15.5 (H) 11.5 - 14.5 %    PLATELET 591 448 - 586 K/uL    MPV 10.4 8.9 - 12.9 FL    NRBC 0.0 0  WBC    ABSOLUTE NRBC 0.00 0.00 - 0.01 K/uL    NEUTROPHILS 76 (H) 32 - 75 %    LYMPHOCYTES 11 (L) 12 - 49 %    MONOCYTES 9 5 - 13 %    EOSINOPHILS 3 0 - 7 %    BASOPHILS 1 0 - 1 %    IMMATURE GRANULOCYTES 0 0.0 - 0.5 %    ABS. NEUTROPHILS 5.6 1.8 - 8.0 K/UL    ABS. LYMPHOCYTES 0.8 0.8 - 3.5 K/UL    ABS. MONOCYTES 0.6 0.0 - 1.0 K/UL    ABS. EOSINOPHILS 0.2 0.0 - 0.4 K/UL    ABS. BASOPHILS 0.1 0.0 - 0.1 K/UL    ABS. IMM. GRANS. 0.0 0.00 - 0.04 K/UL    DF AUTOMATED     METABOLIC PANEL, COMPREHENSIVE    Collection Time: 05/06/22  1:48 PM   Result Value Ref Range    Sodium 139 136 - 145 mmol/L    Potassium 5.4 (H) 3.5 - 5.1 mmol/L    Chloride 106 97 - 108 mmol/L    CO2 33 (H) 21 - 32 mmol/L    Anion gap 0 (L) 5 - 15 mmol/L    Glucose 99 65 - 100 mg/dL    BUN 26 (H) 6 - 20 MG/DL    Creatinine 0.86 0.55 - 1.02 MG/DL    BUN/Creatinine ratio 30 (H) 12 - 20      GFR est AA >60 >60 ml/min/1.73m2    GFR est non-AA >60 >60 ml/min/1.73m2    Calcium 9.3 8.5 - 10.1 MG/DL    Bilirubin, total 0.4 0.2 - 1.0 MG/DL    ALT (SGPT) 15 12 - 78 U/L    AST (SGOT) 21 15 - 37 U/L    Alk. phosphatase 64 45 - 117 U/L    Protein, total 6.9 6.4 - 8.2 g/dL    Albumin 3.2 (L) 3.5 - 5.0 g/dL    Globulin 3.7 2.0 - 4.0 g/dL    A-G Ratio 0.9 (L) 1.1 - 2.2     TROPONIN-HIGH SENSITIVITY    Collection Time: 05/06/22  1:48 PM   Result Value Ref Range    Troponin-High Sensitivity 6 0 - 51 ng/L   NT-PRO BNP    Collection Time: 05/06/22  1:48 PM   Result Value Ref Range    NT pro-BNP 5,737 (H) <450 PG/ML   SAMPLES BEING HELD    Collection Time: 05/06/22  1:48 PM   Result Value Ref Range    SAMPLES BEING HELD LAV     COMMENT        Add-on orders for these samples will be processed based on acceptable specimen integrity and analyte stability, which may vary by analyte. Radiologic Studies -   XR CHEST PORT   Final Result   No acute findings. CT Results  (Last 48 hours)    None        CXR Results  (Last 48 hours)               05/06/22 1421  XR CHEST PORT Final result    Impression:  No acute findings.        Narrative:  EXAM: XR CHEST PORT       INDICATION: Shortness of Breath       COMPARISON: 8/9/2020       FINDINGS: A portable AP radiograph of the chest was obtained at 1407 hours. The   patient is moderately rotated to the right. No consolidation or pulmonary edema   is shown. No pneumothorax or pleural effusion is demonstrated. Mild cardiac   contour enlargement and mild-moderate thoracic aortic tortuosity are noted. No   definite hilar enlargement is shown. Medical Decision Making   I am the first provider for this patient. I reviewed the vital signs, available nursing notes, past medical history, past surgical history, family history and social history. Vital Signs-Reviewed the patient's vital signs. Patient Vitals for the past 12 hrs:   Temp Pulse Resp BP SpO2   05/06/22 1554     90 %   05/06/22 1552 97.7 °F (36.5 °C)       05/06/22 1318  60 24 (!) 141/100 92 %       Records Reviewed: Nursing Notes and Old Medical Records    Provider Notes (Medical Decision Making):   Patient presenting with shortness of breath and hypoxia. We will see if we can wean her back to 2 L or if she is going to require more. Differential includes COPD exacerbation possibly triggered by allergies that she said, pneumonia, bronchitis, CHF exacerbation. Less likely ACS or PE.    ED Course:   Initial assessment performed. The patients presenting problems have been discussed, and they are in agreement with the care plan formulated and outlined with them. I have encouraged them to ask questions as they arise throughout their visit. ED Course as of 05/06/22 1613   Fri May 06, 2022   1447 Nurse notified me that patient was 88% on 2 L which is her baseline oxygen and increased to 3 L. [JS]   0519 Patient confirmed that she is a DNR.  [JS]      ED Course User Index  [JS] Britton Royal MD     Critical Care Time:   CRITICAL CARE NOTE :    4:13 PM    IMPENDING DETERIORATION -Airway and Respiratory  ASSOCIATED RISK FACTORS - Hypotension, Shock and Hypoxia  MANAGEMENT- Bedside Assessment and Supervision of Care  INTERPRETATION -  Xrays, ECG, Blood Pressure and Cardiac Output Measures   INTERVENTIONS - hemodynamic mngmt and vent mngmt  CASE REVIEW - Hospitalist/Intensivist, Nursing and Family  TREATMENT RESPONSE -Stable  PERFORMED BY - Self    NOTES   :    I have spent 30 minutes of critical care time involved in lab review, consultations with specialist, family decision- making, bedside attention and documentation. During this entire length of time I was immediately available to the patient . Disposition:    Admission Note:  Patient is being admitted to the hospital by Dr. Segundo Camarillo, Service: Hospitalist.  The results of their tests and reasons for their admission have been discussed with them and available family. They convey agreement and understanding for the need to be admitted and for their admission diagnosis. Diagnosis     Clinical Impression:   1. Acute respiratory failure with hypoxia ([address]    2. Chronic obstructive pulmonary disease with acute exacerbation (HCC)        Attestations:  Renea Gant M.D. Please note that this dictation was completed with [employer], the computer voice recognition software. Quite often unanticipated grammatical, syntax, homophones, and other interpretive errors are inadvertently transcribed by the computer software. Please disregard these errors. Please excuse any errors that have escaped final proofreading. Thank you.

## 2022-05-07 NOTE — PROGRESS NOTES
Problem: Mobility Impaired (Adult and Pediatric)  Goal: *Acute Goals and Plan of Care (Insert Text)  Description: FUNCTIONAL STATUS PRIOR TO ADMISSION: Patient was modified independent using a rolling walker for functional mobility. Wears 2L of oxygen at baseline    [address]: Patient lived alone with a son that lives locally. Pt reported she has a caregiver that stays with her at night 11pm-9am and she prepares breakfast for her    Physical Therapy Goals  Initiated 5/7/2022  1. Patient will move from supine to sit and sit to supine  in bed with modified independence within 7 day(s). 2.  Patient will transfer from bed to chair and chair to bed with modified independence using the least restrictive device within 7 day(s). 3.  Patient will perform sit to stand with modified independence within 7 day(s). 4.  Patient will ambulate with modified independence for 50 feet with the least restrictive device within 7 day(s). 5.  Patient will ascend/descend 3 stairs with single handrail(s) with supervision/set-up within 7 day(s). Outcome: Not Progressing Towards Goal   PHYSICAL THERAPY EVALUATION  Patient: Waqar Abrams (80 y.o. female)  Date: 5/7/2022  Primary Diagnosis: COPD exacerbation ([facility] [J44.1]        Precautions:   Fall      ASSESSMENT  Based on the objective data described below, the patient presents with generalized weakness, decreased functional mobility, impaired standing tolerance, increased work of breathing with minimal activity s/p admission on 5/6 with SOB due to COPD exacerbation. Pt received seated in the chair on 3L of oxygen. Pt tolerated session fairly. Pt completed sit<>stand transfers x 2 trials with RW with CGA. Pt with increased work of breathing during static standing and after taking 1-2 steps forward. Pt reported difficulty catching her breath and noted increased use of accessory muscles. Pt educated on pursed lip breathing technique for recovery.  Pt declined further mobility. Pt will continue to benefit from PT to progress mobility as tolerated. Anticipate pt will be able to return home with HHPT and increased assistance upon discharge     O2 needs:     3L   AT REST   O2 SATS  92   (3    ) LITERS OF O2 WITH ACTIVITY O2 SATS  84   (3    )LITERS OF 02 PATIENT LEFT COMFORTABLY  SITTING/SUPINE 02 SATS  92     .    Current Level of Function Impacting Discharge (mobility/balance): CGA transfers with RW; spO2 desat to 84% with minimal exertion    Functional Outcome Measure: The patient scored Total Score: 12/28 on the Tinetti outcome measure which is indicative of high fall risk. Other factors to consider for discharge: increased oxygen needs from baseline     Patient will benefit from skilled therapy intervention to address the above noted impairments. PLAN :  Recommendations and Planned Interventions: bed mobility training, transfer training, gait training, therapeutic exercises, neuromuscular re-education, patient and family training/education, and therapeutic activities      Frequency/Duration: Patient will be followed by physical therapy:  5 times a week to address goals. Recommendation for discharge: (in order for the patient to meet his/her long term goals)  Physical therapy at least 2 days/week in the home AND ensure assist and/or supervision for safety with mobility    This discharge recommendation:  Has not yet been discussed the attending provider and/or case management    IF patient discharges home will need the following DME: patient owns DME required for discharge         SUBJECTIVE:   Patient stated Do I have to get up again? Jay Tabor    OBJECTIVE DATA SUMMARY:   HISTORY:    Past Medical History:   Diagnosis Date    Acute renal failure (ARF) ([facility] 6/4/2017    Arthritis     generalized    Bronchitis, acute 6/4/2017    COPD (chronic obstructive pulmonary disease) ([facility]     Essential hypertension 9/4/2018    former smoker      >60pkyr quit 1/3/11 History of DVT (deep vein thrombosis) 1955    left leg    History of sepsis 06/04/2017    ischemic left lower extremitiy pain     above knee FemPop 1/3/11    PVD (peripheral vascular disease) ([facility]     Seasonal allergic rhinitis     Single kidney     Sinus bradycardia 7/28/2018     Past Surgical History:   Procedure Laterality Date    HX GI  10 yrs ago    colonoscopy    HX GYN      3 miscarriges    HX GYN      1 vaginal birth    HX ORTHOPAEDIC      veinography left leg, stent left leg    [place] BREAST SURGERY PROCEDURE UNLISTED  1955    excision left breast cyst       Personal factors and/or comorbidities impacting plan of care:     Home Situation  Home Environment: Private residence  # Steps to Enter: 3  Rails to Enter: Yes  Hand Rails : Right  One/Two Story Residence: One story  Living Alone: No  Support Systems: Caregiver/Home Care Staff,Child(genna)  Current DME Used/Available at Home: Oxygen, portable,Walker, rolling    EXAMINATION/PRESENTATION/DECISION MAKING:   Critical Behavior:              Hearing: Auditory  Auditory Impairment: None  Skin:    Edema:   Range Of Motion:  AROM: Within functional limits                       Strength:    Strength: Generally decreased, functional                    Tone & Sensation:                  Sensation: Impaired               Coordination:     Vision:      Functional Mobility:  Bed Mobility:     Supine to Sit:  (NT--pt received/returned seated in the chair)        Transfers:  Sit to Stand: Contact guard assistance  Stand to Sit: Contact guard assistance                       Balance:   Sitting: Intact  Standing: Impaired; With support  Standing - Static: Good  Standing - Dynamic : Fair  Ambulation/Gait Training:                                                         Stairs:               Therapeutic Exercises:       Functional Measure:  Tinetti test:    Sitting Balance: 1  Arises: 1  Attempts to Rise: 1  Immediate Standing Balance: 1  Standing Balance: 1  Nudged: 1  Eyes Closed: 0  Turn 360 Degrees - Continuous/Discontinuous: 1  Turn 360 Degrees - Steady/Unsteady: 1  Sitting Down: 1  Balance Score: 9 Balance total score  Indication of Gait: 0  R Step Length/Height: 0  L Step Length/Height: 0  R Foot Clearance: 1  L Foot Clearance: 1  Step Symmetry: 0  Step Continuity: 0  Path: 0  Trunk: 0  Walking Time: 1  Gait Score: 3 Gait total score  Total Score: 12/28 Overall total score         Tinetti Tool Score Risk of Falls  <19 = High Fall Risk  19-24 = Moderate Fall Risk  25-28 = Low Fall Risk  Tinetti ME. Performance-Oriented Assessment of Mobility Problems in Elderly Patients. [address]; [id number]. (Scoring Description: PT Bulletin Feb. 10, 1993)    Older adults: Karly Hairston et al, 2009; n = 1000 [facility] elderly evaluated with ABC, MAGDI, ADL, and IADL)  · Mean MAGDI score for males aged 69-68 years = 26.21(3.40)  · Mean MAGDI score for females age 69-68 years = 25.16(4.30)  · Mean MAGDI score for males over 80 years = 23.29(6.02)  · Mean MAGDI score for females over 80 years = 17.20(8.32)           Based on the above components, the patient evaluation is determined to be of the following complexity level: LOW     Pain Rating:  Pt denied pain    Activity Tolerance:   Fair and desaturates with exertion and requires oxygen      After treatment patient left in no apparent distress:   Sitting in chair and Call bell within reach    COMMUNICATION/EDUCATION:   The patients plan of care was discussed with: Registered nurse. Fall prevention education was provided and the patient/caregiver indicated understanding., Patient/family have participated as able in goal setting and plan of care. , and Patient/family agree to work toward stated goals and plan of care.     Thank you for this referral.  Tomasa Osorio, PT, DPT   Time Calculation: 17 mins

## 2022-05-07 NOTE — PROGRESS NOTES
ADULT PROTOCOL: JET AEROSOL  REASSESSMENT    Patient  Jeanie Murray     80 y.o.   female     5/7/2022  12:21 PM    Breath Sounds Pre Procedure: Right Breath Sounds: Diminished                               Left Breath Sounds: Diminished    Breath Sounds Post Procedure: Right Breath Sounds: Diminished                                 Left Breath Sounds: Diminished    Breathing pattern: Pre procedure Breathing Pattern: Regular          Post procedure Breathing Pattern: Regular    Heart Rate: Pre procedure Pulse: 63           Post procedure Pulse: 63    Resp Rate: Pre procedure Respirations: 20           Post procedure Respirations: 20      Oxygen: O2 Device: Nasal cannula   Flow rate (L/min) 3     Changed: NO    SpO2: Pre procedure SpO2: 92 %   with oxygen              Post procedure SpO2: 92 %  with oxygen    Nebulizer Therapy: Current medications Aerosolized Medications: Brovana,Pulmicort,DuoNeb      Changed: NO    o    Problem List:   Patient Active Problem List   Diagnosis Code    PVD (peripheral vascular disease) ([facility]) I73.9    Seasonal allergic rhinitis J30.2    LBBB (left bundle branch block) I44.7    Essential hypertension I10    Weakness R53.1    Hyponatremia E87.1    Cellulitis of left lower extremity L03. 80    History of DVT (deep vein thrombosis) Z86.718    Single kidney Z90.5    Chronic respiratory failure with hypoxia, on home oxygen therapy ([facility]) J96.11, Z99.81    Moderate pulmonary arterial systolic hypertension ([facility]) I27.21    Venous stasis ulcer of left ankle limited to breakdown of skin without varicose veins ([facility]) I87.2, L97.321    Anxiety F41.9    COPD exacerbation ([facility] J44.1       Respiratory Therapist: Vicky Whitten, RT

## 2022-05-07 NOTE — ROUTINE PROCESS
Bedside shift change report given to [address] [place] [address] (oncoming nurse) by Torri PERALES RN (offgoing nurse). Report included the following information SBAR, Kardex and MAR.

## 2022-05-07 NOTE — PROGRESS NOTES
Problem: Pressure Injury - Risk of  Goal: *Prevention of pressure injury  Description: Document Juan Scale and appropriate interventions in the flowsheet.   Outcome: Progressing Towards Goal  Note: Pressure Injury Interventions:  Sensory Interventions: Assess changes in LOC,Discuss PT/OT consult with provider,Keep linens dry and wrinkle-free    Moisture Interventions: Absorbent underpads,Apply protective barrier, creams and emollients,Internal/External urinary devices    Activity Interventions: Increase time out of bed,Pressure redistribution bed/mattress(bed type),PT/OT evaluation    Mobility Interventions: HOB 30 degrees or less,Pressure redistribution bed/mattress (bed type),PT/OT evaluation    Nutrition Interventions: Document food/fluid/supplement intake    Friction and Shear Interventions: Apply protective barrier, creams and emollients,HOB 30 degrees or less,Feet elevated on foot rest                Problem: Patient Education: Go to Patient Education Activity  Goal: Patient/Family Education  Outcome: Progressing Towards Goal

## 2022-05-07 NOTE — PROGRESS NOTES
Comprehensive Nutrition Assessment    Type and Reason for Visit: Initial,Positive nutrition screen    Nutrition Recommendations/Plan:   1. Continue current diet  2. Switch from Ensure enlive to Nepro daily      Nutrition Assessment:    Patient medically noted for COPD and hyperkalemia. PMH PVD, respiratory failure, and HTN. Patient stated she hadn't eaten anything all morning but had empty cereal bowl in front of her. Didn't like her first breakfast. MST referral received for weight loss; patient reports weight loss was 5 years ago. No weight loss noted over the past year. She reports drinking some protein shake at home but was unable to name it. Patient took a phone call so visit ended. Provided with menu. Currently has ensure enlive ordered but given hyperkalemia this is not the most appropriate supplement. Will switch to Nepro. Encourage intake of meals. Nutrition Related Findings:    K+ 5.4  Solu-medrol    Wound Type: Venous stasis    Current Nutrition Intake & Therapies:        ADULT DIET Regular; Low Fat/Low Chol/High Fiber/FRANCESCO; Low Potassium (Less than 3000 mg/day)  ADULT ORAL NUTRITION SUPPLEMENT Breakfast, Lunch, Dinner; Standard High Calorie/High Protein    Anthropometric Measures:  Height: 5' 8\" (172.7 cm)  Ideal Body Weight (IBW): 140 lbs (64 kg)     Current Body Wt:  56.7 kg (125 lb), 89.3 % IBW. Stated  Current BMI (kg/m2): 19                          BMI Category: Normal weight (BMI 18.5-24. 9)    Estimated Daily Nutrient Needs:  Energy Requirements Based On: Formula  Weight Used for Energy Requirements: Current  Energy (kcal/day): 1600 kcal (BMR 1039 x 1.3AF +250kcal)  Weight Used for Protein Requirements: Current  Protein (g/day): 68g (1.2 g/kg bw)  Method Used for Fluid Requirements: 1 ml/kcal  Fluid (ml/day): 1600 mL    Nutrition Diagnosis:   · Altered nutrition-related lab values related to  (chronic hyperkalemia) as evidenced by  (K+ 5.4)    Nutrition Interventions:   Food and/or Nutrient Delivery: Continue current diet,Modify oral nutrition supplement  Nutrition Education/Counseling: No recommendations at this time  Coordination of Nutrition Care: No recommendation at this time       Goals:     Goals:  (PO intake >50% of meals/ONS and K+ WNL next 4-6 days)       Nutrition Monitoring and Evaluation:   Behavioral-Environmental Outcomes: None identified  Food/Nutrient Intake Outcomes: Food and nutrient intake,Supplement intake  Physical Signs/Symptoms Outcomes: Biochemical data,Weight    Discharge Planning:    Continue current diet    Maggie Fernandes RD  Contact: ext [phone]

## 2022-05-07 NOTE — PROGRESS NOTES
Hospitalist Progress Note              Subhash Garcias MD.                                                             Cell: [phone]                               NAME:  Jarad Lester  :  11/10/1931  MRN:  [id number]  Date of Service:  2022    Summary: 80 y.o. female who presented on 2022 with        Assessment/Plan:  Acute exacerbation of COPD  On 4 L oxygen currently, baseline of 2 L  Chest x-ray did not show any acute pathology  Wheezing improving  Continue breathing treatment and steroids. Continue levaquin  We will begin steriod taper tomorrow  Request combivent  Wean off oxygen to baseline     Hyperkalemia:  Potassium 5.4, will monitor  Mentions this is a chronic issue  Low K diet  Repeat BMP in AM     Hypertension  Peripheral vascular disease  Resume home meds    Debility: Consult PT/OT    Left lower extremity wound     Code Status: DNR as per her wishes  Surrogate Decision Maker:     DVT Prophylaxis: Lovenox  GI Prophylaxis: not indicated      Code status: Full  Dispo: pending         Interval History/Subjective:  F/u for COPD exacerbation  \" I cant breath\"    Review of Systems:  A comprehensive review of systems was negative except for that written in the HPI. Objective:     VITALS:   Last 24hrs VS reviewed since prior progress note. Most recent are:  Visit Vitals  BP (!) 147/88   Pulse 60   Temp 97.9 °F (36.6 °C)   Resp 18   Ht 5' 8\" (1.727 m)   Wt 56.7 kg (125 lb)   SpO2 92%   BMI 19.01 kg/m²     No intake or output data in the 24 hours ending 22 1106     PHYSICAL EXAM:  General: No acute distress, cooperative, pleasant. Speaking in full sentences  EENT: EOMI. Anicteric sclerae. Oral mucous moist, oropharynx benign  Resp: CTA bilaterally. +ve mild wheezing/rhonchi/rales. No accessory muscle use  CV: Regular rhythm, normal rate, no murmurs, gallops, rubs  GI: Soft, non distended, non tender.  normoactive bowel sounds, no hepatosplenomegaly Extremities: No edema, warm, 2+ pulses throughout  Neurologic: Moves all extremities. AAOx3, CN II-XII grossly intact  Psych: Good insight. Not anxious nor agitated. Skin: Good Turgor, no rashes. Left lower extremity in dressing    Lab Data Personally Reviewed: (see below)     Medications list Personally Reviewed:  x YES  NO     _______________________________________________________________________  Care Plan discussed with:  Patient/Family and Nurse    Total NON critical care TIME:  30 minutes    Preston Portillo MD     Procedures: see electronic medical records for all procedures/Xrays and details which were not copied into this note but were reviewed prior to creation of Plan. LABS:  Recent Labs     05/06/22  1348   WBC 7.3   HGB 12.1   HCT 40.7        Recent Labs     05/06/22  1348      K 5.4*      CO2 33*   BUN 26*   CREA 0.86   GLU 99   CA 9.3     Recent Labs     05/06/22  1348   ALT 15   AP 64   TBILI 0.4   TP 6.9   ALB 3.2*   GLOB 3.7     No results for input(s): INR, PTP, APTT, INREXT in the last 72 hours. No results for input(s): FE, TIBC, PSAT, FERR in the last 72 hours. No results found for: FOL, RBCF   No results for input(s): PH, PCO2, PO2 in the last 72 hours. No results for input(s): CPK, CKNDX, TROIQ in the last 72 hours.     No lab exists for component: CPKMB  Lab Results   Component Value Date/Time    Cholesterol, total 167 11/11/2021 03:23 PM    HDL Cholesterol 81 11/11/2021 03:23 PM    LDL, calculated 59.6 11/11/2021 03:23 PM    Triglyceride 132 11/11/2021 03:23 PM    CHOL/HDL Ratio 2.1 11/11/2021 03:23 PM     Lab Results   Component Value Date/Time    Glucose (POC) 103 01/03/2011 07:03 AM     Lab Results   Component Value Date/Time    Color DARK YELLOW 06/04/2017 02:52 AM    Appearance OPAQUE (A) 06/04/2017 02:52 AM    Specific gravity 1.026 06/04/2017 02:52 AM    Specific gravity >1.030 (H) 03/11/2011 03:47 PM    pH (UA) 5.0 06/04/2017 02:52 AM    Protein 100 (A) 06/04/2017 02:52 AM    Glucose NEGATIVE  06/04/2017 02:52 AM    Ketone 15 (A) 06/04/2017 02:52 AM    Bilirubin NEGATIVE  03/11/2011 03:47 PM    Urobilinogen 1.0 06/04/2017 02:52 AM    Nitrites NEGATIVE  06/04/2017 02:52 AM    Leukocyte Esterase MODERATE (A) 06/04/2017 02:52 AM    Epithelial cells MANY (A) 06/04/2017 02:52 AM    Bacteria 2+ (A) 06/04/2017 02:52 AM    WBC 20-50 06/04/2017 02:52 AM    RBC 5-10 06/04/2017 02:52 AM

## 2022-05-07 NOTE — PROGRESS NOTES
ADULT PROTOCOL: JET AEROSOL ASSESSMENT    Patient  May Racer     80 y.o.   female     5/6/2022  9:56 PM    Breath Sounds Pre Procedure: Right Breath Sounds: Diminished,Coarse                               Left Breath Sounds: Diminished,Coarse    Breath Sounds Post Procedure: Right Breath Sounds: Crackles                                 Left Breath Sounds: Crackles    Breathing pattern: Pre procedure Breathing Pattern: Regular          Post procedure Breathing Pattern: Dyspnea at rest    Heart Rate: Pre procedure Pulse: 70           Post procedure Pulse: 76    Resp Rate: Pre procedure Respirations: 21           Post procedure Respirations: 24    Oxygen: O2 Device: Nasal cannula   Flow rate (L/min) 6     Changed: NO    SpO2: Pre procedure SpO2: 93 %   with oxygen              Post procedure SpO2: 93 %  with oxygen    Nebulizer Therapy: Current medications Aerosolized Medications: DuoNeb      Changed: NO    Smoking History: Former Smoker    Problem List:   Patient Active Problem List   Diagnosis Code    PVD (peripheral vascular disease) ([facility] I73.9    Seasonal allergic rhinitis J30.2    LBBB (left bundle branch block) I44.7    Essential hypertension I10    Weakness R53.1    Hyponatremia E87.1    Cellulitis of left lower extremity L03. 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

## 2022-05-07 NOTE — PROGRESS NOTES
2030- Notified by nursing that patient is requesting Mylanta. She is having indigestion and specifically requesting Mylanta, not wanting to try anything else. Order placed. Please note that this note was dictated using Dragon computer voice recognition software. Quite often unanticipated grammatical, syntax, homophones, and other interpretive errors are inadvertently transcribed by the computer software. Please disregard these errors. Please excuse any errors that have escaped final proofreading.

## 2022-05-07 NOTE — PROGRESS NOTES
Anticoagulation Dosing    Indication:  DVT ppx    Estimated Creatinine Clearance: 25.7 mL/min (A) (based on SCr of 1.3 mg/dL (H)). Estimated Creatinine Clearance (using IBW):29.0 mL/min    Recent Labs     05/07/22  1215 05/06/22  1348   CREA 1.30* 0.86   ALB  --  3.2*   HGB 12.2 12.1   HCT 40.9 40.7    168       Wt Readings from Last 1 Encounters:   05/06/22 56.7 kg (125 lb)     Ht Readings from Last 1 Encounters:   05/07/22 172.7 cm (68\")     Body mass index is 19.01 kg/m².     Plan:  adjusted lovenox to 30mg Sq q24h for eCrCl and wt     Thank you,  Eliot Carrero, [facility]

## 2022-05-08 NOTE — PROGRESS NOTES
Transition of Care Plan:    RUR: 14%  Disposition: Home with [facility] services with [facility]   Follow up appointments: To be scheduled prior to d/c   DME needed: The patient has a rollator at home. She also has home O2 with [address] at Discharge: The patient's son will transport her home and will bring an O2 tank for her to travel home with  [place] or means to access home: Yes      IM Medicare Letter: To be given prior to d/c   Is patient a BCPI-A Bundle: N/A          If yes, was Bundle Letter given?:    Is patient a [place] and connected with the [place]? N/A               If yes, was [employer] transfer form completed and [facility] notified? Caregiver Contact: Kristina Skinner, Phone: [phone]  Discharge Caregiver contacted prior to discharge? CM will contact the patient's son if the patient requests this   Care Conference needed?: No                  Reason for Admission: COPD Exacerbation                    RUR Score: 14%                    Plan for utilizing home health: The patient is open to [facility] for SN services. PT is now recommending HHPT. CM to sent KATHARINE orders to [facility] prior to d/c       PCP: First and Last name:  Kathy Suarez MD   Name of Practice: [facility]    Are you a current patient: Yes/No: Yes   Approximate date of last visit: 2 months ago    Can you participate in a virtual visit with your PCP: Yes                    Current Advanced Directive/Advance Care Plan: DNR    Healthcare Decision Maker: Kristina Skinner, Phone: [phone]                Transition of Care Plan:                    CM met with the patient to discuss dispo plan. The patient resides in a 1 level home with 3 steps to enter by herself. She is  and she has 1 son and daughter-in-law that live very close to her. She has 1 grandson. She has an aide, Isreal Azevedo, that she privately pays to stay with her overnight. She also receives [facility] SN services with [facility].  The patient can toilet and dress herself independently. She can also feed herself and she is able to prepare most meals for herself. Her son and DIL also prepare her meals. Her aide assists her with bathing. She uses a rollator when ambulating. She does not drive and her son assists with transportation needs. She uses [facility] for her medications. She has home O2 set-up with [facility]. She has been to both [address] [place] [address],[address] of [place] and [address] [place] [address] in the past. CM will send KATHARINE orders to [facility] for continuation of [facility] SN services and HHPT. Care Management Interventions  PCP Verified by CM: Yes (The patient saw her PCP 2 months ago )  Mode of Transport at Discharge: Other (see comment) (The patient's son will transport her home upon d/c )  Transition of Care Consult (CM Consult): Discharge Planning  MyChart Signup: No  Discharge Durable Medical Equipment: No  Physical Therapy Consult: Yes  Occupational Therapy Consult: No  Speech Therapy Consult: No  Support Systems: Child(genna),Caregiver/Home Care Staff  Confirm Follow Up Transport: Family  The Patient and/or Patient Representative was Provided with a Choice of Provider and Agrees with the Discharge Plan?: Yes  Name of the Patient Representative Who was Provided with a Choice of Provider and Agrees with the Discharge Plan:  Simon Evans  Freedom of Choice List was Provided with Basic Dialogue that Supports the Patient's Individualized Plan of Care/Goals, Treatment Preferences and Shares the Quality Data Associated with the Providers?: Yes   Resource Information Provided?: No  Discharge Location  Patient Expects to be Discharged to[de-identified] Home with home health    [place], [place]

## 2022-05-08 NOTE — PROGRESS NOTES
Hospitalist Progress Note              Rell Farrell MD.                                                             Cell: [phone]                               NAME:  Cathi Brink  :  11/10/1931  MRN:  [id number]  Date of Service:  2022    Summary: 80 y.o. female who presented on 2022 with worsening SOB       Assessment/Plan:  Acute exacerbation of COPD  On 4 L oxygen currently, baseline of 2 L  Chest x-ray did not show any acute pathology  Wheezing resolved  We will transition solumedrol to prednisone 40 mg BID and then discharged home on tapered dose pred- 60 mg daily for 3 days, 40 mg daily for 3 days, 20 mg daily for 3 days and 10 mg daily for 2 days and then stop  D/c levaquin  Continue breathing trx,   Wean off oxygen to baseline     Hyperkalemia:  Potassium 5.4, will monitor  Mentions this is a chronic issue  resolved     Hypertension  Peripheral vascular disease  Resume home meds    Debility: Consult PT/OT    Left lower extremity wound    Dysphagia: Check modified barium swallow to evaluate further  Continue Mylanta for indigestion    Elevated lactic acid s/p IVF hydration. Resolved  D/c NSIVF     Code Status: DNR as per her wishes  Surrogate Decision Maker:     DVT Prophylaxis: Lovenox  GI Prophylaxis: not indicated      Code status: Full  Dispo: D/c home with [facility] services tomorrow if MBS is ok         Interval History/Subjective:  F/u for COPD exacerbation  Breathing so much better. She is complaining of difficulty with swallowing    Review of Systems:  A comprehensive review of systems was negative except for that written in the HPI. Objective:     VITALS:   Last 24hrs VS reviewed since prior progress note.  Most recent are:  Visit Vitals  /85   Pulse 93   Temp 97.9 °F (36.6 °C)   Resp 22   Ht 5' 8\" (1.727 m)   Wt 56.7 kg (125 lb)   SpO2 93%   BMI 19.01 kg/m²     No intake or output data in the 24 hours ending 22 [id number]:  General: No acute distress, cooperative, pleasant. Speaking in full sentences  EENT: EOMI. Anicteric sclerae. Oral mucous moist, oropharynx benign  Resp: CTA bilaterally. +ve mild wheezing/rhonchi/rales. No accessory muscle use  CV: Regular rhythm, normal rate, no murmurs, gallops, rubs  GI: Soft, non distended, non tender. normoactive bowel sounds, no hepatosplenomegaly Extremities: No edema, warm, 2+ pulses throughout  Neurologic: Moves all extremities. AAOx3, CN II-XII grossly intact  Psych: Good insight. Not anxious nor agitated. Skin: Good Turgor, no rashes. Left lower extremity in dressing    Lab Data Personally Reviewed: (see below)     Medications list Personally Reviewed:  x YES  NO     _______________________________________________________________________  Care Plan discussed with:  Patient/Family and Nurse    Total NON critical care TIME:  30 minutes    Noe Rodgers MD     Procedures: see electronic medical records for all procedures/Xrays and details which were not copied into this note but were reviewed prior to creation of Plan. LABS:  Recent Labs     05/07/22  1215 05/06/22  1348   WBC 5.0 7.3   HGB 12.2 12.1   HCT 40.9 40.7    168     Recent Labs     05/08/22  0703 05/07/22  1215 05/06/22  1348    140 139   K 4.2 4.8 5.4*    102 106   CO2 35* 36* 33*   BUN 37* 29* 26*   CREA 1.01 1.30* 0.86   * 161* 99   CA 8.5 9.2 9.3   MG 2.8*  --   --      Recent Labs     05/06/22  1348   ALT 15   AP 64   TBILI 0.4   TP 6.9   ALB 3.2*   GLOB 3.7     No results for input(s): INR, PTP, APTT, INREXT, INREXT in the last 72 hours. No results for input(s): FE, TIBC, PSAT, FERR in the last 72 hours. No results found for: FOL, RBCF   No results for input(s): PH, PCO2, PO2 in the last 72 hours. No results for input(s): CPK, CKNDX, TROIQ in the last 72 hours.     No lab exists for component: CPKMB  Lab Results   Component Value Date/Time    Cholesterol, total 167 11/11/2021 03:23 PM    HDL Cholesterol 81 11/11/2021 03:23 PM    LDL, calculated 59.6 11/11/2021 03:23 PM    Triglyceride 132 11/11/2021 03:23 PM    CHOL/HDL Ratio 2.1 11/11/2021 03:23 PM     Lab Results   Component Value Date/Time    Glucose (POC) 103 01/03/2011 07:03 AM     Lab Results   Component Value Date/Time    Color DARK YELLOW 06/04/2017 02:52 AM    Appearance OPAQUE (A) 06/04/2017 02:52 AM    Specific gravity 1.026 06/04/2017 02:52 AM    Specific gravity >1.030 (H) 03/11/2011 03:47 PM    pH (UA) 5.0 06/04/2017 02:52 AM    Protein 100 (A) 06/04/2017 02:52 AM    Glucose NEGATIVE  06/04/2017 02:52 AM    Ketone 15 (A) 06/04/2017 02:52 AM    Bilirubin NEGATIVE  03/11/2011 03:47 PM    Urobilinogen 1.0 06/04/2017 02:52 AM    Nitrites NEGATIVE  06/04/2017 02:52 AM    Leukocyte Esterase MODERATE (A) 06/04/2017 02:52 AM    Epithelial cells MANY (A) 06/04/2017 02:52 AM    Bacteria 2+ (A) 06/04/2017 02:52 AM    WBC 20-50 06/04/2017 02:52 AM    RBC 5-10 06/04/2017 02:52 AM

## 2022-05-08 NOTE — PROGRESS NOTES
ADULT PROTOCOL: JET AEROSOL  REASSESSMENT    Patient  Siegfried Severs     80 y.o.   female     5/8/2022  3:38 PM    Breath Sounds Pre Procedure: Right Breath Sounds: Coarse                               Left Breath Sounds: Coarse    Breath Sounds Post Procedure: Right Breath Sounds: Coarse                                 Left Breath Sounds: Coarse    Breathing pattern: Pre procedure Breathing Pattern: Regular          Post procedure Breathing Pattern: Regular    Heart Rate: Pre procedure Pulse: 94           Post procedure Pulse: 94    Resp Rate: Pre procedure Respirations: 22           Post procedure Respirations: 22    Cough: Pre procedure                 Post procedure Cough: Non-productive      Oxygen: O2 Device: Nasal cannula   Flow rate (L/min) 3     Changed: NO    SpO2: Pre procedure SpO2: 97 %   with oxygen              Post procedure SpO2: 97 %  with oxygen    Nebulizer Therapy: Current medications Aerosolized Medications: Brovana,Pulmicort, Duoneb      Changed: NO    \    Problem List:   Patient Active Problem List   Diagnosis Code    PVD (peripheral vascular disease) ([facility]) I73.9    Seasonal allergic rhinitis J30.2    LBBB (left bundle branch block) I44.7    Essential hypertension I10    Weakness R53.1    Hyponatremia E87.1    Cellulitis of left lower extremity L03. 80    History of DVT (deep vein thrombosis) Z86.718    Single kidney Z90.5    Chronic respiratory failure with hypoxia, on home oxygen therapy ([facility]) J96.11, Z99.81    Moderate pulmonary arterial systolic hypertension ([facility]) I27.21    Venous stasis ulcer of left ankle limited to breakdown of skin without varicose veins ([facility]) I87.2, L97.321    Anxiety F41.9    COPD exacerbation ([facility] J44.1       Respiratory Therapist: Simone Campos, RT

## 2022-05-09 NOTE — PROGRESS NOTES
Pt remained free of falls throughout shift. Ambulating with assist x2. Pt repositioned to prevent increased pressure areas.

## 2022-05-09 NOTE — PROGRESS NOTES
Bedside and Verbal shift change report given to [facility]. RN (oncoming nurse) by Momo Merlos RN (offgoing nurse). Report included the following information SBAR and Kardex. 0900: Pt. Refused wound care and RN looking at dressing. Educated patient on need to assess left lower leg. Per home routine, wound care dressing would be need to be done today. 1750:  Discussed wound care with family and patient. 1850: received call from [facility] regarding home wound care regimen. Pt. Agreeable to RN change dressing tomorrow.

## 2022-05-09 NOTE — PROGRESS NOTES
Problem: Mobility Impaired (Adult and Pediatric)  Goal: *Acute Goals and Plan of Care (Insert Text)  Description: FUNCTIONAL STATUS PRIOR TO ADMISSION: Patient was modified independent using a rolling walker for functional mobility. Wears 2L of oxygen at baseline    [address]: Patient lived alone with a son that lives locally. Pt reported she has a caregiver that stays with her at night 11pm-9am and she prepares breakfast for her    Physical Therapy Goals  Initiated 5/7/2022  1. Patient will move from supine to sit and sit to supine  in bed with modified independence within 7 day(s). 2.  Patient will transfer from bed to chair and chair to bed with modified independence using the least restrictive device within 7 day(s). 3.  Patient will perform sit to stand with modified independence within 7 day(s). 4.  Patient will ambulate with modified independence for 50 feet with the least restrictive device within 7 day(s). 5.  Patient will ascend/descend 3 stairs with single handrail(s) with supervision/set-up within 7 day(s). Outcome: Not Progressing Towards Goal   PHYSICAL THERAPY TREATMENT  Patient: Latia Friedman (80 y.o. female)  Date: 5/9/2022  Diagnosis: COPD exacerbation ([facility] [address] [J44.1] <principal problem not specified>       Precautions: Fall  Chart, physical therapy assessment, plan of care and goals were reviewed. ASSESSMENT  Patient continues with skilled PT services and is not progressing towards goals. Patient with subjective reports of increased weakness and more difficulty breathing with new cough. O2 sats 90-91% on 4 l/min at rest. She desats to 84-87% with minimal activity and slow to recover even when performing pursed lip breathing. Patient able to perform sit<>stand x 2, standing for approximately 30 sec each time. Increased WOB noted with any activity. At end of session patient remained in chair and O2 sats 90% on 4L/min.    Patient's functional independence is significantly impaired by poor respiratory endurance-she is essentially dependent for care at this time. Patient lives alone at baseline with caregiver who stays overnight. At this time, recommend SNF with pulmonary emphasis to improve overall functional status prior to returning to home. She will likely require increased assistance once home     Current Level of Function Impacting Discharge (mobility/balance): CGA; very limited mobility    Other factors to consider for discharge: lives alone         PLAN :  Patient continues to benefit from skilled intervention to address the above impairments. Continue treatment per established plan of care. to address goals. Recommendation for discharge: (in order for the patient to meet his/her long term goals)  Therapy up to 5 days/week in SNF setting- pulmonary rehab focus    This discharge recommendation:  Has not yet been discussed the attending provider and/or case management    IF patient discharges home will need the following DME: to be determined (TBD)       SUBJECTIVE:   Patient stated I can't breathe and I'm feeling weaker.     OBJECTIVE DATA SUMMARY:   Critical Behavior:     Orientation Level: Oriented X4  Cognition: Appropriate for age attention/concentration,Follows commands     Functional Mobility Training:  Bed Mobility:      Deferred; patient sitting in reclining chair upon arrival              Transfers:  Sit to Stand: Contact guard assistance;Assist x2  Stand to Sit: Contact guard assistance;Assist x2                             Balance:  Sitting: Intact  Standing: Impaired  Standing - Static: Good;Constant support  Ambulation/Gait Training:      Not able to tolerate today            Activity Tolerance:   Poor, desaturates with exertion and requires oxygen, requires frequent rest breaks, and observed SOB with activity    After treatment patient left in no apparent distress:   Sitting in chair and Call bell within reach    COMMUNICATION/COLLABORATION: The patients plan of care was discussed with: Registered nurse and Rehabilitation technician.      Deloris Velasquez, PT   Time Calculation: 26 mins

## 2022-05-09 NOTE — PROGRESS NOTES
Hospitalist Progress Note              Pipo Fountain MD.                                                             Cell: [phone]                               NAME:  Rayray Eng  :  11/10/1931  MRN:  [id number]  Date of Service:  2022    Summary: 80 y.o. female who presented on 2022 with worsening SOB       Assessment/Plan:  Acute exacerbation of COPD  Baseline of 2 L  Chest x-ray did not show any acute pathology  Wheezing resolved  solumedrol changed  to prednisone 40 mg BID and then discharged home on tapered dose pred- 60 mg daily for 3 days, 40 mg daily for 3 days, 20 mg daily for 3 days and 10 mg daily for 2 days and then stop  D/c levaquin  Continue breathing trx  On 4 L oxygen currently, oxygen challenge   PT/OT re eduardo, pt lives alone at day time and she feels she can not walk today      Hyperkalemia:  Mentions this is a chronic issue  resolved     Hypertension  Peripheral vascular disease  MV mod regurg/mild stenosis  TR severe regurge  F/u echo in 6 months  Cardio f/u as outpt  Resume home meds    Left lower extremity wound  Cont wound care/wound care consult    Dysphagia  Check modified barium swallow  Continue Mylanta for indigestion    Elevated lactic acid s/p IVF hydration. Resolved  D/c NSIVF     Code Status: DNR as per her wishes  Surrogate Decision Maker:  DVT Prophylaxis: Lovenox  GI Prophylaxis: not indicated   Dispo: pending PT/OT, HUGO  CATIA: 24 hrs       Interval History/Subjective:  F/u for COPD exacerbation  Breathing so much better. She is complaining of difficulty with swallowing  C/o leg weakness and that she can't walk    Review of Systems:  A comprehensive review of systems was negative except for that written in the HPI. Objective:     VITALS:   Last 24hrs VS reviewed since prior progress note.  Most recent are:  Visit Vitals  /68   Pulse 81   Temp 98 °F (36.7 °C)   Resp 19   Ht 5' 8\" (1.727 m)   Wt 56.7 kg (125 lb)   SpO2 96%   BMI 19.01 kg/m²     No intake or output data in the 24 hours ending 05/09/22 1108     PHYSICAL EXAM:  General: No acute distress, cooperative, pleasant. Speaking in full sentences  EENT: EOMI. Anicteric sclerae. Oral mucous moist, oropharynx benign and no oral thrush  Resp: CTA bilaterally. No wheezing/rhonchi/rales. No accessory muscle use  CV: Regular rhythm, normal rate, no murmurs, gallops, rubs  GI: Soft, non distended, non tender. normoactive bowel sounds, no hepatosplenomegaly Extremities: No edema, warm, 2+ pulses throughout  Neurologic: Moves all extremities. AAOx3, CN II-XII grossly intact  Psych: Good insight. Not anxious nor agitated. Skin: Good Turgor, no rashes. Left lower extremity in dressing    Lab Data Personally Reviewed: (see below)     Medications list Personally Reviewed:  x YES  NO     _______________________________________________________________________  Care Plan discussed with:  Patient/Family and Nurse    Total NON critical care TIME:  35 minutes    Sunita Ridley MD     Procedures: see electronic medical records for all procedures/Xrays and details which were not copied into this note but were reviewed prior to creation of Plan. LABS:  Recent Labs     05/07/22  1215 05/06/22  1348   WBC 5.0 7.3   HGB 12.2 12.1   HCT 40.9 40.7    168     Recent Labs     05/08/22  0703 05/07/22  1215 05/06/22  1348    140 139   K 4.2 4.8 5.4*    102 106   CO2 35* 36* 33*   BUN 37* 29* 26*   CREA 1.01 1.30* 0.86   * 161* 99   CA 8.5 9.2 9.3   MG 2.8*  --   --      Recent Labs     05/06/22  1348   ALT 15   AP 64   TBILI 0.4   TP 6.9   ALB 3.2*   GLOB 3.7     No results for input(s): INR, PTP, APTT, INREXT, INREXT in the last 72 hours. No results for input(s): FE, TIBC, PSAT, FERR in the last 72 hours. No results found for: FOL, RBCF   No results for input(s): PH, PCO2, PO2 in the last 72 hours.   No results for input(s): CPK, CKNDX, TROIQ in the last 72 hours.     No lab exists for component: CPKMB  Lab Results   Component Value Date/Time    Cholesterol, total 167 11/11/2021 03:23 PM    HDL Cholesterol 81 11/11/2021 03:23 PM    LDL, calculated 59.6 11/11/2021 03:23 PM    Triglyceride 132 11/11/2021 03:23 PM    CHOL/HDL Ratio 2.1 11/11/2021 03:23 PM     Lab Results   Component Value Date/Time    Glucose (POC) 103 01/03/2011 07:03 AM     Lab Results   Component Value Date/Time    Color DARK YELLOW 06/04/2017 02:52 AM    Appearance OPAQUE (A) 06/04/2017 02:52 AM    Specific gravity 1.026 06/04/2017 02:52 AM    Specific gravity >1.030 (H) 03/11/2011 03:47 PM    pH (UA) 5.0 06/04/2017 02:52 AM    Protein 100 (A) 06/04/2017 02:52 AM    Glucose NEGATIVE  06/04/2017 02:52 AM    Ketone 15 (A) 06/04/2017 02:52 AM    Bilirubin NEGATIVE  03/11/2011 03:47 PM    Urobilinogen 1.0 06/04/2017 02:52 AM    Nitrites NEGATIVE  06/04/2017 02:52 AM    Leukocyte Esterase MODERATE (A) 06/04/2017 02:52 AM    Epithelial cells MANY (A) 06/04/2017 02:52 AM    Bacteria 2+ (A) 06/04/2017 02:52 AM    WBC 20-50 06/04/2017 02:52 AM    RBC 5-10 06/04/2017 02:52 AM

## 2022-05-09 NOTE — PROGRESS NOTES
ADULT PROTOCOL: JET AEROSOL ASSESSMENT    Patient  May Racer     80 y.o.   female     5/9/2022  1:18 PM    Breath Sounds Pre Procedure: Right Breath Sounds: Diminished                               Left Breath Sounds: Diminished    Breath Sounds Post Procedure: Right Breath Sounds: Diminished                                 Left Breath Sounds: Diminished    Breathing pattern: Pre procedure Breathing Pattern: Regular          Post procedure Breathing Pattern: Regular    Heart Rate: Pre procedure Pulse: 80           Post procedure Pulse: 82    Resp Rate: Pre procedure Respirations: 16           Post procedure Respirations: 16    Oxygen: O2 Device: Nasal cannula 4lpm        SpO2: Pre procedure SpO2: 94 %                Post procedure SpO2: 97 %     Nebulizer Therapy: Current medications Aerosolized Medications: Brovana,Pulmicort          Problem List:   Patient Active Problem List   Diagnosis Code    PVD (peripheral vascular disease) ([facility]) I73.9    Seasonal allergic rhinitis J30.2    LBBB (left bundle branch block) I44.7    Essential hypertension I10    Weakness R53.1    Hyponatremia E87.1    Cellulitis of left lower extremity L03. 80    History of DVT (deep vein thrombosis) Z86.718    Single kidney Z90.5    Chronic respiratory failure with hypoxia, on home oxygen therapy ([facility]) J96.11, Z99.81    Moderate pulmonary arterial systolic hypertension ([facility]) I27.21    Venous stasis ulcer of left ankle limited to breakdown of skin without varicose veins ([facility]) I87.2, L97.321    Anxiety F41.9    COPD exacerbation ([facility] [address] J44.1       Respiratory Therapist: Paul Trujillo, RT

## 2022-05-09 NOTE — PROGRESS NOTES
SPEECH PATHOLOGY BEDSIDE SWALLOW EVALUATION/DISCHARGE  Patient: Didier Olivier (80 y.o. female)  Date: 5/9/2022  Primary Diagnosis: COPD exacerbation ([facility] [address] [J44.1]       Precautions:   Fall    ASSESSMENT :  Based on the objective data described below, the patient presents with functional swallow of all textures. She is tolerating purees, thins and soft solids well. She did not cough or show other s/s of aspiration. She complains that her sinus drainage down the back of her throat causes swallowing trouble. Barium swallow results of today are as follows  Barium swallow results today     Clinical indication: Dysphagia.     Premier examination AP upright view of the chest show small pleural effusion on  the right. A single contrast barium swallow is performed and show complete loss of motility  of the esophagus, there is significant delay in emptying into the stomach. Some  narrowing is seen at the gastroesophageal junction without obstruction  extravasation or filling defect.     IMPRESSION  No significant peristalsis of the esophagus demonstrated. Significant stasis and dilatation. Skilled acute therapy provided by a speech-language pathologist is not indicated at this time. PLAN :  Recommendations:  Continue with diet  GI consult due to esophageal dysphagia. Discharge Recommendations: None     SUBJECTIVE:   Patient stated she has sinus drainage and that is causing her to have trouble swallowing.      OBJECTIVE:     Past Medical History:   Diagnosis Date    Acute renal failure (ARF) ([facility] [address] 6/4/2017    Arthritis     generalized    Bronchitis, acute 6/4/2017    COPD (chronic obstructive pulmonary disease) ([facility] [address]     Essential hypertension 9/4/2018    former smoker      >60pkyr quit 1/3/11    History of DVT (deep vein thrombosis) 1955    left leg    History of sepsis 06/04/2017    ischemic left lower extremitiy pain     above knee FemPop 1/3/11    PVD (peripheral vascular disease) ([address]     Seasonal allergic rhinitis     Single kidney     Sinus bradycardia 7/28/2018     Past Surgical History:   Procedure Laterality Date    HX GI  10 yrs ago    colonoscopy    HX GYN      3 miscarriges    HX GYN      1 vaginal birth   [de-identified] ORTHOPAEDIC      veinography left leg, stent left leg    AR BREAST SURGERY PROCEDURE UNLISTED  1955    excision left breast cyst     Prior Level of Function/Home Situation:   Home Situation  Home Environment: Private residence  # Steps to Enter: 3  Rails to Enter: Yes  Hand Rails : Right  One/Two Story Residence: One story  Living Alone: No  Support Systems: Child(genna),Caregiver/Home Care Staff  Patient Expects to be Discharged to[de-identified] Home with home health  Current DME Used/Available at Home: Oxygen, portable,Walker, rolling  Diet prior to admission:reg/thins  Current Diet:  Reg/thins  Cognitive and Communication Status:     Orientation Level: Oriented X4  Cognition: Appropriate for age attention/concentration,Follows commands  Perception: Appears intact  Perseveration: No perseveration noted     Oral Assessment:  Oral Assessment  Labial: No impairment  Dentition: Natural  Lingual: No impairment  Mandible: No impairment  P.O. Trials:  Patient Position: upright in chair  Vocal quality prior to P.O.: No impairment  Consistency Presented: Thin liquid;Puree;Mechanical soft  How Presented: Self-fed/presented;Straw     Bolus Acceptance: No impairment  Bolus Formation/Control: No impairment     Propulsion: No impairment  Oral Residue: None  Initiation of Swallow: No impairment  Laryngeal Elevation: Functional  Aspiration Signs/Symptoms: None  Pharyngeal Phase Characteristics: No impairment, issues, or problems              Oral Phase Severity: No impairment  Pharyngeal Phase Severity : No impairment  NOMS:   The NOMS functional outcome measure was used to quantify this patient's level of swallowing impairment.   Based on the NOMS, the patient was determined to be at level 6 for swallow function     NOMS Swallowing Levels:  Level 1 (CN): NPO  Level 2 (CM): NPO but takes consistency in therapy  Level 3 (CL): Takes less than 50% of nutrition p.o. and continues with nonoral feedings; and/or safe with mod cues; and/or max diet restriction  Level 4 (CK): Safe swallow but needs mod cues; and/or mod diet restriction; and/or still requires some nonoral feeding/supplements  Level 5 (CJ): Safe swallow with min diet restriction; and/or needs min cues  Level 6 (CI): Independent with p.o.; rare cues; usually self cues; may need to avoid some foods or needs extra time  Level 7 ([address] [place] [address]): Independent for all p.o.  CARLOS. (2003). National Outcomes Measurement System (NOMS): Adult Speech-Language Pathology User's Guide. Pain:           After treatment:   Patient left in no apparent distress in bed    COMMUNICATION/EDUCATION:   The patient tolerated all textures well. We discussed that her esophagus is largely the problem with her dysphagia and it is not something speech can help her with. The patient's plan of care including recommendations, planned interventions, and recommended diet changes were discussed with: Registered nurse.      Thank you for this referral.  Paco Shanks, ANG  Time Calculation: 13 mins

## 2022-05-09 NOTE — PROGRESS NOTES
Barium swallow results today    Clinical indication: Dysphagia.     Premier examination AP upright view of the chest show small pleural effusion on  the right. A single contrast barium swallow is performed and show complete loss of motility  of the esophagus, there is significant delay in emptying into the stomach. Some  narrowing is seen at the gastroesophageal junction without obstruction  extravasation or filling defect.     IMPRESSION  No significant peristalsis of the esophagus demonstrated. Significant stasis and dilatation.   FLUOROSCOPY DOSE (air kerma): 60.26 mGy

## 2022-05-10 NOTE — PROGRESS NOTES
Spiritual Care Assessment/Progress Note  [facility]      NAME: Angelica Bourne      MRN: [id number]  AGE: 80 y.o.  SEX: female  Quaker Affiliation: Presbyterian   Language: English     5/10/2022     Total Time (in minutes): 24     Spiritual Assessment begun in [facility] 2 MED TELE through conversation with:         [x]Patient        [] Family    [] Friend(s)        Reason for Consult: Initial/Spiritual assessment, patient floor     Spiritual beliefs: (Please include comment if needed)     [x] Identifies with a yaa tradition:         [] Supported by a yaa community:            [] Claims no spiritual orientation:           [] Seeking spiritual identity:                [] Adheres to an individual form of spirituality:           [] Not able to assess:                           Identified resources for coping:      [x] Prayer                               [] Music                  [] Guided Imagery     [x] Family/friends                 [] Pet visits     [] Devotional reading                         [] Unknown     [] Other:                                               Interventions offered during this visit: (See comments for more details)    Patient Interventions: Normalization of emotional/spiritual concerns,Life review/legacy,Initial/Spiritual assessment, patient floor,Coping skills reviewed/reinforced,Quaker beliefs/image of God discussed,Integration of medical assessment with existing values and beliefs,Affirmation of yaa,Catharsis/review of pertinent events in supportive environment,Iconic (affirming the presence of God/Higher Power)           Plan of Care:     [] Support spiritual and/or cultural needs    [] Support AMD and/or advance care planning process      [] Support grieving process   [] Coordinate Rites and/or Rituals    [] Coordination with community clergy   [] No spiritual needs identified at this time   [] Detailed Plan of Care below (See Comments)  [] Make referral to Music Therapy  [] Make referral to Pet Therapy     [] Make referral to Addiction services  [] Make referral to [facility]  [] Make referral to Spiritual Care Partner  [] No future visits requested        [x] Contact Spiritual Care for further referrals     Comments:  's visit was in 2137 for initial spiritual assessment. Patient, Miss Dafne Fernandez, was siting in her recliner and appeared comfortable. She received visit warmly and engaged in conversation and life review. She shared that she has a son who come up regularly for support. Patient shared that she's had some painful life experiences, loosing a son and later a grand son were really hard to cope with. But over time she's learned to proces things differently. She tries to view life from the scripture's perspectives. She was confident that her loved ones who passed are in a better place.  offered active listening and affirmation. She requested for prayer when  inquired how best she'd  like to be supported at time of visit. Requested prayer offered, patient appeared uplifted, she thanked  for the visit and prayer. Visited by: Sera Chowdhury.    Paging Service: [phone])

## 2022-05-10 NOTE — CONSULTS
GI CONSULTATION NOTE  Abdifatah Vance NP  [phone] NP in-hospital cell phone M-F until 4:30  After 5pm or on weekends, please call  for physician on call    NAME: Johnathan Shah   :  11/10/1931   MRN:  [id number]   Attending: Dr. Freddy Walker  Primary GI: Dr. Sandie Copeland  Date/Time:  5/10/2022 2:50 PM  Assessment:   Dysphagia; Achalasia vs Lower Esophageal Stricture  · Modified Barium Swallow 22 showed no significant peristalsis of the esophagus demonstrated with significant stasis and dilation  · CXR 5/10/22 showing right pleural effusion that had increased in size since prior examination; PMH COPD with current SOB is on O2 NC 5 lpm @ time of this consultation; also on Methylprednisolone and nebulizers currently. · Patient states noticed having trouble swallowing 1 week ago but she believes this may also be a progressive long term issue; she states at times will choke upon initiation of swallow but will also be able to swallow and will feel like food is stuck in lower esophagus. · Both patient and RN reports patient has been drooling excessively  · Has been on Lovenox IP;  mg IP and @ home; denies any other blood thinning/anticoagulant medications  · Labs 22 Hgb 12.2, Hct 40.9, RBC 4.14, WBC 5.0, GFR 51, BUN 37    Plan:   · EGD w/ biop scheduled 22 for r/o upper GI mass/esophageal stricture/achalasia  · Possible follow up post EGD with Esophageal Manometry pending findings of EGD 22   · Continue to hold Lovenox until post EGD; recommend hold  mg daily until post EGD  · Recommend NPO and medications with sips of water  · Have sought clearance from pulmonology for EGD; Dr. Zahra Barrett agrees benefits of EGD outweigh risks. Procedure will be short.    · Symptomatic care per primary team    Plan discussed with Dr. Elian Laboy:     HISTORY OF PRESENT ILLNESS:     Johnathan Shah is an 80 y.o. female who we are asked to see for complaint of dysphagia. Had MBS 22 showing no significant peristalsis of the esophagus demonstrated with significant stasis and dilations. She noticed this suddenly worsening 1 week ago, but states this may have been a progressive issue that has not been as noticeable before. At times she will choke on initiation of swallow but other times will be able to swallow and states food will sit in her lower esophagus and feel stuck. Other than  mg daily she denies being on blood thinner/anticoagulant therapy @ home. She has reportedly been drooling excessively. Has been on Lovenox IP but on hold. Denies hematemesis/hemoptysis. Denies changes in bowel habit. Last BM she reports  w/o issue.      Past Medical History:   Diagnosis Date    Acute renal failure (ARF) ([address] 2017    Arthritis     generalized    Bronchitis, acute 2017    COPD (chronic obstructive pulmonary disease) ([address]     Essential hypertension 2018    former smoker      >60pkyr quit 1/3/11    History of DVT (deep vein thrombosis)     left leg    History of sepsis 2017    ischemic left lower extremitiy pain     above knee FemPop 1/3/11    PVD (peripheral vascular disease) ([address]     Seasonal allergic rhinitis     Single kidney     Sinus bradycardia 2018      Past Surgical History:   Procedure Laterality Date    HX GI  10 yrs ago    colonoscopy    HX GYN      3 miscarriges    HX GYN      1 vaginal birth   [facility]      veinography left leg, stent left leg    [place] BREAST SURGERY PROCEDURE UNLISTED      excision left breast cyst     Social History     Tobacco Use    Smoking status: Former Smoker     Packs/day: 1.00     Years: 60.00     Pack years: 60.00     Quit date: 1/3/2011     Years since quittin.3    Smokeless tobacco: Never Used   Substance Use Topics    Alcohol use: Yes     Comment: occasional liquor after dinner      Family History   Problem Relation Age of Onset    Colon Cancer Mother     Lung Cancer Sister       Allergies   Allergen Reactions    Food Extracts Diarrhea     Onions and garlic causes abdominal pain,cramping     Amlodipine Swelling    Sulfa (Sulfonamide Antibiotics) Other (comments)     Altered Mental Status      Prior to Admission medications    Medication Sig Start Date End Date Taking? Authorizing Provider   calcium carb/magnesium hydrox (MYLANTA PO) Take 15-30 mL by mouth two (2) times daily as needed for PRN Reason (Other) (constipation). Yes Provider, Historical   acetaminophen (TYLENOL) 500 mg tablet Take 1,000 mg by mouth every six (6) hours as needed for Pain. Yes Provider, Historical   azelastine (ASTEPRO) 205.5 mcg (0.15 %) 2 Sprays by Both Nostrils route daily. Provider, Historical   fluticasone propionate (FLONASE) 50 mcg/actuation nasal spray 2 Sprays by Both Nostrils route daily. Provider, Historical   busPIRone (BUSPAR) 5 mg tablet Take 1 Tab by mouth every evening. Patient not taking: Reported on 4/20/2022 12/23/21   Eda Chavira MD   calcium carbonate (TUMS PO) Take 1 Tablet by mouth three (3) times daily as needed for Other (indigestion). Patient not taking: Reported on 4/20/2022    Provider, Historical   fluticasone-umeclidinium-vilanterol (Trelegy Ellipta) 100-62.5-25 mcg inhaler Take 1 Puff by inhalation daily. Provider, Historical   simethicone (MYLANTA GAS PO) Take  by mouth daily as needed. Patient not taking: Reported on 4/20/2022    Provider, Historical   OXYGEN-AIR DELIVERY SYSTEMS 2 L by Nasal route continuous. Provider, Historical   acetaminophen (TYLENOL) 325 mg tablet Take 2 Tabs by mouth every four (4) hours as needed for Pain or Fever. 12/21/19   Merary Valdes MD   ipratropium-albuterol (COMBIVENT RESPIMAT)  mcg/actuation inhaler Take 1 Puff by inhalation four (4) times daily. Provider, Historical   loratadine (CLARITIN) 10 mg tablet Take 10 mg by mouth daily.     Provider, Historical   azithromycin (ZITHROMAX) 250 mg tablet Take 250 mg by mouth every Monday, Wednesday, Friday. Indications: prevent lung infections    Provider, Historical   aspirin delayed-release 81 mg tablet Take 162 mg by mouth daily. Provider, Historical       Patient Active Problem List   Diagnosis Code    PVD [facility]peripheral vascular disease) ([facility] I73.9    Seasonal allergic rhinitis J30.2    LBBB (left bundle branch block) I44.7    Essential hypertension I10    Weakness R53.1    Hyponatremia E87.1    Cellulitis of left lower extremity L03. 80    History of DVT (deep vein thrombosis) Z86.718    Single kidney Z90.5    Chronic respiratory failure with hypoxia, on home oxygen therapy ([facility]) J96.11, Z99.81    Moderate pulmonary arterial systolic hypertension ([facility]) I27.21    Venous stasis ulcer of left ankle limited to breakdown of skin without varicose veins ([facility]) I87.2, L97.321    Anxiety F41.9    COPD exacerbation ([facility]) J44.1       REVIEW OF SYSTEMS:    Constitutional: negative fever, negative chills, negative weight loss  Eyes:   negative visual changes  ENT:   negative sore throat, tongue or lip swelling   Respiratory:  Hx COPD on 5 lpm O2 NC. O2 saturation 89-90%  Cards:  negative for chest pain, palpitations, lower extremity edema  GI:   See HPI  :  negative for frequency, dysuria  Integument:  negative for rash and pruritus  Heme:  negative for easy bruising and gum/nose bleeding  Musculoskel: negative for myalgias,  back pain and muscle weakness  Neuro: negative for headaches, dizziness, vertigo  Psych: negative for feelings of anxiety, depression     Objective:   VITALS:    Visit Vitals  BP (!) 150/77   Pulse 69   Temp 98.1 °F (36.7 °C)   Resp 22   Ht 5' 8\" (1.727 m)   Wt 56.7 kg (125 lb)   SpO2 (!) 88%   BMI 19.01 kg/m²     PHYSICAL EXAM:   General:          Alert, WD, WN, cooperative, no distress, appears stated age. Head:               Normocephalic, without obvious abnormality, atraumatic.   Eyes:               Conjunctivae clear and pale, anicteric sclerae. Pupils are equal  Nose:               Nares normal. No drainage or sinus tenderness. Throat:             Lips, mucosa, and tongue normal.  No Thrush  Lungs:             CTA bilaterally and diminished  Chest wall:      No tenderness or deformity. No Accessory muscle use. Heart:              Regular rate and rhythm,  no murmur, rub or gallop. Abdomen:        Soft, non-tender. Not distended. Bowel sounds normal. No masses  Extremities:     Atraumatic, No cyanosis. No edema. No clubbing  Skin:                Texture, turgor normal. No rashes/lesions/jaundice  Psych:             Good insight. Not depressed. Not anxious or agitated. Neurologic:      EOMs intact. No facial asymmetry. No aphasia or slurred speech. Normal                        strength, A/O X 3. LAB DATA REVIEWED:    No results found for this or any previous visit (from the past 24 hour(s)).     IMAGING RESULTS:  I have personally reviewed the imaging reports    Total time spent with patient: 25 minutes ________________________________________________________________________  Care Plan discussed with:  Patient y   Family  n   Mandy Nicole              Consultant:  traci May     ________________________________________________________________________    ___________________________________________________  Consulting Provider: Adriana Bergman NP      5/10/2022  2:50 PM

## 2022-05-10 NOTE — PROGRESS NOTES
RAPID RESPONSE TEAM    I was asked to help the nurse with the patient by the DD. Upon arrival, nurse informed that patient had increased drooling today and increased oxygen needs. Patient was sitting in the chair, 84% on 3L NC, mild increased WOB coupled with anxiety. Patient was c/o of her LT foot hurting as well. + Barrel chest, lung sounds - diminished throughout. Not in acute distress. I increased her oxygen to 4L and saturations were 86-88%. Nurse had already done the swallow evaluation and patient failed - SLP will need to see the patient. No stroke symptoms. I was called away to an emergency but I was able to notify Dr. Makeda Lopes who was on the unit, he is going to see the patient.      NO RRT INTERVENTIONS    Lyubov Morrison  Rapid Response RN  Ext [phone]

## 2022-05-10 NOTE — PROGRESS NOTES
Hospitalist Progress Note              Rell Farrell MD.                                                             Cell: [phone]                               NAME:  Cathi Brink  :  11/10/1931  MRN:  [id number]  Date of Service:  5/10/2022    Summary: 80 y.o. female who presented on 2022 with worsening SOB       Assessment/Plan:  Acute exacerbation of COPD  Chest x-ray did not show any acute pathology, Wheezing resolved  Complete Levaquin today   Continue breathing trx  Now she is NPO, switched prednisone to Solu, cont tapering down   PT/OT re eval done and recs SNF, pt lives alone at day time and she feels too week and she can not walk   Baseline of 2 L, worsening hypoxia, now on 5 L/M, check CXR to r/o aspiration      Dysphagia, subacute, started a week PTA, w solid/liquids  Esophagogram w no peristalsis  GI consulted, likely EGD in am  NPO   IVF    Hyperkalemia:  Mentions this is a chronic issue  resolved     Hypertension  Peripheral vascular disease  MV mod regurg/mild stenosis  TR severe regurge  F/u echo in 6 months  Cardio f/u as outpt  Resume home meds    Left lower extremity wound  Cont wound care/wound care consult, recs : \"Every other day on Even days - Cleanse the wound with Caraklenz spray and wipe with 4x4's. Apply the Silvasorb wound hydrogel,  Optifoam Ag (silver) and then an ABD. Wrapped with a small roll therese and tape on the therese only. Date the dressing\"    Elevated lactic acid s/p IVF hydration.  Resolved  D/c NSIVF     Code Status: DNR as per her wishes  Surrogate Decision Maker:  DVT Prophylaxis: Lovenox  GI Prophylaxis: not indicated   Dispo: SNF  CATIA: 48 hrs       Interval History/Subjective:  F/u for COPD exacerbation  More SOB  Anxious   Could not swallow water  Feels food stuck in her stomach  C/o leg weakness and that she can't walk    Review of Systems:  A comprehensive review of systems was negative except for that written in the HPI. Objective:     VITALS:   Last 24hrs VS reviewed since prior progress note. Most recent are:  Visit Vitals  BP (!) 150/77   Pulse 69   Temp 98.1 °F (36.7 °C)   Resp 22   Ht 5' 8\" (1.727 m)   Wt 56.7 kg (125 lb)   SpO2 (!) 88%   BMI 19.01 kg/m²     No intake or output data in the 24 hours ending 05/10/22 1128     PHYSICAL EXAM:  General: No acute distress, cooperative, pleasant. Speaking in full sentences  EENT: EOMI. Anicteric sclerae. Oral mucous moist, oropharynx benign and no oral thrush  Resp: CTA bilaterally. No wheezing/rhonchi/rales. No accessory muscle use  CV: Regular rhythm, normal rate, no murmurs, gallops, rubs  GI: Soft, non distended, non tender. normoactive bowel sounds, no hepatosplenomegaly Extremities: No edema, warm, 2+ pulses throughout. dressing on RLE  Neurologic: Moves all extremities. AAOx3, CN II-XII grossly intact  Psych: Good insight. Not anxious nor agitated. Skin: Good Turgor, no rashes. Left lower extremity in dressing    Lab Data Personally Reviewed: (see below)     Medications list Personally Reviewed:  x YES  NO     _______________________________________________________________________  Care Plan discussed with:  Patient/Family and Nurse    Total NON critical care TIME:  35 minutes    Yuki Lua MD     Procedures: see electronic medical records for all procedures/Xrays and details which were not copied into this note but were reviewed prior to creation of Plan. LABS:  Recent Labs     05/07/22  1215   WBC 5.0   HGB 12.2   HCT 40.9        Recent Labs     05/08/22  0703 05/07/22  1215    140   K 4.2 4.8    102   CO2 35* 36*   BUN 37* 29*   CREA 1.01 1.30*   * 161*   CA 8.5 9.2   MG 2.8*  --      No results for input(s): ALT, AP, TBIL, TBILI, TP, ALB, GLOB, GGT, AML, LPSE in the last 72 hours.     No lab exists for component: SGOT, GPT, AMYP, HLPSE  No results for input(s): INR, PTP, APTT, INREXT, INREXT in the last 72 hours. No results for input(s): FE, TIBC, PSAT, FERR in the last 72 hours. No results found for: FOL, RBCF   No results for input(s): PH, PCO2, PO2 in the last 72 hours. No results for input(s): CPK, CKNDX, TROIQ in the last 72 hours.     No lab exists for component: CPKMB  Lab Results   Component Value Date/Time    Cholesterol, total 167 11/11/2021 03:23 PM    HDL Cholesterol 81 11/11/2021 03:23 PM    LDL, calculated 59.6 11/11/2021 03:23 PM    Triglyceride 132 11/11/2021 03:23 PM    CHOL/HDL Ratio 2.1 11/11/2021 03:23 PM     Lab Results   Component Value Date/Time    Glucose (POC) 103 01/03/2011 07:03 AM     Lab Results   Component Value Date/Time    Color DARK YELLOW 06/04/2017 02:52 AM    Appearance OPAQUE (A) 06/04/2017 02:52 AM    Specific gravity 1.026 06/04/2017 02:52 AM    Specific gravity >1.030 (H) 03/11/2011 03:47 PM    pH (UA) 5.0 06/04/2017 02:52 AM    Protein 100 (A) 06/04/2017 02:52 AM    Glucose NEGATIVE  06/04/2017 02:52 AM    Ketone 15 (A) 06/04/2017 02:52 AM    Bilirubin NEGATIVE  03/11/2011 03:47 PM    Urobilinogen 1.0 06/04/2017 02:52 AM    Nitrites NEGATIVE  06/04/2017 02:52 AM    Leukocyte Esterase MODERATE (A) 06/04/2017 02:52 AM    Epithelial cells MANY (A) 06/04/2017 02:52 AM    Bacteria 2+ (A) 06/04/2017 02:52 AM    WBC 20-50 06/04/2017 02:52 AM    RBC 5-10 06/04/2017 02:52 AM

## 2022-05-10 NOTE — PROGRESS NOTES
Bedside and Verbal shift change report given to [facility]. RN (oncoming nurse) by Key Laird RN (offgoing nurse). Report included the following information SBAR, Kardex and Recent Results. 4747: MD paged via perfect serve. Pt. experiencing dyspnea upon transferring from bed to chair via wheelchair. Was comfortable on 2L NC overnight. Felt SOB with O2 in 70's. Increased to 4L NC. O2 steady 87-88%. Some inspiratory wheezing upon assessment. Pt. breathing less labored at this time. c/o drainage in nose to back of throat.     0804: Oxygen saturation 92% on 4L. Pt. Reports being less SOB. Will titrate O2 down to 3L and monitor. 0848: pt. Tolerating 3L NC.     [id number]: pt. now reporting that although she can swallow, it \"feels like it doesn't want to go down\". neuro assessment at baseline. pt. with increased anxiety wanting to know results of barium swallow and to talk to you. 0930: prn mylanta given. 1047: pt. Reporting drooling due to feeling that she cannot swallow. Neuro assessment completed. See flowsheet. [id number] pt. now drooling and stating she cannot swallow and is increasingly working to breath due to anxiety. neuro assessment continues to be at baseline with the addition of drooling. she does not have PRN meds for the anxiety. I gave her Mylanta incase it was reflux that she was experiencing. she is requesting again to speak to you. 1100: pt. Failed michi swallow screen. Pt. Could not take sequential swallows and reported it \"backing up into nose\". No coughing or swallowing. 1110: RRT RN at bedside. Increased to 4L NC.     1116: GI MD at bedside for consult. 1118: MD at bedside to address patient's concerns. Pt. Visibly less anxious. 1125: Pt. NPO per MD order. Plan is to give PIV fluids and have abdominal CT scan. GI will further consult.

## 2022-05-10 NOTE — CONSULTS
Pulmonary, Critical Care, and Sleep Medicine    Initial Patient Consult    Name: Angelica Bourne MRN: [id number]   : 11/10/1931 Hospital: [facility]   Date: 5/10/2022        IMPRESSION:   · Acute/chronic hypoxic respiratory failure - on 2 LPM home O2 at baseline  · COPD - FEV1 0.94 L (55% predicted) - with acute exacerbation - on Anoro, TIW azithromycin at home  · Esophageal dysmotility with likely stricture in need of dilation  · Suspect there is some contribution of her esophageal dysmotility to exacerbating her COPD symptoms, may have episodes of aspiration as well   · Peripheral vascular disease  · H/O tobacco use      RECOMMENDATIONS:   · O2 - wean as tolerated, currently on 5 LPM  · Pulmonary toilet  · Bronchodilators - will add muscarinic antagonists and scheduled albuterol  · Increase steroids  · GI evaluation ongoing  · In terms of her periprocedural risk, she is certainly at increased risk for respiratory complications, but benefits appear to outweigh risks. I am told this is a very short procedure, which is certainly favorable, and relief of her severe dysphagia may help her respiratory symptoms in the long-run. · Restart Anoro and MWF Azithromycin at the time of discharge (note she is on Anoro, not Trelegy as listed in her PTA meds)  · Will be available to help with periprocedural issues, if they arise. Subjective: This patient has been seen and evaluated at the request of Dr. Toyin Del Cid for preop evaluation. Patient is a 80 y.o. female former smoker with COPD. She is well known to me. She was admitted 4 days ago with an exacerbation characterized by the sudden onset of dyspnea and wheezing beyond her baseline. She is on 2 LPM at baseline and required 4 here. She improved, but she was struggling with dysphagia. 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 After her swallow evaluation she has had increasing dyspnea again. Cough is currently nonproductive. Past Medical History:   Diagnosis Date    Acute renal failure (ARF) ([facility] [address] 6/4/2017    Arthritis     generalized    Bronchitis, acute 6/4/2017    COPD (chronic obstructive pulmonary disease) ([facility]     Essential hypertension 9/4/2018    former smoker      >60pkyr quit 1/3/11    History of DVT (deep vein thrombosis) 1955    left leg    History of sepsis 06/04/2017    ischemic left lower extremitiy pain     above knee FemPop 1/3/11    PVD (peripheral vascular disease) ([facility]     Seasonal allergic rhinitis     Single kidney     Sinus bradycardia 7/28/2018      Past Surgical History:   Procedure Laterality Date    HX GI  10 yrs ago    colonoscopy    HX GYN      3 miscarriges    HX GYN      1 vaginal birth   [facility]      veinography left leg, stent left leg    DE BREAST SURGERY PROCEDURE UNLISTED  1955    excision left breast cyst      Prior to Admission medications    Medication Sig Start Date End Date Taking? Authorizing Provider   calcium carb/magnesium hydrox (MYLANTA PO) Take 15-30 mL by mouth two (2) times daily as needed for PRN Reason (Other) (constipation). Yes Provider, Historical   acetaminophen (TYLENOL) 500 mg tablet Take 1,000 mg by mouth every six (6) hours as needed for Pain. Yes Provider, Historical   azelastine (ASTEPRO) 205.5 mcg (0.15 %) 2 Sprays by Both Nostrils route daily. Provider, Historical   fluticasone propionate (FLONASE) 50 mcg/actuation nasal spray 2 Sprays by Both Nostrils route daily. Provider, Historical   busPIRone (BUSPAR) 5 mg tablet Take 1 Tab by mouth every evening. Patient not taking: Reported on 4/20/2022 12/23/21   Duong Guaman MD   calcium carbonate (TUMS PO) Take 1 Tablet by mouth three (3) times daily as needed for Other (indigestion).   Patient not taking: Reported on 4/20/2022    Provider, Historical   fluticasone-umeclidinium-vilanterol (Trelegy Ellipta) 100-62.5-25 mcg inhaler Take 1 Puff by inhalation daily. Provider, Historical   simethicone (MYLANTA GAS PO) Take  by mouth daily as needed. Patient not taking: Reported on 2022    Provider, Historical   OXYGEN-AIR DELIVERY SYSTEMS 2 L by Nasal route continuous. Provider, Historical   acetaminophen (TYLENOL) 325 mg tablet Take 2 Tabs by mouth every four (4) hours as needed for Pain or Fever. 19   Lor Mallory MD   ipratropium-albuterol (COMBIVENT RESPIMAT)  mcg/actuation inhaler Take 1 Puff by inhalation four (4) times daily. Provider, Historical   loratadine (CLARITIN) 10 mg tablet Take 10 mg by mouth daily. Provider, Historical   azithromycin (ZITHROMAX) 250 mg tablet Take 250 mg by mouth every Monday, Wednesday, Friday. Indications: prevent lung infections    Provider, Historical   aspirin delayed-release 81 mg tablet Take 162 mg by mouth daily.     Provider, Historical     Allergies   Allergen Reactions    Food Extracts Diarrhea     Onions and garlic causes abdominal pain,cramping     Amlodipine Swelling    Sulfa (Sulfonamide Antibiotics) Other (comments)     Altered Mental Status      Social History     Tobacco Use    Smoking status: Former Smoker     Packs/day: 1.00     Years: 60.00     Pack years: 60.00     Quit date: 1/3/2011     Years since quittin.3    Smokeless tobacco: Never Used   Substance Use Topics    Alcohol use: Yes     Comment: occasional liquor after dinner      Family History   Problem Relation Age of Onset    Colon Cancer Mother     Lung Cancer Sister         Current Facility-Administered Medications   Medication Dose Route Frequency    dextrose 5% and 0.9% NaCl infusion  75 mL/hr IntraVENous CONTINUOUS    [START ON 2022] methylPREDNISolone (PF) (SOLU-MEDROL) injection 40 mg  40 mg IntraVENous DAILY    [Held by provider] predniSONE (DELTASONE) tablet 40 mg  40 mg Oral BID WITH MEALS    levoFLOXacin (LEVAQUIN) 750 mg in D5W IVPB  750 mg IntraVENous Q48H    [Held by provider] enoxaparin (LOVENOX) injection 30 mg  30 mg SubCUTAneous DAILY    aspirin delayed-release tablet 162 mg  162 mg Oral DAILY    fluticasone propionate (FLONASE) 50 mcg/actuation nasal spray 2 Spray  2 Spray Both Nostrils DAILY    sodium chloride (NS) flush 5-40 mL  5-40 mL IntraVENous Q8H    arformoteroL (BROVANA) neb solution 15 mcg  15 mcg Nebulization BID RT    And    budesonide (PULMICORT) 250 mcg/2ml nebulizer susp  250 mcg Nebulization BID RT       Review of Systems:  A comprehensive review of systems was negative except for that written in the HPI. Objective:   Vital Signs:    Visit Vitals  BP (!) 150/77   Pulse 69   Temp 98.1 °F (36.7 °C)   Resp 22   Ht 5' 8\" (1.727 m)   Wt 56.7 kg (125 lb)   SpO2 (!) 88%   BMI 19.01 kg/m²       O2 Device: Nasal cannula   O2 Flow Rate (L/min): 5 l/min (per MD order)   Temp (24hrs), Av.9 °F (36.6 °C), Min:97.6 °F (36.4 °C), Max:98.2 °F (36.8 °C)       Intake/Output:   Last shift:      No intake/output data recorded. Last 3 shifts:  1901 - 05/10 0700  In: -   Out: 800 [Urine:800]  No intake or output data in the 24 hours ending 05/10/22 1241   Physical Exam:   General:  Alert, cooperative, no distress, frail   Head:  Normocephalic, without obvious abnormality, atraumatic. Eyes:  Conjunctivae/corneas clear. Nose: Nares normal. Septum midline. Mucosa normal.     Throat: Lips, mucosa, and tongue normal. Teeth and gums normal.   Neck: Supple, symmetrical, trachea midline, no adenopathy    Back:   Symmetric, no curvature. ROM normal.   Lungs:   Poor bilateral air movement with bibasilar wheeze and rales   Chest wall:  No tenderness or deformity. Heart:  Regular rate and rhythm    Abdomen:   Soft, non-tender.  Bowel sounds normal.     Extremities: Extremities normal, atraumatic, no cyanosis    Skin: Skin color, texture, turgor normal. No rashes or lesions   Lymph nodes: Cervical, supraclavicular nodes normal.   Neurologic: Grossly nonfocal     Data review:   No results found for this or any previous visit (from the past 24 hour(s)). Imaging:  I have personally reviewed the patients radiographs and have reviewed the reports:  Tiny right effusion. Chronic lung changes.          David Cannon MD

## 2022-05-10 NOTE — PROGRESS NOTES
Physical Therapy  Chart reviewed and spoke with nursing. Patient currently requiring 5 l/min supplemental O2 at rest. Nursing reporting patient is more anxious today with increased c/o leg pain following wound care intervention. Requesting therapy be deferred at this time. Will follow back tomorrow.   Thank you,  Manish Spears, PT

## 2022-05-10 NOTE — PROGRESS NOTES
Transition of Care Plan:     RUR: 13% - low   Disposition: SNF - referrals sent to [facility], [address] [facility], pending. Follow up appointments: To be scheduled prior to d/c   DME needed: The patient has a rollator at home. She also has home O2 with [address] at Discharge: [address] [facility] or means to access home: Yes       Medicare Letter: To be given prior to d/c   Is patient a BCPI-A Bundle: N/A                     If yes, was Bundle Letter given?:    Is patient a [place] and connected with the [place]? N/A               If yes, was [employer] transfer form completed and [facility] notified? Caregiver Contact: Alex Greervaleriano, Phone: [phone]  Discharge Caregiver contacted prior to discharge? CM will contact the patient's son if the patient requests this   Care Conference needed?: No     Initial Note: CM met with pt at bedside to discuss discharge plan. Pt agreeable to SNF - FOC provided. Referrals sent via [facility], pending.      Senia Elizabeth MSW  Care Manager, [address] [facility]

## 2022-05-11 NOTE — PROGRESS NOTES
Physician Progress Note      Scottie Flanagan  [facility] #:                  [id number]  :                       11/10/1931  ADMIT DATE:       2022 1:20 PM  [address] DATE:  RESPONDING  PROVIDER #:        Daphne Ley MD          QUERY TEXT:    Pt admitted with acute COPD exacerbation. Pt noted to use 2L NC at home at baseline. Please document in the progress notes and discharge summary if you are evaluating and/or treating any of the following: The medical record reflects the following:    Risk Factors: 80year old with COPD on 2L NC at baseline    Clinical Indicators:  ED PN:   She is always on 2 L. RR up to 24  --According to patient she is always short of breath but today started having worsening shortness of breath  CXR: No acute findings. H&P: bilateral wheezing.    SAT 88% on 2 L oxygen   09:12 Nursing flow sheet: dyspnea at rest, nasal flaring; labored    Treatment: Supplemental O2 up to 6L NC in ED, currently on 4L NC.  Duo-Nebs q6h, Levaquin 750 mg IV q48h, Solu-Medro 60 mg IV q8h, now on Prednisone 50 mg po bid    Thank You,  Christopher Blakely RN, CDI      Reference:  Acute Respiratory Failure Clinical Indicators per [employer] Training Guide and Quick Reference Guide:  pO2 < 60 mmHg or SpO2 (pulse oximetry) < 91% breathing room air  pCO2 > 50 and pH < 7.35  P/F ratio (pO2 / FIO2) < 300  pO2 decrease or pCO2 increase by 10 mmHg from baseline (if known)  Supplemental oxygen of 40% or more  Presence of respiratory distress, tachypnea, dyspnea, shortness of breath, wheezing  Unable to speak in complete sentences  Use of accessory muscles to breathe  Extreme anxiety and feeling of impending doom  Tripod position  Confusion/altered mental status/obtunded  Options provided:  -- Acute respiratory failure with hypoxia  -- Chronic respiratory failure with hypoxia  -- Acute on chronic respiratory failure with hypoxia  -- Other - I will add my own diagnosis  -- Disagree - Not applicable / Not valid  -- Disagree - Clinically unable to determine / Unknown  -- Refer to Clinical Documentation Reviewer    PROVIDER RESPONSE TEXT:    This patient is in acute on chronic respiratory failure with hypoxia.     Query created by: Addy Enriquez on 5/9/2022 9:23 AM      Electronically signed by:  Kin Cee MD 5/11/2022 2:39 PM

## 2022-05-11 NOTE — ROUTINE PROCESS
[address] [facility] Iris Guerrero  11/10/1931  [id number]    Situation:  Verbal report received from: Lubna Diego  Procedure: Procedure(s):  ESOPHAGOGASTRODUODENOSCOPY (EGD)  ESOPHAGEAL DILATION  ESOPHAGOGASTRODUODENAL (EGD) BIOPSY    Background:    Preoperative diagnosis: dysphagia  Postoperative diagnosis: EGD: esophagitis    :  Dr. Sandie Copeland  Assistant(s): Endoscopy Technician-1: Laurie Rudolph  Endoscopy RN-1: Darryn Diego RN    Specimens:   ID Type Source Tests Collected by Time Destination   1 : Bx Preservative GE Junction  Braden Bridges MD 5/11/2022 1139 Pathology     H. Pylori  no    Assessment:  Intra-procedure medications     Anesthesia gave intra-procedure sedation and medications, see anesthesia flow sheet yes    Intravenous fluids: NS@ KVO     Vital signs stable     Abdominal assessment: round and soft     No subcutaneous crepitus of the chest or cervical region was noted post procedure. Recommendation:  Discharge patient per MD order.   Return to floor  Permission to share finding with family or friend yes    Lower partial plate denture back in mouth  Ring on hand  Tele box on

## 2022-05-11 NOTE — PROCEDURES
[facility]                   Endoscopic Gastroduodenoscopy Procedure Note      5/11/2022  Radha Fleming  11/10/1931  [id number]    Procedure: Endoscopic Gastroduodenoscopy with biopsy, esophageal dilation    Indication: esophageal dysphagia and narrowing of area of GE junction on esophagram     Pre-operative Diagnosis: see indication above    Post-operative Diagnosis: see findings below    : KYLAH Ahn MD    Referring Provider:  Yenny Flanagan MD      Anesthesia/Sedation:  MAC anesthesia Propofol    Airway assessment: No airway problems anticipated    Pre-Procedural Exam:      Airway: clear, no airway problems anticipated  Heart: RRR, without gallops or rubs  Lungs: clear bilaterally without wheezes, crackles, or rhonchi  Abdomen: soft, nontender, nondistended, bowel sounds present  Mental Status: awake, alert and oriented to person, place and time       Procedure Details     After infomed consent was obtained for the procedure, with all risks and benefits of procedure explained the patient was taken to the endoscopy suite and placed in the left lateral decubitus position. Following sequential administration of sedation as per above, the endoscope was inserted into the mouth and advanced under direct vision to second portion of the duodenum. A careful inspection was made as the gastroscope was withdrawn, including a retroflexed view of the proximal stomach; findings and interventions are described below. Findings:   Esophagus:  Lumen is dilated. Tortuous area at GE junction, snug feeling but no stricture or mass seen. Dilated from 18 to 20 mm with balloon. Some erythema at the z-line, biopsied.   Stomach: normal including retroflexion view of the GE junction  Duodenum: normal    Therapies:  esophageal dilation with 18-20 mm sized balloon  biopsy of esophagus    Specimens: biopsies of GE junction           Complications:   None; patient tolerated the procedure well. EBL:  None. Impression:      1. Dilated esophagus with tortuosity. Dilated to 20 mm  2. Erythema at the z-line, biopsied  3. Normal stomach  4 Normal duodenum    Recommendations:  , -Await pathology. , -Will try dysphagia diet  If does not improve then will proceed with esophageal manometry to look for achalasia.     Kathy Cogan., MD5/11/2022

## 2022-05-11 NOTE — ANESTHESIA PREPROCEDURE EVALUATION
Relevant Problems   RESPIRATORY SYSTEM   (+) COPD exacerbation (HCC)      CARDIOVASCULAR   (+) Essential hypertension   (+) LBBB (left bundle branch block)       Anesthetic History   No history of anesthetic complications            Review of Systems / Medical History  Patient summary reviewed, nursing notes reviewed and pertinent labs reviewed    Pulmonary    COPD: severe            Comments: Former smoker  2 LPM home    Neuro/Psych              Cardiovascular    Hypertension                Comments: Hx of DVT  EKG (05/2022): Sinus rhythm with premature atrial complexes   Left axis deviation   Left bundle branch block     TTE (05/2022):   Left Ventricle: Normal left ventricular systolic function with a visually estimated EF of 50 - 55%. Left ventricle size is normal. Normal wall thickness. Global hypokinesis present. Normal diastolic function.   Mitral Valve:  moderate regurgitation. Mild stenosis noted.   Tricuspid Valve: Severe regurgitation.      GI/Hepatic/Renal                Endo/Other        Arthritis  Pertinent negatives: No obesity and morbid obesity   Other Findings   Comments: Current admission (05/2022) for esophageal dismotility and COPDE           Physical Exam    Airway  Mallampati: II  TM Distance: > 6 cm  Neck ROM: normal range of motion   Mouth opening: Normal     Cardiovascular  Regular rate and rhythm,  S1 and S2 normal,  no murmur, click, rub, or gallop  Rhythm: regular  Rate: normal         Dental  No notable dental hx       Pulmonary      Decreased breath sounds: bilateral        Pertinent negatives: No rhonchi and wheezes   Abdominal  GI exam deferred       Other Findings            Anesthetic Plan    ASA: 3  Anesthesia type: MAC          Induction: Intravenous  Anesthetic plan and risks discussed with: Patient

## 2022-05-11 NOTE — PROGRESS NOTES
1010: Pt. Transported off unit for proedure at this time. 1047: Report received via SBAR from TOMI Calvo from endoscopy. Pt. Is S/P EGD. Pt. Currently on 3L NC and tolerating. 1233: pt. Returned to unit at this time. VS and assessment completed. See Flowsheet.

## 2022-05-11 NOTE — PROGRESS NOTES
Pulmonary, Critical Care, and Sleep Medicine    Name: Beata Stockton MRN: [id number]   : 11/10/1931 Hospital: [facility]   Date: 2022        IMPRESSION:   · Acute/chronic hypoxic respiratory failure - on 2 LPM home O2 at baseline  · COPD - FEV1 0.94 L (55% predicted) - with acute exacerbation - on Anoro, TIW azithromycin at home  · Esophageal dysmotility with likely stricture in need of dilation  · Suspect there is some contribution of her esophageal dysmotility to exacerbating her COPD symptoms, may have episodes of aspiration as well   · Peripheral vascular disease  · H/O tobacco use      RECOMMENDATIONS:   · O2 - wean as tolerated, currently down to 3 LPM  · Pulmonary toilet  · Bronchodilators   · Systemic steroids  · GI evaluation ongoing  · In terms of her periprocedural risk, she is certainly at increased risk for respiratory complications, but benefits appear to outweigh risks. I am told this is a very short procedure, which is certainly favorable, and relief of her severe dysphagia may help her respiratory symptoms in the long-run. · Restart Anoro and MWF Azithromycin at the time of discharge (note she is on Anoro, not Trelegy as listed in her PTA meds)  · Will be available to help with periprocedural issues, if they arise. Subjective:     22: no events. Feels a bit better, down to 3 LPM.  EGD planned for today. Initial history: This patient has been seen and evaluated at the request of Dr. Zuleyma Ascencio for preop evaluation. Patient is a 80 y.o. female former smoker with COPD. She is well known to me. She was admitted 4 days ago with an exacerbation characterized by the sudden onset of dyspnea and wheezing beyond her baseline. She is on 2 LPM at baseline and required 4 here. She improved, but she was struggling with dysphagia. 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   After her swallow evaluation she has had increasing dyspnea again. Cough is currently nonproductive. Past Medical History:   Diagnosis Date    Acute renal failure (ARF) ([facility] 6/4/2017    Arthritis     generalized    Bronchitis, acute 6/4/2017    COPD (chronic obstructive pulmonary disease) ([facility]     Essential hypertension 9/4/2018    former smoker      >60pkyr quit 1/3/11    History of DVT (deep vein thrombosis) 1955    left leg    History of sepsis 06/04/2017    ischemic left lower extremitiy pain     above knee FemPop 1/3/11    PVD (peripheral vascular disease) ([facility]     Seasonal allergic rhinitis     Single kidney     Sinus bradycardia 7/28/2018      Past Surgical History:   Procedure Laterality Date    HX GI  10 yrs ago    colonoscopy    HX GYN      3 miscarriges    HX GYN      1 vaginal birth   [de-identified] ORTHOPAEDIC      veinography left leg, stent left leg    GA BREAST SURGERY PROCEDURE UNLISTED  1955    excision left breast cyst      Prior to Admission medications    Medication Sig Start Date End Date Taking? Authorizing Provider   calcium carb/magnesium hydrox (MYLANTA PO) Take 15-30 mL by mouth two (2) times daily as needed for PRN Reason (Other) (constipation). Yes Provider, Historical   acetaminophen (TYLENOL) 500 mg tablet Take 1,000 mg by mouth every six (6) hours as needed for Pain. Yes Provider, Historical   azelastine (ASTEPRO) 205.5 mcg (0.15 %) 2 Sprays by Both Nostrils route daily. Provider, Historical   fluticasone propionate (FLONASE) 50 mcg/actuation nasal spray 2 Sprays by Both Nostrils route daily. Provider, Historical   busPIRone (BUSPAR) 5 mg tablet Take 1 Tab by mouth every evening. Patient not taking: Reported on 4/20/2022 12/23/21   Chandan Ford MD   calcium carbonate (TUMS PO) Take 1 Tablet by mouth three (3) times daily as needed for Other (indigestion).   Patient not taking: Reported on 4/20/2022    Provider, Historical   fluticasone-umeclidinium-vilanterol (Janiya Katz) 771-29.7-75 mcg inhaler Take 1 Puff by inhalation daily. Provider, Historical   simethicone (MYLANTA GAS PO) Take  by mouth daily as needed. Patient not taking: Reported on 2022    Provider, Historical   OXYGEN-AIR DELIVERY SYSTEMS 2 L by Nasal route continuous. Provider, Historical   acetaminophen (TYLENOL) 325 mg tablet Take 2 Tabs by mouth every four (4) hours as needed for Pain or Fever. 19   Lula Otero MD   ipratropium-albuterol (COMBIVENT RESPIMAT)  mcg/actuation inhaler Take 1 Puff by inhalation four (4) times daily. Provider, Historical   loratadine (CLARITIN) 10 mg tablet Take 10 mg by mouth daily. Provider, Historical   azithromycin (ZITHROMAX) 250 mg tablet Take 250 mg by mouth every Monday, Wednesday, Friday. Indications: prevent lung infections    Provider, Historical   aspirin delayed-release 81 mg tablet Take 162 mg by mouth daily.     Provider, Historical     Allergies   Allergen Reactions    Food Extracts Diarrhea     Onions and garlic causes abdominal pain,cramping     Amlodipine Swelling    Sulfa (Sulfonamide Antibiotics) Other (comments)     Altered Mental Status      Social History     Tobacco Use    Smoking status: Former Smoker     Packs/day: 1.00     Years: 60.00     Pack years: 60.00     Quit date: 1/3/2011     Years since quittin.3    Smokeless tobacco: Never Used   Substance Use Topics    Alcohol use: Yes     Comment: occasional liquor after dinner      Family History   Problem Relation Age of Onset    Colon Cancer Mother     Lung Cancer Sister         Current Facility-Administered Medications   Medication Dose Route Frequency    dextrose 5% and 0.9% NaCl infusion  75 mL/hr IntraVENous CONTINUOUS    albuterol-ipratropium (DUO-NEB) 2.5 MG-0.5 MG/3 ML  3 mL Nebulization QID RT    methylPREDNISolone (PF) (SOLU-MEDROL) injection 40 mg  40 mg IntraVENous Q8H    [Held by provider] predniSONE (DELTASONE) tablet 40 mg  40 mg Oral BID WITH MEALS    [Held by provider] enoxaparin (LOVENOX) injection 30 mg  30 mg SubCUTAneous DAILY    aspirin delayed-release tablet 162 mg  162 mg Oral DAILY    fluticasone propionate (FLONASE) 50 mcg/actuation nasal spray 2 Spray  2 Spray Both Nostrils DAILY    sodium chloride (NS) flush 5-40 mL  5-40 mL IntraVENous Q8H    arformoteroL (BROVANA) neb solution 15 mcg  15 mcg Nebulization BID RT    And    budesonide (PULMICORT) 250 mcg/2ml nebulizer susp  250 mcg Nebulization BID RT       Review of Systems:  A comprehensive review of systems was negative except for that written in the HPI. Objective:   Vital Signs:    Visit Vitals  BP (!) 166/85 (BP 1 Location: Left arm, BP Patient Position: Supine)   Pulse 77   Temp 97.9 °F (36.6 °C)   Resp 16   Ht 5' 8\" (1.727 m)   Wt 56.7 kg (125 lb)   SpO2 93%   BMI 19.01 kg/m²       O2 Device: Nasal cannula   O2 Flow Rate (L/min): 3 l/min   Temp (24hrs), Av °F (36.7 °C), Min:97.8 °F (36.6 °C), Max:98.1 °F (36.7 °C)       Intake/Output:   Last shift:      No intake/output data recorded. Last 3 shifts: No intake/output data recorded. No intake or output data in the 24 hours ending 22 0845   Physical Exam:   General:  Alert, cooperative, no distress, frail   Head:  Normocephalic, without obvious abnormality, atraumatic. Eyes:  Conjunctivae/corneas clear. Nose: Nares normal. Septum midline. Mucosa normal.     Throat: Lips, mucosa, and tongue normal. Teeth and gums normal.   Neck: Supple, symmetrical, trachea midline, no adenopathy    Lungs:   Poor bilateral air movement, wheezing is improved   Chest wall:  No tenderness or deformity. Heart:  Regular rate and rhythm    Abdomen:   Soft, non-tender.  Bowel sounds normal.     Extremities: Extremities normal, atraumatic, no cyanosis    Skin: Skin color, texture, turgor normal. No rashes or lesions   Neurologic: Grossly nonfocal     Data:   Labs:  Recent Labs     22  0401   WBC 7.9   HGB 12.2   HCT 41.7        Recent Labs 05/11/22  0657      K 4.7      CO2 37*   *   BUN 36*   CREA 0.83   CA 8.4*   ALB 2.8*   TBILI 0.5   ALT 18     No results for input(s): PHI, PCO2I, PO2I, HCO3I, FIO2I in the last 72 hours.     Imaging:  I have personally reviewed the patients radiographs:  None today        German Johnson MD

## 2022-05-11 NOTE — PROGRESS NOTES
Transition of Care Plan:     RUR: 13% - low   Disposition: SNF - referrals sent to [address] [place] [address],[address], [facility], pending. Follow up appointments: To be scheduled prior to d/c   DME needed: The patient has a rollator at home. She also has home O2 with [facility]  Transportation at [address] or means to access home: Yes      IM Medicare Letter: To be given prior to d/c   Is patient a BCPI-A Bundle: N/A                     If yes, was Bundle Letter given?:    Is patient a  and connected with the [facility]? N/A               If yes, was [employer] transfer form completed and [facility] notified? Caregiver Contact: Damon Liao, Phone: [phone]  Discharge Caregiver contacted prior to discharge? CM will contact the patient's son if the patient requests this   Care Conference needed?: No      Initial Note: Chart reviewed. CM spoke with Claudia Bruno Dr who indicated that facility is not able to accept due to pt's dysphagia. Other referrals are pending.  Unit CM to follow.        ANA Chambers  Care Manager, [facility]  [phone]

## 2022-05-11 NOTE — ANESTHESIA POSTPROCEDURE EVALUATION
Procedure(s):  ESOPHAGOGASTRODUODENOSCOPY (EGD)  ESOPHAGEAL DILATION  ESOPHAGOGASTRODUODENAL (EGD) BIOPSY. total IV anesthesia    Anesthesia Post Evaluation      Multimodal analgesia: multimodal analgesia used between 6 hours prior to anesthesia start to PACU discharge  Patient location during evaluation: PACU  Patient participation: complete - patient participated  Level of consciousness: awake and alert  Pain management: adequate  Airway patency: patent  Anesthetic complications: no  Cardiovascular status: acceptable, hemodynamically stable and blood pressure returned to baseline  Respiratory status: acceptable and nasal cannula  Hydration status: euvolemic  Post anesthesia nausea and vomiting:  none  Final Post Anesthesia Temperature Assessment:  Normothermia (36.0-37.5 degrees C)      INITIAL Post-op Vital signs:   Vitals Value Taken Time   /91 05/11/22 1218   Temp 36.6 °C (97.8 °F) 05/11/22 1212   Pulse 74 05/11/22 1219   Resp 27 05/11/22 1219   SpO2 94 % 05/11/22 1219   Vitals shown include unvalidated device data.

## 2022-05-11 NOTE — PROGRESS NOTES
Physical Therapy Note:  Chart reviewed in preparation for treatment. She is currently SHADI for an EGD. Will defer and follow up as appropriate once she returns.  Thank you,  Yolanda Ness, PT, DPT

## 2022-05-11 NOTE — PROGRESS NOTES
1029 Patient arrived to endoscopy and placed on monitor. Came on oxygen at 6L NC, Spo2 97. Patient placed on wall oxygen at [facility], maintaining SpO2 96%. She is alert and oriented. Scattered ecchymosis bilat arms, wound dressing on left lower leg. No complaints at this time. Belongings include telebox and Gold colored ring with 1 green stone and 2 clear stones, Dentures: lower partal plate. 1110 Patient and Dr. Felix Catalan from anesthesia had a long conversation about DNR status. Patient wants her DNR status to be suspended during the perioperative period. Consents signed to reflect patient's wishes. 1135: CRE balloon dilatation of the esophagus   18 mm Balloon inflated to 3 ATMs and held for 60 seconds. 19 mm Balloon inflated to 4.5 ATMs and held for 60 seconds. 20 mm Balloon inflated to 6 ATMs and held for 60 seconds. No subcutaneous crepitus of the chest or cervical region was noted post dilatation. 1150 TRANSFER - OUT REPORT:    Verbal report given to Jorge Vasquez RN (name) on Arthur Reese  being transferred to room 2137(unit) for routine post - op     Report consisted of patients Situation, Background, Assessment and   Recommendations(SBAR). Information from the following report(s) SBAR, Kardex, Procedure Summary and Accordion was reviewed with the receiving nurse. Lines:   Peripheral IV 05/06/22 Left;Posterior Hand (Active)   Site Assessment Clean, dry, & intact 05/11/22 0759   Phlebitis Assessment 0 05/11/22 0759   Infiltration Assessment 0 05/11/22 0759   Dressing Status Dry; Intact 05/11/22 0759   Dressing Type Transparent;Tape 05/11/22 0759   Hub Color/Line Status Pink;Patent; Infusing 05/11/22 0759   Action Taken Open ports on tubing capped 05/11/22 0759   Alcohol Cap Used No 05/11/22 0759    Opportunity for questions and clarification was provided. Patient will spend some time in recovery then return to her room. 1203 Patient taken to recovery.  SBAR given to Tr Marin Rn. Patient is alert remains on 3L via NC (oxygen tank 3/4 full) Belongings remain with patient, partial lower are in a labeled denture cup and given to recovery nurse.

## 2022-05-11 NOTE — PROGRESS NOTES
Hospitalist Progress Note                                         NAME:  Todd Carmen  :  11/10/1931  MRN:  [id number]  Date of Service:  2022    Summary: 80 y.o. female who presented on 2022 with worsening SOB       Assessment/Plan:  Acute exacerbation of COPD  Chest x-ray did not show any acute pathology, Wheezing resolved  Completed  Levaquin  Continue breathing trx  Now she is NPO, switched prednisone to Solu, cont tapering down   PT/OT re eval done and recs SNF, pt lives alone at day time and she feels too week and she can not walk   Baseline of 2 L, worsening hypoxia, now on 5 L/M, check CXR to r/o aspiration      Dysphagia, subacute,   started a week PTA, w solid/liquids  Esophagogram w no peristalsis  GI consulted,   NPO , plan egd today   IVF    Hyperkalemia:  Mentions this is a chronic issue  resolved     Hypertension  Peripheral vascular disease  MV mod regurg/mild stenosis  TR severe regurge  F/u echo in 6 months  Cardio f/u as outpt  Resume home meds    Left lower extremity wound  Cont wound care/wound care consult, recs : \"Every other day on Even days - Cleanse the wound with Caraklenz spray and wipe with 4x4's. Apply the Silvasorb wound hydrogel,  Optifoam Ag (silver) and then an ABD. Wrapped with a small roll therese and tape on the therese only. Date the dressing\"    Elevated lactic acid s/p IVF hydration. Resolved  D/c NSIVF     Code Status: DNR as per her wishes  Surrogate Decision Maker:  DVT Prophylaxis: Lovenox  GI Prophylaxis: not indicated   Dispo: SNF  CATIA:        Interval History/Subjective: Fu copd /dysphagia  Waiting for egd   Review of Systems:  A comprehensive review of systems was negative except for that written in the HPI. Objective:     VITALS:   Last 24hrs VS reviewed since prior progress note.  Most recent are:  Visit Vitals  BP (!) 167/91   Pulse 77   Temp 97.5 °F (36.4 °C)   Resp 23   Ht 5' 8\" (1.727 m)   Wt 56.7 kg (125 lb)   SpO2 92%   BMI 19.01 kg/m²       Intake/Output Summary (Last 24 hours) at 5/11/2022 1545  Last data filed at 5/11/2022 1215  Gross per 24 hour   Intake 300 ml   Output    Net 300 ml        PHYSICAL EXAM:  General: No acute distress, cooperative, pleasant. Speaking in full sentences  EENT: EOMI. Anicteric sclerae. Oral mucous moist, oropharynx benign and no oral thrush  Resp: CTA bilaterally. No wheezing/rhonchi/rales. No accessory muscle use  CV: Regular rhythm, normal rate, no murmurs, gallops, rubs  GI: Soft, non distended, non tender. normoactive bowel sounds, no hepatosplenomegaly Extremities: No edema, warm, 2+ pulses throughout. dressing on RLE  Neurologic: Moves all extremities. AAOx3, CN II-XII grossly intact  Psych: Good insight. Not anxious nor agitated. Skin: Good Turgor, no rashes. Left lower extremity in dressing    Lab Data Personally Reviewed: (see below)     Medications list Personally Reviewed:  x YES  NO     _______________________________________________________________________  Care Plan discussed with:  Patient/Family and Nurse    Total NON critical care TIME:  35 minutes    Cristino Ahumada, MD     Procedures: see electronic medical records for all procedures/Xrays and details which were not copied into this note but were reviewed prior to creation of Plan. LABS:  Recent Labs     05/11/22  0401   WBC 7.9   HGB 12.2   HCT 41.7        Recent Labs     05/11/22  0657      K 4.7      CO2 37*   BUN 36*   CREA 0.83   *   CA 8.4*     Recent Labs     05/11/22  0657   ALT 18   AP 45   TBILI 0.5   TP 5.7*   ALB 2.8*   GLOB 2.9     Recent Labs     05/11/22  0657   APTT 25.7      No results for input(s): FE, TIBC, PSAT, FERR in the last 72 hours. No results found for: FOL, RBCF   No results for input(s): PH, PCO2, PO2 in the last 72 hours. No results for input(s): CPK, CKNDX, TROIQ in the last 72 hours.     No lab exists for component: CPKMB  Lab Results Component Value Date/Time    Cholesterol, total 167 11/11/2021 03:23 PM    HDL Cholesterol 81 11/11/2021 03:23 PM    LDL, calculated 59.6 11/11/2021 03:23 PM    Triglyceride 132 11/11/2021 03:23 PM    CHOL/HDL Ratio 2.1 11/11/2021 03:23 PM     Lab Results   Component Value Date/Time    Glucose (POC) 103 01/03/2011 07:03 AM     Lab Results   Component Value Date/Time    Color DARK YELLOW 06/04/2017 02:52 AM    Appearance OPAQUE (A) 06/04/2017 02:52 AM    Specific gravity 1.026 06/04/2017 02:52 AM    Specific gravity >1.030 (H) 03/11/2011 03:47 PM    pH (UA) 5.0 06/04/2017 02:52 AM    Protein 100 (A) 06/04/2017 02:52 AM    Glucose NEGATIVE  06/04/2017 02:52 AM    Ketone 15 (A) 06/04/2017 02:52 AM    Bilirubin NEGATIVE  03/11/2011 03:47 PM    Urobilinogen 1.0 06/04/2017 02:52 AM    Nitrites NEGATIVE  06/04/2017 02:52 AM    Leukocyte Esterase MODERATE (A) 06/04/2017 02:52 AM    Epithelial cells MANY (A) 06/04/2017 02:52 AM    Bacteria 2+ (A) 06/04/2017 02:52 AM    WBC 20-50 06/04/2017 02:52 AM    RBC 5-10 06/04/2017 02:52 AM

## 2022-05-12 NOTE — PROGRESS NOTES
Comprehensive Nutrition Assessment    Type and Reason for Visit: Reassess    Nutrition Recommendations/Plan:   1. Continue current diet as tolerated  2. Increase nepro to TID     Nutrition Assessment:    Chart reviewed; medically noted for COPD, chronic respiratory failure, and esophageal dysmotility s/p dilation. Downgraded to full liquids today by MD. Patient complaining of dysphagia and having something stuck in her throat. Agreeable to increasing Nepro to help meet kcal/protein needs better. Encouraged intake of meals as tolerated. GI planning on outpatient esophageal manometry. Nutrition Related Findings:    K+ 4.7, -142  Prednisone, D5% IVF     Current Nutrition Intake & Therapies:        ADULT ORAL NUTRITION SUPPLEMENT Lunch; Renal Supplement  ADULT DIET Full Liquid    Anthropometric Measures:  Height: 5' 8\" (172.7 cm)  Ideal Body Weight (IBW): 140 lbs (64 kg)     Current Body Wt:  56.7 kg (125 lb), 89.3 % IBW. Stated  Current BMI (kg/m2): 19                          BMI Category: Normal weight (BMI 18.5-24. 9)    Estimated Daily Nutrient Needs:  Energy Requirements Based On: Formula  Weight Used for Energy Requirements: Current  Energy (kcal/day): 1600 kcal (BMR 1039 x 1.3AF +250kcal)  Weight Used for Protein Requirements: Current  Protein (g/day): 68g (1.2 g/kg bw)  Method Used for Fluid Requirements: 1 ml/kcal  Fluid (ml/day): 1600 mL    Nutrition Diagnosis:   · Inadequate protein-energy intake related to swallowing difficulty as evidenced by intake 26-50% (full liquids)    Nutrition Interventions:   Food and/or Nutrient Delivery: Continue current diet,Modify oral nutrition supplement  Nutrition Education/Counseling: No recommendations at this time  Coordination of Nutrition Care: Continue to monitor while inpatient       Goals:  Previous Goal Met: Progressing toward goal(s)  Goals:  (PO intake >50% of meals/ONS next 3-5 days)       Nutrition Monitoring and Evaluation: Behavioral-Environmental Outcomes: None identified  Food/Nutrient Intake Outcomes: Food and nutrient intake,Supplement intake  Physical Signs/Symptoms Outcomes: Biochemical data,Chewing or swallowing,Weight    Discharge Planning:    Continue oral nutrition supplement    Faby France RD  Contact: ext [phone]

## 2022-05-12 NOTE — PROGRESS NOTES
Pulmonary, Critical Care, and Sleep Medicine    Name: Devyn Meneses MRN: [id number]   : 11/10/1931 Hospital: [facility]   Date: 2022        IMPRESSION:   · Acute/chronic hypoxic respiratory failure - on 2 LPM home O2 at baseline  · COPD - FEV1 0.94 L (55% predicted) - with acute exacerbation - on Anoro, TIW azithromycin at home  · Esophageal dysmotility with likely stricture in need of dilation  · Suspect there is some contribution of her esophageal dysmotility to exacerbating her COPD symptoms, may have episodes of aspiration as well   · Peripheral vascular disease  · H/O tobacco use      RECOMMENDATIONS:   · O2 - wean as tolerated, currently down to 3 LPM  · Pulmonary toilet  · Bronchodilators   · Systemic steroids  · GI evaluation ongoing  · Restart Anoro and MWF Azithromycin at the time of discharge (note she is on Anoro, not Trelegy as listed in her PTA meds)     Subjective:     22: tolerated her EGD without issue. She has nasal congestion and drip which is making her cough. 22: no events. Feels a bit better, down to 3 LPM.  EGD planned for today. Initial history: This patient has been seen and evaluated at the request of Dr. Saran Diaz for preop evaluation. Patient is a 80 y.o. female former smoker with COPD. She is well known to me. She was admitted 4 days ago with an exacerbation characterized by the sudden onset of dyspnea and wheezing beyond her baseline. She is on 2 LPM at baseline and required 4 here. She improved, but she was struggling with dysphagia. 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 After her swallow evaluation she has had increasing dyspnea again. Cough is currently nonproductive.         Past Medical History:   Diagnosis Date    Acute renal failure (ARF) ([address] 2017    Arthritis     generalized    Bronchitis, acute 2017    COPD (chronic obstructive pulmonary disease) [facility]     Essential hypertension 9/4/2018    former smoker      >60pkyr quit 1/3/11    History of DVT (deep vein thrombosis) 1955    left leg    History of sepsis 06/04/2017    ischemic left lower extremitiy pain     above knee FemPop 1/3/11    PVD (peripheral vascular disease) [facility] [address]     Seasonal allergic rhinitis     Single kidney     Sinus bradycardia 7/28/2018      Past Surgical History:   Procedure Laterality Date    HX GI  10 yrs ago    colonoscopy    HX GYN      3 miscarriges    HX GYN      1 vaginal birth    HX ORTHOPAEDIC      veinography left leg, stent left leg    [place] BREAST SURGERY PROCEDURE UNLISTED  1955    excision left breast cyst    UPPER GI ENDOSCOPY,BALL DIL,30MM  5/11/2022         UPPER GI ENDOSCOPY,BIOPSY  5/11/2022           Prior to Admission medications    Medication Sig Start Date End Date Taking? Authorizing Provider   calcium carb/magnesium hydrox (MYLANTA PO) Take 15-30 mL by mouth two (2) times daily as needed for PRN Reason (Other) (constipation). Yes Provider, Historical   acetaminophen (TYLENOL) 500 mg tablet Take 1,000 mg by mouth every six (6) hours as needed for Pain. Yes Provider, Historical   azelastine (ASTEPRO) 205.5 mcg (0.15 %) 2 Sprays by Both Nostrils route daily. Provider, Historical   fluticasone propionate (FLONASE) 50 mcg/actuation nasal spray 2 Sprays by Both Nostrils route daily. Provider, Historical   busPIRone (BUSPAR) 5 mg tablet Take 1 Tab by mouth every evening. Patient not taking: Reported on 4/20/2022 12/23/21   Maria D Daugherty MD   calcium carbonate (TUMS PO) Take 1 Tablet by mouth three (3) times daily as needed for Other (indigestion). Patient not taking: Reported on 4/20/2022    Provider, Historical   fluticasone-umeclidinium-vilanterol (Trelegy Ellipta) 100-62.5-25 mcg inhaler Take 1 Puff by inhalation daily. Provider, Historical   simethicone (MYLANTA GAS PO) Take  by mouth daily as needed.   Patient not taking: Reported on 2022    Provider, Historical   OXYGEN-AIR DELIVERY SYSTEMS 2 L by Nasal route continuous. Provider, Historical   acetaminophen (TYLENOL) 325 mg tablet Take 2 Tabs by mouth every four (4) hours as needed for Pain or Fever. 19   Candace Sheridan MD   ipratropium-albuterol (COMBIVENT RESPIMAT)  mcg/actuation inhaler Take 1 Puff by inhalation four (4) times daily. Provider, Historical   loratadine (CLARITIN) 10 mg tablet Take 10 mg by mouth daily. Provider, Historical   azithromycin (ZITHROMAX) 250 mg tablet Take 250 mg by mouth every Monday, Wednesday, Friday. Indications: prevent lung infections    Provider, Historical   aspirin delayed-release 81 mg tablet Take 162 mg by mouth daily.     Provider, Historical     Allergies   Allergen Reactions    Food Extracts Diarrhea     Onions and garlic causes abdominal pain,cramping     Amlodipine Swelling    Sulfa (Sulfonamide Antibiotics) Other (comments)     Altered Mental Status      Social History     Tobacco Use    Smoking status: Former Smoker     Packs/day: 1.00     Years: 60.00     Pack years: 60.00     Quit date: 1/3/2011     Years since quittin.3    Smokeless tobacco: Never Used   Substance Use Topics    Alcohol use: Yes     Comment: occasional liquor after dinner      Family History   Problem Relation Age of Onset    Colon Cancer Mother     Lung Cancer Sister         Current Facility-Administered Medications   Medication Dose Route Frequency    oxymetazoline (AFRIN) 0.05 % nasal spray 2 Spray  2 Spray Both Nostrils BID    sodium chloride (NS) flush 5-40 mL  5-40 mL IntraVENous Q8H    dextrose 5% and 0.9% NaCl infusion  75 mL/hr IntraVENous CONTINUOUS    albuterol-ipratropium (DUO-NEB) 2.5 MG-0.5 MG/3 ML  3 mL Nebulization QID RT    methylPREDNISolone (PF) (SOLU-MEDROL) injection 40 mg  40 mg IntraVENous Q8H    [Held by provider] predniSONE (DELTASONE) tablet 40 mg  40 mg Oral BID WITH MEALS    [Held by provider] enoxaparin (LOVENOX) injection 30 mg  30 mg SubCUTAneous DAILY    aspirin delayed-release tablet 162 mg  162 mg Oral DAILY    fluticasone propionate (FLONASE) 50 mcg/actuation nasal spray 2 Spray  2 Spray Both Nostrils DAILY    sodium chloride (NS) flush 5-40 mL  5-40 mL IntraVENous Q8H    arformoteroL (BROVANA) neb solution 15 mcg  15 mcg Nebulization BID RT    And    budesonide (PULMICORT) 250 mcg/2ml nebulizer susp  250 mcg Nebulization BID RT       Review of Systems:  A comprehensive review of systems was negative except for that written in the HPI. Objective:   Vital Signs:    Visit Vitals  BP (!) 170/87 (BP 1 Location: Right upper arm, BP Patient Position: Sitting)   Pulse 78   Temp 98.7 °F (37.1 °C)   Resp 22   Ht 5' 8\" (1.727 m)   Wt 56.7 kg (125 lb)   SpO2 94%   BMI 19.01 kg/m²       O2 Device: Nasal cannula   O2 Flow Rate (L/min): 3 l/min   Temp (24hrs), Av.1 °F (36.7 °C), Min:97.5 °F (36.4 °C), Max:98.7 °F (37.1 °C)       Intake/Output:   Last shift:      No intake/output data recorded. Last 3 shifts: 05/10 1901 -  0700  In: 300 [I.V.:300]  Out: -     Intake/Output Summary (Last 24 hours) at 2022 0025  Last data filed at 2022 1215  Gross per 24 hour   Intake 300 ml   Output    Net 300 ml      Physical Exam:   General:  Alert, cooperative, no distress, frail   Head:  Normocephalic, without obvious abnormality, atraumatic. Eyes:  Conjunctivae/corneas clear. Nose: Nares normal. Septum midline. Mucosa normal.     Throat: Lips, mucosa, and tongue normal. Teeth and gums normal.   Neck: Supple, symmetrical, trachea midline, no adenopathy    Lungs:   Poor bilateral air movement, wheezing is improved   Chest wall:  No tenderness or deformity. Heart:  Regular rate and rhythm    Abdomen:   Soft, non-tender.  Bowel sounds normal.     Extremities: Extremities normal, atraumatic, no cyanosis    Skin: Skin color, texture, turgor normal. No rashes or lesions   Neurologic: Grossly nonfocal     Data:   Labs:  Recent Labs     05/11/22  0401   WBC 7.9   HGB 12.2   HCT 41.7        Recent Labs     05/11/22  0657      K 4.7      CO2 37*   *   BUN 36*   CREA 0.83   CA 8.4*   ALB 2.8*   TBILI 0.5   ALT 18     No results for input(s): PHI, PCO2I, PO2I, HCO3I, FIO2I in the last 72 hours.     Imaging:  I have personally reviewed the patients radiographs:  None today        Suzanna Haro MD

## 2022-05-12 NOTE — PROGRESS NOTES
Hospitalist Progress Note                            NAME:  Johnathan Shah  :  11/10/1931  MRN:  [id number]  Date of Service:  2022    Summary: 80 y.o. female who presented on 2022 with worsening SOB       Assessment/Plan:  Acute exacerbation of COPD  Chest x-ray did not show any acute pathology, Wheezing resolved  Completed  Levaquin  Continue breathing trx  Resume prednisone  PT/OT re eval done and recs SNF, pt lives alone at day time and she feels too week and she can not walk   Patient is currently on 3 L of oxygen     Dysphagia, subacute,   S/p esophageal dilation on   started a week PTA, w solid/liquids  Esophagogram w no peristalsis  GI consulted,   S/p EGD on   Impression:      1. Dilated esophagus with tortuosity. Dilated to 20 mm  2. Erythema at the z-line, biopsied  3. Normal stomach  4 Normal duodenum     Recommendations:  , -Await pathology. , -Will try dysphagia diet  If does not improve then will proceed with esophageal manometry to look for achalasia  Complaining of not being able to swallow dysphagia diet, downgraded to full liquid  Complain of ringing secretion in her throat that she cannot get out. Hyperkalemia:  Mentions this is a chronic issue  resolved     Hypertension  Peripheral vascular disease  MV mod regurg/mild stenosis  TR severe regurge  F/u echo in 6 months  Cardio f/u as outpt  Resume home meds    Left lower extremity wound  Cont wound care/wound care consult, recs : \"Every other day on Even days - Cleanse the wound with Caraklenz spray and wipe with 4x4's. Apply the Silvasorb wound hydrogel,  Optifoam Ag (silver) and then an ABD. Wrapped with a small roll therese and tape on the therese only. Date the dressing\"    Elevated lactic acid s/p IVF hydration.  Resolved  D/c NSIVF     Code Status: DNR as per her wishes  Surrogate Decision Maker:  DVT Prophylaxis: Lovenox  GI Prophylaxis: not indicated   Dispo: SNF  CATIA:  Interval History/Subjective: Fu copd /dysphagia  Complaining of a nonproductive wet cough  And unable to eat this morning      Objective:     VITALS:   Last 24hrs VS reviewed since prior progress note. Most recent are:  Visit Vitals  /76   Pulse 88   Temp 98 °F (36.7 °C)   Resp 22   Ht 5' 8\" (1.727 m)   Wt 56.7 kg (125 lb)   SpO2 94%   BMI 19.01 kg/m²       Intake/Output Summary (Last 24 hours) at 5/12/2022 3848  Last data filed at 5/11/2022 1215  Gross per 24 hour   Intake 300 ml   Output    Net 300 ml        PHYSICAL EXAM:  General: No acute distress, cooperative, pleasant. Speaking in full sentences  EENT: EOMI. Anicteric sclerae. Oral mucous moist, oropharynx benign and no oral thrush  Resp: CTA bilaterally. No wheezing/rhonchi/rales. No accessory muscle use  CV: Regular rhythm, normal rate, no murmurs, gallops, rubs  GI: Soft, non distended, non tender. normoactive bowel sounds, no hepatosplenomegaly Extremities: No edema, warm, 2+ pulses throughout. dressing on RLE  Neurologic: Moves all extremities. AAOx3, CN II-XII grossly intact  Psych: Good insight. Not anxious nor agitated. Skin: Good Turgor, no rashes. Left lower extremity in dressing    Lab Data Personally Reviewed: (see below)     Medications list Personally Reviewed:  x YES  NO     _______________________________________________________________________  Care Plan discussed with:     Total NON critical care TIME:  35 minutes    Mirta Pratt MD     Procedures: see electronic medical records for all procedures/Xrays and details which were not copied into this note but were reviewed prior to creation of Plan.       LABS:  Recent Labs     05/11/22  0401   WBC 7.9   HGB 12.2   HCT 41.7        Recent Labs     05/11/22  0657      K 4.7      CO2 37*   BUN 36*   CREA 0.83   *   CA 8.4*     Recent Labs     05/11/22  0657   ALT 18   AP 45   TBILI 0.5   TP 5.7*   ALB 2.8*   GLOB 2.9     Recent Labs     05/11/22  0657 APTT 25.7      No results for input(s): FE, TIBC, PSAT, FERR in the last 72 hours. No results found for: FOL, RBCF   No results for input(s): PH, PCO2, PO2 in the last 72 hours. No results for input(s): CPK, CKNDX, TROIQ in the last 72 hours.     No lab exists for component: CPKMB  Lab Results   Component Value Date/Time    Cholesterol, total 167 11/11/2021 03:23 PM    HDL Cholesterol 81 11/11/2021 03:23 PM    LDL, calculated 59.6 11/11/2021 03:23 PM    Triglyceride 132 11/11/2021 03:23 PM    CHOL/HDL Ratio 2.1 11/11/2021 03:23 PM     Lab Results   Component Value Date/Time    Glucose (POC) 103 01/03/2011 07:03 AM     Lab Results   Component Value Date/Time    Color DARK YELLOW 06/04/2017 02:52 AM    Appearance OPAQUE (A) 06/04/2017 02:52 AM    Specific gravity 1.026 06/04/2017 02:52 AM    Specific gravity >1.030 (H) 03/11/2011 03:47 PM    pH (UA) 5.0 06/04/2017 02:52 AM    Protein 100 (A) 06/04/2017 02:52 AM    Glucose NEGATIVE  06/04/2017 02:52 AM    Ketone 15 (A) 06/04/2017 02:52 AM    Bilirubin NEGATIVE  03/11/2011 03:47 PM    Urobilinogen 1.0 06/04/2017 02:52 AM    Nitrites NEGATIVE  06/04/2017 02:52 AM    Leukocyte Esterase MODERATE (A) 06/04/2017 02:52 AM    Epithelial cells MANY (A) 06/04/2017 02:52 AM    Bacteria 2+ (A) 06/04/2017 02:52 AM    WBC 20-50 06/04/2017 02:52 AM    RBC 5-10 06/04/2017 02:52 AM

## 2022-05-12 NOTE — PROGRESS NOTES
Problem: Mobility Impaired (Adult and Pediatric)  Goal: *Acute Goals and Plan of Care (Insert Text)  Description: FUNCTIONAL STATUS PRIOR TO ADMISSION: Patient was modified independent using a rolling walker for functional mobility. Wears 2L of oxygen at baseline    [address]: Patient lived alone with a son that lives locally. Pt reported she has a caregiver that stays with her at night 11pm-9am and she prepares breakfast for her    Physical Therapy Goals  Initiated 5/7/2022  1. Patient will move from supine to sit and sit to supine  in bed with modified independence within 7 day(s). 2.  Patient will transfer from bed to chair and chair to bed with modified independence using the least restrictive device within 7 day(s). 3.  Patient will perform sit to stand with modified independence within 7 day(s). 4.  Patient will ambulate with modified independence for 50 feet with the least restrictive device within 7 day(s). 5.  Patient will ascend/descend 3 stairs with single handrail(s) with supervision/set-up within 7 day(s). Outcome: Progressing Towards Goal   PHYSICAL THERAPY TREATMENT  Patient: Sondra Landeros (80 y.o. female)  Date: 5/12/2022  Diagnosis: COPD exacerbation (HCC) [J44.1] <principal problem not specified>  Procedure(s) (LRB):  ESOPHAGOGASTRODUODENOSCOPY (EGD) (N/A)  ESOPHAGEAL DILATION (N/A)  ESOPHAGOGASTRODUODENAL (EGD) BIOPSY (N/A) 1 Day Post-Op  Precautions: Fall  Chart, physical therapy assessment, plan of care and goals were reviewed. ASSESSMENT  Patient continues with skilled PT services and is progressing towards goals. She is motivated for progress and making gains but limited by high supplemental O2 demand, decreased safety awareness with onset of GODINEZ, and impaired activity tolerance. Gait training completed 2x8' with a seated rest break on the foot of the bed after 8' d/t severe GODINEZ.  Received ion 3.5 L O2 via NC and O2 sats dropped to 84% +  BPM. She required several minutes for recovery with cues for PLB. Within 8' her valentin accelerated, her posture declined, and she attempted to sit with poor eccentric control d/t GODINEZ. Discussed concerns for return to her bedside recliner and noted improved performance on 2nd 8' trial. Able to statically stand for an additional 60 seconds to a RW after allowing for a 2nd seated recovery. Encouraged seated LE exercises while up and OOB for leg strengthening while allowing for recovery if she becomes dyspneic. The patient typically lives alone but has support from her son and a caregiver that stays with her overnight. Agree with prior recommendation for SNF level rehab when medically stable. Current Level of Function Impacting Discharge (mobility/balance): CGA for mobility, low activity tolerance d/t GODINEZ    Other factors to consider for discharge: lives alone         PLAN :  Patient continues to benefit from skilled intervention to address the above impairments. Continue treatment per established plan of care. to address goals.     Recommendation for discharge: (in order for the patient to meet his/her long term goals)  Therapy up to 5 days/week in SNF setting    This discharge recommendation:  Has been made in collaboration with the attending provider and/or case management    IF patient discharges home will need the following DME: to be determined (TBD)       SUBJECTIVE:   Patient stated I need to rest.    OBJECTIVE DATA SUMMARY:   Critical Behavior:  Neurologic State: Alert,Appropriate for age  Orientation Level: Oriented X4  Cognition: Appropriate decision making,Appropriate for age attention/concentration,Appropriate safety awareness,Follows commands     Functional Mobility Training:  Bed Mobility:                    Transfers:  Sit to Stand: Contact guard assistance  Stand to Sit: Contact guard assistance                             Balance:  Sitting: Intact  Standing: Impaired  Standing - Static: Fair;Constant support  Standing - Dynamic : Fair;Constant support  Ambulation/Gait Training:  Distance (ft): 8 Feet (ft) (x2)  Assistive Device: Gait belt;Walker, rolling  Ambulation - Level of Assistance: Contact guard assistance        Gait Abnormalities: Decreased step clearance;Shuffling gait              Speed/Paige: Accelerated             Therapeutic Exercises:   Seated LAQs x15 each  Pain Rating:      Activity Tolerance:   Fair and desaturates with exertion and requires oxygen  Continued to desaturate on 4L O2 with exertion  After treatment patient left in no apparent distress:   Sitting in chair, Heels elevated for pressure relief, and Call bell within reach    COMMUNICATION/COLLABORATION:   The patients plan of care was discussed with: Registered nurse.      Aries Presley, PT, DPT   Time Calculation: 28 mins

## 2022-05-12 NOTE — PROGRESS NOTES
GI PROGRESS NOTE  Tia Irizarry NP & Misty Freeman NP  [phone] NP in-hospital cell phone M-F until 4:30  After 5pm or on weekends, please call  for physician on call    NAME:Scarlett Lo :11/10/1931 ZPL:[id number]   ATTG: Dr. Rut Ybarra  PCP: Priyanka Ling MD  Date/Time:  2022 2:49 PM     Primary GI: Dr. Casillas Single    Reason for following: Dysphagia    Assessment:   -Dysphagia, both with liquids and solids  · Modified Barium Swallow 22 showed no significant peristalsis of the esophagus demonstrated with significant stasis and dilation  · 2022 EGD showing esophageal lumen dilated. Tortuous area at GE junction, s/p dilation, normal stomach and duodenum  · Trial dysphagia diet, patient still having significant dysphagia, next step esophageal manometry     Plan:   -Next step is esophageal manometry, to be completed as outpatient  -Continue with dysphagia diet  -Supportive measure per primary team  -Continue acid suppression  -If meets discharge criteria GI has not hold on patient  -Will sign off for now as GI has nothing further to add to plan. Please call GI with any further questions or concerns. Thank you! *Plan discussed with Dr. Casillas Single  Subjective:   Patient resting comfortably in chair by bedside during visit. Reports has been trying the dysphagia diet. Still having significant symptoms of dysphagia, feels like foods and liquids are getting stuck mid to upper sternum. Having increased SOB, unsure if that's her COPD or the dysphagia. Complaint Y/N Description   Abdominal Pain N    Hematemesis     Hematochezia     Melena     Constipation     Diarrhea     Dyspepsia     Dysphagia Y    Jaundiced     Nausea/vomiting N      Review of Systems:  Symptom Y/N Comments  Symptom Y/N Comments   Fever/Chills    Chest Pain     Cough    Headaches     Sputum    Joint Pain     SOB/GODINEZ    Pruritis/Rash     Tolerating Diet ?  Dysphagia continued  Other       Could NOT obtain due to: Objective:   VITALS:   Last 24hrs VS reviewed since prior progress note. Most recent are:  Visit Vitals  BP (!) 170/87 (BP 1 Location: Right upper arm, BP Patient Position: Sitting)   Pulse 78   Temp 98.7 °F (37.1 °C)   Resp 22   Ht 5' 8\" (1.727 m)   Wt 56.7 kg (125 lb)   SpO2 97%   BMI 19.01 kg/m²     No intake or output data in the 24 hours ending 05/12/22 1449  PHYSICAL EXAM:  General: WD, WN. Alert, cooperative, no acute distress    HEENT: NC, Atraumatic. Anicteric sclerae. Lungs:  CTA Bilaterally. No Wheezing/Rhonchi/Rales. Heart:  Regular  rhythm, No Rubs, No Gallops  Abdomen: Soft, Non distended, Non tender.  +Bowel sounds, no HSM  Extremities: No c/c/e  Neurologic:  Alert and oriented X 3. No acute neurological distress   Psych:   Good insight. Not anxious nor agitated. Lab and Radiology Data Reviewed: (see below)    Medications Reviewed: (see below)  PMH/SH reviewed - no change compared to H&P  ________________________________________________________________________  Total time spent with patient: 30 minutes ________________________________________________________________________  Care Plan discussed with:  Patient Y   Family     RN               Consultant:       Gregory Edwards NP     Procedures: see electronic medical records for all procedures/Xrays and details which were not copied into this note but were reviewed prior to creation of Plan. LABS:  Recent Labs     05/11/22  0401   WBC 7.9   HGB 12.2   HCT 41.7        Recent Labs     05/11/22  0657      K 4.7      CO2 37*   BUN 36*   CREA 0.83   *   CA 8.4*     Recent Labs     05/11/22 0657   AP 45   TP 5.7*   ALB 2.8*   GLOB 2.9     Recent Labs     05/11/22 0657   APTT 25.7      No results for input(s): FE, TIBC, PSAT, FERR in the last 72 hours. No results found for: FOL, RBCF  No results for input(s): PH, PCO2, PO2 in the last 72 hours. No results for input(s): CPK, CKMB in the last 72 hours.     No lab exists for component: TROPONINI  Lab Results   Component Value Date/Time    Color DARK YELLOW 06/04/2017 02:52 AM    Appearance OPAQUE (A) 06/04/2017 02:52 AM    Specific gravity 1.026 06/04/2017 02:52 AM    Specific gravity >1.030 (H) 03/11/2011 03:47 PM    pH (UA) 5.0 06/04/2017 02:52 AM    Protein 100 (A) 06/04/2017 02:52 AM    Glucose NEGATIVE  06/04/2017 02:52 AM    Ketone 15 (A) 06/04/2017 02:52 AM    Bilirubin NEGATIVE  03/11/2011 03:47 PM    Urobilinogen 1.0 06/04/2017 02:52 AM    Nitrites NEGATIVE  06/04/2017 02:52 AM    Leukocyte Esterase MODERATE (A) 06/04/2017 02:52 AM    Epithelial cells MANY (A) 06/04/2017 02:52 AM    Bacteria 2+ (A) 06/04/2017 02:52 AM    WBC 20-50 06/04/2017 02:52 AM    RBC 5-10 06/04/2017 02:52 AM       MEDICATIONS:  Current Facility-Administered Medications   Medication Dose Route Frequency    oxymetazoline (AFRIN) 0.05 % nasal spray 2 Spray  2 Spray Both Nostrils BID    guaiFENesin (ROBITUSSIN) 100 mg/5 mL oral liquid 100 mg  100 mg Oral Q4H PRN    guaiFENesin ER (MUCINEX) tablet 600 mg  600 mg Oral Q12H    [START ON 5/13/2022] azithromycin (ZITHROMAX) tablet 250 mg  250 mg Oral Q MON, WED & FRI    sodium chloride (NS) flush 5-40 mL  5-40 mL IntraVENous Q8H    sodium chloride (NS) flush 5-40 mL  5-40 mL IntraVENous PRN    calcium carbonate (TUMS) chewable tablet 200 mg [elemental]  200 mg Oral Q6H PRN    dextrose 5% and 0.9% NaCl infusion  75 mL/hr IntraVENous CONTINUOUS    albuterol-ipratropium (DUO-NEB) 2.5 MG-0.5 MG/3 ML  3 mL Nebulization QID RT    albuterol-ipratropium (DUO-NEB) 2.5 MG-0.5 MG/3 ML  3 mL Nebulization Q4H PRN    predniSONE (DELTASONE) tablet 40 mg  40 mg Oral BID WITH MEALS    enoxaparin (LOVENOX) injection 30 mg  30 mg SubCUTAneous DAILY    aspirin delayed-release tablet 162 mg  162 mg Oral DAILY    fluticasone propionate (FLONASE) 50 mcg/actuation nasal spray 2 Spray  2 Spray Both Nostrils DAILY    simethicone (MYLICON) tablet 40 mg  40 mg Oral QID PRN    acetaminophen (TYLENOL) tablet 650 mg  650 mg Oral Q6H PRN    Or    acetaminophen (TYLENOL) suppository 650 mg  650 mg Rectal Q6H PRN    polyethylene glycol (MIRALAX) packet 17 g  17 g Oral DAILY PRN    ondansetron (ZOFRAN ODT) tablet 4 mg  4 mg Oral Q8H PRN    Or    ondansetron (ZOFRAN) injection 4 mg  4 mg IntraVENous Q6H PRN    arformoteroL (BROVANA) neb solution 15 mcg  15 mcg Nebulization BID RT    And    budesonide (PULMICORT) 250 mcg/2ml nebulizer susp  250 mcg Nebulization BID RT    alum-mag hydroxide-simeth (MYLANTA) oral suspension 30 mL  30 mL Oral Q4H PRN

## 2022-05-12 NOTE — PROGRESS NOTES
ADULT PROTOCOL: JET AEROSOL  REASSESSMENT    Patient  Sarah Hannon     80 y.o.   female     5/11/2022  11:57 PM    Breath Sounds Pre Procedure: Right Breath Sounds: Diminished                               Left Breath Sounds: Diminished    Breath Sounds Post Procedure: Right Breath Sounds: Diminished                                 Left Breath Sounds: Diminished    Breathing pattern: Pre procedure Breathing Pattern: Regular          Post procedure Breathing Pattern: Regular    Heart Rate: Pre procedure Pulse: 74           Post procedure Pulse: 60    Resp Rate: Pre procedure Respirations: 18           Post procedure Respirations: 18    Peak Flow: Pre bronchodilator             Post bronchodilator       FVC/FEV1:  n/a    Incentive Spirometry:             Cough: Pre procedure Cough: Non-productive               Post procedure Cough: Non-productive    Suctioned: NO    Sputum: Pre procedure                   Post procedure      Oxygen: O2 Device: Nasal cannula   Flow rate (L/min) 3     Changed: NO    SpO2: Pre procedure SpO2: 93 %   with oxygen              Post procedure SpO2: 92 %  with oxygen    Nebulizer Therapy: Current medications Aerosolized Medications: Brovana,Pulmicort, Duo-neb      Changed: NO    Smoking History: n/a    Problem List:   Patient Active Problem List   Diagnosis Code    PVD (peripheral vascular disease) ([facility]) I73.9    Seasonal allergic rhinitis J30.2    LBBB (left bundle branch block) I44.7    Essential hypertension I10    Weakness R53.1    Hyponatremia E87.1    Cellulitis of left lower extremity L03. 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

## 2022-05-12 NOTE — PROGRESS NOTES
ADULT PROTOCOL: JET AEROSOL  REASSESSMENT    Patient  Damon Sexton     80 y.o.   female     5/12/2022  3:52 PM    Breath Sounds Pre Procedure: Right Breath Sounds: Diminished                               Left Breath Sounds: Diminished    Breath Sounds Post Procedure: Right Breath Sounds: Diminished                                 Left Breath Sounds: Diminished    Breathing pattern: Pre procedure Breathing Pattern: Regular          Post procedure Breathing Pattern: Regular    Heart Rate: Pre procedure Pulse: 72           Post procedure Pulse: 80    Resp Rate: Pre procedure Respirations: 18           Post procedure Respirations: 16         Cough: Pre procedure Cough: Non-productive               Post procedure Cough: Non-productive    Oxygen: O2 Device: Nasal cannula   Flow rate (L/min) 4     Changed: NO    SpO2: Pre procedure SpO2: 97 %   with oxygen              Post procedure SpO2: 97 %  with oxygen    Nebulizer Therapy: Current medications Aerosolized Medications: DuoNeb      Changed: NO        Problem List:   Patient Active Problem List   Diagnosis Code    PVD (peripheral vascular disease) ([facility]) I73.9    Seasonal allergic rhinitis J30.2    LBBB (left bundle branch block) I44.7    Essential hypertension I10    Weakness R53.1    Hyponatremia E87.1    Cellulitis of left lower extremity L03. 80    History of DVT (deep vein thrombosis) Z86.718    Single kidney Z90.5    Chronic respiratory failure with hypoxia, on home oxygen therapy ([facility]) J96.11, Z99.81    Moderate pulmonary arterial systolic hypertension (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) I87.2, L97.321    Anxiety F41.9    COPD exacerbation ([address] J44.1       Respiratory Therapist: Brock Dos Santos, RT

## 2022-05-12 NOTE — PROGRESS NOTES
Transition of Care Plan:     RUR: 15% - low   Disposition: SNF - referrals sent to [address] [facility] are pending. CM asked pt to pick 3 more choices.   Follow up appointments: To be scheduled prior to d/c   DME needed: The patient has a rollator at home. She also has home O2 with [facility]  Transportation at [address] or means to access home: Yes       Medicare Letter: To be given prior to d/c   Is patient a BCPI-A Bundle: N/A                     If yes, was Bundle Letter given?:    Is patient a [place] and connected with the [facility]? N/A               If yes, was [employer] transfer form completed and [facility] notified? Caregiver Contact: Damon Agustin Blanche, Phone: [phone]  Discharge Caregiver contacted prior to discharge? CM will contact the patient's son if the patient requests this   Care Conference needed?: No     3:50 p.m. CM spoke with pt's son, Beto Farris, who requested CM send referral to [facility]. Mr. Jordi Villatoro also requested CM send a SNF list to [email or web address].    3:43 p.m. CM checked referrals. [facility] pending. CM met with pt to obtain add't choices. Pt referred CM to speak with her son. CM checked referrals. [facility] unable to accept. [facility] are pending.   CM met with pt and provided her a list of SNFs and asked that she pick out three more choices in preparation for d/c.     Sana Pickard, MSW  Care Management, [address] [place] [address]

## 2022-05-13 NOTE — PROGRESS NOTES
Problem: Pressure Injury - Risk of  Goal: *Prevention of pressure injury  Description: Document Juan Scale and appropriate interventions in the flowsheet. Outcome: Progressing Towards Goal  Note: Pressure Injury Interventions:  Sensory Interventions: Assess changes in LOC,Check visual cues for pain,Keep linens dry and wrinkle-free,Minimize linen layers    Moisture Interventions: Apply protective barrier, creams and emollients,Limit adult briefs    Activity Interventions: PT/OT evaluation,Chair cushion    Mobility Interventions: Chair cushion,Float heels    Nutrition Interventions: Document food/fluid/supplement intake,Offer support with meals,snacks and hydration    Friction and Shear Interventions: Minimize layers,Feet elevated on foot rest                Problem: Patient Education: Go to Patient Education Activity  Goal: Patient/Family Education  Outcome: Progressing Towards Goal     Problem: Falls - Risk of  Goal: *Absence of Falls  Description: Document Tracy Fall Risk and appropriate interventions in the flowsheet.   Outcome: Progressing Towards Goal  Note: Fall Risk Interventions:  Mobility Interventions: Communicate number of staff needed for ambulation/transfer,Patient to call before getting OOB,Utilize walker, cane, or other assistive device    Mentation Interventions: Bed/chair exit alarm,Adequate sleep, hydration, pain control,Evaluate medications/consider consulting pharmacy    Medication Interventions: Patient to call before getting OOB,Teach patient to arise slowly    Elimination Interventions: Call light in reach,Bed/chair exit alarm    History of Falls Interventions: Bed/chair exit alarm         Problem: Patient Education: Go to Patient Education Activity  Goal: Patient/Family Education  Outcome: Progressing Towards Goal     Problem: Patient Education: Go to Patient Education Activity  Goal: Patient/Family Education  Outcome: Progressing Towards Goal     Problem: Aspiration - Risk of  Goal: *Absence of aspiration  Outcome: Progressing Towards Goal     Problem: Patient Education: Go to Patient Education Activity  Goal: Patient/Family Education  Outcome: Progressing Towards Goal

## 2022-05-13 NOTE — PROGRESS NOTES
Hospitalist Progress Note                            NAME:  Radha Fleming  :  11/10/1931  MRN:  [id number]  Date of Service:  2022    Summary: 80 y.o. female who presented on 2022 with worsening SOB       Assessment/Plan:  Acute exacerbation of COPD  Acute on chronic respiratory failure with hypoxia  Right pleural effusion/edema  Chest x-ray did not show any acute pathology, Wheezing resolved  Completed  Levaquin  Continue breathing trx  Patient is more hypoxic today, p.o. prednisone switch to IV 40 Medrol  PT/OT re eval done and recs SNF, pt lives alone at day time and she feels too week and she can not walk   Patient is currently on 5 L when seen  Added on IV Lasix     Dysphagia, subacute,   S/p esophageal dilation on   started a week PTA, w solid/liquids  Esophagogram w no peristalsis  GI consulted,   S/p EGD on   Impression:      1. Dilated esophagus with tortuosity. Dilated to 20 mm  2. Erythema at the z-line, biopsied  3. Normal stomach  4 Normal duodenum     Recommendations:  , -Await pathology. , -Will try dysphagia diet  If does not improve then will proceed with esophageal manometry to look for achalasia  Complaining of not being able to swallow dysphagia diet, downgraded to full liquid  Complain of ringing secretion in her throat that she cannot get out. Discussed with GI, plan to do manometry as outpatient  And is afraid to eat, per GI she will be able to swallow as her esophagus was well dilated    Hyperkalemia:  Mentions this is a chronic issue  resolved     Hypertension  Peripheral vascular disease  MV mod regurg/mild stenosis  TR severe regurge  F/u echo in 6 months  Cardio f/u as outpt  Resume home meds    Left lower extremity wound  Cont wound care/wound care consult, recs : \"Every other day on Even days - Cleanse the wound with Caraklenz spray and wipe with 4x4's.  Apply the Silvasorb wound hydrogel,  Optifoam Ag (silver) and then an ABD. Wrapped with a small roll therese and tape on the therese only. Date the dressing\"    Elevated lactic acid s/p IVF hydration. Resolved  D/c NSIVF     Code Status: DNR as per her wishes  Surrogate Decision Maker:  DVT Prophylaxis: Lovenox  GI Prophylaxis: not indicated   Dispo: SNF  CATIA: 05/13       Interval History/Subjective: Fu copd /dysphagia  Patient was more short of breath today and hypoxic    Objective:     VITALS:   Last 24hrs VS reviewed since prior progress note. Most recent are:  Visit Vitals  BP (!) 133/58 (BP 1 Location: Left upper arm, BP Patient Position: At rest)   Pulse (!) 59   Temp 98.1 °F (36.7 °C)   Resp 16   Ht 5' 8\" (1.727 m)   Wt 56.7 kg (125 lb)   SpO2 96%   BMI 19.01 kg/m²     No intake or output data in the 24 hours ending 05/13/22 0838     PHYSICAL EXAM:  General: No acute distress, cooperative, pleasant. Speaking in full sentences  EENT: EOMI. Anicteric sclerae. Oral mucous moist, oropharynx benign and no oral thrush  Resp: Bilateral wheezes s. No accessory muscle use  CV: Regular rhythm, normal rate, no murmurs, gallops, rubs  GI: Soft, non distended, non tender. normoactive bowel sounds, no hepatosplenomegaly Extremities: No edema, warm, 2+ pulses throughout. dressing on RLE  Neurologic: Moves all extremities. AAOx3, CN II-XII grossly intact  Psych: Good insight. Not anxious nor agitated. Skin: Good Turgor, no rashes. Left lower extremity in dressing    Lab Data Personally Reviewed: (see below)     Medications list Personally Reviewed:  x YES  NO     _______________________________________________________________________  Care Plan discussed with:     Total NON critical care TIME:  35 minutes    Nidhi Mcmullen MD     Procedures: see electronic medical records for all procedures/Xrays and details which were not copied into this note but were reviewed prior to creation of Plan.       LABS:  Recent Labs     05/11/22  0401   WBC 7.9   HGB 12.2   HCT 41.7        Recent Labs     05/11/22  0657      K 4.7      CO2 37*   BUN 36*   CREA 0.83   *   CA 8.4*     Recent Labs     05/11/22 0657   ALT 18   AP 45   TBILI 0.5   TP 5.7*   ALB 2.8*   GLOB 2.9     Recent Labs     05/11/22 0657   APTT 25.7      No results for input(s): FE, TIBC, PSAT, FERR in the last 72 hours. No results found for: FOL, RBCF   No results for input(s): PH, PCO2, PO2 in the last 72 hours. No results for input(s): CPK, CKNDX, TROIQ in the last 72 hours.     No lab exists for component: CPKMB  Lab Results   Component Value Date/Time    Cholesterol, total 167 11/11/2021 03:23 PM    HDL Cholesterol 81 11/11/2021 03:23 PM    LDL, calculated 59.6 11/11/2021 03:23 PM    Triglyceride 132 11/11/2021 03:23 PM    CHOL/HDL Ratio 2.1 11/11/2021 03:23 PM     Lab Results   Component Value Date/Time    Glucose (POC) 103 01/03/2011 07:03 AM     Lab Results   Component Value Date/Time    Color DARK YELLOW 06/04/2017 02:52 AM    Appearance OPAQUE (A) 06/04/2017 02:52 AM    Specific gravity 1.026 06/04/2017 02:52 AM    Specific gravity >1.030 (H) 03/11/2011 03:47 PM    pH (UA) 5.0 06/04/2017 02:52 AM    Protein 100 (A) 06/04/2017 02:52 AM    Glucose NEGATIVE  06/04/2017 02:52 AM    Ketone 15 (A) 06/04/2017 02:52 AM    Bilirubin NEGATIVE  03/11/2011 03:47 PM    Urobilinogen 1.0 06/04/2017 02:52 AM    Nitrites NEGATIVE  06/04/2017 02:52 AM    Leukocyte Esterase MODERATE (A) 06/04/2017 02:52 AM    Epithelial cells MANY (A) 06/04/2017 02:52 AM    Bacteria 2+ (A) 06/04/2017 02:52 AM    WBC 20-50 06/04/2017 02:52 AM    RBC 5-10 06/04/2017 02:52 AM

## 2022-05-13 NOTE — PROGRESS NOTES
Pulmonary, Critical Care, and Sleep Medicine    Name: Cameron George MRN: [id number]   : 11/10/1931 Hospital: [facility]   Date: 2022        IMPRESSION:   · Acute/chronic hypoxic respiratory failure - on 2 LPM home O2 at baseline  · COPD - FEV1 0.94 L (55% predicted) - with acute exacerbation - on Anoro, TIW azithromycin at home  · Esophageal dysmotility with likely stricture in need of dilation  · Suspect there is some contribution of her esophageal dysmotility to exacerbating her COPD symptoms, may have episodes of aspiration as well   · Peripheral vascular disease  · H/O tobacco use      RECOMMENDATIONS:   · O2 - wean back to her home dose of 2 LPM as tolerated  · Pulmonary toilet  · Bronchodilators   · Systemic steroids  · GI recommended outpatient manometry  · Restart Anoro and MWF Azithromycin at the time of discharge (note she is on Anoro, not Trelegy as listed in her PTA meds)     Subjective:     22: dyspnea at baseline. She says she was told she is having a procedure today, though I don't know what that is as I don't see anything scheduled. 22: tolerated her EGD without issue. She has nasal congestion and drip which is making her cough. 22: no events. Feels a bit better, down to 3 LPM.  EGD planned for today. Initial history: This patient has been seen and evaluated at the request of Dr. Cristina Carlos for preop evaluation. Patient is a 80 y.o. female former smoker with COPD. She is well known to me. She was admitted 4 days ago with an exacerbation characterized by the sudden onset of dyspnea and wheezing beyond her baseline. She is on 2 LPM at baseline and required 4 here. She improved, but she was struggling with dysphagia. 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 After her swallow evaluation she has had increasing dyspnea again. Cough is currently nonproductive.         Past Medical History: Diagnosis Date    [facility] renal failure ([facility]F) ([facility] 6/4/2017    Arthritis     generalized    Bronchitis, acute 6/4/2017    COPD (chronic obstructive pulmonary disease) ([facility]     Essential hypertension 9/4/2018    former smoker      >60pkyr quit 1/3/11    History of DVT (deep vein thrombosis) 1955    left leg    History of sepsis 06/04/2017    ischemic left lower extremitiy pain     above knee FemPop 1/3/11    PVD (peripheral vascular disease) ([facility]     Seasonal allergic rhinitis     Single kidney     Sinus bradycardia 7/28/2018      Past Surgical History:   Procedure Laterality Date    HX GI  10 yrs ago    colonoscopy    HX GYN      3 miscarriges    HX GYN      1 vaginal birth    HX ORTHOPAEDIC      veinography left leg, stent left leg    LA BREAST SURGERY PROCEDURE UNLISTED  1955    excision left breast cyst    UPPER GI ENDOSCOPY,BALL DIL,30MM  5/11/2022         UPPER GI ENDOSCOPY,BIOPSY  5/11/2022           Prior to Admission medications    Medication Sig Start Date End Date Taking? Authorizing Provider   calcium carb/magnesium hydrox (MYLANTA PO) Take 15-30 mL by mouth two (2) times daily as needed for PRN Reason (Other) (constipation). Yes Provider, Historical   acetaminophen (TYLENOL) 500 mg tablet Take 1,000 mg by mouth every six (6) hours as needed for Pain. Yes Provider, Historical   azelastine (ASTEPRO) 205.5 mcg (0.15 %) 2 Sprays by Both Nostrils route daily. Provider, Historical   fluticasone propionate (FLONASE) 50 mcg/actuation nasal spray 2 Sprays by Both Nostrils route daily. Provider, Historical   busPIRone (BUSPAR) 5 mg tablet Take 1 Tab by mouth every evening. Patient not taking: Reported on 4/20/2022 12/23/21   Marco Luong MD   calcium carbonate (TUMS PO) Take 1 Tablet by mouth three (3) times daily as needed for Other (indigestion).   Patient not taking: Reported on 4/20/2022    Provider, Historical   fluticasone-umeclidinium-vilanterol ([place]) 100-62.5-25 mcg inhaler Take 1 Puff by inhalation daily. Provider, Historical   simethicone (MYLANTA GAS PO) Take  by mouth daily as needed. Patient not taking: Reported on 2022    Provider, Historical   OXYGEN-AIR DELIVERY SYSTEMS 2 L by Nasal route continuous. Provider, Historical   acetaminophen (TYLENOL) 325 mg tablet Take 2 Tabs by mouth every four (4) hours as needed for Pain or Fever. 19   Jordi Ibrahim MD   ipratropium-albuterol (COMBIVENT RESPIMAT)  mcg/actuation inhaler Take 1 Puff by inhalation four (4) times daily. Provider, Historical   loratadine (CLARITIN) 10 mg tablet Take 10 mg by mouth daily. Provider, Historical   azithromycin (ZITHROMAX) 250 mg tablet Take 250 mg by mouth every Monday, Wednesday, Friday. Indications: prevent lung infections    Provider, Historical   aspirin delayed-release 81 mg tablet Take 162 mg by mouth daily.     Provider, Historical     Allergies   Allergen Reactions    Food Extracts Diarrhea     Onions and garlic causes abdominal pain,cramping     Amlodipine Swelling    Sulfa (Sulfonamide Antibiotics) Other (comments)     Altered Mental Status      Social History     Tobacco Use    Smoking status: Former Smoker     Packs/day: 1.00     Years: 60.00     Pack years: 60.00     Quit date: 1/3/2011     Years since quittin.3    Smokeless tobacco: Never Used   Substance Use Topics    Alcohol use: Yes     Comment: occasional liquor after dinner      Family History   Problem Relation Age of Onset    Colon Cancer Mother     Lung Cancer Sister         Current Facility-Administered Medications   Medication Dose Route Frequency    methylPREDNISolone (PF) (SOLU-MEDROL) injection 40 mg  40 mg IntraVENous Q8H    furosemide (LASIX) injection 40 mg  40 mg IntraVENous DAILY    oxymetazoline (AFRIN) 0.05 % nasal spray 2 Spray  2 Spray Both Nostrils BID    guaiFENesin ER (MUCINEX) tablet 600 mg  600 mg Oral Q12H    azithromycin (ZITHROMAX) tablet 250 mg  250 mg Oral Q MON, WED & FRI    sodium chloride (NS) flush 5-40 mL  5-40 mL IntraVENous Q8H    albuterol-ipratropium (DUO-NEB) 2.5 MG-0.5 MG/3 ML  3 mL Nebulization QID RT    enoxaparin (LOVENOX) injection 30 mg  30 mg SubCUTAneous DAILY    aspirin delayed-release tablet 162 mg  162 mg Oral DAILY    fluticasone propionate (FLONASE) 50 mcg/actuation nasal spray 2 Spray  2 Spray Both Nostrils DAILY    arformoteroL (BROVANA) neb solution 15 mcg  15 mcg Nebulization BID RT    And    budesonide (PULMICORT) 250 mcg/2ml nebulizer susp  250 mcg Nebulization BID RT       Review of Systems:  A comprehensive review of systems was negative except for that written in the HPI. Objective:   Vital Signs:    Visit Vitals  BP (!) 133/58 (BP 1 Location: Left upper arm, BP Patient Position: At rest)   Pulse (!) 59   Temp 98.1 °F (36.7 °C)   Resp 16   Ht 5' 8\" (1.727 m)   Wt 56.7 kg (125 lb)   SpO2 96%   BMI 19.01 kg/m²       O2 Device: Nasal cannula   O2 Flow Rate (L/min): 5 l/min   Temp (24hrs), Av.8 °F (36.6 °C), Min:97 °F (36.1 °C), Max:98.4 °F (36.9 °C)       Intake/Output:   Last shift:      No intake/output data recorded. Last 3 shifts: No intake/output data recorded. No intake or output data in the 24 hours ending 22 0952   Physical Exam:   General:  Alert, cooperative, no distress, frail   Head:  Normocephalic, without obvious abnormality, atraumatic. Eyes:  Conjunctivae/corneas clear. Nose: Nares normal. Septum midline. Mucosa normal.     Throat: Lips, mucosa, and tongue normal. Teeth and gums normal.   Neck: Supple, symmetrical, trachea midline, no adenopathy    Lungs:   Poor bilateral air movement, wheezing is improved   Chest wall:  No tenderness or deformity. Heart:  Regular rate and rhythm    Abdomen:   Soft, non-tender.  Bowel sounds normal.     Extremities: Extremities normal, atraumatic, no cyanosis    Skin: Skin color, texture, turgor normal. No rashes or lesions Neurologic: Grossly nonfocal     Data:   Labs:  Recent Labs     05/11/22  0401   WBC 7.9   HGB 12.2   HCT 41.7        Recent Labs     05/11/22  0657      K 4.7      CO2 37*   *   BUN 36*   CREA 0.83   CA 8.4*   ALB 2.8*   TBILI 0.5   ALT 18     No results for input(s): PHI, PCO2I, PO2I, HCO3I, FIO2I in the last 72 hours.     Imaging:  I have personally reviewed the patients radiographs:  None today        Nicole Varela MD

## 2022-05-13 NOTE — PROGRESS NOTES
Problem: Pressure Injury - Risk of  Goal: *Prevention of pressure injury  Description: Document Juan Scale and appropriate interventions in the flowsheet.   Outcome: Progressing Towards Goal  Note: Pressure Injury Interventions:  Sensory Interventions: Keep linens dry and wrinkle-free    Moisture Interventions: Absorbent underpads    Activity Interventions: PT/OT evaluation    Mobility Interventions: HOB 30 degrees or less    Nutrition Interventions: Discuss nutritional consult with provider    Friction and Shear Interventions: Apply protective barrier, creams and emollients

## 2022-05-13 NOTE — PROGRESS NOTES
Transition of Care Plan:     RUR: 15% - low   Disposition: SNF - referrals pending  Follow up appointments: To be scheduled prior to d/c   DME needed: The patient has a rollator at home. She also has home O2 with [facility]  Transportation at [address] or means to access home: Yes      IM Medicare Letter: To be given prior to d/c   Is patient a BCPI-A Bundle: N/A                     If yes, was Bundle Letter given?:    Is patient a [place] and connected with the [facility]? N/A               If yes, was [place] transfer form completed and [facility] notified? Caregiver Contact: Damon Lambert, Phone: [phone]  Discharge Caregiver contacted prior to discharge? CM will contact the patient's son if the patient requests this   Care Conference needed?: No    CM received a call from pt's son, Mr. Randene Soulier, to check on status of referrals. Referrals pending so CM asked for 3 more choices. CM sent referrals to [facility], [facility] CM will follow.     Lindsay Gandhi, MSW  Care Management, [address] [place] [address]

## 2022-05-13 NOTE — PROGRESS NOTES
ADULT PROTOCOL: JET AEROSOL  REASSESSMENT    Patient  Josselyn Salinas     80 y.o.   female     5/13/2022  5:47 PM    Breath Sounds Pre Procedure: Right Breath Sounds: Coarse                               Left Breath Sounds: Coarse    Breath Sounds Post Procedure: Right Breath Sounds: Coarse                                 Left Breath Sounds: Coarse    Breathing pattern: Pre procedure Breathing Pattern: Regular          Post procedure Breathing Pattern: Regular    Heart Rate: Pre procedure Pulse: 60           Post procedure Pulse: 60    Resp Rate: Pre procedure Respirations: 18           Post procedure Respirations: 18    Cough: Pre procedure Cough: Non-productive               Post procedure Cough: Non-productive    Oxygen: O2 Device: Nasal cannula        Changed: no    SpO2: Pre procedure SpO2: 97 %                 Post procedure SpO2: 97 %     Nebulizer Therapy: Current medications Aerosolized Medications: DuoNeb      Changed: no    Problem List:   Patient Active Problem List   Diagnosis Code    PVD (peripheral vascular disease) ([facility]) I73.9    Seasonal allergic rhinitis J30.2    LBBB (left bundle branch block) I44.7    Essential hypertension I10    Weakness R53.1    Hyponatremia E87.1    Cellulitis of left lower extremity L03. 80    History of DVT (deep vein thrombosis) Z86.718    Single kidney Z90.5    Chronic respiratory failure with hypoxia, on home oxygen therapy ([facility]) J96.11, Z99.81    Moderate pulmonary arterial systolic hypertension ([facility]) I27.21    Venous stasis ulcer of left ankle limited to breakdown of skin without varicose veins ([facility]) I87.2, L97.321    Anxiety F41.9    COPD exacerbation ([facility] J44.1       Respiratory Therapist: Jose Broussard

## 2022-05-13 NOTE — PROGRESS NOTES
Pt has been kept NPO as midnight as per order. No change in condition. Routine nurrsing care provided as needed. Report endorsed to oncoming RN.

## 2022-05-13 NOTE — PROGRESS NOTES
Problem: Mobility Impaired (Adult and Pediatric)  Goal: *Acute Goals and Plan of Care (Insert Text)  Description: FUNCTIONAL STATUS PRIOR TO ADMISSION: Patient was modified independent using a rolling walker for functional mobility. Wears 2L of oxygen at baseline    [address]: Patient lived alone with a son that lives locally. Pt reported she has a caregiver that stays with her at night 11pm-9am and she prepares breakfast for her    Physical Therapy Goals  Initiated 5/7/2022  1. Patient will move from supine to sit and sit to supine  in bed with modified independence within 7 day(s). 2.  Patient will transfer from bed to chair and chair to bed with modified independence using the least restrictive device within 7 day(s). 3.  Patient will perform sit to stand with modified independence within 7 day(s). 4.  Patient will ambulate with modified independence for 50 feet with the least restrictive device within 7 day(s). 5.  Patient will ascend/descend 3 stairs with single handrail(s) with supervision/set-up within 7 day(s). Outcome: Not Progressing Towards Goal   PHYSICAL THERAPY TREATMENT  Patient: Siegfried Severs (80 y.o. female)  Date: 5/13/2022  Diagnosis: COPD exacerbation ([facility]) [J44.1] <principal problem not specified>  Procedure(s) (LRB):  ESOPHAGOGASTRODUODENOSCOPY (EGD) (N/A)  ESOPHAGEAL DILATION (N/A)  ESOPHAGOGASTRODUODENAL (EGD) BIOPSY (N/A) 2 Days Post-Op  Precautions: Fall  Chart, physical therapy assessment, plan of care and goals were reviewed. ASSESSMENT  Patient continues with skilled PT services and is not progressing towards goals. Patient received up in chair, reports she is very tired but agreeable to work on exercises and transfer to stand, does not want to ambulate. Patient on 4 1/2 liters 02, sats in ow 90s at rest, note use of accessory muscles and increased resp rate close to 30.   Required rest breaks when performing LE exercises, sats dropped to 88% but quickly recovered using nasal breathing. HR increased to 110 with sit to stand transfer to RW and patient required min assist to steady. Good tolerance of gentle trunk stretching. Left sitting up in chair with call bell in reach, still with significant GODINEZ and general weakness, will require rehab in SNF. Current Level of Function Impacting Discharge (mobility/balance): min assist x 2 to stand    Other factors to consider for discharge: lives alone, 02 needs, high falls risk         PLAN :  Patient continues to benefit from skilled intervention to address the above impairments. Continue treatment per established plan of care. to address goals. Recommendation for discharge: (in order for the patient to meet his/her long term goals)  Therapy up to 5 days/week in SNF setting    This discharge recommendation:  Has been made in collaboration with the attending provider and/or case management    IF patient discharges home will need the following DME: patient owns DME required for discharge       SUBJECTIVE:   Patient stated I'm too tired to walk.     OBJECTIVE DATA SUMMARY:   Critical Behavior:  Neurologic State: Alert  Orientation Level: Oriented X4  Cognition: Appropriate decision making     Functional Mobility Training:  Bed Mobility:                    Transfers:  Sit to Stand: Minimum assistance  Stand to Sit: Contact guard assistance                             Balance:  Sitting: Intact  Standing - Static: Constant support; Fair  Ambulation/Gait Training:                                                        Stairs: Therapeutic Exercises:    Ankle pumps, LAQ, marching,hip abd add, trunk rotations and lateral trunk stretch  Pain Rating:      Activity Tolerance:   Fair, desaturates with exertion and requires oxygen, requires rest breaks, and observed SOB with activity    After treatment patient left in no apparent distress:   Sitting in chair and Call bell within reach    COMMUNICATION/COLLABORATION:   The patients plan of care was discussed with: Registered nurse.      Gaurav Cassidy, PT   Time Calculation: 26 mins

## 2022-05-14 NOTE — PROGRESS NOTES
Problem: Pressure Injury - Risk of  Goal: *Prevention of pressure injury  Description: Document Juan Scale and appropriate interventions in the flowsheet. Outcome: Progressing Towards Goal  Note: Pressure Injury Interventions:  Sensory Interventions: Keep linens dry and wrinkle-free    Moisture Interventions: Absorbent underpads    Activity Interventions: PT/OT evaluation    Mobility Interventions: HOB 30 degrees or less    Nutrition Interventions: Discuss nutritional consult with provider    Friction and Shear Interventions: Apply protective barrier, creams and emollients                Problem: Pressure Injury - Risk of  Goal: *Prevention of pressure injury  Description: Document Juan Scale and appropriate interventions in the flowsheet.   Outcome: Progressing Towards Goal  Note: Pressure Injury Interventions:  Sensory Interventions: Keep linens dry and wrinkle-free    Moisture Interventions: Absorbent underpads    Activity Interventions: PT/OT evaluation    Mobility Interventions: HOB 30 degrees or less    Nutrition Interventions: Discuss nutritional consult with provider    Friction and Shear Interventions: Apply protective barrier, creams and emollients                Problem: Patient Education: Go to Patient Education Activity  Goal: Patient/Family Education  Outcome: Progressing Towards Goal

## 2022-05-14 NOTE — PROGRESS NOTES
Hospitalist Progress Note                            NAME:  Cathi Brink  :  11/10/1931  MRN:  [id number]  Date of Service:  2022    Assessment/Plan:  Acute exacerbation of COPD POA  Acute on chronic respiratory failure with hypoxia POA  Right pleural effusion/edema  Baseline on 2 liters NC, currently on 5 liters  Not improving despite COPD rx, IV lasix  Chest x-ray with right pleural effusion  Wheezing resolved, not wheezing today  Completed  Levaquin  PT/OT re eval done and recs SNF, pt lives alone at day time and she feels too weak and she can not walk   Stop lasix as HCO3 is rising  Check d-dimer  Recheck pBNP, check procalcitonin  ? Element of chronic aspiration  Continue mucinex    Dysphagia, subacute,   S/p esophageal dilation on   started a week PTA, w solid/liquids  Esophagogram w no peristalsis  GI consulted,   S/p EGD on      1. Dilated esophagus with tortuosity. Dilated to 20 mm  2. Erythema at the z-line, biopsied  3. Normal stomach  4 Normal duodenum  plan to do manometry as outpatient    Hyperkalemia:  Mentions this is a chronic issue  resolved     Essential hypertension POA  Peripheral vascular disease POA  MV mod regurg/mild stenosis POA  TR severe regurge  F/u echo in 6 months  Cardio f/u as outpt  Resume home meds    Left lower extremity wound  Cont wound care/wound care consult, recs : \"Every other day on Even days - Cleanse the wound with Caraklenz spray and wipe with 4x4's. Apply the Silvasorb wound hydrogel,  Optifoam Ag (silver) and then an ABD. Wrapped with a small roll therese and tape on the therese only. Date the dressing\"    Elevated lactic acid s/p IVF hydration. Resolved  D/c NSIVF     Code Status: DNR as per her wishes  Surrogate Decision Maker:  DVT Prophylaxis: Lovenox  GI Prophylaxis: not indicated        Interval History/Subjective:   Fu copd /dysphagia  \"I cannot cough anything out of my throat\"  Feels like phlegm is caught in her throat and she cannot get it out  Dry cough  Remains on 5 liters NC    No Cp or abdominal pain, N/V or diarrhea or headache    Objective:     VITALS:   Last 24hrs VS reviewed since prior progress note. Most recent are:  Patient Vitals for the past 24 hrs:   Temp Pulse Resp BP SpO2   05/14/22 1531     95 %   05/14/22 1504 97.9 °F (36.6 °C) 89 19 130/78 97 %   05/14/22 1139     94 %   05/14/22 1041 98.2 °F (36.8 °C) 92 18 138/77 100 %   05/14/22 0808 98.1 °F (36.7 °C) 61 18 (!) 140/79 100 %   05/14/22 0803     100 %   05/13/22 2105 98.5 °F (36.9 °C) (!) 58 18 (!) 146/84 96 %   05/13/22 1943     97 %   05/13/22 1549     97 %       No intake or output data in the 24 hours ending 05/14/22 1538     PHYSICAL EXAM:  General: No acute distress, cooperative, pleasant. Speaking in full sentences  EENT: Anicteric sclerae. Oral mucous moist, oropharynx benign and no oral thrush  Resp: Decreased BS bilaterally, no wheezing or rales No accessory muscle use  CV: Regular rhythm, normal rate, no murmurs, gallops, rubs  GI: Soft, non distended, non tender. normoactive bowel sounds, no hepatosplenomegaly Extremities: No edema, warm, 2+ pulses throughout. dressing on RLE  Neurologic: Moves all extremities. AAOx3, CN II-XII grossly intact  Psych: Good insight. Not anxious nor agitated. Skin: Good Turgor, no rashes. Left lower extremity in dressing    Lab Data Personally Reviewed: (see below)     Medications list Personally Reviewed:  x YES  NO     _______________________________________________________________________  Care Plan discussed with:       Mark Tabor MD     Procedures: see electronic medical records for all procedures/Xrays and details which were not copied into this note but were reviewed prior to creation of Plan.       LABS:  Recent Labs     05/14/22 0349   WBC 9.4   HGB 11.5   HCT 38.5   *     Recent Labs     05/14/22 0349      K 3.9      CO2 42*   BUN 34* CREA 0.75   GLU 99   CA 9.1     No results for input(s): ALT, AP, TBIL, TBILI, TP, ALB, GLOB, GGT, AML, LPSE in the last 72 hours. No lab exists for component: SGOT, GPT, AMYP, HLPSE  No results for input(s): INR, PTP, APTT, INREXT, INREXT in the last 72 hours. No results for input(s): FE, TIBC, PSAT, FERR in the last 72 hours. No results found for: FOL, RBCF   No results for input(s): PH, PCO2, PO2 in the last 72 hours. No results for input(s): CPK, CKNDX, TROIQ in the last 72 hours.     No lab exists for component: CPKMB  Lab Results   Component Value Date/Time    Cholesterol, total 167 11/11/2021 03:23 PM    HDL Cholesterol 81 11/11/2021 03:23 PM    LDL, calculated 59.6 11/11/2021 03:23 PM    Triglyceride 132 11/11/2021 03:23 PM    CHOL/HDL Ratio 2.1 11/11/2021 03:23 PM     Lab Results   Component Value Date/Time    Glucose (POC) 103 01/03/2011 07:03 AM     Lab Results   Component Value Date/Time    Color DARK YELLOW 06/04/2017 02:52 AM    Appearance OPAQUE (A) 06/04/2017 02:52 AM    Specific gravity 1.026 06/04/2017 02:52 AM    Specific gravity >1.030 (H) 03/11/2011 03:47 PM    pH (UA) 5.0 06/04/2017 02:52 AM    Protein 100 (A) 06/04/2017 02:52 AM    Glucose NEGATIVE  06/04/2017 02:52 AM    Ketone 15 (A) 06/04/2017 02:52 AM    Bilirubin NEGATIVE  03/11/2011 03:47 PM    Urobilinogen 1.0 06/04/2017 02:52 AM    Nitrites NEGATIVE  06/04/2017 02:52 AM    Leukocyte Esterase MODERATE (A) 06/04/2017 02:52 AM    Epithelial cells MANY (A) 06/04/2017 02:52 AM    Bacteria 2+ (A) 06/04/2017 02:52 AM    WBC 20-50 06/04/2017 02:52 AM    RBC 5-10 06/04/2017 02:52 AM

## 2022-05-14 NOTE — PROGRESS NOTES
ADULT PROTOCOL: JET AEROSOL ASSESSMENT    Patient  Shivam Snow     80 y.o.   female     5/14/2022  10:17 AM    Breath Sounds Pre Procedure: Right Breath Sounds: Coarse                               Left Breath Sounds: Coarse    Breath Sounds Post Procedure: Right Breath Sounds: Coarse                                 Left Breath Sounds: Coarse    Breathing pattern: Pre procedure Breathing Pattern: Regular          Post procedure Breathing Pattern: Regular    Heart Rate: Pre procedure Pulse: 70           Post procedure Pulse: 81    Resp Rate: Pre procedure Respirations: 18           Post procedure Respirations: 18        Cough: Pre procedure Cough: Non-productive              Post procedure Cough: Non-productive               Oxygen: O2 Device: Nasal cannula 5L     Changed: no    SpO2: Pre procedure SpO2: 100 %  with oxygen              Post procedure SpO2: 97 % with oxygen    Nebulizer Therapy: Current medications Aerosolized Medications: Brovana,DuoNeb,Pulmicort      Changed:no        Problem List:   Patient Active Problem List   Diagnosis Code    PVD (peripheral vascular disease) ([facility]) I73.9    Seasonal allergic rhinitis J30.2    LBBB (left bundle branch block) I44.7    Essential hypertension I10    Weakness R53.1    Hyponatremia E87.1    Cellulitis of left lower extremity L03. 80    History of DVT (deep vein thrombosis) Z86.718    Single kidney Z90.5    Chronic respiratory failure with hypoxia, on home oxygen therapy ([facility]) J96.11, Z99.81    Moderate pulmonary arterial systolic hypertension ([facility]) I27.21    Venous stasis ulcer of left ankle limited to breakdown of skin without varicose veins ([facility]) I87.2, L97.321    Anxiety F41.9    COPD exacerbation ([address] J44.1       Respiratory Therapist: Nieves Watson, RT

## 2022-05-14 NOTE — PROGRESS NOTES
Problem: Pressure Injury - Risk of  Goal: *Prevention of pressure injury  Description: Document Juan Scale and appropriate interventions in the flowsheet.   Outcome: Progressing Towards Goal  Note: Pressure Injury Interventions:  Sensory Interventions: Check visual cues for pain,Assess changes in LOC    Moisture Interventions: Internal/External urinary devices    Activity Interventions: Increase time out of bed    Mobility Interventions: HOB 30 degrees or less    Nutrition Interventions: Offer support with meals,snacks and hydration    Friction and Shear Interventions: Apply protective barrier, creams and emollients

## 2022-05-14 NOTE — PROGRESS NOTES
Bedside shift change report given to Haider Ng  (oncoming nurse) by Elyssa Medellin RN  (offgoing nurse). Report included the following information SBAR, Kardex, Intake/Output, MAR and Cardiac Rhythm Afib.

## 2022-05-15 NOTE — PROGRESS NOTES
ADULT PROTOCOL: JET AEROSOL ASSESSMENT    Patient  Rayray Eng     80 y.o.   female     5/15/2022  10:47 AM    Breath Sounds Pre Procedure: Right Breath Sounds: Diminished,Coarse                               Left Breath Sounds: Diminished,Coarse    Breath Sounds Post Procedure: Right Breath Sounds: Diminished,Coarse                                 Left Breath Sounds: Diminished,Coarse    Breathing pattern: Pre procedure Breathing Pattern: Regular          Post procedure Breathing Pattern: Regular    Heart Rate: Pre procedure Pulse: 71           Post procedure Pulse: 74    Resp Rate: Pre procedure Respirations: 18           Post procedure Respirations: 18         Cough: Pre procedure Cough: Congested               Post procedure Cough: Non-productive             Oxygen: O2 Device: Nasal cannula  4L     Changed: no    SpO2: Pre procedure SpO2: 93 %   With oxygen              Post procedure SpO2: 91 %  with oxygen    Nebulizer Therapy: Current medications Aerosolized Medications: Brovana,DuoNeb,Pulmicort      Changed:no      Problem List:   Patient Active Problem List   Diagnosis Code    PVD (peripheral vascular disease) ([facility]) I73.9    Seasonal allergic rhinitis J30.2    LBBB (left bundle branch block) I44.7    Essential hypertension I10    Weakness R53.1    Hyponatremia E87.1    Cellulitis of left lower extremity L03. 80    History of DVT (deep vein thrombosis) Z86.718    Single kidney Z90.5    Chronic respiratory failure with hypoxia, on home oxygen therapy ([facility]) J96.11, Z99.81    Moderate pulmonary arterial systolic hypertension ([facility]) I27.21    Venous stasis ulcer of left ankle limited to breakdown of skin without varicose veins ([facility]) I87.2, L97.321    Anxiety F41.9    COPD exacerbation ([facility] J44.1       Respiratory Therapist: Baylee Blair, RT

## 2022-05-15 NOTE — PROGRESS NOTES
Problem: Pressure Injury - Risk of  Goal: *Prevention of pressure injury  Description: Document Juan Scale and appropriate interventions in the flowsheet. Outcome: Progressing Towards Goal  Note: Pressure Injury Interventions:  Sensory Interventions: Float heels,Minimize linen layers,Keep linens dry and wrinkle-free    Moisture Interventions: Internal/External urinary devices    Activity Interventions: Increase time out of bed    Mobility Interventions: Turn and reposition approx.  every two hours(pillow and wedges)    Nutrition Interventions: Offer support with meals,snacks and hydration    Friction and Shear Interventions: Minimize layers

## 2022-05-15 NOTE — PROGRESS NOTES
Bedside shift change report given to [address] [place] [address] (oncoming nurse) by Arabella Salazar (offgoing nurse). Report included the following information SBAR, Kardex, MAR and Cardiac Rhythm A-fib.

## 2022-05-16 NOTE — PROGRESS NOTES
Hospitalist Progress Note                            NAME:  Farzana Wilde  :  11/10/1931  MRN:  [id number]  Date of Service:  5/15/2022    Assessment/Plan:  Acute exacerbation of COPD POA  Acute on chronic respiratory failure with hypoxia POA  Right pleural effusion/edema  Baseline on 2 liters NC, currently on 5 liters  Not improving despite COPD rx, IV lasix  Chest x-ray with right pleural effusion  Wheezing resolved, not wheezing today  Completed  Levaquin  PT/OT re eval done and recs SNF, pt lives alone at day time and she feels too weak and she can not walk   Stop lasix as HCO3 is rising  Check d-dimer  Recheck pBNP, check procalcitonin  ? Element of chronic aspiration  Continue mucinex    Dysphagia, subacute,   S/p esophageal dilation on   started a week PTA, w solid/liquids  Esophagogram w no peristalsis  GI consulted,   S/p EGD on      1. Dilated esophagus with tortuosity. Dilated to 20 mm  2. Erythema at the z-line, biopsied  3. Normal stomach  4 Normal duodenum  plan to do manometry as outpatient    Hyperkalemia:  Mentions this is a chronic issue  resolved     Essential hypertension POA  Peripheral vascular disease POA  MV mod regurg/mild stenosis POA  TR severe regurge  F/u echo in 6 months  Cardio f/u as outpt  Resume home meds    Left lower extremity wound  Cont wound care/wound care consult, recs : \"Every other day on Even days - Cleanse the wound with Caraklenz spray and wipe with 4x4's. Apply the Silvasorb wound hydrogel,  Optifoam Ag (silver) and then an ABD. Wrapped with a small roll therese and tape on the therese only. Date the dressing\"    Elevated lactic acid s/p IVF hydration. Resolved  D/c NSIVF     Code Status: DNR as per her wishes  Surrogate Decision Maker:  DVT Prophylaxis: Lovenox  GI Prophylaxis: not indicated        Interval History/Subjective:   Fu copd /dysphagia  \"I cannot cough anything out of my throat\"  Feels like phlegm is caught in her throat and she cannot get it out  Dry cough  Remains on 5 liters NC    No Cp or abdominal pain, N/V or diarrhea or headache    Objective:     VITALS:   Last 24hrs VS reviewed since prior progress note. Most recent are:  Patient Vitals for the past 24 hrs:   Temp Pulse Resp BP SpO2   05/15/22 2258 98 °F (36.7 °C)       05/15/22 2026 98 °F (36.7 °C) 98 18 (!) 178/77 96 %   05/15/22 2020     95 %   05/15/22 1647     90 %   05/15/22 1516 97.8 °F (36.6 °C) 77 19 (!) 152/82 93 %   05/15/22 1146     93 %   05/15/22 1119 98.3 °F (36.8 °C) 83 18 (!) 143/93 93 %   05/15/22 0833     93 %   05/15/22 0748 97.8 °F (36.6 °C) 88 20 (!) 167/79 91 %   05/15/22 0339 98.6 °F (37 °C) 61  (!) 140/63 92 %   05/15/22 0018 98.3 °F (36.8 °C) 94  128/63 91 %       No intake or output data in the 24 hours ending 05/15/22 2315     PHYSICAL EXAM:  General: No acute distress, cooperative, pleasant. Speaking in full sentences  EENT: Anicteric sclerae. Oral mucous moist, oropharynx benign and no oral thrush  Resp: Decreased BS bilaterally, no wheezing or rales No accessory muscle use  CV: Regular rhythm, normal rate, no murmurs, gallops, rubs  GI: Soft, non distended, non tender. normoactive bowel sounds, no hepatosplenomegaly Extremities: No edema, warm, 2+ pulses throughout. dressing on RLE  Neurologic: Moves all extremities. AAOx3, CN II-XII grossly intact  Psych: Good insight. Not anxious nor agitated. Skin: Good Turgor, no rashes. Left lower extremity in dressing    Lab Data Personally Reviewed: (see below)     Medications list Personally Reviewed:  x YES  NO     _______________________________________________________________________  Care Plan discussed with:       Zac Howe MD     Procedures: see electronic medical records for all procedures/Xrays and details which were not copied into this note but were reviewed prior to creation of Plan.       LABS:  Recent Labs     05/15/22  0630 05/14/22  0349   WBC 10.1 9.4   HGB 11.4* 11.5   HCT 38.5 38.5   * 122*     Recent Labs     05/15/22  0630 05/14/22  0349    142   K 3.7 3.9   CL 98 101   CO2 42* 42*   BUN 37* 34*   CREA 0.81 0.75   * 99   CA 9.2 9.1     Recent Labs     05/15/22  0630   ALT 17   AP 40*   TBILI 0.7   TP 5.1*   ALB 2.7*   GLOB 2.4     No results for input(s): INR, PTP, APTT, INREXT, INREXT in the last 72 hours. No results for input(s): FE, TIBC, PSAT, FERR in the last 72 hours. No results found for: FOL, RBCF   No results for input(s): PH, PCO2, PO2 in the last 72 hours. No results for input(s): CPK, CKNDX, TROIQ in the last 72 hours.     No lab exists for component: CPKMB  Lab Results   Component Value Date/Time    Cholesterol, total 167 11/11/2021 03:23 PM    HDL Cholesterol 81 11/11/2021 03:23 PM    LDL, calculated 59.6 11/11/2021 03:23 PM    Triglyceride 132 11/11/2021 03:23 PM    CHOL/HDL Ratio 2.1 11/11/2021 03:23 PM     Lab Results   Component Value Date/Time    Glucose (POC) 103 01/03/2011 07:03 AM     Lab Results   Component Value Date/Time    Color DARK YELLOW 06/04/2017 02:52 AM    Appearance OPAQUE (A) 06/04/2017 02:52 AM    Specific gravity 1.026 06/04/2017 02:52 AM    Specific gravity >1.030 (H) 03/11/2011 03:47 PM    pH (UA) 5.0 06/04/2017 02:52 AM    Protein 100 (A) 06/04/2017 02:52 AM    Glucose NEGATIVE  06/04/2017 02:52 AM    Ketone 15 (A) 06/04/2017 02:52 AM    Bilirubin NEGATIVE  03/11/2011 03:47 PM    Urobilinogen 1.0 06/04/2017 02:52 AM    Nitrites NEGATIVE  06/04/2017 02:52 AM    Leukocyte Esterase MODERATE (A) 06/04/2017 02:52 AM    Epithelial cells MANY (A) 06/04/2017 02:52 AM    Bacteria 2+ (A) 06/04/2017 02:52 AM    WBC 20-50 06/04/2017 02:52 AM    RBC 5-10 06/04/2017 02:52 AM

## 2022-05-16 NOTE — PROGRESS NOTES
Pulmonary, Critical Care, and Sleep Medicine    Name: Rosales Thompson MRN: [id number]   : 11/10/1931 Hospital: [facility]   Date: 2022        IMPRESSION:   · Acute/chronic hypoxic respiratory failure - on 2 LPM home O2 at baseline  · COPD - FEV1 0.94 L (55% predicted) - with acute exacerbation - on Anoro, TIW azithromycin at home  · Esophageal dysmotility with likely stricture in need of dilation  · Suspect there is some contribution of her esophageal dysmotility to exacerbating her COPD symptoms, may have episodes of aspiration as well   · Peripheral vascular disease  · H/O tobacco use      RECOMMENDATIONS:   · O2 - wean back to her home dose of 2 LPM as tolerated  · Pulmonary toilet  · Bronchodilators   · Systemic steroids  · GI recommended outpatient manometry  · Restart Anoro and MWF Azithromycin at the time of discharge (note she is on Anoro, not Trelegy as listed in her PTA meds)     Subjective:     22: Events of weekend reviewed. Discussed with , nursing. Notes and data reviewed. Now on 4 LPM. Up in recliner. She says she was told she might have a blood clot? No wheeze today. congested cough. Pt reports leg sores. 22: dyspnea at baseline. She says she was told she is having a procedure today, though I don't know what that is as I don't see anything scheduled. 22: tolerated her EGD without issue. She has nasal congestion and drip which is making her cough. 22: no events. Feels a bit better, down to 3 LPM.  EGD planned for today. Initial history: This patient has been seen and evaluated at the request of Dr. Lazarus Points for preop evaluation. Patient is a [address] y.o. female former smoker with COPD. She is well known to me. She was admitted 4 days ago with an exacerbation characterized by the sudden onset of dyspnea and wheezing beyond her baseline. She is on 2 LPM at baseline and required 4 here.   She improved, but she was struggling with dysphagia. 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 After her swallow evaluation she has had increasing dyspnea again. Cough is currently nonproductive. Past Medical History:   Diagnosis Date    Acute renal failure (ARF) ([facility] 6/4/2017    Arthritis     generalized    Bronchitis, acute 6/4/2017    COPD (chronic obstructive pulmonary disease) ([facility]     Essential hypertension 9/4/2018    former smoker      >60pkyr quit 1/3/11    History of DVT (deep vein thrombosis) 1955    left leg    History of sepsis 06/04/2017    ischemic left lower extremitiy pain     above knee FemPop 1/3/11    PVD (peripheral vascular disease) ([facility]     Seasonal allergic rhinitis     Single kidney     Sinus bradycardia 7/28/2018      Past Surgical History:   Procedure Laterality Date    HX GI  10 yrs ago    colonoscopy    HX GYN      3 miscarriges    HX GYN      1 vaginal birth    HX ORTHOPAEDIC      veinography left leg, stent left leg    [place] BREAST SURGERY PROCEDURE UNLISTED  1955    excision left breast cyst    UPPER GI ENDOSCOPY,BALL DIL,30MM  5/11/2022         UPPER GI ENDOSCOPY,BIOPSY  5/11/2022           Prior to Admission medications    Medication Sig Start Date End Date Taking? Authorizing Provider   calcium carb/magnesium hydrox (MYLANTA PO) Take 15-30 mL by mouth two (2) times daily as needed for PRN Reason (Other) (constipation). Yes Provider, Historical   acetaminophen (TYLENOL) 500 mg tablet Take 1,000 mg by mouth every six (6) hours as needed for Pain. Yes Provider, Historical   azelastine (ASTEPRO) 205.5 mcg (0.15 %) 2 Sprays by Both Nostrils route daily. Provider, Historical   fluticasone propionate (FLONASE) 50 mcg/actuation nasal spray 2 Sprays by Both Nostrils route daily. Provider, Historical   busPIRone (BUSPAR) 5 mg tablet Take 1 Tab by mouth every evening.   Patient not taking: Reported on 4/20/2022 12/23/21   Jason Dominique MD calcium carbonate (TUMS PO) Take 1 Tablet by mouth three (3) times daily as needed for Other (indigestion). Patient not taking: Reported on 2022    Provider, Historical   fluticasone-umeclidinium-vilanterol (Trelegy Ellipta) 100-62.5-25 mcg inhaler Take 1 Puff by inhalation daily. Provider, Historical   simethicone (MYLANTA GAS PO) Take  by mouth daily as needed. Patient not taking: Reported on 2022    Provider, Historical   OXYGEN-AIR DELIVERY SYSTEMS 2 L by Nasal route continuous. Provider, Historical   acetaminophen (TYLENOL) 325 mg tablet Take 2 Tabs by mouth every four (4) hours as needed for Pain or Fever. 19   Merary Valdes MD   ipratropium-albuterol (COMBIVENT RESPIMAT)  mcg/actuation inhaler Take 1 Puff by inhalation four (4) times daily. Provider, Historical   loratadine (CLARITIN) 10 mg tablet Take 10 mg by mouth daily. Provider, Historical   azithromycin (ZITHROMAX) 250 mg tablet Take 250 mg by mouth every Monday, Wednesday, Friday. Indications: prevent lung infections    Provider, Historical   aspirin delayed-release 81 mg tablet Take 162 mg by mouth daily.     Provider, Historical     Allergies   Allergen Reactions    Food Extracts Diarrhea     Onions and garlic causes abdominal pain,cramping     Amlodipine Swelling    Sulfa (Sulfonamide Antibiotics) Other (comments)     Altered Mental Status      Social History     Tobacco Use    Smoking status: Former Smoker     Packs/day: 1.00     Years: 60.00     Pack years: 60.00     Quit date: 1/3/2011     Years since quittin.3    Smokeless tobacco: Never Used   Substance Use Topics    Alcohol use: Yes     Comment: occasional liquor after dinner      Family History   Problem Relation Age of Onset    Colon Cancer Mother     Lung Cancer Sister         Current Facility-Administered Medications   Medication Dose Route Frequency    iopamidoL (ISOVUE-370) 76 % injection 100 mL  100 mL IntraVENous RAD ONCE    guaiFENesin ER (MUCINEX) tablet 1,200 mg  1,200 mg Oral Q12H    enoxaparin (LOVENOX) injection 40 mg  40 mg SubCUTAneous DAILY    methylPREDNISolone (PF) (SOLU-MEDROL) injection 40 mg  40 mg IntraVENous Q8H    [Held by provider] furosemide (LASIX) injection 40 mg  40 mg IntraVENous DAILY    azithromycin (ZITHROMAX) tablet 250 mg  250 mg Oral Q MON, WED & FRI    sodium chloride (NS) flush 5-40 mL  5-40 mL IntraVENous Q8H    albuterol-ipratropium (DUO-NEB) 2.5 MG-0.5 MG/3 ML  3 mL Nebulization QID RT    aspirin delayed-release tablet 162 mg  162 mg Oral DAILY    fluticasone propionate (FLONASE) 50 mcg/actuation nasal spray 2 Spray  2 Spray Both Nostrils DAILY    arformoteroL (BROVANA) neb solution 15 mcg  15 mcg Nebulization BID RT    And    budesonide (PULMICORT) 250 mcg/2ml nebulizer susp  250 mcg Nebulization BID RT       Review of Systems:  A comprehensive review of systems was negative except for that written in the HPI. Objective:   Vital Signs:    Visit Vitals  BP (!) 142/68 (BP 1 Location: Left upper arm, BP Patient Position: Reclining)   Pulse 66   Temp 97.9 °F (36.6 °C)   Resp 16   Ht 5' 8\" (1.727 m)   Wt 56.7 kg (125 lb)   SpO2 97%   BMI 19.01 kg/m²       O2 Device: Nasal cannula   O2 Flow Rate (L/min): 4 l/min   Temp (24hrs), Av.1 °F (36.7 °C), Min:97.8 °F (36.6 °C), Max:98.4 °F (36.9 °C)       Intake/Output:   Last shift:      No intake/output data recorded. Last 3 shifts: No intake/output data recorded. No intake or output data in the 24 hours ending 22 1244   Physical Exam:   General:  Alert, cooperative, no distress, frail   Head:  Normocephalic, without obvious abnormality, atraumatic. Eyes:  Conjunctivae/corneas clear. Nose: Nares normal. Septum midline.  Mucosa normal.     Throat: Lips, mucosa, and tongue normal. Teeth and gums normal.   Neck: Supple, symmetrical, trachea midline, no adenopathy    Lungs:   Poor bilateral air movement, wheezing is improved   Chest wall: No tenderness or deformity. Heart:  Regular rate and rhythm    Abdomen:   Soft, non-tender. Bowel sounds normal.     Extremities: Extremities normal, atraumatic, no cyanosis    Skin: Skin color, texture, turgor normal. No rashes or lesions   Neurologic: Grossly nonfocal     Data:   Labs:  Recent Labs     05/16/22  0156 05/15/22  0630 05/14/22  0349   WBC 8.3 10.1 9.4   HGB 11.7 11.4* 11.5   HCT 38.6 38.5 38.5   * 125* 122*     Recent Labs     05/16/22  0156 05/15/22  0630 05/14/22  0349    143 142   K 4.1 3.7 3.9    98 101   CO2 40* 42* 42*   * 103* 99   BUN 36* 37* 34*   CREA 0.73 0.81 0.75   CA 9.0 9.2 9.1   ALB 2.7* 2.7*  --    TBILI 0.8 0.7  --    ALT 18 17  --      No results for input(s): PHI, PCO2I, PO2I, HCO3I, FIO2I in the last 72 hours.     Imaging:  I have personally reviewed the patients radiographs:  None today        Arelis Rodriguez MD

## 2022-05-16 NOTE — PROGRESS NOTES
Transition of Care Plan:     RUR: 15% - low   Disposition: SNF - [facility] has accepted, pending bed availability  Follow up appointments: Defer to [facility]  DME needed: Defer to SNF  Transportation at [address] or means to access home: Yes      IM Medicare Letter: To be given prior to d/c   Is patient a BCPI-A Bundle: N/A                     If yes, was Bundle Letter given?:    Is patient a [place] and connected with the [facility]? N/A               If yes, was [employer] transfer form completed and [facility] notified? Caregiver Contact: Damon Javed, Phone: [phone]  Discharge Caregiver contacted prior to discharge? CM will contact the patient's son if the patient requests this   Care Conference needed?: No    Initial Note: Chart reviewed. CM lvm for [facility] [phone] to confirm bed availability for 5/17. Unit CM will continue to follow for dcp.      Anatoliy Benton MSW  Care Manager, [facility]

## 2022-05-16 NOTE — PROGRESS NOTES
Physical therapy:    Attempted PT session. Spoke with RN and reported pt with elevated D-dimer and awaiting chest CT to r/o PE. Will defer and continue to follow.  Thank you    Augusto Casanova, PT, DPT

## 2022-05-16 NOTE — PROGRESS NOTES
Comprehensive Nutrition Assessment    Type and Reason for Visit: Reassess    Nutrition Recommendations/Plan:   1. Continue full liquids as tolerated  2. Continue nepro TID     Nutrition Assessment:   Chart reviewed; medically noted for chronic respiratory failure, COPD, and esophageal dysmotility. Patient sleeping soundly in chair at time of attempted visit; open nepro supplement at bedside. Good PO intake noted over the weekend. Ruling out PE today. Continue PO diet as tolerated. Encourage intake of meals/supplements. Plans for esophageal manometry as outpatient. Patient Vitals for the past 168 hrs:   % Diet Eaten   05/15/22 1513 51 - 75%   05/15/22 0749 51 - 75%   05/14/22 1531 51 - 75%         Nutrition Related Findings:     5/15  Solu-medrol    Wound Type: Venous stasis    Current Nutrition Intake & Therapies:  Average Meal Intake: 51-75%     ADULT DIET Full Liquid  ADULT ORAL NUTRITION SUPPLEMENT Lunch, Breakfast, Dinner; Renal Supplement  DIET ONE TIME MESSAGE    Anthropometric Measures:  Height: 5' 8\" (172.7 cm)  Ideal Body Weight (IBW): 140 lbs (64 kg)     Current Body Wt:  56.7 kg (125 lb), 89.3 % IBW. Stated  Current BMI (kg/m2): 19                          BMI Category: Normal weight (BMI 18.5-24. 9)    Estimated Daily Nutrient Needs:  Energy Requirements Based On: Formula  Weight Used for Energy Requirements: Current  Energy (kcal/day): 1600 kcal (BMR 1039 x 1.3AF +250kcal)  Weight Used for Protein Requirements: Current  Protein (g/day): 68g (1.2 g/kg bw)  Method Used for Fluid Requirements: 1 ml/kcal  Fluid (ml/day): 1600 mL    Nutrition Diagnosis:   · Inadequate protein-energy intake related to swallowing difficulty as evidenced by intake 26-50% (full liquids)   Previous diagnosis resolved; good intake of meals/supplements     Nutrition Interventions:   Food and/or Nutrient Delivery: Continue current diet,Continue oral nutrition supplement  Nutrition Education/Counseling: No recommendations at this time  Coordination of Nutrition Care: Continue to monitor while inpatient       Goals:  Previous Goal Met: Goal(s) achieved  Goals:  (PO intake >70% of meals/ONS next 3-5 days)       Nutrition Monitoring and Evaluation:   Behavioral-Environmental Outcomes: None identified  Food/Nutrient Intake Outcomes: Food and nutrient intake,Supplement intake  Physical Signs/Symptoms Outcomes: Biochemical data,Chewing or swallowing,Weight    Discharge Planning:    Continue oral nutrition supplement    Judit Green RD  Contact: ext [phone]

## 2022-05-16 NOTE — PROGRESS NOTES
Problem: Pressure Injury - Risk of  Goal: *Prevention of pressure injury  Description: Document Juan Scale and appropriate interventions in the flowsheet.   Outcome: Progressing Towards Goal  Note: Pressure Injury Interventions:  Sensory Interventions: Assess changes in LOC,Check visual cues for pain    Moisture Interventions: Internal/External urinary devices    Activity Interventions: Increase time out of bed,PT/OT evaluation    Mobility Interventions: PT/OT evaluation,HOB 30 degrees or less,Float heels    Nutrition Interventions: Offer support with meals,snacks and hydration    Friction and Shear Interventions: Minimize layers

## 2022-05-16 NOTE — PROGRESS NOTES
Hospitalist Progress Note                            NAME:  Radha Fleming  :  11/10/1931  MRN:  [id number]  Date of Service:  2022    Assessment/Plan:  Acute exacerbation of COPD POA  Acute on chronic respiratory failure with hypoxia POA  Right pleural effusion/edema  Baseline on 2 liters NC, currently on 5 liters  Slowly improving with COPD rx  Chest x-ray with right pleural effusion  D-dimer elevated, CTA with no PE  CTA chest results done with prior PE and D-dimer > 5.0  mild sized right pleural effusion. There is a small left pleural reaction. There is no shift or pneumothorax. No mediastinal masses or adenopathy. The aorta show tortuosity no focal findings. There is no evidence for pulmonary emboli. Prominent emphysema once again demonstrated. There is infiltrate and atelectasis at the right lung base. IMPRESSION  1. Right lung base infiltrate with effusion. 2. No pulmonary emboli. 3. COPD. Received a 5 day course of levaquin (renally dosed)       ? Treated CAP at right base        Procalcitonin < 0.05 on 5/15/2022  Wheezing resolved, overall improved            NS still reports GODINEZ  Able to wean to 3 liters  PT/OT re eval done and recs SNF       pt lives alone at day time and she feels too weak and she can not walk   Holding lasix as HCO3 increasing        PO lasix once HCo3 improved a bit more  ? Element of chronic aspiration  Pt asking to increase diet, try dysphagia diet  Hopefully discharge when SNF bed is available    Dysphagia, subacute,   S/p esophageal dilation on   started a week PTA, w solid/liquids  Esophagogram w no peristalsis  GI consulted,   S/p EGD on      1. Dilated esophagus with tortuosity. Dilated to 20 mm  2. Erythema at the z-line, biopsied  3.  Normal stomach  4 Normal duodenum  plan to do manometry as outpatient  Clear liquids currently, advance to dysphagia diet    Hyperkalemia:  Mentions this is a chronic issue  resolved     Essential hypertension POA  Peripheral vascular disease POA  Moderate AS POA  Severe TR  Mild to mod MR  F/u echo in 6 months  Echo TTE  Left Ventricle Normal left ventricular systolic function with a visually estimated EF of 50 - 55%. Left ventricle size is normal. Normal wall thickness. Global hypokinesis present. Normal diastolic function. Left Atrium Left atrium size is normal.   Right Ventricle Right ventricle size is normal. Normal systolic function. Right Atrium Right atrium size is normal.   Aortic Valve Valve structure is normal. Thickened cusp. Calcified cusp. No regurgitation. Moderate stenosis of the aortic valve. Mitral Valve Thickened leaflet. Mild to moderate regurgitation. Mild stenosis noted. Tricuspid Valve Valve structure is normal. Severe regurgitation. No stenosis noted. Pulmonic Valve Valve structure is normal. No regurgitation. No stenosis noted. Aorta Normal sized aorta. Pericardium No pericardial effusion. Cardio f/u as outpt  Resume home meds    Left lower extremity wound  Cont wound care/wound care consult, recs : \"Every other day on Even days - Cleanse the wound with Caraklenz spray and wipe with 4x4's. Apply the Silvasorb wound hydrogel,  Optifoam Ag (silver) and then an ABD. Wrapped with a small roll therese and tape on the therese only. Date the dressing\"    Elevated lactic acid s/p IVF hydration. Resolved  D/c NSIVF     Code Status: DNR as per her wishes  Surrogate Decision Maker:  DVT Prophylaxis: Lovenox  GI Prophylaxis: not indicated        Interval History/Subjective: Fu copd /dysphagia  \"my throat feels better\"  Weaned to 3 liters  Not SOB today  CTA chest with No PE today  Dry cough    No Cp or abdominal pain, N/V or diarrhea or headache    Objective:     VITALS:   Last 24hrs VS reviewed since prior progress note.  Most recent are:  Patient Vitals for the past 24 hrs:   Temp Pulse Resp BP SpO2   05/16/22 1442 97.5 °F (36.4 °C) 76 16 139/66 95 % 05/16/22 1131 97.9 °F (36.6 °C) 66 16 (!) 142/68 97 %   05/16/22 0801 98.3 °F (36.8 °C) 82 16 131/69 95 %   05/16/22 0330 98.4 °F (36.9 °C) 81 16 (!) 140/73 94 %   05/15/22 2258 98 °F (36.7 °C)       05/15/22 2026 98 °F (36.7 °C) 98 18 (!) 178/77 96 %   05/15/22 2020     95 %       No intake or output data in the 24 hours ending 05/16/22 1927     PHYSICAL EXAM:  General: No acute distress, cooperative, pleasant. Speaking in full sentences  EENT: Anicteric sclerae. Oral mucous moist, oropharynx benign and no oral thrush  Resp: Decreased BS bilaterally, no wheezing or rales No accessory muscle use  CV: Regular rhythm, normal rate, no murmurs, gallops, rubs  GI: Soft, non distended, non tender. normoactive bowel sounds, no hepatosplenomegaly Extremities: No edema, warm, 2+ pulses throughout. dressing on RLE  Neurologic: Moves all extremities. AAOx3, CN II-XII grossly intact  Psych: Good insight. Not anxious nor agitated. Skin: Good Turgor, no rashes. Left lower extremity in dressing    Lab Data Personally Reviewed: (see below)     Medications list Personally Reviewed:  x YES  NO     _______________________________________________________________________  Care Plan discussed with:       Alejandro Fair MD     Procedures: see electronic medical records for all procedures/Xrays and details which were not copied into this note but were reviewed prior to creation of Plan.       LABS:  Recent Labs     05/16/22  0156 05/15/22  0630   WBC 8.3 10.1   HGB 11.7 11.4*   HCT 38.6 38.5   * 125*     Recent Labs     05/16/22  0156 05/15/22  0630 05/14/22  0349    143 142   K 4.1 3.7 3.9    98 101   CO2 40* 42* 42*   BUN 36* 37* 34*   CREA 0.73 0.81 0.75   * 103* 99   CA 9.0 9.2 9.1     Recent Labs     05/16/22  0156 05/15/22  0630   ALT 18 17   AP 40* 40*   TBILI 0.8 0.7   TP 5.2* 5.1*   ALB 2.7* 2.7*   GLOB 2.5 2.4     No results for input(s): INR, PTP, APTT, INREXT, INREXT in the last 72 hours. No results for input(s): FE, TIBC, PSAT, FERR in the last 72 hours. No results found for: FOL, RBCF   No results for input(s): PH, PCO2, PO2 in the last 72 hours. No results for input(s): CPK, CKNDX, TROIQ in the last 72 hours.     No lab exists for component: CPKMB  Lab Results   Component Value Date/Time    Cholesterol, total 167 11/11/2021 03:23 PM    HDL Cholesterol 81 11/11/2021 03:23 PM    LDL, calculated 59.6 11/11/2021 03:23 PM    Triglyceride 132 11/11/2021 03:23 PM    CHOL/HDL Ratio 2.1 11/11/2021 03:23 PM     Lab Results   Component Value Date/Time    Glucose (POC) 103 01/03/2011 07:03 AM     Lab Results   Component Value Date/Time    Color DARK YELLOW 06/04/2017 02:52 AM    Appearance OPAQUE (A) 06/04/2017 02:52 AM    Specific gravity 1.026 06/04/2017 02:52 AM    Specific gravity >1.030 (H) 03/11/2011 03:47 PM    pH (UA) 5.0 06/04/2017 02:52 AM    Protein 100 (A) 06/04/2017 02:52 AM    Glucose NEGATIVE  06/04/2017 02:52 AM    Ketone 15 (A) 06/04/2017 02:52 AM    Bilirubin NEGATIVE  03/11/2011 03:47 PM    Urobilinogen 1.0 06/04/2017 02:52 AM    Nitrites NEGATIVE  06/04/2017 02:52 AM    Leukocyte Esterase MODERATE (A) 06/04/2017 02:52 AM    Epithelial cells MANY (A) 06/04/2017 02:52 AM    Bacteria 2+ (A) 06/04/2017 02:52 AM    WBC 20-50 06/04/2017 02:52 AM    RBC 5-10 06/04/2017 02:52 AM

## 2022-05-17 NOTE — PROGRESS NOTES
Pt tolerated thoracentesis well, 500 ml of clear yellow fluid removed. Post thora chest xray completed. Pt denies complaints. Transport to take Pt back to floor. Report called to [facility] [id number]. Labs sent as ordered.

## 2022-05-17 NOTE — PROGRESS NOTES
Problem: Self Care Deficits Care Plan (Adult)  Goal: *Acute Goals and Plan of Care (Insert Text)  Description: FUNCTIONAL STATUS PRIOR TO ADMISSION: ambulated with rolling walker with modified independence, performed ADLS on her own and was sponge bathing due to LLE wound, 2L NC 24/7 and O2 sats decrease to the lowest 82% with activity at baseline, performed light IADLS or family and cargivers assist    HOME SUPPORT PRIOR TO ADMISSION: The patient lived alone with paid caregivers and family to provide assistance. Occupational Therapy Goals:  Initiated 5/17/2022  1. Patient will perform grooming with supervision/set-up within 7 days. 2. Patient will perform upper body dressing and lower body dressing with supervision/set-up within 7 days. 3. Patient will perform toileting with supervision/set-up within 7 days. 4. Patient will transfer from toilet with supervision/set-up using the least restrictive device and appropriate durable medical equipment within 7 days. Outcome: Not Met   OCCUPATIONAL THERAPY EVALUATION  Patient: Sarah Hannon (80 y.o. female)  Date: 5/17/2022  Primary Diagnosis: COPD exacerbation (HCC) [J44.1]  Procedure(s) (LRB):  ESOPHAGOGASTRODUODENOSCOPY (EGD) (N/A)  ESOPHAGEAL DILATION (N/A)  ESOPHAGOGASTRODUODENAL (EGD) BIOPSY (N/A) 6 Days Post-Op   Precautions:  Fall    ASSESSMENT  Based on the objective data described below, the patient presents with decreased endurance, O2 sat and activity tolerance this session. Pt was seated at bedside chair upon arrival and reported that her stomach hurt. Nurse was notified and gave tums. Pt was assisted to bedside commode stand pivot with RW with CGA to min assist.  Pt was able to have a extensive BM and then reported feeling better. She was able to laterally shift in seated position to wipe but needed min assist for thoroughness in standing. Pt needed to return to seated at bedside chair to wash hands due to decreased endurance.   Pt is far from her baseline and will need SNF for rehab at discharge. Pt was on 5L NC this session with O2 sat of 95% at rest and 86% with activity. O2 sat does improve over time with sitting. At baseline pt is on 2L NC and reports that her O2 sat is at the lowest 82% at home with ADLS/mobility    Current Level of Function Impacting Discharge (ADLs/self-care): CGA to min assist stand pivot transfer with RW    Feeding: Setup    Oral Facial Hygiene/Grooming: Setup    Bathing: Moderate assistance    Upper Body Dressing: Setup    Lower Body Dressing: Moderate assistance    Toileting: Moderate assistance    Functional Outcome Measure: The patient scored 50/100 on the barthel outcome measure which is indicative of moderate decline in mobility and ADLS. Other factors to consider for discharge: none     Patient will benefit from skilled therapy intervention to address the above noted impairments. PLAN :  Recommendations and Planned Interventions: self care training, functional mobility training, therapeutic exercise, balance training, therapeutic activities, patient education, home safety training, and family training/education    Frequency/Duration: Patient will be followed by occupational therapy 4 times a week to address goals. Recommendation for discharge: (in order for the patient to meet his/her long term goals)  Therapy up to 5 days/week in SNF setting    This discharge recommendation:  Has been made in collaboration with the attending provider and/or case management    IF patient discharges home will need the following DME: none       SUBJECTIVE:   Patient stated If you knew in your 20's, what it would be like in your 90's, you would want to commit suicide. This is rough.   Pt was joking with therapist    OBJECTIVE DATA SUMMARY:   HISTORY:   Past Medical History:   Diagnosis Date    Acute renal failure (ARF) ([facility] [address] 6/4/2017    Arthritis     generalized    Bronchitis, acute 6/4/2017    COPD (chronic obstructive pulmonary disease) ([facility] [address]     Essential hypertension 9/4/2018    former smoker      >60pkyr quit 1/3/11    History of DVT (deep vein thrombosis) 1955    left leg    History of sepsis 06/04/2017    ischemic left lower extremitiy pain     above knee FemPop 1/3/11    PVD (peripheral vascular disease) ([facility] [address]     Seasonal allergic rhinitis     Single kidney     Sinus bradycardia 7/28/2018     Past Surgical History:   Procedure Laterality Date    HX GI  10 yrs ago    colonoscopy    HX GYN      3 miscarriges    HX GYN      1 vaginal birth    [de-identified] ORTHOPAEDIC      veinography left leg, stent left leg    IL BREAST SURGERY PROCEDURE UNLISTED  1955    excision left breast cyst    UPPER GI ENDOSCOPY,BALL DIL,30MM  5/11/2022         UPPER GI ENDOSCOPY,BIOPSY  5/11/2022            Expanded or extensive additional review of patient history:     Home Situation  Home Environment: Private residence  # Steps to Enter: 3  Rails to Enter: Yes  Hand Rails : Right  One/Two Story Residence: One story  Living Alone: Yes  Support Systems: Child(genna),Caregiver/Home Care Staff  Patient Expects to be Discharged to[de-identified] Home with home health  Current DME Used/Available at Home:  (pulse ox, 2L NC 24/7)  Tub or Shower Type:  (sponge bathing due to LLE wound)    Hand dominance: Right    EXAMINATION OF PERFORMANCE DEFICITS:  Cognitive/Behavioral Status:                 Safety/Judgement: Fall prevention; Insight into deficits    Skin: left LLE wound      Hearing: Auditory  Auditory Impairment: None    Vision/Perceptual:                           Acuity: Within Defined Limits         Range of Motion:    AROM: Generally decreased, functional  PROM: Generally decreased, functional                      Strength:    Strength: Generally decreased, functional                Coordination:  Coordination: Generally decreased, functional (weakness)  Fine Motor Skills-Upper: Left Intact; Right Intact    Gross Motor Skills-Upper: Left Intact; Right Intact    Tone & Sensation:    Tone: Normal  Sensation: Intact                      Balance:   Good seated balance  Fair dynamic standing balance and good static balance with RW for support    Functional Mobility and Transfers for ADLs:  Bed Mobility:  Rolling:  (seated at bedside chair upon arrival)  Scooting: Supervision    Transfers:  Sit to Stand: Minimum assistance  Stand to Sit: Contact guard assistance  Bed to Chair: Contact guard assistance;Minimum assistance  Bathroom Mobility:  (unable at this time)  Toilet Transfer : Contact guard assistance;Minimum assistance (with RW)    ADL Assessment:  Feeding: Setup    Oral Facial Hygiene/Grooming: Setup    Bathing: Moderate assistance    Upper Body Dressing: Setup    Lower Body Dressing: Moderate assistance    Toileting: Moderate assistance                ADL Intervention and task modifications:         Cognitive Retraining  Safety/Judgement: Fall prevention; Insight into deficits     Functional Measure:    Barthel Index:  Bathin  Bladder: 5  Bowels: 10  Groomin  Dressin  Feeding: 10  Mobility: 0  Stairs: 0  Toilet Use: 5  Transfer (Bed to Chair and Back): 10  Total: 50/100      The Barthel ADL Index: Guidelines  1. The index should be used as a record of what a patient does, not as a record of what a patient could do. 2. The main aim is to establish degree of independence from any help, physical or verbal, however minor and for whatever reason. 3. The need for supervision renders the patient not independent. 4. A patient's performance should be established using the best available evidence. Asking the patient, friends/relatives and nurses are the usual sources, but direct observation and common sense are also important. However direct testing is not needed. 5. Usually the patient's performance over the preceding 24-48 hours is important, but occasionally longer periods will be relevant.   6. Middle categories imply that the patient supplies over 50 per cent of the effort. 7. Use of aids to be independent is allowed. Score Interpretation (from [facility])    Independent   60-79 Minimally independent   40-59 Partially dependent   20-39 Very dependent   <20 Totally dependent     -Oswaldo Elam., Barthel, D.W. (1965). Functional evaluation: the Barthel Index. [address] ([address] (). The Barthel activities of daily living index: self-reporting versus actual performance in the old (> or = 75 years). Journal of [address] [place] [address] 45(7), [facility], J...F, Quintin Sanchez., Steve Posey. (1999). Measuring the change in disability after inpatient rehabilitation; comparison of the responsiveness of the Barthel Index and Functional [place] Measure. Journal of Neurology, Neurosurgery, and Psychiatry, 66(4), 214-878. Chika Rutledge, N.J.A, JAGDISH Andersen, & Javan Carlisle M.A. (2004) Assessment of post-stroke quality of life in cost-effectiveness studies: The usefulness of the Barthel Index and the EuroQoL-5D. Quality of Life Research, 15, 134-36         Occupational Therapy Evaluation Charge Determination   History Examination Decision-Making   MEDIUM Complexity : Expanded review of history including physical, cognitive and psychosocial  history  MEDIUM Complexity : 3-5 performance deficits relating to physical, cognitive , or psychosocial skils that result in activity limitations and / or participation restrictions MEDIUM Complexity : Patient may present with comorbidities that affect occupational performnce.  Miniml to moderate modification of tasks or assistance (eg, physical or verbal ) with assesment(s) is necessary to enable patient to complete evaluation       Based on the above components, the patient evaluation is determined to be of the following complexity level: MEDIUM  Pain Ratin/10    Activity Tolerance:   Poor, desaturates with exertion and requires oxygen, requires frequent rest breaks, and observed SOB with activity    After treatment patient left in no apparent distress:    Sitting in chair and Call bell within reach    COMMUNICATION/EDUCATION:   The patients plan of care was discussed with: Physical therapist, Registered nurse, and patient . Patient/family have participated as able in goal setting and plan of care. and Patient/family agree to work toward stated goals and plan of care. This patients plan of care is appropriate for delegation to [facility].     Thank you for this referral.  Inga Alexander OTR/L  Time Calculation: 24 mins

## 2022-05-17 NOTE — PROGRESS NOTES
Pulmonary, Critical Care, and Sleep Medicine    Name: Cheli Goff MRN: [id number]   : 11/10/1931 Hospital: [facility]   Date: 2022        IMPRESSION:   · Acute/chronic hypoxic respiratory failure - on 2 LPM home O2 at baseline  · COPD - FEV1 0.94 L (55% predicted) - with acute exacerbation - on Anoro, TIW azithromycin at home  · Esophageal dysmotility with likely stricture in need of dilation  · Right pleural effusion likely parapneumonic- with RLL consolidation and compressive atelectasis on my review of chest CT  · Probable aspiration pneumonitis  · Suspect there is some contribution of her esophageal dysmotility to exacerbating her COPD symptoms, may have episodes of aspiration as well   · Peripheral vascular disease  · H/O tobacco use      RECOMMENDATIONS:   · O2 - wean back to her home dose of 2 LPM as tolerated  · Ultrasound guided thoracentesis- d/w pt and   · Pulmonary toilet  · Bronchodilators   · Systemic steroids  · GI recommended outpatient manometry  · Should have another CXR at some point to make sure right effusion improves  · Restart Anoro and MWF Azithromycin at the time of discharge (note she is on Anoro, not Trelegy as listed in her PTA meds)  · F/u with Dr. Luis Antonio Ruiz     Subjective:     22: CTA without PE. Has a small pleural effusion. Pt reminded to eat carefully. Pt still with mild congestion. No chest pain. Wonder if previous discomfort was from pleural irritation, hence effusion. Lives alone. Working with PT. Channing Mast Notes and data reviewed. Now on 4-5 LPM. Unable to wean. Up in recliner. 22: Events of weekend reviewed. Discussed with , nursing. Notes and data reviewed. Now on 4 LPM. Up in recliner. She says she was told she might have a blood clot? No wheeze today. congested cough. Pt reports leg sores. 22: dyspnea at baseline.   She says she was told she is having a procedure today, though I don't know what that is as I don't see anything scheduled. 5-12-22: tolerated her EGD without issue. She has nasal congestion and drip which is making her cough. 5-11-22: no events. Feels a bit better, down to 3 LPM.  EGD planned for today. Initial history: This patient has been seen and evaluated at the request of Dr. Jose Cruz Daugherty for preop evaluation. Patient is a 80 y.o. female former smoker with COPD. She is well known to me. She was admitted 4 days ago with an exacerbation characterized by the sudden onset of dyspnea and wheezing beyond her baseline. She is on 2 LPM at baseline and required 4 here. She improved, but she was struggling with dysphagia. 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 After her swallow evaluation she has had increasing dyspnea again. Cough is currently nonproductive. Past Medical History:   Diagnosis Date    Acute renal failure (ARF) ([address] 6/4/2017    Arthritis     generalized    Bronchitis, acute 6/4/2017    COPD (chronic obstructive pulmonary disease) ([address]     Essential hypertension 9/4/2018    former smoker      >60pkyr quit 1/3/11    History of DVT (deep vein thrombosis) 1955    left leg    History of sepsis 06/04/2017    ischemic left lower extremitiy pain     above knee FemPop 1/3/11    PVD (peripheral vascular disease) ([address]     Seasonal allergic rhinitis     Single kidney     Sinus bradycardia 7/28/2018      Past Surgical History:   Procedure Laterality Date    HX GI  10 yrs ago    colonoscopy    HX GYN      3 miscarriges    HX GYN      1 vaginal birth    HX ORTHOPAEDIC      veinography left leg, stent left leg    TX BREAST SURGERY PROCEDURE UNLISTED  1955    excision left breast cyst    UPPER GI ENDOSCOPY,BALL DIL,30MM  5/11/2022         UPPER GI ENDOSCOPY,BIOPSY  5/11/2022           Prior to Admission medications    Medication Sig Start Date End Date Taking?  Authorizing Provider   calcium carb/magnesium hydrox (MYLANTA PO) Take 15-30 mL by mouth two (2) times daily as needed for PRN Reason (Other) (constipation). Yes Provider, Historical   acetaminophen (TYLENOL) 500 mg tablet Take 1,000 mg by mouth every six (6) hours as needed for Pain. Yes Provider, Historical   azelastine (ASTEPRO) 205.5 mcg (0.15 %) 2 Sprays by Both Nostrils route daily. Provider, Historical   fluticasone propionate (FLONASE) 50 mcg/actuation nasal spray 2 Sprays by Both Nostrils route daily. Provider, Historical   busPIRone (BUSPAR) 5 mg tablet Take 1 Tab by mouth every evening. Patient not taking: Reported on 4/20/2022 12/23/21   Miguel Gómez MD   calcium carbonate (TUMS PO) Take 1 Tablet by mouth three (3) times daily as needed for Other (indigestion). Patient not taking: Reported on 4/20/2022    Provider, Historical   fluticasone-umeclidinium-vilanterol (Trelegy Ellipta) 100-62.5-25 mcg inhaler Take 1 Puff by inhalation daily. Provider, Historical   simethicone (MYLANTA GAS PO) Take  by mouth daily as needed. Patient not taking: Reported on 4/20/2022    Provider, Historical   OXYGEN-AIR DELIVERY SYSTEMS 2 L by Nasal route continuous. Provider, Historical   acetaminophen (TYLENOL) 325 mg tablet Take 2 Tabs by mouth every four (4) hours as needed for Pain or Fever. 12/21/19   Nayely Barrios MD   ipratropium-albuterol (COMBIVENT RESPIMAT)  mcg/actuation inhaler Take 1 Puff by inhalation four (4) times daily. Provider, Historical   loratadine (CLARITIN) 10 mg tablet Take 10 mg by mouth daily. Provider, Historical   azithromycin (ZITHROMAX) 250 mg tablet Take 250 mg by mouth every Monday, Wednesday, Friday. Indications: prevent lung infections    Provider, Historical   aspirin delayed-release 81 mg tablet Take 162 mg by mouth daily.     Provider, Historical     Allergies   Allergen Reactions    Food Extracts Diarrhea     Onions and garlic causes abdominal pain,cramping     Amlodipine Swelling    Sulfa (Sulfonamide Antibiotics) Other (comments)     Altered Mental Status      Social History     Tobacco Use    Smoking status: Former Smoker     Packs/day: 1.00     Years: 60.00     Pack years: 60.00     Quit date: 1/3/2011     Years since quittin.3    Smokeless tobacco: Never Used   Substance Use Topics    Alcohol use: Yes     Comment: occasional liquor after dinner      Family History   Problem Relation Age of Onset    Colon Cancer Mother     Lung Cancer Sister         Current Facility-Administered Medications   Medication Dose Route Frequency    guaiFENesin ER (MUCINEX) tablet 1,200 mg  1,200 mg Oral Q12H    enoxaparin (LOVENOX) injection 40 mg  40 mg SubCUTAneous DAILY    methylPREDNISolone (PF) (SOLU-MEDROL) injection 40 mg  40 mg IntraVENous Q8H    azithromycin (ZITHROMAX) tablet 250 mg  250 mg Oral Q MON, WED & FRI    sodium chloride (NS) flush 5-40 mL  5-40 mL IntraVENous Q8H    albuterol-ipratropium (DUO-NEB) 2.5 MG-0.5 MG/3 ML  3 mL Nebulization QID RT    aspirin delayed-release tablet 162 mg  162 mg Oral DAILY    fluticasone propionate (FLONASE) 50 mcg/actuation nasal spray 2 Spray  2 Spray Both Nostrils DAILY    arformoteroL (BROVANA) neb solution 15 mcg  15 mcg Nebulization BID RT    And    budesonide (PULMICORT) 250 mcg/2ml nebulizer susp  250 mcg Nebulization BID RT       Review of Systems:  A comprehensive review of systems was negative except for that written in the HPI. Objective:   Vital Signs:    Visit Vitals  /67 (BP 1 Location: Left upper arm, BP Patient Position: At rest)   Pulse 68   Temp 99.1 °F (37.3 °C)   Resp 16   Ht 5' 8\" (1.727 m)   Wt 56.7 kg (125 lb)   SpO2 98%   BMI 19.01 kg/m²       O2 Device: Nasal cannula   O2 Flow Rate (L/min): 5 l/min   Temp (24hrs), Av.2 °F (36.8 °C), Min:97.5 °F (36.4 °C), Max:99.1 °F (37.3 °C)       Intake/Output:   Last shift:      No intake/output data recorded. Last 3 shifts: No intake/output data recorded.   No intake or output data in the 24 hours ending 05/17/22 1033   Physical Exam:   General:  Alert, cooperative, no distress, frail   Head:  Normocephalic, without obvious abnormality, atraumatic. Eyes:  Conjunctivae/corneas clear. Nose: Nares normal. Septum midline. Mucosa normal.     Throat: Lips, mucosa, and tongue normal. Teeth and gums normal.   Neck: Supple, symmetrical, trachea midline, no adenopathy    Lungs:   Poor bilateral air movement, wheezing is improved   Chest wall:  No tenderness or deformity. Heart:  Regular rate and rhythm    Abdomen:   Soft, non-tender. Bowel sounds normal.     Extremities: Extremities normal, atraumatic, no cyanosis    Skin: Skin color, texture, turgor normal. No rashes or lesions   Neurologic: Grossly nonfocal     Data:   Labs:  Recent Labs     05/16/22  0156 05/15/22  0630   WBC 8.3 10.1   HGB 11.7 11.4*   HCT 38.6 38.5   * 125*     Recent Labs     05/17/22  0456 05/16/22  0156 05/15/22  0630    140 143   K 4.3 4.1 3.7   CL 99 101 98   CO2 42* 40* 42*   GLU 99 132* 103*   BUN 31* 36* 37*   CREA 0.72 0.73 0.81   CA 8.9 9.0 9.2   ALB  --  2.7* 2.7*   TBILI  --  0.8 0.7   ALT  --  18 17     No results for input(s): PHI, PCO2I, PO2I, HCO3I, FIO2I in the last 72 hours.     Imaging:  I have personally reviewed the patients radiographs:  None today        Linda Dominguez MD

## 2022-05-17 NOTE — PROGRESS NOTES
Hospitalist Progress Note                            NAME:  Todd Carmen  :  11/10/1931  MRN:  [id number]  Date of Service:  2022     Estimated discharge date: 2022    Barriers: awaiting SNF bed    Assessment/Plan:  Acute exacerbation of COPD POA  Acute on chronic respiratory failure with hypoxia POA  Right pleural effusion with lower loBe atelectasis  Baseline on 2 liters NC, presented with worsening hypoxia  Chest x-ray with right pleural effusion  D-dimer elevated, CTA with no PE  CTA chest results done with prior PE and D-dimer > 5.0  mod sized right pleural effusion with infiltrate and atelectasis at the right lung base  There is a small left pleural reaction. There is no shift or pneumothorax. No mediastinal masses or adenopathy. The aorta show tortuosity no focal findings. There is no evidence for pulmonary emboli. Prominent emphysema once again demonstrated. IMPRESSION  1. Right lung base infiltrate with effusion. 2. No pulmonary emboli. 3. COPD. Received a 5 day course of levaquin (renally dosed)       ? Treated CAP at right base        Procalcitonin < 0.05 on 5/15/2022  Wheezing resolved, overall improved            NS still reports GODINEZ  Able to wean to till on 3-5 liters  PT/OT re eval done and recs SNF       pt lives alone at day time and she feels too weak and she can not walk   Holding lasix as HCO3 increasing        PO lasix resumed   ? Element of chronic aspiration  Thoracentesis today to drain right effusion, ? May benefot  Pt asking to increase diet, try dysphagia diet  Updated son 2022 by phone    Dysphagia, subacute,   S/p esophageal dilation on   started a week PTA, w solid/liquids  Esophagogram w no peristalsis  GI consulted,   S/p EGD on      1. Dilated esophagus with tortuosity. Dilated to 20 mm  2. Erythema at the z-line, biopsied  3.  Normal stomach  4 Normal duodenum  plan to do manometry as outpatient  Clear liquids advanced to dysphagia diet    Hyperkalemia:  Mentions this is a chronic issue  resolved     Essential hypertension POA  Peripheral vascular disease POA  Moderate AS POA  Severe TR  Mild to mod MR  F/u echo in 6 months  Echo TTE  Left Ventricle Normal left ventricular systolic function with a visually estimated EF of 50 - 55%. Left ventricle size is normal. Normal wall thickness. Global hypokinesis present. Normal diastolic function. Left Atrium Left atrium size is normal.   Right Ventricle Right ventricle size is normal. Normal systolic function. Right Atrium Right atrium size is normal.   Aortic Valve Valve structure is normal. Thickened cusp. Calcified cusp. No regurgitation. Moderate stenosis of the aortic valve. Mitral Valve Thickened leaflet. Mild to moderate regurgitation. Mild stenosis noted. Tricuspid Valve Valve structure is normal. Severe regurgitation. No stenosis noted. Pulmonic Valve Valve structure is normal. No regurgitation. No stenosis noted. Aorta Normal sized aorta. Pericardium No pericardial effusion. Cardio f/u as outpt  Resume home meds    Left lower extremity wound  Cont wound care/wound care consult, recs : \"Every other day on Even days - Cleanse the wound with Caraklenz spray and wipe with 4x4's. Apply the Silvasorb wound hydrogel,  Optifoam Ag (silver) and then an ABD. Wrapped with a small roll therese and tape on the therese only. Date the dressing\"    Elevated lactic acid s/p IVF hydration. Resolved  D/c NSIVF     Code Status: DNR as per her wishes  Surrogate Decision Maker:  DVT Prophylaxis: Lovenox  GI Prophylaxis: not indicated        Interval History/Subjective:   Fu copd /dysphagia  \"it still feels like something is caught in my throat\"  Back on 4-5 liters O2  Not SOB today  CTA chest reviewed with Dr Maureen Hardy, right effusion, sent for thoracentesis  Dry cough    No CP or abdominal pain, N/V or diarrhea or headache    Objective:     VITALS:   Last 24hrs VS reviewed since prior progress note. Most recent are:  Patient Vitals for the past 24 hrs:   Temp Pulse Resp BP SpO2   05/17/22 1612  68 20 134/60 96 %   05/17/22 1603  81 22 (!) 151/61 96 %   05/17/22 1553  70 22 (!) 155/61 96 %   05/17/22 1530 98.9 °F (37.2 °C) 69 22 (!) 145/70 99 %   05/17/22 1501 98.6 °F (37 °C) 70 16 126/68 93 %   05/17/22 1204     (!) 87 %   05/17/22 1129 98.2 °F (36.8 °C) 64 14 (!) 150/76 95 %   05/17/22 0739 99.1 °F (37.3 °C) 68 16 131/67 98 %   05/17/22 0415 98 °F (36.7 °C) 72 16 138/72 97 %   05/17/22 0008 98.4 °F (36.9 °C) 74 16 (!) 147/74 97 %   05/16/22 1959 98.5 °F (36.9 °C) 65 16 (!) 157/79 95 %         Intake/Output Summary (Last 24 hours) at 5/17/2022 1950  Last data filed at 5/17/2022 1730  Gross per 24 hour   Intake    Output 350 ml   Net -350 ml        PHYSICAL EXAM:  General: No acute distress, cooperative, pleasant. Speaking in full sentences  EENT: Anicteric sclerae. Oral mucous moist, oropharynx benign and no oral thrush  Resp: Decreased BS bilaterally, no wheezing or rales No accessory muscle use  CV: Regular rhythm, normal rate, no murmurs, gallops, rubs  GI: Soft, non distended, non tender. normoactive bowel sounds, no hepatosplenomegaly Extremities: No edema, warm, 2+ pulses throughout. dressing on RLE  Neurologic: Moves all extremities. Alert, talking, CN II-XII grossly intact  Psych: ot anxious nor agitated. Skin: Good Turgor, no rashes. Left lower extremity in dressing    Lab Data Personally Reviewed: (see below)     Medications list Personally Reviewed:  x YES  NO     _______________________________________________________________________  Care Plan discussed with:       Nba Conway MD     Procedures: see electronic medical records for all procedures/Xrays and details which were not copied into this note but were reviewed prior to creation of Plan.       LABS:  Recent Labs     05/16/22  0156 05/15/22  0630   WBC 8.3 10.1   HGB 11.7 11.4*   HCT 38.6 38.5   * 125*     Recent Labs     05/17/22  0456 05/16/22  0156 05/15/22  0630    140 143   K 4.3 4.1 3.7   CL 99 101 98   CO2 42* 40* 42*   BUN 31* 36* 37*   CREA 0.72 0.73 0.81   GLU 99 132* 103*   CA 8.9 9.0 9.2     Recent Labs     05/16/22  0156 05/15/22  0630   ALT 18 17   AP 40* 40*   TBILI 0.8 0.7   TP 5.2* 5.1*   ALB 2.7* 2.7*   GLOB 2.5 2.4     No results for input(s): INR, PTP, APTT, INREXT, INREXT in the last 72 hours. No results for input(s): FE, TIBC, PSAT, FERR in the last 72 hours. No results found for: FOL, RBCF   No results for input(s): PH, PCO2, PO2 in the last 72 hours. No results for input(s): CPK, CKNDX, TROIQ in the last 72 hours.     No lab exists for component: CPKMB  Lab Results   Component Value Date/Time    Cholesterol, total 167 11/11/2021 03:23 PM    HDL Cholesterol 81 11/11/2021 03:23 PM    LDL, calculated 59.6 11/11/2021 03:23 PM    Triglyceride 132 11/11/2021 03:23 PM    CHOL/HDL Ratio 2.1 11/11/2021 03:23 PM     Lab Results   Component Value Date/Time    Glucose (POC) 103 01/03/2011 07:03 AM     Lab Results   Component Value Date/Time    Color DARK YELLOW 06/04/2017 02:52 AM    Appearance OPAQUE (A) 06/04/2017 02:52 AM    Specific gravity 1.026 06/04/2017 02:52 AM    Specific gravity >1.030 (H) 03/11/2011 03:47 PM    pH (UA) 5.0 06/04/2017 02:52 AM    Protein 100 (A) 06/04/2017 02:52 AM    Glucose NEGATIVE  06/04/2017 02:52 AM    Ketone 15 (A) 06/04/2017 02:52 AM    Bilirubin NEGATIVE  03/11/2011 03:47 PM    Urobilinogen 1.0 06/04/2017 02:52 AM    Nitrites NEGATIVE  06/04/2017 02:52 AM    Leukocyte Esterase MODERATE (A) 06/04/2017 02:52 AM    Epithelial cells MANY (A) 06/04/2017 02:52 AM    Bacteria 2+ (A) 06/04/2017 02:52 AM    WBC 20-50 06/04/2017 02:52 AM    RBC 5-10 06/04/2017 02:52 AM

## 2022-05-17 NOTE — PROGRESS NOTES
Problem: Mobility Impaired (Adult and Pediatric)  Goal: *Acute Goals and Plan of Care (Insert Text)  Description: FUNCTIONAL STATUS PRIOR TO ADMISSION: Patient was modified independent using a rolling walker for functional mobility. Wears 2L of oxygen at baseline    [address]: Patient lived alone with a son that lives locally. Pt reported she has a caregiver that stays with her at night 11pm-9am and she prepares breakfast for her    Physical Therapy Goals  Revised 5/17/2022  1. Patient will move from supine to sit and sit to supine , scoot up and down, and roll side to side in bed with modified independence within 7 day(s). 2.  Patient will transfer from bed to chair and chair to bed with supervision/set-up using the least restrictive device within 7 day(s). 3.  Patient will perform sit to stand with supervision/set-up within 7 day(s). 4.  Patient will ambulate with supervision/set-up for 20 feet with the least restrictive device within 7 day(s). Physical Therapy Goals  Initiated 5/7/2022  1. Patient will move from supine to sit and sit to supine  in bed with modified independence within 7 day(s). 2.  Patient will transfer from bed to chair and chair to bed with modified independence using the least restrictive device within 7 day(s). 3.  Patient will perform sit to stand with modified independence within 7 day(s). 4.  Patient will ambulate with modified independence for 50 feet with the least restrictive device within 7 day(s). 5.  Patient will ascend/descend 3 stairs with single handrail(s) with supervision/set-up within 7 day(s). Outcome: Progressing Towards Goal     PHYSICAL THERAPY WEEKLY RE-ASSESSMENT  Patient:  Beata Stockton (80 y.o. female)  Date: 5/17/2022  Diagnosis: COPD exacerbation (HCC) [J44.1] <principal problem not specified>  Procedure(s) (LRB):  ESOPHAGOGASTRODUODENOSCOPY (EGD) (N/A)  ESOPHAGEAL DILATION (N/A)  ESOPHAGOGASTRODUODENAL (EGD) BIOPSY (N/A) 6 Days Post-Op  Precautions: Fall  Chart, physical therapy assessment, plan of care and goals were reviewed. ASSESSMENT  Nurse clears pt for mobility. Patient continues with skilled PT services and is slowly progressing towards goals. Goals revised this date to reflect current mobility status. She is with c/o of a \"stomach ache\" on arrival and requesting to use the [facility]. Pt assisted with this task. .. she had a large/soft BM (nurse notified). .. assist with hygiene care (see OT note). Pt is exhausted afterwards and unable to complete further efforts/gait. Pt with decrease in O2 sats. To 86% on 5L NC with BSC transfer and recovers to 92% with seated rest, HR also increased to 112 bpm with task. Continue to follow. Current Level of Function Impacting Discharge (mobility/balance): bed mob. S ; transfers CGA/min. ; gait with RW min. Other factors to consider for discharge: pt continues with increased O2 demands         PLAN :  Patient continues to benefit from skilled intervention to address the above impairments. Continue treatment per established plan of care. to address goals. Recommendation for discharge: (in order for the patient to meet his/her long term goals)  Therapy up to 5 days/week in SNF setting    This discharge recommendation:  Has been made in collaboration with the attending provider and/or case management    IF patient discharges home will need the following DME: to be determined (TBD)       SUBJECTIVE:   Patient stated I can't do much. .. my stomach hurts. .. I need to use the bathroom.     OBJECTIVE DATA SUMMARY:   Critical Behavior:  Neurologic State: Alert  Orientation Level: Oriented X4  Cognition: Appropriate decision making,Appropriate for age attention/concentration,Appropriate safety awareness,Follows commands  Safety/Judgement: Fall prevention,Insight into deficits  Functional Mobility Training:  Bed Mobility:  Rolling:  (seated at bedside chair upon arrival) Scooting: Supervision        Transfers:  Sit to Stand: Minimum assistance  Stand to Sit: Contact guard assistance        Bed to Chair: Contact guard assistance;Minimum assistance                    Balance:  Sitting: Intact  Standing: Impaired  Standing - Static: Constant support; Fair (RW)  Standing - Dynamic : Constant support; Fair (RW)    Ambulation/Gait Training:  Distance (ft): 6 Feet (ft) (3ft x 2 (bedside chair to [facility] to bedside chair))  Assistive Device: Gait belt;Walker, rolling  Ambulation - Level of Assistance: Minimal assistance        Gait Abnormalities: Decreased step clearance        Base of Support: Narrowed     Speed/Paige: Shuffled  Step Length: Left shortened;Right shortened        Interventions: Safety awareness training; Tactile cues; Verbal cues       Pain Rating:  No c/o pain    Activity Tolerance:   Fair, Poor, desaturates with exertion and requires oxygen, requires frequent rest breaks, and observed SOB with activity    After treatment patient left in no apparent distress:   Sitting in chair, Call bell within reach, and nurse notified     COMMUNICATION/COLLABORATION:   The patients plan of care was discussed with: Occupational therapist and Registered nurse.      Jessica Terrell PT, DPT   Time Calculation: 28 mins

## 2022-05-17 NOTE — PROGRESS NOTES
Transition of Care Plan:     RUR: 15% - medium  Disposition: SNF - referrals pending, family not agreeable to [facility]   Follow up appointments: [facility] to [facility]  DME needed: Defer to SNF  Transportation at [address] or means to access home: Yes      IM Medicare Letter: To be given prior to d/c   Is patient a BCPI-A Bundle: N/A                     If yes, was Bundle Letter given?:    Is patient a  and connected with the [facility]? N/A               If yes, was [employer] transfer form completed and [facility] notified? Caregiver Contact: Damon Alvarado Tesfaye, Phone: [phone]  Discharge Caregiver contacted prior to discharge? CM will contact the patient's son if the patient requests this   Care Conference needed?: No     Update 12:43 PM: CM spoke with [address] [place] [address] [phone] - facility may have bed available for pt tomorrow 5/18. Facility needs to know if pt is boosted. Per son, pt is not boosted. CM to update Matt Nails. Initial Note: CM rec'd a phone call from pt's son indicating that family is no longer agreeable to [facility]. Referrals to [facility], [facility], [facility], and [address] [place] [address] are pending. Unit CM to follow for dcp.      Rodolfo Salvador, MSW  Care Manager, [facility]

## 2022-05-18 NOTE — PROGRESS NOTES
Transition of Care Plan:     RUR: 15% - medium  Disposition: [facility]   Room #: 290P   Report Phone: [phone]  Follow up appointments: Defer to [facility]  DME needed: Defer to SNF  Transportation at Discharge: [facility] at 4:30 PM  Deputy or means to access home: Yes       Medicare Letter: Provided  Is patient a BCPI-A Bundle: N/A                     If yes, was Bundle Letter given?:    Is patient a  and connected with the [facility]? N/A               If yes, was [place] transfer form completed and VA notified? Caregiver Contact: Gene Chloe Hamman, Phone: [phone]  Discharge Caregiver contacted prior to [address] needed?: No     Initial Note: Chart reviewed. [facility] accepted for SNF and has bed availability today. CM phoned son Damon [phone] to update - son is agreeable. CM met with pt at bedside to review dc plan. Pt is agreeable to dc and expresses no additional questions or concerns. No further CM needs identified. Transition of Care Plan to SNF/Rehab    Communication to Patient/Family:  The Patient and/or patient representative was provided with a choice of provider and agrees  with the discharge plan. Yes [x] No []    A Freedom of choice list was provided with basic dialogue that supports the patient's individualized plan of care/goals and shares the quality data associated with the providers. Yes [x] No []    SNF/Rehab Transition:  Patient has been accepted to [facility] and meets criteria for admission. Patient will transported by [facility] and expected to leave at 4:30PM.    Communication to SNF/Rehab:  Bedside RN, Nancy Philippe, has been notified to update the transition plan to the facility and call report (phone number). [phone]  Discharge information has been updated on the AVS. And communicated to facility via [employer], or CC link. Discharge instructions to be fax'd to facility at [facility] #).     Nursing Please include all hard scripts for controlled substances, med rec and dc summary, and AVS in packet. Reviewed and confirmed with facility, [facility], can manage the patient care needs for the following:     Chris Amezquita with (X) only those applicable:  Medication:  [x]Medications are available at the facility  []IV Antibiotics    []Controlled Substance  hard copies available sent. []Weekly Labs    Equipment:  []CPAP/BiPAP  []Wound Vacuum  []Peterson or Urinary Device  []PICC/Central Line  []Nebulizer  []Ventilator    Treatment:  []Isolation (for MRSA, VRE, etc.)  []Surgical Drain Management  []Tracheostomy Care  []Dressing Changes  []Dialysis with transportation  []PEG Care  [x]Oxygen  []Daily Weights for Heart Failure    Dietary:  [x]Any diet limitations: ADULT DIET Dysphagia - soft and bite sized  []Tube Feedings   []Total Parenteral Management (TPN)    Financial Resources:  []Medicaid Application Completed    []UAI Completed and copy given to pt/family  and copy given to pt/family  []A screening has previously been completed. []Level II Completed    [x] Private pay individual who will not become   financially eligible for Medicaid within 6 months from admission to a [address] [place] [address]. [] Individual refused to have screening conducted. []Medicaid Application Completed    []The screening denied because it was determined individual did not need/did not qualify for nursing facility level of care. [] Out of state residents seeking direct admission to a [address] facility.   [] Individuals who are inpatients of an out of state hospital, or in state or out of state veterans/ hospital and seek direct admission to a [address] facility  [] Individuals who are pateints or residents of a state owned/operated facility that is licensed by [employer] and seek direct admission to [address] [place] [address]  [] A screening not required for enrollment in [facility] services as set out in [facility]-  [] [facility]) staff shall perform screenings of the [facility] clients. Advanced Care Plan:  []Surrogate Decision Maker of Care  []POA  [x]Communicated Code Status and copy sent. Other:         Care Management Interventions  PCP Verified by CM: Yes  Palliative Care Criteria Met (RRAT>21 & CHF Dx)?: No  Mode of Transport at Discharge: [facility] Transport Time of Discharge: [address] 58 (CM Consult): SNF  Partner SNF: No  MyChart Signup: No  Discharge Durable Medical Equipment: No  Physical Therapy Consult: Yes  Occupational Therapy Consult: Yes  Speech Therapy Consult: Yes  Support Systems: Child(genna)  Confirm Follow Up Transport: Family  The Patient and/or Patient Representative was Provided with a Choice of Provider and Agrees with the Discharge Plan?: Yes  Name of the Patient Representative Who was Provided with a Choice of Provider and Agrees with the Discharge Plan:  Jennie Rene  [place] of Choice List was Provided with Basic Dialogue that Supports the Patient's Individualized Plan of Care/Goals, Treatment Preferences and Shares the Quality Data Associated with the Providers?: Yes  [place] Resource Information Provided?: No  Discharge Location  Patient Expects to be Discharged to[de-identified] [address], [address] [facility], [address] [place] [facility]  [phone]

## 2022-05-18 NOTE — PROGRESS NOTES
Problem: Self Care Deficits Care Plan (Adult)  Goal: *Acute Goals and Plan of Care (Insert Text)  Description: FUNCTIONAL STATUS PRIOR TO ADMISSION: ambulated with rolling walker with modified independence, performed ADLS on her own and was sponge bathing due to LLE wound, 2L NC 24/7 and O2 sats decrease to the lowest 82% with activity at baseline, performed light IADLS or family and cargivers assist    HOME SUPPORT PRIOR TO ADMISSION: The patient lived alone with paid caregivers and family to provide assistance. Occupational Therapy Goals:  Initiated 5/17/2022  1. Patient will perform grooming with supervision/set-up within 7 days. 2. Patient will perform upper body dressing and lower body dressing with supervision/set-up within 7 days. 3. Patient will perform toileting with supervision/set-up within 7 days. 4. Patient will transfer from toilet with supervision/set-up using the least restrictive device and appropriate durable medical equipment within 7 days. Outcome: Progressing Towards Goal   OCCUPATIONAL THERAPY TREATMENT  Patient: Devyn Meneses (80 y.o. female)  Date: 5/18/2022  Diagnosis: COPD exacerbation ([facility] [J44.1] <principal problem not specified>  Procedure(s) (LRB):  ESOPHAGOGASTRODUODENOSCOPY (EGD) (N/A)  ESOPHAGEAL DILATION (N/A)  ESOPHAGOGASTRODUODENAL (EGD) BIOPSY (N/A) 7 Days Post-Op  Precautions: Fall  Chart, occupational therapy assessment, plan of care, and goals were reviewed. ASSESSMENT  Patient continues with skilled OT services and is slowly progressing towards goals. Pt was seated at bedside chair upon arrival and reported feeling uncomfortable sitting. She was on 2L NC and O2 sat at rest was 93% and 86% with activity. 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 Increased timed needed to void and pt voided x4 seated with pure wick and was hesitant to stand up and transfer to [facility] due to frequency.   Xochitl Salinas assist for sit to stand and pt was able to remain standing for 30 seconds for padding to be changed. Min assist needed for lin care. Recommend SNF for rehab at discharge. Current Level of Function Impacting Discharge (ADLs): min assist sit to stand, min assist lin care    Other factors to consider for discharge: fall risk, decreased activity tolerance         PLAN :  Patient continues to benefit from skilled intervention to address the above impairments. Continue treatment per established plan of care to address goals. Recommend with staff: mobilize, to bedside commode with assist for toileting needs    Recommend next OT session: ADls, standing tolerance    Recommendation for discharge: (in order for the patient to meet his/her long term goals)  Therapy up to 5 days/week in SNF setting    This discharge recommendation:  Has been made in collaboration with the attending provider and/or case management    IF patient discharges home will need the following DME: none       SUBJECTIVE:   Patient stated I am so uncomfortable.     OBJECTIVE DATA SUMMARY:   Cognitive/Behavioral Status:  Neurologic State: Alert  Orientation Level: Oriented X4  Cognition: Follows commands (pt reports steriods are causing confusion, confusion noted)  Perception: Cues to maintain midline in standing  Perseveration: No perseveration noted       Functional Mobility and Transfers for ADLs:      Transfers:  Sit to Stand: Minimum assistance          Balance:  Sitting: Intact  Standing - Static: Fair;Constant support  Standing - Dynamic : Fair;Constant support    ADL Intervention:      Lower Body Dressing Assistance  Socks:  Total assistance (dependent)    Toileting  Bladder Hygiene: Minimum assistance           Pain:  0/10    Activity Tolerance:   Fair and Poor    After treatment patient left in no apparent distress:   Sitting in chair and Call bell within reach    COMMUNICATION/COLLABORATION:   The patients plan of care was discussed with: Physical therapy assistant, Registered nurse, and patient .      Calista Ag, OTR/L  Time Calculation: 23 mins

## 2022-05-18 NOTE — PROGRESS NOTES
Problem: Falls - Risk of  Goal: *Absence of Falls  Description: Document Marvney Mess Fall Risk and appropriate interventions in the flowsheet.   Outcome: Progressing Towards Goal  Note: Fall Risk Interventions:  Mobility Interventions: Patient to call before getting OOB    Mentation Interventions: Bed/chair exit alarm    Medication Interventions: Bed/chair exit alarm    Elimination Interventions: Call light in reach,Bed/chair exit alarm    History of Falls Interventions: Bed/chair exit alarm

## 2022-05-18 NOTE — PROGRESS NOTES
Problem: Mobility Impaired (Adult and Pediatric)  Goal: *Acute Goals and Plan of Care (Insert Text)  Description: FUNCTIONAL STATUS PRIOR TO ADMISSION: Patient was modified independent using a rolling walker for functional mobility. Wears 2L of oxygen at baseline    [address]: Patient lived alone with a son that lives locally. Pt reported she has a caregiver that stays with her at night 11pm-9am and she prepares breakfast for her    Physical Therapy Goals  Revised 5/17/2022  1. Patient will move from supine to sit and sit to supine , scoot up and down, and roll side to side in bed with modified independence within 7 day(s). 2.  Patient will transfer from bed to chair and chair to bed with supervision/set-up using the least restrictive device within 7 day(s). 3.  Patient will perform sit to stand with supervision/set-up within 7 day(s). 4.  Patient will ambulate with supervision/set-up for 20 feet with the least restrictive device within 7 day(s). Physical Therapy Goals  Initiated 5/7/2022  1. Patient will move from supine to sit and sit to supine  in bed with modified independence within 7 day(s). 2.  Patient will transfer from bed to chair and chair to bed with modified independence using the least restrictive device within 7 day(s). 3.  Patient will perform sit to stand with modified independence within 7 day(s). 4.  Patient will ambulate with modified independence for 50 feet with the least restrictive device within 7 day(s). 5.  Patient will ascend/descend 3 stairs with single handrail(s) with supervision/set-up within 7 day(s). Outcome: Not Progressing Towards Goal   PHYSICAL THERAPY TREATMENT  Patient:  Radha Fleming (80 y.o. female)  Date: 5/18/2022  Diagnosis: COPD exacerbation ([facility] [J44.1] <principal problem not specified>  Procedure(s) (LRB):  ESOPHAGOGASTRODUODENOSCOPY (EGD) (N/A)  ESOPHAGEAL DILATION (N/A)  ESOPHAGOGASTRODUODENAL (EGD) BIOPSY (N/A) 7 Days Post-Op  Precautions: Fall  Chart, physical therapy assessment, plan of care and goals were reviewed. ASSESSMENT  Patient continues with skilled PT services and is not progressing towards goals. Pt presents with decreased strength and endurance. Pt received on 2LPM. Pt reported wanting to adjust to get more comfortable in the chair. Pt performed sit to stand transfer at min A with additional time. Pt able to stand x1 min but very fatigued and o2 stats decreased to 80%. Pt able to recover to 90% after 5 mins. Current Level of Function Impacting Discharge (mobility/balance): sit to stand transfer at min A    Other factors to consider for discharge: decreased strength and endurance. PLAN :  Patient continues to benefit from skilled intervention to address the above impairments. Continue treatment per established plan of care. to address goals. Recommendation for discharge: (in order for the patient to meet his/her long term goals)  Therapy up to 5 days/week in SNF setting    This discharge recommendation:  Has been made in collaboration with the attending provider and/or case management    IF patient discharges home will need the following DME: to be determined (TBD)       SUBJECTIVE:   Patient stated  I keep peeing.     OBJECTIVE DATA SUMMARY:   Critical Behavior:  Neurologic State: Alert  Orientation Level: Oriented X4  Cognition: Follows commands (pt reports steriods are causing confusion, confusion noted)  Safety/Judgement: Fall prevention,Insight into deficits  Functional Mobility Training:  Bed Mobility:                    Transfers:  Sit to Stand: Minimum assistance  Stand to Sit: Contact guard assistance                             Balance:  Sitting: Intact  Standing - Static: Fair;Constant support  Standing - Dynamic : Fair;Constant support           Pain Rating:  Pt reported increased pain of left ankle     Activity Tolerance:   Fair, desaturates with exertion and requires oxygen, requires rest breaks, and observed SOB with activity    After treatment patient left in no apparent distress:   Sitting in chair, Call bell within reach, and Side rails x 3    COMMUNICATION/COLLABORATION:   The patients plan of care was discussed with: Occupational therapist and Registered nurse.      Ailyn Munoz PTA   Time Calculation: 23 mins

## 2022-05-18 NOTE — PROGRESS NOTES
Problem: Pressure Injury - Risk of  Goal: *Prevention of pressure injury  Description: Document Juan Scale and appropriate interventions in the flowsheet. Outcome: Progressing Towards Goal  Note: Pressure Injury Interventions:  Sensory Interventions: Assess changes in LOC    Moisture Interventions: Absorbent underpads    Activity Interventions: Assess need for specialty bed    Mobility Interventions: PT/OT evaluation    Nutrition Interventions: Document food/fluid/supplement intake    Friction and Shear Interventions: Apply protective barrier, creams and emollients                Problem: Patient Education: Go to Patient Education Activity  Goal: Patient/Family Education  Outcome: Progressing Towards Goal     Problem: Falls - Risk of  Goal: *Absence of Falls  Description: Document Tracy Fall Risk and appropriate interventions in the flowsheet.   Outcome: Progressing Towards Goal  Note: Fall Risk Interventions:  Mobility Interventions: Patient to call before getting OOB    Mentation Interventions: Bed/chair exit alarm    Medication Interventions: Bed/chair exit alarm    Elimination Interventions: Bed/chair exit alarm    History of Falls Interventions: Bed/chair exit alarm         Problem: Patient Education: Go to Patient Education Activity  Goal: Patient/Family Education  Outcome: Progressing Towards Goal     Problem: Patient Education: Go to Patient Education Activity  Goal: Patient/Family Education  Outcome: Progressing Towards Goal     Problem: Aspiration - Risk of  Goal: *Absence of aspiration  Outcome: Progressing Towards Goal     Problem: Patient Education: Go to Patient Education Activity  Goal: Patient/Family Education  Outcome: Progressing Towards Goal     Problem: Patient Education: Go to Patient Education Activity  Goal: Patient/Family Education  Outcome: Progressing Towards Goal

## 2022-05-18 NOTE — PROGRESS NOTES
Bedside and Verbal shift change report given to Brittney Mays (oncoming nurse) by Angely Castro RN (offgoing nurse). Report included the following information SBAR, Kardex, Intake/Output, MAR and Recent Results.

## 2022-05-18 NOTE — DISCHARGE SUMMARY
Hospitalist Discharge Summary     Patient ID:  Johnathan Shah  [id number]  80 y.o.  11/10/1931    PCP on record: Philip Suarez MD    Admit date: 5/6/2022  Discharge date and time: 5/18/2022      DISCHARGE DIAGNOSIS:    Acute exacerbation of COPD POA  Acute on chronic respiratory failure with hypoxia POA  Right pleural effusion with lower loBe atelectasis  Dysphagia, subacute,   S/p esophageal dilation on 05/11  Essential hypertension POA  Peripheral vascular disease POA  Moderate AS POA  Severe TR  Mild to mod MR      CONSULTATIONS:  IP CONSULT TO HOSPITALIST  IP CONSULT TO GASTROENTEROLOGY    Excerpted HPI from H&P of Harlan Rowe MD:    Enrico Barron is a 80 y.o. female with past medical history of COPD, hypertension, peripheral vascular disease presented with worsening shortness of breath for the last week. She started having worsening shortness of breath for the last week, this morning's care nurse noted that her oxygen saturation was 88% on 2 L oxygen. She also has a cough with a sputum production. She denies any chest pain, belly pain, change in bladder or bowel habits. She has some chronic sores in her left leg, mentions having intermittent swelling in both legs. She denies any fever or chills. Currently she is on 4 L oxygen and saturating well. At baseline she uses 2 L     ______________________________________________________________________  DISCHARGE SUMMARY/HOSPITAL COURSE:  for full details see H&P, daily progress notes, labs, consult notes.      Acute exacerbation of COPD POA  Acute on chronic respiratory failure with hypoxia POA  Right pleural effusion with lower loBe atelectasis  Baseline on 2 liters NC, presented with worsening hypoxia  Chest x-ray with right pleural effusion  D-dimer elevated, CTA with no PE  CTA chest results done with prior PE and D-dimer > 5.0  mod sized right pleural effusion with infiltrate and atelectasis at the right lung base  There is a small left pleural reaction. There is no shift or pneumothorax. No mediastinal masses or adenopathy. The aorta show tortuosity no focal findings. There is no evidence for pulmonary emboli. Prominent emphysema once again demonstrated. IMPRESSION  1. Right lung base infiltrate with effusion. 2. No pulmonary emboli. 3. COPD. Received a 5 day course of levaquin (renally dosed)       ? Treated CAP at right base        Procalcitonin < 0.05 on 5/15/2022  Wheezing resolved, overall improved            NS still reports GODINEZ  Able to wean to till on 3-5 liters  PT/OT re eval done and recs SNF       pt lives alone at day time and she feels too weak and she can not walk         PO lasix resumed 5/18  ? Element of chronic aspiration  S/p thoracentesis on 5/17 where 500 cc was removed     Dysphagia, subacute,   S/p esophageal dilation on 05/11  started a week PTA, w solid/liquids  Esophagogram w no peristalsis  GI consulted,   S/p EGD on 05/11     1. Dilated esophagus with tortuosity. Dilated to 20 mm  2. Erythema at the z-line, biopsied  3. Normal stomach  4 Normal duodenum  plan to do manometry as outpatient  dysphagia diet     Hyperkalemia:  Mentions this is a chronic issue  resolved     Essential hypertension POA  Peripheral vascular disease POA  Moderate AS POA  Severe TR  Mild to mod MR  F/u echo in 6 months  Echo TTE  Left Ventricle Normal left ventricular systolic function with a visually estimated EF of 50 - 55%. Left ventricle size is normal. Normal wall thickness. Global hypokinesis present. Normal diastolic function. Left Atrium Left atrium size is normal.   Right Ventricle Right ventricle size is normal. Normal systolic function. Right Atrium Right atrium size is normal.   Aortic Valve Valve structure is normal. Thickened cusp. Calcified cusp. No regurgitation. Moderate stenosis of the aortic valve. Mitral Valve Thickened leaflet. Mild to moderate regurgitation. Mild stenosis noted.    Tricuspid Valve Valve structure is normal. Severe regurgitation. No stenosis noted. Pulmonic Valve Valve structure is normal. No regurgitation. No stenosis noted. Aorta Normal sized aorta. Pericardium No pericardial effusion. Cardio f/u as outpt  Resume home meds     Left lower extremity wound  Cont wound care/wound care consult, recs : \"Every other day on Even days - Cleanse the wound with Caraklenz spray and wipe with 4x4's. Apply the Silvasorb wound hydrogel,  Optifoam Ag (silver) and then an ABD. Wrapped with a small roll therese and tape on the therese only. Date the dressing\"     Elevated lactic acid s/p IVF hydration. Resolved  D/c IVF          _______________________________________________________________________  Patient seen and examined by me on discharge day. Pertinent Findings:  General: No acute distress, cooperative, pleasant. Speaking in full sentences  EENT: Anicteric sclerae. Oral mucous moist, oropharynx benign and no oral thrush  Resp: Decreased BS bilaterally, no wheezing or rales No accessory muscle use  CV: Regular rhythm, normal rate, no murmurs, gallops, rubs  GI: Soft, non distended, non tender. normoactive bowel sounds, no hepatosplenomegaly Extremities: No edema, warm, 2+ pulses throughout. dressing on RLE  Neurologic: Moves all extremities. Alert, talking, CN II-XII grossly intact  Psych: ot anxious nor agitated. Skin: Good Turgor, no rashes. Left lower extremity in dressing  _______________________________________________________________________  DISCHARGE MEDICATIONS:   Current Discharge Medication List      START taking these medications    Details   guaiFENesin ER (MUCINEX) 1,200 mg Ta12 ER tablet Take 1 Tablet by mouth every twelve (12) hours. Qty: 60 Tablet, Refills: 0  Start date: 5/18/2022      furosemide (LASIX) 40 mg tablet Take 1 Tablet by mouth daily.   Qty: 30 Tablet, Refills: 0  Start date: 5/18/2022      benzocaine-menthoL (CEPACOL) 15-3.6 mg lozg lozenge 1 Lozenge by Mucous Membrane route as needed for Cough. Qty: 30 Lozenge, Refills: 0  Start date: 5/18/2022      umeclidinium-vilanteroL (ANORO ELLIPTA) 62.5-25 mcg/actuation inhaler Take 1 Puff by inhalation daily. Qty: 60 Each, Refills: 0  Start date: 5/18/2022         CONTINUE these medications which have NOT CHANGED    Details   calcium carb/magnesium hydrox (MYLANTA PO) Take 15-30 mL by mouth two (2) times daily as needed for PRN Reason (Other) (constipation). !! acetaminophen (TYLENOL) 500 mg tablet Take 1,000 mg by mouth every six (6) hours as needed for Pain. azelastine (ASTEPRO) 205.5 mcg (0.15 %) 2 Sprays by Both Nostrils route daily. fluticasone propionate (FLONASE) 50 mcg/actuation nasal spray 2 Sprays by Both Nostrils route daily. busPIRone (BUSPAR) 5 mg tablet Take 1 Tab by mouth every evening. Qty: 30 Tablet, Refills: 0      calcium carbonate (TUMS PO) Take 1 Tablet by mouth three (3) times daily as needed for Other (indigestion). simethicone (MYLANTA GAS PO) Take  by mouth daily as needed. OXYGEN-AIR DELIVERY SYSTEMS 2 L by Nasal route continuous. !! acetaminophen (TYLENOL) 325 mg tablet Take 2 Tabs by mouth every four (4) hours as needed for Pain or Fever. Qty: 40 Tab, Refills: 0      ipratropium-albuterol (COMBIVENT RESPIMAT)  mcg/actuation inhaler Take 1 Puff by inhalation four (4) times daily. loratadine (CLARITIN) 10 mg tablet Take 10 mg by mouth daily. azithromycin (ZITHROMAX) 250 mg tablet Take 250 mg by mouth every Monday, Wednesday, Friday. Indications: prevent lung infections      aspirin delayed-release 81 mg tablet Take 162 mg by mouth daily. !! - Potential duplicate medications found. Please discuss with provider.       STOP taking these medications       fluticasone-umeclidinium-vilanterol (Trelegy Ellipta) 100-62.5-25 mcg inhaler Comments:   Reason for Stopping:               My Recommended Diet, Activity, Wound Care, and follow-up labs are listed in the patient's Discharge Insturctions which I have personally completed and reviewed.   Risk of deterioration: Moderate    Condition at Discharge:  Stable  _____________________________________________________________________    Disposition  SNF/LTC  ____________________________________________________________________    Care Plan discussed with:   Patient, Family, RN, Care Manager, Consultant    ____________________________________________________________________    Code Status: DNR/DNI  ____________________________________________________________________      Condition at Discharge:  Stable  _____________________________________________________________________  Follow up with:   PCP : Omar Mott MD  Follow-up Information     Follow up With Specialties Details Why Contact Info    Omar Mott MD Internal Medicine Physician   [address]  [address] [phone]      Mike Pa MD Pulmonary Disease   [address]  [facility]  [address]  [address] [facility]      Abbey Borrero MD Gastroenterology   [address]  [address] [phone]                Total time in minutes spent coordinating this discharge (includes going over instructions, follow-up, prescriptions, and preparing report for sign off to her PCP) :  35 minutes    Signed:  Jo Bernheim, MD

## 2022-05-18 NOTE — PROGRESS NOTES
Pulmonary, Critical Care, and Sleep Medicine    Name: Beata Stockton MRN: [id number]   : 11/10/1931 Hospital: [facility]   Date: 2022        IMPRESSION:   · Acute/chronic hypoxic respiratory failure - on 2 LPM home O2 at baseline  · COPD - FEV1 0.94 L (55% predicted) - with acute exacerbation - on Anoro, TIW azithromycin at home  · Esophageal dysmotility with likely stricture in need of dilation  · Right pleural effusion transudative likely parapneumonic- with RLL consolidation and compressive atelectasis on my review of chest CT; s/p thoracentesis 22  · Probable aspiration pneumonitis  · Suspect there is some contribution of her esophageal dysmotility to exacerbating her COPD symptoms, may have episodes of aspiration as well   · Peripheral vascular disease  · H/O tobacco use      RECOMMENDATIONS:   · O2 - wean back to her home dose of 2 LPM as tolerated  · Mobilize  · D/c planning  · Pulmonary toilet  · Bronchodilators   · Systemic steroids  · GI recommended outpatient manometry  · Follow up pleural fluid cytology, cultures  · Should have another CXR at some point to make sure right effusion improves; pt informed that fluid may come back  · Restart Anoro and MWF Azithromycin at the time of discharge (note she is on Anoro, not Trelegy as listed in her PTA meds)  · F/u with Dr. Lazarus Cedar     Subjective:         22: Thoracentesis done the other day. Tolerated it well. 500 ml removed. Pale. Pt worried about malignancy. Tried to reassure her that no obvious signs, fluid results pending. Hopefully fluid willnot return. She does feel better after removala nd is less congested. Pt reminded to eat carefully. No chest pain. Waiting for SNF placement. Lives alone. Working with PT. Tahira Martin Notes and data reviewed. Now on 4-5 LPM.. 22: CTA without PE. Has a small pleural effusion. Pt reminded to eat carefully. Pt still with mild congestion. No chest pain.  Wonder if previous discomfort was from pleural irritation, hence effusion. Lives alone. Working with PT. Paulie Zapien Notes and data reviewed. Now on 4-5 LPM. Unable to wean. Up in recliner. 5-16-22: Events of weekend reviewed. Discussed with , nursing. Notes and data reviewed. Now on 4 LPM. Up in recliner. She says she was told she might have a blood clot? No wheeze today. congested cough. Pt reports leg sores. 5-13-22: dyspnea at baseline. She says she was told she is having a procedure today, though I don't know what that is as I don't see anything scheduled. 5-12-22: tolerated her EGD without issue. She has nasal congestion and drip which is making her cough. 5-11-22: no events. Feels a bit better, down to 3 LPM.  EGD planned for today. Initial history: This patient has been seen and evaluated at the request of Dr. Deya Irizarry for preop evaluation. Patient is a 80 y.o. female former smoker with COPD. She is well known to me. She was admitted 4 days ago with an exacerbation characterized by the sudden onset of dyspnea and wheezing beyond her baseline. She is on 2 LPM at baseline and required 4 here. She improved, but she was struggling with dysphagia. 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 After her swallow evaluation she has had increasing dyspnea again. Cough is currently nonproductive.         Past Medical History:   Diagnosis Date    Acute renal failure (ARF) ([address] 6/4/2017    Arthritis     generalized    Bronchitis, acute 6/4/2017    COPD (chronic obstructive pulmonary disease) ([address]     Essential hypertension 9/4/2018    former smoker      >60pkyr quit 1/3/11    History of DVT (deep vein thrombosis) 1955    left leg    History of sepsis 06/04/2017    ischemic left lower extremitiy pain     above knee FemPop 1/3/11    PVD (peripheral vascular disease) ([address]     Seasonal allergic rhinitis     Single kidney     Sinus bradycardia 7/28/2018 Past Surgical History:   Procedure Laterality Date    HX GI  10 yrs ago    colonoscopy    HX GYN      3 miscarriges    HX GYN      1 vaginal birth    HX ORTHOPAEDIC      veinography left leg, stent left leg    OK BREAST SURGERY PROCEDURE UNLISTED  1955    excision left breast cyst    UPPER GI ENDOSCOPY,BALL DIL,30MM  5/11/2022         UPPER GI ENDOSCOPY,BIOPSY  5/11/2022           Prior to Admission medications    Medication Sig Start Date End Date Taking? Authorizing Provider   calcium carb/magnesium hydrox (MYLANTA PO) Take 15-30 mL by mouth two (2) times daily as needed for PRN Reason (Other) (constipation). Yes Provider, Historical   acetaminophen (TYLENOL) 500 mg tablet Take 1,000 mg by mouth every six (6) hours as needed for Pain. Yes Provider, Historical   azelastine (ASTEPRO) 205.5 mcg (0.15 %) 2 Sprays by Both Nostrils route daily. Provider, Historical   fluticasone propionate (FLONASE) 50 mcg/actuation nasal spray 2 Sprays by Both Nostrils route daily. Provider, Historical   busPIRone (BUSPAR) 5 mg tablet Take 1 Tab by mouth every evening. Patient not taking: Reported on 4/20/2022 12/23/21   Salome Segundo MD   calcium carbonate (TUMS PO) Take 1 Tablet by mouth three (3) times daily as needed for Other (indigestion). Patient not taking: Reported on 4/20/2022    Provider, Historical   fluticasone-umeclidinium-vilanterol (Trelegy Ellipta) 100-62.5-25 mcg inhaler Take 1 Puff by inhalation daily. Provider, Historical   simethicone (MYLANTA GAS PO) Take  by mouth daily as needed. Patient not taking: Reported on 4/20/2022    Provider, Historical   OXYGEN-AIR DELIVERY SYSTEMS 2 L by Nasal route continuous. Provider, Historical   acetaminophen (TYLENOL) 325 mg tablet Take 2 Tabs by mouth every four (4) hours as needed for Pain or Fever.  12/21/19   Ry Boston MD   ipratropium-albuterol (COMBIVENT RESPIMAT)  mcg/actuation inhaler Take 1 Puff by inhalation four (4) times daily. Provider, Historical   loratadine (CLARITIN) 10 mg tablet Take 10 mg by mouth daily. Provider, Historical   azithromycin (ZITHROMAX) 250 mg tablet Take 250 mg by mouth every Monday, Wednesday, Friday. Indications: prevent lung infections    Provider, Historical   aspirin delayed-release 81 mg tablet Take 162 mg by mouth daily.     Provider, Historical     Allergies   Allergen Reactions    Food Extracts Diarrhea     Onions and garlic causes abdominal pain,cramping     Amlodipine Swelling    Sulfa (Sulfonamide Antibiotics) Other (comments)     Altered Mental Status      Social History     Tobacco Use    Smoking status: Former Smoker     Packs/day: 1.00     Years: 60.00     Pack years: 60.00     Quit date: 1/3/2011     Years since quittin.3    Smokeless tobacco: Never Used   Substance Use Topics    Alcohol use: Yes     Comment: occasional liquor after dinner      Family History   Problem Relation Age of Onset    Colon Cancer Mother     Lung Cancer Sister         Current Facility-Administered Medications   Medication Dose Route Frequency    albuterol-ipratropium (DUO-NEB) 2.5 MG-0.5 MG/3 ML  3 mL Nebulization Q4H RT    furosemide (LASIX) tablet 40 mg  40 mg Oral DAILY    guaiFENesin ER (MUCINEX) tablet 1,200 mg  1,200 mg Oral Q12H    enoxaparin (LOVENOX) injection 40 mg  40 mg SubCUTAneous DAILY    methylPREDNISolone (PF) (SOLU-MEDROL) injection 40 mg  40 mg IntraVENous Q8H    azithromycin (ZITHROMAX) tablet 250 mg  250 mg Oral Q MON, WED & FRI    sodium chloride (NS) flush 5-40 mL  5-40 mL IntraVENous Q8H    aspirin delayed-release tablet 162 mg  162 mg Oral DAILY    fluticasone propionate (FLONASE) 50 mcg/actuation nasal spray 2 Spray  2 Spray Both Nostrils DAILY    arformoteroL (BROVANA) neb solution 15 mcg  15 mcg Nebulization BID RT    And    budesonide (PULMICORT) 250 mcg/2ml nebulizer susp  250 mcg Nebulization BID RT       Review of Systems:  A comprehensive review of systems was negative except for that written in the HPI. Objective:   Vital Signs:    Visit Vitals  /80   Pulse 72   Temp 97.2 °F (36.2 °C)   Resp 16   Ht 5' 8\" (1.727 m)   Wt 56.7 kg (125 lb)   SpO2 97%   BMI 19.01 kg/m²       O2 Device: Nasal cannula   O2 Flow Rate (L/min): 4 l/min   Temp (24hrs), Av.2 °F (36.8 °C), Min:97.2 °F (36.2 °C), Max:98.9 °F (37.2 °C)       Intake/Output:   Last shift:      No intake/output data recorded. Last 3 shifts:  1901 -  0700  In: 720 [P.O.:720]  Out: 350 [Urine:350]    Intake/Output Summary (Last 24 hours) at 2022 1101  Last data filed at 2022 1800  Gross per 24 hour   Intake 480 ml   Output 350 ml   Net 130 ml      Physical Exam:   General:  Alert, cooperative, no distress, frail   Head:  Normocephalic, without obvious abnormality, atraumatic. Eyes:  Conjunctivae/corneas clear. Nose: Nares normal. Septum midline. Mucosa normal.     Throat: Lips, mucosa, and tongue normal. Teeth and gums normal.   Neck: Supple, symmetrical, trachea midline, no adenopathy    Lungs:   Poor bilateral air movement, wheezing is improved   Chest wall:  No tenderness or deformity. Heart:  Regular rate and rhythm    Abdomen:   Soft, non-tender. Bowel sounds normal.     Extremities: Extremities normal, atraumatic, no cyanosis    Skin: Skin color, texture, turgor normal. No rashes or lesions   Neurologic: Grossly nonfocal     Data:   Labs:  Recent Labs     22  0203 22  0156   WBC 9.1 8.3   HGB 11.6 11.7   HCT 38.6 38.6   * 123*     Recent Labs     22  0203 22  0456 22  0156    140 140   K 4.1 4.3 4.1   CL 99 99 101   CO2 41* 42* 40*   * 99 132*   BUN 32* 31* 36*   CREA 0.75 0.72 0.73   CA 9.0 8.9 9.0   ALB  --   --  2.7*   TBILI  --   --  0.8   ALT  --   --  18     No results for input(s): PHI, PCO2I, PO2I, HCO3I, FIO2I in the last 72 hours.     Imaging:  I have personally reviewed the patients radiographs:  None today        Nyra Dandy, MD

## 2022-05-19 NOTE — PROGRESS NOTES
Transition of care outreach postponed for 14 days due to patient's discharge to SNF.   D/C to [facility] 5/18/22 f/u14d

## 2022-06-03 NOTE — PROGRESS NOTES
Transition of care outreach postponed for 14 days due to patient's discharge to SNF.   D/C to [facility] 5/18/22 still admitted f/u 14d

## 2022-07-08 NOTE — TELEPHONE ENCOUNTER
Patient's son Florencio Diop, called regarding more information on what assistance there is available for his mother once she transitions out of rehab such as home health care during the day, home health pt etc. Unsure if Dr. Nisreen Torres can put in referrals for these. He states the patient has a cna that stays with her at night already but will most likely require other assistance daily.

## 2022-07-08 NOTE — TELEPHONE ENCOUNTER
Referral to home health placed per patient request.       ICD-10-CM ICD-9-CM    1.  Chronic respiratory failure with hypoxia, on home oxygen therapy (HCC)  J96.11 518.83 REFERRAL TO HOME HEALTH    Z99.81 799.02 CANCELED: [address] [place] [address]     V46.2

## 2022-07-13 NOTE — PROGRESS NOTES
ICD-10-CM ICD-9-CM    1.  Chronic respiratory failure with hypoxia, on home oxygen therapy (HCC)  J96.11 518.83 REFERRAL TO HOME HEALTH    Z99.81 799.02      V46.2

## 2023-07-16 NOTE — ED PROVIDER NOTES
EMERGENCY DEPARTMENT HISTORY AND PHYSICAL EXAM 
 
 
Date: 7/27/2018 Patient Name: Analisa Barrios History of Presenting Illness Chief Complaint Patient presents with  Shortness of Breath Pt wheeled to triage with c/o SOB; pt with hx of COPD states she is having an exacerbation x 3 days; pt is on 2L NC O2; pt with chronic, non-productive cough History Provided By: Patient HPI: Analisa Barrios, 80 y.o. female with PMHx significant for COPD, PVD, Single kidney, arthritis, presents ambulatory to the ED with cc of worsening SOB for the past 3 days. Pt reports a hx of similar symptoms in the past when humidity is up. She does wear home O2 at nights and when she is SOB. She denies any other pain or symptoms. No CP,cough, fever, chills, HA, abdominal pain, leg swelling, leg pain. Denies smoking or drinking. Because there are no symptoms or complaints of pain, there is no reported quality, severity, modifying factors, or associated signs and symptoms reported. Social Hx: - Tobacco (former smoker), + EtOH (occasionally), - illicit drug use (-) There are no other complaints, changes, or physical findings at this time. PCP: Ainsley Cordova MD 
 
Current Outpatient Prescriptions Medication Sig Dispense Refill  predniSONE (DELTASONE) 5 mg tablet Take 5 mg by mouth.  azithromycin (ZITHROMAX) 250 mg tablet Take 250 mg by mouth every Monday, Wednesday, Friday.  OXYGEN-AIR DELIVERY SYSTEMS 2 L/min by Nasal route as needed (Shortness of Breath).  docusate sodium (COLACE) 100 mg capsule Take 100 mg by mouth daily as needed for Constipation.  acetaminophen (TYLENOL) 500 mg tablet Take 500 mg by mouth every six (6) hours as needed for Pain.  albuterol-ipratropium (DUO-NEB) 2.5 mg-0.5 mg/3 ml nebu 3 mL by Nebulization route three (3) times daily.  (Patient taking differently: 3 mL by Nebulization route four (4) times daily.) 90 Nebule 0  
 Azelastine (ASTEPRO) 0.15 % (205.5 mcg) nasal spray  ANORO ELLIPTA 62.5-25 mcg/actuation inhaler  COMBIVENT RESPIMAT  mcg/actuation inhaler INHALE 1 PUFF THREE TIMES A DAY 1 Inhaler 11  
 triamcinolone acetonide (KENALOG) 0.1 % topical cream Apply  to affected area two (2) times daily as needed for Skin Irritation. use thin layer 80 g 5  
 aspirin delayed-release 81 mg tablet Take  by mouth daily. Past History Past Medical History: 
Past Medical History:  
Diagnosis Date  Arthritis   
 generalized  COPD   
 former smoker   
  >60pkyr quit 1/3/11  
 h/o DVT 1955 left leg  ischemic left lower extremitiy pain   
 above knee FemPop 1/3/11  PVD (peripheral vascular disease) ([address]  Seasonal allergic rhinitis  Single kidney Past Surgical History: 
Past Surgical History:  
Procedure Laterality Date [address] excision left breast cyst  
 HX GI  10 yrs ago  
 colonoscopy  HX GYN    
 3 miscarriges  HX GYN    
 1 vaginal birth  HX ORTHOPAEDIC    
 veinography left leg, stent left leg Family History: 
Family History Problem Relation Age of Onset  Cancer Mother   
  colon  Cancer Sister   
  lung Social History: 
Social History Substance Use Topics  Smoking status: Former Smoker Packs/day: 1.00 Years: 60.00 Quit date: 1/3/2011  Smokeless tobacco: Never Used  Alcohol use Yes Comment: occasional liquor after dinner Allergies: Allergies Allergen Reactions  Food Extracts Diarrhea Onions and garlic causes abdominal pain,cramping  Sulfa (Sulfonamide Antibiotics) Other (comments) Altered Mental Status Review of Systems Review of Systems Constitutional: Negative for chills and fever. HENT: Negative for congestion. Eyes: Negative. Respiratory: Positive for shortness of breath. Negative for cough. Cardiovascular: Negative for chest pain and palpitations. Gastrointestinal: Negative for abdominal pain, diarrhea, nausea and vomiting. Endocrine: Negative for heat intolerance. Genitourinary: Negative for dysuria, frequency and hematuria. Musculoskeletal: Negative for back pain. Skin: Negative for rash. Allergic/Immunologic: Negative for immunocompromised state. Neurological: Negative for headaches. Hematological: Does not bruise/bleed easily. Psychiatric/Behavioral: Negative. All other systems reviewed and are negative. Physical Exam  
Physical Exam  
Constitutional: She is oriented to person, place, and time. She appears well-developed and well-nourished. No distress. NAD HENT:  
Head: Normocephalic and atraumatic. Eyes: EOM are normal. Pupils are equal, round, and reactive to light. Neck: Normal range of motion. Neck supple. Cardiovascular: Normal rate, regular rhythm and normal heart sounds. Pulmonary/Chest: Effort normal. No respiratory distress. She has wheezes (scattered). Abdominal: Soft. Bowel sounds are normal. She exhibits no mass. There is no tenderness. Musculoskeletal: Normal range of motion. She exhibits edema. 1+ edema left lower extremity. Neurological: She is alert and oriented to person, place, and time. Coordination normal.  
Decreased sensations of the lower extremities at baseline. Skin: Skin is warm and dry. Psychiatric: She has a normal mood and affect. Her behavior is normal.  
Nursing note and vitals reviewed. Diagnostic Study Results Labs - Recent Results (from the past 12 hour(s)) EKG, 12 LEAD, INITIAL Collection Time: 07/27/18 12:56 PM  
Result Value Ref Range Ventricular Rate 67 BPM  
 Atrial Rate 67 BPM  
 P-R Interval 242 ms QRS Duration 138 ms Q-T Interval 442 ms QTC Calculation (Bezet) 467 ms Calculated P Axis 53 degrees Calculated R Axis -29 degrees Calculated T Axis 92 degrees Diagnosis Sinus rhythm with 1st degree AV block Left bundle branch block When compared with ECG of 17-JUL-2017 19:34, Sinus rhythm has replaced Atrial fibrillation Left bundle branch block is now present Minimal criteria for Anterior infarct are no longer present CBC WITH AUTOMATED DIFF Collection Time: 07/27/18  2:11 PM  
Result Value Ref Range WBC 7.9 3.6 - 11.0 K/uL  
 RBC 4.65 3.80 - 5.20 M/uL  
 HGB 13.8 11.5 - 16.0 g/dL HCT 41.0 35.0 - 47.0 % MCV 88.2 80.0 - 99.0 FL  
 MCH 29.7 26.0 - 34.0 PG  
 MCHC 33.7 30.0 - 36.5 g/dL  
 RDW 13.6 11.5 - 14.5 % PLATELET 841 639 - 711 K/uL MPV 8.8 (L) 8.9 - 12.9 FL  
 NRBC 0.0 0  WBC ABSOLUTE NRBC 0.00 0.00 - 0.01 K/uL NEUTROPHILS 74 32 - 75 % LYMPHOCYTES 13 12 - 49 % MONOCYTES 8 5 - 13 % EOSINOPHILS 5 0 - 7 % BASOPHILS 1 0 - 1 % IMMATURE GRANULOCYTES 0 0.0 - 0.5 % ABS. NEUTROPHILS 5.8 1.8 - 8.0 K/UL  
 ABS. LYMPHOCYTES 1.0 0.8 - 3.5 K/UL  
 ABS. MONOCYTES 0.6 0.0 - 1.0 K/UL  
 ABS. EOSINOPHILS 0.4 0.0 - 0.4 K/UL  
 ABS. BASOPHILS 0.1 0.0 - 0.1 K/UL  
 ABS. IMM. GRANS. 0.0 0.00 - 0.04 K/UL  
 DF AUTOMATED METABOLIC PANEL, COMPREHENSIVE Collection Time: 07/27/18  2:11 PM  
Result Value Ref Range Sodium 128 (L) 136 - 145 mmol/L Potassium 4.7 3.5 - 5.1 mmol/L Chloride 95 (L) 97 - 108 mmol/L  
 CO2 25 21 - 32 mmol/L Anion gap 8 5 - 15 mmol/L Glucose 99 65 - 100 mg/dL BUN 16 6 - 20 MG/DL Creatinine 0.92 0.55 - 1.02 MG/DL  
 BUN/Creatinine ratio 17 12 - 20 GFR est AA >60 >60 ml/min/1.73m2 GFR est non-AA 58 (L) >60 ml/min/1.73m2 Calcium 9.1 8.5 - 10.1 MG/DL Bilirubin, total 0.6 0.2 - 1.0 MG/DL  
 ALT (SGPT) 14 12 - 78 U/L  
 AST (SGOT) 15 15 - 37 U/L Alk. phosphatase 63 45 - 117 U/L Protein, total 7.1 6.4 - 8.2 g/dL Albumin 4.0 3.5 - 5.0 g/dL Globulin 3.1 2.0 - 4.0 g/dL A-G Ratio 1.3 1.1 - 2.2 CK W/ REFLX CKMB Collection Time: 07/27/18  2:11 PM  
Result Value Ref Range CK 66 26 - 192 U/L  
TROPONIN I  Collection Time: 07/27/18  2:11 PM  
Result Value Ref Range Troponin-I, Qt. <0.05 <0.05 ng/mL Radiologic Studies - CXR Results  (Last 48 hours) 07/27/18 1340  XR CHEST PA LAT Final result Impression:  Impression: 1. Hyperinflated lungs, no acute cardiopulmonary disease Narrative:  INDICATION:  SOB, COPD Exam: Chest 2 views. Comparison July 17, 2017. FINDINGS: Lung volumes remain hyperinflated. . Cardiac silhouette remains  
enlarged. Pulmonary vasculature is not engorged. No focal parenchymal opacities,  
effusions, or pneumothorax. Bony thorax is intact. Medical Decision Making I am the first provider for this patient. I reviewed the vital signs, available nursing notes, past medical history, past surgical history, family history and social history. Vital Signs-Reviewed the patient's vital signs. Patient Vitals for the past 12 hrs: 
 Temp Pulse Resp BP SpO2  
07/27/18 1746 - (!) 59 - (!) 205/76 97 %  
07/27/18 1630 - (!) 58 - 192/90 98 %  
07/27/18 1530 - (!) 56 - 170/82 98 %  
07/27/18 1249 97.9 °F (36.6 °C) 68 18 (!) 189/93 95 % Records Reviewed: Nursing Notes, Old Medical Records, Previous Radiology Studies and Previous Laboratory Studies Provider Notes (Medical Decision Making): DDx: COPD exacerbation, Bronchitis, PNA, CAD, CHF. ED Course:  
Initial assessment performed. The patients presenting problems have been discussed, and they are in agreement with the care plan formulated and outlined with them. I have encouraged them to ask questions as they arise throughout their visit. PROGRESS NOTE: 
5:48 PM 
Pt is not feeling any better. Her BP has been rising since she got to the ED. Her BP was running in the 170s but during my re-evaluation it was in the 200s. Plan to ambulate the pt. CONSULT NOTE:  
6:59 PM 
Margarita Meneses MD spoke with Dr. William Trivedi, Specialty: Hospitalist 
Discussed pt's hx, disposition, and available diagnostic and imaging results. Reviewed care plans. Consultant will evaluate pt for admission. Written by MELODY Rosenthalibricha, as dictated by Doe Swanson MD. Critical Care Time: CRITICAL CARE NOTE : 
6:59 PM 
IMPENDING DETERIORATION -Respiratory, Cardiovascular and Metabolic ASSOCIATED RISK FACTORS - Hypotension, Hypoxia, Dysrhythmia and Metabolic changes MANAGEMENT- Bedside Assessment and Supervision of Care INTERPRETATION -  Xrays, ECG and Blood Pressure INTERVENTIONS - hemodynamic mngmt and Metobolic interventions CASE REVIEW - Hospitalist, Nursing and Family TREATMENT RESPONSE -Unchanged PERFORMED BY - Self NOTES   : 
I have spent 45 minutes of critical care time involved in lab review, consultations with specialist, family decision- making, bedside attention and documentation. During this entire length of time I was immediately available to the patient . Doe Swanson MD 
 
Disposition: 
ADMIT NOTE: 
7:00 PM 
Patient is being admitted to the hospital by Dr. Anil Back. The results of their tests and reasons for their admission have been discussed with them and/or available family. They convey agreement and understanding for the need to be admitted and for their admission diagnosis. Consultation has been made with the inpatient physician specialist for hospitalization. PLAN: Admit the pt to the hospitalist.  
 
Diagnosis Clinical Impression: 1. Acute exacerbation of chronic obstructive pulmonary disease (COPD) ([facility] [address] 2. Hypoxia 3. Hyponatremia Attestations: This note is prepared by Cris Mejia acting as Scribe for MD Doe Gomez MD : The scribe's documentation has been prepared under my direction and personally reviewed by me in its entirety. I confirm that the note above accurately reflects all work, treatment, procedures, and medical decision making performed by me.  used

## 2024-07-25 NOTE — WOUND CARE
Wound care consult for a chronic leg wound present on admission:  Chart reviewed and patient assessed today. Pt. Is 80years old and was admitted this time for her COPD and is currently on Steroids (pills and inhalers?). Pt. Has a long standing history of COPD with HTN and lower Extremity Arterial disease (LEAD). She has hx of lower extremity bypass surgeries with Dr. Jovanni Gonzales. She said that she had a \"bad case of phlebitis\" when she was [address] old and the wound has existed every since that time (sometimes better than other times). Pt. Is usually on about 2 lpm Oxygen at home but here she is staying between 3-4 liters with SATs of 90. She is also on Steroids for her breathing and the wheezing has resolved for the most part. Assessment today:  Pt. Is able to talk and she said, \"I can't breathe well and I cannot eat\". \"Nobody has told me any test results for my tests I did yesterday\". Pt. Is in the bedside recliner. The old dressing was removed. The dressing that was removed was about 3days old because she would not let any staff nurses change her dressing. The wound bed is pink and all around   Treatment: The wound was cleansed with Caraklenz spray and wiped with gauze to remover the old drainage and wound debris. An Optifoam Ag dressing was applied, followed by ABD pad and wrapped with the small roll therese. Plan: Every other day on Even days - Cleanse the wound with Caraklenz spray and wipe with 4x4's. Apply the Silvasorb wound hydrogel,  Optifoam Ag (silver) and then an ABD. Wrapped with a small roll therese and tape on the therese only. Date the dressing. Dressing will need changed every other day. Pt. Agreed to this.    Santy Kiser RN, BSN, [employer]
No
